# Patient Record
Sex: FEMALE | Race: BLACK OR AFRICAN AMERICAN | NOT HISPANIC OR LATINO
[De-identification: names, ages, dates, MRNs, and addresses within clinical notes are randomized per-mention and may not be internally consistent; named-entity substitution may affect disease eponyms.]

---

## 2020-01-27 PROBLEM — Z00.00 ENCOUNTER FOR PREVENTIVE HEALTH EXAMINATION: Status: ACTIVE | Noted: 2020-01-27

## 2020-01-28 PROBLEM — R87.619 ATYPICAL GLANDULAR CELLS OF UNDETERMINED SIGNIFICANCE (AGUS) ON CERVICAL PAP SMEAR: Status: ACTIVE | Noted: 2020-01-28

## 2020-01-28 NOTE — PHYSICAL EXAM
[Normal] : Bimanual Exam: Normal [Fully active, able to carry on all pre-disease performance without restriction] : Status 0 - Fully active, able to carry on all pre-disease performance without restriction

## 2020-01-29 ENCOUNTER — APPOINTMENT (OUTPATIENT)
Dept: GYNECOLOGIC ONCOLOGY | Facility: CLINIC | Age: 55
End: 2020-01-29
Payer: COMMERCIAL

## 2020-01-29 VITALS
BODY MASS INDEX: 40.98 KG/M2 | SYSTOLIC BLOOD PRESSURE: 110 MMHG | WEIGHT: 246 LBS | HEIGHT: 65 IN | OXYGEN SATURATION: 99 % | HEART RATE: 82 BPM | DIASTOLIC BLOOD PRESSURE: 90 MMHG

## 2020-01-29 DIAGNOSIS — Z80.0 FAMILY HISTORY OF MALIGNANT NEOPLASM OF DIGESTIVE ORGANS: ICD-10-CM

## 2020-01-29 DIAGNOSIS — R93.89 ABNORMAL FINDINGS ON DIAGNOSTIC IMAGING OF OTHER SPECIFIED BODY STRUCTURES: ICD-10-CM

## 2020-01-29 DIAGNOSIS — R87.619 UNSPECIFIED ABNORMAL CYTOLOGICAL FINDINGS IN SPECIMENS FROM CERVIX UTERI: ICD-10-CM

## 2020-01-29 DIAGNOSIS — Z78.9 OTHER SPECIFIED HEALTH STATUS: ICD-10-CM

## 2020-01-29 PROCEDURE — 99202 OFFICE O/P NEW SF 15 MIN: CPT | Mod: 25

## 2020-01-29 PROCEDURE — 58100 BIOPSY OF UTERUS LINING: CPT

## 2020-01-29 NOTE — CHIEF COMPLAINT
[FreeTextEntry1] : 55 y/o referred from Dr. Matt Marshall for abnormal pap smear revealing BETI, HPV + and thick endometrium for further evaluation. She reports monthly spotting, unclear if rasheed-menopausal. Last episode was in October. \par \par PAP was performed September 2019 which demosntrated BETI, hrHPV + (16/18 negative). Oct 2019: patient had an ECC (not with Dr. Marshall) which was normal. \par \par OBHx: P0\par GYNHx: Fibroids\par PMHx: Asthma\par Sx: endometrial polypectomy 2008\par MedHx: No meds\par Allergies: NKDA\par SocialHx: , single, no toxic habits\par FamHx: rectal cancer- mother\par \par Health Maintenance \par Last Pap- as above\par Last Mammogram- Nov 2019\par Colonoscopy- 2016

## 2020-01-29 NOTE — ASSESSMENT
[FreeTextEntry1] : Atypical glandular cells of undetermined significance (BETI) on cervical Pap smear and thickened endometrium.\par \par As per ASCCP recommedations colposcopy and Endometrial sampling is recommended. Importance of both procedures discussed to r/o precancerous/cancerous lesions. \par \par Endometrial biopsy risks including bleeding, infections and possible uterine perforation discussed. Pt verbalized understanding and gave verbal consent\par \par Colposcopy performed previously with ECC. Visual inspection today normal. \par \par [] EMB performed successfully, hemostasis achieved \par [] Will call w/ results. If results are negative recommend co-testing in 1 year.\par

## 2020-01-29 NOTE — PROCEDURE
[Endometrial Biopsy] : an endometrial biopsy [Other: ___] : [unfilled] [Patient] : the patient [Betadine] : betadine [Yes] : the specimen was sent to pathology [No Complications] : none [Tolerated Well] : the patient tolerated the procedure well [FreeTextEntry1] : Pipelle introduced to 8 cm. Two passes, adequate tissue.

## 2020-01-29 NOTE — HISTORY OF PRESENT ILLNESS
[FreeTextEntry1] : PROBLEM LIST \par 1. BETI, HPV positive\par 2. Thick endometrium \par \par [] Cotesting: Atyical glandular cells, NOS, HPV HR Detected. 16/18 NOT Detected\par \par [] Pelvic US 11/20/2019 \par     a) uterus, anteverted 5.8 x 3.2 x 4.5cm\par     b) Multiple fibroids, largest 2.3 x 2.1 x 2.4cm\par     c) Endometrial stripe measures 1cm in thickness\par \par [] MRI Pelvis 1/14/2020\par     a) Endometrial stripe 1.4cm w/ mild cystic changes. No discrete endometrial polyp or mass identified\par     b) multiple small fibrouds\par     c) No lymphadenopathy in pelvis

## 2020-05-20 ENCOUNTER — OUTPATIENT (OUTPATIENT)
Dept: OUTPATIENT SERVICES | Facility: HOSPITAL | Age: 55
LOS: 1 days | Discharge: HOME | End: 2020-05-20

## 2020-05-20 DIAGNOSIS — C54.1 MALIGNANT NEOPLASM OF ENDOMETRIUM: ICD-10-CM

## 2020-05-21 ENCOUNTER — APPOINTMENT (OUTPATIENT)
Dept: HEMATOLOGY ONCOLOGY | Facility: CLINIC | Age: 55
End: 2020-05-21
Payer: COMMERCIAL

## 2020-05-21 ENCOUNTER — APPOINTMENT (OUTPATIENT)
Dept: HEMATOLOGY ONCOLOGY | Facility: CLINIC | Age: 55
End: 2020-05-21

## 2020-05-21 PROCEDURE — 99205 OFFICE O/P NEW HI 60 MIN: CPT | Mod: 95

## 2020-05-22 NOTE — CONSULT LETTER
[Dear  ___] : Dear  [unfilled], [Consult Letter:] : I had the pleasure of evaluating your patient, [unfilled]. [Consult Closing:] : Thank you very much for allowing me to participate in the care of this patient.  If you have any questions, please do not hesitate to contact me. [Sincerely,] : Sincerely, [DrRobin  ___] : Dr. MEZA

## 2020-05-26 ENCOUNTER — APPOINTMENT (OUTPATIENT)
Dept: HEMATOLOGY ONCOLOGY | Facility: CLINIC | Age: 55
End: 2020-05-26

## 2020-05-27 ENCOUNTER — RESULT REVIEW (OUTPATIENT)
Age: 55
End: 2020-05-27

## 2020-05-27 ENCOUNTER — OUTPATIENT (OUTPATIENT)
Dept: OUTPATIENT SERVICES | Facility: HOSPITAL | Age: 55
LOS: 1 days | End: 2020-05-27
Payer: COMMERCIAL

## 2020-05-27 ENCOUNTER — APPOINTMENT (OUTPATIENT)
Dept: HEMATOLOGY ONCOLOGY | Facility: CLINIC | Age: 55
End: 2020-05-27

## 2020-05-27 DIAGNOSIS — Z11.59 ENCOUNTER FOR SCREENING FOR OTHER VIRAL DISEASES: ICD-10-CM

## 2020-05-27 LAB
BASOPHILS # BLD AUTO: 0.01 K/UL — SIGNIFICANT CHANGE UP (ref 0–0.2)
BASOPHILS NFR BLD AUTO: 0.2 % — SIGNIFICANT CHANGE UP (ref 0–2)
EOSINOPHIL # BLD AUTO: 0.11 K/UL — SIGNIFICANT CHANGE UP (ref 0–0.5)
EOSINOPHIL NFR BLD AUTO: 2.1 % — SIGNIFICANT CHANGE UP (ref 0–6)
HCT VFR BLD CALC: 36.8 % — SIGNIFICANT CHANGE UP (ref 34.5–45)
HGB BLD-MCNC: 12.2 G/DL — SIGNIFICANT CHANGE UP (ref 11.5–15.5)
IMM GRANULOCYTES NFR BLD AUTO: 0.2 % — SIGNIFICANT CHANGE UP (ref 0–1.5)
LYMPHOCYTES # BLD AUTO: 2.46 K/UL — SIGNIFICANT CHANGE UP (ref 1–3.3)
LYMPHOCYTES # BLD AUTO: 46.6 % — HIGH (ref 13–44)
MCHC RBC-ENTMCNC: 27.4 PG — SIGNIFICANT CHANGE UP (ref 27–34)
MCHC RBC-ENTMCNC: 33.2 GM/DL — SIGNIFICANT CHANGE UP (ref 32–36)
MCV RBC AUTO: 82.7 FL — SIGNIFICANT CHANGE UP (ref 80–100)
MONOCYTES # BLD AUTO: 0.58 K/UL — SIGNIFICANT CHANGE UP (ref 0–0.9)
MONOCYTES NFR BLD AUTO: 11 % — SIGNIFICANT CHANGE UP (ref 2–14)
NEUTROPHILS # BLD AUTO: 2.11 K/UL — SIGNIFICANT CHANGE UP (ref 1.8–7.4)
NEUTROPHILS NFR BLD AUTO: 39.9 % — LOW (ref 43–77)
NRBC # BLD: 0 /100 WBCS — SIGNIFICANT CHANGE UP (ref 0–0)
PLATELET # BLD AUTO: 212 K/UL — SIGNIFICANT CHANGE UP (ref 150–400)
RBC # BLD: 4.45 M/UL — SIGNIFICANT CHANGE UP (ref 3.8–5.2)
RBC # FLD: 14 % — SIGNIFICANT CHANGE UP (ref 10.3–14.5)
WBC # BLD: 5.28 K/UL — SIGNIFICANT CHANGE UP (ref 3.8–10.5)
WBC # FLD AUTO: 5.28 K/UL — SIGNIFICANT CHANGE UP (ref 3.8–10.5)

## 2020-05-27 PROCEDURE — U0003: CPT

## 2020-05-29 ENCOUNTER — OUTPATIENT (OUTPATIENT)
Dept: OUTPATIENT SERVICES | Facility: HOSPITAL | Age: 55
LOS: 1 days | End: 2020-05-29
Payer: COMMERCIAL

## 2020-05-29 ENCOUNTER — RESULT REVIEW (OUTPATIENT)
Age: 55
End: 2020-05-29

## 2020-05-29 DIAGNOSIS — C55 MALIGNANT NEOPLASM OF UTERUS, PART UNSPECIFIED: ICD-10-CM

## 2020-05-29 PROCEDURE — 36561 INSERT TUNNELED CV CATH: CPT

## 2020-05-29 PROCEDURE — C1769: CPT

## 2020-05-29 PROCEDURE — 77001 FLUOROGUIDE FOR VEIN DEVICE: CPT | Mod: 26

## 2020-05-29 PROCEDURE — 76937 US GUIDE VASCULAR ACCESS: CPT

## 2020-05-29 PROCEDURE — C1788: CPT

## 2020-05-29 PROCEDURE — 76937 US GUIDE VASCULAR ACCESS: CPT | Mod: 26

## 2020-05-29 PROCEDURE — C1894: CPT

## 2020-05-29 PROCEDURE — 77001 FLUOROGUIDE FOR VEIN DEVICE: CPT

## 2020-05-29 NOTE — PROGRESS NOTE ADULT - SUBJECTIVE AND OBJECTIVE BOX
Interventional Radiology Pre-Procedure Note    This is a 54y Female with Uterine Ca presenting for mediport placement for chemotherapy    Procedure: Mediport placement for chemotherapy    Diagnosis/Indication: Patient is a 54y old  Female who presents with a chief complaint of Uterine Ca    PAST MEDICAL & SURGICAL HISTORY:       Allergies: No Known Allergies      LABS:  CBC Full  -  ( 27 May 2020 16:23 )  WBC Count : 5.28 K/uL  RBC Count : 4.45 M/uL  Hemoglobin : 12.2 g/dL  Hematocrit : 36.8 %  Platelet Count - Automated : 212 K/uL  Mean Cell Volume : 82.7 fl  Mean Cell Hemoglobin : 27.4 pg  Mean Cell Hemoglobin Concentration : 33.2 gm/dL  Auto Neutrophil # : 2.11 K/uL  Auto Lymphocyte # : 2.46 K/uL  Auto Monocyte # : 0.58 K/uL  Auto Eosinophil # : 0.11 K/uL  Auto Basophil # : 0.01 K/uL  Auto Neutrophil % : 39.9 %  Auto Lymphocyte % : 46.6 %  Auto Monocyte % : 11.0 %  Auto Eosinophil % : 2.1 %  Auto Basophil % : 0.2 %              Procedure/ risks/ benefits/ alternatives were explained, informed consent obtained from patient, verbalizes understanding.

## 2020-06-03 ENCOUNTER — RESULT REVIEW (OUTPATIENT)
Age: 55
End: 2020-06-03

## 2020-06-03 ENCOUNTER — APPOINTMENT (OUTPATIENT)
Dept: INFUSION THERAPY | Facility: HOSPITAL | Age: 55
End: 2020-06-03

## 2020-06-03 DIAGNOSIS — C55 MALIGNANT NEOPLASM OF UTERUS, PART UNSPECIFIED: ICD-10-CM

## 2020-06-03 DIAGNOSIS — Z45.2 ENCOUNTER FOR ADJUSTMENT AND MANAGEMENT OF VASCULAR ACCESS DEVICE: ICD-10-CM

## 2020-06-03 LAB
BASOPHILS # BLD AUTO: 0 K/UL — SIGNIFICANT CHANGE UP (ref 0–0.2)
BASOPHILS NFR BLD AUTO: 0 % — SIGNIFICANT CHANGE UP (ref 0–2)
EOSINOPHIL # BLD AUTO: 0 K/UL — SIGNIFICANT CHANGE UP (ref 0–0.5)
EOSINOPHIL NFR BLD AUTO: 0 % — SIGNIFICANT CHANGE UP (ref 0–6)
HCT VFR BLD CALC: 38.2 % — SIGNIFICANT CHANGE UP (ref 34.5–45)
HGB BLD-MCNC: 12.6 G/DL — SIGNIFICANT CHANGE UP (ref 11.5–15.5)
IMM GRANULOCYTES NFR BLD AUTO: 0.8 % — SIGNIFICANT CHANGE UP (ref 0–1.5)
LYMPHOCYTES # BLD AUTO: 1.3 K/UL — SIGNIFICANT CHANGE UP (ref 1–3.3)
LYMPHOCYTES # BLD AUTO: 20.1 % — SIGNIFICANT CHANGE UP (ref 13–44)
MCHC RBC-ENTMCNC: 27.6 PG — SIGNIFICANT CHANGE UP (ref 27–34)
MCHC RBC-ENTMCNC: 33 GM/DL — SIGNIFICANT CHANGE UP (ref 32–36)
MCV RBC AUTO: 83.8 FL — SIGNIFICANT CHANGE UP (ref 80–100)
MONOCYTES # BLD AUTO: 0.08 K/UL — SIGNIFICANT CHANGE UP (ref 0–0.9)
MONOCYTES NFR BLD AUTO: 1.2 % — LOW (ref 2–14)
NEUTROPHILS # BLD AUTO: 5.03 K/UL — SIGNIFICANT CHANGE UP (ref 1.8–7.4)
NEUTROPHILS NFR BLD AUTO: 77.9 % — HIGH (ref 43–77)
NRBC # BLD: 0 /100 WBCS — SIGNIFICANT CHANGE UP (ref 0–0)
PLATELET # BLD AUTO: 214 K/UL — SIGNIFICANT CHANGE UP (ref 150–400)
RBC # BLD: 4.56 M/UL — SIGNIFICANT CHANGE UP (ref 3.8–5.2)
RBC # FLD: 13.8 % — SIGNIFICANT CHANGE UP (ref 10.3–14.5)
WBC # BLD: 6.46 K/UL — SIGNIFICANT CHANGE UP (ref 3.8–10.5)
WBC # FLD AUTO: 6.46 K/UL — SIGNIFICANT CHANGE UP (ref 3.8–10.5)

## 2020-06-04 DIAGNOSIS — R11.2 NAUSEA WITH VOMITING, UNSPECIFIED: ICD-10-CM

## 2020-06-04 DIAGNOSIS — Z51.11 ENCOUNTER FOR ANTINEOPLASTIC CHEMOTHERAPY: ICD-10-CM

## 2020-06-05 LAB
ALBUMIN SERPL ELPH-MCNC: 4.3 G/DL
ALP BLD-CCNC: 91 U/L
ALT SERPL-CCNC: 25 U/L
ANION GAP SERPL CALC-SCNC: 13 MMOL/L
APTT BLD: 30.2 SEC
AST SERPL-CCNC: 21 U/L
BILIRUB SERPL-MCNC: 0.3 MG/DL
BUN SERPL-MCNC: 13 MG/DL
CALCIUM SERPL-MCNC: 9.4 MG/DL
CANCER AG125 SERPL-ACNC: 28 U/ML
CEA SERPL-MCNC: 1.9 NG/ML
CHLORIDE SERPL-SCNC: 104 MMOL/L
CO2 SERPL-SCNC: 23 MMOL/L
CREAT SERPL-MCNC: 0.99 MG/DL
GLUCOSE SERPL-MCNC: 87 MG/DL
HAV IGM SER QL: NONREACTIVE
HBV CORE IGM SER QL: NONREACTIVE
HBV SURFACE AG SER QL: NONREACTIVE
HCV AB SER QL: NONREACTIVE
HCV S/CO RATIO: 0.12 S/CO
INR PPP: 1 RATIO
MAGNESIUM SERPL-MCNC: 2 MG/DL
POTASSIUM SERPL-SCNC: 4.2 MMOL/L
PROT SERPL-MCNC: 7.2 G/DL
PT BLD: 11.4 SEC
SODIUM SERPL-SCNC: 140 MMOL/L

## 2020-06-10 ENCOUNTER — APPOINTMENT (OUTPATIENT)
Dept: INFUSION THERAPY | Facility: HOSPITAL | Age: 55
End: 2020-06-10

## 2020-06-10 ENCOUNTER — LABORATORY RESULT (OUTPATIENT)
Age: 55
End: 2020-06-10

## 2020-06-10 ENCOUNTER — RESULT REVIEW (OUTPATIENT)
Age: 55
End: 2020-06-10

## 2020-06-10 ENCOUNTER — APPOINTMENT (OUTPATIENT)
Dept: HEMATOLOGY ONCOLOGY | Facility: CLINIC | Age: 55
End: 2020-06-10
Payer: COMMERCIAL

## 2020-06-10 VITALS
DIASTOLIC BLOOD PRESSURE: 88 MMHG | HEART RATE: 103 BPM | RESPIRATION RATE: 17 BRPM | OXYGEN SATURATION: 98 % | TEMPERATURE: 99.3 F | BODY MASS INDEX: 41.2 KG/M2 | SYSTOLIC BLOOD PRESSURE: 136 MMHG | WEIGHT: 247.58 LBS

## 2020-06-10 LAB
BASOPHILS # BLD AUTO: 0 K/UL — SIGNIFICANT CHANGE UP (ref 0–0.2)
BASOPHILS NFR BLD AUTO: 0 % — SIGNIFICANT CHANGE UP (ref 0–2)
EOSINOPHIL # BLD AUTO: 0.04 K/UL — SIGNIFICANT CHANGE UP (ref 0–0.5)
EOSINOPHIL NFR BLD AUTO: 2 % — SIGNIFICANT CHANGE UP (ref 0–6)
HCT VFR BLD CALC: 39.3 % — SIGNIFICANT CHANGE UP (ref 34.5–45)
HGB BLD-MCNC: 12.9 G/DL — SIGNIFICANT CHANGE UP (ref 11.5–15.5)
LYMPHOCYTES # BLD AUTO: 1.3 K/UL — SIGNIFICANT CHANGE UP (ref 1–3.3)
LYMPHOCYTES # BLD AUTO: 62 % — HIGH (ref 13–44)
MCHC RBC-ENTMCNC: 27.6 PG — SIGNIFICANT CHANGE UP (ref 27–34)
MCHC RBC-ENTMCNC: 32.8 GM/DL — SIGNIFICANT CHANGE UP (ref 32–36)
MCV RBC AUTO: 84 FL — SIGNIFICANT CHANGE UP (ref 80–100)
MONOCYTES # BLD AUTO: 0.13 K/UL — SIGNIFICANT CHANGE UP (ref 0–0.9)
MONOCYTES NFR BLD AUTO: 6 % — SIGNIFICANT CHANGE UP (ref 2–14)
NEUTROPHILS # BLD AUTO: 0.63 K/UL — LOW (ref 1.8–7.4)
NEUTROPHILS NFR BLD AUTO: 30 % — LOW (ref 43–77)
NRBC # BLD: 0 /100 — SIGNIFICANT CHANGE UP (ref 0–0)
NRBC # BLD: SIGNIFICANT CHANGE UP /100 WBCS (ref 0–0)
PLAT MORPH BLD: NORMAL — SIGNIFICANT CHANGE UP
PLATELET # BLD AUTO: 127 K/UL — LOW (ref 150–400)
RBC # BLD: 4.68 M/UL — SIGNIFICANT CHANGE UP (ref 3.8–5.2)
RBC # FLD: 13.2 % — SIGNIFICANT CHANGE UP (ref 10.3–14.5)
RBC BLD AUTO: SIGNIFICANT CHANGE UP
WBC # BLD: 2.1 K/UL — LOW (ref 3.8–10.5)
WBC # FLD AUTO: 2.1 K/UL — LOW (ref 3.8–10.5)

## 2020-06-10 PROCEDURE — 99214 OFFICE O/P EST MOD 30 MIN: CPT

## 2020-06-13 ENCOUNTER — RESULT REVIEW (OUTPATIENT)
Age: 55
End: 2020-06-13

## 2020-06-13 ENCOUNTER — LABORATORY RESULT (OUTPATIENT)
Age: 55
End: 2020-06-13

## 2020-06-13 ENCOUNTER — APPOINTMENT (OUTPATIENT)
Dept: INFUSION THERAPY | Facility: HOSPITAL | Age: 55
End: 2020-06-13

## 2020-06-13 LAB
BASOPHILS # BLD AUTO: 0 K/UL — SIGNIFICANT CHANGE UP (ref 0–0.2)
BASOPHILS NFR BLD AUTO: 0 % — SIGNIFICANT CHANGE UP (ref 0–2)
EOSINOPHIL # BLD AUTO: 0.04 K/UL — SIGNIFICANT CHANGE UP (ref 0–0.5)
EOSINOPHIL NFR BLD AUTO: 2 % — SIGNIFICANT CHANGE UP (ref 0–6)
HCT VFR BLD CALC: 32.6 % — LOW (ref 34.5–45)
HGB BLD-MCNC: 11 G/DL — LOW (ref 11.5–15.5)
LYMPHOCYTES # BLD AUTO: 1.28 K/UL — SIGNIFICANT CHANGE UP (ref 1–3.3)
LYMPHOCYTES # BLD AUTO: 69 % — HIGH (ref 13–44)
MCHC RBC-ENTMCNC: 28.1 PG — SIGNIFICANT CHANGE UP (ref 27–34)
MCHC RBC-ENTMCNC: 33.7 GM/DL — SIGNIFICANT CHANGE UP (ref 32–36)
MCV RBC AUTO: 83.2 FL — SIGNIFICANT CHANGE UP (ref 80–100)
MONOCYTES # BLD AUTO: 0.33 K/UL — SIGNIFICANT CHANGE UP (ref 0–0.9)
MONOCYTES NFR BLD AUTO: 18 % — HIGH (ref 2–14)
NEUTROPHILS # BLD AUTO: 0.2 K/UL — LOW (ref 1.8–7.4)
NEUTROPHILS NFR BLD AUTO: 11 % — LOW (ref 43–77)
NRBC # BLD: 0 /100 — SIGNIFICANT CHANGE UP (ref 0–0)
NRBC # BLD: SIGNIFICANT CHANGE UP /100 WBCS (ref 0–0)
PLAT MORPH BLD: NORMAL — SIGNIFICANT CHANGE UP
PLATELET # BLD AUTO: 101 K/UL — LOW (ref 150–400)
RBC # BLD: 3.92 M/UL — SIGNIFICANT CHANGE UP (ref 3.8–5.2)
RBC # FLD: 12.8 % — SIGNIFICANT CHANGE UP (ref 10.3–14.5)
RBC BLD AUTO: SIGNIFICANT CHANGE UP
WBC # BLD: 1.86 K/UL — LOW (ref 3.8–10.5)
WBC # FLD AUTO: 1.86 K/UL — LOW (ref 3.8–10.5)

## 2020-06-15 DIAGNOSIS — E86.0 DEHYDRATION: ICD-10-CM

## 2020-06-15 DIAGNOSIS — D70.9 NEUTROPENIA, UNSPECIFIED: ICD-10-CM

## 2020-06-15 NOTE — HISTORY OF PRESENT ILLNESS
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 Patient was seen initially by Dr. Arciniega at [place].\par She then saw Dr Robin Zamorano, at [facility] and had a repeat biopsy that showed clear cell carcinoma.\par On 4/20/2020 she underwent surgical staging and is post TLH/BSO/SLND/Omentectomy.\par Final pathology showed stage IA,high grade endometrial carcinoma involving predominantly clear cell and focally serous features, no invasion, no LVSI.\par \par Ct C/A/P with contrast done on 3/12/2020 showed no metastatic sites. [FreeTextEntry1] : Carboplatin and Taxol\par C1 - 6/3/2020\par  [de-identified] : Patient here for follow up after first cycle of chemotherapy.  She complains of painful neuropathy with numbness to the soles of both feet with slight unsteadiness while walking and numbness of fingertips.  She also had lower extremities arthralgias for a few days starting day 3 which resolved.  She has no difficulty using her hands.  She has decreased appetite and reports losing over 10lbs in one week according to her scale at home.  She tries to eat but says nothing tastes good and cannot take more than 1 or 2 bites.  She has been taking PO fluids and some juices that her brother has made for her.  She had constipation for 1-2 days, used prune juice for relief.  She denies fever, chills, chest pain, SOB, abdominal pain, nausea, vomiting, diarrhea, bleeding.

## 2020-06-15 NOTE — ASSESSMENT
[Curative] : Goals of care discussed with patient: Curative [Palliative Care Plan] : not applicable at this time [FreeTextEntry1] : 54 year old woman with uterine cancer on chemotherapy with Carboplatin and Taxol.\par She completed her first cycle and tolerated it fairly.\par Neuropathy - patient to call next week with update of symptoms, declines gabapentin at this time.  Will reduce Taxol for next cycle.\par Decreased appetite/weight loss- will get update of symptoms next week.  if not recovered may need to reduce Carboplatin dose as well and may refer to Dr. Jose Enrique Ervin.\par Labs today.\par Constipation -bowel regimen discussed.\par RTC 3 weeks.

## 2020-06-15 NOTE — REVIEW OF SYSTEMS
[Negative] : Heme/Lymph [Fatigue] : fatigue [Recent Change In Weight] : ~T recent weight change [Constipation] : constipation [de-identified] : neuropathy

## 2020-06-16 ENCOUNTER — RESULT REVIEW (OUTPATIENT)
Age: 55
End: 2020-06-16

## 2020-06-16 ENCOUNTER — APPOINTMENT (OUTPATIENT)
Dept: INFUSION THERAPY | Facility: HOSPITAL | Age: 55
End: 2020-06-16

## 2020-06-16 ENCOUNTER — APPOINTMENT (OUTPATIENT)
Dept: HEMATOLOGY ONCOLOGY | Facility: CLINIC | Age: 55
End: 2020-06-16

## 2020-06-16 LAB
BASOPHILS # BLD AUTO: 0.03 K/UL — SIGNIFICANT CHANGE UP (ref 0–0.2)
BASOPHILS NFR BLD AUTO: 1 % — SIGNIFICANT CHANGE UP (ref 0–2)
EOSINOPHIL # BLD AUTO: 0.17 K/UL — SIGNIFICANT CHANGE UP (ref 0–0.5)
EOSINOPHIL NFR BLD AUTO: 6 % — SIGNIFICANT CHANGE UP (ref 0–6)
HCT VFR BLD CALC: 32.4 % — LOW (ref 34.5–45)
HGB BLD-MCNC: 11.3 G/DL — LOW (ref 11.5–15.5)
LYMPHOCYTES # BLD AUTO: 1.84 K/UL — SIGNIFICANT CHANGE UP (ref 1–3.3)
LYMPHOCYTES # BLD AUTO: 66 % — HIGH (ref 13–44)
MCHC RBC-ENTMCNC: 28.1 PG — SIGNIFICANT CHANGE UP (ref 27–34)
MCHC RBC-ENTMCNC: 34.9 GM/DL — SIGNIFICANT CHANGE UP (ref 32–36)
MCV RBC AUTO: 80.6 FL — SIGNIFICANT CHANGE UP (ref 80–100)
MONOCYTES # BLD AUTO: 0.64 K/UL — SIGNIFICANT CHANGE UP (ref 0–0.9)
MONOCYTES NFR BLD AUTO: 23 % — HIGH (ref 2–14)
NEUTROPHILS # BLD AUTO: 0.11 K/UL — LOW (ref 1.8–7.4)
NEUTROPHILS NFR BLD AUTO: 4 % — LOW (ref 43–77)
NRBC # BLD: 0 /100 — SIGNIFICANT CHANGE UP (ref 0–0)
NRBC # BLD: SIGNIFICANT CHANGE UP /100 WBCS (ref 0–0)
PLAT MORPH BLD: NORMAL — SIGNIFICANT CHANGE UP
PLATELET # BLD AUTO: 99 K/UL — LOW (ref 150–400)
RBC # BLD: 4.02 M/UL — SIGNIFICANT CHANGE UP (ref 3.8–5.2)
RBC # FLD: 12.6 % — SIGNIFICANT CHANGE UP (ref 10.3–14.5)
RBC BLD AUTO: SIGNIFICANT CHANGE UP
WBC # BLD: 2.79 K/UL — LOW (ref 3.8–10.5)
WBC # FLD AUTO: 2.79 K/UL — LOW (ref 3.8–10.5)

## 2020-06-17 ENCOUNTER — APPOINTMENT (OUTPATIENT)
Dept: INFUSION THERAPY | Facility: HOSPITAL | Age: 55
End: 2020-06-17

## 2020-06-17 DIAGNOSIS — Z51.89 ENCOUNTER FOR OTHER SPECIFIED AFTERCARE: ICD-10-CM

## 2020-06-18 ENCOUNTER — APPOINTMENT (OUTPATIENT)
Dept: INFUSION THERAPY | Facility: HOSPITAL | Age: 55
End: 2020-06-18

## 2020-06-22 ENCOUNTER — OUTPATIENT (OUTPATIENT)
Dept: OUTPATIENT SERVICES | Facility: HOSPITAL | Age: 55
LOS: 1 days | Discharge: HOME | End: 2020-06-22

## 2020-06-22 DIAGNOSIS — C54.1 MALIGNANT NEOPLASM OF ENDOMETRIUM: ICD-10-CM

## 2020-06-24 ENCOUNTER — RESULT REVIEW (OUTPATIENT)
Age: 55
End: 2020-06-24

## 2020-06-24 ENCOUNTER — APPOINTMENT (OUTPATIENT)
Dept: INFUSION THERAPY | Facility: HOSPITAL | Age: 55
End: 2020-06-24

## 2020-06-24 LAB
BASOPHILS # BLD AUTO: 0.02 K/UL — SIGNIFICANT CHANGE UP (ref 0–0.2)
BASOPHILS NFR BLD AUTO: 0.3 % — SIGNIFICANT CHANGE UP (ref 0–2)
EOSINOPHIL # BLD AUTO: 0.02 K/UL — SIGNIFICANT CHANGE UP (ref 0–0.5)
EOSINOPHIL NFR BLD AUTO: 0.3 % — SIGNIFICANT CHANGE UP (ref 0–6)
HCT VFR BLD CALC: 32 % — LOW (ref 34.5–45)
HGB BLD-MCNC: 10.9 G/DL — LOW (ref 11.5–15.5)
IMM GRANULOCYTES NFR BLD AUTO: 0.7 % — SIGNIFICANT CHANGE UP (ref 0–1.5)
LYMPHOCYTES # BLD AUTO: 1.76 K/UL — SIGNIFICANT CHANGE UP (ref 1–3.3)
LYMPHOCYTES # BLD AUTO: 29.7 % — SIGNIFICANT CHANGE UP (ref 13–44)
MCHC RBC-ENTMCNC: 28.2 PG — SIGNIFICANT CHANGE UP (ref 27–34)
MCHC RBC-ENTMCNC: 34.1 GM/DL — SIGNIFICANT CHANGE UP (ref 32–36)
MCV RBC AUTO: 82.9 FL — SIGNIFICANT CHANGE UP (ref 80–100)
MONOCYTES # BLD AUTO: 0.82 K/UL — SIGNIFICANT CHANGE UP (ref 0–0.9)
MONOCYTES NFR BLD AUTO: 13.8 % — SIGNIFICANT CHANGE UP (ref 2–14)
NEUTROPHILS # BLD AUTO: 3.27 K/UL — SIGNIFICANT CHANGE UP (ref 1.8–7.4)
NEUTROPHILS NFR BLD AUTO: 55.2 % — SIGNIFICANT CHANGE UP (ref 43–77)
NRBC # BLD: 0 /100 WBCS — SIGNIFICANT CHANGE UP (ref 0–0)
PLATELET # BLD AUTO: 133 K/UL — LOW (ref 150–400)
RBC # BLD: 3.86 M/UL — SIGNIFICANT CHANGE UP (ref 3.8–5.2)
RBC # FLD: 13.1 % — SIGNIFICANT CHANGE UP (ref 10.3–14.5)
WBC # BLD: 5.93 K/UL — SIGNIFICANT CHANGE UP (ref 3.8–10.5)
WBC # FLD AUTO: 5.93 K/UL — SIGNIFICANT CHANGE UP (ref 3.8–10.5)

## 2020-06-25 DIAGNOSIS — R11.2 NAUSEA WITH VOMITING, UNSPECIFIED: ICD-10-CM

## 2020-06-25 DIAGNOSIS — Z51.11 ENCOUNTER FOR ANTINEOPLASTIC CHEMOTHERAPY: ICD-10-CM

## 2020-07-01 ENCOUNTER — RESULT REVIEW (OUTPATIENT)
Age: 55
End: 2020-07-01

## 2020-07-01 ENCOUNTER — LABORATORY RESULT (OUTPATIENT)
Age: 55
End: 2020-07-01

## 2020-07-01 ENCOUNTER — APPOINTMENT (OUTPATIENT)
Dept: HEMATOLOGY ONCOLOGY | Facility: CLINIC | Age: 55
End: 2020-07-01
Payer: COMMERCIAL

## 2020-07-01 ENCOUNTER — APPOINTMENT (OUTPATIENT)
Dept: INFUSION THERAPY | Facility: HOSPITAL | Age: 55
End: 2020-07-01

## 2020-07-01 VITALS
RESPIRATION RATE: 18 BRPM | HEART RATE: 100 BPM | BODY MASS INDEX: 40.17 KG/M2 | DIASTOLIC BLOOD PRESSURE: 80 MMHG | WEIGHT: 241.41 LBS | TEMPERATURE: 98 F | OXYGEN SATURATION: 99 % | SYSTOLIC BLOOD PRESSURE: 130 MMHG

## 2020-07-01 LAB
BASOPHILS # BLD AUTO: 0.03 K/UL — SIGNIFICANT CHANGE UP (ref 0–0.2)
BASOPHILS NFR BLD AUTO: 1 % — SIGNIFICANT CHANGE UP (ref 0–2)
EOSINOPHIL # BLD AUTO: 0.16 K/UL — SIGNIFICANT CHANGE UP (ref 0–0.5)
EOSINOPHIL NFR BLD AUTO: 6 % — SIGNIFICANT CHANGE UP (ref 0–6)
HCT VFR BLD CALC: 33.4 % — LOW (ref 34.5–45)
HGB BLD-MCNC: 11 G/DL — LOW (ref 11.5–15.5)
LYMPHOCYTES # BLD AUTO: 1.51 K/UL — SIGNIFICANT CHANGE UP (ref 1–3.3)
LYMPHOCYTES # BLD AUTO: 55 % — HIGH (ref 13–44)
MCHC RBC-ENTMCNC: 28 PG — SIGNIFICANT CHANGE UP (ref 27–34)
MCHC RBC-ENTMCNC: 32.9 GM/DL — SIGNIFICANT CHANGE UP (ref 32–36)
MCV RBC AUTO: 85 FL — SIGNIFICANT CHANGE UP (ref 80–100)
MONOCYTES # BLD AUTO: 0.08 K/UL — SIGNIFICANT CHANGE UP (ref 0–0.9)
MONOCYTES NFR BLD AUTO: 3 % — SIGNIFICANT CHANGE UP (ref 2–14)
NEUTROPHILS # BLD AUTO: 0.96 K/UL — LOW (ref 1.8–7.4)
NEUTROPHILS NFR BLD AUTO: 35 % — LOW (ref 43–77)
NRBC # BLD: 0 /100 — SIGNIFICANT CHANGE UP (ref 0–0)
NRBC # BLD: SIGNIFICANT CHANGE UP /100 WBCS (ref 0–0)
PLAT MORPH BLD: NORMAL — SIGNIFICANT CHANGE UP
PLATELET # BLD AUTO: 156 K/UL — SIGNIFICANT CHANGE UP (ref 150–400)
RBC # BLD: 3.93 M/UL — SIGNIFICANT CHANGE UP (ref 3.8–5.2)
RBC # FLD: 12.9 % — SIGNIFICANT CHANGE UP (ref 10.3–14.5)
RBC BLD AUTO: SIGNIFICANT CHANGE UP
WBC # BLD: 2.74 K/UL — LOW (ref 3.8–10.5)
WBC # FLD AUTO: 2.74 K/UL — LOW (ref 3.8–10.5)

## 2020-07-01 PROCEDURE — 99214 OFFICE O/P EST MOD 30 MIN: CPT

## 2020-07-01 NOTE — HISTORY OF PRESENT ILLNESS
[Disease: _____________________] : Disease: [unfilled] [AJCC Stage: ____] : AJCC Stage: [unfilled] [Home] : at home, [unfilled] , at the time of the visit. 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 Patient was seen initially by Dr. Arciniega at [facility].\par She then saw Dr Robin Zamorano, at [facility] and had a repeat biopsy that showed clear cell carcinoma.\par On 4/20/2020 she underwent surgical staging and is post TLH/BSO/SLND/Omentectomy.\par Final pathology showed stage IA,high grade endometrial carcinoma involving predominantly clear cell and focally serous features, no invasion, no LVSI.\par \par Ct C/A/P with contrast done on 3/12/2020 showed no metastatic sites. [de-identified] : Patient has recovered well from surgery and is working full time.\par She has some constipation, she is on laxatives.\par She has a fissure, seeing GI.\par She works, single , no children.\par +0\par No Smoke or drink\par Menarche: 12\par Menopause: 46\par No OC or HRT.

## 2020-07-06 NOTE — HISTORY OF PRESENT ILLNESS
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 Patient was seen initially by Dr. Arciniega at [facility].\par She then saw Dr Robin Zamorano, at [facility] and had a repeat biopsy that showed clear cell carcinoma.\par On 4/20/2020 she underwent surgical staging and is post TLH/BSO/SLND/Omentectomy.\par Final pathology showed stage IA,high grade endometrial carcinoma involving predominantly clear cell and focally serous features, no invasion, no LVSI.\par \par Ct C/A/P with contrast done on 3/12/2020 showed no metastatic sites. [FreeTextEntry1] : Carboplatin and Taxol\par C1 - 6/3/2020\par C2- 6/24/20\par  [de-identified] : Patient struggling with moderate side effects from the chemotherapy.\par She c.o arthralgias and generalized weakness.\par She has some loss of appetite, constipation, relieved with laxatives.

## 2020-07-06 NOTE — REVIEW OF SYSTEMS
[Fatigue] : fatigue [Recent Change In Weight] : ~T recent weight change [Constipation] : constipation [Negative] : Allergic/Immunologic [de-identified] : neuropathy

## 2020-07-13 ENCOUNTER — APPOINTMENT (OUTPATIENT)
Dept: INFUSION THERAPY | Facility: HOSPITAL | Age: 55
End: 2020-07-13

## 2020-07-13 ENCOUNTER — LABORATORY RESULT (OUTPATIENT)
Age: 55
End: 2020-07-13

## 2020-07-15 ENCOUNTER — RESULT REVIEW (OUTPATIENT)
Age: 55
End: 2020-07-15

## 2020-07-15 ENCOUNTER — APPOINTMENT (OUTPATIENT)
Dept: INFUSION THERAPY | Facility: HOSPITAL | Age: 55
End: 2020-07-15

## 2020-07-15 LAB
BASOPHILS # BLD AUTO: 0.01 K/UL — SIGNIFICANT CHANGE UP (ref 0–0.2)
BASOPHILS NFR BLD AUTO: 0.2 % — SIGNIFICANT CHANGE UP (ref 0–2)
EOSINOPHIL # BLD AUTO: 0.01 K/UL — SIGNIFICANT CHANGE UP (ref 0–0.5)
EOSINOPHIL NFR BLD AUTO: 0.2 % — SIGNIFICANT CHANGE UP (ref 0–6)
HCT VFR BLD CALC: 28.2 % — LOW (ref 34.5–45)
HGB BLD-MCNC: 9.5 G/DL — LOW (ref 11.5–15.5)
IMM GRANULOCYTES NFR BLD AUTO: 0.4 % — SIGNIFICANT CHANGE UP (ref 0–1.5)
LYMPHOCYTES # BLD AUTO: 1.89 K/UL — SIGNIFICANT CHANGE UP (ref 1–3.3)
LYMPHOCYTES # BLD AUTO: 39.5 % — SIGNIFICANT CHANGE UP (ref 13–44)
MCHC RBC-ENTMCNC: 28.2 PG — SIGNIFICANT CHANGE UP (ref 27–34)
MCHC RBC-ENTMCNC: 33.7 GM/DL — SIGNIFICANT CHANGE UP (ref 32–36)
MCV RBC AUTO: 83.7 FL — SIGNIFICANT CHANGE UP (ref 80–100)
MONOCYTES # BLD AUTO: 0.41 K/UL — SIGNIFICANT CHANGE UP (ref 0–0.9)
MONOCYTES NFR BLD AUTO: 8.6 % — SIGNIFICANT CHANGE UP (ref 2–14)
NEUTROPHILS # BLD AUTO: 2.44 K/UL — SIGNIFICANT CHANGE UP (ref 1.8–7.4)
NEUTROPHILS NFR BLD AUTO: 51.1 % — SIGNIFICANT CHANGE UP (ref 43–77)
NRBC # BLD: 0 /100 WBCS — SIGNIFICANT CHANGE UP (ref 0–0)
PLATELET # BLD AUTO: 74 K/UL — LOW (ref 150–400)
RBC # BLD: 3.37 M/UL — LOW (ref 3.8–5.2)
RBC # FLD: 14 % — SIGNIFICANT CHANGE UP (ref 10.3–14.5)
WBC # BLD: 4.78 K/UL — SIGNIFICANT CHANGE UP (ref 3.8–10.5)
WBC # FLD AUTO: 4.78 K/UL — SIGNIFICANT CHANGE UP (ref 3.8–10.5)

## 2020-07-22 ENCOUNTER — RESULT REVIEW (OUTPATIENT)
Age: 55
End: 2020-07-22

## 2020-07-22 ENCOUNTER — LABORATORY RESULT (OUTPATIENT)
Age: 55
End: 2020-07-22

## 2020-07-22 ENCOUNTER — APPOINTMENT (OUTPATIENT)
Dept: INFUSION THERAPY | Facility: HOSPITAL | Age: 55
End: 2020-07-22

## 2020-07-22 ENCOUNTER — APPOINTMENT (OUTPATIENT)
Dept: HEMATOLOGY ONCOLOGY | Facility: CLINIC | Age: 55
End: 2020-07-22
Payer: COMMERCIAL

## 2020-07-22 LAB
BASOPHILS # BLD AUTO: 0 K/UL — SIGNIFICANT CHANGE UP (ref 0–0.2)
BASOPHILS NFR BLD AUTO: 0 % — SIGNIFICANT CHANGE UP (ref 0–2)
EOSINOPHIL # BLD AUTO: 0.04 K/UL — SIGNIFICANT CHANGE UP (ref 0–0.5)
EOSINOPHIL NFR BLD AUTO: 1 % — SIGNIFICANT CHANGE UP (ref 0–6)
HCT VFR BLD CALC: 29.6 % — LOW (ref 34.5–45)
HGB BLD-MCNC: 9.7 G/DL — LOW (ref 11.5–15.5)
IMM GRANULOCYTES NFR BLD AUTO: 0.2 % — SIGNIFICANT CHANGE UP (ref 0–1.5)
LYMPHOCYTES # BLD AUTO: 1.57 K/UL — SIGNIFICANT CHANGE UP (ref 1–3.3)
LYMPHOCYTES # BLD AUTO: 38.3 % — SIGNIFICANT CHANGE UP (ref 13–44)
MCHC RBC-ENTMCNC: 27.7 PG — SIGNIFICANT CHANGE UP (ref 27–34)
MCHC RBC-ENTMCNC: 32.8 GM/DL — SIGNIFICANT CHANGE UP (ref 32–36)
MCV RBC AUTO: 84.6 FL — SIGNIFICANT CHANGE UP (ref 80–100)
MONOCYTES # BLD AUTO: 0.35 K/UL — SIGNIFICANT CHANGE UP (ref 0–0.9)
MONOCYTES NFR BLD AUTO: 8.5 % — SIGNIFICANT CHANGE UP (ref 2–14)
NEUTROPHILS # BLD AUTO: 2.13 K/UL — SIGNIFICANT CHANGE UP (ref 1.8–7.4)
NEUTROPHILS NFR BLD AUTO: 52 % — SIGNIFICANT CHANGE UP (ref 43–77)
NRBC # BLD: 0 /100 WBCS — SIGNIFICANT CHANGE UP (ref 0–0)
PLATELET # BLD AUTO: 151 K/UL — SIGNIFICANT CHANGE UP (ref 150–400)
RBC # BLD: 3.5 M/UL — LOW (ref 3.8–5.2)
RBC # FLD: 14.6 % — HIGH (ref 10.3–14.5)
WBC # BLD: 4.1 K/UL — SIGNIFICANT CHANGE UP (ref 3.8–10.5)
WBC # FLD AUTO: 4.1 K/UL — SIGNIFICANT CHANGE UP (ref 3.8–10.5)

## 2020-07-24 ENCOUNTER — OUTPATIENT (OUTPATIENT)
Dept: OUTPATIENT SERVICES | Facility: HOSPITAL | Age: 55
LOS: 1 days | Discharge: HOME | End: 2020-07-24

## 2020-07-24 DIAGNOSIS — C54.1 MALIGNANT NEOPLASM OF ENDOMETRIUM: ICD-10-CM

## 2020-07-29 ENCOUNTER — APPOINTMENT (OUTPATIENT)
Dept: INFUSION THERAPY | Facility: HOSPITAL | Age: 55
End: 2020-07-29

## 2020-07-29 ENCOUNTER — LABORATORY RESULT (OUTPATIENT)
Age: 55
End: 2020-07-29

## 2020-07-29 ENCOUNTER — APPOINTMENT (OUTPATIENT)
Dept: HEMATOLOGY ONCOLOGY | Facility: CLINIC | Age: 55
End: 2020-07-29
Payer: COMMERCIAL

## 2020-07-29 ENCOUNTER — RESULT REVIEW (OUTPATIENT)
Age: 55
End: 2020-07-29

## 2020-07-29 VITALS
SYSTOLIC BLOOD PRESSURE: 142 MMHG | OXYGEN SATURATION: 97 % | TEMPERATURE: 97.8 F | RESPIRATION RATE: 16 BRPM | HEART RATE: 76 BPM | WEIGHT: 239.29 LBS | DIASTOLIC BLOOD PRESSURE: 91 MMHG | BODY MASS INDEX: 39.82 KG/M2

## 2020-07-29 PROCEDURE — 99215 OFFICE O/P EST HI 40 MIN: CPT

## 2020-07-30 LAB — SARS-COV-2 RNA SPEC QL NAA+PROBE: SIGNIFICANT CHANGE UP

## 2020-07-31 NOTE — REVIEW OF SYSTEMS
[Fatigue] : fatigue [Recent Change In Weight] : ~T recent weight change [Constipation] : constipation [Negative] : Allergic/Immunologic [de-identified] : neuropathy

## 2020-07-31 NOTE — HISTORY OF PRESENT ILLNESS
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 Patient was seen initially by Dr. Arciniega at [place].\par She then saw Dr Robin Zamorano, at [facility] and had a repeat biopsy that showed clear cell carcinoma.\par On 4/20/2020 she underwent surgical staging and is post TLH/BSO/SLND/Omentectomy.\par Final pathology showed stage IA,high grade endometrial carcinoma involving predominantly clear cell and focally serous features, no invasion, no LVSI.\par \par Ct C/A/P with contrast done on 3/12/2020 showed no metastatic sites. [FreeTextEntry1] : Carboplatin and Taxol\par C1 - 6/3/2020\par C2- 6/24/20\par C3- 7/22/20 [de-identified] : Patient tolerating treatment well.\par She completed three cycles.\par Appetite is good , no bowel or bladder issues.

## 2020-08-12 ENCOUNTER — RESULT REVIEW (OUTPATIENT)
Age: 55
End: 2020-08-12

## 2020-08-12 ENCOUNTER — APPOINTMENT (OUTPATIENT)
Dept: INFUSION THERAPY | Facility: HOSPITAL | Age: 55
End: 2020-08-12

## 2020-08-12 LAB
BASOPHILS # BLD AUTO: 0.03 K/UL — SIGNIFICANT CHANGE UP (ref 0–0.2)
BASOPHILS NFR BLD AUTO: 0.5 % — SIGNIFICANT CHANGE UP (ref 0–2)
EOSINOPHIL # BLD AUTO: 0.02 K/UL — SIGNIFICANT CHANGE UP (ref 0–0.5)
EOSINOPHIL NFR BLD AUTO: 0.3 % — SIGNIFICANT CHANGE UP (ref 0–6)
HCT VFR BLD CALC: 26.5 % — LOW (ref 34.5–45)
HGB BLD-MCNC: 8.9 G/DL — LOW (ref 11.5–15.5)
IMM GRANULOCYTES NFR BLD AUTO: 0.8 % — SIGNIFICANT CHANGE UP (ref 0–1.5)
LYMPHOCYTES # BLD AUTO: 2.1 K/UL — SIGNIFICANT CHANGE UP (ref 1–3.3)
LYMPHOCYTES # BLD AUTO: 35.7 % — SIGNIFICANT CHANGE UP (ref 13–44)
MCHC RBC-ENTMCNC: 28.6 PG — SIGNIFICANT CHANGE UP (ref 27–34)
MCHC RBC-ENTMCNC: 33.6 GM/DL — SIGNIFICANT CHANGE UP (ref 32–36)
MCV RBC AUTO: 85.2 FL — SIGNIFICANT CHANGE UP (ref 80–100)
MONOCYTES # BLD AUTO: 0.8 K/UL — SIGNIFICANT CHANGE UP (ref 0–0.9)
MONOCYTES NFR BLD AUTO: 13.6 % — SIGNIFICANT CHANGE UP (ref 2–14)
NEUTROPHILS # BLD AUTO: 2.89 K/UL — SIGNIFICANT CHANGE UP (ref 1.8–7.4)
NEUTROPHILS NFR BLD AUTO: 49.1 % — SIGNIFICANT CHANGE UP (ref 43–77)
NRBC # BLD: 0 /100 WBCS — SIGNIFICANT CHANGE UP (ref 0–0)
PLATELET # BLD AUTO: 107 K/UL — LOW (ref 150–400)
RBC # BLD: 3.11 M/UL — LOW (ref 3.8–5.2)
RBC # FLD: 16.1 % — HIGH (ref 10.3–14.5)
WBC # BLD: 5.89 K/UL — SIGNIFICANT CHANGE UP (ref 3.8–10.5)
WBC # FLD AUTO: 5.89 K/UL — SIGNIFICANT CHANGE UP (ref 3.8–10.5)

## 2020-08-13 DIAGNOSIS — Z51.11 ENCOUNTER FOR ANTINEOPLASTIC CHEMOTHERAPY: ICD-10-CM

## 2020-08-13 DIAGNOSIS — R11.2 NAUSEA WITH VOMITING, UNSPECIFIED: ICD-10-CM

## 2020-08-19 ENCOUNTER — APPOINTMENT (OUTPATIENT)
Dept: HEMATOLOGY ONCOLOGY | Facility: CLINIC | Age: 55
End: 2020-08-19
Payer: COMMERCIAL

## 2020-08-19 ENCOUNTER — APPOINTMENT (OUTPATIENT)
Dept: INFUSION THERAPY | Facility: HOSPITAL | Age: 55
End: 2020-08-19

## 2020-08-19 ENCOUNTER — LABORATORY RESULT (OUTPATIENT)
Age: 55
End: 2020-08-19

## 2020-08-19 ENCOUNTER — RESULT REVIEW (OUTPATIENT)
Age: 55
End: 2020-08-19

## 2020-08-19 VITALS
RESPIRATION RATE: 16 BRPM | HEART RATE: 88 BPM | SYSTOLIC BLOOD PRESSURE: 131 MMHG | OXYGEN SATURATION: 98 % | HEIGHT: 64.57 IN | BODY MASS INDEX: 39.97 KG/M2 | WEIGHT: 236.97 LBS | DIASTOLIC BLOOD PRESSURE: 84 MMHG | TEMPERATURE: 98.6 F

## 2020-08-19 LAB
BASOPHILS # BLD AUTO: 0 K/UL — SIGNIFICANT CHANGE UP (ref 0–0.2)
BASOPHILS NFR BLD AUTO: 0 % — SIGNIFICANT CHANGE UP (ref 0–2)
EOSINOPHIL # BLD AUTO: 0.02 K/UL — SIGNIFICANT CHANGE UP (ref 0–0.5)
EOSINOPHIL NFR BLD AUTO: 1 % — SIGNIFICANT CHANGE UP (ref 0–6)
HCT VFR BLD CALC: 26.3 % — LOW (ref 34.5–45)
HGB BLD-MCNC: 8.9 G/DL — LOW (ref 11.5–15.5)
LYMPHOCYTES # BLD AUTO: 1.19 K/UL — SIGNIFICANT CHANGE UP (ref 1–3.3)
LYMPHOCYTES # BLD AUTO: 50 % — HIGH (ref 13–44)
MCHC RBC-ENTMCNC: 28.8 PG — SIGNIFICANT CHANGE UP (ref 27–34)
MCHC RBC-ENTMCNC: 33.8 GM/DL — SIGNIFICANT CHANGE UP (ref 32–36)
MCV RBC AUTO: 85.1 FL — SIGNIFICANT CHANGE UP (ref 80–100)
MONOCYTES # BLD AUTO: 0.07 K/UL — SIGNIFICANT CHANGE UP (ref 0–0.9)
MONOCYTES NFR BLD AUTO: 3 % — SIGNIFICANT CHANGE UP (ref 2–14)
NEUTROPHILS # BLD AUTO: 1.07 K/UL — LOW (ref 1.8–7.4)
NEUTROPHILS NFR BLD AUTO: 45 % — SIGNIFICANT CHANGE UP (ref 43–77)
NRBC # BLD: 0 /100 — SIGNIFICANT CHANGE UP (ref 0–0)
NRBC # BLD: SIGNIFICANT CHANGE UP /100 WBCS (ref 0–0)
PLAT MORPH BLD: NORMAL — SIGNIFICANT CHANGE UP
PLATELET # BLD AUTO: 88 K/UL — LOW (ref 150–400)
RBC # BLD: 3.09 M/UL — LOW (ref 3.8–5.2)
RBC # FLD: 15 % — HIGH (ref 10.3–14.5)
RBC BLD AUTO: SIGNIFICANT CHANGE UP
VARIANT LYMPHS # BLD: 1 % — SIGNIFICANT CHANGE UP (ref 0–6)
WBC # BLD: 2.37 K/UL — LOW (ref 3.8–10.5)
WBC # FLD AUTO: 2.37 K/UL — LOW (ref 3.8–10.5)

## 2020-08-19 PROCEDURE — 99214 OFFICE O/P EST MOD 30 MIN: CPT

## 2020-08-19 RX ORDER — GLYCERIN 2 G/1
2 SUPPOSITORY RECTAL
Qty: 1 | Refills: 0 | Status: DISCONTINUED | COMMUNITY
Start: 2020-06-22 | End: 2020-08-19

## 2020-08-19 RX ORDER — CIPROFLOXACIN HYDROCHLORIDE 500 MG/1
500 TABLET, FILM COATED ORAL TWICE DAILY
Qty: 10 | Refills: 0 | Status: DISCONTINUED | COMMUNITY
Start: 2020-06-15 | End: 2020-08-19

## 2020-08-19 RX ORDER — DEXAMETHASONE 4 MG/1
4 TABLET ORAL
Qty: 10 | Refills: 0 | Status: DISCONTINUED | COMMUNITY
Start: 2020-05-21 | End: 2020-08-19

## 2020-08-20 ENCOUNTER — OUTPATIENT (OUTPATIENT)
Dept: OUTPATIENT SERVICES | Facility: HOSPITAL | Age: 55
LOS: 1 days | Discharge: HOME | End: 2020-08-20

## 2020-08-20 DIAGNOSIS — C54.1 MALIGNANT NEOPLASM OF ENDOMETRIUM: ICD-10-CM

## 2020-08-24 ENCOUNTER — APPOINTMENT (OUTPATIENT)
Dept: INFUSION THERAPY | Facility: HOSPITAL | Age: 55
End: 2020-08-24

## 2020-08-24 ENCOUNTER — RESULT REVIEW (OUTPATIENT)
Age: 55
End: 2020-08-24

## 2020-08-24 ENCOUNTER — APPOINTMENT (OUTPATIENT)
Dept: HEMATOLOGY ONCOLOGY | Facility: CLINIC | Age: 55
End: 2020-08-24

## 2020-08-24 LAB
ANISOCYTOSIS BLD QL: SLIGHT — SIGNIFICANT CHANGE UP
BASOPHILS # BLD AUTO: 0 K/UL — SIGNIFICANT CHANGE UP (ref 0–0.2)
BASOPHILS NFR BLD AUTO: 0 % — SIGNIFICANT CHANGE UP (ref 0–2)
EOSINOPHIL # BLD AUTO: 0 K/UL — SIGNIFICANT CHANGE UP (ref 0–0.5)
EOSINOPHIL NFR BLD AUTO: 0 % — SIGNIFICANT CHANGE UP (ref 0–6)
HCT VFR BLD CALC: 23.5 % — LOW (ref 34.5–45)
HGB BLD-MCNC: 8.1 G/DL — LOW (ref 11.5–15.5)
LYMPHOCYTES # BLD AUTO: 1.84 K/UL — SIGNIFICANT CHANGE UP (ref 1–3.3)
LYMPHOCYTES # BLD AUTO: 72 % — HIGH (ref 13–44)
MACROCYTES BLD QL: SLIGHT — SIGNIFICANT CHANGE UP
MCHC RBC-ENTMCNC: 29.2 PG — SIGNIFICANT CHANGE UP (ref 27–34)
MCHC RBC-ENTMCNC: 34.5 GM/DL — SIGNIFICANT CHANGE UP (ref 32–36)
MCV RBC AUTO: 84.8 FL — SIGNIFICANT CHANGE UP (ref 80–100)
METAMYELOCYTES # FLD: 1 % — HIGH (ref 0–0)
MICROCYTES BLD QL: SLIGHT — SIGNIFICANT CHANGE UP
MONOCYTES # BLD AUTO: 0.31 K/UL — SIGNIFICANT CHANGE UP (ref 0–0.9)
MONOCYTES NFR BLD AUTO: 12 % — SIGNIFICANT CHANGE UP (ref 2–14)
NEUTROPHILS # BLD AUTO: 0.38 K/UL — LOW (ref 1.8–7.4)
NEUTROPHILS NFR BLD AUTO: 15 % — LOW (ref 43–77)
NRBC # BLD: 0 /100 — SIGNIFICANT CHANGE UP (ref 0–0)
NRBC # BLD: SIGNIFICANT CHANGE UP /100 WBCS (ref 0–0)
OVALOCYTES BLD QL SMEAR: SLIGHT — SIGNIFICANT CHANGE UP
PLAT MORPH BLD: NORMAL — SIGNIFICANT CHANGE UP
PLATELET # BLD AUTO: 119 K/UL — LOW (ref 150–400)
POIKILOCYTOSIS BLD QL AUTO: SLIGHT — SIGNIFICANT CHANGE UP
RBC # BLD: 2.77 M/UL — LOW (ref 3.8–5.2)
RBC # FLD: 15.7 % — HIGH (ref 10.3–14.5)
RBC BLD AUTO: ABNORMAL
WBC # BLD: 2.56 K/UL — LOW (ref 3.8–10.5)
WBC # FLD AUTO: 2.56 K/UL — LOW (ref 3.8–10.5)

## 2020-08-24 NOTE — ASSESSMENT
[Curative] : Goals of care discussed with patient: Curative [Palliative Care Plan] : not applicable at this time [FreeTextEntry1] : 55 year old woman with UPSC on chemotherapy with Carboplatin and Taxol.\par She has completed 4 cycles.\par Increased weakness/fatigue - likely from cumulative effect of chemo and anemia.\par Anemia - hgb 8.9, will recheck Monday or sooner if any worsening symptoms.\par RTC 3 weeks.

## 2020-08-24 NOTE — HISTORY OF PRESENT ILLNESS
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 Patient was seen initially by Dr. Arciniega at [facility].\par She then saw Dr Robin Zamorano, at [facility] and had a repeat biopsy that showed clear cell carcinoma.\par On 4/20/2020 she underwent surgical staging and is post TLH/BSO/SLND/Omentectomy.\par Final pathology showed stage IA,high grade endometrial carcinoma involving predominantly clear cell and focally serous features, no invasion, no LVSI.\par \par Ct C/A/P with contrast done on 3/12/2020 showed no metastatic sites. [FreeTextEntry1] : Carboplatin and Taxol\par C1 - 6/3/2020\par C2- 6/24/20\par C3- 7/22/20\par C4 - 8/12/20 [de-identified] : Patient is here for follow up, complains of increased weakness and fatigue with this cycle.  She also has decreased appetite but says she feels hungry today. Peripheral neuropathy is stable.  She denies fever, chills, chest pain, SOB, dypsnea on exertion, palpitations, cough, abdominal pain, nausea, vomiting, diarrhea.  Constipation has been well controlled with bowel regimen.

## 2020-08-26 ENCOUNTER — APPOINTMENT (OUTPATIENT)
Dept: INFUSION THERAPY | Facility: HOSPITAL | Age: 55
End: 2020-08-26

## 2020-08-30 DIAGNOSIS — Z51.89 ENCOUNTER FOR OTHER SPECIFIED AFTERCARE: ICD-10-CM

## 2020-09-02 ENCOUNTER — RESULT REVIEW (OUTPATIENT)
Age: 55
End: 2020-09-02

## 2020-09-02 ENCOUNTER — APPOINTMENT (OUTPATIENT)
Dept: INFUSION THERAPY | Facility: HOSPITAL | Age: 55
End: 2020-09-02

## 2020-09-02 LAB
BASOPHILS # BLD AUTO: 0.01 K/UL — SIGNIFICANT CHANGE UP (ref 0–0.2)
BASOPHILS NFR BLD AUTO: 0.2 % — SIGNIFICANT CHANGE UP (ref 0–2)
EOSINOPHIL # BLD AUTO: 0.01 K/UL — SIGNIFICANT CHANGE UP (ref 0–0.5)
EOSINOPHIL NFR BLD AUTO: 0.2 % — SIGNIFICANT CHANGE UP (ref 0–6)
HCT VFR BLD CALC: 24.5 % — LOW (ref 34.5–45)
HGB BLD-MCNC: 8.5 G/DL — LOW (ref 11.5–15.5)
IMM GRANULOCYTES NFR BLD AUTO: 1.1 % — SIGNIFICANT CHANGE UP (ref 0–1.5)
LYMPHOCYTES # BLD AUTO: 1.63 K/UL — SIGNIFICANT CHANGE UP (ref 1–3.3)
LYMPHOCYTES # BLD AUTO: 37 % — SIGNIFICANT CHANGE UP (ref 13–44)
MCHC RBC-ENTMCNC: 29.2 PG — SIGNIFICANT CHANGE UP (ref 27–34)
MCHC RBC-ENTMCNC: 34.7 GM/DL — SIGNIFICANT CHANGE UP (ref 32–36)
MCV RBC AUTO: 84.2 FL — SIGNIFICANT CHANGE UP (ref 80–100)
MONOCYTES # BLD AUTO: 0.42 K/UL — SIGNIFICANT CHANGE UP (ref 0–0.9)
MONOCYTES NFR BLD AUTO: 9.5 % — SIGNIFICANT CHANGE UP (ref 2–14)
NEUTROPHILS # BLD AUTO: 2.29 K/UL — SIGNIFICANT CHANGE UP (ref 1.8–7.4)
NEUTROPHILS NFR BLD AUTO: 52 % — SIGNIFICANT CHANGE UP (ref 43–77)
NRBC # BLD: 0 /100 WBCS — SIGNIFICANT CHANGE UP (ref 0–0)
PLATELET # BLD AUTO: 123 K/UL — LOW (ref 150–400)
RBC # BLD: 2.91 M/UL — LOW (ref 3.8–5.2)
RBC # FLD: 17.2 % — HIGH (ref 10.3–14.5)
WBC # BLD: 4.41 K/UL — SIGNIFICANT CHANGE UP (ref 3.8–10.5)
WBC # FLD AUTO: 4.41 K/UL — SIGNIFICANT CHANGE UP (ref 3.8–10.5)

## 2020-09-09 ENCOUNTER — RESULT REVIEW (OUTPATIENT)
Age: 55
End: 2020-09-09

## 2020-09-09 ENCOUNTER — APPOINTMENT (OUTPATIENT)
Dept: HEMATOLOGY ONCOLOGY | Facility: CLINIC | Age: 55
End: 2020-09-09
Payer: COMMERCIAL

## 2020-09-09 ENCOUNTER — LABORATORY RESULT (OUTPATIENT)
Age: 55
End: 2020-09-09

## 2020-09-09 ENCOUNTER — APPOINTMENT (OUTPATIENT)
Dept: INFUSION THERAPY | Facility: HOSPITAL | Age: 55
End: 2020-09-09

## 2020-09-09 VITALS
WEIGHT: 235.89 LBS | SYSTOLIC BLOOD PRESSURE: 157 MMHG | HEART RATE: 98 BPM | RESPIRATION RATE: 14 BRPM | TEMPERATURE: 98 F | DIASTOLIC BLOOD PRESSURE: 94 MMHG | BODY MASS INDEX: 39.78 KG/M2 | OXYGEN SATURATION: 95 %

## 2020-09-09 LAB
BASOPHILS # BLD AUTO: 0.02 K/UL — SIGNIFICANT CHANGE UP (ref 0–0.2)
BASOPHILS NFR BLD AUTO: 1 % — SIGNIFICANT CHANGE UP (ref 0–2)
EOSINOPHIL # BLD AUTO: 0.08 K/UL — SIGNIFICANT CHANGE UP (ref 0–0.5)
EOSINOPHIL NFR BLD AUTO: 4 % — SIGNIFICANT CHANGE UP (ref 0–6)
HCT VFR BLD CALC: 22.6 % — LOW (ref 34.5–45)
HGB BLD-MCNC: 7.5 G/DL — LOW (ref 11.5–15.5)
LYMPHOCYTES # BLD AUTO: 1.3 K/UL — SIGNIFICANT CHANGE UP (ref 1–3.3)
LYMPHOCYTES # BLD AUTO: 64 % — HIGH (ref 13–44)
MCHC RBC-ENTMCNC: 29.2 PG — SIGNIFICANT CHANGE UP (ref 27–34)
MCHC RBC-ENTMCNC: 33.2 G/DL — SIGNIFICANT CHANGE UP (ref 32–36)
MCV RBC AUTO: 87.9 FL — SIGNIFICANT CHANGE UP (ref 80–100)
MONOCYTES # BLD AUTO: 0 K/UL — SIGNIFICANT CHANGE UP (ref 0–0.9)
MONOCYTES NFR BLD AUTO: 0 % — LOW (ref 2–14)
NEUTROPHILS # BLD AUTO: 0.63 K/UL — LOW (ref 1.8–7.4)
NEUTROPHILS NFR BLD AUTO: 31 % — LOW (ref 43–77)
NRBC # BLD: 0 /100 — SIGNIFICANT CHANGE UP (ref 0–0)
NRBC # BLD: SIGNIFICANT CHANGE UP /100 WBCS (ref 0–0)
PLAT MORPH BLD: NORMAL — SIGNIFICANT CHANGE UP
PLATELET # BLD AUTO: 82 K/UL — LOW (ref 150–400)
RBC # BLD: 2.57 M/UL — LOW (ref 3.8–5.2)
RBC # FLD: 16.6 % — HIGH (ref 10.3–14.5)
RBC BLD AUTO: SIGNIFICANT CHANGE UP
WBC # BLD: 2.03 K/UL — LOW (ref 3.8–10.5)
WBC # FLD AUTO: 2.03 K/UL — LOW (ref 3.8–10.5)

## 2020-09-09 PROCEDURE — 99214 OFFICE O/P EST MOD 30 MIN: CPT

## 2020-09-11 NOTE — ASSESSMENT
[Curative] : Goals of care discussed with patient: Curative [Palliative Care Plan] : not applicable at this time [FreeTextEntry1] : 55 year old woman with uterine cancer on chemotherapy with Carboplatin and Taxol.\par She has completed 5 cycles.\par Worsening neuropathy - patient declines gabapentin at this time.  Discussed that due to early stage and her severity of neuropathy would recommend holding last cycle of chemo.  Patient would like to consider having the last cycle with carboplatin single agent.  Will discuss futher after next week's cbc as she has pancytopenia.\par Will continue exercise/stretch and massage for neuropathy as these modalities have been helping.\par RTC 3 weeks or sooner if 6th cycle is held.

## 2020-09-11 NOTE — REVIEW OF SYSTEMS
[Fatigue] : fatigue [Negative] : Allergic/Immunologic [de-identified] : neuropathy of feet and hands

## 2020-09-11 NOTE — HISTORY OF PRESENT ILLNESS
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 Patient was seen initially by Dr. Arciniega at [facility].\par She then saw Dr Robin Zamorano, at [facility] and had a repeat biopsy that showed clear cell carcinoma.\par On 4/20/2020 she underwent surgical staging and is post TLH/BSO/SLND/Omentectomy.\par Final pathology showed stage IA,high grade endometrial carcinoma involving predominantly clear cell and focally serous features, no invasion, no LVSI.\par \par Ct C/A/P with contrast done on 3/12/2020 showed no metastatic sites. [FreeTextEntry1] : Carboplatin and Taxol\par C1 - 6/3/2020\par C2- 6/24/20\par C3- 7/22/20\par C4 - 8/12/20\par C5 - 9/2/20 [de-identified] : Patient is here for follow up after 5th cycle of chemo.  She reports increased numbness to her feet and fingers.  She has had no falls but sometimes feels unsteady and has to hold on to wall to walk.  She has some difficulty using her fingers for buttons, zippers, etc.  She has more fatigue and weakness.  She saw Dr. Hastings last month who treated her with topical medication for a "yeast infection" in her groin area which has resolved.  Her appetite has improved, maintaining her weight.  She denies fever, chills, chest pain, SOB, palpitations, abdominal pain, nausea, vomiting, diarrhea,  Constipation is well controlled with bowel regimen.

## 2020-09-16 ENCOUNTER — RESULT REVIEW (OUTPATIENT)
Age: 55
End: 2020-09-16

## 2020-09-16 ENCOUNTER — APPOINTMENT (OUTPATIENT)
Dept: INFUSION THERAPY | Facility: HOSPITAL | Age: 55
End: 2020-09-16

## 2020-09-16 ENCOUNTER — APPOINTMENT (OUTPATIENT)
Dept: HEMATOLOGY ONCOLOGY | Facility: CLINIC | Age: 55
End: 2020-09-16

## 2020-09-16 LAB
BASOPHILS # BLD AUTO: 0 K/UL — SIGNIFICANT CHANGE UP (ref 0–0.2)
BASOPHILS NFR BLD AUTO: 0 % — SIGNIFICANT CHANGE UP (ref 0–2)
EOSINOPHIL # BLD AUTO: 0 K/UL — SIGNIFICANT CHANGE UP (ref 0–0.5)
EOSINOPHIL NFR BLD AUTO: 0 % — SIGNIFICANT CHANGE UP (ref 0–6)
HCT VFR BLD CALC: 22.3 % — LOW (ref 34.5–45)
HGB BLD-MCNC: 7.4 G/DL — LOW (ref 11.5–15.5)
LYMPHOCYTES # BLD AUTO: 1.8 K/UL — SIGNIFICANT CHANGE UP (ref 1–3.3)
LYMPHOCYTES # BLD AUTO: 71 % — HIGH (ref 13–44)
MCHC RBC-ENTMCNC: 29.6 PG — SIGNIFICANT CHANGE UP (ref 27–34)
MCHC RBC-ENTMCNC: 33.2 G/DL — SIGNIFICANT CHANGE UP (ref 32–36)
MCV RBC AUTO: 89.2 FL — SIGNIFICANT CHANGE UP (ref 80–100)
MONOCYTES # BLD AUTO: 0.33 K/UL — SIGNIFICANT CHANGE UP (ref 0–0.9)
MONOCYTES NFR BLD AUTO: 13 % — SIGNIFICANT CHANGE UP (ref 2–14)
NEUTROPHILS # BLD AUTO: 0.4 K/UL — LOW (ref 1.8–7.4)
NEUTROPHILS NFR BLD AUTO: 16 % — LOW (ref 43–77)
NRBC # BLD: 0 /100 — SIGNIFICANT CHANGE UP (ref 0–0)
NRBC # BLD: SIGNIFICANT CHANGE UP /100 WBCS (ref 0–0)
PLAT MORPH BLD: NORMAL — SIGNIFICANT CHANGE UP
PLATELET # BLD AUTO: 150 K/UL — SIGNIFICANT CHANGE UP (ref 150–400)
RBC # BLD: 2.5 M/UL — LOW (ref 3.8–5.2)
RBC # FLD: 17.8 % — HIGH (ref 10.3–14.5)
RBC BLD AUTO: SIGNIFICANT CHANGE UP
WBC # BLD: 2.53 K/UL — LOW (ref 3.8–10.5)
WBC # FLD AUTO: 2.53 K/UL — LOW (ref 3.8–10.5)

## 2020-09-17 ENCOUNTER — OUTPATIENT (OUTPATIENT)
Dept: OUTPATIENT SERVICES | Facility: HOSPITAL | Age: 55
LOS: 1 days | Discharge: HOME | End: 2020-09-17

## 2020-09-21 ENCOUNTER — RESULT REVIEW (OUTPATIENT)
Age: 55
End: 2020-09-21

## 2020-09-21 ENCOUNTER — APPOINTMENT (OUTPATIENT)
Dept: HEMATOLOGY ONCOLOGY | Facility: CLINIC | Age: 55
End: 2020-09-21

## 2020-09-21 LAB
BASOPHILS # BLD AUTO: 0.02 K/UL — SIGNIFICANT CHANGE UP (ref 0–0.2)
BASOPHILS NFR BLD AUTO: 0.4 % — SIGNIFICANT CHANGE UP (ref 0–2)
EOSINOPHIL # BLD AUTO: 0.03 K/UL — SIGNIFICANT CHANGE UP (ref 0–0.5)
EOSINOPHIL NFR BLD AUTO: 0.6 % — SIGNIFICANT CHANGE UP (ref 0–6)
HCT VFR BLD CALC: 24.9 % — LOW (ref 34.5–45)
HGB BLD-MCNC: 8.2 G/DL — LOW (ref 11.5–15.5)
IMM GRANULOCYTES NFR BLD AUTO: 0.6 % — SIGNIFICANT CHANGE UP (ref 0–1.5)
LYMPHOCYTES # BLD AUTO: 1.87 K/UL — SIGNIFICANT CHANGE UP (ref 1–3.3)
LYMPHOCYTES # BLD AUTO: 37.3 % — SIGNIFICANT CHANGE UP (ref 13–44)
MCHC RBC-ENTMCNC: 29.7 PG — SIGNIFICANT CHANGE UP (ref 27–34)
MCHC RBC-ENTMCNC: 32.9 G/DL — SIGNIFICANT CHANGE UP (ref 32–36)
MCV RBC AUTO: 90.2 FL — SIGNIFICANT CHANGE UP (ref 80–100)
MONOCYTES # BLD AUTO: 0.81 K/UL — SIGNIFICANT CHANGE UP (ref 0–0.9)
MONOCYTES NFR BLD AUTO: 16.1 % — HIGH (ref 2–14)
NEUTROPHILS # BLD AUTO: 2.26 K/UL — SIGNIFICANT CHANGE UP (ref 1.8–7.4)
NEUTROPHILS NFR BLD AUTO: 45 % — SIGNIFICANT CHANGE UP (ref 43–77)
NRBC # BLD: 0 /100 WBCS — SIGNIFICANT CHANGE UP (ref 0–0)
PLATELET # BLD AUTO: 145 K/UL — LOW (ref 150–400)
RBC # BLD: 2.76 M/UL — LOW (ref 3.8–5.2)
RBC # FLD: 18.8 % — HIGH (ref 10.3–14.5)
WBC # BLD: 5.02 K/UL — SIGNIFICANT CHANGE UP (ref 3.8–10.5)
WBC # FLD AUTO: 5.02 K/UL — SIGNIFICANT CHANGE UP (ref 3.8–10.5)

## 2020-09-23 ENCOUNTER — APPOINTMENT (OUTPATIENT)
Dept: INFUSION THERAPY | Facility: HOSPITAL | Age: 55
End: 2020-09-23

## 2020-09-29 ENCOUNTER — OUTPATIENT (OUTPATIENT)
Dept: OUTPATIENT SERVICES | Facility: HOSPITAL | Age: 55
LOS: 1 days | End: 2020-09-29
Payer: COMMERCIAL

## 2020-09-29 ENCOUNTER — OUTPATIENT (OUTPATIENT)
Dept: OUTPATIENT SERVICES | Facility: HOSPITAL | Age: 55
LOS: 1 days | Discharge: HOME | End: 2020-09-29
Payer: COMMERCIAL

## 2020-09-29 ENCOUNTER — APPOINTMENT (OUTPATIENT)
Dept: CT IMAGING | Facility: IMAGING CENTER | Age: 55
End: 2020-09-29
Payer: COMMERCIAL

## 2020-09-29 DIAGNOSIS — C55 MALIGNANT NEOPLASM OF UTERUS, PART UNSPECIFIED: ICD-10-CM

## 2020-09-29 PROCEDURE — 74177 CT ABD & PELVIS W/CONTRAST: CPT

## 2020-09-29 PROCEDURE — 74177 CT ABD & PELVIS W/CONTRAST: CPT | Mod: 26

## 2020-09-29 PROCEDURE — 71260 CT THORAX DX C+: CPT | Mod: 26

## 2020-09-29 PROCEDURE — 71260 CT THORAX DX C+: CPT

## 2020-09-30 ENCOUNTER — LABORATORY RESULT (OUTPATIENT)
Age: 55
End: 2020-09-30

## 2020-09-30 ENCOUNTER — RESULT REVIEW (OUTPATIENT)
Age: 55
End: 2020-09-30

## 2020-09-30 ENCOUNTER — APPOINTMENT (OUTPATIENT)
Dept: INFUSION THERAPY | Facility: HOSPITAL | Age: 55
End: 2020-09-30

## 2020-09-30 ENCOUNTER — APPOINTMENT (OUTPATIENT)
Dept: HEMATOLOGY ONCOLOGY | Facility: CLINIC | Age: 55
End: 2020-09-30
Payer: COMMERCIAL

## 2020-09-30 VITALS
SYSTOLIC BLOOD PRESSURE: 161 MMHG | OXYGEN SATURATION: 98 % | TEMPERATURE: 99.6 F | HEART RATE: 94 BPM | RESPIRATION RATE: 18 BRPM | DIASTOLIC BLOOD PRESSURE: 105 MMHG | WEIGHT: 237.64 LBS | BODY MASS INDEX: 40.08 KG/M2

## 2020-09-30 LAB
BASOPHILS # BLD AUTO: 0.01 K/UL — SIGNIFICANT CHANGE UP (ref 0–0.2)
BASOPHILS NFR BLD AUTO: 0.1 % — SIGNIFICANT CHANGE UP (ref 0–2)
EOSINOPHIL # BLD AUTO: 0.01 K/UL — SIGNIFICANT CHANGE UP (ref 0–0.5)
EOSINOPHIL NFR BLD AUTO: 0.1 % — SIGNIFICANT CHANGE UP (ref 0–6)
HCT VFR BLD CALC: 25.2 % — LOW (ref 34.5–45)
HGB BLD-MCNC: 8.4 G/DL — LOW (ref 11.5–15.5)
IMM GRANULOCYTES NFR BLD AUTO: 0.4 % — SIGNIFICANT CHANGE UP (ref 0–1.5)
LYMPHOCYTES # BLD AUTO: 1.4 K/UL — SIGNIFICANT CHANGE UP (ref 1–3.3)
LYMPHOCYTES # BLD AUTO: 20.3 % — SIGNIFICANT CHANGE UP (ref 13–44)
MCHC RBC-ENTMCNC: 30.4 PG — SIGNIFICANT CHANGE UP (ref 27–34)
MCHC RBC-ENTMCNC: 33.3 G/DL — SIGNIFICANT CHANGE UP (ref 32–36)
MCV RBC AUTO: 91.3 FL — SIGNIFICANT CHANGE UP (ref 80–100)
MONOCYTES # BLD AUTO: 0.65 K/UL — SIGNIFICANT CHANGE UP (ref 0–0.9)
MONOCYTES NFR BLD AUTO: 9.4 % — SIGNIFICANT CHANGE UP (ref 2–14)
NEUTROPHILS # BLD AUTO: 4.79 K/UL — SIGNIFICANT CHANGE UP (ref 1.8–7.4)
NEUTROPHILS NFR BLD AUTO: 69.7 % — SIGNIFICANT CHANGE UP (ref 43–77)
NRBC # BLD: 0 /100 WBCS — SIGNIFICANT CHANGE UP (ref 0–0)
PLATELET # BLD AUTO: 159 K/UL — SIGNIFICANT CHANGE UP (ref 150–400)
RBC # BLD: 2.76 M/UL — LOW (ref 3.8–5.2)
RBC # FLD: 18.1 % — HIGH (ref 10.3–14.5)
WBC # BLD: 6.89 K/UL — SIGNIFICANT CHANGE UP (ref 3.8–10.5)
WBC # FLD AUTO: 6.89 K/UL — SIGNIFICANT CHANGE UP (ref 3.8–10.5)

## 2020-09-30 PROCEDURE — 99214 OFFICE O/P EST MOD 30 MIN: CPT

## 2020-10-01 ENCOUNTER — APPOINTMENT (OUTPATIENT)
Dept: RADIATION ONCOLOGY | Facility: CLINIC | Age: 55
End: 2020-10-01
Payer: COMMERCIAL

## 2020-10-01 VITALS
HEART RATE: 87 BPM | RESPIRATION RATE: 16 BRPM | TEMPERATURE: 98.78 F | OXYGEN SATURATION: 100 % | WEIGHT: 237 LBS | BODY MASS INDEX: 39.97 KG/M2 | DIASTOLIC BLOOD PRESSURE: 82 MMHG | SYSTOLIC BLOOD PRESSURE: 130 MMHG

## 2020-10-01 PROCEDURE — 99203 OFFICE O/P NEW LOW 30 MIN: CPT | Mod: GC,25

## 2020-10-01 NOTE — REVIEW OF SYSTEMS
[Fatigue] : fatigue [Negative] : Allergic/Immunologic [de-identified] : neuropathy of feet and hands

## 2020-10-01 NOTE — HISTORY OF PRESENT ILLNESS
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 Patient was seen initially by Dr. Arciniega at [facility].\par She then saw Dr Robin Zamorano, at [facility] and had a repeat biopsy that showed clear cell carcinoma.\par On 4/20/2020 she underwent surgical staging and is post TLH/BSO/SLND/Omentectomy.\par Final pathology showed stage IA,high grade endometrial carcinoma involving predominantly clear cell and focally serous features, no invasion, no LVSI.\par \par Ct C/A/P with contrast done on 3/12/2020 showed no metastatic sites. [FreeTextEntry1] : Carboplatin and Taxol\par C1 - 6/3/2020\par C2- 6/24/20\par C3- 7/22/20\par C4 - 8/12/20\par C5 - 9/2/20 [de-identified] : Patient is here for follow up today, had CT scans done yesterday.  She continues to have fatigue and weakness.  Peripheral neuropathy is stable.  Appetite is low today but she thinks it is from anxiety regarding waiting for her scan results.

## 2020-10-01 NOTE — REASON FOR VISIT
[Consideration of Curative Therapy] : consideration of curative therapy for [Endometrial Cancer] : endometrial cancer

## 2020-10-02 NOTE — DISCUSSION/SUMMARY
[Cancer Type / Location / Histology Subtype: ________] : Cancer Type / Location / Histology Subtype: [unfilled] [Diagnosis Date (year): ____] : Diagnosis Date (year): [unfilled] [I] : I [Surgery] : Surgery: Yes [Surgery Date(s) (year): ____] : Surgery Date(s) (year): [unfilled] [Surgical Procedure / Location / Findings: _________] : Surgical Procedure / Location / Findings: [unfilled] [Primary care physician] : primary care physician [Systemic Therapy (chemotherapy, hormonal therapy, other)] : Systemic Therapy (chemotherapy, hormonal therapy, other): Yes [Colonoscopy] : colonoscopy [Mammogram] : Mammogram [Skin Checks] : skin checks [Bone Density Test] : bone density test [Cholesterol Test] : cholesterol test [Annual Flu Shot] : annual flu shot [Anxiety and Depression] : anxiety and depression [Cognitive Function] : cognitive function [Sexual Function] : sexual function [Sleep Disorders] : sleep disorders [Memory or Concentration Loss] : Memory or concentration loss [Fatigue] : Fatigue [Physical Functioning] : Physical functioning [Sexual Functioning] : Sexual functioning [Fertility] : Fertility [Alcohol use] : Alcohol use [Weigh Management (loss / gain)] : Weight management (loss / gain) [Diet] : Diet [Sun screen use] : Sun screen use [Physical activity] : Physical activity [Bridge to Survivorship GYN Cancer] : Bridge to Survivorship GYN Cancer [Psycho-social Emotional Support ([place])] : Psycho-social Emotional Support  (please call SW at [phone]) [Nutritional and Wellness Counseling ([place])] : Nutritional and Wellness Counseling (please call Nutrition office at [phone]) [Cancer Care] : Cancer Care [American Cancer Society] : the American Cancer Society [Radiation] : Radiation: Yes [Follow up with Oncologist in _____] : Follow up with Oncologist in [unfilled] [Follow up with Surgeon in _____] : Follow up with Surgeon in [unfilled] [Bloodwork: ______] : Bloodwork: [unfilled] [FreeTextEntry1] : carboplatin / taxol every 3  weeks x 5 cycles  completed 9/2/20\par cycle 6  was held for chemotherapy induced peripheral neuropathy and low white blood cell and platelet counts [FreeTextEntry2] : Seeing Dr. Kuhn for vaginal brachytherapy [FreeTextEntry7] : chemotherapy induced peripheral neuropathy of feet\par chemo induced anemia [FreeTextEntry8] : Jeny Lee [FreeTextEntry9] : 9/30/2020

## 2020-10-05 PROCEDURE — 77263 THER RADIOLOGY TX PLNG CPLX: CPT

## 2020-10-06 PROCEDURE — 77290 THER RAD SIMULAJ FIELD CPLX: CPT | Mod: 26

## 2020-10-09 ENCOUNTER — RESULT REVIEW (OUTPATIENT)
Age: 55
End: 2020-10-09

## 2020-10-09 PROCEDURE — 88321 CONSLTJ&REPRT SLD PREP ELSWR: CPT

## 2020-10-12 NOTE — DISEASE MANAGEMENT
[Pathological] : TNM Stage: p [IA] : IA [FreeTextEntry4] : endometrial cancer, clear cell [TTNM] : 1as [NTNM] : 0 [MTNM] : 0

## 2020-10-12 NOTE — REVIEW OF SYSTEMS
[Fatigue] : fatigue [Negative] : Allergic/Immunologic [Vaginal Stricture: Grade 2 - Vaginal narrowing and/or shortening not interfering with physical examination] : Vaginal Stricture: Grade 2 - Vaginal narrowing and/or shortening not interfering with physical examination

## 2020-10-12 NOTE — PHYSICAL EXAM
[General Appearance - Well Developed] : well developed [Sclera] : the sclera and conjunctiva were normal [Outer Ear] : the ears and nose were normal in appearance [] : no respiratory distress [Heart Rate And Rhythm] : heart rate and rhythm were normal [Abdomen Soft] : soft [Normal] : oriented to person, place and time, the affect was normal, the mood was normal and not anxious [Normal External Genitalia] : normal external genitalia  [de-identified] : well healed caginal apex narrowed vaginal canl

## 2020-10-12 NOTE — HISTORY OF PRESENT ILLNESS
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 \par \par She initially presented for Pap smear with michael\par \par Biopsy revealed serous cancer\par \par Repeat Bx at [place] with Dr. Zamorano 3/12/20 showed clear cell carcinoma\par CT C/A/P with contrast done on 3/12/2020 showed no metastatic sites. \par \par On 4/20/20 she had TLH/BSO/SLND/Omentectomy.\par Final pathology showed stage IA, G3 endometrial carcinoma involving predominantly clear cell and focally serous features, no myometrial invasion, no LVSI. 1.8 centimeters in greatest dimension. 0/2 right and 0/2 left SLN, omentum negative\par \par She had Five cycles of adjuvant chemo therapy with carboplatin and Taxol from 6/3 until 9/2/20 with neuropathy in her fingers and some unsteadiness standing as sequelae. Cycle 6 held for pancytopenia for now. \par \par Today she is feeling well. She continues to experience neuropathy. Reports fatigue. No urinary or bowel complaints. No vaginal bleeding or discharge. Follow up CT scan 9/29/20: CINDY. She is here to discuss adjuvant radiation\par \par \par

## 2020-10-12 NOTE — OB/GYN HISTORY
[Definite:  ___ (Date)] : the last menstrual period was [unfilled] [History of Birth Control Pills] : Patient has a history of taking birth control pills [___] : Living: [unfilled] [History of Hormone Replacement Therapy] : no history of hormone replacement therapy [Currently In Menopause] : not currently in menopause [Experiencing Menopausal Sxs] : not experiencing menopausal symptoms

## 2020-10-15 PROCEDURE — 77317 BRACHYTX ISODOSE INTERMED: CPT | Mod: 26

## 2020-10-16 PROCEDURE — 77770 HDR RDNCL NTRSTL/ICAV BRCHTX: CPT | Mod: 26

## 2020-10-16 PROCEDURE — 57156 INS VAG BRACHYTX DEVICE: CPT

## 2020-10-16 PROCEDURE — 77332 RADIATION TREATMENT AID(S): CPT | Mod: 26

## 2020-10-20 PROCEDURE — 77332 RADIATION TREATMENT AID(S): CPT | Mod: 26

## 2020-10-20 PROCEDURE — 77770 HDR RDNCL NTRSTL/ICAV BRCHTX: CPT | Mod: 26

## 2020-10-20 PROCEDURE — 57156 INS VAG BRACHYTX DEVICE: CPT

## 2020-10-23 PROCEDURE — 77332 RADIATION TREATMENT AID(S): CPT | Mod: 26

## 2020-10-23 PROCEDURE — 57156 INS VAG BRACHYTX DEVICE: CPT

## 2020-10-23 PROCEDURE — 77770 HDR RDNCL NTRSTL/ICAV BRCHTX: CPT | Mod: 26

## 2020-10-30 ENCOUNTER — NON-APPOINTMENT (OUTPATIENT)
Age: 55
End: 2020-10-30

## 2020-11-08 LAB — SURGICAL PATHOLOGY STUDY: SIGNIFICANT CHANGE UP

## 2020-12-01 ENCOUNTER — APPOINTMENT (OUTPATIENT)
Dept: RADIATION ONCOLOGY | Facility: CLINIC | Age: 55
End: 2020-12-01
Payer: COMMERCIAL

## 2020-12-01 VITALS
SYSTOLIC BLOOD PRESSURE: 122 MMHG | OXYGEN SATURATION: 100 % | DIASTOLIC BLOOD PRESSURE: 83 MMHG | HEIGHT: 64.57 IN | BODY MASS INDEX: 40.05 KG/M2 | HEART RATE: 78 BPM | WEIGHT: 237.49 LBS | RESPIRATION RATE: 16 BRPM | TEMPERATURE: 96.7 F

## 2020-12-01 PROCEDURE — 99024 POSTOP FOLLOW-UP VISIT: CPT

## 2020-12-01 RX ORDER — LIDOCAINE AND PRILOCAINE 25; 25 MG/G; MG/G
2.5-2.5 CREAM TOPICAL
Qty: 1 | Refills: 3 | Status: DISCONTINUED | COMMUNITY
Start: 2020-05-22 | End: 2020-12-01

## 2020-12-01 RX ORDER — PROCHLORPERAZINE MALEATE 10 MG/1
10 TABLET ORAL EVERY 6 HOURS
Qty: 30 | Refills: 2 | Status: DISCONTINUED | COMMUNITY
Start: 2020-05-21 | End: 2020-12-01

## 2020-12-01 RX ORDER — GABAPENTIN 100 MG/1
100 CAPSULE ORAL
Qty: 30 | Refills: 3 | Status: DISCONTINUED | COMMUNITY
Start: 2020-09-16 | End: 2020-12-01

## 2020-12-01 RX ORDER — APREPITANT 80 MG/1
80 CAPSULE ORAL
Qty: 2 | Refills: 5 | Status: DISCONTINUED | COMMUNITY
Start: 2020-05-21 | End: 2020-12-01

## 2020-12-01 RX ORDER — ONDANSETRON 8 MG/1
8 TABLET ORAL
Qty: 10 | Refills: 2 | Status: DISCONTINUED | COMMUNITY
Start: 2020-05-21 | End: 2020-12-01

## 2020-12-01 RX ORDER — CLOTRIMAZOLE AND BETAMETHASONE DIPROPIONATE 10; .5 MG/G; MG/G
1-0.05 CREAM TOPICAL
Qty: 15 | Refills: 0 | Status: DISCONTINUED | COMMUNITY
Start: 2020-08-20

## 2020-12-01 NOTE — REVIEW OF SYSTEMS
[Constipation: Grade 0] : Constipation: Grade 0 [Diarrhea: Grade 0] : Diarrhea: Grade 0 [Fatigue: Grade 1 - Fatigue relieved by rest] : Fatigue: Grade 1 - Fatigue relieved by rest [Hematuria: Grade 0] : Hematuria: Grade 0 [Urinary Incontinence: Grade 0] : Urinary Incontinence: Grade 0  [Urinary Retention: Grade 0] : Urinary Retention: Grade 0 [Urinary Tract Pain: Grade 0] : Urinary Tract Pain: Grade 0 [Urinary Urgency: Grade 0] : Urinary Urgency: Grade 0 [Urinary Frequency: Grade 1 - Present] : Urinary Frequency: Grade 1 - Present

## 2020-12-07 NOTE — HISTORY OF PRESENT ILLNESS
[FreeTextEntry1] : Palmira Liao is a 55 year old female diagnosed with Stage 1A G3 [id number] clear cell serous endometrial carcinoma s/p TLHBSO/SLND/omentectomy with negative SLN s/p 5 cycles CHT with carbo/taxol,  C6 held for pancytopenia. She received vaginal brachytherapy to a total of 2100 cGy in 3 fractions. Treatment was delivered from 10/16/20- 10/23/20. She tolerated treatment well and did not develop any grade 3 or higher acute toxicities as a result of the treatment. Returns today for a PTE.\par \par Today she is doing well. Denies any pain. Continues to have some mild fatigue. Reports urinary frequency. No dysuria or hematuria. Occasional constipation dependent on foods. She continues to report b/l LE neuropathy. Neuropathy to fingers have improved. Has follow up with Dr. Celaya and gyn onc later this month. She is not sexually active. Using vaginal dilator 2 x week. No vaginal bleeding or discharge. \par

## 2020-12-07 NOTE — PHYSICAL EXAM
[General Appearance - In No Acute Distress] : in no acute distress [] : no respiratory distress [Abdomen Soft] : soft [Normal External Genitalia] : normal external genitalia  [Normal Vaginal Cuff] : vaginal cuff without lesion or nodularity [Supraclavicular Lymph Nodes Enlarged Bilaterally] : supraclavicular [Inguinal Lymph Nodes Enlarged Bilaterally] : inguinal

## 2020-12-10 ENCOUNTER — OUTPATIENT (OUTPATIENT)
Dept: OUTPATIENT SERVICES | Facility: HOSPITAL | Age: 55
LOS: 1 days | Discharge: HOME | End: 2020-12-10

## 2020-12-10 DIAGNOSIS — C54.1 MALIGNANT NEOPLASM OF ENDOMETRIUM: ICD-10-CM

## 2020-12-15 ENCOUNTER — LABORATORY RESULT (OUTPATIENT)
Age: 55
End: 2020-12-15

## 2020-12-15 ENCOUNTER — APPOINTMENT (OUTPATIENT)
Dept: HEMATOLOGY ONCOLOGY | Facility: CLINIC | Age: 55
End: 2020-12-15
Payer: COMMERCIAL

## 2020-12-15 ENCOUNTER — RESULT REVIEW (OUTPATIENT)
Age: 55
End: 2020-12-15

## 2020-12-15 ENCOUNTER — APPOINTMENT (OUTPATIENT)
Dept: INFUSION THERAPY | Facility: HOSPITAL | Age: 55
End: 2020-12-15

## 2020-12-15 VITALS
WEIGHT: 242.49 LBS | HEART RATE: 80 BPM | RESPIRATION RATE: 16 BRPM | SYSTOLIC BLOOD PRESSURE: 163 MMHG | HEIGHT: 64.57 IN | TEMPERATURE: 97.7 F | DIASTOLIC BLOOD PRESSURE: 95 MMHG | BODY MASS INDEX: 40.89 KG/M2 | OXYGEN SATURATION: 100 %

## 2020-12-15 LAB
BASOPHILS # BLD AUTO: 0.01 K/UL — SIGNIFICANT CHANGE UP (ref 0–0.2)
BASOPHILS NFR BLD AUTO: 0.2 % — SIGNIFICANT CHANGE UP (ref 0–2)
EOSINOPHIL # BLD AUTO: 0.07 K/UL — SIGNIFICANT CHANGE UP (ref 0–0.5)
EOSINOPHIL NFR BLD AUTO: 1.5 % — SIGNIFICANT CHANGE UP (ref 0–6)
HCT VFR BLD CALC: 30.1 % — LOW (ref 34.5–45)
HGB BLD-MCNC: 10 G/DL — LOW (ref 11.5–15.5)
IMM GRANULOCYTES NFR BLD AUTO: 0.4 % — SIGNIFICANT CHANGE UP (ref 0–1.5)
LYMPHOCYTES # BLD AUTO: 1.63 K/UL — SIGNIFICANT CHANGE UP (ref 1–3.3)
LYMPHOCYTES # BLD AUTO: 34.8 % — SIGNIFICANT CHANGE UP (ref 13–44)
MCHC RBC-ENTMCNC: 28.8 PG — SIGNIFICANT CHANGE UP (ref 27–34)
MCHC RBC-ENTMCNC: 33.2 G/DL — SIGNIFICANT CHANGE UP (ref 32–36)
MCV RBC AUTO: 86.7 FL — SIGNIFICANT CHANGE UP (ref 80–100)
MONOCYTES # BLD AUTO: 0.43 K/UL — SIGNIFICANT CHANGE UP (ref 0–0.9)
MONOCYTES NFR BLD AUTO: 9.2 % — SIGNIFICANT CHANGE UP (ref 2–14)
NEUTROPHILS # BLD AUTO: 2.52 K/UL — SIGNIFICANT CHANGE UP (ref 1.8–7.4)
NEUTROPHILS NFR BLD AUTO: 53.9 % — SIGNIFICANT CHANGE UP (ref 43–77)
NRBC # BLD: 0 /100 WBCS — SIGNIFICANT CHANGE UP (ref 0–0)
PLATELET # BLD AUTO: 169 K/UL — SIGNIFICANT CHANGE UP (ref 150–400)
RBC # BLD: 3.47 M/UL — LOW (ref 3.8–5.2)
RBC # FLD: 14.2 % — SIGNIFICANT CHANGE UP (ref 10.3–14.5)
WBC # BLD: 4.68 K/UL — SIGNIFICANT CHANGE UP (ref 3.8–10.5)
WBC # FLD AUTO: 4.68 K/UL — SIGNIFICANT CHANGE UP (ref 3.8–10.5)

## 2020-12-15 PROCEDURE — 99213 OFFICE O/P EST LOW 20 MIN: CPT

## 2020-12-15 PROCEDURE — 99072 ADDL SUPL MATRL&STAF TM PHE: CPT

## 2020-12-16 DIAGNOSIS — R11.2 NAUSEA WITH VOMITING, UNSPECIFIED: ICD-10-CM

## 2020-12-16 DIAGNOSIS — Z51.11 ENCOUNTER FOR ANTINEOPLASTIC CHEMOTHERAPY: ICD-10-CM

## 2020-12-22 NOTE — REVIEW OF SYSTEMS
[Fatigue] : fatigue [Negative] : Allergic/Immunologic [de-identified] : neuropathy of feet and hands

## 2020-12-22 NOTE — HISTORY OF PRESENT ILLNESS
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 Patient was seen initially by Dr. Arciniega at [facility].\par She then saw Dr Robin Zamorano, at [facility] and had a repeat biopsy that showed clear cell carcinoma.\par On 4/20/2020 she underwent surgical staging and is post TLH/BSO/SLND/Omentectomy.\par Final pathology showed stage IA,high grade endometrial carcinoma involving predominantly clear cell and focally serous features, no invasion, no LVSI.\par \par Ct C/A/P with contrast done on 3/12/2020 showed no metastatic sites. [de-identified] : Patient is here for follow up, continues to recover from treatment.  She continues to have peripheral neuropathy.  She saw her gyn/onc (Dr. Hastings at [facility]) last week.  She is due for mammogram, has prescription and will have done here instead of in [place].\par

## 2021-04-09 ENCOUNTER — OUTPATIENT (OUTPATIENT)
Dept: OUTPATIENT SERVICES | Facility: HOSPITAL | Age: 56
LOS: 1 days | Discharge: HOME | End: 2021-04-09

## 2021-04-09 DIAGNOSIS — C54.1 MALIGNANT NEOPLASM OF ENDOMETRIUM: ICD-10-CM

## 2021-04-13 ENCOUNTER — APPOINTMENT (OUTPATIENT)
Dept: RADIATION ONCOLOGY | Facility: CLINIC | Age: 56
End: 2021-04-13
Payer: COMMERCIAL

## 2021-04-13 ENCOUNTER — APPOINTMENT (OUTPATIENT)
Dept: HEMATOLOGY ONCOLOGY | Facility: CLINIC | Age: 56
End: 2021-04-13
Payer: COMMERCIAL

## 2021-04-13 VITALS
HEART RATE: 67 BPM | DIASTOLIC BLOOD PRESSURE: 82 MMHG | TEMPERATURE: 96.8 F | RESPIRATION RATE: 16 BRPM | SYSTOLIC BLOOD PRESSURE: 136 MMHG | OXYGEN SATURATION: 99 % | WEIGHT: 244.05 LBS | BODY MASS INDEX: 41.16 KG/M2

## 2021-04-13 VITALS
RESPIRATION RATE: 16 BRPM | SYSTOLIC BLOOD PRESSURE: 133 MMHG | TEMPERATURE: 97.5 F | DIASTOLIC BLOOD PRESSURE: 82 MMHG | BODY MASS INDEX: 41.15 KG/M2 | HEART RATE: 66 BPM | WEIGHT: 244 LBS | OXYGEN SATURATION: 100 %

## 2021-04-13 PROCEDURE — 99072 ADDL SUPL MATRL&STAF TM PHE: CPT

## 2021-04-13 PROCEDURE — 99212 OFFICE O/P EST SF 10 MIN: CPT | Mod: GC

## 2021-04-13 PROCEDURE — 99213 OFFICE O/P EST LOW 20 MIN: CPT

## 2021-04-14 NOTE — HISTORY OF PRESENT ILLNESS
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 Patient was seen initially by Dr. Arciniega at [facility].\par She then saw Dr Robin Zmaorano, at [facility] and had a repeat biopsy that showed clear cell carcinoma.\par On 4/20/2020 she underwent surgical staging and is post TLH/BSO/SLND/Omentectomy.\par Final pathology showed stage IA,high grade endometrial carcinoma involving predominantly clear cell and focally serous features, no invasion, no LVSI.\par \par CT C/A/P with contrast done on 3/12/2020 showed no metastatic sites.\par \par She completed 5 cycles of chemotherapy with Carboplatin and Taxol (6th cycle held due to neuropathy) - 6/3/20 - 9/2/20\par Vaginal brachytherapy 10/16/20 - 10/23/20\par  [de-identified] : Patient is here for routine follow up, overall doing well.  She reports that she had not been using her vaginal dilators for nearly 3 months and when she recently tried to use it she had an episode of bleeding and pain, otherwise no bleeding.  Saw Dr. Kuhn today and will start using dilators again as instructed.  Appetite and energy levels are good.  Peripheral neuropathy is still present and bothersome but has gradually continues to improve.  Walking has helped.  She is up todate with mammogram (January 2021) and colonoscopy  (2018).  She denies fever, chills, chest pain, SOB, abdominal pain, bloating, nausea, vomiting, change in bowel habits.

## 2021-04-14 NOTE — ASSESSMENT
[Curative] : Goals of care discussed with patient: Curative [Palliative Care Plan] : not applicable at this time [FreeTextEntry1] : 55 year old woman with uterine cancer s/p chemotherapy (completed 9/2020) and vaginal brachytherapy (completed 10/2020) here for routine follow up.\par Reviewed signs and symptoms of recurrence, patient feeling well at this time.\par Peripheral neuropathy - improving.  Encouraged continued exercise as tolerated.\par Reviewed health maintenance, encouraged patient to take Ca and Vitamin D and maintain a healthy weight.\par Mediport maintenance discussed, will have pof/labs this Saturday.\par RTC 3 months.\par

## 2021-04-15 NOTE — HISTORY OF PRESENT ILLNESS
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 She received vaginal brachytherapy to a total of 2100 cGy in 3 fractions. Treatment was delivered from 10/16/20- 10/23/20. She tolerated treatment well and did not develop any grade 3 or higher acute toxicities as a result of the treatment. \par \par Last followed up with us and Dr. Celaya in 12/2020\par \par Labs: ca125 and cea WNL\par \par Today she is feeling well. She denies any urinary or bowel complaints. She was not using the vaginal dilator for a while , however, used the dilator yesterday and had episode of vaginal bleeding which subsided. Continues to follow up with GYN onc\par \par \par

## 2021-04-15 NOTE — REVIEW OF SYSTEMS
[Constipation: Grade 0] : Constipation: Grade 0 [Diarrhea: Grade 0] : Diarrhea: Grade 0 [Fatigue: Grade 0] : Fatigue: Grade 0 [Hematuria: Grade 0] : Hematuria: Grade 0 [Urinary Incontinence: Grade 0] : Urinary Incontinence: Grade 0  [Urinary Retention: Grade 0] : Urinary Retention: Grade 0 [Urinary Tract Pain: Grade 0] : Urinary Tract Pain: Grade 0 [Urinary Urgency: Grade 0] : Urinary Urgency: Grade 0 [Urinary Frequency: Grade 0] : Urinary Frequency: Grade 0 [Genital Edema: Grade 0] : Genital Edema: Grade 0 [Vaginal Stricture: Grade 2 - Vaginal narrowing and/or shortening not interfering with physical examination] : Vaginal Stricture: Grade 2 - Vaginal narrowing and/or shortening not interfering with physical examination

## 2021-04-15 NOTE — PHYSICAL EXAM
[] : no respiratory distress [Exaggerated Use Of Accessory Muscles For Inspiration] : no accessory muscle use [Abdomen Soft] : soft [Nondistended] : nondistended [Abdomen Tenderness] : non-tender [Normal External Genitalia] : normal external genitalia  [Normal] : oriented to person, place and time, the affect was normal, the mood was normal and not anxious [Inguinal Lymph Nodes Enlarged Bilaterally] : inguinal [Supraclavicular Lymph Nodes Enlarged Bilaterally] : supraclavicular [de-identified] : vaginal cuff with shortening but not stenotic

## 2021-04-15 NOTE — DISEASE MANAGEMENT
[Pathological] : TNM Stage: p [IA] : IA [FreeTextEntry4] : clear cell serous [TTNM] : 1a [MTNM] : 0 [NTNM] : 0

## 2021-04-17 ENCOUNTER — LABORATORY RESULT (OUTPATIENT)
Age: 56
End: 2021-04-17

## 2021-04-17 ENCOUNTER — RESULT REVIEW (OUTPATIENT)
Age: 56
End: 2021-04-17

## 2021-04-17 ENCOUNTER — APPOINTMENT (OUTPATIENT)
Dept: INFUSION THERAPY | Facility: HOSPITAL | Age: 56
End: 2021-04-17

## 2021-04-17 LAB
BASOPHILS # BLD AUTO: 0.01 K/UL — SIGNIFICANT CHANGE UP (ref 0–0.2)
BASOPHILS NFR BLD AUTO: 0.3 % — SIGNIFICANT CHANGE UP (ref 0–2)
EOSINOPHIL # BLD AUTO: 0.05 K/UL — SIGNIFICANT CHANGE UP (ref 0–0.5)
EOSINOPHIL NFR BLD AUTO: 1.4 % — SIGNIFICANT CHANGE UP (ref 0–6)
HCT VFR BLD CALC: 30.2 % — LOW (ref 34.5–45)
HGB BLD-MCNC: 9.9 G/DL — LOW (ref 11.5–15.5)
IMM GRANULOCYTES NFR BLD AUTO: 0.3 % — SIGNIFICANT CHANGE UP (ref 0–1.5)
LYMPHOCYTES # BLD AUTO: 0.99 K/UL — LOW (ref 1–3.3)
LYMPHOCYTES # BLD AUTO: 28.4 % — SIGNIFICANT CHANGE UP (ref 13–44)
MCHC RBC-ENTMCNC: 27.7 PG — SIGNIFICANT CHANGE UP (ref 27–34)
MCHC RBC-ENTMCNC: 32.8 G/DL — SIGNIFICANT CHANGE UP (ref 32–36)
MCV RBC AUTO: 84.4 FL — SIGNIFICANT CHANGE UP (ref 80–100)
MONOCYTES # BLD AUTO: 0.44 K/UL — SIGNIFICANT CHANGE UP (ref 0–0.9)
MONOCYTES NFR BLD AUTO: 12.6 % — SIGNIFICANT CHANGE UP (ref 2–14)
NEUTROPHILS # BLD AUTO: 1.98 K/UL — SIGNIFICANT CHANGE UP (ref 1.8–7.4)
NEUTROPHILS NFR BLD AUTO: 57 % — SIGNIFICANT CHANGE UP (ref 43–77)
NRBC # BLD: 0 /100 WBCS — SIGNIFICANT CHANGE UP (ref 0–0)
PLATELET # BLD AUTO: 147 K/UL — LOW (ref 150–400)
RBC # BLD: 3.58 M/UL — LOW (ref 3.8–5.2)
RBC # FLD: 14.9 % — HIGH (ref 10.3–14.5)
WBC # BLD: 3.48 K/UL — LOW (ref 3.8–10.5)
WBC # FLD AUTO: 3.48 K/UL — LOW (ref 3.8–10.5)

## 2021-04-29 ENCOUNTER — APPOINTMENT (OUTPATIENT)
Dept: HEMATOLOGY ONCOLOGY | Facility: CLINIC | Age: 56
End: 2021-04-29

## 2021-04-29 ENCOUNTER — RESULT REVIEW (OUTPATIENT)
Age: 56
End: 2021-04-29

## 2021-04-29 LAB
BASOPHILS # BLD AUTO: 0.02 K/UL — SIGNIFICANT CHANGE UP (ref 0–0.2)
BASOPHILS NFR BLD AUTO: 0.4 % — SIGNIFICANT CHANGE UP (ref 0–2)
EOSINOPHIL # BLD AUTO: 0.09 K/UL — SIGNIFICANT CHANGE UP (ref 0–0.5)
EOSINOPHIL NFR BLD AUTO: 1.9 % — SIGNIFICANT CHANGE UP (ref 0–6)
HCT VFR BLD CALC: 31.4 % — LOW (ref 34.5–45)
HGB BLD-MCNC: 10.6 G/DL — LOW (ref 11.5–15.5)
IMM GRANULOCYTES NFR BLD AUTO: 0.8 % — SIGNIFICANT CHANGE UP (ref 0–1.5)
LYMPHOCYTES # BLD AUTO: 1.97 K/UL — SIGNIFICANT CHANGE UP (ref 1–3.3)
LYMPHOCYTES # BLD AUTO: 40.7 % — SIGNIFICANT CHANGE UP (ref 13–44)
MCHC RBC-ENTMCNC: 28.3 PG — SIGNIFICANT CHANGE UP (ref 27–34)
MCHC RBC-ENTMCNC: 33.8 G/DL — SIGNIFICANT CHANGE UP (ref 32–36)
MCV RBC AUTO: 83.7 FL — SIGNIFICANT CHANGE UP (ref 80–100)
MONOCYTES # BLD AUTO: 0.49 K/UL — SIGNIFICANT CHANGE UP (ref 0–0.9)
MONOCYTES NFR BLD AUTO: 10.1 % — SIGNIFICANT CHANGE UP (ref 2–14)
NEUTROPHILS # BLD AUTO: 2.23 K/UL — SIGNIFICANT CHANGE UP (ref 1.8–7.4)
NEUTROPHILS NFR BLD AUTO: 46.1 % — SIGNIFICANT CHANGE UP (ref 43–77)
NRBC # BLD: 0 /100 WBCS — SIGNIFICANT CHANGE UP (ref 0–0)
PLATELET # BLD AUTO: 171 K/UL — SIGNIFICANT CHANGE UP (ref 150–400)
RBC # BLD: 3.75 M/UL — LOW (ref 3.8–5.2)
RBC # FLD: 15.2 % — HIGH (ref 10.3–14.5)
WBC # BLD: 4.84 K/UL — SIGNIFICANT CHANGE UP (ref 3.8–10.5)
WBC # FLD AUTO: 4.84 K/UL — SIGNIFICANT CHANGE UP (ref 3.8–10.5)

## 2021-06-09 ENCOUNTER — OUTPATIENT (OUTPATIENT)
Dept: OUTPATIENT SERVICES | Facility: HOSPITAL | Age: 56
LOS: 1 days | Discharge: HOME | End: 2021-06-09

## 2021-06-09 DIAGNOSIS — C54.1 MALIGNANT NEOPLASM OF ENDOMETRIUM: ICD-10-CM

## 2021-06-10 ENCOUNTER — RESULT REVIEW (OUTPATIENT)
Age: 56
End: 2021-06-10

## 2021-06-10 ENCOUNTER — APPOINTMENT (OUTPATIENT)
Dept: INFUSION THERAPY | Facility: HOSPITAL | Age: 56
End: 2021-06-10

## 2021-06-10 LAB
BASOPHILS # BLD AUTO: 0.01 K/UL — SIGNIFICANT CHANGE UP (ref 0–0.2)
BASOPHILS NFR BLD AUTO: 0.2 % — SIGNIFICANT CHANGE UP (ref 0–2)
EOSINOPHIL # BLD AUTO: 0.13 K/UL — SIGNIFICANT CHANGE UP (ref 0–0.5)
EOSINOPHIL NFR BLD AUTO: 2.7 % — SIGNIFICANT CHANGE UP (ref 0–6)
HCT VFR BLD CALC: 31.7 % — LOW (ref 34.5–45)
HGB BLD-MCNC: 10.4 G/DL — LOW (ref 11.5–15.5)
IMM GRANULOCYTES NFR BLD AUTO: 0.2 % — SIGNIFICANT CHANGE UP (ref 0–1.5)
LYMPHOCYTES # BLD AUTO: 1.92 K/UL — SIGNIFICANT CHANGE UP (ref 1–3.3)
LYMPHOCYTES # BLD AUTO: 40.5 % — SIGNIFICANT CHANGE UP (ref 13–44)
MCHC RBC-ENTMCNC: 28 PG — SIGNIFICANT CHANGE UP (ref 27–34)
MCHC RBC-ENTMCNC: 32.8 G/DL — SIGNIFICANT CHANGE UP (ref 32–36)
MCV RBC AUTO: 85.4 FL — SIGNIFICANT CHANGE UP (ref 80–100)
MONOCYTES # BLD AUTO: 0.48 K/UL — SIGNIFICANT CHANGE UP (ref 0–0.9)
MONOCYTES NFR BLD AUTO: 10.1 % — SIGNIFICANT CHANGE UP (ref 2–14)
NEUTROPHILS # BLD AUTO: 2.19 K/UL — SIGNIFICANT CHANGE UP (ref 1.8–7.4)
NEUTROPHILS NFR BLD AUTO: 46.3 % — SIGNIFICANT CHANGE UP (ref 43–77)
NRBC # BLD: 0 /100 WBCS — SIGNIFICANT CHANGE UP (ref 0–0)
PLATELET # BLD AUTO: 169 K/UL — SIGNIFICANT CHANGE UP (ref 150–400)
RBC # BLD: 3.71 M/UL — LOW (ref 3.8–5.2)
RBC # FLD: 14.8 % — HIGH (ref 10.3–14.5)
WBC # BLD: 4.74 K/UL — SIGNIFICANT CHANGE UP (ref 3.8–10.5)
WBC # FLD AUTO: 4.74 K/UL — SIGNIFICANT CHANGE UP (ref 3.8–10.5)

## 2021-07-15 ENCOUNTER — APPOINTMENT (OUTPATIENT)
Dept: HEMATOLOGY ONCOLOGY | Facility: CLINIC | Age: 56
End: 2021-07-15

## 2021-07-22 ENCOUNTER — APPOINTMENT (OUTPATIENT)
Dept: HEMATOLOGY ONCOLOGY | Facility: CLINIC | Age: 56
End: 2021-07-22

## 2021-07-29 ENCOUNTER — OUTPATIENT (OUTPATIENT)
Dept: OUTPATIENT SERVICES | Facility: HOSPITAL | Age: 56
LOS: 1 days | Discharge: HOME | End: 2021-07-29

## 2021-07-29 DIAGNOSIS — C54.1 MALIGNANT NEOPLASM OF ENDOMETRIUM: ICD-10-CM

## 2021-07-30 ENCOUNTER — LABORATORY RESULT (OUTPATIENT)
Age: 56
End: 2021-07-30

## 2021-07-30 ENCOUNTER — APPOINTMENT (OUTPATIENT)
Dept: HEMATOLOGY ONCOLOGY | Facility: CLINIC | Age: 56
End: 2021-07-30
Payer: COMMERCIAL

## 2021-07-30 ENCOUNTER — RESULT REVIEW (OUTPATIENT)
Age: 56
End: 2021-07-30

## 2021-07-30 ENCOUNTER — APPOINTMENT (OUTPATIENT)
Dept: INFUSION THERAPY | Facility: HOSPITAL | Age: 56
End: 2021-07-30

## 2021-07-30 VITALS
RESPIRATION RATE: 16 BRPM | HEART RATE: 71 BPM | SYSTOLIC BLOOD PRESSURE: 136 MMHG | DIASTOLIC BLOOD PRESSURE: 86 MMHG | OXYGEN SATURATION: 99 % | TEMPERATURE: 97.3 F

## 2021-07-30 LAB
BASOPHILS # BLD AUTO: 0.01 K/UL — SIGNIFICANT CHANGE UP (ref 0–0.2)
BASOPHILS NFR BLD AUTO: 0.2 % — SIGNIFICANT CHANGE UP (ref 0–2)
EOSINOPHIL # BLD AUTO: 0.14 K/UL — SIGNIFICANT CHANGE UP (ref 0–0.5)
EOSINOPHIL NFR BLD AUTO: 2.7 % — SIGNIFICANT CHANGE UP (ref 0–6)
HCT VFR BLD CALC: 33 % — LOW (ref 34.5–45)
HGB BLD-MCNC: 10.8 G/DL — LOW (ref 11.5–15.5)
IMM GRANULOCYTES NFR BLD AUTO: 0.2 % — SIGNIFICANT CHANGE UP (ref 0–1.5)
LYMPHOCYTES # BLD AUTO: 2.2 K/UL — SIGNIFICANT CHANGE UP (ref 1–3.3)
LYMPHOCYTES # BLD AUTO: 42.6 % — SIGNIFICANT CHANGE UP (ref 13–44)
MCHC RBC-ENTMCNC: 27.8 PG — SIGNIFICANT CHANGE UP (ref 27–34)
MCHC RBC-ENTMCNC: 32.7 G/DL — SIGNIFICANT CHANGE UP (ref 32–36)
MCV RBC AUTO: 85.1 FL — SIGNIFICANT CHANGE UP (ref 80–100)
MONOCYTES # BLD AUTO: 0.61 K/UL — SIGNIFICANT CHANGE UP (ref 0–0.9)
MONOCYTES NFR BLD AUTO: 11.8 % — SIGNIFICANT CHANGE UP (ref 2–14)
NEUTROPHILS # BLD AUTO: 2.19 K/UL — SIGNIFICANT CHANGE UP (ref 1.8–7.4)
NEUTROPHILS NFR BLD AUTO: 42.5 % — LOW (ref 43–77)
NRBC # BLD: 0 /100 WBCS — SIGNIFICANT CHANGE UP (ref 0–0)
PLATELET # BLD AUTO: 188 K/UL — SIGNIFICANT CHANGE UP (ref 150–400)
RBC # BLD: 3.88 M/UL — SIGNIFICANT CHANGE UP (ref 3.8–5.2)
RBC # FLD: 14.4 % — SIGNIFICANT CHANGE UP (ref 10.3–14.5)
WBC # BLD: 5.16 K/UL — SIGNIFICANT CHANGE UP (ref 3.8–10.5)
WBC # FLD AUTO: 5.16 K/UL — SIGNIFICANT CHANGE UP (ref 3.8–10.5)

## 2021-07-30 PROCEDURE — 99214 OFFICE O/P EST MOD 30 MIN: CPT

## 2021-08-02 NOTE — HISTORY OF PRESENT ILLNESS
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 Patient was seen initially by Dr. Arciniega at [facility].\par She then saw Dr Robni Zamorano, at [facility] and had a repeat biopsy that showed clear cell carcinoma.\par On 4/20/2020 she underwent surgical staging and is post TLH/BSO/SLND/Omentectomy.\par Final pathology showed stage IA,high grade endometrial carcinoma involving predominantly clear cell and focally serous features, no invasion, no LVSI.\par \par Ct C/A/P with contrast done on 3/12/2020 showed no metastatic sites.\par She completed five cycles of CT 6/3/20- 9/2/20) Last cycle held due to toxicity and myelosuppression.She received vaginal brachytherapy to a total of 2100 cGy in 3 fractions. Treatment was delivered from 10/16/20- 10/23/20. \par Scan showed CINDY. [de-identified] : Patient has no new complaints.\par She is up to date with mammogram and colonoscopy.\par

## 2021-08-05 ENCOUNTER — RESULT REVIEW (OUTPATIENT)
Age: 56
End: 2021-08-05

## 2021-08-10 ENCOUNTER — APPOINTMENT (OUTPATIENT)
Dept: CT IMAGING | Facility: IMAGING CENTER | Age: 56
End: 2021-08-10
Payer: COMMERCIAL

## 2021-08-10 ENCOUNTER — OUTPATIENT (OUTPATIENT)
Dept: OUTPATIENT SERVICES | Facility: HOSPITAL | Age: 56
LOS: 1 days | End: 2021-08-10
Payer: COMMERCIAL

## 2021-08-10 DIAGNOSIS — C55 MALIGNANT NEOPLASM OF UTERUS, PART UNSPECIFIED: ICD-10-CM

## 2021-08-10 PROCEDURE — 74177 CT ABD & PELVIS W/CONTRAST: CPT

## 2021-08-10 PROCEDURE — 74177 CT ABD & PELVIS W/CONTRAST: CPT | Mod: 26

## 2021-08-19 ENCOUNTER — OUTPATIENT (OUTPATIENT)
Dept: OUTPATIENT SERVICES | Facility: HOSPITAL | Age: 56
LOS: 1 days | End: 2021-08-19
Payer: COMMERCIAL

## 2021-08-19 ENCOUNTER — APPOINTMENT (OUTPATIENT)
Dept: CT IMAGING | Facility: IMAGING CENTER | Age: 56
End: 2021-08-19
Payer: COMMERCIAL

## 2021-08-19 DIAGNOSIS — J90 PLEURAL EFFUSION, NOT ELSEWHERE CLASSIFIED: ICD-10-CM

## 2021-08-19 DIAGNOSIS — C55 MALIGNANT NEOPLASM OF UTERUS, PART UNSPECIFIED: ICD-10-CM

## 2021-08-19 DIAGNOSIS — Z00.8 ENCOUNTER FOR OTHER GENERAL EXAMINATION: ICD-10-CM

## 2021-08-19 PROCEDURE — 71250 CT THORAX DX C-: CPT

## 2021-08-19 PROCEDURE — 71250 CT THORAX DX C-: CPT | Mod: 26

## 2021-08-30 ENCOUNTER — APPOINTMENT (OUTPATIENT)
Dept: RADIOLOGY | Facility: IMAGING CENTER | Age: 56
End: 2021-08-30
Payer: COMMERCIAL

## 2021-08-30 ENCOUNTER — OUTPATIENT (OUTPATIENT)
Dept: OUTPATIENT SERVICES | Facility: HOSPITAL | Age: 56
LOS: 1 days | End: 2021-08-30
Payer: COMMERCIAL

## 2021-08-30 ENCOUNTER — INPATIENT (INPATIENT)
Facility: HOSPITAL | Age: 56
LOS: 2 days | Discharge: HOME | End: 2021-09-02
Attending: HOSPITALIST | Admitting: HOSPITALIST
Payer: COMMERCIAL

## 2021-08-30 VITALS
OXYGEN SATURATION: 99 % | TEMPERATURE: 98 F | RESPIRATION RATE: 18 BRPM | SYSTOLIC BLOOD PRESSURE: 145 MMHG | DIASTOLIC BLOOD PRESSURE: 76 MMHG | HEIGHT: 65 IN | HEART RATE: 92 BPM

## 2021-08-30 DIAGNOSIS — J90 PLEURAL EFFUSION, NOT ELSEWHERE CLASSIFIED: ICD-10-CM

## 2021-08-30 DIAGNOSIS — Z90.710 ACQUIRED ABSENCE OF BOTH CERVIX AND UTERUS: Chronic | ICD-10-CM

## 2021-08-30 DIAGNOSIS — N12 TUBULO-INTERSTITIAL NEPHRITIS, NOT SPECIFIED AS ACUTE OR CHRONIC: ICD-10-CM

## 2021-08-30 LAB
ALBUMIN SERPL ELPH-MCNC: 4 G/DL — SIGNIFICANT CHANGE UP (ref 3.3–5)
ALP SERPL-CCNC: 177 U/L — HIGH (ref 40–120)
ALT FLD-CCNC: 46 U/L — HIGH (ref 4–33)
ANION GAP SERPL CALC-SCNC: 15 MMOL/L — HIGH (ref 7–14)
AST SERPL-CCNC: 26 U/L — SIGNIFICANT CHANGE UP (ref 4–32)
BASOPHILS # BLD AUTO: 0.01 K/UL — SIGNIFICANT CHANGE UP (ref 0–0.2)
BASOPHILS NFR BLD AUTO: 0.2 % — SIGNIFICANT CHANGE UP (ref 0–2)
BILIRUB SERPL-MCNC: 0.4 MG/DL — SIGNIFICANT CHANGE UP (ref 0.2–1.2)
BLOOD GAS VENOUS COMPREHENSIVE RESULT: SIGNIFICANT CHANGE UP
BUN SERPL-MCNC: 12 MG/DL — SIGNIFICANT CHANGE UP (ref 7–23)
CALCIUM SERPL-MCNC: 9.5 MG/DL — SIGNIFICANT CHANGE UP (ref 8.4–10.5)
CHLORIDE SERPL-SCNC: 104 MMOL/L — SIGNIFICANT CHANGE UP (ref 98–107)
CO2 SERPL-SCNC: 22 MMOL/L — SIGNIFICANT CHANGE UP (ref 22–31)
CREAT SERPL-MCNC: 1.1 MG/DL — SIGNIFICANT CHANGE UP (ref 0.5–1.3)
D DIMER BLD IA.RAPID-MCNC: 1355 NG/ML DDU — HIGH
EOSINOPHIL # BLD AUTO: 0.06 K/UL — SIGNIFICANT CHANGE UP (ref 0–0.5)
EOSINOPHIL NFR BLD AUTO: 1 % — SIGNIFICANT CHANGE UP (ref 0–6)
GLUCOSE SERPL-MCNC: 111 MG/DL — HIGH (ref 70–99)
HCT VFR BLD CALC: 33.6 % — LOW (ref 34.5–45)
HGB BLD-MCNC: 11 G/DL — LOW (ref 11.5–15.5)
IANC: 3.94 K/UL — SIGNIFICANT CHANGE UP (ref 1.5–8.5)
IMM GRANULOCYTES NFR BLD AUTO: 0.2 % — SIGNIFICANT CHANGE UP (ref 0–1.5)
LYMPHOCYTES # BLD AUTO: 1.58 K/UL — SIGNIFICANT CHANGE UP (ref 1–3.3)
LYMPHOCYTES # BLD AUTO: 25.4 % — SIGNIFICANT CHANGE UP (ref 13–44)
MCHC RBC-ENTMCNC: 26.5 PG — LOW (ref 27–34)
MCHC RBC-ENTMCNC: 32.7 GM/DL — SIGNIFICANT CHANGE UP (ref 32–36)
MCV RBC AUTO: 81 FL — SIGNIFICANT CHANGE UP (ref 80–100)
MONOCYTES # BLD AUTO: 0.63 K/UL — SIGNIFICANT CHANGE UP (ref 0–0.9)
MONOCYTES NFR BLD AUTO: 10.1 % — SIGNIFICANT CHANGE UP (ref 2–14)
NEUTROPHILS # BLD AUTO: 3.94 K/UL — SIGNIFICANT CHANGE UP (ref 1.8–7.4)
NEUTROPHILS NFR BLD AUTO: 63.1 % — SIGNIFICANT CHANGE UP (ref 43–77)
NRBC # BLD: 0 /100 WBCS — SIGNIFICANT CHANGE UP
NRBC # FLD: 0 K/UL — SIGNIFICANT CHANGE UP
PLATELET # BLD AUTO: 227 K/UL — SIGNIFICANT CHANGE UP (ref 150–400)
POTASSIUM SERPL-MCNC: 3.4 MMOL/L — LOW (ref 3.5–5.3)
POTASSIUM SERPL-SCNC: 3.4 MMOL/L — LOW (ref 3.5–5.3)
PROT SERPL-MCNC: 7.4 G/DL — SIGNIFICANT CHANGE UP (ref 6–8.3)
RBC # BLD: 4.15 M/UL — SIGNIFICANT CHANGE UP (ref 3.8–5.2)
RBC # FLD: 13.9 % — SIGNIFICANT CHANGE UP (ref 10.3–14.5)
SODIUM SERPL-SCNC: 141 MMOL/L — SIGNIFICANT CHANGE UP (ref 135–145)
WBC # BLD: 6.23 K/UL — SIGNIFICANT CHANGE UP (ref 3.8–10.5)
WBC # FLD AUTO: 6.23 K/UL — SIGNIFICANT CHANGE UP (ref 3.8–10.5)

## 2021-08-30 PROCEDURE — 71275 CT ANGIOGRAPHY CHEST: CPT | Mod: 26

## 2021-08-30 PROCEDURE — 71046 X-RAY EXAM CHEST 2 VIEWS: CPT | Mod: 26

## 2021-08-30 PROCEDURE — 99254 IP/OBS CNSLTJ NEW/EST MOD 60: CPT

## 2021-08-30 PROCEDURE — 99285 EMERGENCY DEPT VISIT HI MDM: CPT

## 2021-08-30 PROCEDURE — 71046 X-RAY EXAM CHEST 2 VIEWS: CPT

## 2021-08-30 RX ORDER — MORPHINE SULFATE 50 MG/1
4 CAPSULE, EXTENDED RELEASE ORAL ONCE
Refills: 0 | Status: DISCONTINUED | OUTPATIENT
Start: 2021-08-30 | End: 2021-08-30

## 2021-08-30 RX ORDER — KETOROLAC TROMETHAMINE 30 MG/ML
15 SYRINGE (ML) INJECTION ONCE
Refills: 0 | Status: DISCONTINUED | OUTPATIENT
Start: 2021-08-30 | End: 2021-08-30

## 2021-08-30 RX ADMIN — Medication 15 MILLIGRAM(S): at 18:59

## 2021-08-30 NOTE — ED ADULT NURSE NOTE - NSICDXPASTMEDICALHX_GEN_ALL_CORE_FT
PAST MEDICAL HISTORY:  Asthma No attacks since 2017    Uterine cancer S/p chemotherapy, radiation and hysterectomy

## 2021-08-30 NOTE — ED ADULT TRIAGE NOTE - CHIEF COMPLAINT QUOTE
pt coming from [facility], pt c/o right flank pain x a few days.  pt had CXR which shows plural effusion.  pt reports being SOB on exertion.  PMH: uterine ca

## 2021-08-30 NOTE — ED PROVIDER NOTE - CLINICAL SUMMARY MEDICAL DECISION MAKING FREE TEXT BOX
DO Terrie PGY-3: 55 y/o F presenting with ongoing chest pain for last few weeks, given pleuritic nature r/o PE. Labs, dimer, EKG. CXR today shows unchanged pleural effusion and atelectasis. Pain may be MSK in nature. Low concern for cardiac etiology, will check EKG. Dispo pending results

## 2021-08-30 NOTE — ED PROVIDER NOTE - DISPOSITION TYPE
ADMIT Oxybutynin Counseling:  I discussed with the patient the risks of oxybutynin including but not limited to skin rash, drowsiness, dry mouth, difficulty urinating, and blurred vision.

## 2021-08-30 NOTE — ED PROVIDER NOTE - NS ED ROS FT
CONSTITUTIONAL: No fevers  EYES: No vision changes  ENT: No runny nose, sore throat  CV: No palpitations   PULM: + Inability to take deep breaths due to pain. No cough  GI: +Bloating   : No changes in urine  SKIN: No swelling  NEURO: +Mild headache
No

## 2021-08-30 NOTE — ED ADULT NURSE NOTE - OBJECTIVE STATEMENT
Pt received to room 23 has a hx of uterine ca with a hysterectomy complaining of R flank pain that started few weeks ago. Pt states she hit the side of her bed on that side. Pt states she woke up saturday with worsening pain. Pt states pain is worse when she takes a deep breath. Pt states she was sent by MD because a pleural effusion was found. Pt denies chest pain. n/v/d, fever or chills. Pts chest wall port accessed by facilitator RN.

## 2021-08-30 NOTE — ED ADULT NURSE NOTE - NSIMPLEMENTINTERV_GEN_ALL_ED
Implemented All Universal Safety Interventions:  [place] to call system. Call bell, personal items and telephone within reach. Instruct patient to call for assistance. Room bathroom lighting operational. Non-slip footwear when patient is off stretcher. Physically safe environment: no spills, clutter or unnecessary equipment. Stretcher in lowest position, wheels locked, appropriate side rails in place.

## 2021-08-30 NOTE — ED ADULT NURSE NOTE - CHIEF COMPLAINT QUOTE
pt coming from [facility], pt c/o right flank pain x a few days.  pt had CXR which shows plural effusion.  pt reports being SOB on exertion.  PMH: uterine ca

## 2021-08-30 NOTE — ED PROVIDER NOTE - NSICDXPASTSURGICALHX_GEN_ALL_CORE_FT
PT CALLING ABOUT [facility] RF DENIAL. EXPLAINED TO PT THAT DR. PAYTON WROTE IN HER DICTATION THAT SHE WOULD NOT BE RF'ING PT'S PAIN MED ANYMORE. PT'S PAIN IS NOT R/T CANCER. PT STATES THAT SHE HAS DISC AND ROTATOR CUFF ISSUES AND HER PAIN IS FROM THAT. TOLD HER THAT SHE WOULD HAVE TO TRY TYL EX STR AND IBU AND SEE HER PCP IF PAIN CONTINUES. PT V/U.    PAST SURGICAL HISTORY:  S/P hysterectomy

## 2021-08-30 NOTE — ED PROVIDER NOTE - PHYSICAL EXAMINATION
GENERAL: Overweight woman  EYES: Sclera anicteric  HENT: NC/AT, moist mucous membranes  NECK: Supple  LUNG: Nonlabored respirations, no wheezes, rales  CV: Regular rate and rhythm  ABDOMEN: Soft, distended, nontender  MSK: No swelling  NEURO: AAOx4 (to person, place, time, event)  PSYCH: Anxious

## 2021-08-30 NOTE — ED PROVIDER NOTE - OBJECTIVE STATEMENT
Ms. Liao is a 57 YO F w/ PMH uterine cancer s/p hysterectomy, chemo and radiation and asthma, who was sent in from her oncologist's office for R flank pain. The pain started a few weeks ago when she hit the side of her bed. On Saturday (8/28), when she woke up, she noted that the pain was more severe and limited her ability to take deep breaths. She attributed this to having fasted the night before which caused her to be bloated/gassy. She notes that her abdomen has been distended more often since her hysterectomy. She made an appointment to see her oncologist today as the pain contined. A CXR was done, which found a pleural effusion, and it was recommended she come to the ED. Apart from this she has a headache and feels bloated. She denies fever, cough, change in urinary or bowel habits or swelling.

## 2021-08-31 DIAGNOSIS — J45.909 UNSPECIFIED ASTHMA, UNCOMPLICATED: ICD-10-CM

## 2021-08-31 DIAGNOSIS — J90 PLEURAL EFFUSION, NOT ELSEWHERE CLASSIFIED: ICD-10-CM

## 2021-08-31 DIAGNOSIS — Z00.00 ENCOUNTER FOR GENERAL ADULT MEDICAL EXAMINATION WITHOUT ABNORMAL FINDINGS: ICD-10-CM

## 2021-08-31 DIAGNOSIS — Z85.42 PERSONAL HISTORY OF MALIGNANT NEOPLASM OF OTHER PARTS OF UTERUS: ICD-10-CM

## 2021-08-31 LAB
ANION GAP SERPL CALC-SCNC: 14 MMOL/L — SIGNIFICANT CHANGE UP (ref 7–14)
APTT BLD: 30.2 SEC — SIGNIFICANT CHANGE UP (ref 27–36.3)
B PERT DNA SPEC QL NAA+PROBE: SIGNIFICANT CHANGE UP
B PERT+PARAPERT DNA PNL SPEC NAA+PROBE: SIGNIFICANT CHANGE UP
BLD GP AB SCN SERPL QL: NEGATIVE — SIGNIFICANT CHANGE UP
BORDETELLA PARAPERTUSSIS (RAPRVP): SIGNIFICANT CHANGE UP
BUN SERPL-MCNC: 17 MG/DL — SIGNIFICANT CHANGE UP (ref 7–23)
C PNEUM DNA SPEC QL NAA+PROBE: SIGNIFICANT CHANGE UP
CALCIUM SERPL-MCNC: 9.3 MG/DL — SIGNIFICANT CHANGE UP (ref 8.4–10.5)
CHLORIDE SERPL-SCNC: 106 MMOL/L — SIGNIFICANT CHANGE UP (ref 98–107)
CO2 SERPL-SCNC: 23 MMOL/L — SIGNIFICANT CHANGE UP (ref 22–31)
CREAT SERPL-MCNC: 1.17 MG/DL — SIGNIFICANT CHANGE UP (ref 0.5–1.3)
FLUAV SUBTYP SPEC NAA+PROBE: SIGNIFICANT CHANGE UP
FLUBV RNA SPEC QL NAA+PROBE: SIGNIFICANT CHANGE UP
GLUCOSE SERPL-MCNC: 100 MG/DL — HIGH (ref 70–99)
HADV DNA SPEC QL NAA+PROBE: SIGNIFICANT CHANGE UP
HCOV 229E RNA SPEC QL NAA+PROBE: SIGNIFICANT CHANGE UP
HCOV HKU1 RNA SPEC QL NAA+PROBE: SIGNIFICANT CHANGE UP
HCOV NL63 RNA SPEC QL NAA+PROBE: SIGNIFICANT CHANGE UP
HCOV OC43 RNA SPEC QL NAA+PROBE: SIGNIFICANT CHANGE UP
HCT VFR BLD CALC: 32.1 % — LOW (ref 34.5–45)
HGB BLD-MCNC: 10.6 G/DL — LOW (ref 11.5–15.5)
HMPV RNA SPEC QL NAA+PROBE: SIGNIFICANT CHANGE UP
HPIV1 RNA SPEC QL NAA+PROBE: SIGNIFICANT CHANGE UP
HPIV2 RNA SPEC QL NAA+PROBE: SIGNIFICANT CHANGE UP
HPIV3 RNA SPEC QL NAA+PROBE: SIGNIFICANT CHANGE UP
HPIV4 RNA SPEC QL NAA+PROBE: SIGNIFICANT CHANGE UP
INR BLD: 1.16 RATIO — SIGNIFICANT CHANGE UP (ref 0.88–1.16)
M PNEUMO DNA SPEC QL NAA+PROBE: SIGNIFICANT CHANGE UP
MAGNESIUM SERPL-MCNC: 2.2 MG/DL — SIGNIFICANT CHANGE UP (ref 1.6–2.6)
MCHC RBC-ENTMCNC: 27.3 PG — SIGNIFICANT CHANGE UP (ref 27–34)
MCHC RBC-ENTMCNC: 33 GM/DL — SIGNIFICANT CHANGE UP (ref 32–36)
MCV RBC AUTO: 82.7 FL — SIGNIFICANT CHANGE UP (ref 80–100)
NRBC # BLD: 0 /100 WBCS — SIGNIFICANT CHANGE UP
NRBC # FLD: 0 K/UL — SIGNIFICANT CHANGE UP
PHOSPHATE SERPL-MCNC: 3.3 MG/DL — SIGNIFICANT CHANGE UP (ref 2.5–4.5)
PLATELET # BLD AUTO: 212 K/UL — SIGNIFICANT CHANGE UP (ref 150–400)
POTASSIUM SERPL-MCNC: 3.9 MMOL/L — SIGNIFICANT CHANGE UP (ref 3.5–5.3)
POTASSIUM SERPL-SCNC: 3.9 MMOL/L — SIGNIFICANT CHANGE UP (ref 3.5–5.3)
PROTHROM AB SERPL-ACNC: 13.3 SEC — SIGNIFICANT CHANGE UP (ref 10.6–13.6)
RAPID RVP RESULT: SIGNIFICANT CHANGE UP
RBC # BLD: 3.88 M/UL — SIGNIFICANT CHANGE UP (ref 3.8–5.2)
RBC # FLD: 14.1 % — SIGNIFICANT CHANGE UP (ref 10.3–14.5)
RH IG SCN BLD-IMP: POSITIVE — SIGNIFICANT CHANGE UP
RSV RNA SPEC QL NAA+PROBE: SIGNIFICANT CHANGE UP
RV+EV RNA SPEC QL NAA+PROBE: SIGNIFICANT CHANGE UP
SARS-COV-2 RNA SPEC QL NAA+PROBE: SIGNIFICANT CHANGE UP
SODIUM SERPL-SCNC: 143 MMOL/L — SIGNIFICANT CHANGE UP (ref 135–145)
WBC # BLD: 5.96 K/UL — SIGNIFICANT CHANGE UP (ref 3.8–10.5)
WBC # FLD AUTO: 5.96 K/UL — SIGNIFICANT CHANGE UP (ref 3.8–10.5)

## 2021-08-31 PROCEDURE — 99223 1ST HOSP IP/OBS HIGH 75: CPT | Mod: GC

## 2021-08-31 PROCEDURE — 99255 IP/OBS CONSLTJ NEW/EST HI 80: CPT

## 2021-08-31 PROCEDURE — 12345: CPT | Mod: NC

## 2021-08-31 PROCEDURE — 76604 US EXAM CHEST: CPT | Mod: 26

## 2021-08-31 RX ORDER — CHLORHEXIDINE GLUCONATE 213 G/1000ML
1 SOLUTION TOPICAL DAILY
Refills: 0 | Status: DISCONTINUED | OUTPATIENT
Start: 2021-08-31 | End: 2021-09-02

## 2021-08-31 RX ORDER — ACETAMINOPHEN 500 MG
650 TABLET ORAL EVERY 6 HOURS
Refills: 0 | Status: DISCONTINUED | OUTPATIENT
Start: 2021-08-31 | End: 2021-08-31

## 2021-08-31 RX ORDER — ACETAMINOPHEN 500 MG
650 TABLET ORAL EVERY 8 HOURS
Refills: 0 | Status: DISCONTINUED | OUTPATIENT
Start: 2021-08-31 | End: 2021-09-02

## 2021-08-31 RX ORDER — IBUPROFEN 200 MG
200 TABLET ORAL EVERY 4 HOURS
Refills: 0 | Status: DISCONTINUED | OUTPATIENT
Start: 2021-08-31 | End: 2021-08-31

## 2021-08-31 RX ORDER — TRAMADOL HYDROCHLORIDE 50 MG/1
25 TABLET ORAL EVERY 6 HOURS
Refills: 0 | Status: DISCONTINUED | OUTPATIENT
Start: 2021-08-31 | End: 2021-09-02

## 2021-08-31 RX ORDER — KETOROLAC TROMETHAMINE 30 MG/ML
15 SYRINGE (ML) INJECTION ONCE
Refills: 0 | Status: DISCONTINUED | OUTPATIENT
Start: 2021-08-31 | End: 2021-08-31

## 2021-08-31 RX ADMIN — Medication 200 MILLIGRAM(S): at 12:16

## 2021-08-31 RX ADMIN — Medication 200 MILLIGRAM(S): at 05:40

## 2021-08-31 RX ADMIN — Medication 650 MILLIGRAM(S): at 15:06

## 2021-08-31 RX ADMIN — Medication 200 MILLIGRAM(S): at 13:00

## 2021-08-31 RX ADMIN — Medication 650 MILLIGRAM(S): at 22:38

## 2021-08-31 RX ADMIN — Medication 15 MILLIGRAM(S): at 00:36

## 2021-08-31 RX ADMIN — Medication 200 MILLIGRAM(S): at 04:46

## 2021-08-31 RX ADMIN — CHLORHEXIDINE GLUCONATE 1 APPLICATION(S): 213 SOLUTION TOPICAL at 12:17

## 2021-08-31 RX ADMIN — Medication 650 MILLIGRAM(S): at 21:57

## 2021-08-31 NOTE — H&P ADULT - PROBLEM SELECTOR PLAN 2
Follows Dr. Celaya tx'd w/ Carboplatin and Taxol 6/3/20- 9/2/20( five cycles); no mets on 3/2020 CT C/A/P.  - will consult onc in AM Follows Dr. Celaya, tx'd w/ Carboplatin and Taxol 6/3/20- 9/2/20( five cycles); no mets on 3/2020 CT C/A/P.  - f/u onc consult

## 2021-08-31 NOTE — CONSULT NOTE ADULT - ASSESSMENT
Pt is a 55 yo F hx of uterine ca (dx 2020; s/p chemo last cycle 9/2020, s/p VINCENT/BSO) and asthma p/w several days of increasingly severe R sided chest pain, small right pleural effusion.

## 2021-08-31 NOTE — CONSULT NOTE ADULT - PROBLEM SELECTOR RECOMMENDATION 9
Case and images reviewed and discussed with DR. Peters  Right pleural effusion small in size.   Will attempt ultrasound guided Rt. Pigtail placement at bedside today.   Will send for cultures, cytopathology  If no clear safe window for drainage though, would recommend IR consult for   guided drainage.   Will follow.

## 2021-08-31 NOTE — CONSULT NOTE ADULT - ASSESSMENT
56y      -  -  -  -    -Patient to followup with  ([facility]) upon discharge  -C/w Supportive care, pain control, Nutrition, PT, DVT ppx  -Oncology will continue to follow with you      Case d/w Dr. Eusebio ROQUE  Oncology Physician Assistant  [facility]  Pager [phone]    If after 5pm or weekends please page On-call Oncology Fellow   56y Stage: IA high grade clear cell/serous carcinoma of the uterus in CR s/p carbo/taxol in remission presenting with SOB and R pleuritic chest pain.         -  -  -  -    -Patient to followup with  ([facility]) upon discharge  -C/w Supportive care, pain control, Nutrition, PT, DVT ppx  -Oncology will continue to follow with you      Case d/w Dr. Eusebio ROQUE  Oncology Physician Assistant  [facility]  Pager [phone]    If after 5pm or weekends please page On-call Oncology Fellow   56y with h/o of Stage IA high grade clear cell/serous carcinoma of the uterus now in CR s/p VINCENT/BSO and adjuvant chemo with carbo/taxol presenting with SOB and R pleuritic chest pain.     Ct angio chest: Limited. No pulmonary embolus to level of the lobar pulmonary arteries.  Interval increase in small right pleural effusion since 8/19/2021 with associated adjacent compressive atelectasis.  Small perihepatic ascites.    -Appreciate CT surgery consult recs, Bedside US of the chest reveals very small, complex pleural effusion on the right with no approach for bedside thoracentesis.  -Would obtain IR consult for possible image guided thoracentesis  -If thoracentesis is feasible, Will send for cultures, cytopathology  -Would order an ECHO, patient endorsing history of MVP  -Please also order tumor marker   -Rest of care as per primary team  -Patient to followup with Dr. Mirtha Celaya ([facility]) upon discharge  -C/w Supportive care, pain control, Nutrition, PT, DVT ppx  -Oncology will continue to follow with you      Case d/w Dr. Eusebio ROQUE  Oncology Physician Assistant  [facility]  Pager [phone]    If after 5pm or weekends please page On-call Oncology Fellow

## 2021-08-31 NOTE — H&P ADULT - NSHPLABSRESULTS_GEN_ALL_CORE
LABS                          11.0   6.23  )-----------( 227      ( 30 Aug 2021 18:54 )             33.6     08-30    141  |  104  |  12  ----------------------------<  111<H>  3.4<L>   |  22  |  1.10    Ca    9.5      30 Aug 2021 18:54    TPro  7.4  /  Alb  4.0  /  TBili  0.4  /  DBili  x   /  AST  26  /  ALT  46<H>  /  AlkPhos  177<H>  08-30      Troponin T, High Sensitivity Result: <6:     D-Dimer Assay, Quantitative: 1355:    Blood Gas Profile - Venous (08.30.21 @ 18:54)    pH, Venous: 7.40    pCO2, Venous: 41 mmHg    pO2, Venous: 45 mmHg    HCO3, Venous: 25 mmol/L    Base Excess, Venous: 0.5 mmol/L    Oxygen Saturation, Venous: 76.7 %    Total CO2, Venous: 26.7 mmol/L    EKG personally reviewed; NSR, no obvious ST elevations    IMAGING    < from: Xray Chest 2 Views PA/Lat (08.30.21 @ 12:47) >    IMPRESSION: Right chest wall Mediport.    Small right pleural effusion associated with right mid to lower lung linear atelectasis as correlated with the prior recently performed abdominal CT of August 10, 2021. There is adjacent elevation of the right hemidiaphragm as on the prior abdominal CT.    Minimal left lung base linear or subsegmental atelectasis.    No pneumothorax.      < from: CT Angio Chest PE Protocol w/ IV Cont (08.30.21 @ 21:00) >    IMPRESSION:    Limited. No pulmonary embolus to level of the lobar pulmonary arteries.    Interval increase in small right pleural effusion since 8/19/2021 with associated adjacent compressive atelectasis.    Small perihepatic ascites.

## 2021-08-31 NOTE — H&P ADULT - PROBLEM SELECTOR PLAN 3
hx of asthma per pt, last reported attack 2017. Sating well on RA.  - no asthma related complaints at this time, will observe on RA

## 2021-08-31 NOTE — CONSULT NOTE ADULT - SUBJECTIVE AND OBJECTIVE BOX
Patient is a 56y old  Female who presents with a chief complaint of R chest pain (31 Aug 2021 07:21)      HPI:  Pt is a 55 yo F hx of uterine ca (dx 2020; s/p chemo last cycle 9/2020, s/p VINCENT/BSO) and asthma p/w several days of increasingly severe R sided chest pain w/ known R pleural effusions. Out pt records show CT performed earlier this month which demonstrated new R pleural effusions, Pt states she continued to have pain and saw outpt oncologist Dr. Mirtha Celaya 8/30, performed CXR which redemonstrated effusion at which pt was advised to go to the hospital. Pt states that the chest pain limits deep inspiration and she is unable to walk long distances without SOB since ca dx and tx, but otherwise no other associated symptoms. Denies fevers/chills, n/v/d, LE swelling, orthopnea.    In ED, 1x hypertensive to 179 systolic, otherwise VSS and AF. Received toradol for pain. (31 Aug 2021 02:01)       Oncologic History:      ROS: as above     PAST MEDICAL & SURGICAL HISTORY:  Asthma  No attacks since 2017    Uterine cancer  S/p chemotherapy, radiation and hysterectomy    S/P hysterectomy  2020 VINCENT/BSO        SOCIAL HISTORY:    FAMILY HISTORY:  No pertinent family history in first degree relatives        MEDICATIONS  (STANDING):    MEDICATIONS  (PRN):  acetaminophen   Tablet .. 650 milliGRAM(s) Oral every 6 hours PRN Moderate Pain (4 - 6)  ibuprofen  Tablet. 200 milliGRAM(s) Oral every 4 hours PRN Mild Pain (1 - 3)      Allergies    No Known Allergies    Intolerances        Vital Signs Last 24 Hrs  T(C): 36.7 (31 Aug 2021 04:51), Max: 37.3 (30 Aug 2021 17:44)  T(F): 98.1 (31 Aug 2021 04:51), Max: 99.1 (30 Aug 2021 17:44)  HR: 94 (31 Aug 2021 04:51) (83 - 94)  BP: 131/78 (31 Aug 2021 04:51) (130/77 - 179/93)  BP(mean): --  RR: 18 (31 Aug 2021 04:51) (18 - 20)  SpO2: 98% (31 Aug 2021 04:51) (98% - 100%)    PHYSICAL EXAM  General: adult in NAD  HEENT: clear oropharynx, anicteric sclera, pink conjunctiva  Neck: supple  CV: normal S1/S2 with no murmur rubs or gallops  Lungs: positive air movement b/l ant lungs, clear to auscultation, no wheezes, no rales  Abdomen: soft non-tender non-distended, no hepatosplenomegaly  Ext: no clubbing cyanosis or edema  Skin: no rashes and no petechiae  Neuro: alert and oriented X 3, none focal    LABS:                          10.6   5.96  )-----------( 212      ( 31 Aug 2021 08:00 )             32.1         Mean Cell Volume : 82.7 fL  Mean Cell Hemoglobin : 27.3 pg  Mean Cell Hemoglobin Concentration : 33.0 gm/dL  Auto Neutrophil # : x  Auto Lymphocyte # : x  Auto Monocyte # : x  Auto Eosinophil # : x  Auto Basophil # : x  Auto Neutrophil % : x  Auto Lymphocyte % : x  Auto Monocyte % : x  Auto Eosinophil % : x  Auto Basophil % : x      Serial CBC's  08-31 @ 08:00  Hct-32.1 / Hgb-10.6 / Plat-212 / RBC-3.88 / WBC-5.96  Serial CBC's  08-30 @ 18:54  Hct-33.6 / Hgb-11.0 / Plat-227 / RBC-4.15 / WBC-6.23      08-31    143  |  106  |  17  ----------------------------<  100<H>  3.9   |  23  |  1.17    Ca    9.3      31 Aug 2021 08:00  Phos  3.3     08-31  Mg     2.20     08-31    TPro  7.4  /  Alb  4.0  /  TBili  0.4  /  DBili  x   /  AST  26  /  ALT  46<H>  /  AlkPhos  177<H>  08-30      PT/INR - ( 31 Aug 2021 08:00 )   PT: 13.3 sec;   INR: 1.16 ratio         PTT - ( 31 Aug 2021 08:00 )  PTT:30.2 sec                RADIOLOGY & ADDITIONAL STUDIES:     Patient is a 56y old  Female who presents with a chief complaint of R chest pain (31 Aug 2021 07:21)      HPI:  Pt is a 55 yo F hx of uterine ca (dx 2020; s/p chemo last cycle 9/2020, s/p VINCENT/BSO) and asthma p/w several days of increasingly severe R sided chest pain w/ known R pleural effusions. Out pt records show CT performed earlier this month which demonstrated new R pleural effusions, Pt states she continued to have pain and saw outpt oncologist Dr. Mirtha Celaya 8/30, performed CXR which redemonstrated effusion at which pt was advised to go to the hospital. Pt states that the chest pain limits deep inspiration and she is unable to walk long distances without SOB since ca dx and tx, but otherwise no other associated symptoms. Denies fevers/chills, n/v/d, LE swelling, orthopnea.    In ED, 1x hypertensive to 179 systolic, otherwise VSS and AF. Received toradol for pain. 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  Patient is s/p pap c/w BETI, followed by endometrial biopsy with findings of serous cancer. Patient was seen initially by Dr. Arciniega at [place].  She then saw Dr. Zamorano, at [facility] and had a repeat biopsy that showed clear cell carcinoma.  On 4/20/2020 she underwent surgical staging and is post TLH/BSO/SLND/Omentectomy.  Final pathology showed stage IA,high grade endometrial carcinoma involving predominantly clear cell and focally serous features, no invasion, no LVSI.    Ct C/A/P with contrast done on 3/12/2020 showed no metastatic sites.  She completed five cycles of CT 6/3/20- 9/2/20) Last cycle held due to toxicity and myelosuppression.She received vaginal brachytherapy to a total of 2100 cGy in 3 fractions. Treatment was delivered from 10/16/20- 10/23/20.   Scan showed CINDY.     Disease: high grade clear cell/serous carcnoma of the uterus.   AJCC Stage: IA     Carboplatin and Taxol 6/3/20- 9/2/20( five cycles)         ROS: as above     PAST MEDICAL & SURGICAL HISTORY:  Asthma  No attacks since 2017    Uterine cancer  S/p chemotherapy, radiation and hysterectomy    S/P hysterectomy  2020 VINCENT/BSO        SOCIAL HISTORY:    FAMILY HISTORY:  No pertinent family history in first degree relatives        MEDICATIONS  (STANDING):    MEDICATIONS  (PRN):  acetaminophen   Tablet .. 650 milliGRAM(s) Oral every 6 hours PRN Moderate Pain (4 - 6)  ibuprofen  Tablet. 200 milliGRAM(s) Oral every 4 hours PRN Mild Pain (1 - 3)      Allergies    No Known Allergies    Intolerances        Vital Signs Last 24 Hrs  T(C): 36.7 (31 Aug 2021 04:51), Max: 37.3 (30 Aug 2021 17:44)  T(F): 98.1 (31 Aug 2021 04:51), Max: 99.1 (30 Aug 2021 17:44)  HR: 94 (31 Aug 2021 04:51) (83 - 94)  BP: 131/78 (31 Aug 2021 04:51) (130/77 - 179/93)  BP(mean): --  RR: 18 (31 Aug 2021 04:51) (18 - 20)  SpO2: 98% (31 Aug 2021 04:51) (98% - 100%)    PHYSICAL EXAM  General: adult in NAD  HEENT: clear oropharynx, anicteric sclera, pink conjunctiva  Neck: supple  CV: normal S1/S2 with no murmur rubs or gallops  Lungs: positive air movement b/l ant lungs, clear to auscultation, no wheezes, no rales  Abdomen: soft non-tender non-distended, no hepatosplenomegaly  Ext: no clubbing cyanosis or edema  Skin: no rashes and no petechiae  Neuro: alert and oriented X 3, none focal    LABS:                          10.6   5.96  )-----------( 212      ( 31 Aug 2021 08:00 )             32.1         Mean Cell Volume : 82.7 fL  Mean Cell Hemoglobin : 27.3 pg  Mean Cell Hemoglobin Concentration : 33.0 gm/dL  Auto Neutrophil # : x  Auto Lymphocyte # : x  Auto Monocyte # : x  Auto Eosinophil # : x  Auto Basophil # : x  Auto Neutrophil % : x  Auto Lymphocyte % : x  Auto Monocyte % : x  Auto Eosinophil % : x  Auto Basophil % : x      Serial CBC's  08-31 @ 08:00  Hct-32.1 / Hgb-10.6 / Plat-212 / RBC-3.88 / WBC-5.96  Serial CBC's  08-30 @ 18:54  Hct-33.6 / Hgb-11.0 / Plat-227 / RBC-4.15 / WBC-6.23      08-31    143  |  106  |  17  ----------------------------<  100<H>  3.9   |  23  |  1.17    Ca    9.3      31 Aug 2021 08:00  Phos  3.3     08-31  Mg     2.20     08-31    TPro  7.4  /  Alb  4.0  /  TBili  0.4  /  DBili  x   /  AST  26  /  ALT  46<H>  /  AlkPhos  177<H>  08-30      PT/INR - ( 31 Aug 2021 08:00 )   PT: 13.3 sec;   INR: 1.16 ratio         PTT - ( 31 Aug 2021 08:00 )  PTT:30.2 sec                RADIOLOGY & ADDITIONAL STUDIES:     Patient is a 56y old  Female who presents with a chief complaint of R chest pain (31 Aug 2021 07:21)      HPI:  Pt is a 57 yo F hx of uterine ca (dx 2020; s/p chemo last cycle 9/2020, s/p VINCENT/BSO) and asthma p/w several days of increasingly severe R sided chest pain w/ known R pleural effusions. Out pt records show CT performed earlier this month which demonstrated new R pleural effusions, Pt states she continued to have pain and saw outpt oncologist Dr. Mirtha Celaya 8/30, performed CXR which redemonstrated effusion at which pt was advised to go to the hospital. Pt states that the chest pain limits deep inspiration and she is unable to walk long distances without SOB since ca dx and tx, but otherwise no other associated symptoms. Denies fevers/chills, n/v/d, LE swelling, orthopnea.    In ED, 1x hypertensive to 179 systolic, otherwise VSS and AF. Received toradol for pain. 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 Diagnosed s/p pap c/w BETI, followed by endometrial biopsy with findings of serous cancer. Patient was seen initially by Dr. Arciniega at [place].  She then saw Dr. Zamorano, at [facility] and had a repeat biopsy that showed clear cell carcinoma.  On 4/20/2020 she underwent surgical staging and is post TLH/BSO/SLND/Omentectomy.  Final pathology showed stage IA,high grade endometrial carcinoma involving predominantly clear cell and focally serous features, no invasion, no LVSI.    Ct C/A/P with contrast done on 3/12/2020 showed no metastatic sites.  She completed adjuvant chemo with  five cycles of CT 6/3/20- 9/2/20) Last cycle held due to toxicity and myelosuppression. She received vaginal brachytherapy to a total of 2100 cGy in 3 fractions. Treatment was delivered from 10/16/20- 10/23/20. Scan afterwards showed CINDY. Patient has since been on surveillance     Disease: high grade clear cell/serous carcnoma of the uterus.   AJCC Stage: IA     Carboplatin and Taxol 6/3/20- 9/2/20( five cycles)         ROS: as above     PAST MEDICAL & SURGICAL HISTORY:  Asthma  No attacks since 2017    Uterine cancer  S/p chemotherapy, radiation and hysterectomy    S/P hysterectomy  2020 VINCENT/BSO        SOCIAL HISTORY:    FAMILY HISTORY:  No pertinent family history in first degree relatives        MEDICATIONS  (STANDING):    MEDICATIONS  (PRN):  acetaminophen   Tablet .. 650 milliGRAM(s) Oral every 6 hours PRN Moderate Pain (4 - 6)  ibuprofen  Tablet. 200 milliGRAM(s) Oral every 4 hours PRN Mild Pain (1 - 3)      Allergies    No Known Allergies    Intolerances        Vital Signs Last 24 Hrs  T(C): 36.7 (31 Aug 2021 04:51), Max: 37.3 (30 Aug 2021 17:44)  T(F): 98.1 (31 Aug 2021 04:51), Max: 99.1 (30 Aug 2021 17:44)  HR: 94 (31 Aug 2021 04:51) (83 - 94)  BP: 131/78 (31 Aug 2021 04:51) (130/77 - 179/93)  BP(mean): --  RR: 18 (31 Aug 2021 04:51) (18 - 20)  SpO2: 98% (31 Aug 2021 04:51) (98% - 100%)    Physical Exam:  General: WN/WD NAD. Appears to have pain/uncomfortable during interview  Neurology: A&Ox3, nonfocal, BUCHANAN x 4  Eyes: PERRLA/ EOMI, Gross vision intact  ENT/Neck: Neck supple, trachea midline, No JVD, Gross hearing intact  Respiratory: slight decreased BS to Rt. LL. CTA of left.   CV: RRR, S1S2, no murmurs, rubs or gallops  Abdominal: Soft, NT, ND +BS,   Extremities: No edema, + peripheral pulses  Skin: No Rashes, Hematoma, Ecchymosis  Neuro: alert and oriented X 3, none focal    LABS:                          10.6   5.96  )-----------( 212      ( 31 Aug 2021 08:00 )             32.1         Mean Cell Volume : 82.7 fL  Mean Cell Hemoglobin : 27.3 pg  Mean Cell Hemoglobin Concentration : 33.0 gm/dL  Auto Neutrophil # : x  Auto Lymphocyte # : x  Auto Monocyte # : x  Auto Eosinophil # : x  Auto Basophil # : x  Auto Neutrophil % : x  Auto Lymphocyte % : x  Auto Monocyte % : x  Auto Eosinophil % : x  Auto Basophil % : x      Serial CBC's  08-31 @ 08:00  Hct-32.1 / Hgb-10.6 / Plat-212 / RBC-3.88 / WBC-5.96  Serial CBC's  08-30 @ 18:54  Hct-33.6 / Hgb-11.0 / Plat-227 / RBC-4.15 / WBC-6.23      08-31    143  |  106  |  17  ----------------------------<  100<H>  3.9   |  23  |  1.17    Ca    9.3      31 Aug 2021 08:00  Phos  3.3     08-31  Mg     2.20     08-31    TPro  7.4  /  Alb  4.0  /  TBili  0.4  /  DBili  x   /  AST  26  /  ALT  46<H>  /  AlkPhos  177<H>  08-30      PT/INR - ( 31 Aug 2021 08:00 )   PT: 13.3 sec;   INR: 1.16 ratio         PTT - ( 31 Aug 2021 08:00 )  PTT:30.2 sec                RADIOLOGY & ADDITIONAL STUDIES:

## 2021-08-31 NOTE — H&P ADULT - ATTENDING COMMENTS
Pt with uterine ca p/w R sided chest pain, and found to have R pleural effusion.  Pt reports pain with deep inspiration and with laughing/coughing  On exam: Pt in NAD.  Heart RRR, Lungs diminished BS at R base.  R chest wall port in place without erythema  Labs reviewed  CT angio chest images personally reviewed showing R sided pleural effusion with increase in size  Will need thoracentesis from IR, CT surgery or pulmonology.

## 2021-08-31 NOTE — H&P ADULT - PROBLEM SELECTOR PLAN 1
Interval increase in effusion since last CT performed 8/19/2021. Pain likely 2/2 effusions - unclear etiology for effusions, but may be 2/2 malignant effusion  - IR consult for IR guided drainage of effusion Interval increase in effusion since last CT performed 8/19/2021. Pain likely 2/2 effusions - unclear etiology for effusions, but may be 2/2 malignant effusion  - IR consult vs CT consult for drainage and sampling of effusion  - f/u effusion fluid studies  - will hold AC in case pt may be taken for drain  - ordered T&S, coags Interval increase in effusion since last CT performed 8/19/2021. Pain likely 2/2 effusions - unclear etiology for effusions, but may be 2/2 malignant effusion  - IR consult vs CT consult for drainage and sampling of effusion in AM  - f/u effusion fluid studies  - will hold AC in case pt may be taken for drain  - ordered T&S, coags  - tylenol and motrin ordered prn for pain

## 2021-08-31 NOTE — H&P ADULT - NSHPREVIEWOFSYSTEMS_GEN_ALL_CORE
GENERAL: no fever, chills  HEENT: no throat pain, cough, congestion, dysphagia  CARDIAC: no chest pain, palpitations, no LE edema  PULM: +SOB with exertion, cough, wheezing   GI: no abdominal pain, nausea, vomiting, diarrhea, constipation  : no dysuria, frequency  NEURO: no headache, lightheadedness  MSK: no joint or muscle pain  SKIN: no rashes, no ulcers  HEME: no active bleeding, no supraclavicular LAD

## 2021-08-31 NOTE — PROGRESS NOTE ADULT - ATTENDING COMMENTS
Patient seen and examined care d/w HS3    55 yo F hx of uterine ca (dx 2020; s/p chemo last cycle 9/2020, s/p VINCENT/BSO) and asthma (not on meds) p/w several days of increasingly severe R sided chest pain i/s/o known R sided pleural effusions, redemonstrated on CTA during this admission with interval increase in volume.     # Pleural effusion: likely cause of R sided chest pain, CTPA neg for PNA or PE or alternative source of pain  - plan was for diagnotic eval via CT surgery or IR both of which are now stating pleural effusion cannot be tapped  - c/w symptomatic pain control, standing tylenol, avoid NSAID until decision re: procedure, trial of tramadol prn  - assess ambulatory sat   - appreciate oncology f/u   - incentive spirometry      Dispo pending   d/w CTS PA

## 2021-08-31 NOTE — CONSULT NOTE ADULT - ATTENDING COMMENTS
Patient seen and examined agree with above note as modified, where appropriate, by me. Right effusion not amenable to IR drainage. May need VATS. Repeat CXR in am.
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 CTA negative. Infection unlikely. No recent echo and history of MVP. Degree of effusion seems small to account for her symptoms although pleural disease and lymphangitis carcinomatosis can be difficult to observed by CT; IR guided thora pending.   Shar Liu MD PhD  Oncology Attending

## 2021-08-31 NOTE — PROGRESS NOTE ADULT - PROBLEM SELECTOR PLAN 1
Interval increase in effusion since last CT performed 8/19/2021. Pain likely 2/2 effusions - unclear etiology for effusions, but may be 2/2 malignant effusion  - IR consult vs CT consult for drainage and sampling of effusion in AM  - f/u effusion fluid studies  - will hold AC in case pt may be taken for drain  - ordered T&S, coags  - tylenol and motrin ordered prn for pain Interval increase in effusion since last CT performed 8/19/2021. Pain likely 2/2 effusions - unclear etiology for effusions, but may be 2/2 malignant effusion  - CT consulted for drainage - will attempt ultrasound guided right pigtail placement at bedside today and send fluid for studies, will f/u.  - if no window for drain, IR to be consulted for guided drainage.  - ordered T&S, coags  - tylenol and motrin ordered prn for pain  - heme/onc following, ordered Echo given hx MVP, also will order CA-125 marker w/AM labs tomorrow.

## 2021-08-31 NOTE — H&P ADULT - PROBLEM SELECTOR PLAN 4
ppx: lovenox 40 qd  diet: regular  dispo: pending medical improvement ppx: will start lovenox 40 qd pending drain placement  diet: NPO in case of drain  dispo: pending medical improvement

## 2021-08-31 NOTE — PROGRESS NOTE ADULT - PROBLEM SELECTOR PLAN 2
Follows Dr. Celaya, tx'd w/ Carboplatin and Taxol 6/3/20- 9/2/20( five cycles); no mets on 3/2020 CT C/A/P.  - f/u onc consult Follows Dr. Fuller, tx'd w/ Carboplatin and Taxol 6/3/20- 9/2/20( five cycles); no mets on 3/2020 CT C/A/P.  - onc consulted, following recs for echo and Ca-125.  - will f/u with Dr. Mirtha fuller at [facility] upon discharge

## 2021-08-31 NOTE — PROGRESS NOTE ADULT - SUBJECTIVE AND OBJECTIVE BOX
MD REAGAN Sheldon/VINH [id number]      PROGRESS NOTE:     Patient is a 56y old  Female who presents with a chief complaint of R chest pain (31 Aug 2021 02:01)      SUBJECTIVE / OVERNIGHT EVENTS:    No acute events overnight. Patient examined at bedside with no acute complaints.     REVIEW OF SYSTEMS:    CONSTITUTIONAL: No weakness, fevers or chills  EYES/ENT: No visual changes;  No vertigo or throat pain   NECK: No pain or stiffness  RESPIRATORY: No cough, wheezing, hemoptysis; No shortness of breath  CARDIOVASCULAR: No chest pain or palpitations  GASTROINTESTINAL: No abdominal or epigastric pain. No nausea, vomiting, or hematemesis; No diarrhea or constipation. No melena or hematochezia.  GENITOURINARY: No dysuria, frequency or hematuria  NEUROLOGICAL: No numbness or weakness  SKIN: No itching, rashes      MEDICATIONS  (STANDING):    MEDICATIONS  (PRN):  acetaminophen   Tablet .. 650 milliGRAM(s) Oral every 6 hours PRN Moderate Pain (4 - 6)  ibuprofen  Tablet. 200 milliGRAM(s) Oral every 4 hours PRN Mild Pain (1 - 3)      CAPILLARY BLOOD GLUCOSE        I&O's Summary      VITALS:   T(C): 36.7 (08-31-21 @ 04:51), Max: 37.3 (08-30-21 @ 17:44)  HR: 94 (08-31-21 @ 04:51) (83 - 94)  BP: 131/78 (08-31-21 @ 04:51) (130/77 - 179/93)  RR: 18 (08-31-21 @ 04:51) (18 - 20)  SpO2: 98% (08-31-21 @ 04:51) (98% - 100%)    GENERAL: NAD, lying in bed comfortably  HEAD:  Atraumatic, normocephalic  EYES: EOMI, PERRLA, conjunctiva and sclera clear  ENT: Moist mucous membranes  NECK: Supple, no JVD  HEART: Regular rate and rhythm, no murmurs, rubs, or gallops  LUNGS: Unlabored respirations.  Clear to auscultation bilaterally, no crackles, wheezing, or rhonchi  ABDOMEN: Soft, nontender, nondistended, +BS  EXTREMITIES: 2+ peripheral pulses bilaterally. No clubbing, cyanosis, or edema  NERVOUS SYSTEM:  A&Ox3, no focal deficits   SKIN: No rashes or lesions    LABS:                        11.0   6.23  )-----------( 227      ( 30 Aug 2021 18:54 )             33.6     08-30    141  |  104  |  12  ----------------------------<  111<H>  3.4<L>   |  22  |  1.10    Ca    9.5      30 Aug 2021 18:54    TPro  7.4  /  Alb  4.0  /  TBili  0.4  /  DBili  x   /  AST  26  /  ALT  46<H>  /  AlkPhos  177<H>  08-30                RADIOLOGY & ADDITIONAL TESTS:  Results Reviewed:   Imaging Personally Reviewed:  Electrocardiogram Personally Reviewed:    COORDINATION OF CARE:  Care Discussed with Consultants/Other Providers [Y/N]:  Prior or Outpatient Records Reviewed [Y/N]:   MD REAGAN Sheldon/VINH [id number]      PROGRESS NOTE:     Patient is a 56y old  Female who presents with a chief complaint of R chest pain (31 Aug 2021 02:01)      SUBJECTIVE / OVERNIGHT EVENTS:    No acute events overnight. Patient examined at bedside, complaining of R sided chest pain that limits deep inspiration. She also reports that she can't walk long distances without feeling short of breath.     REVIEW OF SYSTEMS:    CONSTITUTIONAL: No weakness, fevers or chills  EYES/ENT: No visual changes;  No vertigo or throat pain   NECK: No pain or stiffness  RESPIRATORY: No cough, wheezing, hemoptysis; Mild difficulty breathing  CARDIOVASCULAR: No chest pain or palpitations  GASTROINTESTINAL: No abdominal or epigastric pain. No nausea, vomiting, or hematemesis; No diarrhea or constipation. No melena or hematochezia.  GENITOURINARY: No dysuria, frequency or hematuria  NEUROLOGICAL: No numbness or weakness  SKIN: No itching, rashes      MEDICATIONS  (STANDING):    MEDICATIONS  (PRN):  acetaminophen   Tablet .. 650 milliGRAM(s) Oral every 6 hours PRN Moderate Pain (4 - 6)  ibuprofen  Tablet. 200 milliGRAM(s) Oral every 4 hours PRN Mild Pain (1 - 3)      CAPILLARY BLOOD GLUCOSE        I&O's Summary      VITALS:   T(C): 36.7 (08-31-21 @ 04:51), Max: 37.3 (08-30-21 @ 17:44)  HR: 94 (08-31-21 @ 04:51) (83 - 94)  BP: 131/78 (08-31-21 @ 04:51) (130/77 - 179/93)  RR: 18 (08-31-21 @ 04:51) (18 - 20)  SpO2: 98% (08-31-21 @ 04:51) (98% - 100%)    GENERAL: NAD, lying in bed comfortably  HEAD:  Atraumatic, normocephalic  EYES: EOMI, PERRLA, conjunctiva and sclera clear  ENT: Moist mucous membranes  NECK: Supple, no JVD  HEART: Regular rate and rhythm, no murmurs, rubs, or gallops  LUNGS: Unlabored respirations noted.  L lung to auscultation bilaterally, no crackles, wheezing, or rhonchi, R side mildly diminshed breath sounds, no crackles, wheezing, or rhonchi.   ABDOMEN: Soft, nontender, nondistended, +BS  EXTREMITIES: 2+ peripheral pulses bilaterally. No clubbing, cyanosis, or edema  NERVOUS SYSTEM:  A&Ox3, no focal deficits   SKIN: No rashes or lesions    LABS:                        11.0   6.23  )-----------( 227      ( 30 Aug 2021 18:54 )             33.6     08-30    141  |  104  |  12  ----------------------------<  111<H>  3.4<L>   |  22  |  1.10    Ca    9.5      30 Aug 2021 18:54    TPro  7.4  /  Alb  4.0  /  TBili  0.4  /  DBili  x   /  AST  26  /  ALT  46<H>  /  AlkPhos  177<H>  08-30                RADIOLOGY & ADDITIONAL TESTS:  Results Reviewed:   Imaging Personally Reviewed:  Electrocardiogram Personally Reviewed:    COORDINATION OF CARE:  Care Discussed with Consultants/Other Providers [Y/N]:  Prior or Outpatient Records Reviewed [Y/N]:   MD REAGAN Sheldon/VINH [id number]      PROGRESS NOTE:     Patient is a 56y old  Female who presents with a chief complaint of R chest pain (31 Aug 2021 02:01)      SUBJECTIVE / OVERNIGHT EVENTS:    No acute events overnight. Patient examined at bedside, complaining of R sided chest pain that limits deep inspiration. She also reports that she can't walk long distances without feeling short of breath.     REVIEW OF SYSTEMS:    CONSTITUTIONAL: No weakness, fevers or chills  EYES/ENT: No visual changes;  No vertigo or throat pain   NECK: No pain or stiffness  RESPIRATORY: No cough, wheezing, hemoptysis; Mild difficulty breathing  CARDIOVASCULAR: No chest pain or palpitations  GASTROINTESTINAL: No abdominal or epigastric pain. No nausea, vomiting, or hematemesis; No diarrhea or constipation. No melena or hematochezia.  GENITOURINARY: No dysuria, frequency or hematuria  NEUROLOGICAL: No numbness or weakness  SKIN: No itching, rashes      MEDICATIONS  (STANDING):    MEDICATIONS  (PRN):  acetaminophen   Tablet .. 650 milliGRAM(s) Oral every 6 hours PRN Moderate Pain (4 - 6)  ibuprofen  Tablet. 200 milliGRAM(s) Oral every 4 hours PRN Mild Pain (1 - 3)      CAPILLARY BLOOD GLUCOSE        I&O's Summary      VITALS:   T(C): 36.7 (08-31-21 @ 04:51), Max: 37.3 (08-30-21 @ 17:44)  HR: 94 (08-31-21 @ 04:51) (83 - 94)  BP: 131/78 (08-31-21 @ 04:51) (130/77 - 179/93)  RR: 18 (08-31-21 @ 04:51) (18 - 20)  SpO2: 98% (08-31-21 @ 04:51) (98% - 100%)    GENERAL: NAD, lying in bed comfortably  HEAD:  Atraumatic, normocephalic  EYES: EOMI, PERRLA, conjunctiva and sclera clear  ENT: Moist mucous membranes  NECK: Supple, no JVD  HEART: Regular rate and rhythm, no murmurs, rubs, or gallops  LUNGS: Unlabored respirations noted.  L lung to auscultation bilaterally, no crackles, wheezing, or rhonchi, R side mildly diminshed breath sounds, no crackles, wheezing, or rhonchi.   ABDOMEN: Soft, nontender, nondistended, +BS  EXTREMITIES: 2+ peripheral pulses bilaterally. No clubbing, cyanosis, or edema  NERVOUS SYSTEM:  A&Ox3, no focal deficits   SKIN: No rashes or lesions    LABS:                        11.0   6.23  )-----------( 227      ( 30 Aug 2021 18:54 )             33.6     08-30    141  |  104  |  12  ----------------------------<  111<H>  3.4<L>   |  22  |  1.10    Ca    9.5      30 Aug 2021 18:54    TPro  7.4  /  Alb  4.0  /  TBili  0.4  /  DBili  x   /  AST  26  /  ALT  46<H>  /  AlkPhos  177<H>  08-30      RADIOLOGY & ADDITIONAL TESTS:  Results Reviewed:   Imaging Personally Reviewed:  Electrocardiogram Personally Reviewed:    COORDINATION OF CARE:  Care Discussed with Consultants/Other Providers [Y/N]:  Prior or Outpatient Records Reviewed [Y/N]:

## 2021-08-31 NOTE — PROGRESS NOTE ADULT - PROBLEM SELECTOR PLAN 4
ppx: will start lovenox 40 qd pending drain placement  diet: NPO in case of drain  dispo: pending medical improvement ppx: will start lovenox 40 qd pending pigtail placement  diet: NPO in case of drain  dispo: pending medical improvement

## 2021-08-31 NOTE — H&P ADULT - HISTORY OF PRESENT ILLNESS
Pt is a 55 yo F hx of uterine ca (dx 2020; s/p chemo last cycle 9/2020, s/p VINCENT/BSO) and asthma p/w several days of increasingly severe R sided chest pain w/ known R pleural effusions. Out pt records show CT performed earlier this month which demonstrated new R pleural effusions, Pt states she continued to have pain and saw outpt oncologist Dr. Celaya 8/30, performed CXR which redemonstrated effusion at which pt was rec Pt is a 57 yo F hx of uterine ca (dx 2020; s/p chemo last cycle 9/2020, s/p VINCENT/BSO) and asthma p/w several days of increasingly severe R sided chest pain w/ known R pleural effusions. Out pt records show CT performed earlier this month which demonstrated new R pleural effusions, Pt states she continued to have pain and saw outpt oncologist Dr. Mirtha Celaya 8/30, performed CXR which redemonstrated effusion at which pt was advised to go to the hospital. Pt states that the chest pain limits deep inspiration, but  Pt is a 55 yo F hx of uterine ca (dx 2020; s/p chemo last cycle 9/2020, s/p VINCENT/BSO) and asthma p/w several days of increasingly severe R sided chest pain w/ known R pleural effusions. Out pt records show CT performed earlier this month which demonstrated new R pleural effusions, Pt states she continued to have pain and saw outpt oncologist Dr. Mirtha Celaya 8/30, performed CXR which redemonstrated effusion at which pt was advised to go to the hospital. Pt states that the chest pain limits deep inspiration and she is unable to walk long distances without SOB since ca dx and tx, but otherwise no other associated symptoms. Denies fevers/chills, n/v/d, LE swelling, orthopnea.    In ED, 1x hypertensive to 179 systolic, otherwise VSS and AF.  Pt is a 55 yo F hx of uterine ca (dx 2020; s/p chemo last cycle 9/2020, s/p VINCENT/BSO) and asthma p/w several days of increasingly severe R sided chest pain w/ known R pleural effusions. Out pt records show CT performed earlier this month which demonstrated new R pleural effusions, Pt states she continued to have pain and saw outpt oncologist Dr. Mirtha Celaya 8/30, performed CXR which redemonstrated effusion at which pt was advised to go to the hospital. Pt states that the chest pain limits deep inspiration and she is unable to walk long distances without SOB since ca dx and tx, but otherwise no other associated symptoms. Denies fevers/chills, n/v/d, LE swelling, orthopnea.    In ED, 1x hypertensive to 179 systolic, otherwise VSS and AF. Received toradol for pain.

## 2021-08-31 NOTE — H&P ADULT - ASSESSMENT
Pt is a 55 yo F hx of uterine ca (dx 2020; s/p chemo last cycle 9/2020, s/p VINCENT/BSO) and asthma (not on meds) p/w several days of increasingly severe R sided chest pain i/s/o known R sided pleural effusions, redemonstrated on CTA during this admission with interval increase in volume.

## 2021-08-31 NOTE — H&P ADULT - NSHPPHYSICALEXAM_GEN_ALL_CORE
Vital Signs Last 24 Hrs  T(C): 37.1 (31 Aug 2021 00:47), Max: 37.3 (30 Aug 2021 17:44)  T(F): 98.8 (31 Aug 2021 00:47), Max: 99.1 (30 Aug 2021 17:44)  HR: 88 (31 Aug 2021 00:47) (83 - 92)  BP: 130/77 (31 Aug 2021 00:47) (130/77 - 179/93)  BP(mean): --  RR: 18 (31 Aug 2021 00:47) (18 - 20)  SpO2: 98% (31 Aug 2021 00:47) (98% - 100%)      CONSTITUTIONAL: alert and active in no apparent distress; appears well-developed and well-nourished;   HEENT: head atraumatic; normal cephalic shape; no conjunctivitis or scleral icterus; EOMI; neck supple w/ FROM  CARDIAC: regular rate & rhythm; normal S1, S2; no murmurs, rubs or gallops.  RESPIRATORY: breath sounds clear to auscultation bilaterally, mildly diminished on R; no distress present, no crackles, wheezes, rales, rhonchi, retractions, or tachypnea; normal rate and effort.  GASTROINTESTINAL: abdomen soft, non-tender, & non-distended; no organomegaly or masses; no HSM appreciated; normoactive bowel sounds.  SKIN: cap refill brisk; skin warm, dry and intact; no evidence of rash.  BACK: no vertebral or paraspinal tenderness along entire spine; no CVAT.  MSK: no joint effusion or tenderness; FROM of all joints; no deformities or erythema noted; 2+ peripheral pulses.  NEURO: alert; interactive; no focal deficits.

## 2021-09-01 ENCOUNTER — RESULT REVIEW (OUTPATIENT)
Age: 56
End: 2021-09-01

## 2021-09-01 PROBLEM — C55 MALIGNANT NEOPLASM OF UTERUS, PART UNSPECIFIED: Chronic | Status: ACTIVE | Noted: 2021-08-30

## 2021-09-01 LAB
ALBUMIN FLD-MCNC: 3 G/DL — SIGNIFICANT CHANGE UP
ALBUMIN SERPL ELPH-MCNC: 3.7 G/DL — SIGNIFICANT CHANGE UP (ref 3.3–5)
ALP SERPL-CCNC: 209 U/L — HIGH (ref 40–120)
ALT FLD-CCNC: 96 U/L — HIGH (ref 4–33)
ANION GAP SERPL CALC-SCNC: 14 MMOL/L — SIGNIFICANT CHANGE UP (ref 7–14)
AST SERPL-CCNC: 92 U/L — HIGH (ref 4–32)
B PERT IGG+IGM PNL SER: ABNORMAL
BILIRUB SERPL-MCNC: 0.4 MG/DL — SIGNIFICANT CHANGE UP (ref 0.2–1.2)
BUN SERPL-MCNC: 19 MG/DL — SIGNIFICANT CHANGE UP (ref 7–23)
CALCIUM SERPL-MCNC: 9.4 MG/DL — SIGNIFICANT CHANGE UP (ref 8.4–10.5)
CANCER AG125 SERPL-ACNC: 202 U/ML — HIGH
CHLORIDE SERPL-SCNC: 104 MMOL/L — SIGNIFICANT CHANGE UP (ref 98–107)
CO2 SERPL-SCNC: 21 MMOL/L — LOW (ref 22–31)
COLOR FLD: YELLOW
COVID-19 SPIKE DOMAIN AB INTERP: POSITIVE
COVID-19 SPIKE DOMAIN ANTIBODY RESULT: >250 U/ML — HIGH
CREAT SERPL-MCNC: 1.15 MG/DL — SIGNIFICANT CHANGE UP (ref 0.5–1.3)
EOSINOPHIL # FLD: 1 % — SIGNIFICANT CHANGE UP
FLUID INTAKE SUBSTANCE CLASS: SIGNIFICANT CHANGE UP
FLUID SEGMENTED GRANULOCYTES: 50 % — SIGNIFICANT CHANGE UP
FOLATE+VIT B12 SERBLD-IMP: 0 % — SIGNIFICANT CHANGE UP
GLUCOSE FLD-MCNC: 71 MG/DL — SIGNIFICANT CHANGE UP
GLUCOSE SERPL-MCNC: 103 MG/DL — HIGH (ref 70–99)
GRAM STN FLD: SIGNIFICANT CHANGE UP
HCT VFR BLD CALC: 34.1 % — LOW (ref 34.5–45)
HCV AB S/CO SERPL IA: 0.09 S/CO — SIGNIFICANT CHANGE UP (ref 0–0.99)
HCV AB SERPL-IMP: SIGNIFICANT CHANGE UP
HGB BLD-MCNC: 11.1 G/DL — LOW (ref 11.5–15.5)
LDH SERPL L TO P-CCNC: 456 U/L — SIGNIFICANT CHANGE UP
LYMPHOCYTES # FLD: 6 % — SIGNIFICANT CHANGE UP
MAGNESIUM SERPL-MCNC: 2.1 MG/DL — SIGNIFICANT CHANGE UP (ref 1.6–2.6)
MCHC RBC-ENTMCNC: 27.2 PG — SIGNIFICANT CHANGE UP (ref 27–34)
MCHC RBC-ENTMCNC: 32.6 GM/DL — SIGNIFICANT CHANGE UP (ref 32–36)
MCV RBC AUTO: 83.6 FL — SIGNIFICANT CHANGE UP (ref 80–100)
MESOTHL CELL # FLD: 14 % — SIGNIFICANT CHANGE UP
MONOS+MACROS # FLD: 19 % — SIGNIFICANT CHANGE UP
NRBC # BLD: 0 /100 WBCS — SIGNIFICANT CHANGE UP
NRBC # FLD: 0 K/UL — SIGNIFICANT CHANGE UP
OTHER CELLS FLD MANUAL: 10 % — SIGNIFICANT CHANGE UP
PHOSPHATE SERPL-MCNC: 3.1 MG/DL — SIGNIFICANT CHANGE UP (ref 2.5–4.5)
PLATELET # BLD AUTO: 196 K/UL — SIGNIFICANT CHANGE UP (ref 150–400)
POTASSIUM SERPL-MCNC: 3.9 MMOL/L — SIGNIFICANT CHANGE UP (ref 3.5–5.3)
POTASSIUM SERPL-SCNC: 3.9 MMOL/L — SIGNIFICANT CHANGE UP (ref 3.5–5.3)
PROT FLD-MCNC: 5 G/DL — SIGNIFICANT CHANGE UP
PROT SERPL-MCNC: 7.4 G/DL — SIGNIFICANT CHANGE UP (ref 6–8.3)
RBC # BLD: 4.08 M/UL — SIGNIFICANT CHANGE UP (ref 3.8–5.2)
RBC # FLD: 14.2 % — SIGNIFICANT CHANGE UP (ref 10.3–14.5)
RCV VOL RI: 3000 CELLS/UL — HIGH (ref 0–5)
SARS-COV-2 IGG+IGM SERPL QL IA: >250 U/ML — HIGH
SARS-COV-2 IGG+IGM SERPL QL IA: POSITIVE
SODIUM SERPL-SCNC: 139 MMOL/L — SIGNIFICANT CHANGE UP (ref 135–145)
SPECIMEN SOURCE: SIGNIFICANT CHANGE UP
TOTAL NUCLEATED CELL COUNT, BODY FLUID: HIGH CELLS/UL (ref 0–5)
TUBE TYPE: SIGNIFICANT CHANGE UP
WBC # BLD: 5.8 K/UL — SIGNIFICANT CHANGE UP (ref 3.8–10.5)
WBC # FLD AUTO: 5.8 K/UL — SIGNIFICANT CHANGE UP (ref 3.8–10.5)

## 2021-09-01 PROCEDURE — 71045 X-RAY EXAM CHEST 1 VIEW: CPT | Mod: 26,76

## 2021-09-01 PROCEDURE — 88342 IMHCHEM/IMCYTCHM 1ST ANTB: CPT | Mod: 26,59

## 2021-09-01 PROCEDURE — 88108 CYTOPATH CONCENTRATE TECH: CPT | Mod: 26

## 2021-09-01 PROCEDURE — 88305 TISSUE EXAM BY PATHOLOGIST: CPT | Mod: 26

## 2021-09-01 PROCEDURE — 88112 CYTOPATH CELL ENHANCE TECH: CPT | Mod: 26

## 2021-09-01 PROCEDURE — 99233 SBSQ HOSP IP/OBS HIGH 50: CPT | Mod: GC

## 2021-09-01 PROCEDURE — 88360 TUMOR IMMUNOHISTOCHEM/MANUAL: CPT | Mod: 26

## 2021-09-01 PROCEDURE — 32555 ASPIRATE PLEURA W/ IMAGING: CPT | Mod: RT

## 2021-09-01 PROCEDURE — 88341 IMHCHEM/IMCYTCHM EA ADD ANTB: CPT | Mod: 26,59

## 2021-09-01 PROCEDURE — 99232 SBSQ HOSP IP/OBS MODERATE 35: CPT

## 2021-09-01 RX ORDER — ACETAMINOPHEN 500 MG
650 TABLET ORAL ONCE
Refills: 0 | Status: COMPLETED | OUTPATIENT
Start: 2021-09-01 | End: 2021-09-01

## 2021-09-01 RX ORDER — POLYETHYLENE GLYCOL 3350 17 G/17G
17 POWDER, FOR SOLUTION ORAL DAILY
Refills: 0 | Status: DISCONTINUED | OUTPATIENT
Start: 2021-09-01 | End: 2021-09-02

## 2021-09-01 RX ADMIN — CHLORHEXIDINE GLUCONATE 1 APPLICATION(S): 213 SOLUTION TOPICAL at 12:56

## 2021-09-01 RX ADMIN — Medication 650 MILLIGRAM(S): at 20:47

## 2021-09-01 RX ADMIN — Medication 650 MILLIGRAM(S): at 06:00

## 2021-09-01 RX ADMIN — TRAMADOL HYDROCHLORIDE 25 MILLIGRAM(S): 50 TABLET ORAL at 18:30

## 2021-09-01 RX ADMIN — TRAMADOL HYDROCHLORIDE 25 MILLIGRAM(S): 50 TABLET ORAL at 17:40

## 2021-09-01 RX ADMIN — Medication 650 MILLIGRAM(S): at 21:47

## 2021-09-01 RX ADMIN — Medication 0.5 MILLIGRAM(S): at 16:16

## 2021-09-01 RX ADMIN — Medication 650 MILLIGRAM(S): at 14:53

## 2021-09-01 RX ADMIN — Medication 650 MILLIGRAM(S): at 05:56

## 2021-09-01 RX ADMIN — Medication 0.5 MILLIGRAM(S): at 20:47

## 2021-09-01 NOTE — PROGRESS NOTE ADULT - ATTENDING COMMENTS
Patient seen and examined care d/w HS3    57 yo F hx of uterine ca (dx 2020; s/p chemo last cycle 9/2020, s/p VINCENT/BSO) and asthma (not on meds) p/w several days of increasingly severe R sided chest pain i/s/o known R sided pleural effusions, redemonstrated on CTA during this admission with interval increase in volume.     # Pleural effusion: likely cause of R sided chest pain, CTPA neg for PNA or PE or alternative source of pain  - CTS no safe pocket for bedside tap  - IR did diagnostic thora on 9/1, f/u testing from pleural fluid  - elevated Ca-125 may suggest recurrent disease, defer to onc re: futher w/u or management, will f/u fluid cytopath  - c/w symptomatic pain control, standing Tylenol, avoid NSAID until decision re: procedure, trial of tramadol prn  - assess ambulatory sat   - appreciate oncology f/u   - incentive spirometry      Dispo pending   care d/w CTS PA Patient seen and examined care d/w HS3    57 yo F hx of uterine ca (dx 2020; s/p chemo last cycle 9/2020, s/p VINCENT/BSO) and asthma (not on meds) p/w several days of increasingly severe R sided chest pain i/s/o known R sided pleural effusions, redemonstrated on CTA during this admission with interval increase in volume.     # Pleural effusion: likely cause of R sided chest pain, CTPA neg for PNA or PE or alternative source of pain  - CTS no safe pocket for bedside tap  - IR did diagnostic thora on 9/1, f/u testing from pleural fluid  - elevated Ca-125 (202) may suggest recurrent disease (prior 7/2021 was 29), defer to onc re: futher w/u or management, will f/u fluid cytopath  - c/w symptomatic pain control, standing Tylenol, avoid NSAID until decision re: procedure, trial of tramadol prn  - assess ambulatory sat   - appreciate oncology f/u   - incentive spirometry      Dispo pending, possibly tomorrow w/ OP f/u for cytopath and further management   care d/w CTS PA

## 2021-09-01 NOTE — PROGRESS NOTE ADULT - PROBLEM SELECTOR PLAN 2
Follows Dr. Fuller, tx'd w/ Carboplatin and Taxol 6/3/20- 9/2/20( five cycles); no mets on 3/2020 CT C/A/P.  - onc consulted, following recs for echo and Ca-125.  - will f/u with Dr. Mirtha fuller at [facility] upon discharge

## 2021-09-01 NOTE — PROGRESS NOTE ADULT - SUBJECTIVE AND OBJECTIVE BOX
MD REAGAN Sheldon/VINH [id number]      PROGRESS NOTE:     Patient is a 56y old  Female who presents with a chief complaint of R chest pain (31 Aug 2021 12:11)      SUBJECTIVE / OVERNIGHT EVENTS:    No acute events overnight. Patient examined at bedside with no acute complaints.     REVIEW OF SYSTEMS:    CONSTITUTIONAL: No weakness, fevers or chills  EYES/ENT: No visual changes;  No vertigo or throat pain   NECK: No pain or stiffness  RESPIRATORY: No cough, wheezing, hemoptysis; No shortness of breath  CARDIOVASCULAR: No chest pain or palpitations  GASTROINTESTINAL: No abdominal or epigastric pain. No nausea, vomiting, or hematemesis; No diarrhea or constipation. No melena or hematochezia.  GENITOURINARY: No dysuria, frequency or hematuria  NEUROLOGICAL: No numbness or weakness  SKIN: No itching, rashes      MEDICATIONS  (STANDING):  acetaminophen   Tablet .. 650 milliGRAM(s) Oral every 8 hours  chlorhexidine 2% Cloths 1 Application(s) Topical daily    MEDICATIONS  (PRN):  traMADol 25 milliGRAM(s) Oral every 6 hours PRN moderate to severe pain      CAPILLARY BLOOD GLUCOSE        I&O's Summary      VITALS:   T(C): 36.7 (09-01-21 @ 05:55), Max: 37.6 (08-31-21 @ 13:08)  HR: 82 (09-01-21 @ 05:55) (82 - 88)  BP: 142/82 (09-01-21 @ 05:55) (136/81 - 145/89)  RR: 17 (09-01-21 @ 05:55) (17 - 18)  SpO2: 100% (09-01-21 @ 05:55) (100% - 100%)    GENERAL: NAD, lying in bed comfortably  HEAD:  Atraumatic, normocephalic  EYES: EOMI, PERRLA, conjunctiva and sclera clear  ENT: Moist mucous membranes  NECK: Supple, no JVD  HEART: Regular rate and rhythm, no murmurs, rubs, or gallops  LUNGS: Unlabored respirations.  Clear to auscultation bilaterally, no crackles, wheezing, or rhonchi  ABDOMEN: Soft, nontender, nondistended, +BS  EXTREMITIES: 2+ peripheral pulses bilaterally. No clubbing, cyanosis, or edema  NERVOUS SYSTEM:  A&Ox3, no focal deficits   SKIN: No rashes or lesions    LABS:                        10.6   5.96  )-----------( 212      ( 31 Aug 2021 08:00 )             32.1     08-31    143  |  106  |  17  ----------------------------<  100<H>  3.9   |  23  |  1.17    Ca    9.3      31 Aug 2021 08:00  Phos  3.3     08-31  Mg     2.20     08-31    TPro  7.4  /  Alb  4.0  /  TBili  0.4  /  DBili  x   /  AST  26  /  ALT  46<H>  /  AlkPhos  177<H>  08-30    PT/INR - ( 31 Aug 2021 08:00 )   PT: 13.3 sec;   INR: 1.16 ratio         PTT - ( 31 Aug 2021 08:00 )  PTT:30.2 sec            RADIOLOGY & ADDITIONAL TESTS:  Results Reviewed:   Imaging Personally Reviewed:  Electrocardiogram Personally Reviewed:    COORDINATION OF CARE:  Care Discussed with Consultants/Other Providers [Y/N]:  Prior or Outpatient Records Reviewed [Y/N]:   MD REAGAN Sheldon/VINH [id number]      PROGRESS NOTE:     Patient is a 56y old  Female who presents with a chief complaint of R chest pain (31 Aug 2021 12:11)      SUBJECTIVE / OVERNIGHT EVENTS:    No acute events overnight. Patient examined at bedside with no acute complaints except for R sided chest wall pain on end expiration.    REVIEW OF SYSTEMS:    CONSTITUTIONAL: No weakness, fevers or chills  EYES/ENT: No visual changes;  No vertigo or throat pain   NECK: No pain or stiffness  RESPIRATORY: No cough, wheezing, hemoptysis; No shortness of breath. R sided pain on end expiration.  CARDIOVASCULAR: No chest pain or palpitations  GASTROINTESTINAL: No abdominal or epigastric pain. No nausea, vomiting, or hematemesis; No diarrhea or constipation. No melena or hematochezia.  GENITOURINARY: No dysuria, frequency or hematuria  NEUROLOGICAL: No numbness or weakness  SKIN: No itching, rashes      MEDICATIONS  (STANDING):  acetaminophen   Tablet .. 650 milliGRAM(s) Oral every 8 hours  chlorhexidine 2% Cloths 1 Application(s) Topical daily    MEDICATIONS  (PRN):  traMADol 25 milliGRAM(s) Oral every 6 hours PRN moderate to severe pain      CAPILLARY BLOOD GLUCOSE        I&O's Summary      VITALS:   T(C): 36.7 (09-01-21 @ 05:55), Max: 37.6 (08-31-21 @ 13:08)  HR: 82 (09-01-21 @ 05:55) (82 - 88)  BP: 142/82 (09-01-21 @ 05:55) (136/81 - 145/89)  RR: 17 (09-01-21 @ 05:55) (17 - 18)  SpO2: 100% (09-01-21 @ 05:55) (100% - 100%)    GENERAL: NAD, lying in bed comfortably  HEAD:  Atraumatic, normocephalic  EYES: EOMI, PERRLA, conjunctiva and sclera clear  ENT: Moist mucous membranes  NECK: Supple, no JVD  HEART: Regular rate and rhythm, no murmurs, rubs, or gallops  LUNGS: Unlabored respirations noted.  L lung to auscultation bilaterally, no crackles, wheezing, or rhonchi, R side mildly diminshed breath sounds, no crackles, wheezing, or rhonchi.   ABDOMEN: Soft, nontender, nondistended, +BS  EXTREMITIES: 2+ peripheral pulses bilaterally. No clubbing, cyanosis, or edema  NERVOUS SYSTEM:  A&Ox3, no focal deficits   SKIN: No rashes or lesions      LABS:                        10.6   5.96  )-----------( 212      ( 31 Aug 2021 08:00 )             32.1     08-31    143  |  106  |  17  ----------------------------<  100<H>  3.9   |  23  |  1.17    Ca    9.3      31 Aug 2021 08:00  Phos  3.3     08-31  Mg     2.20     08-31    TPro  7.4  /  Alb  4.0  /  TBili  0.4  /  DBili  x   /  AST  26  /  ALT  46<H>  /  AlkPhos  177<H>  08-30    PT/INR - ( 31 Aug 2021 08:00 )   PT: 13.3 sec;   INR: 1.16 ratio         PTT - ( 31 Aug 2021 08:00 )  PTT:30.2 sec    RADIOLOGY & ADDITIONAL TESTS:  Results Reviewed:   Imaging Personally Reviewed:  Electrocardiogram Personally Reviewed:    COORDINATION OF CARE:  Care Discussed with Consultants/Other Providers [Y/N]:  Prior or Outpatient Records Reviewed [Y/N]:

## 2021-09-01 NOTE — PROGRESS NOTE ADULT - ATTENDING COMMENTS
57 y/o lady with hx of early stage endometrial cancer in apparent remission following resection and adjuvant chemotherapy, presenting with dysnpea and unilateral pleural effusion of unclear etiology but cancer relapse is a concern. Pleural cytology and  will facilitate evaluation.  Shar Liu MD PhD  Oncology Attending

## 2021-09-01 NOTE — PROGRESS NOTE ADULT - SUBJECTIVE AND OBJECTIVE BOX
INTERVAL HPI/OVERNIGHT EVENTS:  Patient seen at bedside.  Patient still with R chest discomfort and SOB  No better or worse  Anticipating IR procedure today      VITAL SIGNS:  T(F): 98.9 (09-01-21 @ 13:14)  HR: 89 (09-01-21 @ 13:14)  BP: 149/85 (09-01-21 @ 13:14)  RR: 18 (09-01-21 @ 13:14)  SpO2: 100% (09-01-21 @ 13:14)  Wt(kg): --    PHYSICAL EXAM:    General: WN/WD NAD.   Neurology: A&Ox3, nonfocal, [place] x 4  Eyes: PERRLA/ EOMI, Gross vision intact  ENT/Neck: Neck supple, trachea midline, No JVD, Gross hearing intact  Respiratory: slight decreased BS to Rt. LL. CTA of left.   CV: RRR, S1S2, no murmurs, rubs or gallops  Abdominal: Soft, NT, ND +BS,   Extremities: No edema, + peripheral pulses  Skin: No Rashes, Hematoma, Ecchymosis  Neuro: alert and oriented X 3, none focal    MEDICATIONS  (STANDING):  acetaminophen   Tablet .. 650 milliGRAM(s) Oral every 8 hours  chlorhexidine 2% Cloths 1 Application(s) Topical daily    MEDICATIONS  (PRN):  traMADol 25 milliGRAM(s) Oral every 6 hours PRN moderate to severe pain      Allergies    No Known Allergies    Intolerances        LABS:                        11.1   5.80  )-----------( 196      ( 01 Sep 2021 07:56 )             34.1     09-01    139  |  104  |  19  ----------------------------<  103<H>  3.9   |  21<L>  |  1.15    Ca    9.4      01 Sep 2021 07:56  Phos  3.1     09-01  Mg     2.10     09-01    TPro  7.4  /  Alb  3.7  /  TBili  0.4  /  DBili  x   /  AST  92<H>  /  ALT  96<H>  /  AlkPhos  209<H>  09-01    PT/INR - ( 31 Aug 2021 08:00 )   PT: 13.3 sec;   INR: 1.16 ratio         PTT - ( 31 Aug 2021 08:00 )  PTT:30.2 sec      RADIOLOGY & ADDITIONAL TESTS:  Studies reviewed.

## 2021-09-01 NOTE — CONSULT NOTE ADULT - ASSESSMENT
Assessment: 56y Female with small R pleural effusion and R chest pain and SOB with IR consulted for thoracentesis.    Plan: IR to perform R thoracentesis today, 9/1, schedule permitting.  -Please place order for IR Procedure, approving attending Dr. Trimble  -discussed with primary team    Lucas Ndiaye MD  PGY-IV, Interventional Radiology  [facility]-[phone], [phone]  [phone]), [phone]

## 2021-09-01 NOTE — PROGRESS NOTE ADULT - PROBLEM SELECTOR PLAN 4
ppx: will start lovenox 40 qd pending pigtail placement  diet: NPO in case of drain  dispo: pending medical improvement

## 2021-09-01 NOTE — CONSULT NOTE ADULT - SUBJECTIVE AND OBJECTIVE BOX
Vascular & Interventional Radiology Consult Note    Evaluate for Procedure: R thoracentesis    HPI: 56y Female with R chest pain and SOB found to have a small right pleural effusion with adjacent compressive atelectasis. IR consulted for R thoracentesis.    Allergies:   Medications (Abx/Cardiac/Anticoagulation/Blood Products)      Data:    T(C): 36.7  HR: 82  BP: 142/82  RR: 17  SpO2: 100%    -WBC 5.80 / HgB 11.1 / Hct 34.1 / Plt 196  -Na 139 / Cl 104 / BUN 19 / Glucose 103  -K 3.9 / CO2 21 / Cr 1.15  -ALT 96 / Alk Phos 209 / T.Bili 0.4  -INR 1.16 / PTT 30.2      Imaging: CT chest reviewed demonstrating a small R pleural effusion with resultant compression atelectasis.

## 2021-09-01 NOTE — PROGRESS NOTE ADULT - PROBLEM SELECTOR PLAN 1
Interval increase in effusion since last CT performed 8/19/2021. Pain likely 2/2 effusions - unclear etiology for effusions, but may be 2/2 malignant effusion  - CT consulted for drainage - will attempt ultrasound guided right pigtail placement at bedside today and send fluid for studies, will f/u.  - if no window for drain, IR to be consulted for guided drainage.  - ordered T&S, coags  - tylenol and motrin ordered prn for pain  - heme/onc following, ordered Echo given hx MVP, also will order CA-125 marker w/AM labs tomorrow. Interval increase in effusion since last CT performed 8/19/2021. Pain likely 2/2 effusions - unclear etiology for effusions, but may be 2/2 malignant effusion  - CT surg consulted for drainage - no window available for bedside drainage, but preliminarily scheduled VATS for friday  - IR to attempt to drain fluid today, if successful will contact CT surg to cancel Friday procedure  - tylenol and motrin ordered prn for pain  - heme/onc following,  - echo pending

## 2021-09-02 ENCOUNTER — TRANSCRIPTION ENCOUNTER (OUTPATIENT)
Age: 56
End: 2021-09-02

## 2021-09-02 VITALS
DIASTOLIC BLOOD PRESSURE: 82 MMHG | SYSTOLIC BLOOD PRESSURE: 137 MMHG | OXYGEN SATURATION: 98 % | HEART RATE: 95 BPM | TEMPERATURE: 99 F | RESPIRATION RATE: 18 BRPM

## 2021-09-02 LAB
ALBUMIN SERPL ELPH-MCNC: 3.3 G/DL — SIGNIFICANT CHANGE UP (ref 3.3–5)
ALP SERPL-CCNC: 231 U/L — HIGH (ref 40–120)
ALT FLD-CCNC: 137 U/L — HIGH (ref 4–33)
ANION GAP SERPL CALC-SCNC: 16 MMOL/L — HIGH (ref 7–14)
AST SERPL-CCNC: 106 U/L — HIGH (ref 4–32)
BILIRUB SERPL-MCNC: 0.4 MG/DL — SIGNIFICANT CHANGE UP (ref 0.2–1.2)
BUN SERPL-MCNC: 20 MG/DL — SIGNIFICANT CHANGE UP (ref 7–23)
CALCIUM SERPL-MCNC: 9.4 MG/DL — SIGNIFICANT CHANGE UP (ref 8.4–10.5)
CHLORIDE SERPL-SCNC: 102 MMOL/L — SIGNIFICANT CHANGE UP (ref 98–107)
CO2 SERPL-SCNC: 20 MMOL/L — LOW (ref 22–31)
COMMENT - FLUIDS: SIGNIFICANT CHANGE UP
COMMENT - FLUIDS: SIGNIFICANT CHANGE UP
CREAT SERPL-MCNC: 1.11 MG/DL — SIGNIFICANT CHANGE UP (ref 0.5–1.3)
GLUCOSE SERPL-MCNC: 90 MG/DL — SIGNIFICANT CHANGE UP (ref 70–99)
HCT VFR BLD CALC: 32.2 % — LOW (ref 34.5–45)
HGB BLD-MCNC: 10.7 G/DL — LOW (ref 11.5–15.5)
LDH SERPL L TO P-CCNC: 203 U/L — SIGNIFICANT CHANGE UP (ref 135–225)
MAGNESIUM SERPL-MCNC: 2.1 MG/DL — SIGNIFICANT CHANGE UP (ref 1.6–2.6)
MCHC RBC-ENTMCNC: 27.6 PG — SIGNIFICANT CHANGE UP (ref 27–34)
MCHC RBC-ENTMCNC: 33.2 GM/DL — SIGNIFICANT CHANGE UP (ref 32–36)
MCV RBC AUTO: 83.2 FL — SIGNIFICANT CHANGE UP (ref 80–100)
NRBC # BLD: 0 /100 WBCS — SIGNIFICANT CHANGE UP
NRBC # FLD: 0 K/UL — SIGNIFICANT CHANGE UP
PH FLD: 7.2 — SIGNIFICANT CHANGE UP
PHOSPHATE SERPL-MCNC: 3.6 MG/DL — SIGNIFICANT CHANGE UP (ref 2.5–4.5)
PLATELET # BLD AUTO: 203 K/UL — SIGNIFICANT CHANGE UP (ref 150–400)
POTASSIUM SERPL-MCNC: 4.1 MMOL/L — SIGNIFICANT CHANGE UP (ref 3.5–5.3)
POTASSIUM SERPL-SCNC: 4.1 MMOL/L — SIGNIFICANT CHANGE UP (ref 3.5–5.3)
PROT SERPL-MCNC: 7 G/DL — SIGNIFICANT CHANGE UP (ref 6–8.3)
RBC # BLD: 3.87 M/UL — SIGNIFICANT CHANGE UP (ref 3.8–5.2)
RBC # FLD: 14.2 % — SIGNIFICANT CHANGE UP (ref 10.3–14.5)
SODIUM SERPL-SCNC: 138 MMOL/L — SIGNIFICANT CHANGE UP (ref 135–145)
WBC # BLD: 5.13 K/UL — SIGNIFICANT CHANGE UP (ref 3.8–10.5)
WBC # FLD AUTO: 5.13 K/UL — SIGNIFICANT CHANGE UP (ref 3.8–10.5)

## 2021-09-02 PROCEDURE — 99239 HOSP IP/OBS DSCHRG MGMT >30: CPT | Mod: GC

## 2021-09-02 PROCEDURE — 99233 SBSQ HOSP IP/OBS HIGH 50: CPT

## 2021-09-02 RX ORDER — ENOXAPARIN SODIUM 100 MG/ML
40 INJECTION SUBCUTANEOUS DAILY
Refills: 0 | Status: DISCONTINUED | OUTPATIENT
Start: 2021-09-02 | End: 2021-09-02

## 2021-09-02 RX ORDER — TRAMADOL HYDROCHLORIDE 50 MG/1
1 TABLET ORAL
Qty: 12 | Refills: 0
Start: 2021-09-02 | End: 2021-09-04

## 2021-09-02 RX ORDER — OXYCODONE HYDROCHLORIDE 5 MG/1
1 TABLET ORAL
Qty: 12 | Refills: 0
Start: 2021-09-02 | End: 2021-09-05

## 2021-09-02 RX ADMIN — Medication 650 MILLIGRAM(S): at 05:46

## 2021-09-02 RX ADMIN — TRAMADOL HYDROCHLORIDE 25 MILLIGRAM(S): 50 TABLET ORAL at 15:11

## 2021-09-02 RX ADMIN — Medication 300 UNIT(S): at 19:04

## 2021-09-02 RX ADMIN — CHLORHEXIDINE GLUCONATE 1 APPLICATION(S): 213 SOLUTION TOPICAL at 15:45

## 2021-09-02 RX ADMIN — POLYETHYLENE GLYCOL 3350 17 GRAM(S): 17 POWDER, FOR SOLUTION ORAL at 05:56

## 2021-09-02 RX ADMIN — TRAMADOL HYDROCHLORIDE 25 MILLIGRAM(S): 50 TABLET ORAL at 16:07

## 2021-09-02 RX ADMIN — Medication 650 MILLIGRAM(S): at 15:44

## 2021-09-02 RX ADMIN — ENOXAPARIN SODIUM 40 MILLIGRAM(S): 100 INJECTION SUBCUTANEOUS at 15:44

## 2021-09-02 NOTE — DISCHARGE NOTE PROVIDER - NSDCFUADDAPPT_GEN_ALL_CORE_FT
See Dr. Peters Thoracic surgeon in office in about a week Call to make an apt. [phone]. Have a new chest xray done prior to that appointment. Dr. Peters will discuss fluid results with you and discuss any further needed interventions if the fluid reaccumulates.

## 2021-09-02 NOTE — DISCHARGE NOTE NURSING/CASE MANAGEMENT/SOCIAL WORK - NSDCFUADDAPPT_GEN_ALL_CORE_FT
See Dr. Peters Thoracic surgeon in office in about a week Call to make an apt. [phone]. Have a new chest xray done prior to that appointment. Dr. Peters will discuss fluid results with you and discuss any further needed interventions if the fluid reaccumulates.

## 2021-09-02 NOTE — PROGRESS NOTE ADULT - PROBLEM SELECTOR PLAN 2
Follows Dr. Fuller, tx'd w/ Carboplatin and Taxol 6/3/20- 9/2/20( five cycles); no mets on 3/2020 CT C/A/P.  - onc consulted, following recs for echo and Ca-125.  - will f/u with Dr. Mirtha fuller at [facility] upon discharge

## 2021-09-02 NOTE — CHART NOTE - NSCHARTNOTEFT_GEN_A_CORE
Bedside US of the chest reveals very small, complex pleural effusion on the right with no approach for bedside thoracentesis.
PRE-INTERVENTIONAL RADIOLOGY PROCEDURE NOTE      Patient Age: 56    Patient Gender: F    Procedure: Thoracentesis    Diagnosis/Indication: New right pleural effusion    Interventional Radiology Attending Physician: Dr. Trimble    Ordering Attending Physician: Dr. Maru Ramsey    Pertinent Medical History: Previous uterine cancer    Pertinent labs:                      11.1   5.80  )-----------( 196      ( 01 Sep 2021 07:56 )             34.1       09-01    139  |  104  |  19  ----------------------------<  103<H>  3.9   |  21<L>  |  1.15    Ca    9.4      01 Sep 2021 07:56  Phos  3.1     09-01  Mg     2.10     09-01    TPro  7.4  /  Alb  3.7  /  TBili  0.4  /  DBili  x   /  AST  92<H>  /  ALT  96<H>  /  AlkPhos  209<H>  09-01      PT/INR - ( 31 Aug 2021 08:00 )   PT: 13.3 sec;   INR: 1.16 ratio         PTT - ( 31 Aug 2021 08:00 )  PTT:30.2 sec        Patient and Family Aware ? Yes
Pt at IR when attempted to see    Tentatively booked for Friday 9/3/21 for decortication but may not need OR after IR drainage. Will d/w Dr. Fran Abel PAC [id number]
Pt s/p IR drainage yesterday.   Ready for discharge per primary team  Dr. Peters will see pt next week in office to follow   up final fluid results and discuss any surgical options  if fluid reaccumulates. Pt should have repeat CXR prior  to that appointment.    Above d/w pt and primary team.

## 2021-09-02 NOTE — PROGRESS NOTE ADULT - PROBLEM SELECTOR PLAN 1
Interval increase in effusion since last CT performed 8/19/2021. Pain likely 2/2 effusions - unclear etiology for effusions, but may be 2/2 malignant effusion  - S/P thoracentesis as completed by IR yesterday. Will f/u results  - tylenol ordered for pain along with tramadol  - Will f/u with CT surgery regarding possibility of decortication; if no plans at this time. Will likely discharge the patient today.

## 2021-09-02 NOTE — DISCHARGE NOTE PROVIDER - NSDCMRMEDTOKEN_GEN_ALL_CORE_FT
oxyCODONE 5 mg oral tablet: 1 tab(s) orally every 8 hours MDD:3 tabs   traMADol 50 mg oral tablet: 1 tab(s) orally every 6 hours, As Needed MDD:4 tabs

## 2021-09-02 NOTE — DISCHARGE NOTE PROVIDER - NSDCCPCAREPLAN_GEN_ALL_CORE_FT
PRINCIPAL DISCHARGE DIAGNOSIS  Diagnosis: Pleural effusion  Assessment and Plan of Treatment: You were seen in the hospital for a right pleural effusion that was increasing in size. We were able to drain this fluid in the interventional radiology suite and sent this fluid for analysis. The results of this fluid analysis will likely return in a week. You should follow up with your oncologist Dr. Yomi bah [facility] in a week to see the results of the biopsy and to go over the next steps. Please return to the hospital immediately if you develop any new bleeding, shortness of breath, fevers or any other major changes.       PRINCIPAL DISCHARGE DIAGNOSIS  Diagnosis: Pleural effusion  Assessment and Plan of Treatment: You were seen in the hospital for a right pleural effusion that was increasing in size. We were able to drain this fluid in the interventional radiology suite and sent this fluid for analysis. The results of this fluid analysis will likely return in a week and is concerning for possible malignancy. You should follow up with your oncologist Dr. Celaya in [facility] in a week to see the results of the biopsy and to go over the next steps. Please return to the hospital immediately if you develop any new bleeding, shortness of breath, fevers or any other major changes.  You also have a follow up with Dr. Peters who is a cardiothoracic surgeon. You should call and make a follow up with him for possible

## 2021-09-02 NOTE — PROGRESS NOTE ADULT - ATTENDING COMMENTS
Patient seen and examined care d/w HS3    55 yo F hx of uterine ca (dx 2020; s/p chemo last cycle 9/2020, s/p VINCENT/BSO) and asthma (not on meds) p/w several days of increasingly severe R sided chest pain i/s/o known R sided pleural effusions, redemonstrated on CTA during this admission with interval increase in volume.     # Pleural effusion: likely cause of R sided chest pain, CTPA neg for PNA or PE or alternative source of pain  - CTS no safe pocket for bedside tap, f/u CTS re: any further plans  - IR did diagnostic thora on 9/1, f/u testing from pleural fluid, exudate  - elevated Ca-125 (202) may suggest recurrent disease (prior 7/2021 was 29), defer to onc re: futher w/u or management, will f/u fluid cytopath  - c/w symptomatic pain control, standing Tylenol, avoid NSAID until decision re: procedure, trial of tramadol prn  - incentive spirometry      Dispo pending, possibly today, pending need for further w/u per CTS  dispo planning, dispo time 32 min Patient seen and examined care d/w HS3    57 yo F hx of uterine ca (dx 2020; s/p chemo last cycle 9/2020, s/p VINCENT/BSO) and asthma (not on meds) p/w several days of increasingly severe R sided chest pain i/s/o known R sided pleural effusions, redemonstrated on CTA during this admission with interval increase in volume.     # Pleural effusion: likely cause of R sided chest pain, CTPA neg for PNA or PE or alternative source of pain  - CTS no safe pocket for bedside tap, f/u CTS re: any further plans  - IR did diagnostic thora on 9/1, f/u testing from pleural fluid, exudate, can f/u f/u at [facility]  - elevated Ca-125 (202) may suggest recurrent disease (prior 7/2021 was 29)  - c/w symptomatic pain control, standing Tylenol, can start NSAIDs or oxycodone prn pain  - incentive spirometry  - LFT elevations noted, CT on 8/10 reviewed, no symptoms, may have mets but this can be further evaluated as OP given asymptomatic without pain/nausea/vomiting and normal bili  - TTE was to r/o effusion related to HF, given plueral studies unlikely HF related, no need for TTE       Dispo home today  dispo planning, dispo time 32 min

## 2021-09-02 NOTE — PROGRESS NOTE ADULT - SUBJECTIVE AND OBJECTIVE BOX
INTERVAL HPI/OVERNIGHT EVENTS:  Patient seen at bedside.  Patient s/p thoracentesis yesterday  Tolerated procedure well  now with improved symptoms  feels breathing is better    VITAL SIGNS:  T(F): 99.4 (09-02-21 @ 05:49)  HR: 91 (09-02-21 @ 05:49)  BP: 133/81 (09-02-21 @ 05:49)  RR: 18 (09-02-21 @ 05:49)  SpO2: 98% (09-02-21 @ 05:49)  Wt(kg): --    PHYSICAL EXAM:    GENERAL: NAD, well-developed  HEAD:  Atraumatic, Normocephalic  EYES: EOMI, PERRLA, conjunctiva and sclera clear  NECK: Supple, No JVD  CHEST/LUNG: Clear to auscultation bilaterally; No wheeze  HEART: Regular rate and rhythm; No murmurs, rubs, or gallops  ABDOMEN: Soft, Nontender, Nondistended; Bowel sounds present  EXTREMITIES:  2+ Peripheral Pulses, No clubbing, cyanosis, or edema  PSYCH: AAOx3  SKIN: No rashes or lesions    MEDICATIONS  (STANDING):  acetaminophen   Tablet .. 650 milliGRAM(s) Oral every 8 hours  chlorhexidine 2% Cloths 1 Application(s) Topical daily  enoxaparin Injectable 40 milliGRAM(s) SubCutaneous daily  polyethylene glycol 3350 17 Gram(s) Oral daily    MEDICATIONS  (PRN):  traMADol 25 milliGRAM(s) Oral every 6 hours PRN moderate to severe pain      Allergies    No Known Allergies    Intolerances        LABS:                        10.7   5.13  )-----------( 203      ( 02 Sep 2021 08:04 )             32.2     09-02    138  |  102  |  20  ----------------------------<  90  4.1   |  20<L>  |  1.11    Ca    9.4      02 Sep 2021 08:04  Phos  3.6     09-02  Mg     2.10     09-02    TPro  7.0  /  Alb  3.3  /  TBili  0.4  /  DBili  x   /  AST  106<H>  /  ALT  137<H>  /  AlkPhos  231<H>  09-02          RADIOLOGY & ADDITIONAL TESTS:  Studies reviewed.

## 2021-09-02 NOTE — DISCHARGE NOTE PROVIDER - NSDCFUSCHEDAPPT_GEN_ALL_CORE_FT
ALEXANDER HUDDLESTON ; 09/03/2021 ; [facility] [address]  ALEXANDER HUDDLESTON ; 09/07/2021 ; [facility] ALEXANDER HUDDLESTON ; 09/07/2021 ; [facility]

## 2021-09-02 NOTE — DISCHARGE NOTE PROVIDER - HOSPITAL COURSE
Pt is a 57 yo F hx of uterine ca (dx 2020; s/p chemo last cycle 9/2020, s/p VINCENT/BSO) and asthma p/w several days of increasingly severe R sided chest pain w/ known R pleural effusions. Out pt records show CT performed earlier this month which demonstrated new R pleural effusions, Pt states she continued to have pain and saw outpt oncologist Dr. Mirtha Celaya 8/30, performed CXR which redemonstrated effusion at which pt was advised to go to the hospital. Pt states that the chest pain limits deep inspiration and she is unable to walk long distances without SOB since ca dx and tx, but otherwise no other associated symptoms. Denies fevers/chills, n/v/d, LE swelling, orthopnea.    Pt was found to have continued right pleural effusions and it was drained via IR and a diagnostic thoracentesis. The fluid analysis showed that it was exudative in nature and is likely a recurrence of her uterine cancer. Patient was then discharged for outpatient follow up awaiting the final results of the cytology of this pleural fluid.

## 2021-09-02 NOTE — PROGRESS NOTE ADULT - ASSESSMENT
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     Ct angio chest: Limited. No pulmonary embolus to level of the lobar pulmonary arteries.  Interval increase in small right pleural effusion since 8/19/2021 with associated adjacent compressive atelectasis.  Small perihepatic ascites.    -Appreciate CT surgery consult recs, Bedside US of the chest reveals very small, complex pleural effusion on the right with no approach for bedside thoracentesis.  -Appreciate IR consult for possible image guided thoracentesis, tentative thora today  -If thoracentesis is feasible, Will send for cultures, cytopathology  -Echo ordered, pending  - Tumor marker , elevated, will discuss with patient  -Rest of care as per primary team  -Patient to followup with Dr. Mirtha Celaya ([facility]) upon discharge  -C/w Supportive care, pain control, Nutrition, PT, DVT ppx  -Oncology will continue to follow with you      Case d/w Dr. Eusebio ROQUE  Oncology Physician Assistant  [facility] JUSTICE/[facility]  Pager [phone]    If after 5pm or weekends please page On-call Oncology Fellow  
Pt here by himself, pt reports having surgery today at [facility], pt reports he shattered his lt arm and had plates put in today around 1 PM, pt reports no current pain, but states that bleeding is coming through the dressing, pt is concerned about this, CMS intact, VSS, pt out into waiting room to wait for ED room  
Pt is a 55 yo F hx of uterine ca (dx 2020; s/p chemo last cycle 9/2020, s/p VINCENT/BSO) and asthma (not on meds) p/w several days of increasingly severe R sided chest pain i/s/o known R sided pleural effusions, redemonstrated on CTA during this admission with interval increase in volume. 
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     Ct angio chest: Limited. No pulmonary embolus to level of the lobar pulmonary arteries.  Interval increase in small right pleural effusion since 8/19/2021 with associated adjacent compressive atelectasis.  Small perihepatic ascites.    -Appreciate CT surgery consult recs, Bedside US of the chest reveals very small, complex pleural effusion on the right with no approach for bedside thoracentesis.  -Appreciate IR consult for possible image guided thoracentesis, s/p procedure. 350 cc removed, will follow up cytopath.  -Echo ordered, pending  - Tumor marker , elevated, discussed results with patient at bedside today. Patient became upset, pt is having a challenging time as we work her up  for possible recurred disease.   -Patient with elevated LFTs, would order CT A/P for further evaluation.  Most recent CT showing questionable liver hypodensities.   -Rest of care as per primary team  -Patient to followup with Dr. Mirtha Celaya ([facility]) upon discharge  -C/w Supportive care, pain control, Nutrition, PT, DVT ppx  -Oncology will continue to follow with you      Case d/w Dr. Shirley ROQUE  Oncology Physician Assistant  [facility]  Pager [phone]    If after 5pm or weekends please page On-call Oncology Fellow  
Pt is a 57 yo F hx of uterine ca (dx 2020; s/p chemo last cycle 9/2020, s/p VINCENT/BSO) and asthma (not on meds) p/w several days of increasingly severe R sided chest pain i/s/o known R sided pleural effusions, redemonstrated on CTA during this admission with interval increase in volume. 
Pt is a 57 yo F hx of uterine ca (dx 2020; s/p chemo last cycle 9/2020, s/p VINCENT/BSO) and asthma (not on meds) p/w several days of increasingly severe R sided chest pain i/s/o known R sided pleural effusions, redemonstrated on CTA during this admission with interval increase in volume.

## 2021-09-02 NOTE — DISCHARGE NOTE PROVIDER - CARE PROVIDER_API CALL
Mirtha Celaya)  Hematology; Internal Medicine; Medical Oncology  [address] [place] [address]  Phone: [phone]  Fax: [phone]  Established Patient  Follow Up Time: 1 week    Stevan Peters)  Surgery; Thoracic Surgery  [phone] [place] [address] [place] [address]  Phone: [phone]  Fax: [phone]  Established Patient  Follow Up Time: 1 week

## 2021-09-02 NOTE — DISCHARGE NOTE NURSING/CASE MANAGEMENT/SOCIAL WORK - PATIENT PORTAL LINK FT
You can access the FollowMyHealth Patient Portal offered by [facility] by registering at the following website: [email or web address]. By joining [employer]’s FollowMyHealth portal, you will also be able to view your health information using other applications (apps) compatible with our system.

## 2021-09-02 NOTE — PROGRESS NOTE ADULT - ATTENDING COMMENTS
Patient seen at bedside. Case discussed with JUDE Judd. Plan as above.   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   Cytology +   rising  Subcentimeter lesions on liver on CT from early Aug  LFT rising  Discussed cancer recurrence with patient. She is very upset about this. Discussed that she will need cancer treatment and outpatient oncology follow up.   Monitor LFTs, consider viral hep panels.   Please consider CT a/p with contrast to eval liver lesions.  Outpt oncology follow up

## 2021-09-02 NOTE — DISCHARGE NOTE PROVIDER - PROVIDER TOKENS
PROVIDER:[TOKEN:[[id number]],FOLLOWUP:[1 week],ESTABLISHEDPATIENT:[T]],PROVIDER:[TOKEN:[[id number]],FOLLOWUP:[1 week],ESTABLISHEDPATIENT:[T]]

## 2021-09-02 NOTE — PROGRESS NOTE ADULT - SUBJECTIVE AND OBJECTIVE BOX
Patient is a 56y old  Female who presents with a chief complaint of R chest pain (01 Sep 2021 16:29)      SUBJECTIVE / OVERNIGHT EVENTS: No acute overnight events. Patient states that she feels her breathing is better. I showed her how to use the spirometer at bedside. Patient endorses some right sided chest pain from the procedure, but no other major changes.      MEDICATIONS  (STANDING):  acetaminophen   Tablet .. 650 milliGRAM(s) Oral every 8 hours  chlorhexidine 2% Cloths 1 Application(s) Topical daily  polyethylene glycol 3350 17 Gram(s) Oral daily    MEDICATIONS  (PRN):  traMADol 25 milliGRAM(s) Oral every 6 hours PRN moderate to severe pain        CAPILLARY BLOOD GLUCOSE        I&O's Summary      PHYSICAL EXAM  GENERAL: NAD, well-developed  HEAD:  Atraumatic, Normocephalic  EYES: EOMI, PERRLA, conjunctiva and sclera clear  NECK: Supple, No JVD  CHEST/LUNG: Clear to auscultation bilaterally; No wheeze  HEART: Regular rate and rhythm; No murmurs, rubs, or gallops  ABDOMEN: Soft, Nontender, Nondistended; Bowel sounds present  EXTREMITIES:  2+ Peripheral Pulses, No clubbing, cyanosis, or edema  PSYCH: AAOx3  SKIN: No rashes or lesions    LABS:                        10.7   5.13  )-----------( 203      ( 02 Sep 2021 08:04 )             32.2     09-01    139  |  104  |  19  ----------------------------<  103<H>  3.9   |  21<L>  |  1.15    Ca    9.4      01 Sep 2021 07:56  Phos  3.1     09-01  Mg     2.10     09-01    TPro  7.4  /  Alb  3.7  /  TBili  0.4  /  DBili  x   /  AST  92<H>  /  ALT  96<H>  /  AlkPhos  209<H>  09-01              RADIOLOGY & ADDITIONAL TESTS:    Imaging Personally Reviewed:  Consultant(s) Notes Reviewed:    Care Discussed with Consultants/Other Providers:

## 2021-09-03 ENCOUNTER — OUTPATIENT (OUTPATIENT)
Dept: OUTPATIENT SERVICES | Facility: HOSPITAL | Age: 56
LOS: 1 days | Discharge: HOME | End: 2021-09-03

## 2021-09-03 ENCOUNTER — APPOINTMENT (OUTPATIENT)
Dept: THORACIC SURGERY | Facility: HOSPITAL | Age: 56
End: 2021-09-03

## 2021-09-03 DIAGNOSIS — Z90.710 ACQUIRED ABSENCE OF BOTH CERVIX AND UTERUS: Chronic | ICD-10-CM

## 2021-09-03 DIAGNOSIS — C54.1 MALIGNANT NEOPLASM OF ENDOMETRIUM: ICD-10-CM

## 2021-09-06 LAB
CULTURE RESULTS: SIGNIFICANT CHANGE UP
SPECIMEN SOURCE: SIGNIFICANT CHANGE UP

## 2021-09-07 ENCOUNTER — APPOINTMENT (OUTPATIENT)
Dept: HEMATOLOGY ONCOLOGY | Facility: CLINIC | Age: 56
End: 2021-09-07
Payer: COMMERCIAL

## 2021-09-07 VITALS
RESPIRATION RATE: 16 BRPM | OXYGEN SATURATION: 95 % | TEMPERATURE: 98.1 F | DIASTOLIC BLOOD PRESSURE: 87 MMHG | HEART RATE: 94 BPM | SYSTOLIC BLOOD PRESSURE: 135 MMHG

## 2021-09-07 PROCEDURE — 99214 OFFICE O/P EST MOD 30 MIN: CPT

## 2021-09-07 NOTE — HISTORY OF PRESENT ILLNESS
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 Patient was seen initially by Dr. Arciniega at [place].\par She then saw Dr Robin Zamorano, at [facility] and had a repeat biopsy that showed clear cell carcinoma.\par On 4/20/2020 she underwent surgical staging and is post TLH/BSO/SLND/Omentectomy.\par Final pathology showed stage IA,high grade endometrial carcinoma involving predominantly clear cell and focally serous features, no invasion, no LVSI.\par \par Ct C/A/P with contrast done on 3/12/2020 showed no metastatic sites.\par She completed five cycles of CT 6/3/20- 9/2/20) Last cycle held due to toxicity and myelosuppression.She received vaginal brachytherapy to a total of 2100 cGy in 3 fractions. Treatment was delivered from 10/16/20- 10/23/20. \par Scan showed CINDY. [de-identified] : Carboplatin and Taxol 6/3/20- 9/2/20( five cycles) [de-identified] : Patient is here for follow up after recent discharge from hospital for new pleural effusion.  She continues to have some SOB but overall feels better.  She has follow up with Dr. Peters 9/13.

## 2021-09-10 ENCOUNTER — APPOINTMENT (OUTPATIENT)
Dept: HEMATOLOGY ONCOLOGY | Facility: CLINIC | Age: 56
End: 2021-09-10
Payer: COMMERCIAL

## 2021-09-10 VITALS
HEART RATE: 120 BPM | OXYGEN SATURATION: 99 % | SYSTOLIC BLOOD PRESSURE: 148 MMHG | TEMPERATURE: 97.4 F | RESPIRATION RATE: 18 BRPM | DIASTOLIC BLOOD PRESSURE: 98 MMHG

## 2021-09-10 PROCEDURE — 99214 OFFICE O/P EST MOD 30 MIN: CPT

## 2021-09-13 ENCOUNTER — APPOINTMENT (OUTPATIENT)
Dept: THORACIC SURGERY | Facility: CLINIC | Age: 56
End: 2021-09-13
Payer: COMMERCIAL

## 2021-09-13 ENCOUNTER — APPOINTMENT (OUTPATIENT)
Dept: RADIOLOGY | Facility: HOSPITAL | Age: 56
End: 2021-09-13

## 2021-09-13 ENCOUNTER — NON-APPOINTMENT (OUTPATIENT)
Age: 56
End: 2021-09-13

## 2021-09-13 ENCOUNTER — RESULT REVIEW (OUTPATIENT)
Age: 56
End: 2021-09-13

## 2021-09-13 ENCOUNTER — LABORATORY RESULT (OUTPATIENT)
Age: 56
End: 2021-09-13

## 2021-09-13 ENCOUNTER — OUTPATIENT (OUTPATIENT)
Dept: OUTPATIENT SERVICES | Facility: HOSPITAL | Age: 56
LOS: 1 days | End: 2021-09-13
Payer: COMMERCIAL

## 2021-09-13 VITALS
HEIGHT: 65 IN | SYSTOLIC BLOOD PRESSURE: 147 MMHG | BODY MASS INDEX: 40.15 KG/M2 | RESPIRATION RATE: 18 BRPM | OXYGEN SATURATION: 97 % | HEART RATE: 92 BPM | DIASTOLIC BLOOD PRESSURE: 89 MMHG | WEIGHT: 241 LBS | TEMPERATURE: 98.6 F

## 2021-09-13 DIAGNOSIS — J90 PLEURAL EFFUSION, NOT ELSEWHERE CLASSIFIED: ICD-10-CM

## 2021-09-13 DIAGNOSIS — Z90.710 ACQUIRED ABSENCE OF BOTH CERVIX AND UTERUS: Chronic | ICD-10-CM

## 2021-09-13 PROCEDURE — 71046 X-RAY EXAM CHEST 2 VIEWS: CPT | Mod: 26

## 2021-09-13 PROCEDURE — 99213 OFFICE O/P EST LOW 20 MIN: CPT

## 2021-09-13 NOTE — HISTORY OF PRESENT ILLNESS
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 Patient was seen initially by Dr. Arciniega at [facility].\par She then saw Dr Robin Zamorano, at [facility] and had a repeat biopsy that showed clear cell carcinoma.\par On 4/20/2020 she underwent surgical staging and is post TLH/BSO/SLND/Omentectomy.\par Final pathology showed stage IA,high grade endometrial carcinoma involving predominantly clear cell and focally serous features, no invasion, no LVSI.\par \par Ct C/A/P with contrast done on 3/12/2020 showed no metastatic sites.\par She completed five cycles of CT 6/3/20- 9/2/20) Last cycle held due to toxicity and myelosuppression.She received vaginal brachytherapy to a total of 2100 cGy in 3 fractions. Treatment was delivered from 10/16/20- 10/23/20. \par Scan showed CINDY.\par \par 8/10/21: Surveillance scan showed New partially visualized small right pleural effusion. Correlation with dedicated chest CT is recommended. Few subcentimeter hepatic hypo densities, too small to characterize, but similar in appearance to prior. Small pelvic ascites, similar in appearance to prior.\par - 29( previously 15)\par \par 8/30/21: CT chest: Limited. No pulmonary embolus to level of the lobar pulmonary arteries. Interval increase in small right pleural effusion since 8/19/2021 with associated adjacent compressive atelectasis.Small perihepatic ascites.\par \par Patient was admitted to the hospital with SOB and pleural effusion. She had  thoracentesis and cytology was consistent with relapsed uterine cancer.\par \par Final Diagnosis\par PLEURAL FLUID, RIGHT\par POSITIVE FOR MALIGNANT CELLS.\par Metastatic adenocarcinoma\par \par Cytology slides and cell block reveal a hypercellular specimen\par composed of crowded 3-dimensional groups and single lying\par malignant cells with enlarged nuclei containing prominent\par nucleoli and vacuolated cytoplasm, among benign mesothelial\par cells.\par \par Immunohistochemical stains are performed on the cell block. The\par tumor cells are positive for PAX-8 (focal and weak), p16, napsin\par (focal) and p53 (overexpression), while they are negative for ER,\par WT-1 and TTF-1. In the absence of another primary, it is most\par consistent with involvement by patient's known endometrial\par adenocarcinoma. [de-identified] : Carboplatin and Taxol 6/3/20- 9/2/20( five cycles) [de-identified] : Patient is here to discuss pathology results and treatment options.\par SOB is stable, denies any pain or cough.

## 2021-09-14 ENCOUNTER — OUTPATIENT (OUTPATIENT)
Dept: OUTPATIENT SERVICES | Facility: HOSPITAL | Age: 56
LOS: 1 days | End: 2021-09-14
Payer: COMMERCIAL

## 2021-09-14 VITALS
TEMPERATURE: 98 F | HEIGHT: 65 IN | HEART RATE: 84 BPM | RESPIRATION RATE: 16 BRPM | WEIGHT: 231.93 LBS | OXYGEN SATURATION: 99 % | DIASTOLIC BLOOD PRESSURE: 80 MMHG | SYSTOLIC BLOOD PRESSURE: 130 MMHG

## 2021-09-14 DIAGNOSIS — J90 PLEURAL EFFUSION, NOT ELSEWHERE CLASSIFIED: ICD-10-CM

## 2021-09-14 DIAGNOSIS — Z90.710 ACQUIRED ABSENCE OF BOTH CERVIX AND UTERUS: Chronic | ICD-10-CM

## 2021-09-14 LAB
BLD GP AB SCN SERPL QL: NEGATIVE — SIGNIFICANT CHANGE UP
RH IG SCN BLD-IMP: POSITIVE — SIGNIFICANT CHANGE UP

## 2021-09-14 PROCEDURE — 93010 ELECTROCARDIOGRAM REPORT: CPT

## 2021-09-14 RX ORDER — SODIUM CHLORIDE 9 MG/ML
1000 INJECTION, SOLUTION INTRAVENOUS
Refills: 0 | Status: DISCONTINUED | OUTPATIENT
Start: 2021-09-15 | End: 2021-09-16

## 2021-09-14 NOTE — H&P PST ADULT - NEGATIVE ENMT SYMPTOMS
no hearing difficulty/no ear pain/no tinnitus/no vertigo/no sinus symptoms/no nasal congestion/no nasal discharge/no throat pain

## 2021-09-14 NOTE — H&P PST ADULT - PROBLEM SELECTOR PLAN 1
Patient tentatively scheduled for right video assisted thoracoscopy, Pleurx catheter placement on 9/15/21.  Pre-op instructions provided. Pt given verbal and written instructions with teach back on chlorhexidine shampoo and pepcid. Pt verbalized understanding with return demonstration.   Covid testing done by patient  prior to surgery as per patient. Patient tentatively scheduled for right video assisted thoracoscopy, Pleurx catheter placement on 9/15/21.  Pre-op instructions provided. Pt given verbal and written instructions with teach back on chlorhexidine shampoo and pepcid. Pt verbalized understanding with return demonstration.   Covid testing done by patient  prior to surgery as per patient.   Case discussed with Dr Hernández no further evaluation needed at this time.

## 2021-09-14 NOTE — H&P PST ADULT - RS GEN PE MLT RESP DETAILS PC
airway patent/breath sounds equal/good air movement/clear to auscultation bilaterally/no rales/no rhonchi/no wheezes/diminished breath sounds, R

## 2021-09-14 NOTE — H&P PST ADULT - NSANTHOSAYNRD_GEN_A_CORE
No. RICCO screening performed.  STOP BANG Legend: 0-2 = LOW Risk; 3-4 = INTERMEDIATE Risk; 5-8 = HIGH Risk

## 2021-09-14 NOTE — H&P PST ADULT - HISTORY OF PRESENT ILLNESS
56 year old female with PMH of Asthma, uterine cancer  s/p chemo 9/2020, presents to Presurgical testing with diagnosis of pleural effusion not elsewhere classified scheduled for right video assisted thoracoscopy, Pleurx catheter placement.     Patient was recently admitted to [facility] with right chest pain found to have right sided pleural effusion. Drain by IR. Patient reports that the right pleural effusion has been increasing recently

## 2021-09-14 NOTE — H&P PST ADULT - ATTENDING COMMENTS
The risks, benefits and alternatives of the procedure were explained to the patient including but not limited to bleeding, infection, prolonged air leak, recurrence of the effusion, oxygen dependance and shortness of breath.  All of her questions were answered. She demonstrated understanding and freely consented to the procedure.

## 2021-09-15 ENCOUNTER — TRANSCRIPTION ENCOUNTER (OUTPATIENT)
Age: 56
End: 2021-09-15

## 2021-09-15 ENCOUNTER — INPATIENT (INPATIENT)
Facility: HOSPITAL | Age: 56
LOS: 0 days | Discharge: HOME HEALTH | End: 2021-09-16
Attending: THORACIC SURGERY (CARDIOTHORACIC VASCULAR SURGERY) | Admitting: THORACIC SURGERY (CARDIOTHORACIC VASCULAR SURGERY)
Payer: COMMERCIAL

## 2021-09-15 ENCOUNTER — APPOINTMENT (OUTPATIENT)
Dept: THORACIC SURGERY | Facility: HOSPITAL | Age: 56
End: 2021-09-15

## 2021-09-15 ENCOUNTER — RESULT REVIEW (OUTPATIENT)
Age: 56
End: 2021-09-15

## 2021-09-15 VITALS
DIASTOLIC BLOOD PRESSURE: 90 MMHG | OXYGEN SATURATION: 96 % | SYSTOLIC BLOOD PRESSURE: 142 MMHG | TEMPERATURE: 99 F | HEART RATE: 99 BPM | WEIGHT: 231.04 LBS | HEIGHT: 65 IN | RESPIRATION RATE: 18 BRPM

## 2021-09-15 VITALS
WEIGHT: 241 LBS | TEMPERATURE: 98.6 F | DIASTOLIC BLOOD PRESSURE: 89 MMHG | HEIGHT: 65 IN | RESPIRATION RATE: 18 BRPM | SYSTOLIC BLOOD PRESSURE: 147 MMHG | OXYGEN SATURATION: 97 % | BODY MASS INDEX: 40.15 KG/M2 | HEART RATE: 92 BPM

## 2021-09-15 DIAGNOSIS — J90 PLEURAL EFFUSION, NOT ELSEWHERE CLASSIFIED: ICD-10-CM

## 2021-09-15 DIAGNOSIS — Z90.710 ACQUIRED ABSENCE OF BOTH CERVIX AND UTERUS: Chronic | ICD-10-CM

## 2021-09-15 PROCEDURE — 88305 TISSUE EXAM BY PATHOLOGIST: CPT | Mod: 26

## 2021-09-15 PROCEDURE — 31622 DX BRONCHOSCOPE/WASH: CPT

## 2021-09-15 PROCEDURE — 88112 CYTOPATH CELL ENHANCE TECH: CPT | Mod: 26

## 2021-09-15 PROCEDURE — 32550 INSERT PLEURAL CATH: CPT

## 2021-09-15 PROCEDURE — 88305 TISSUE EXAM BY PATHOLOGIST: CPT | Mod: 26,59

## 2021-09-15 PROCEDURE — 32651 THORACOSCOPY REMOVE CORTEX: CPT

## 2021-09-15 PROCEDURE — 71045 X-RAY EXAM CHEST 1 VIEW: CPT | Mod: 26

## 2021-09-15 RX ORDER — OXYCODONE HYDROCHLORIDE 5 MG/1
10 TABLET ORAL EVERY 6 HOURS
Refills: 0 | Status: DISCONTINUED | OUTPATIENT
Start: 2021-09-15 | End: 2021-09-15

## 2021-09-15 RX ORDER — OXYCODONE HYDROCHLORIDE 5 MG/1
5 TABLET ORAL EVERY 6 HOURS
Refills: 0 | Status: DISCONTINUED | OUTPATIENT
Start: 2021-09-15 | End: 2021-09-15

## 2021-09-15 RX ORDER — HYDROMORPHONE HYDROCHLORIDE 2 MG/ML
0.5 INJECTION INTRAMUSCULAR; INTRAVENOUS; SUBCUTANEOUS
Refills: 0 | Status: DISCONTINUED | OUTPATIENT
Start: 2021-09-15 | End: 2021-09-15

## 2021-09-15 RX ORDER — NALOXONE HYDROCHLORIDE 4 MG/.1ML
0.1 SPRAY NASAL
Refills: 0 | Status: DISCONTINUED | OUTPATIENT
Start: 2021-09-15 | End: 2021-09-16

## 2021-09-15 RX ORDER — FAMOTIDINE 10 MG/ML
20 INJECTION INTRAVENOUS EVERY 12 HOURS
Refills: 0 | Status: DISCONTINUED | OUTPATIENT
Start: 2021-09-15 | End: 2021-09-16

## 2021-09-15 RX ORDER — TRAMADOL HYDROCHLORIDE 50 MG/1
50 TABLET ORAL EVERY 6 HOURS
Refills: 0 | Status: DISCONTINUED | OUTPATIENT
Start: 2021-09-15 | End: 2021-09-16

## 2021-09-15 RX ORDER — ONDANSETRON 8 MG/1
4 TABLET, FILM COATED ORAL EVERY 6 HOURS
Refills: 0 | Status: DISCONTINUED | OUTPATIENT
Start: 2021-09-15 | End: 2021-09-16

## 2021-09-15 RX ORDER — TRAMADOL HYDROCHLORIDE 50 MG/1
25 TABLET ORAL ONCE
Refills: 0 | Status: DISCONTINUED | OUTPATIENT
Start: 2021-09-15 | End: 2021-09-15

## 2021-09-15 RX ORDER — ACETAMINOPHEN 500 MG
1000 TABLET ORAL ONCE
Refills: 0 | Status: COMPLETED | OUTPATIENT
Start: 2021-09-15 | End: 2021-09-15

## 2021-09-15 RX ORDER — ACETAMINOPHEN 500 MG
650 TABLET ORAL EVERY 6 HOURS
Refills: 0 | Status: DISCONTINUED | OUTPATIENT
Start: 2021-09-15 | End: 2021-09-16

## 2021-09-15 RX ORDER — TRAMADOL HYDROCHLORIDE 50 MG/1
25 TABLET ORAL EVERY 6 HOURS
Refills: 0 | Status: DISCONTINUED | OUTPATIENT
Start: 2021-09-15 | End: 2021-09-16

## 2021-09-15 RX ORDER — HYDROMORPHONE HYDROCHLORIDE 2 MG/ML
30 INJECTION INTRAMUSCULAR; INTRAVENOUS; SUBCUTANEOUS
Refills: 0 | Status: DISCONTINUED | OUTPATIENT
Start: 2021-09-15 | End: 2021-09-15

## 2021-09-15 RX ORDER — HEPARIN SODIUM 5000 [USP'U]/ML
7500 INJECTION INTRAVENOUS; SUBCUTANEOUS EVERY 8 HOURS
Refills: 0 | Status: DISCONTINUED | OUTPATIENT
Start: 2021-09-15 | End: 2021-09-16

## 2021-09-15 RX ADMIN — TRAMADOL HYDROCHLORIDE 25 MILLIGRAM(S): 50 TABLET ORAL at 18:48

## 2021-09-15 RX ADMIN — SODIUM CHLORIDE 30 MILLILITER(S): 9 INJECTION, SOLUTION INTRAVENOUS at 15:00

## 2021-09-15 RX ADMIN — Medication 400 MILLIGRAM(S): at 20:50

## 2021-09-15 RX ADMIN — TRAMADOL HYDROCHLORIDE 25 MILLIGRAM(S): 50 TABLET ORAL at 18:20

## 2021-09-15 RX ADMIN — HEPARIN SODIUM 7500 UNIT(S): 5000 INJECTION INTRAVENOUS; SUBCUTANEOUS at 21:33

## 2021-09-15 RX ADMIN — Medication 1000 MILLIGRAM(S): at 21:20

## 2021-09-15 RX ADMIN — FAMOTIDINE 20 MILLIGRAM(S): 10 INJECTION INTRAVENOUS at 21:32

## 2021-09-15 RX ADMIN — HYDROMORPHONE HYDROCHLORIDE 30 MILLILITER(S): 2 INJECTION INTRAMUSCULAR; INTRAVENOUS; SUBCUTANEOUS at 16:30

## 2021-09-15 NOTE — PATIENT PROFILE ADULT - NSPROGENBLOODRESTRICT_GEN_A_NUR
Progress Note -  Comp 1950, 71 y o  female MRN: [id number]    Unit/Bed#: -01 Encounter: [id number]    Primary Care Provider: No primary care provider on file     Date and time admitted to hospital: 1/18/2020 12:00 PM        Community acquired pneumonia  Assessment & Plan  · Confirmed by CT a imaging showing right upper lobe consolidation concerning for pneumonia  · On day 2 of IV antibiotics, continue with current management  · Follow-up cultures, continued symptom control    Elevated LFTs  Assessment & Plan  · Status post right upper quadrant ultra sound showing potential portal venous hypertension  · Will consider GI consult in a m , otherwise continue to monitor with daily liver function tests    Diabetes mellitus [facility])  Assessment & Plan  No results found for: HGBA1C    Recent Labs     01/18/20  1756 01/18/20  2109 01/19/20  0604 01/19/20  1143   POCGLU 198* 166* 142* 138       Blood Sugar Average: Last 72 hrs:  (P) 161  Glucose levels with good control, continue with current management    Influenza A  Assessment & Plan  · Status post treatment with Tamiflu as an outpatient  · No indication further treatment at this time  · Has superimposed bacterial pneumonia and right upper lobe  · Continue with antibiotics for community-acquired pneumonia      Hypertension  Assessment & Plan  · Blood pressure relatively well controlled, continue with home dose lisinopril 10 mg daily    CVA (cerebral vascular accident) ([facility] [address] )  Assessment & Plan  · At previous ischemic CVA back in 12/2017, status post rehab  · Still has residual left-sided hemiparesis  · Continue with high-intensity statin, blood pressure control  · Will continue with PT and OT along with fall precaution    * Asthma exacerbation, non-allergic, severe persistent  Assessment & Plan  · Has severe persistent asthma, requiring 7 intubations previously  · Currently with diffuse expiratory wheeze in all lung fields bilaterally  · Currently on Xopenex and Atrovent along with IV Solu-Medrol q 6 hours with minimal improvement  · Will consult pulmonology, continue with current management and reassess in the morning  · Continue with symptom management for cough  · Asthma exacerbation likely secondary to underlying influenza and potential pneumonia         VTE Pharmacologic Prophylaxis:   Pharmacologic: Enoxaparin (Lovenox)  Mechanical VTE Prophylaxis in Place: Yes    Patient Centered Rounds: I have performed bedside rounds with nursing staff today  Discussions with Specialists or Other Care Team Provider:  None    Education and Discussions with Family / Patient:  Severe asthma exacerbation and pneumonia    Time Spent for Care: 45 minutes  More than 50% of total time spent on counseling and coordination of care as described above  Current Length of Stay: 1 day(s)    Current Patient Status: Inpatient   Certification Statement: The patient will continue to require additional inpatient hospital stay due to See above    Discharge Plan:  48 hours    Code Status: Level 1 - Full Code      Subjective:   Patient feels about the same as yesterday    Objective:     Vitals:   Temp (24hrs), Av 4 °F (36 9 °C), Min:98 3 °F (36 8 °C), Max:98 6 °F (37 °C)    Temp:  [98 3 °F (36 8 °C)-98 6 °F (37 °C)] 98 6 °F (37 °C)  HR:  [] 79  Resp:  [16-24] 18  BP: (121-154)/(62-90) 154/71  SpO2:  [93 %-98 %] 97 %  Body mass index is 29 57 kg/m²  Input and Output Summary (last 24 hours):        Intake/Output Summary (Last 24 hours) at 2020 1435  Last data filed at 2020 1318  Gross per 24 hour   Intake 2840 ml   Output 800 ml   Net 2040 ml       Physical Exam:     In mild distress  Alert oriented x3  Regular rate rhythm  Diffuse expiratory wheeze in all lung fields bilaterally, tachypnea  Bowel sounds positive, nontender nondistended  No lower extremity edema  Residual weakness with left hemiparesis      Additional Data:     Labs:    Results from last 7 days Lab Units 01/19/20  0527  01/18/20  1241   WBC Thousand/uL 3 24*  --  6 20   HEMOGLOBIN g/dL 10 6*  --  12 1   HEMATOCRIT % 33 6*  --  37 8   PLATELETS Thousands/uL 212   < > 258   NEUTROS PCT %  --   --  71   LYMPHS PCT %  --   --  13*   MONOS PCT %  --   --  15*   EOS PCT %  --   --  0    < > = values in this interval not displayed  Results from last 7 days   Lab Units 01/19/20  0527   SODIUM mmol/L 144   POTASSIUM mmol/L 3 6   CHLORIDE mmol/L 111*   CO2 mmol/L 24   BUN mg/dL 9   CREATININE mg/dL 0 63   ANION GAP mmol/L 9   CALCIUM mg/dL 8 4   ALBUMIN g/dL 2 4*   TOTAL BILIRUBIN mg/dL 0 30   ALK PHOS U/L 302*   ALT U/L 87*   AST U/L 72*   GLUCOSE RANDOM mg/dL 142*         Results from last 7 days   Lab Units 01/19/20  1143 01/19/20  0604 01/18/20  2109 01/18/20  1756   POC GLUCOSE mg/dl 138 142* 166* 198*         Results from last 7 days   Lab Units 01/18/20  1901 01/18/20  1241   LACTIC ACID mmol/L  --  1 4   PROCALCITONIN ng/ml 0 06  --            * I Have Reviewed All Lab Data Listed Above  * Additional Pertinent Lab Tests Reviewed: Marvin 66 Admission Reviewed    Imaging:    Imaging Reports Reviewed Today Include:  CTA chest  Imaging Personally Reviewed by Myself Includes:  See above    Recent Cultures (last 7 days):     Results from last 7 days   Lab Units 01/18/20  1257 01/18/20  1241   BLOOD CULTURE  Received in Microbiology Lab  Culture in Progress  Received in Microbiology Lab  Culture in Progress         Last 24 Hours Medication List:     Current Facility-Administered Medications:  acetaminophen 650 mg Oral Q6H PRN Shajtkat Ruziev    acetaminophen 650 mg Oral Q6H PRN Mandokat Ruziev    aspirin 81 mg Oral Daily Yves Ruzioctavio    atorvastatin 40 mg Oral Daily Yves Ruzioctavio    azithromycin 500 mg Oral Q24H Yves Ruzioctavio    benzonatate 100 mg Oral TID PRN Mandokat Ruzioctavio    cefTRIAXone 1,000 mg Intravenous Q24H Yves Lamas Last Rate: 1,000 mg (01/18/20 1852) dextromethorphan-guaiFENesin 10 mL Oral Q4H PRN [facility]    docusate sodium 200 mg Oral BID [facility]    doxycycline hyclate 100 mg Oral Q12H [facility]    enoxaparin 40 mg Subcutaneous Daily [facility]    fluticasone 1 spray Each Nare Daily Con MD Shai    folic acid 1 mg Oral Daily [facility]    guaiFENesin 200 mg Oral Q12H [facility]    insulin lispro 1-5 Units Subcutaneous 4 times day [facility]    ipratropium 0 5 mg Nebulization Q6H [facility]    lactulose 10 g Oral Daily [facility]    levalbuterol 1 25 mg Nebulization 4x Daily Con MD Shai    levETIRAcetam 1,500 mg Oral BID [facility]    lisinopril 10 mg Oral Daily [facility]    methylPREDNISolone sodium succinate 60 mg Intravenous Q6H [facility]    montelukast 10 mg Oral Daily [facility]    ondansetron 4 mg Intravenous Q6H PRN [facility]    pantoprazole 40 mg Oral Early Morning [facility]    polyethylene glycol 17 g Oral BID [facility]    potassium chloride 10 mEq Oral BID [facility]    sodium chloride 50 mL/hr Intravenous Continuous [facility] Last Rate: 50 mL/hr (01/18/20 1723)   zolpidem 5 mg Oral HS [facility]         Today, Patient Was Seen By: DEMAR Holt      ** Please Note: Dictation voice to text software may have been used in the creation of this document   ** Jainism beliefs

## 2021-09-15 NOTE — CHART NOTE - NSCHARTNOTEFT_GEN_A_CORE
Patient s/p left vats, pleural biopsy and pleurx cath placement.  Patient complaining of incisional pain,  refusing to use IV PCA.  Po pain medication ordered and IV PCA discontinued.  Incision with dressing clean and dry.  Pleurx cath to water seal, no air leak noted.  Patient tolerating po, voiding.    Vital Signs Last 24 Hrs  T(C): 36.8 (15 Sep 2021 17:58), Max: 37.1 (15 Sep 2021 09:41)  T(F): 98.2 (15 Sep 2021 17:58), Max: 98.8 (15 Sep 2021 09:41)  HR: 101 (15 Sep 2021 17:58) (99 - 113)  BP: 116/84 (15 Sep 2021 17:58) (116/84 - 142/90)  BP(mean): 84 (15 Sep 2021 16:45) (80 - 90)  RR: 31 (15 Sep 2021 17:58) (13 - 34)  SpO2: 97% (15 Sep 2021 17:58) (92% - 99%)    I&O's Detail    15 Sep 2021 07:01  -  15 Sep 2021 20:18  --------------------------------------------------------  IN:    Lactated Ringers: 120 mL    Oral Fluid: 30 mL  Total IN: 150 mL    OUT:    Chest Tube (mL): 175 mL    Voided (mL): 500 mL  Total OUT: 675 mL    Total NET: -525 mL      MEDICATIONS  (STANDING):  famotidine    Tablet 20 milliGRAM(s) Oral every 12 hours  heparin   Injectable 7500 Unit(s) SubCutaneous every 8 hours  lactated ringers. 1000 milliLiter(s) (30 mL/Hr) IV Continuous <Continuous>    A/P: S/P Left Vats, pleural biopsy and pleurx cath placement   Continue pleurx to water seal   Follow up CXR and labs in am   Continue po pain meds   Chest PT, ambulation and incentive spirometer Patient s/p Right vats, pleural biopsy and pleurx cath placement.  Patient complaining of incisional pain,  refusing to use IV PCA.  Po pain medication ordered and IV PCA discontinued.  Incision with dressing clean and dry.  Pleurx cath to water seal, no air leak noted.  Patient tolerating po, voiding.    Vital Signs Last 24 Hrs  T(C): 36.8 (15 Sep 2021 17:58), Max: 37.1 (15 Sep 2021 09:41)  T(F): 98.2 (15 Sep 2021 17:58), Max: 98.8 (15 Sep 2021 09:41)  HR: 101 (15 Sep 2021 17:58) (99 - 113)  BP: 116/84 (15 Sep 2021 17:58) (116/84 - 142/90)  BP(mean): 84 (15 Sep 2021 16:45) (80 - 90)  RR: 31 (15 Sep 2021 17:58) (13 - 34)  SpO2: 97% (15 Sep 2021 17:58) (92% - 99%)    I&O's Detail    15 Sep 2021 07:01  -  15 Sep 2021 20:18  --------------------------------------------------------  IN:    Lactated Ringers: 120 mL    Oral Fluid: 30 mL  Total IN: 150 mL    OUT:    Chest Tube (mL): 175 mL    Voided (mL): 500 mL  Total OUT: 675 mL    Total NET: -525 mL      MEDICATIONS  (STANDING):  famotidine    Tablet 20 milliGRAM(s) Oral every 12 hours  heparin   Injectable 7500 Unit(s) SubCutaneous every 8 hours  lactated ringers. 1000 milliLiter(s) (30 mL/Hr) IV Continuous <Continuous>    A/P: S/P Right Vats, pleural biopsy and pleurx cath placement   Continue pleurx to water seal   Follow up CXR and labs in am   Continue po pain meds   Chest PT, ambulation and incentive spirometer

## 2021-09-16 ENCOUNTER — TRANSCRIPTION ENCOUNTER (OUTPATIENT)
Age: 56
End: 2021-09-16

## 2021-09-16 VITALS
DIASTOLIC BLOOD PRESSURE: 81 MMHG | RESPIRATION RATE: 18 BRPM | TEMPERATURE: 98 F | OXYGEN SATURATION: 100 % | HEART RATE: 83 BPM | SYSTOLIC BLOOD PRESSURE: 133 MMHG

## 2021-09-16 LAB
ANION GAP SERPL CALC-SCNC: 14 MMOL/L — SIGNIFICANT CHANGE UP (ref 7–14)
BUN SERPL-MCNC: 13 MG/DL — SIGNIFICANT CHANGE UP (ref 7–23)
CALCIUM SERPL-MCNC: 9.7 MG/DL — SIGNIFICANT CHANGE UP (ref 8.4–10.5)
CHLORIDE SERPL-SCNC: 101 MMOL/L — SIGNIFICANT CHANGE UP (ref 98–107)
CO2 SERPL-SCNC: 23 MMOL/L — SIGNIFICANT CHANGE UP (ref 22–31)
COVID-19 SPIKE DOMAIN AB INTERP: POSITIVE
COVID-19 SPIKE DOMAIN ANTIBODY RESULT: >250 U/ML — HIGH
CREAT SERPL-MCNC: 1.18 MG/DL — SIGNIFICANT CHANGE UP (ref 0.5–1.3)
GLUCOSE SERPL-MCNC: 119 MG/DL — HIGH (ref 70–99)
HCT VFR BLD CALC: 35 % — SIGNIFICANT CHANGE UP (ref 34.5–45)
HGB BLD-MCNC: 11.3 G/DL — LOW (ref 11.5–15.5)
MAGNESIUM SERPL-MCNC: 2.2 MG/DL — SIGNIFICANT CHANGE UP (ref 1.6–2.6)
MCHC RBC-ENTMCNC: 26.5 PG — LOW (ref 27–34)
MCHC RBC-ENTMCNC: 32.3 GM/DL — SIGNIFICANT CHANGE UP (ref 32–36)
MCV RBC AUTO: 82.2 FL — SIGNIFICANT CHANGE UP (ref 80–100)
NRBC # BLD: 0 /100 WBCS — SIGNIFICANT CHANGE UP
NRBC # FLD: 0 K/UL — SIGNIFICANT CHANGE UP
PHOSPHATE SERPL-MCNC: 3.9 MG/DL — SIGNIFICANT CHANGE UP (ref 2.5–4.5)
PLATELET # BLD AUTO: 312 K/UL — SIGNIFICANT CHANGE UP (ref 150–400)
POTASSIUM SERPL-MCNC: 4.8 MMOL/L — SIGNIFICANT CHANGE UP (ref 3.5–5.3)
POTASSIUM SERPL-SCNC: 4.8 MMOL/L — SIGNIFICANT CHANGE UP (ref 3.5–5.3)
RBC # BLD: 4.26 M/UL — SIGNIFICANT CHANGE UP (ref 3.8–5.2)
RBC # FLD: 13.8 % — SIGNIFICANT CHANGE UP (ref 10.3–14.5)
SARS-COV-2 IGG+IGM SERPL QL IA: >250 U/ML — HIGH
SARS-COV-2 IGG+IGM SERPL QL IA: POSITIVE
SODIUM SERPL-SCNC: 138 MMOL/L — SIGNIFICANT CHANGE UP (ref 135–145)
WBC # BLD: 8.72 K/UL — SIGNIFICANT CHANGE UP (ref 3.8–10.5)
WBC # FLD AUTO: 8.72 K/UL — SIGNIFICANT CHANGE UP (ref 3.8–10.5)

## 2021-09-16 PROCEDURE — 71045 X-RAY EXAM CHEST 1 VIEW: CPT | Mod: 26

## 2021-09-16 RX ORDER — ACETAMINOPHEN 500 MG
2 TABLET ORAL
Qty: 0 | Refills: 0 | DISCHARGE
Start: 2021-09-16

## 2021-09-16 RX ORDER — TRAMADOL HYDROCHLORIDE 50 MG/1
1 TABLET ORAL
Qty: 42 | Refills: 0
Start: 2021-09-16 | End: 2021-09-22

## 2021-09-16 RX ORDER — TRAMADOL HYDROCHLORIDE 50 MG/1
25 TABLET ORAL EVERY 4 HOURS
Refills: 0 | Status: DISCONTINUED | OUTPATIENT
Start: 2021-09-16 | End: 2021-09-16

## 2021-09-16 RX ORDER — TRAMADOL HYDROCHLORIDE 50 MG/1
50 TABLET ORAL EVERY 4 HOURS
Refills: 0 | Status: DISCONTINUED | OUTPATIENT
Start: 2021-09-16 | End: 2021-09-16

## 2021-09-16 RX ORDER — IBUPROFEN 200 MG
1 TABLET ORAL
Qty: 0 | Refills: 0 | DISCHARGE
Start: 2021-09-16

## 2021-09-16 RX ORDER — LIDOCAINE 4 G/100G
1 CREAM TOPICAL DAILY
Refills: 0 | Status: DISCONTINUED | OUTPATIENT
Start: 2021-09-16 | End: 2021-09-16

## 2021-09-16 RX ORDER — IBUPROFEN 200 MG
400 TABLET ORAL EVERY 6 HOURS
Refills: 0 | Status: DISCONTINUED | OUTPATIENT
Start: 2021-09-16 | End: 2021-09-16

## 2021-09-16 RX ORDER — POLYETHYLENE GLYCOL 3350 17 G/17G
17 POWDER, FOR SOLUTION ORAL
Qty: 0 | Refills: 0 | DISCHARGE
Start: 2021-09-16

## 2021-09-16 RX ORDER — POLYETHYLENE GLYCOL 3350 17 G/17G
17 POWDER, FOR SOLUTION ORAL DAILY
Refills: 0 | Status: DISCONTINUED | OUTPATIENT
Start: 2021-09-16 | End: 2021-09-16

## 2021-09-16 RX ORDER — SENNA PLUS 8.6 MG/1
2 TABLET ORAL AT BEDTIME
Refills: 0 | Status: DISCONTINUED | OUTPATIENT
Start: 2021-09-16 | End: 2021-09-16

## 2021-09-16 RX ORDER — SENNA PLUS 8.6 MG/1
2 TABLET ORAL
Qty: 0 | Refills: 0 | DISCHARGE
Start: 2021-09-16

## 2021-09-16 RX ORDER — TRAMADOL HYDROCHLORIDE 50 MG/1
1 TABLET ORAL
Qty: 0 | Refills: 0 | DISCHARGE

## 2021-09-16 RX ADMIN — TRAMADOL HYDROCHLORIDE 50 MILLIGRAM(S): 50 TABLET ORAL at 06:07

## 2021-09-16 RX ADMIN — TRAMADOL HYDROCHLORIDE 50 MILLIGRAM(S): 50 TABLET ORAL at 00:17

## 2021-09-16 RX ADMIN — TRAMADOL HYDROCHLORIDE 50 MILLIGRAM(S): 50 TABLET ORAL at 00:47

## 2021-09-16 RX ADMIN — FAMOTIDINE 20 MILLIGRAM(S): 10 INJECTION INTRAVENOUS at 06:03

## 2021-09-16 RX ADMIN — Medication 400 MILLIGRAM(S): at 08:45

## 2021-09-16 RX ADMIN — TRAMADOL HYDROCHLORIDE 50 MILLIGRAM(S): 50 TABLET ORAL at 06:39

## 2021-09-16 RX ADMIN — LIDOCAINE 1 PATCH: 4 CREAM TOPICAL at 07:00

## 2021-09-16 RX ADMIN — Medication 400 MILLIGRAM(S): at 09:15

## 2021-09-16 RX ADMIN — TRAMADOL HYDROCHLORIDE 50 MILLIGRAM(S): 50 TABLET ORAL at 12:28

## 2021-09-16 RX ADMIN — HEPARIN SODIUM 7500 UNIT(S): 5000 INJECTION INTRAVENOUS; SUBCUTANEOUS at 06:03

## 2021-09-16 RX ADMIN — LIDOCAINE 1 PATCH: 4 CREAM TOPICAL at 06:10

## 2021-09-16 NOTE — DISCHARGE NOTE PROVIDER - NSDCMRMEDTOKEN_GEN_ALL_CORE_FT
traMADol 50 mg oral tablet: 1 tab(s) orally 2 times a day, As Needed   acetaminophen 325 mg oral tablet: 2 tab(s) orally every 6 hours, As needed, Mild Pain (1 - 3)  ibuprofen 400 mg oral tablet: 1 tab(s) orally every 6 hours, As Needed  polyethylene glycol 3350 oral powder for reconstitution: 17 gram(s) orally once a day  senna oral tablet: 2 tab(s) orally once a day (at bedtime)  traMADol 50 mg oral tablet: 1 tab(s) orally every 4 hours, As Needed moderate to severe pain MDD:6

## 2021-09-16 NOTE — DISCHARGE NOTE PROVIDER - NSRESEARCHGRANT_MLMHIDDEN_GEN_A_CORE
CVTS Daily Note  10/31/2017  Attending: Rubio Piña,*    S:   No overnight events. Patient continues to progress. Flank pain resolved, UA WNL. Ambulating independently. Appetite improving. +BM. UOP adequate. Pain well-controlled. Maintaining oxygen saturations on room air. No new chest pain, shortness of breath, nausea, calf pain.      O:   Vitals:    10/30/17 2336 10/31/17 0403 10/31/17 0600 10/31/17 0757   BP: 123/77 133/74  122/72   BP Location: Left arm Right arm  Right arm   Resp: 16 16 16   Temp: 98.5  F (36.9  C) 98.2  F (36.8  C)  98.1  F (36.7  C)   TempSrc: Oral Oral  Oral   SpO2: 94% 96%  93%   Weight:   92.6 kg (204 lb 1.6 oz)    Height:         Vitals:    10/29/17 0030 10/30/17 0105 10/31/17 0600   Weight: 94.8 kg (208 lb 14.4 oz) 93.2 kg (205 lb 8 oz) 92.6 kg (204 lb 1.6 oz)       Intake/Output Summary (Last 24 hours) at 10/31/17 1106  Last data filed at 10/31/17 0547   Gross per 24 hour   Intake              720 ml   Output             2290 ml   Net            -1570 ml       Gen: AAO x 3, pleasant, NAD  CV: RRR, S1S2 normal, no murmurs, rubs, or gallops.   Pulm: CTA, no rhonchi or wheezes. Limited effort.  Abd: Soft, non-distended, non-tender.   Ext: Peripheral edema, resolving  Incision:Clean, dry, intact, no erythema  Chest Tube sites: Right pleural tube to waterseal. Dressings clean and dry, serosanguinous, no air leak, output appropriate.     Labs:  [facility]    Recent Labs  Lab 10/31/17  0723 10/30/17  0727 10/29/17  0811 10/29/17  0655 10/28/17  0341 10/27/17  2008 10/27/17  1402    139 138 138 147*  --  147*   POTASSIUM 3.7 3.9 3.8 4.0 4.4 4.6 3.8   CHLORIDE 101 106 106 106 117*  --  114*   CO2 27 24 24 23 24  --  24   BUN 18 20 25 26 24  --  18   CR 0.46* 0.42* 0.47* 0.53 0.58  --  0.63   * 106* 124* 125* 132*  --  203*   MAG  --   --  2.1  --  2.4* 2.4* 2.6*   PHOS  --  2.5 2.4*  --  3.5  --  4.7*     CBC    Recent Labs  Lab 10/31/17  0723 10/30/17  0727 10/29/17  0811  10/28/17  0341   WBC 7.9 10.1 12.8* 12.2*   HGB 11.1* 10.8* 11.1* 12.0   * 79* 69* 75*     INR/PTT    Recent Labs  Lab 10/29/17  0811 10/27/17  1402 10/27/17  1235 10/27/17  0544   INR 1.21* 1.23* 1.29* 0.91   PTT  --  35 32  --      ABG    Recent Labs  Lab 10/28/17  0341 10/27/17  2008 10/27/17  1648 10/27/17  1402   PH 7.40 7.40 7.42 7.27*   PCO2 40 37 29* 51*   PO2 66* 66* 78* 81   HCO3 25 23 19* 23     LFTNo lab results found in last 7 days.    GLUCOSE:     Recent Labs  Lab 10/31/17  0755 10/31/17  0723 10/30/17  2249 10/30/17  1750 10/30/17  1200 10/30/17  0731 10/30/17  0727 10/29/17  2059  10/29/17  0811 10/29/17  0655  10/28/17  0341  10/27/17  1402   GLC  --  110*  --   --   --   --  106*  --   --  124* 125*  --  132*  --  203*   *  --  87 124* 113* 120*  --  122*  < >  --   --   < >  --   < >  --    < > = values in this interval not displayed.      Imaging:    Recent imaging reviewed.       A/P:   Georgie Patino is a 60 year old female who is status post repair of a 6.2 cm ascending aortic aneurysm on 10/27/17 by Dr Piña.     Neuro:   - PRN Tylenol, oxy, dilaudid for pain    CV:   - Aspirin  - PTA statin to begin today  - Normotensive currently, holding home HTN regimen    Pulm:   - Encourage pulmonary toilet  - Removed right pleural tube this morning without immediate complication. CXR to follow.     ID:   -  WBC WNL, no concern for infection.     GI / FEN:  - Regular diet  - Bowel regimen     Renal / :   - Replace lytes  - UA/UC yesterday WNL, flank pain resolved.     Heme:   - Hgb WNL, no bleeding concerns.    Endo:   - SSI    PPX:   - Protonix  - Ambulation    Dispo:   - 6C since 10/28. Likely discharge in next 1-2 days.       Patient discussed with staff.    ____  Jody Medrano PA-C  Transplant Surgery  Pager: [phone]     yes

## 2021-09-16 NOTE — DISCHARGE NOTE NURSING/CASE MANAGEMENT/SOCIAL WORK - PATIENT PORTAL LINK FT
You can access the FollowMyHealth Patient Portal offered by [facility] by registering at the following website: [email or web address]. By joining [facility]’s FollowMyHealth portal, you will also be able to view your health information using other applications (apps) compatible with our system.

## 2021-09-16 NOTE — HISTORY OF PRESENT ILLNESS
[FreeTextEntry1] : Ms. ALEXANDER HUDDLESTON, 56 year old female, never smoker, w/ hx of Uterine cancer dx 2020 s/p chemo s/p VINCENT/BSO, asthma, with recent hospitalization 8/30-9/2/21 for right chest pain found to have persistent pleural effusion now s/p IR drainage during that admission with path positive for metastatic adenocarcinoma.  \par \par CT Chest on 8/19/21:\par - Small right pleural effusion and atelectasis at the right base is not significantly changed from 8/10/2021, new from 9/29/2020. \par \par CXR 9/13/21:\par - Interval increase in partly loculated right pleural effusion -with associated right basilar atelectasis.\par \par Patient is here today for CT Sx consultation, referred by Dr. Mirtha Celaya (HEME/ONC). She endorses persistent SOB affecting her activities of daily living. Her brother accompanies her today and endorses she struggles to maintain a conversation over the phone secondary to her SOB. Patient denies cough, fever, chills.\par

## 2021-09-16 NOTE — DISCHARGE NOTE PROVIDER - NSDCFUADDAPPT_GEN_ALL_CORE_FT
Follow up with Dr. Peters in 7-10 days  Chest x-ray 1-2 days prior to appointment with Dr. Peters  Follow up with primary care provider in one week  Follow up with Dr. Peters in 7-10 days. Call to make an apt. [phone]  Chest x-ray 1-2 days prior to appointment with Dr. Peters  Follow up with primary care provider in 2-3 weeks  Follow up with your Oncologist in 1-2 weeks.

## 2021-09-16 NOTE — DISCHARGE NOTE PROVIDER - NSDCCPCAREPLAN_GEN_ALL_CORE_FT
PRINCIPAL DISCHARGE DIAGNOSIS  Diagnosis: Pleural effusion, left  Assessment and Plan of Treatment:        PRINCIPAL DISCHARGE DIAGNOSIS  Diagnosis: Pleural effusion, right  Assessment and Plan of Treatment:

## 2021-09-16 NOTE — DISCHARGE NOTE PROVIDER - HOSPITAL COURSE
56 year old female with PMH of Asthma, uterine cancer  s/p chemo 9/2020, presents with diagnosis of pleural effusion.  Patient was recently admitted to [facility] with right chest pain found to have right sided pleural effusion. Drain by IR. Patient reports that the right pleural effusion has been increasing recently.  Patient s/p left vats, pleural biopsy and pleurx cath placement on 9/15/21.  Post op course without complication, patient stable for discharge home with visiting nurse service.     56 year old female with PMH of Asthma, uterine cancer  s/p chemo 9/2020, presents with diagnosis of pleural effusion.  Patient was recently admitted to [facility] with right chest pain found to have right sided pleural effusion. Drain by IR. Patient reports that the right pleural effusion has been increasing recently.  Patient s/p right vats, pleural biopsy and pleurx cath placement on 9/15/21.  Post op course without complication, patient stable for discharge home with visiting nurse service.     56 year old female with PMH of Asthma, uterine cancer  s/p chemo 9/2020, presents with diagnosis of pleural effusion.  Patient was recently admitted to [facility] with right chest pain found to have right sided pleural effusion. Drain by IR. Patient reports that the right pleural effusion has been increasing recently.  Patient s/p right vats, pleural biopsy and pleurx cath placement on 9/15/21.  Post op course without complication,  Today, CXR stable. No air leak. Rt. Pleurx capped. VATS site c/d/i. pt amb ad edison in room. No CP or SOB. Preferring tramadol for pain. All VNS arranged for pleurx drainages.  Cleared for dc to home by Dr Martinez, covering for Dr. Peters  Vital Signs Last 24 Hrs  T(C): 36.9 (16 Sep 2021 08:25), Max: 37.2 (15 Sep 2021 21:30)  T(F): 98.5 (16 Sep 2021 08:25), Max: 98.9 (15 Sep 2021 21:30)  HR: 83 (16 Sep 2021 08:25) (77 - 113)  BP: 133/81 (16 Sep 2021 08:25) (111/78 - 137/71)  BP(mean): 84 (15 Sep 2021 16:45) (80 - 90)  RR: 18 (16 Sep 2021 08:25) (13 - 34)  SpO2: 100% (16 Sep 2021 08:25) (92% - 100%)

## 2021-09-16 NOTE — DISCHARGE NOTE PROVIDER - NSDCCPTREATMENT_GEN_ALL_CORE_FT
PRINCIPAL PROCEDURE  Procedure: Video-assisted thoracoscopic surgery (VATS) on left side  Findings and Treatment: Pleural biopsy, Pleurex cath placement       PRINCIPAL PROCEDURE  Procedure: VATS, with PleurX catheter system insertion, pleural cavity  Findings and Treatment:

## 2021-09-16 NOTE — CONSULT LETTER
[Dear  ___] : Dear  [unfilled], [Consult Letter:] : I had the pleasure of evaluating your patient, [unfilled]. [( Thank you for referring [unfilled] for consultation for _____ )] : Thank you for referring [unfilled] for consultation for [unfilled] [Please see my note below.] : Please see my note below. [Consult Closing:] : Thank you very much for allowing me to participate in the care of this patient.  If you have any questions, please do not hesitate to contact me. [Sincerely,] : Sincerely, [FreeTextEntry2] : Dr. Mirtha Celaya ([facility]/ref) [FreeTextEntry3] : Stevan Peters MD \par Attending Surgeon \par Division of Thoracic Surgery \par , Cardiovascular and Thoracic Surgery \par [facility] [employer] [facility]\par

## 2021-09-16 NOTE — DISCHARGE NOTE NURSING/CASE MANAGEMENT/SOCIAL WORK - NSDCFUADDAPPT_GEN_ALL_CORE_FT
Follow up with Dr. Peters in 7-10 days  Chest x-ray 1-2 days prior to appointment with Dr. Peters  Follow up with primary care provider in one week

## 2021-09-16 NOTE — ASSESSMENT
[FreeTextEntry1] : Ms. ALEXANDER HUDDLESTON, 56 year old female, never smoker, w/ hx of Uterine cancer dx 2020 s/p chemo s/p VINCENT/BSO, asthma, with recent hospitalization 8/30-9/2/21 for right chest pain found to have persistent pleural effusion now s/p IR drainage during that admission with path positive for metastatic adenocarcinoma.  \par \par CT Chest on 8/19/21:\par - Small right pleural effusion and atelectasis at the right base is not significantly changed from 8/10/2021, new from 9/29/2020. \par \par CXR 9/13/21:\par - Interval increase in partly loculated right pleural effusion -with associated right basilar atelectasis.\par \par I have independently reviewed the patient's medical records and diagnostic images at time of this office consultation and have made the following recommendation:\par 1. CXR reviewed with patient and brother; interval increase in pleural effusion. I recommended PleurX catheter placement. Risks, benefits, and alternatives explained to patient, all questions answered, patient agreed to proceed with surgery-booked for Wed 9/15/21.\par 2. She understand that if her SOB worsens she should go to ED immediately. \par \par I personally performed the services described in the documentation, reviewed the documentation recorded by the scribe in my presence and it accurately and completely records my words and actions.\par \par I, SHAHIDA Andres, am scribing for and in the presence of NAVNEET Mari, the following sections HISTORY OF PRESENT ILLNESS, PAST MEDICAL/FAMILY/SOCIAL HISTORY; REVIEW OF SYSTEMS; VITAL SIGNS; PHYSICAL EXAM; DISPOSITION.\par  \par \par  \par

## 2021-09-16 NOTE — PHYSICAL EXAM
[General Appearance - Alert] : alert [PERRL With Normal Accommodation] : pupils were equal in size, round, and reactive to light [Outer Ear] : the ears and nose were normal in appearance [Neck Appearance] : the appearance of the neck was normal [Decreased Breath Sounds] : decreased breath sounds [Heart Sounds] : normal S1 and S2 [Examination Of The Chest] : the chest was normal in appearance [2+] : left 2+ [Breast Appearance] : normal in appearance [Abdomen Soft] : soft [Cervical Lymph Nodes Enlarged Posterior Bilaterally] : posterior cervical [Cervical Lymph Nodes Enlarged Anterior Bilaterally] : anterior cervical [Supraclavicular Lymph Nodes Enlarged Bilaterally] : supraclavicular [No CVA Tenderness] : no ~M costovertebral angle tenderness [Abnormal Walk] : normal gait [Skin Turgor] : normal skin turgor [No Focal Deficits] : no focal deficits [Oriented To Time, Place, And Person] : oriented to person, place, and time [FreeTextEntry1] : labored

## 2021-09-16 NOTE — DISCHARGE NOTE PROVIDER - CARE PROVIDER_API CALL
Stevan Peters)  Surgery; Thoracic Surgery  [phone] [place] [address], [place] [address]  Phone: [phone]  Fax: [phone]  Follow Up Time:

## 2021-09-17 NOTE — BRIEF OPERATIVE NOTE - NSICDXBRIEFPROCEDURE_GEN_ALL_CORE_FT
PROCEDURES:  Drainage, pleural effusion 17-Sep-2021 08:31:44  Timothy Lopez  VATS, with pleural biopsy 17-Sep-2021 08:32:01  Timothy Lopez

## 2021-09-21 ENCOUNTER — RESULT REVIEW (OUTPATIENT)
Age: 56
End: 2021-09-21

## 2021-09-21 ENCOUNTER — NON-APPOINTMENT (OUTPATIENT)
Age: 56
End: 2021-09-21

## 2021-09-21 ENCOUNTER — APPOINTMENT (OUTPATIENT)
Dept: HEMATOLOGY ONCOLOGY | Facility: CLINIC | Age: 56
End: 2021-09-21
Payer: COMMERCIAL

## 2021-09-21 VITALS
DIASTOLIC BLOOD PRESSURE: 91 MMHG | SYSTOLIC BLOOD PRESSURE: 143 MMHG | OXYGEN SATURATION: 98 % | TEMPERATURE: 97.3 F | HEART RATE: 72 BPM | RESPIRATION RATE: 18 BRPM

## 2021-09-21 LAB
NON-GYNECOLOGICAL CYTOLOGY STUDY: SIGNIFICANT CHANGE UP
SURGICAL PATHOLOGY STUDY: SIGNIFICANT CHANGE UP

## 2021-09-21 PROCEDURE — 99214 OFFICE O/P EST MOD 30 MIN: CPT

## 2021-09-22 NOTE — REASON FOR VISIT
[Follow-Up Visit] : a follow-up [Family Member] : family member [FreeTextEntry2] : relapsed Uterine cancer

## 2021-09-22 NOTE — HISTORY OF PRESENT ILLNESS
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 Patient was seen initially by Dr. Arciniega at [facility].\par She then saw Dr Robin Zamorano, at [facility] and had a repeat biopsy that showed clear cell carcinoma.\par On 4/20/2020 she underwent surgical staging and is post TLH/BSO/SLND/Omentectomy.\par Final pathology showed stage IA,high grade endometrial carcinoma involving predominantly clear cell and focally serous features, no invasion, no LVSI.\par \par Ct C/A/P with contrast done on 3/12/2020 showed no metastatic sites.\par She completed five cycles of CT 6/3/20- 9/2/20) Last cycle held due to toxicity and myelosuppression.She received vaginal brachytherapy to a total of 2100 cGy in 3 fractions. Treatment was delivered from 10/16/20- 10/23/20. \par Scan showed CINDY.\par \par 8/10/21: Surveillance scan showed New partially visualized small right pleural effusion. Correlation with dedicated chest CT is recommended. Few subcentimeter hepatic hypo densities, too small to characterize, but similar in appearance to prior. Small pelvic ascites, similar in appearance to prior.\par - 29( previously 15)\par \par 8/30/21: CT chest: Limited. No pulmonary embolus to level of the lobar pulmonary arteries. Interval increase in small right pleural effusion since 8/19/2021 with associated adjacent compressive atelectasis.Small perihepatic ascites.\par \par Patient was admitted to the hospital with SOB and pleural effusion. She had  thoracentesis and cytology was consistent with relapsed uterine cancer.\par \par Final Diagnosis\par PLEURAL FLUID, RIGHT\par POSITIVE FOR MALIGNANT CELLS.\par Metastatic adenocarcinoma\par \par Cytology slides and cell block reveal a hypercellular specimen\par composed of crowded 3-dimensional groups and single lying\par malignant cells with enlarged nuclei containing prominent\par nucleoli and vacuolated cytoplasm, among benign mesothelial\par cells.\par \par Immunohistochemical stains are performed on the cell block. The\par tumor cells are positive for PAX-8 (focal and weak), p16, napsin\par (focal) and p53 (overexpression), while they are negative for ER,\par WT-1 and TTF-1. In the absence of another primary, it is most\par consistent with involvement by patient's known endometrial\par adenocarcinoma. [de-identified] : Carboplatin and Taxol 6/3/20- 9/2/20( five cycles) [de-identified] : Patient here to sign consent for \par She is s/p Right pleurex catheter placement

## 2021-09-24 ENCOUNTER — OUTPATIENT (OUTPATIENT)
Dept: OUTPATIENT SERVICES | Facility: HOSPITAL | Age: 56
LOS: 1 days | End: 2021-09-24
Payer: COMMERCIAL

## 2021-09-24 ENCOUNTER — APPOINTMENT (OUTPATIENT)
Dept: CT IMAGING | Facility: IMAGING CENTER | Age: 56
End: 2021-09-24
Payer: COMMERCIAL

## 2021-09-24 DIAGNOSIS — C55 MALIGNANT NEOPLASM OF UTERUS, PART UNSPECIFIED: ICD-10-CM

## 2021-09-24 DIAGNOSIS — Z00.8 ENCOUNTER FOR OTHER GENERAL EXAMINATION: ICD-10-CM

## 2021-09-24 DIAGNOSIS — Z90.710 ACQUIRED ABSENCE OF BOTH CERVIX AND UTERUS: Chronic | ICD-10-CM

## 2021-09-24 PROCEDURE — 74177 CT ABD & PELVIS W/CONTRAST: CPT

## 2021-09-24 PROCEDURE — 71260 CT THORAX DX C+: CPT | Mod: 26

## 2021-09-24 PROCEDURE — 74177 CT ABD & PELVIS W/CONTRAST: CPT | Mod: 26

## 2021-09-24 PROCEDURE — 71260 CT THORAX DX C+: CPT

## 2021-09-27 ENCOUNTER — RESULT REVIEW (OUTPATIENT)
Age: 56
End: 2021-09-27

## 2021-09-27 ENCOUNTER — OUTPATIENT (OUTPATIENT)
Dept: OUTPATIENT SERVICES | Facility: HOSPITAL | Age: 56
LOS: 1 days | End: 2021-09-27
Payer: COMMERCIAL

## 2021-09-27 ENCOUNTER — APPOINTMENT (OUTPATIENT)
Dept: RADIOLOGY | Facility: HOSPITAL | Age: 56
End: 2021-09-27

## 2021-09-27 ENCOUNTER — APPOINTMENT (OUTPATIENT)
Dept: THORACIC SURGERY | Facility: CLINIC | Age: 56
End: 2021-09-27
Payer: COMMERCIAL

## 2021-09-27 VITALS
OXYGEN SATURATION: 98 % | HEART RATE: 101 BPM | TEMPERATURE: 98.8 F | HEIGHT: 65 IN | RESPIRATION RATE: 18 BRPM | DIASTOLIC BLOOD PRESSURE: 103 MMHG | SYSTOLIC BLOOD PRESSURE: 144 MMHG

## 2021-09-27 DIAGNOSIS — Z90.710 ACQUIRED ABSENCE OF BOTH CERVIX AND UTERUS: Chronic | ICD-10-CM

## 2021-09-27 DIAGNOSIS — C55 MALIGNANT NEOPLASM OF UTERUS, PART UNSPECIFIED: ICD-10-CM

## 2021-09-27 DIAGNOSIS — J90 PLEURAL EFFUSION, NOT ELSEWHERE CLASSIFIED: ICD-10-CM

## 2021-09-27 PROCEDURE — 99024 POSTOP FOLLOW-UP VISIT: CPT

## 2021-09-27 PROCEDURE — 71046 X-RAY EXAM CHEST 2 VIEWS: CPT | Mod: 26

## 2021-10-01 NOTE — CONSULT LETTER
[Dear  ___] : Dear  [unfilled], [Consult Letter:] : I had the pleasure of evaluating your patient, [unfilled]. [( Thank you for referring [unfilled] for consultation for _____ )] : Thank you for referring [unfilled] for consultation for [unfilled] [Please see my note below.] : Please see my note below. [Consult Closing:] : Thank you very much for allowing me to participate in the care of this patient.  If you have any questions, please do not hesitate to contact me. [Sincerely,] : Sincerely, [FreeTextEntry2] : Dr. Mirtha Celaya ([facility]/ref) \par  [FreeTextEntry3] : Stevan Peters MD \par Attending Surgeon \par Division of Thoracic Surgery \par , Cardiovascular and Thoracic Surgery \par [facility] [employer] [facility] [place]\par \par

## 2021-10-01 NOTE — ASSESSMENT
[FreeTextEntry1] : Ms. ALEXANDER HUDDLESTON, 56 year old female, never smoker, w/ hx of Uterine cancer dx 2020 s/p chemo s/p VINCENT/BSO, asthma, with recent hospitalization 8/30-9/2/21 for right chest pain found to have persistent pleural effusion now s/p IR drainage during that admission with path positive for metastatic adenocarcinoma. \par \par Patient is s/p Right VATS, pleural bx and Pleurx catheter placement on 09/15/2021. 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 \par \par I have reviewed the patient's medical records and diagnostic images at time of this office consultation and have made the following recommendation:\par 1. Continue to drain PleurX, if less than 250mL, then decrease frequency to twice a week.\par 2. F/U with Hem/Onc\par 3. RTC in 4wks with CXR.\par \par \par I personally performed the services described in the documentation, reviewed the documentation recorded by the scribe in my presence and it accurately and completely records my words and actions.\par \par I, Jose R Jaffe NP, am scribing for and the presence of NAVNEET Mari, the following sections HISTORY OF PRESENT ILLNESS, PAST MEDICAL/FAMILY/SOCIAL HISTORY; REVIEW OF SYSTEMS; VITAL SIGNS; PHYSICAL EXAM; DISPOSITION.\par \par \par \par

## 2021-10-01 NOTE — HISTORY OF PRESENT ILLNESS
[FreeTextEntry1] : Ms. ALEXANDER HUDDLESTON, 56 year old female, never smoker, w/ hx of Uterine cancer dx 2020 s/p chemo s/p VINCENT/BSO, asthma, with recent hospitalization 8/30-9/2/21 for right chest pain found to have persistent pleural effusion now s/p IR drainage during that admission with path positive for metastatic adenocarcinoma. \par \par Patient is s/p Right VATS, pleural bx and Pleurx catheter placement on 09/15/2021. 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 \par \par Patient was discharged on 09/16/2021 and instructed to drained 3x/week, as follows: 150mL on 9/20, 100mL on 9/22, 250mL on 9/25.\par \par CXR today: Rt sided pleural effusion.\par \par Patient is here today for a follow up. Admits to mild SOB, mild cough, and pain and discomfort to PleurX site, takes Tramadol prior to PleurX drainage, other times she takes Tylenol with good relief. patient will start chemo and clinical trial with Dr. Mirtha Celaya.\par \par \par

## 2021-10-02 LAB
CULTURE RESULTS: SIGNIFICANT CHANGE UP
SPECIMEN SOURCE: SIGNIFICANT CHANGE UP

## 2021-10-08 ENCOUNTER — NON-APPOINTMENT (OUTPATIENT)
Age: 56
End: 2021-10-08

## 2021-10-08 DIAGNOSIS — R06.02 SHORTNESS OF BREATH: ICD-10-CM

## 2021-10-15 ENCOUNTER — OUTPATIENT (OUTPATIENT)
Dept: OUTPATIENT SERVICES | Facility: HOSPITAL | Age: 56
LOS: 1 days | Discharge: HOME | End: 2021-10-15
Payer: COMMERCIAL

## 2021-10-15 DIAGNOSIS — C54.1 MALIGNANT NEOPLASM OF ENDOMETRIUM: ICD-10-CM

## 2021-10-15 DIAGNOSIS — Z90.710 ACQUIRED ABSENCE OF BOTH CERVIX AND UTERUS: Chronic | ICD-10-CM

## 2021-10-18 ENCOUNTER — APPOINTMENT (OUTPATIENT)
Dept: HEMATOLOGY ONCOLOGY | Facility: CLINIC | Age: 56
End: 2021-10-18
Payer: COMMERCIAL

## 2021-10-18 ENCOUNTER — RESULT REVIEW (OUTPATIENT)
Age: 56
End: 2021-10-18

## 2021-10-18 VITALS
DIASTOLIC BLOOD PRESSURE: 90 MMHG | OXYGEN SATURATION: 97 % | BODY MASS INDEX: 38.38 KG/M2 | WEIGHT: 230.38 LBS | HEIGHT: 65 IN | HEART RATE: 108 BPM | TEMPERATURE: 97.2 F | RESPIRATION RATE: 18 BRPM | SYSTOLIC BLOOD PRESSURE: 129 MMHG

## 2021-10-18 LAB
ALBUMIN SERPL ELPH-MCNC: 3.9 G/DL
ALP BLD-CCNC: 198 U/L
ALT SERPL-CCNC: 18 U/L
ANION GAP SERPL CALC-SCNC: 15 MMOL/L
AST SERPL-CCNC: 13 U/L
BASOPHILS # BLD AUTO: 0.02 K/UL — SIGNIFICANT CHANGE UP (ref 0–0.2)
BASOPHILS NFR BLD AUTO: 0.3 % — SIGNIFICANT CHANGE UP (ref 0–2)
BILIRUB SERPL-MCNC: 0.2 MG/DL
BUN SERPL-MCNC: 11 MG/DL
CALCIUM SERPL-MCNC: 9.7 MG/DL
CANCER AG125 SERPL-ACNC: 629 U/ML
CHLORIDE SERPL-SCNC: 102 MMOL/L
CO2 SERPL-SCNC: 23 MMOL/L
CREAT SERPL-MCNC: 1.16 MG/DL
EOSINOPHIL # BLD AUTO: 0.21 K/UL — SIGNIFICANT CHANGE UP (ref 0–0.5)
EOSINOPHIL NFR BLD AUTO: 2.9 % — SIGNIFICANT CHANGE UP (ref 0–6)
GLUCOSE SERPL-MCNC: 97 MG/DL
HBV CORE IGG+IGM SER QL: NONREACTIVE
HBV SURFACE AG SER QL: NONREACTIVE
HCT VFR BLD CALC: 33.8 % — LOW (ref 34.5–45)
HCV AB SER QL: NONREACTIVE
HCV S/CO RATIO: 0.12 S/CO
HGB BLD-MCNC: 10.7 G/DL — LOW (ref 11.5–15.5)
IMM GRANULOCYTES NFR BLD AUTO: 0.4 % — SIGNIFICANT CHANGE UP (ref 0–1.5)
LYMPHOCYTES # BLD AUTO: 1.29 K/UL — SIGNIFICANT CHANGE UP (ref 1–3.3)
LYMPHOCYTES # BLD AUTO: 17.8 % — SIGNIFICANT CHANGE UP (ref 13–44)
MCHC RBC-ENTMCNC: 24.9 PG — LOW (ref 27–34)
MCHC RBC-ENTMCNC: 31.7 G/DL — LOW (ref 32–36)
MCV RBC AUTO: 78.6 FL — LOW (ref 80–100)
MONOCYTES # BLD AUTO: 0.85 K/UL — SIGNIFICANT CHANGE UP (ref 0–0.9)
MONOCYTES NFR BLD AUTO: 11.7 % — SIGNIFICANT CHANGE UP (ref 2–14)
NEUTROPHILS # BLD AUTO: 4.86 K/UL — SIGNIFICANT CHANGE UP (ref 1.8–7.4)
NEUTROPHILS NFR BLD AUTO: 66.9 % — SIGNIFICANT CHANGE UP (ref 43–77)
NRBC # BLD: 0 /100 WBCS — SIGNIFICANT CHANGE UP (ref 0–0)
PLATELET # BLD AUTO: 367 K/UL — SIGNIFICANT CHANGE UP (ref 150–400)
POTASSIUM SERPL-SCNC: 4.6 MMOL/L
PROT SERPL-MCNC: 7.7 G/DL
RBC # BLD: 4.3 M/UL — SIGNIFICANT CHANGE UP (ref 3.8–5.2)
RBC # FLD: 14.6 % — HIGH (ref 10.3–14.5)
SODIUM SERPL-SCNC: 140 MMOL/L
T4 FREE SERPL-MCNC: 1.3 NG/DL
TSH SERPL-ACNC: 3.21 UIU/ML
WBC # BLD: 7.26 K/UL — SIGNIFICANT CHANGE UP (ref 3.8–10.5)
WBC # FLD AUTO: 7.26 K/UL — SIGNIFICANT CHANGE UP (ref 3.8–10.5)

## 2021-10-18 PROCEDURE — 99214 OFFICE O/P EST MOD 30 MIN: CPT

## 2021-10-18 PROCEDURE — 93010 ELECTROCARDIOGRAM REPORT: CPT | Mod: 76

## 2021-10-18 NOTE — HISTORY OF PRESENT ILLNESS
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 Patient was seen initially by Dr. Arciniega at [facility].\par She then saw Dr Robin Zamorano, at [facility] and had a repeat biopsy that showed clear cell carcinoma.\par On 4/20/2020 she underwent surgical staging and is post TLH/BSO/SLND/Omentectomy.\par Final pathology showed stage IA,high grade endometrial carcinoma involving predominantly clear cell and focally serous features, no invasion, no LVSI.\par \par Ct C/A/P with contrast done on 3/12/2020 showed no metastatic sites.\par She completed five cycles of CT 6/3/20- 9/2/20) Last cycle held due to toxicity and myelosuppression.She received vaginal brachytherapy to a total of 2100 cGy in 3 fractions. Treatment was delivered from 10/16/20- 10/23/20. \par Scan showed CINDY.\par \par 8/10/21: Surveillance scan showed New partially visualized small right pleural effusion. Correlation with dedicated chest CT is recommended. Few subcentimeter hepatic hypo densities, too small to characterize, but similar in appearance to prior. Small pelvic ascites, similar in appearance to prior.\par - 29( previously 15)\par \par 8/30/21: CT chest: Limited. No pulmonary embolus to level of the lobar pulmonary arteries. Interval increase in small right pleural effusion since 8/19/2021 with associated adjacent compressive atelectasis.Small perihepatic ascites.\par \par Patient was admitted to the hospital with SOB and pleural effusion. She had  thoracentesis and cytology was consistent with relapsed uterine cancer.\par \par Final Diagnosis\par PLEURAL FLUID, RIGHT\par POSITIVE FOR MALIGNANT CELLS.\par Metastatic adenocarcinoma\par \par Cytology slides and cell block reveal a hypercellular specimen\par composed of crowded 3-dimensional groups and single lying\par malignant cells with enlarged nuclei containing prominent\par nucleoli and vacuolated cytoplasm, among benign mesothelial\par cells.\par \par Immunohistochemical stains are performed on the cell block. The\par tumor cells are positive for PAX-8 (focal and weak), p16, napsin\par (focal) and p53 (overexpression), while they are negative for ER,\par WT-1 and TTF-1. In the absence of another primary, it is most\par consistent with involvement by patient's known endometrial\par adenocarcinoma. [de-identified] : Carboplatin and Taxol 6/3/20- 9/2/20( five cycles) [de-identified] : Patient has some increase in cough.\par She takes Tramadol for pain , which she needs prior to the pleural drainage.\par She denies any bowel or bladder issue. \par She has decreased appetite and has lost few pounds since the last visit.\par \par Patient does not have a prior or concurrent malignancy whose natural history or treatment has the potential to interfere with the safety or efficacy assessment of the investigational regimen.\par Patient has not had any prior treatment with anti-PD-1, antil-PD-L1 or anti-CTLA-4 therapeutic antibodies or other similar agents.\par Patient has not had a history of severe hypersensitivity reaction to monoclonal antibody or pembrolizumab ([id number]) and/or its excipients.\par Patient is not currently participating or receiving cancer-directed study therapy.\par Patient does not have a diagnosis of immunodeficiency or is receiving systematic steroid therapy or any other form of immunosuppressive therapy.\par Patient had not been treated for brain metastases or has any active autoimmune disease or history of autoimmune disease that might recur.\par Patient does not have a history of (non-infectious) pneumonia that required steroids, or current pneumonitis.\par Patient does not have uncontrolled intercurrent illness including but not limited to: ongoing or active infection (except for uncomplicated urinary tract infection), interstitial lung disease or active, non-infectious pneumonitis, symptomatic congestive heart failure, unstable angina pectoris, cardiac arrhythmia, or psychiatric illness/social situations that would limit compliance with study requirements.\par Patient does not have any known clinically significant liver disease, including active viral, alcoholic, or other hepatitis and cirrhosis.\par

## 2021-10-19 ENCOUNTER — APPOINTMENT (OUTPATIENT)
Dept: INFUSION THERAPY | Facility: HOSPITAL | Age: 56
End: 2021-10-19

## 2021-10-22 ENCOUNTER — NON-APPOINTMENT (OUTPATIENT)
Age: 56
End: 2021-10-22

## 2021-10-22 ENCOUNTER — RESULT REVIEW (OUTPATIENT)
Age: 56
End: 2021-10-22

## 2021-10-22 ENCOUNTER — APPOINTMENT (OUTPATIENT)
Dept: INFUSION THERAPY | Facility: HOSPITAL | Age: 56
End: 2021-10-22

## 2021-10-22 LAB
BASOPHILS # BLD AUTO: 0.02 K/UL — SIGNIFICANT CHANGE UP (ref 0–0.2)
BASOPHILS NFR BLD AUTO: 0.3 % — SIGNIFICANT CHANGE UP (ref 0–2)
EOSINOPHIL # BLD AUTO: 0.15 K/UL — SIGNIFICANT CHANGE UP (ref 0–0.5)
EOSINOPHIL NFR BLD AUTO: 2.1 % — SIGNIFICANT CHANGE UP (ref 0–6)
HCT VFR BLD CALC: 32.3 % — LOW (ref 34.5–45)
HGB BLD-MCNC: 10.5 G/DL — LOW (ref 11.5–15.5)
IMM GRANULOCYTES NFR BLD AUTO: 0.4 % — SIGNIFICANT CHANGE UP (ref 0–1.5)
LYMPHOCYTES # BLD AUTO: 0.88 K/UL — LOW (ref 1–3.3)
LYMPHOCYTES # BLD AUTO: 12.3 % — LOW (ref 13–44)
MCHC RBC-ENTMCNC: 25.1 PG — LOW (ref 27–34)
MCHC RBC-ENTMCNC: 32.5 G/DL — SIGNIFICANT CHANGE UP (ref 32–36)
MCV RBC AUTO: 77.3 FL — LOW (ref 80–100)
MONOCYTES # BLD AUTO: 0.17 K/UL — SIGNIFICANT CHANGE UP (ref 0–0.9)
MONOCYTES NFR BLD AUTO: 2.4 % — SIGNIFICANT CHANGE UP (ref 2–14)
NEUTROPHILS # BLD AUTO: 5.91 K/UL — SIGNIFICANT CHANGE UP (ref 1.8–7.4)
NEUTROPHILS NFR BLD AUTO: 82.5 % — HIGH (ref 43–77)
NRBC # BLD: 0 /100 WBCS — SIGNIFICANT CHANGE UP (ref 0–0)
PLATELET # BLD AUTO: 333 K/UL — SIGNIFICANT CHANGE UP (ref 150–400)
RBC # BLD: 4.18 M/UL — SIGNIFICANT CHANGE UP (ref 3.8–5.2)
RBC # FLD: 14.8 % — HIGH (ref 10.3–14.5)
WBC # BLD: 7.16 K/UL — SIGNIFICANT CHANGE UP (ref 3.8–10.5)
WBC # FLD AUTO: 7.16 K/UL — SIGNIFICANT CHANGE UP (ref 3.8–10.5)

## 2021-10-25 ENCOUNTER — NON-APPOINTMENT (OUTPATIENT)
Age: 56
End: 2021-10-25

## 2021-10-25 ENCOUNTER — APPOINTMENT (OUTPATIENT)
Dept: THORACIC SURGERY | Facility: CLINIC | Age: 56
End: 2021-10-25

## 2021-10-25 DIAGNOSIS — Z51.11 ENCOUNTER FOR ANTINEOPLASTIC CHEMOTHERAPY: ICD-10-CM

## 2021-10-25 DIAGNOSIS — R11.2 NAUSEA WITH VOMITING, UNSPECIFIED: ICD-10-CM

## 2021-10-27 ENCOUNTER — NON-APPOINTMENT (OUTPATIENT)
Age: 56
End: 2021-10-27

## 2021-11-10 ENCOUNTER — NON-APPOINTMENT (OUTPATIENT)
Age: 56
End: 2021-11-10

## 2021-11-10 ENCOUNTER — RESULT REVIEW (OUTPATIENT)
Age: 56
End: 2021-11-10

## 2021-11-10 ENCOUNTER — APPOINTMENT (OUTPATIENT)
Dept: HEMATOLOGY ONCOLOGY | Facility: CLINIC | Age: 56
End: 2021-11-10
Payer: COMMERCIAL

## 2021-11-10 ENCOUNTER — APPOINTMENT (OUTPATIENT)
Dept: INFUSION THERAPY | Facility: HOSPITAL | Age: 56
End: 2021-11-10

## 2021-11-10 ENCOUNTER — LABORATORY RESULT (OUTPATIENT)
Age: 56
End: 2021-11-10

## 2021-11-10 VITALS
BODY MASS INDEX: 38.2 KG/M2 | HEART RATE: 93 BPM | WEIGHT: 229.28 LBS | TEMPERATURE: 97.3 F | HEIGHT: 65 IN | DIASTOLIC BLOOD PRESSURE: 86 MMHG | OXYGEN SATURATION: 96 % | SYSTOLIC BLOOD PRESSURE: 128 MMHG | RESPIRATION RATE: 18 BRPM

## 2021-11-10 LAB
BASOPHILS # BLD AUTO: 0.03 K/UL — SIGNIFICANT CHANGE UP (ref 0–0.2)
BASOPHILS NFR BLD AUTO: 0.6 % — SIGNIFICANT CHANGE UP (ref 0–2)
EOSINOPHIL # BLD AUTO: 0.05 K/UL — SIGNIFICANT CHANGE UP (ref 0–0.5)
EOSINOPHIL NFR BLD AUTO: 0.9 % — SIGNIFICANT CHANGE UP (ref 0–6)
HCT VFR BLD CALC: 29 % — LOW (ref 34.5–45)
HGB BLD-MCNC: 9.1 G/DL — LOW (ref 11.5–15.5)
IMM GRANULOCYTES NFR BLD AUTO: 0.7 % — SIGNIFICANT CHANGE UP (ref 0–1.5)
LYMPHOCYTES # BLD AUTO: 1.63 K/UL — SIGNIFICANT CHANGE UP (ref 1–3.3)
LYMPHOCYTES # BLD AUTO: 30.5 % — SIGNIFICANT CHANGE UP (ref 13–44)
MCHC RBC-ENTMCNC: 24.9 PG — LOW (ref 27–34)
MCHC RBC-ENTMCNC: 31.4 G/DL — LOW (ref 32–36)
MCV RBC AUTO: 79.2 FL — LOW (ref 80–100)
MONOCYTES # BLD AUTO: 0.67 K/UL — SIGNIFICANT CHANGE UP (ref 0–0.9)
MONOCYTES NFR BLD AUTO: 12.5 % — SIGNIFICANT CHANGE UP (ref 2–14)
NEUTROPHILS # BLD AUTO: 2.93 K/UL — SIGNIFICANT CHANGE UP (ref 1.8–7.4)
NEUTROPHILS NFR BLD AUTO: 54.8 % — SIGNIFICANT CHANGE UP (ref 43–77)
NRBC # BLD: 0 /100 WBCS — SIGNIFICANT CHANGE UP (ref 0–0)
PLATELET # BLD AUTO: 255 K/UL — SIGNIFICANT CHANGE UP (ref 150–400)
RBC # BLD: 3.66 M/UL — LOW (ref 3.8–5.2)
RBC # FLD: 16.4 % — HIGH (ref 10.3–14.5)
WBC # BLD: 5.35 K/UL — SIGNIFICANT CHANGE UP (ref 3.8–10.5)
WBC # FLD AUTO: 5.35 K/UL — SIGNIFICANT CHANGE UP (ref 3.8–10.5)

## 2021-11-10 PROCEDURE — 99214 OFFICE O/P EST MOD 30 MIN: CPT

## 2021-11-11 ENCOUNTER — APPOINTMENT (OUTPATIENT)
Dept: HEMATOLOGY ONCOLOGY | Facility: CLINIC | Age: 56
End: 2021-11-11

## 2021-11-11 ENCOUNTER — APPOINTMENT (OUTPATIENT)
Dept: INFUSION THERAPY | Facility: HOSPITAL | Age: 56
End: 2021-11-11

## 2021-11-11 NOTE — HISTORY OF PRESENT ILLNESS
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 Patient was seen initially by Dr. Arciniega at [facility].\par She then saw Dr Robin Zamorano, at [facility] and had a repeat biopsy that showed clear cell carcinoma.\par On 4/20/2020 she underwent surgical staging and is post TLH/BSO/SLND/Omentectomy.\par Final pathology showed stage IA,high grade endometrial carcinoma involving predominantly clear cell and focally serous features, no invasion, no LVSI.\par \par Ct C/A/P with contrast done on 3/12/2020 showed no metastatic sites.\par She completed five cycles of CT 6/3/20- 9/2/20) Last cycle held due to toxicity and myelosuppression.She received vaginal brachytherapy to a total of 2100 cGy in 3 fractions. Treatment was delivered from 10/16/20- 10/23/20. \par Scan showed CINDY.\par \par 8/10/21: Surveillance scan showed New partially visualized small right pleural effusion. Correlation with dedicated chest CT is recommended. Few subcentimeter hepatic hypo densities, too small to characterize, but similar in appearance to prior. Small pelvic ascites, similar in appearance to prior.\par - 29( previously 15)\par \par 8/30/21: CT chest: Limited. No pulmonary embolus to level of the lobar pulmonary arteries. Interval increase in small right pleural effusion since 8/19/2021 with associated adjacent compressive atelectasis.Small perihepatic ascites.\par \par Patient was admitted to the hospital with SOB and pleural effusion. She had  thoracentesis and cytology was consistent with relapsed uterine cancer.\par \par Final Diagnosis\par PLEURAL FLUID, RIGHT\par POSITIVE FOR MALIGNANT CELLS.\par Metastatic adenocarcinoma\par \par Cytology slides and cell block reveal a hypercellular specimen\par composed of crowded 3-dimensional groups and single lying\par malignant cells with enlarged nuclei containing prominent\par nucleoli and vacuolated cytoplasm, among benign mesothelial\par cells.\par \par Immunohistochemical stains are performed on the cell block. The\par tumor cells are positive for PAX-8 (focal and weak), p16, napsin\par (focal) and p53 (overexpression), while they are negative for ER,\par WT-1 and TTF-1. In the absence of another primary, it is most\par consistent with involvement by patient's known endometrial\par adenocarcinoma. [de-identified] : Carboplatin and Taxol 6/3/20- 9/2/20( five cycles) [FreeTextEntry1] : NRG -  [de-identified] : Patient is here for follow up prior to cycle#2 tomorrow.  Her main complaint is cough which has been persistent and has not gotten much relief from any cough medicine.  The right pleurex catheter is draining, now being drained twice per week., yesterday was 75 cc.  She had b/l lower extremity pain from knees to feet starting the Sunday after chemo and lasted for about 3 days.  She took Tylenol without much relief. She does not like taking strong pain medications because she does not like how the make her feel ("drowsy").  Appetite was decreased but is better, has maintained her weight since last visit.  She had constipation, needed laxatives for relief.  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

## 2021-11-11 NOTE — ASSESSMENT
[Palliative Care Plan] : not applicable at this time [FreeTextEntry1] : 56 year old woman with relapsed uterine cancer on clinical trial  with Carboplatin, Taxol, and Pembro vs. placebo.\par Continue current regimen, Ok to proceed with cycle #2.\par Arthalgias - patient reassurred that pain MAY be less but to keep Tylenol on hand if pain returns this cycle.  She is to call office if pain is not well controlled. continue to monitor for any increased neuropathy.\par Constipation - reviewed bowel regimen.\par Cough - continue cough medicines as needed, f/up with Dr. Peters as scheduled later this month.  If cough/SOB increases to call office sooner.\par RTC 3 weeks.\par \par

## 2021-11-12 ENCOUNTER — NON-APPOINTMENT (OUTPATIENT)
Age: 56
End: 2021-11-12

## 2021-11-18 ENCOUNTER — NON-APPOINTMENT (OUTPATIENT)
Age: 56
End: 2021-11-18

## 2021-11-22 ENCOUNTER — APPOINTMENT (OUTPATIENT)
Dept: THORACIC SURGERY | Facility: CLINIC | Age: 56
End: 2021-11-22
Payer: COMMERCIAL

## 2021-11-22 ENCOUNTER — RESULT REVIEW (OUTPATIENT)
Age: 56
End: 2021-11-22

## 2021-11-22 ENCOUNTER — APPOINTMENT (OUTPATIENT)
Dept: RADIOLOGY | Facility: HOSPITAL | Age: 56
End: 2021-11-22

## 2021-11-22 ENCOUNTER — OUTPATIENT (OUTPATIENT)
Dept: OUTPATIENT SERVICES | Facility: HOSPITAL | Age: 56
LOS: 1 days | End: 2021-11-22
Payer: COMMERCIAL

## 2021-11-22 VITALS
RESPIRATION RATE: 18 BRPM | TEMPERATURE: 98.8 F | DIASTOLIC BLOOD PRESSURE: 86 MMHG | HEIGHT: 65 IN | HEART RATE: 107 BPM | OXYGEN SATURATION: 96 % | SYSTOLIC BLOOD PRESSURE: 128 MMHG

## 2021-11-22 DIAGNOSIS — J91.0 MALIGNANT PLEURAL EFFUSION: ICD-10-CM

## 2021-11-22 DIAGNOSIS — Z90.710 ACQUIRED ABSENCE OF BOTH CERVIX AND UTERUS: Chronic | ICD-10-CM

## 2021-11-22 PROCEDURE — 71046 X-RAY EXAM CHEST 2 VIEWS: CPT | Mod: 26

## 2021-11-22 PROCEDURE — 99024 POSTOP FOLLOW-UP VISIT: CPT

## 2021-11-22 NOTE — PHYSICAL EXAM
[Respiration, Rhythm And Depth] : normal respiratory rhythm and effort [Exaggerated Use Of Accessory Muscles For Inspiration] : no accessory muscle use [Auscultation Breath Sounds / Voice Sounds] : lungs were clear to auscultation bilaterally [Heart Rate And Rhythm] : heart rate was normal and rhythm regular [Examination Of The Chest] : the chest was normal in appearance [Chest Visual Inspection Thoracic Asymmetry] : no chest asymmetry [Diminished Respiratory Excursion] : normal chest expansion [2+] : left 2+ [Breast Appearance] : normal in appearance [Breast Palpation Mass] : no palpable masses [Bowel Sounds] : normal bowel sounds [Abdomen Soft] : soft [Abdomen Tenderness] : non-tender [No CVA Tenderness] : no ~M costovertebral angle tenderness [No Spinal Tenderness] : no spinal tenderness [Abnormal Walk] : normal gait [Nail Clubbing] : no clubbing  or cyanosis of the fingernails [Musculoskeletal - Swelling] : no joint swelling seen [Skin Color & Pigmentation] : normal skin color and pigmentation [Skin Turgor] : normal skin turgor [] : no rash [Oriented To Time, Place, And Person] : oriented to person, place, and time

## 2021-11-24 NOTE — CONSULT LETTER
[FreeTextEntry2] : Dr. Mirtha Celaya ([facility]/ref) \par  [FreeTextEntry3] : Stevan Peters MD \par Attending Surgeon \par Division of Thoracic Surgery \par , Cardiovascular and Thoracic Surgery \par [facility] [employer] [facility] at [facility]\par \par

## 2021-11-24 NOTE — HISTORY OF PRESENT ILLNESS
[FreeTextEntry1] : Ms. ALEXANDER HUDDLESTON, 56 year old female, never smoker, w/ hx of Uterine cancer dx 2020 s/p chemo s/p VINCENT/BSO, asthma, with recent hospitalization 8/30-9/2/21 for right chest pain found to have persistent pleural effusion now s/p IR drainage during that admission with path positive for metastatic adenocarcinoma. \par \par Patient is s/p Right VATS, pleural bx and Pleurx catheter placement on 09/15/2021. 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 \par \par Followed by Mirtha Celaya; Will be starting 3rd cycle out of 6 on December 3rd. \par \par CXR today - Stable, post op changes. \par \par Patient presents to office for follow up. Endorses that last 2 outputs were 15ml and 35ml. VNS will now start draining weekly. No worsening shortness of breath, fever, chills, night sweats, lightheadedness or dizziness. \par \par

## 2021-11-24 NOTE — ASSESSMENT
[FreeTextEntry1] : Ms. ALEXANDER HUDDLESTON, 56 year old female, never smoker, w/ hx of Uterine cancer dx 2020 s/p chemo s/p VINCENT/BSO, asthma, with recent hospitalization 8/30-9/2/21 for right chest pain found to have persistent pleural effusion now s/p IR drainage during that admission with path positive for metastatic adenocarcinoma. \par \par Patient is s/p Right VATS, pleural bx and Pleurx catheter placement on 09/15/2021. 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 \par \par Followed by Mirtha Celaya; Will be starting 3rd cycle out of 6 on December 3rd. \par \par I have reviewed the patient's medical records and diagnostic images at time of this office consultation and have made the following recommendation:\par 1. Return to clinic in 8 weeks with repeat CXR. Continue draining weekly. If output is maintained less than 50ml, then will consider PleurX removal. \par 2. Follow up with HemeOnc as per their recommendation. \par \par Recommendations reviewed with patient during this office visit, and all questions answered; Patient instructed on the importance of follow up and verbalizes understanding.\par \par I personally performed the services described in the documentation, reviewed the documentation recorded by the scribe in my presence and it accurately and completely records my words and actions.\par \par I, WALTER GilmoreC, am scribing for and the presence of NAVNEET Mari, the following sections HISTORY OF PRESENT ILLNESS, PAST MEDICAL/FAMILY/SOCIAL HISTORY; REVIEW OF SYSTEMS; VITAL SIGNS; PHYSICAL EXAM; DISPOSITION.\par \par \par \par

## 2021-11-25 ENCOUNTER — INPATIENT (INPATIENT)
Facility: HOSPITAL | Age: 56
LOS: 1 days | Discharge: HOME | End: 2021-11-27
Attending: INTERNAL MEDICINE | Admitting: INTERNAL MEDICINE
Payer: COMMERCIAL

## 2021-11-25 ENCOUNTER — NON-APPOINTMENT (OUTPATIENT)
Age: 56
End: 2021-11-25

## 2021-11-25 VITALS
DIASTOLIC BLOOD PRESSURE: 84 MMHG | HEART RATE: 108 BPM | TEMPERATURE: 99 F | RESPIRATION RATE: 20 BRPM | HEIGHT: 65 IN | SYSTOLIC BLOOD PRESSURE: 141 MMHG | OXYGEN SATURATION: 100 %

## 2021-11-25 DIAGNOSIS — D75.89 OTHER SPECIFIED DISEASES OF BLOOD AND BLOOD-FORMING ORGANS: ICD-10-CM

## 2021-11-25 DIAGNOSIS — J98.11 ATELECTASIS: ICD-10-CM

## 2021-11-25 DIAGNOSIS — Z90.710 ACQUIRED ABSENCE OF BOTH CERVIX AND UTERUS: Chronic | ICD-10-CM

## 2021-11-25 DIAGNOSIS — C55 MALIGNANT NEOPLASM OF UTERUS, PART UNSPECIFIED: ICD-10-CM

## 2021-11-25 DIAGNOSIS — R07.81 PLEURODYNIA: ICD-10-CM

## 2021-11-25 DIAGNOSIS — R06.02 SHORTNESS OF BREATH: ICD-10-CM

## 2021-11-25 DIAGNOSIS — Z29.9 ENCOUNTER FOR PROPHYLACTIC MEASURES, UNSPECIFIED: ICD-10-CM

## 2021-11-25 DIAGNOSIS — J45.909 UNSPECIFIED ASTHMA, UNCOMPLICATED: ICD-10-CM

## 2021-11-25 DIAGNOSIS — Z87.09 PERSONAL HISTORY OF OTHER DISEASES OF THE RESPIRATORY SYSTEM: ICD-10-CM

## 2021-11-25 DIAGNOSIS — J90 PLEURAL EFFUSION, NOT ELSEWHERE CLASSIFIED: ICD-10-CM

## 2021-11-25 LAB
ALBUMIN SERPL ELPH-MCNC: 4.2 G/DL — SIGNIFICANT CHANGE UP (ref 3.3–5)
ALP SERPL-CCNC: 215 U/L — HIGH (ref 40–120)
ALT FLD-CCNC: 48 U/L — HIGH (ref 4–33)
ANION GAP SERPL CALC-SCNC: 12 MMOL/L — SIGNIFICANT CHANGE UP (ref 7–14)
APPEARANCE UR: ABNORMAL
APTT BLD: 29.1 SEC — SIGNIFICANT CHANGE UP (ref 27–36.3)
AST SERPL-CCNC: 31 U/L — SIGNIFICANT CHANGE UP (ref 4–32)
B PERT DNA SPEC QL NAA+PROBE: SIGNIFICANT CHANGE UP
B PERT+PARAPERT DNA PNL SPEC NAA+PROBE: SIGNIFICANT CHANGE UP
BACTERIA # UR AUTO: ABNORMAL
BASOPHILS # BLD AUTO: 0 K/UL — SIGNIFICANT CHANGE UP (ref 0–0.2)
BASOPHILS NFR BLD AUTO: 0 % — SIGNIFICANT CHANGE UP (ref 0–2)
BILIRUB SERPL-MCNC: 0.2 MG/DL — SIGNIFICANT CHANGE UP (ref 0.2–1.2)
BILIRUB UR-MCNC: NEGATIVE — SIGNIFICANT CHANGE UP
BLOOD GAS ARTERIAL COMPREHENSIVE RESULT: SIGNIFICANT CHANGE UP
BORDETELLA PARAPERTUSSIS (RAPRVP): SIGNIFICANT CHANGE UP
BUN SERPL-MCNC: 16 MG/DL — SIGNIFICANT CHANGE UP (ref 7–23)
C PNEUM DNA SPEC QL NAA+PROBE: SIGNIFICANT CHANGE UP
CALCIUM SERPL-MCNC: 9.6 MG/DL — SIGNIFICANT CHANGE UP (ref 8.4–10.5)
CHLORIDE SERPL-SCNC: 104 MMOL/L — SIGNIFICANT CHANGE UP (ref 98–107)
CO2 SERPL-SCNC: 26 MMOL/L — SIGNIFICANT CHANGE UP (ref 22–31)
COLOR SPEC: YELLOW — SIGNIFICANT CHANGE UP
COMMENT - URINE: SIGNIFICANT CHANGE UP
CREAT SERPL-MCNC: 1.13 MG/DL — SIGNIFICANT CHANGE UP (ref 0.5–1.3)
DIFF PNL FLD: NEGATIVE — SIGNIFICANT CHANGE UP
EOSINOPHIL # BLD AUTO: 0.11 K/UL — SIGNIFICANT CHANGE UP (ref 0–0.5)
EOSINOPHIL NFR BLD AUTO: 3.6 % — SIGNIFICANT CHANGE UP (ref 0–6)
EPI CELLS # UR: 5 /HPF — SIGNIFICANT CHANGE UP (ref 0–5)
FLUAV SUBTYP SPEC NAA+PROBE: SIGNIFICANT CHANGE UP
FLUBV RNA SPEC QL NAA+PROBE: SIGNIFICANT CHANGE UP
GLUCOSE SERPL-MCNC: 109 MG/DL — HIGH (ref 70–99)
GLUCOSE UR QL: NEGATIVE — SIGNIFICANT CHANGE UP
HADV DNA SPEC QL NAA+PROBE: SIGNIFICANT CHANGE UP
HCOV 229E RNA SPEC QL NAA+PROBE: SIGNIFICANT CHANGE UP
HCOV HKU1 RNA SPEC QL NAA+PROBE: SIGNIFICANT CHANGE UP
HCOV NL63 RNA SPEC QL NAA+PROBE: SIGNIFICANT CHANGE UP
HCOV OC43 RNA SPEC QL NAA+PROBE: SIGNIFICANT CHANGE UP
HCT VFR BLD CALC: 27 % — LOW (ref 34.5–45)
HGB BLD-MCNC: 8.7 G/DL — LOW (ref 11.5–15.5)
HMPV RNA SPEC QL NAA+PROBE: SIGNIFICANT CHANGE UP
HPIV1 RNA SPEC QL NAA+PROBE: SIGNIFICANT CHANGE UP
HPIV2 RNA SPEC QL NAA+PROBE: SIGNIFICANT CHANGE UP
HPIV3 RNA SPEC QL NAA+PROBE: SIGNIFICANT CHANGE UP
HPIV4 RNA SPEC QL NAA+PROBE: SIGNIFICANT CHANGE UP
HYALINE CASTS # UR AUTO: SIGNIFICANT CHANGE UP (ref 0–7)
IANC: 0.8 K/UL — LOW (ref 1.5–8.5)
IMM GRANULOCYTES NFR BLD AUTO: 0.3 % — SIGNIFICANT CHANGE UP (ref 0–1.5)
INR BLD: 1.17 RATIO — HIGH (ref 0.88–1.16)
KETONES UR-MCNC: NEGATIVE — SIGNIFICANT CHANGE UP
LEUKOCYTE ESTERASE UR-ACNC: NEGATIVE — SIGNIFICANT CHANGE UP
LYMPHOCYTES # BLD AUTO: 1.4 K/UL — SIGNIFICANT CHANGE UP (ref 1–3.3)
LYMPHOCYTES # BLD AUTO: 46.2 % — HIGH (ref 13–44)
M PNEUMO DNA SPEC QL NAA+PROBE: SIGNIFICANT CHANGE UP
MCHC RBC-ENTMCNC: 25 PG — LOW (ref 27–34)
MCHC RBC-ENTMCNC: 32.2 GM/DL — SIGNIFICANT CHANGE UP (ref 32–36)
MCV RBC AUTO: 77.6 FL — LOW (ref 80–100)
MONOCYTES # BLD AUTO: 0.71 K/UL — SIGNIFICANT CHANGE UP (ref 0–0.9)
MONOCYTES NFR BLD AUTO: 23.4 % — HIGH (ref 2–14)
NEUTROPHILS # BLD AUTO: 0.8 K/UL — LOW (ref 1.8–7.4)
NEUTROPHILS NFR BLD AUTO: 26.5 % — LOW (ref 43–77)
NITRITE UR-MCNC: NEGATIVE — SIGNIFICANT CHANGE UP
NRBC # BLD: 1 /100 WBCS — SIGNIFICANT CHANGE UP
NRBC # FLD: 0.04 K/UL — HIGH
NT-PROBNP SERPL-SCNC: 53 PG/ML — SIGNIFICANT CHANGE UP
PH UR: 5.5 — SIGNIFICANT CHANGE UP (ref 5–8)
PLATELET # BLD AUTO: 189 K/UL — SIGNIFICANT CHANGE UP (ref 150–400)
POTASSIUM SERPL-MCNC: 3.7 MMOL/L — SIGNIFICANT CHANGE UP (ref 3.5–5.3)
POTASSIUM SERPL-SCNC: 3.7 MMOL/L — SIGNIFICANT CHANGE UP (ref 3.5–5.3)
PROT SERPL-MCNC: 8.5 G/DL — HIGH (ref 6–8.3)
PROT UR-MCNC: ABNORMAL
PROTHROM AB SERPL-ACNC: 13.2 SEC — SIGNIFICANT CHANGE UP (ref 10.6–13.6)
RAPID RVP RESULT: SIGNIFICANT CHANGE UP
RBC # BLD: 3.48 M/UL — LOW (ref 3.8–5.2)
RBC # FLD: 19.1 % — HIGH (ref 10.3–14.5)
RBC CASTS # UR COMP ASSIST: 4 /HPF — SIGNIFICANT CHANGE UP (ref 0–4)
RSV RNA SPEC QL NAA+PROBE: SIGNIFICANT CHANGE UP
RV+EV RNA SPEC QL NAA+PROBE: SIGNIFICANT CHANGE UP
SARS-COV-2 RNA SPEC QL NAA+PROBE: SIGNIFICANT CHANGE UP
SODIUM SERPL-SCNC: 142 MMOL/L — SIGNIFICANT CHANGE UP (ref 135–145)
SP GR SPEC: 1.03 — SIGNIFICANT CHANGE UP (ref 1–1.05)
TROPONIN T, HIGH SENSITIVITY RESULT: 10 NG/L — SIGNIFICANT CHANGE UP
TROPONIN T, HIGH SENSITIVITY RESULT: 8 NG/L — SIGNIFICANT CHANGE UP
UROBILINOGEN FLD QL: SIGNIFICANT CHANGE UP
WBC # BLD: 3.03 K/UL — LOW (ref 3.8–10.5)
WBC # FLD AUTO: 3.03 K/UL — LOW (ref 3.8–10.5)
WBC UR QL: 3 /HPF — SIGNIFICANT CHANGE UP (ref 0–5)

## 2021-11-25 PROCEDURE — 71275 CT ANGIOGRAPHY CHEST: CPT | Mod: 26,MA

## 2021-11-25 PROCEDURE — 99285 EMERGENCY DEPT VISIT HI MDM: CPT | Mod: 25

## 2021-11-25 PROCEDURE — 93010 ELECTROCARDIOGRAM REPORT: CPT

## 2021-11-25 PROCEDURE — 99223 1ST HOSP IP/OBS HIGH 75: CPT

## 2021-11-25 RX ORDER — LANOLIN ALCOHOL/MO/W.PET/CERES
3 CREAM (GRAM) TOPICAL AT BEDTIME
Refills: 0 | Status: DISCONTINUED | OUTPATIENT
Start: 2021-11-25 | End: 2021-11-27

## 2021-11-25 RX ORDER — POLYETHYLENE GLYCOL 3350 17 G/17G
17 POWDER, FOR SOLUTION ORAL DAILY
Refills: 0 | Status: DISCONTINUED | OUTPATIENT
Start: 2021-11-25 | End: 2021-11-26

## 2021-11-25 RX ORDER — TRAMADOL HYDROCHLORIDE 50 MG/1
25 TABLET ORAL EVERY 8 HOURS
Refills: 0 | Status: DISCONTINUED | OUTPATIENT
Start: 2021-11-25 | End: 2021-11-27

## 2021-11-25 RX ORDER — ACETAMINOPHEN 500 MG
650 TABLET ORAL EVERY 6 HOURS
Refills: 0 | Status: DISCONTINUED | OUTPATIENT
Start: 2021-11-25 | End: 2021-11-27

## 2021-11-25 RX ORDER — ENOXAPARIN SODIUM 100 MG/ML
40 INJECTION SUBCUTANEOUS AT BEDTIME
Refills: 0 | Status: DISCONTINUED | OUTPATIENT
Start: 2021-11-25 | End: 2021-11-27

## 2021-11-25 RX ORDER — ALBUTEROL 90 UG/1
2.5 AEROSOL, METERED ORAL ONCE
Refills: 0 | Status: COMPLETED | OUTPATIENT
Start: 2021-11-25 | End: 2021-11-25

## 2021-11-25 RX ORDER — SENNA PLUS 8.6 MG/1
2 TABLET ORAL AT BEDTIME
Refills: 0 | Status: DISCONTINUED | OUTPATIENT
Start: 2021-11-25 | End: 2021-11-27

## 2021-11-25 RX ORDER — CEFEPIME 1 G/1
2000 INJECTION, POWDER, FOR SOLUTION INTRAMUSCULAR; INTRAVENOUS ONCE
Refills: 0 | Status: COMPLETED | OUTPATIENT
Start: 2021-11-25 | End: 2021-11-25

## 2021-11-25 RX ORDER — ACETAMINOPHEN 500 MG
650 TABLET ORAL ONCE
Refills: 0 | Status: COMPLETED | OUTPATIENT
Start: 2021-11-25 | End: 2021-11-25

## 2021-11-25 RX ORDER — TRAMADOL HYDROCHLORIDE 50 MG/1
50 TABLET ORAL EVERY 8 HOURS
Refills: 0 | Status: DISCONTINUED | OUTPATIENT
Start: 2021-11-25 | End: 2021-11-27

## 2021-11-25 RX ORDER — LIDOCAINE 4 G/100G
1 CREAM TOPICAL EVERY 24 HOURS
Refills: 0 | Status: DISCONTINUED | OUTPATIENT
Start: 2021-11-25 | End: 2021-11-27

## 2021-11-25 RX ORDER — ONDANSETRON 8 MG/1
4 TABLET, FILM COATED ORAL EVERY 8 HOURS
Refills: 0 | Status: DISCONTINUED | OUTPATIENT
Start: 2021-11-25 | End: 2021-11-27

## 2021-11-25 RX ORDER — LIDOCAINE 4 G/100G
1 CREAM TOPICAL ONCE
Refills: 0 | Status: COMPLETED | OUTPATIENT
Start: 2021-11-25 | End: 2021-11-25

## 2021-11-25 RX ADMIN — LIDOCAINE 1 PATCH: 4 CREAM TOPICAL at 23:15

## 2021-11-25 RX ADMIN — LIDOCAINE 1 PATCH: 4 CREAM TOPICAL at 21:16

## 2021-11-25 RX ADMIN — Medication 100 MILLIGRAM(S): at 18:45

## 2021-11-25 RX ADMIN — LIDOCAINE 1 PATCH: 4 CREAM TOPICAL at 12:05

## 2021-11-25 RX ADMIN — TRAMADOL HYDROCHLORIDE 50 MILLIGRAM(S): 50 TABLET ORAL at 23:54

## 2021-11-25 RX ADMIN — ALBUTEROL 2.5 MILLIGRAM(S): 90 AEROSOL, METERED ORAL at 18:46

## 2021-11-25 RX ADMIN — ENOXAPARIN SODIUM 40 MILLIGRAM(S): 100 INJECTION SUBCUTANEOUS at 21:10

## 2021-11-25 RX ADMIN — Medication 650 MILLIGRAM(S): at 16:14

## 2021-11-25 RX ADMIN — CEFEPIME 2000 MILLIGRAM(S): 1 INJECTION, POWDER, FOR SOLUTION INTRAMUSCULAR; INTRAVENOUS at 14:16

## 2021-11-25 RX ADMIN — LIDOCAINE 1 PATCH: 4 CREAM TOPICAL at 23:48

## 2021-11-25 RX ADMIN — TRAMADOL HYDROCHLORIDE 50 MILLIGRAM(S): 50 TABLET ORAL at 22:44

## 2021-11-25 RX ADMIN — CEFEPIME 100 MILLIGRAM(S): 1 INJECTION, POWDER, FOR SOLUTION INTRAMUSCULAR; INTRAVENOUS at 13:32

## 2021-11-25 NOTE — H&P ADULT - ASSESSMENT
57 yo F hx of uterine ca now found to have mets in lung per patient (dx 2020; s/p Carboplatin and Taxol in 2020, s/p VINCENT/BSO), asthma, hx of hysterectomy in Sept 2020, and chronic pleural effusions s/p R Pleurx now p/w dyspnea for 3 days and L lower rib pain for 1 week concerning for atelectasis vs CHF.

## 2021-11-25 NOTE — H&P ADULT - NSHPLABSRESULTS_GEN_ALL_CORE
142  |  104  |  16  ----------------------------<  109<H>  3.7   |  26  |  1.13    Ca    9.6      2021 12:13    TPro  8.5<H>  /  Alb  4.2  /  TBili  0.2  /  DBili  x   /  AST  31  /  ALT  48<H>  /  AlkPhos  215<H>                          8.7    3.03  )-----------( 189      ( 2021 12:13 )             27.0     LIVER FUNCTIONS - ( 2021 12:13 )  Alb: 4.2 g/dL / Pro: 8.5 g/dL / ALK PHOS: 215 U/L / ALT: 48 U/L / AST: 31 U/L / GGT: x             PT/INR - ( 2021 12:13 )   PT: 13.2 sec;   INR: 1.17 ratio         PTT - ( 2021 12:13 )  PTT:29.1 sec    Urinalysis Basic - ( 2021 12:14 )    Color: Yellow / Appearance: Slightly Turbid / S.032 / pH: x  Gluc: x / Ketone: Negative  / Bili: Negative / Urobili: <2 mg/dL   Blood: x / Protein: 30 mg/dL / Nitrite: Negative   Leuk Esterase: Negative / RBC: 4 /HPF / WBC 3 /HPF   Sq Epi: x / Non Sq Epi: 5 /HPF / Bacteria: Occasional    In the ED, /84, . T 99.6F. CTA without PE. Given cefepime in ED. EKG sinus tach without ischemic changes.

## 2021-11-25 NOTE — ED ADULT TRIAGE NOTE - CHIEF COMPLAINT QUOTE
pt with CA on Chemo the last 2 wks ago, right chest Med. port, sent by MD for SOB few days ago, pain to left ribs area since Thursday,

## 2021-11-25 NOTE — ED PROVIDER NOTE - OBJECTIVE STATEMENT
55 y/o F w/ hx of asthma, uterine cancer (last chemo 2 weeks ago), hx of hysterectomy in Sept 2020, and pleural effusions p/w sob for 3 days and L lower rib pain for 1 week. Was told by her oncologist to go to the ED. Pt denies f/c, n/v, cp, abd pain, urinary sx. Pt states she has a baseline cough that has not worsened over the past week. Pt has been covid vaccinated. Denies hx of PE or any recent travel 55 y/o F w/ hx of asthma, uterine cancer (last chemo 2 weeks ago), hx of hysterectomy in Sept 2020, and pleural effusions p/w sob for 3 days and L lower rib pain for 1 week. Was told by her oncologist to go to the ED. Pt denies f/c, n/v, cp, abd pain, urinary sx. Pt states she has a baseline cough that has not worsened over the past week. Pt has been covid vaccinated. Denies hx of PE or any recent travel. Gets pleural effusion drained 1x/week.

## 2021-11-25 NOTE — H&P ADULT - NSHPREVIEWOFSYSTEMS_GEN_ALL_CORE
REVIEW OF SYSTEMS:    CONSTITUTIONAL: No weakness, fevers or chills  EYES/ENT: No visual changes;  No dysphagia  NECK: No pain or stiffness  RESPIRATORY: + chronic cough, but no wheezing, hemoptysis; +shortness of breath  CARDIOVASCULAR: No chest pain or palpitations; No lower extremity edema  GASTROINTESTINAL: No abdominal or epigastric pain. No nausea, vomiting, or hematemesis; No diarrhea or constipation. No melena or hematochezia.  GENITOURINARY: No dysuria, frequency or hematuria  NEUROLOGICAL: No numbness or weakness  SKIN: No itching, burning, rashes, or lesions   PSYCH: No auditory or visual hallucinations  All other review of systems is negative unless indicated above.

## 2021-11-25 NOTE — H&P ADULT - NSHPPHYSICALEXAM_GEN_ALL_CORE
PHYSICAL EXAM:  GENERAL: NAD, well-developed, well-nourished  HEAD:  Atraumatic, Normocephalic  EYES: EOMI, PERRL, conjunctiva and sclera clear  NECK: Supple, No JVD  CHEST/LUNG: Clear to auscultation bilaterally, decreaseed breath sounds at bases; No wheezes, rales or rhonchi  HEART: Regular rate and rhythm; No murmurs, rubs, or gallops, (+)S1, S2  ABDOMEN: Soft, Nontender, Nondistended; Normal Bowel sounds   EXTREMITIES:  2+ Peripheral Pulses, No clubbing, cyanosis, no edema  PSYCH: normal mood and affect  NEUROLOGY: AAOx3, non-focal  SKIN: No rashes or lesions

## 2021-11-25 NOTE — H&P ADULT - PROBLEM SELECTOR PLAN 1
Likely related to underlying malignant process but cannot r/o CHF yet  -Will attempt to get previous pleural samples  -Echo for cardiac evaluation as Taxol have been assocaited with cardiomyopathy  -low concern for ACS as patient does not have any chest pain  -Not enough to drain per CT scan

## 2021-11-25 NOTE — ED ADULT NURSE NOTE - OBJECTIVE STATEMENT
Pt presents to ED ambulatory with steady gait c/o left lower rib/flank pain since last Thursday and SOB x3 days. Pt states she was told by her oncologist to go to ED for workup. Pt with hx of uterine ca with mets to lungs and pleural effusions. States she has a baseline cough that has not worsened in the last week and has been vaccinated for covid. Denies any other medical complaints. see assessment. evaluated by provider. Treatment in progress. EMANUEL Fitzgerald

## 2021-11-25 NOTE — H&P ADULT - HISTORY OF PRESENT ILLNESS
55 yo F hx of uterine ca now found to have mets in lung (dx 2020; s/p Carboplatin and Taxol in 2020, s/p VINCENT/BSO) and asthma 55 yo F hx of uterine ca now found to have mets in lung per patient (dx 2020; s/p Carboplatin and Taxol in 2020, s/p VINCENT/BSO), asthma, hx of hysterectomy in Sept 2020, and chronic pleural effusions s/p R Pleurx now p/w dyspnea for 3 days and L lower rib pain for 1 week. Was told by her oncologist to go to the ED. She has been laying in her bed for many weeks now due to her clinical decompensation. She has never had an echo. Pt denies f/c, n/v, cp, abd pain, urinary sx. Pt states she has a baseline cough that has not worsened over the past week. Pt has been covid vaccinated. Denies hx of PE or any recent travel. Gets pleural effusion drained 1x/week last drained was 2 days ago with some symptomatic alleviation. L rib pain related to muscle pain with mild alleviation by tramadol and lidocaine patch. Only hurts with movement.  In the ED, /84, . T 99.6F. CTA without PE. Given cefepime in ED. EKG sinus tach without ischemic changes.

## 2021-11-25 NOTE — ED ADULT NURSE REASSESSMENT NOTE - NS ED NURSE REASSESS COMMENT FT1
Pt resting in stretcher in no apparent distress. pt endorses sadness about missing thanksgiving with her family. Comfort measures provided. POC explained by MD. Pt verbalizes understanding of admission. Pt c/o she is hungry and in pain. MD aware. Pt given sandwich and ginger ale and medicated as ordered for pain. O2 100% on RA. afebrile at this time. pending bed placement. call bell within reach. Will continue to monitor. EMANUEL Fitzgerald

## 2021-11-25 NOTE — ED PROVIDER NOTE - PHYSICAL EXAMINATION
CONSTITUTIONAL: Well-developed; well-nourished; in no acute distress.   SKIN: warm, dry  HEAD: Normocephalic; atraumatic.  EYES: no conjunctival injection. PERRL.   ENT: No nasal discharge; airway clear.  NECK: Supple; non tender.  CARD: S1, S2 normal; no murmurs, gallops, or rubs. tachycardic   RESP: tachypneic. fine crackles R base   ABD: soft ntnd, no guarding, no distention, no rigidity.   EXT: No cyanosis. No gross distal edema   NEURO: Alert, oriented, grossly unremarkable  PSYCH: Cooperative, appropriate.

## 2021-11-25 NOTE — H&P ADULT - PROBLEM SELECTOR PLAN 3
Intractable pain  -Pending Xr of lumbar to ro lesions. CTA did not go far down enough  -Lidocaine patch for now  -Tramadol  -Patient does not like how opiates make her feel. NO OXY or Morphine

## 2021-11-25 NOTE — ED PROVIDER NOTE - CLINICAL SUMMARY MEDICAL DECISION MAKING FREE TEXT BOX
O'[place] DO PGY-2: pt w/ hx of uterine CA and pleural effusion p/w sob for 3 days and L lower rib pain for one week. DDx PE vs worsening pleural effusion vs ACS. Ordered labs, meds, ekg, cxr, CTA. Will reassess. Dispo pending workup.

## 2021-11-25 NOTE — ED PROVIDER NOTE - NS ED ROS FT
CONSTITUTIONAL - No fever, No diaphoresis, No weight change  SKIN - No rash  HEMATOLOGIC - No abnormal bleeding or bruising  EYES - No eye pain, No blurred vision  ENT - No change in hearing, No sore throat, No neck pain, No rhinorrhea, No ear pain  RESPIRATORY +shortness of breath, No cough  CARDIAC +L lower rib pain, No palpitations  GI - No abdominal pain, No nausea, No vomiting, No diarrhea, No constipation  - No dysuria, no frequency, no hematuria.   MUSCULOSKELETAL - No joint pain, No swelling, No back pain  NEUROLOGIC - No numbness, No focal weakness, No headache, No dizziness

## 2021-11-25 NOTE — H&P ADULT - PROBLEM SELECTOR PLAN 2
Her dyspnea could also be related to underlying atelectasis from laying in bed  -Trial nebulizer x1 and incentive spirometry for now  -Will need PT to help with mobility

## 2021-11-25 NOTE — ED PROVIDER NOTE - ATTENDING CONTRIBUTION TO CARE
Jesus Méndez DO:  patient seen and evaluated with the resident.  I was present for key portions of the History & Physical, and I agree with the Impression & Plan. 57 yo f pmh uterine ca (dx 2020; s/p chemo last cycle 9/2020, s/p VINCENT/BSO) and asthma s/p Right vats, pleural biopsy and pleurx cath placement 9/15/21, pw left sided cp and sob. reports one week of sx, woke up with pain, unk inciting event. reports worse this morning and now associated w/ sob. denies ca, n/v, cough, congestion, ha, f/c, urinary sx, hx of similar sx. cp non pleuritic, non exer, mildly positional. arrives hds. mild tachypnea and tach noted in room. borderline elevated po temp, will defer rectal temp as pt recently had chemo. lungs ctabl. high suspicion pna vs pe. ekg non ischemic, sx not c/w jamarcus/pericarditis or acs. labs, imaging, reassess.

## 2021-11-26 LAB
ALBUMIN SERPL ELPH-MCNC: 3.7 G/DL — SIGNIFICANT CHANGE UP (ref 3.3–5)
ALP SERPL-CCNC: 187 U/L — HIGH (ref 40–120)
ALT FLD-CCNC: 37 U/L — HIGH (ref 4–33)
ANION GAP SERPL CALC-SCNC: 11 MMOL/L — SIGNIFICANT CHANGE UP (ref 7–14)
APTT BLD: 31.7 SEC — SIGNIFICANT CHANGE UP (ref 27–36.3)
AST SERPL-CCNC: 24 U/L — SIGNIFICANT CHANGE UP (ref 4–32)
BILIRUB SERPL-MCNC: 0.2 MG/DL — SIGNIFICANT CHANGE UP (ref 0.2–1.2)
BUN SERPL-MCNC: 14 MG/DL — SIGNIFICANT CHANGE UP (ref 7–23)
CALCIUM SERPL-MCNC: 9.3 MG/DL — SIGNIFICANT CHANGE UP (ref 8.4–10.5)
CHLORIDE SERPL-SCNC: 106 MMOL/L — SIGNIFICANT CHANGE UP (ref 98–107)
CO2 SERPL-SCNC: 25 MMOL/L — SIGNIFICANT CHANGE UP (ref 22–31)
COVID-19 NUCLEOCAPSID GAM AB INTERP: POSITIVE
COVID-19 NUCLEOCAPSID TOTAL GAM ANTIBODY RESULT: 279 INDEX — HIGH
COVID-19 SPIKE DOMAIN AB INTERP: POSITIVE
COVID-19 SPIKE DOMAIN ANTIBODY RESULT: >250 U/ML — HIGH
CREAT SERPL-MCNC: 1.09 MG/DL — SIGNIFICANT CHANGE UP (ref 0.5–1.3)
GLUCOSE SERPL-MCNC: 93 MG/DL — SIGNIFICANT CHANGE UP (ref 70–99)
HCT VFR BLD CALC: 26.6 % — LOW (ref 34.5–45)
HGB BLD-MCNC: 8.5 G/DL — LOW (ref 11.5–15.5)
INR BLD: 1.18 RATIO — HIGH (ref 0.88–1.16)
MAGNESIUM SERPL-MCNC: 1.8 MG/DL — SIGNIFICANT CHANGE UP (ref 1.6–2.6)
MCHC RBC-ENTMCNC: 24.9 PG — LOW (ref 27–34)
MCHC RBC-ENTMCNC: 32 GM/DL — SIGNIFICANT CHANGE UP (ref 32–36)
MCV RBC AUTO: 78 FL — LOW (ref 80–100)
NRBC # BLD: 0 /100 WBCS — SIGNIFICANT CHANGE UP
NRBC # FLD: 0.02 K/UL — HIGH
PHOSPHATE SERPL-MCNC: 4.5 MG/DL — SIGNIFICANT CHANGE UP (ref 2.5–4.5)
PLATELET # BLD AUTO: 183 K/UL — SIGNIFICANT CHANGE UP (ref 150–400)
POTASSIUM SERPL-MCNC: 3.8 MMOL/L — SIGNIFICANT CHANGE UP (ref 3.5–5.3)
POTASSIUM SERPL-SCNC: 3.8 MMOL/L — SIGNIFICANT CHANGE UP (ref 3.5–5.3)
PROT SERPL-MCNC: 7.6 G/DL — SIGNIFICANT CHANGE UP (ref 6–8.3)
PROTHROM AB SERPL-ACNC: 13.4 SEC — SIGNIFICANT CHANGE UP (ref 10.6–13.6)
RBC # BLD: 3.41 M/UL — LOW (ref 3.8–5.2)
RBC # FLD: 19.6 % — HIGH (ref 10.3–14.5)
SARS-COV-2 IGG+IGM SERPL QL IA: 279 INDEX — HIGH
SARS-COV-2 IGG+IGM SERPL QL IA: >250 U/ML — HIGH
SARS-COV-2 IGG+IGM SERPL QL IA: POSITIVE
SARS-COV-2 IGG+IGM SERPL QL IA: POSITIVE
SODIUM SERPL-SCNC: 142 MMOL/L — SIGNIFICANT CHANGE UP (ref 135–145)
WBC # BLD: 2.83 K/UL — LOW (ref 3.8–10.5)
WBC # FLD AUTO: 2.83 K/UL — LOW (ref 3.8–10.5)

## 2021-11-26 PROCEDURE — 99232 SBSQ HOSP IP/OBS MODERATE 35: CPT

## 2021-11-26 PROCEDURE — 93306 TTE W/DOPPLER COMPLETE: CPT | Mod: 26

## 2021-11-26 RX ORDER — CYCLOBENZAPRINE HYDROCHLORIDE 10 MG/1
5 TABLET, FILM COATED ORAL THREE TIMES A DAY
Refills: 0 | Status: DISCONTINUED | OUTPATIENT
Start: 2021-11-26 | End: 2021-11-27

## 2021-11-26 RX ORDER — POLYETHYLENE GLYCOL 3350 17 G/17G
17 POWDER, FOR SOLUTION ORAL
Refills: 0 | Status: DISCONTINUED | OUTPATIENT
Start: 2021-11-26 | End: 2021-11-27

## 2021-11-26 RX ORDER — MAGNESIUM HYDROXIDE 400 MG/1
30 TABLET, CHEWABLE ORAL DAILY
Refills: 0 | Status: DISCONTINUED | OUTPATIENT
Start: 2021-11-26 | End: 2021-11-27

## 2021-11-26 RX ADMIN — LIDOCAINE 1 PATCH: 4 CREAM TOPICAL at 06:56

## 2021-11-26 RX ADMIN — TRAMADOL HYDROCHLORIDE 50 MILLIGRAM(S): 50 TABLET ORAL at 09:53

## 2021-11-26 RX ADMIN — Medication 100 MILLIGRAM(S): at 11:47

## 2021-11-26 RX ADMIN — TRAMADOL HYDROCHLORIDE 50 MILLIGRAM(S): 50 TABLET ORAL at 10:50

## 2021-11-26 RX ADMIN — ENOXAPARIN SODIUM 40 MILLIGRAM(S): 100 INJECTION SUBCUTANEOUS at 21:02

## 2021-11-26 RX ADMIN — LIDOCAINE 1 PATCH: 4 CREAM TOPICAL at 22:25

## 2021-11-26 RX ADMIN — POLYETHYLENE GLYCOL 3350 17 GRAM(S): 17 POWDER, FOR SOLUTION ORAL at 11:47

## 2021-11-26 RX ADMIN — MAGNESIUM HYDROXIDE 30 MILLILITER(S): 400 TABLET, CHEWABLE ORAL at 19:15

## 2021-11-26 RX ADMIN — LIDOCAINE 1 PATCH: 4 CREAM TOPICAL at 22:20

## 2021-11-26 RX ADMIN — Medication 100 MILLIGRAM(S): at 04:04

## 2021-11-26 RX ADMIN — CYCLOBENZAPRINE HYDROCHLORIDE 5 MILLIGRAM(S): 10 TABLET, FILM COATED ORAL at 11:51

## 2021-11-26 RX ADMIN — Medication 100 MILLIGRAM(S): at 21:02

## 2021-11-26 NOTE — PHYSICAL THERAPY INITIAL EVALUATION ADULT - PATIENT PROFILE REVIEW, REHAB EVAL
PT orders received: ambulate with assistance. Consult with RN Rosemarie GARCIA, patient may participate in PT evaluation./yes

## 2021-11-26 NOTE — PHYSICAL THERAPY INITIAL EVALUATION ADULT - DISCHARGE DISPOSITION, PT EVAL
Will continue to follow while at [facility]./home/no skilled PT needs

## 2021-11-26 NOTE — PHYSICAL THERAPY INITIAL EVALUATION ADULT - ADDITIONAL COMMENTS
Pt typically lives in an apartment alone, however will be staying with mother post d/c in a house with 4 exterior steps to negotiate. Prior to admission, pt ambulated independently. no assistive device.    Pt was left semi-supine with head of bed elevated to 30°, all lines intact and call bell within reach, RN aware.

## 2021-11-26 NOTE — PROGRESS NOTE ADULT - PROBLEM SELECTOR PLAN 1
Likely related to underlying malignant process but cannot r/o CHF yet  -Will attempt to get previous pleural samples  -Echo for cardiac evaluation as Taxol have been assocaited with cardiomyopathy  -low concern for ACS as patient does not have any chest pain  -Not enough to drain per CT scan    back pain sec to muscle strain , will add flexeril to help with muscle spasm

## 2021-11-26 NOTE — PHYSICAL THERAPY INITIAL EVALUATION ADULT - PERTINENT HX OF CURRENT PROBLEM, REHAB EVAL
Pt is a 56 year old female presenting with dyspnea and left lower rib pain. Concerning for atelectasis vs CHF. PMH: Uterine ca now found to have mets in lung per patient (dx 2020; s/p Carboplatin and Taxol in 2020, s/p VINCENT/BSO), asthma, hx of hysterectomy in Sept 2020, and chronic pleural effusions s/p right Pleurx.

## 2021-11-27 ENCOUNTER — TRANSCRIPTION ENCOUNTER (OUTPATIENT)
Age: 56
End: 2021-11-27

## 2021-11-27 VITALS
SYSTOLIC BLOOD PRESSURE: 130 MMHG | TEMPERATURE: 98 F | OXYGEN SATURATION: 100 % | DIASTOLIC BLOOD PRESSURE: 74 MMHG | RESPIRATION RATE: 19 BRPM | HEART RATE: 96 BPM

## 2021-11-27 LAB
ALBUMIN SERPL ELPH-MCNC: 3.6 G/DL — SIGNIFICANT CHANGE UP (ref 3.3–5)
ALP SERPL-CCNC: 193 U/L — HIGH (ref 40–120)
ALT FLD-CCNC: 37 U/L — HIGH (ref 4–33)
ANION GAP SERPL CALC-SCNC: 12 MMOL/L — SIGNIFICANT CHANGE UP (ref 7–14)
AST SERPL-CCNC: 26 U/L — SIGNIFICANT CHANGE UP (ref 4–32)
BILIRUB SERPL-MCNC: 0.2 MG/DL — SIGNIFICANT CHANGE UP (ref 0.2–1.2)
BUN SERPL-MCNC: 11 MG/DL — SIGNIFICANT CHANGE UP (ref 7–23)
CALCIUM SERPL-MCNC: 9.4 MG/DL — SIGNIFICANT CHANGE UP (ref 8.4–10.5)
CHLORIDE SERPL-SCNC: 103 MMOL/L — SIGNIFICANT CHANGE UP (ref 98–107)
CO2 SERPL-SCNC: 25 MMOL/L — SIGNIFICANT CHANGE UP (ref 22–31)
CREAT SERPL-MCNC: 1.07 MG/DL — SIGNIFICANT CHANGE UP (ref 0.5–1.3)
CULTURE RESULTS: SIGNIFICANT CHANGE UP
GLUCOSE SERPL-MCNC: 108 MG/DL — HIGH (ref 70–99)
HCT VFR BLD CALC: 27.4 % — LOW (ref 34.5–45)
HGB BLD-MCNC: 8.3 G/DL — LOW (ref 11.5–15.5)
MAGNESIUM SERPL-MCNC: 1.7 MG/DL — SIGNIFICANT CHANGE UP (ref 1.6–2.6)
MCHC RBC-ENTMCNC: 24.6 PG — LOW (ref 27–34)
MCHC RBC-ENTMCNC: 30.3 GM/DL — LOW (ref 32–36)
MCV RBC AUTO: 81.1 FL — SIGNIFICANT CHANGE UP (ref 80–100)
NRBC # BLD: 0 /100 WBCS — SIGNIFICANT CHANGE UP
NRBC # FLD: 0.03 K/UL — HIGH
PHOSPHATE SERPL-MCNC: 3.5 MG/DL — SIGNIFICANT CHANGE UP (ref 2.5–4.5)
PLATELET # BLD AUTO: 185 K/UL — SIGNIFICANT CHANGE UP (ref 150–400)
POTASSIUM SERPL-MCNC: 3.6 MMOL/L — SIGNIFICANT CHANGE UP (ref 3.5–5.3)
POTASSIUM SERPL-SCNC: 3.6 MMOL/L — SIGNIFICANT CHANGE UP (ref 3.5–5.3)
PROT SERPL-MCNC: 7.2 G/DL — SIGNIFICANT CHANGE UP (ref 6–8.3)
RBC # BLD: 3.38 M/UL — LOW (ref 3.8–5.2)
RBC # FLD: 19.2 % — HIGH (ref 10.3–14.5)
SODIUM SERPL-SCNC: 140 MMOL/L — SIGNIFICANT CHANGE UP (ref 135–145)
SPECIMEN SOURCE: SIGNIFICANT CHANGE UP
WBC # BLD: 3.78 K/UL — LOW (ref 3.8–10.5)
WBC # FLD AUTO: 3.78 K/UL — LOW (ref 3.8–10.5)

## 2021-11-27 PROCEDURE — 99239 HOSP IP/OBS DSCHRG MGMT >30: CPT

## 2021-11-27 RX ORDER — CYCLOBENZAPRINE HYDROCHLORIDE 10 MG/1
1 TABLET, FILM COATED ORAL
Qty: 45 | Refills: 0
Start: 2021-11-27 | End: 2021-12-11

## 2021-11-27 RX ORDER — TRAMADOL HYDROCHLORIDE 50 MG/1
1 TABLET ORAL
Qty: 30 | Refills: 0
Start: 2021-11-27 | End: 2021-12-06

## 2021-11-27 RX ADMIN — TRAMADOL HYDROCHLORIDE 50 MILLIGRAM(S): 50 TABLET ORAL at 10:30

## 2021-11-27 RX ADMIN — CYCLOBENZAPRINE HYDROCHLORIDE 5 MILLIGRAM(S): 10 TABLET, FILM COATED ORAL at 09:34

## 2021-11-27 RX ADMIN — TRAMADOL HYDROCHLORIDE 50 MILLIGRAM(S): 50 TABLET ORAL at 09:34

## 2021-11-27 RX ADMIN — POLYETHYLENE GLYCOL 3350 17 GRAM(S): 17 POWDER, FOR SOLUTION ORAL at 11:36

## 2021-11-27 RX ADMIN — Medication 100 MILLIGRAM(S): at 15:07

## 2021-11-27 RX ADMIN — TRAMADOL HYDROCHLORIDE 50 MILLIGRAM(S): 50 TABLET ORAL at 18:25

## 2021-11-27 RX ADMIN — LIDOCAINE 1 PATCH: 4 CREAM TOPICAL at 10:00

## 2021-11-27 RX ADMIN — Medication 100 MILLIGRAM(S): at 05:19

## 2021-11-27 RX ADMIN — CYCLOBENZAPRINE HYDROCHLORIDE 5 MILLIGRAM(S): 10 TABLET, FILM COATED ORAL at 18:25

## 2021-11-27 NOTE — DISCHARGE NOTE PROVIDER - NSDCMRMEDTOKEN_GEN_ALL_CORE_FT
acetaminophen 325 mg oral tablet: 2 tab(s) orally every 6 hours, As needed, Mild Pain (1 - 3)  ibuprofen 400 mg oral tablet: 1 tab(s) orally every 6 hours, As Needed  polyethylene glycol 3350 oral powder for reconstitution: 17 gram(s) orally once a day  senna oral tablet: 2 tab(s) orally once a day (at bedtime)  traMADol 50 mg oral tablet: 1 tab(s) orally every 4 hours, As Needed moderate to severe pain MDD:6   cyclobenzaprine 5 mg oral tablet: 1 tab(s) orally 3 times a day, As needed, Muscle Spasm  polyethylene glycol 3350 oral powder for reconstitution: 17 gram(s) orally once a day  senna oral tablet: 2 tab(s) orally once a day (at bedtime)  traMADol 50 mg oral tablet: 1 tab(s) orally every 8 hours, As Needed  moderate to severe pain MDD:3

## 2021-11-27 NOTE — PROGRESS NOTE ADULT - ASSESSMENT
57 yo F hx of uterine ca now found to have mets in lung per patient (dx 2020; s/p Carboplatin and Taxol in 2020, s/p VINCENT/BSO), asthma, hx of hysterectomy in Sept 2020, and chronic pleural effusions s/p R Pleurx now p/w dyspnea for 3 days and L lower rib pain for 1 week. 
57 yo F hx of uterine ca now found to have mets in lung per patient (dx 2020; s/p Carboplatin and Taxol in 2020, s/p VINCENT/BSO), asthma, hx of hysterectomy in Sept 2020, and chronic pleural effusions s/p R Pleurx now p/w dyspnea for 3 days and L lower rib pain for 1 week concerning for atelectasis vs CHF.

## 2021-11-27 NOTE — PROGRESS NOTE ADULT - PROBLEM SELECTOR PLAN 7
FENP: No IVF, Lytes PRN, Regular diet, Lovenox ppx
FENP: No IVF, Lytes PRN, Regular diet, Lovenox ppx    discharge home with outpt f/u 40 min

## 2021-11-27 NOTE — PROGRESS NOTE ADULT - PROBLEM SELECTOR PLAN 6
Likely 2/2 immunosuppression from recent chemo  -No signs of infections  -Hold further abx
Likely 2/2 immunosuppression from recent chemo  -No signs of infections  -Hold further abx

## 2021-11-27 NOTE — DISCHARGE NOTE PROVIDER - NSDCFUSCHEDAPPT_GEN_ALL_CORE_FT
ALEXANDER HUDDLESTON D ; 12/01/2021 ; [facility]  VAL HUDDLESTONAINE D ; 12/01/2021 ; [facility]  VAL HUDDLESTONAINE D ; 12/01/2021 ; [facility]  FLAQUITOVAL CARREONAINE D ; 12/02/2021 ; [facility]  FLAQUITOVAL CARREONAINE D ; 12/02/2021 ; [facility]  VAL HUDDLESTONAINE D ; 12/22/2021 ; [facility]  FLAQUITOVAL CARREONAINE D ; 12/22/2021 ; [facility]  VAL HUDDLESTONAINE D ; 12/22/2021 ; [facility]  VAL HUDDLESTONAINE D ; 12/23/2021 ; [facility]  VAL HUDDLESTONAINE D ; 12/23/2021 ; [facility]  VAL HUDDLESTONAINE D ; 01/10/2022 ; [facility] [phone]

## 2021-11-27 NOTE — PROGRESS NOTE ADULT - PROBLEM SELECTOR PLAN 2
Her dyspnea could also be related to underlying atelectasis from laying in bed  - Trial nebulizer x1 and incentive spirometry for now  - PT home no needs  - would discharge with home with incentive spirometry

## 2021-11-27 NOTE — DISCHARGE NOTE NURSING/CASE MANAGEMENT/SOCIAL WORK - NSDCPNINST_GEN_ALL_CORE
Call MD with any signs of infection ie fever, unrelieved increased pain, or persistent nausea or vomiting. Continue to drink plenty of fluids. Avoid strenuous activity and constipation which may be a side effect from taking certain medications and narcotics.  Safety and fall prevention measures reinforced. Follow-up with MD in office as instructed.

## 2021-11-27 NOTE — PROGRESS NOTE ADULT - PROBLEM SELECTOR PLAN 3
Intractable pain  - CTA did not go far down enough  - Lidocaine patch for now  - Tramadol  - Back pain in LT UE upper back in rib area suspect musculoskeletal reproducible does not need xray L spine
Intractable pain  -Pending Xr of lumbar to ro lesions. CTA did not go far down enough  -Lidocaine patch for now  -Tramadol  -Patient does not like how opiates make her feel. NO OXY or Morphine, add flexeril

## 2021-11-27 NOTE — PROGRESS NOTE ADULT - PROBLEM SELECTOR PLAN 5
- Will need outpatient follow up. No active follow up for now
Will need outpatient follow up. No active follow up for now

## 2021-11-27 NOTE — DISCHARGE NOTE PROVIDER - HOSPITAL COURSE
Patient is a 57 yo F hx of uterine ca now found to have mets in lung per patient (dx 2020; s/p Carboplatin and Taxol in 2020, s/p VINCENT/BSO), asthma,   hx of hysterectomy in Sept 2020, and chronic pleural effusions s/p R Pleurx now p/w dyspnea for 3 days and L lower rib pain for 1 week concerning for atelectasis vs CHF.        Pleural effusion.   Likely related to underlying malignant process   Echo for cardiac evaluation as Taxol have been associated with cardiomyopathy  Not enough to drain per CT scan.    Atelectasis of both lungs.  Patient's dyspnea could also be related to underlying atelectasis from laying in bed  Trial nebulizer x1 and incentive spirometry for now      Rib pain on left side.   Intractable pain  Lidocaine patch for now  Tramadol  Patient does not like how opiates make her feel. NO OXY or Morphine.      Uterine cancer.   Need outpatient follow up.       Bicytopenia.   Likely 2/2 immunosuppression from recent chemo  -No signs of infections    Hospital course discussed with medical attending. Patient is medically stable for discharge.   Patient is a 57 yo F hx of uterine ca now found to have mets in lung per patient (dx 2020; s/p Carboplatin and Taxol in 2020, s/p VINCENT/BSO), asthma,   hx of hysterectomy in Sept 2020, and chronic pleural effusions s/p R Pleurx now p/w dyspnea for 3 days and L lower rib pain for 1 week concerning for atelectasis vs CHF.        Pleural effusion.   Likely related to underlying malignant process   Echo for cardiac evaluation as Taxol have been associated with cardiomyopathy  Not enough to drain per CT scan  CT chest w/o PE or rib lesions.    Atelectasis of both lungs.  Patient's dyspnea could also be related to underlying atelectasis from laying in bed  Trial nebulizer x1 and incentive spirometry for now  PT home no needs  would discharge with home with incentive spirometry.    Rib pain on left side.   Intractable pain  Lidocaine patch for now  Tramadol  Back pain in LT UE upper back in rib area suspect musculoskeletal reproducible does not need xray L spine.  Patient does not like how opiates make her feel. NO OXY or Morphine.      Uterine cancer.   Need outpatient follow up.       Bicytopenia.   Likely 2/2 immunosuppression from recent chemo  -No signs of infections    Hospital course discussed with medical attending. Patient is medically stable for discharge.                         Patient is a 55 yo F hx of uterine ca now found to have mets in lung per patient (dx 2020; s/p Carboplatin and Taxol in 2020, s/p VINCENT/BSO), asthma,   hx of hysterectomy in Sept 2020, and chronic pleural effusions s/p R Pleurx now p/w dyspnea for 3 days and L lower rib pain for 1 week concerning for atelectasis vs CHF.        Pleural effusion.   Likely related to underlying malignant process   TTE normal EF   Not enough to drain per CT scan  CT chest w/o PE or rib lesions.    Atelectasis of both lungs.  Patient's dyspnea could also be related to underlying atelectasis from laying in bed  Trial nebulizer x1 and incentive spirometry for now  PT home no needs  would discharge with home with incentive spirometry.    Rib pain on left side.   Intractable pain  Lidocaine patch for now  Tramadol and muscle relaxant improving   Back pain in LT UE upper back in rib area suspect musculoskeletal reproducible does not need xray L spine.  .  Uterine cancer.   Need outpatient follow up.       Bicytopenia.   Likely 2/2 immunosuppression from recent chemo  -No signs of infections    Hospital course discussed with medical attending. Patient is medically stable for discharge.

## 2021-11-27 NOTE — PROGRESS NOTE ADULT - SUBJECTIVE AND OBJECTIVE BOX
[facility] Division of Hospital Medicine  Lore Adams MD  Pager (M-F, 8A-5P): [phone]      Patient is a 56y old  Female who presents with a chief complaint of Dyspnea (2021 18:17)      SUBJECTIVE / OVERNIGHT EVENTS: Patient is clinically stable, pending TTE  Patient is c/o back pain/strain     MEDICATIONS  (STANDING):  benzonatate 100 milliGRAM(s) Oral every 8 hours  enoxaparin Injectable 40 milliGRAM(s) SubCutaneous at bedtime  lidocaine   4% Patch 1 Patch Transdermal every 24 hours  polyethylene glycol 3350 17 Gram(s) Oral daily  senna 2 Tablet(s) Oral at bedtime    MEDICATIONS  (PRN):  acetaminophen     Tablet .. 650 milliGRAM(s) Oral every 6 hours PRN Temp greater or equal to 38C (100.4F), Mild Pain (1 - 3)  aluminum hydroxide/magnesium hydroxide/simethicone Suspension 30 milliLiter(s) Oral every 4 hours PRN Dyspepsia  cyclobenzaprine 5 milliGRAM(s) Oral three times a day PRN Muscle Spasm  melatonin 3 milliGRAM(s) Oral at bedtime PRN Insomnia  ondansetron Injectable 4 milliGRAM(s) IV Push every 8 hours PRN Nausea and/or Vomiting  traMADol 25 milliGRAM(s) Oral every 8 hours PRN Moderate Pain (4 - 6)  traMADol 50 milliGRAM(s) Oral every 8 hours PRN Severe Pain (7 - 10)      CAPILLARY BLOOD GLUCOSE        I&O's Summary      PHYSICAL EXAM:  Vital Signs Last 24 Hrs  T(C): 36.7 (2021 14:04), Max: 37 (2021 19:47)  T(F): 98.1 (2021 14:04), Max: 98.6 (2021 19:47)  HR: 91 (2021 14:04) (87 - 96)  BP: 139/74 (2021 14:04) (109/61 - 147/94)  BP(mean): --  RR: 17 (2021 14:04) (17 - 24)  SpO2: 97% (2021 14:04) (97% - 100%)  CONSTITUTIONAL: NAD, well-developed, well-groomed  EYES: EOMI; conjunctiva and sclera clear  NECK: Supple, no palpable masses; no thyromegaly  RESPIRATORY: Normal respiratory effort; diminished breath sounds  to auscultation bilaterally  CARDIOVASCULAR: Regular rate and rhythm, normal S1 and S2,  No lower extremity edema; Peripheral pulses are 2+ bilaterally  ABDOMEN: Nontender to palpation, normoactive bowel sounds, no rebound/guarding;   MUSKULOSKELETAL:  no clubbing or cyanosis of digits; no joint swelling or tenderness to palpation  PSYCH: A+O to person, place, and time; affect appropriate  NEUROLOGY: CN 2-12 are intact and symmetric; no gross sensory deficits;   SKIN: No rashes; no palpable lesions    LABS:                        8.5    2.83  )-----------( 183      ( 2021 07:15 )             26.6         142  |  106  |  14  ----------------------------<  93  3.8   |  25  |  1.09    Ca    9.3      2021 07:15  Phos  4.5       Mg     1.80         TPro  7.6  /  Alb  3.7  /  TBili  0.2  /  DBili  x   /  AST  24  /  ALT  37<H>  /  AlkPhos  187<H>      PT/INR - ( 2021 07:15 )   PT: 13.4 sec;   INR: 1.18 ratio         PTT - ( 2021 07:15 )  PTT:31.7 sec      Urinalysis Basic - ( 2021 12:14 )    Color: Yellow / Appearance: Slightly Turbid / S.032 / pH: x  Gluc: x / Ketone: Negative  / Bili: Negative / Urobili: <2 mg/dL   Blood: x / Protein: 30 mg/dL / Nitrite: Negative   Leuk Esterase: Negative / RBC: 4 /HPF / WBC 3 /HPF   Sq Epi: x / Non Sq Epi: 5 /HPF / Bacteria: Occasional          RADIOLOGY & ADDITIONAL TESTS:  Results Reviewed.   Imaging Personally Reviewed.    COORDINATION OF CARE:  Care Discussed with  ACP 
Patient is a 56y old  Female who presents with a chief complaint of Dyspnea (26 Nov 2021 14:42)      SUBJECTIVE / OVERNIGHT EVENTS: Notes pain in LT side on ribs states pain improved with muscle relaxant and tramadol   ADDITIONAL REVIEW OF SYSTEMS: denies CP/N/V     MEDICATIONS  (STANDING):  benzonatate 100 milliGRAM(s) Oral every 8 hours  enoxaparin Injectable 40 milliGRAM(s) SubCutaneous at bedtime  lidocaine   4% Patch 1 Patch Transdermal every 24 hours  polyethylene glycol 3350 17 Gram(s) Oral two times a day  senna 2 Tablet(s) Oral at bedtime    MEDICATIONS  (PRN):  acetaminophen     Tablet .. 650 milliGRAM(s) Oral every 6 hours PRN Temp greater or equal to 38C (100.4F), Mild Pain (1 - 3)  aluminum hydroxide/magnesium hydroxide/simethicone Suspension 30 milliLiter(s) Oral every 4 hours PRN Dyspepsia  cyclobenzaprine 5 milliGRAM(s) Oral three times a day PRN Muscle Spasm  magnesium hydroxide Suspension 30 milliLiter(s) Oral daily PRN Constipation  melatonin 3 milliGRAM(s) Oral at bedtime PRN Insomnia  ondansetron Injectable 4 milliGRAM(s) IV Push every 8 hours PRN Nausea and/or Vomiting  traMADol 25 milliGRAM(s) Oral every 8 hours PRN Moderate Pain (4 - 6)  traMADol 50 milliGRAM(s) Oral every 8 hours PRN Severe Pain (7 - 10)      CAPILLARY BLOOD GLUCOSE        I&O's Summary    26 Nov 2021 07:01  -  27 Nov 2021 07:00  --------------------------------------------------------  IN: 500 mL / OUT: 650 mL / NET: -150 mL        PHYSICAL EXAM:  Vital Signs Last 24 Hrs  T(C): 36.8 (27 Nov 2021 10:09), Max: 37.1 (26 Nov 2021 17:22)  T(F): 98.3 (27 Nov 2021 10:09), Max: 98.8 (26 Nov 2021 17:22)  HR: 98 (27 Nov 2021 10:09) (90 - 98)  BP: 137/76 (27 Nov 2021 10:09) (113/76 - 139/74)  BP(mean): --  RR: 20 (27 Nov 2021 10:09) (17 - 20)  SpO2: 100% (27 Nov 2021 10:09) (97% - 100%)  CONSTITUTIONAL: NAD over nadir female eating lunch  EYES: PERRLA; conjunctiva and sclera clear  ENMT: Moist oral mucosa, no pharyngeal injection or exudates; normal dentition  NECK: Supple, no palpable masses; no thyromegaly  RESPIRATORY: +RT base decreased BS; +RT chest wall cathter no wheezing   CARDIOVASCULAR: Regular rate and rhythm, normal S1 and S2,   ABDOMEN: Nontender to palpation, normoactive bowel sounds, no rebound/guarding; No hepatosplenomegaly  MUSCULOSKELETAL:  +LT upper posterior back tenderness reproducible on exam   PSYCH: A+O to person, place, and time; affect appropriate  NEUROLOGY: CN 2-12 are intact and symmetric; no gross sensory deficits       LABS:                        8.3    3.78  )-----------( 185      ( 27 Nov 2021 07:47 )             27.4     11-27    140  |  103  |  11  ----------------------------<  108<H>  3.6   |  25  |  1.07    Ca    9.4      27 Nov 2021 07:45  Phos  3.5     11-27  Mg     1.70     11-27    TPro  7.2  /  Alb  3.6  /  TBili  0.2  /  DBili  x   /  AST  26  /  ALT  37<H>  /  AlkPhos  193<H>  11-27    PT/INR - ( 26 Nov 2021 07:15 )   PT: 13.4 sec;   INR: 1.18 ratio         PTT - ( 26 Nov 2021 07:15 )  PTT:31.7 sec          Culture - Urine (collected 25 Nov 2021 17:24)  Source: Clean Catch Clean Catch (Midstream)  Final Report (27 Nov 2021 07:15):    <10,000 CFU/mL Normal Urogenital Danielle    Culture - Blood (collected 25 Nov 2021 17:02)  Source: .Blood Blood-Peripheral  Preliminary Report (26 Nov 2021 18:01):    No growth to date.    Culture - Blood (collected 25 Nov 2021 17:01)  Source: .Blood Blood-Venous  Preliminary Report (26 Nov 2021 18:01):    No growth to date.        RADIOLOGY & ADDITIONAL TESTS:  Results Reviewed: < from: Transthoracic Echocardiogram (11.26.21 @ 17:21) >  Ejection Fraction (Visual Estimate): 55-60 %  ------------------------------------------------------------------------  OBSERVATIONS:  Mitral Valve: Normal mitral valve.  Aortic Root: Normal aortic root.  Aortic Valve: Normal trileaflet aortic valve.  Left Atrium: Normal left atrium.  LA volume index = 16  cc/m2.  Left Ventricle: Endocardium not well visualized; grossly  normal left ventricular systolic function. No significant  regional wall motion abnormalities. Normal left ventricular  internal dimensions and wall thicknesses. Increased E/e'  is consistent with elevated left ventricular filling  pressure.Variability of R-R interval precludes accurate  determination.  Right Heart: Normal right atrium. The right ventricle is  not well visualized; grossly normal right ventricular  systolic function. Normal tricuspid valve. Minimal  tricuspid regurgitation. Pulmonic valve not well  visualized.  Pericardium/PleuraNormal pericardium with no pericardial  effusion.  Hemodynamic: Estimated right ventricular systolic pressure  equals 55 mm Hg, assuming right atrial pressure equals 10  mm Hg, consistent with moderate pulmonary hypertension.  ------------------------------------------------------------------------  CONCLUSIONS:  1. Endocardium not well visualized; grossly normal left  ventricular systolic function. No significant regional wall  motion abnormalities.  2. The right ventricle is not well visualized; grossly  normal right ventricular systolic function.  3. Estimated right ventricular systolic pressure equals 55  mm Hg, assuming right atrial pressure equals 10 mm Hg,  consistent with moderate pulmonary hypertension.    < end of copied text >    Imaging Personally Reviewed:  Electrocardiogram Personally Reviewed:    COORDINATION OF CARE:  Care Discussed with Consultants/Other Providers [Y/N]:  Prior or Outpatient Records Reviewed [Y/N]:

## 2021-11-27 NOTE — PROGRESS NOTE ADULT - PROBLEM SELECTOR PLAN 1
Likely related to underlying malignant process   - Will attempt to get previous pleural samples has RT sided chest wall cath  - TTE with normal EF   - low concern for ACS as patient does not have any chest pain  - Not enough to drain per CT scan  - CT chest w/o PE or rib lesions Likely related to underlying malignant process   - RT sided chest wall cath no fever likely malignant in nature   - TTE with normal EF   - low concern for ACS as patient does not have any chest pain  - Not enough to drain per CT scan  - CT chest w/o PE or rib lesions

## 2021-11-27 NOTE — DISCHARGE NOTE NURSING/CASE MANAGEMENT/SOCIAL WORK - PATIENT PORTAL LINK FT
You can access the FollowMyHealth Patient Portal offered by [facility] by registering at the following website: [email or web address]. By joining [email or web address]’s FollowMyHealth portal, you will also be able to view your health information using other applications (apps) compatible with our system.

## 2021-11-29 ENCOUNTER — OUTPATIENT (OUTPATIENT)
Dept: OUTPATIENT SERVICES | Facility: HOSPITAL | Age: 56
LOS: 1 days | Discharge: HOME | End: 2021-11-29

## 2021-11-29 DIAGNOSIS — C54.1 MALIGNANT NEOPLASM OF ENDOMETRIUM: ICD-10-CM

## 2021-11-29 DIAGNOSIS — Z90.710 ACQUIRED ABSENCE OF BOTH CERVIX AND UTERUS: Chronic | ICD-10-CM

## 2021-11-30 ENCOUNTER — RESULT REVIEW (OUTPATIENT)
Age: 56
End: 2021-11-30

## 2021-11-30 ENCOUNTER — APPOINTMENT (OUTPATIENT)
Dept: HEMATOLOGY ONCOLOGY | Facility: CLINIC | Age: 56
End: 2021-11-30

## 2021-11-30 LAB
BASOPHILS # BLD AUTO: 0.03 K/UL — SIGNIFICANT CHANGE UP (ref 0–0.2)
BASOPHILS NFR BLD AUTO: 0.5 % — SIGNIFICANT CHANGE UP (ref 0–2)
CULTURE RESULTS: SIGNIFICANT CHANGE UP
CULTURE RESULTS: SIGNIFICANT CHANGE UP
EOSINOPHIL # BLD AUTO: 0.09 K/UL — SIGNIFICANT CHANGE UP (ref 0–0.5)
EOSINOPHIL NFR BLD AUTO: 1.6 % — SIGNIFICANT CHANGE UP (ref 0–6)
HCT VFR BLD CALC: 27 % — LOW (ref 34.5–45)
HGB BLD-MCNC: 8.9 G/DL — LOW (ref 11.5–15.5)
IMM GRANULOCYTES NFR BLD AUTO: 2.2 % — HIGH (ref 0–1.5)
LYMPHOCYTES # BLD AUTO: 1.45 K/UL — SIGNIFICANT CHANGE UP (ref 1–3.3)
LYMPHOCYTES # BLD AUTO: 26.1 % — SIGNIFICANT CHANGE UP (ref 13–44)
MCHC RBC-ENTMCNC: 25.4 PG — LOW (ref 27–34)
MCHC RBC-ENTMCNC: 33 G/DL — SIGNIFICANT CHANGE UP (ref 32–36)
MCV RBC AUTO: 77.1 FL — LOW (ref 80–100)
MONOCYTES # BLD AUTO: 0.6 K/UL — SIGNIFICANT CHANGE UP (ref 0–0.9)
MONOCYTES NFR BLD AUTO: 10.8 % — SIGNIFICANT CHANGE UP (ref 2–14)
NEUTROPHILS # BLD AUTO: 3.26 K/UL — SIGNIFICANT CHANGE UP (ref 1.8–7.4)
NEUTROPHILS NFR BLD AUTO: 58.8 % — SIGNIFICANT CHANGE UP (ref 43–77)
NRBC # BLD: 1 /100 WBCS — HIGH (ref 0–0)
PLATELET # BLD AUTO: 182 K/UL — SIGNIFICANT CHANGE UP (ref 150–400)
RBC # BLD: 3.5 M/UL — LOW (ref 3.8–5.2)
RBC # FLD: 20 % — HIGH (ref 10.3–14.5)
SPECIMEN SOURCE: SIGNIFICANT CHANGE UP
SPECIMEN SOURCE: SIGNIFICANT CHANGE UP
WBC # BLD: 5.55 K/UL — SIGNIFICANT CHANGE UP (ref 3.8–10.5)
WBC # FLD AUTO: 5.55 K/UL — SIGNIFICANT CHANGE UP (ref 3.8–10.5)

## 2021-12-01 ENCOUNTER — LABORATORY RESULT (OUTPATIENT)
Age: 56
End: 2021-12-01

## 2021-12-01 ENCOUNTER — APPOINTMENT (OUTPATIENT)
Dept: HEMATOLOGY ONCOLOGY | Facility: CLINIC | Age: 56
End: 2021-12-01
Payer: COMMERCIAL

## 2021-12-01 ENCOUNTER — RESULT REVIEW (OUTPATIENT)
Age: 56
End: 2021-12-01

## 2021-12-01 ENCOUNTER — APPOINTMENT (OUTPATIENT)
Dept: INFUSION THERAPY | Facility: HOSPITAL | Age: 56
End: 2021-12-01

## 2021-12-01 ENCOUNTER — NON-APPOINTMENT (OUTPATIENT)
Age: 56
End: 2021-12-01

## 2021-12-01 VITALS
RESPIRATION RATE: 18 BRPM | BODY MASS INDEX: 37.76 KG/M2 | WEIGHT: 226.61 LBS | SYSTOLIC BLOOD PRESSURE: 141 MMHG | HEART RATE: 102 BPM | HEIGHT: 65 IN | TEMPERATURE: 97.2 F | OXYGEN SATURATION: 99 % | DIASTOLIC BLOOD PRESSURE: 104 MMHG

## 2021-12-01 VITALS — SYSTOLIC BLOOD PRESSURE: 134 MMHG | DIASTOLIC BLOOD PRESSURE: 91 MMHG

## 2021-12-01 LAB
BASOPHILS # BLD AUTO: 0.03 K/UL — SIGNIFICANT CHANGE UP (ref 0–0.2)
BASOPHILS NFR BLD AUTO: 0.4 % — SIGNIFICANT CHANGE UP (ref 0–2)
EOSINOPHIL # BLD AUTO: 0.04 K/UL — SIGNIFICANT CHANGE UP (ref 0–0.5)
EOSINOPHIL NFR BLD AUTO: 0.6 % — SIGNIFICANT CHANGE UP (ref 0–6)
HCT VFR BLD CALC: 27 % — LOW (ref 34.5–45)
HGB BLD-MCNC: 8.6 G/DL — LOW (ref 11.5–15.5)
IMM GRANULOCYTES NFR BLD AUTO: 0.4 % — SIGNIFICANT CHANGE UP (ref 0–1.5)
LYMPHOCYTES # BLD AUTO: 1.67 K/UL — SIGNIFICANT CHANGE UP (ref 1–3.3)
LYMPHOCYTES # BLD AUTO: 23.4 % — SIGNIFICANT CHANGE UP (ref 13–44)
MCHC RBC-ENTMCNC: 25 PG — LOW (ref 27–34)
MCHC RBC-ENTMCNC: 31.9 G/DL — LOW (ref 32–36)
MCV RBC AUTO: 78.5 FL — LOW (ref 80–100)
MONOCYTES # BLD AUTO: 0.6 K/UL — SIGNIFICANT CHANGE UP (ref 0–0.9)
MONOCYTES NFR BLD AUTO: 8.4 % — SIGNIFICANT CHANGE UP (ref 2–14)
NEUTROPHILS # BLD AUTO: 4.77 K/UL — SIGNIFICANT CHANGE UP (ref 1.8–7.4)
NEUTROPHILS NFR BLD AUTO: 66.8 % — SIGNIFICANT CHANGE UP (ref 43–77)
NRBC # BLD: 0 /100 WBCS — SIGNIFICANT CHANGE UP (ref 0–0)
PLATELET # BLD AUTO: 193 K/UL — SIGNIFICANT CHANGE UP (ref 150–400)
RBC # BLD: 3.44 M/UL — LOW (ref 3.8–5.2)
RBC # FLD: 20.1 % — HIGH (ref 10.3–14.5)
WBC # BLD: 7.14 K/UL — SIGNIFICANT CHANGE UP (ref 3.8–10.5)
WBC # FLD AUTO: 7.14 K/UL — SIGNIFICANT CHANGE UP (ref 3.8–10.5)

## 2021-12-01 PROCEDURE — 99214 OFFICE O/P EST MOD 30 MIN: CPT

## 2021-12-02 ENCOUNTER — NON-APPOINTMENT (OUTPATIENT)
Age: 56
End: 2021-12-02

## 2021-12-02 ENCOUNTER — APPOINTMENT (OUTPATIENT)
Dept: HEMATOLOGY ONCOLOGY | Facility: CLINIC | Age: 56
End: 2021-12-02

## 2021-12-02 ENCOUNTER — APPOINTMENT (OUTPATIENT)
Dept: INFUSION THERAPY | Facility: HOSPITAL | Age: 56
End: 2021-12-02

## 2021-12-02 DIAGNOSIS — R11.2 NAUSEA WITH VOMITING, UNSPECIFIED: ICD-10-CM

## 2021-12-02 DIAGNOSIS — Z51.11 ENCOUNTER FOR ANTINEOPLASTIC CHEMOTHERAPY: ICD-10-CM

## 2021-12-02 NOTE — HISTORY OF PRESENT ILLNESS
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 Patient was seen initially by Dr. Arciniega at [place].\par She then saw Dr Robin Zamorano, at [facility] and had a repeat biopsy that showed clear cell carcinoma.\par On 4/20/2020 she underwent surgical staging and is post TLH/BSO/SLND/Omentectomy.\par Final pathology showed stage IA,high grade endometrial carcinoma involving predominantly clear cell and focally serous features, no invasion, no LVSI.\par \par Ct C/A/P with contrast done on 3/12/2020 showed no metastatic sites.\par She completed five cycles of CT 6/3/20- 9/2/20) Last cycle held due to toxicity and myelosuppression.She received vaginal brachytherapy to a total of 2100 cGy in 3 fractions. Treatment was delivered from 10/16/20- 10/23/20. \par Scan showed CINDY.\par \par 8/10/21: Surveillance scan showed New partially visualized small right pleural effusion. Correlation with dedicated chest CT is recommended. Few subcentimeter hepatic hypo densities, too small to characterize, but similar in appearance to prior. Small pelvic ascites, similar in appearance to prior.\par - 29( previously 15)\par \par 8/30/21: CT chest: Limited. No pulmonary embolus to level of the lobar pulmonary arteries. Interval increase in small right pleural effusion since 8/19/2021 with associated adjacent compressive atelectasis.Small perihepatic ascites.\par \par Patient was admitted to the hospital with SOB and pleural effusion. She had  thoracentesis and cytology was consistent with relapsed uterine cancer.\par \par Final Diagnosis\par PLEURAL FLUID, RIGHT\par POSITIVE FOR MALIGNANT CELLS.\par Metastatic adenocarcinoma\par \par Cytology slides and cell block reveal a hypercellular specimen\par composed of crowded 3-dimensional groups and single lying\par malignant cells with enlarged nuclei containing prominent\par nucleoli and vacuolated cytoplasm, among benign mesothelial\par cells.\par \par Immunohistochemical stains are performed on the cell block. The\par tumor cells are positive for PAX-8 (focal and weak), p16, napsin\par (focal) and p53 (overexpression), while they are negative for ER,\par WT-1 and TTF-1. In the absence of another primary, it is most\par consistent with involvement by patient's known endometrial\par adenocarcinoma. [de-identified] : Carboplatin and Taxol 6/3/20- 9/2/20( five cycles) [de-identified] : Patient feels better. She was recently discharged from hospital. She was admitted for SOB. Work up negative for PE. Found to be anemic, not transfused.

## 2021-12-08 NOTE — DISCHARGE NOTE PROVIDER - EXTENDED VTE YES NO FOR MLM ENOXAPARIN
12/8/2021 11:48 AM    Ms. Tristan Fortune [id number]        Dear Dr. Tanja Sampson    Please fax us the most recent cytology report so that we may update the patient's records for continuity of care.      Our fax number: [phone]    Patient:   Jamaal Nolasco  1975                      Sincerely,      LIZETTE Van ,

## 2021-12-15 ENCOUNTER — RESULT REVIEW (OUTPATIENT)
Age: 56
End: 2021-12-15

## 2021-12-20 ENCOUNTER — APPOINTMENT (OUTPATIENT)
Dept: CT IMAGING | Facility: IMAGING CENTER | Age: 56
End: 2021-12-20
Payer: COMMERCIAL

## 2021-12-20 ENCOUNTER — OUTPATIENT (OUTPATIENT)
Dept: OUTPATIENT SERVICES | Facility: HOSPITAL | Age: 56
LOS: 1 days | End: 2021-12-20
Payer: COMMERCIAL

## 2021-12-20 DIAGNOSIS — Z00.8 ENCOUNTER FOR OTHER GENERAL EXAMINATION: ICD-10-CM

## 2021-12-20 DIAGNOSIS — C55 MALIGNANT NEOPLASM OF UTERUS, PART UNSPECIFIED: ICD-10-CM

## 2021-12-20 DIAGNOSIS — Z90.710 ACQUIRED ABSENCE OF BOTH CERVIX AND UTERUS: Chronic | ICD-10-CM

## 2021-12-20 PROCEDURE — 74177 CT ABD & PELVIS W/CONTRAST: CPT | Mod: 26

## 2021-12-20 PROCEDURE — 71260 CT THORAX DX C+: CPT | Mod: 26

## 2021-12-22 ENCOUNTER — APPOINTMENT (OUTPATIENT)
Dept: INFUSION THERAPY | Facility: HOSPITAL | Age: 56
End: 2021-12-22

## 2021-12-22 ENCOUNTER — RESULT REVIEW (OUTPATIENT)
Age: 56
End: 2021-12-22

## 2021-12-22 ENCOUNTER — APPOINTMENT (OUTPATIENT)
Dept: HEMATOLOGY ONCOLOGY | Facility: CLINIC | Age: 56
End: 2021-12-22
Payer: COMMERCIAL

## 2021-12-22 ENCOUNTER — NON-APPOINTMENT (OUTPATIENT)
Age: 56
End: 2021-12-22

## 2021-12-22 ENCOUNTER — APPOINTMENT (OUTPATIENT)
Dept: HEMATOLOGY ONCOLOGY | Facility: CLINIC | Age: 56
End: 2021-12-22

## 2021-12-22 VITALS
HEIGHT: 65 IN | WEIGHT: 225.97 LBS | OXYGEN SATURATION: 99 % | SYSTOLIC BLOOD PRESSURE: 126 MMHG | DIASTOLIC BLOOD PRESSURE: 90 MMHG | RESPIRATION RATE: 18 BRPM | TEMPERATURE: 97.3 F | BODY MASS INDEX: 37.65 KG/M2 | HEART RATE: 105 BPM

## 2021-12-22 LAB
ALBUMIN SERPL ELPH-MCNC: 4.1 G/DL — SIGNIFICANT CHANGE UP (ref 3.3–5)
ALP SERPL-CCNC: 193 U/L — HIGH (ref 40–120)
ALT FLD-CCNC: 25 U/L — SIGNIFICANT CHANGE UP (ref 10–45)
ANION GAP SERPL CALC-SCNC: 10 MMOL/L — SIGNIFICANT CHANGE UP (ref 5–17)
AST SERPL-CCNC: 18 U/L — SIGNIFICANT CHANGE UP (ref 10–40)
BILIRUB SERPL-MCNC: <0.2 MG/DL — SIGNIFICANT CHANGE UP (ref 0.2–1.2)
BUN SERPL-MCNC: 12 MG/DL — SIGNIFICANT CHANGE UP (ref 7–23)
CALCIUM SERPL-MCNC: 9.2 MG/DL — SIGNIFICANT CHANGE UP (ref 8.4–10.5)
CHLORIDE SERPL-SCNC: 104 MMOL/L — SIGNIFICANT CHANGE UP (ref 96–108)
CO2 SERPL-SCNC: 25 MMOL/L — SIGNIFICANT CHANGE UP (ref 22–31)
CREAT SERPL-MCNC: 1.33 MG/DL — HIGH (ref 0.5–1.3)
GLUCOSE SERPL-MCNC: 109 MG/DL — HIGH (ref 70–99)
HCT VFR BLD CALC: 24.8 % — LOW (ref 34.5–45)
HGB BLD-MCNC: 8 G/DL — LOW (ref 11.5–15.5)
MCHC RBC-ENTMCNC: 25.8 PG — LOW (ref 27–34)
MCHC RBC-ENTMCNC: 32.3 G/DL — SIGNIFICANT CHANGE UP (ref 32–36)
MCV RBC AUTO: 80 FL — SIGNIFICANT CHANGE UP (ref 80–100)
PLATELET # BLD AUTO: 205 K/UL — SIGNIFICANT CHANGE UP (ref 150–400)
POTASSIUM SERPL-MCNC: 4.2 MMOL/L — SIGNIFICANT CHANGE UP (ref 3.5–5.3)
POTASSIUM SERPL-SCNC: 4.2 MMOL/L — SIGNIFICANT CHANGE UP (ref 3.5–5.3)
PROT SERPL-MCNC: 7.3 G/DL — SIGNIFICANT CHANGE UP (ref 6–8.3)
RBC # BLD: 3.1 M/UL — LOW (ref 3.8–5.2)
RBC # FLD: 23.1 % — HIGH (ref 10.3–14.5)
SODIUM SERPL-SCNC: 139 MMOL/L — SIGNIFICANT CHANGE UP (ref 135–145)
WBC # BLD: 4.77 K/UL — SIGNIFICANT CHANGE UP (ref 3.8–10.5)
WBC # FLD AUTO: 4.77 K/UL — SIGNIFICANT CHANGE UP (ref 3.8–10.5)

## 2021-12-22 PROCEDURE — 99214 OFFICE O/P EST MOD 30 MIN: CPT

## 2021-12-23 ENCOUNTER — NON-APPOINTMENT (OUTPATIENT)
Age: 56
End: 2021-12-23

## 2021-12-23 ENCOUNTER — APPOINTMENT (OUTPATIENT)
Dept: HEMATOLOGY ONCOLOGY | Facility: CLINIC | Age: 56
End: 2021-12-23

## 2021-12-23 ENCOUNTER — APPOINTMENT (OUTPATIENT)
Dept: INFUSION THERAPY | Facility: HOSPITAL | Age: 56
End: 2021-12-23

## 2021-12-23 NOTE — HISTORY OF PRESENT ILLNESS
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 Patient was seen initially by Dr. Arciniega at [facility].\par She then saw Dr Robin Zamorano, at [facility] and had a repeat biopsy that showed clear cell carcinoma.\par On 4/20/2020 she underwent surgical staging and is post TLH/BSO/SLND/Omentectomy.\par Final pathology showed stage IA,high grade endometrial carcinoma involving predominantly clear cell and focally serous features, no invasion, no LVSI.\par \par Ct C/A/P with contrast done on 3/12/2020 showed no metastatic sites.\par She completed five cycles of CT 6/3/20- 9/2/20) Last cycle held due to toxicity and myelosuppression.She received vaginal brachytherapy to a total of 2100 cGy in 3 fractions. Treatment was delivered from 10/16/20- 10/23/20. \par Scan showed CINDY.\par \par 8/10/21: Surveillance scan showed New partially visualized small right pleural effusion. Correlation with dedicated chest CT is recommended. Few subcentimeter hepatic hypo densities, too small to characterize, but similar in appearance to prior. Small pelvic ascites, similar in appearance to prior.\par - 29( previously 15)\par \par 8/30/21: CT chest: Limited. No pulmonary embolus to level of the lobar pulmonary arteries. Interval increase in small right pleural effusion since 8/19/2021 with associated adjacent compressive atelectasis.Small perihepatic ascites.\par \par Patient was admitted to the hospital with SOB and pleural effusion. She had  thoracentesis and cytology was consistent with relapsed uterine cancer.\par \par Final Diagnosis\par PLEURAL FLUID, RIGHT\par POSITIVE FOR MALIGNANT CELLS.\par Metastatic adenocarcinoma\par \par Cytology slides and cell block reveal a hypercellular specimen\par composed of crowded 3-dimensional groups and single lying\par malignant cells with enlarged nuclei containing prominent\par nucleoli and vacuolated cytoplasm, among benign mesothelial\par cells.\par \par Immunohistochemical stains are performed on the cell block. The\par tumor cells are positive for PAX-8 (focal and weak), p16, napsin\par (focal) and p53 (overexpression), while they are negative for ER,\par WT-1 and TTF-1. In the absence of another primary, it is most\par consistent with involvement by patient's known endometrial\par adenocarcinoma. [de-identified] : Carboplatin and Taxol 6/3/20- 9/2/20( five cycles) [FreeTextEntry1] : Carboplatin/ Taxol/ Pembro/Placebo [de-identified] : Patient tolerating treatment fairly. She has some fatigue, cough is stable.\par Pleural fluid drainage <40 ml/week

## 2021-12-23 NOTE — PHYSICAL EXAM
[Fully active, able to carry on all pre-disease performance without restriction] : Status 0 - Fully active, able to carry on all pre-disease performance without restriction [Normal] : affect appropriate Patient seen and evaluated, ED attending note and orders reviewed, will continue with patient follow up and care.  Attempting approval for MRI in the ED as there is a concern for spinal cord compression secondary to increasing weakness.  WIll continue to monitor patient -Simin Claudio PA-C Patient did not respond well to morphine for pain, it caused nausea and a HA.  IV tylenol ordered, CT results pending, MRI pending, patient comfortably waiting at this time, will continue to monitor -Simin Claudio PA-C Reviewed CT results with patient and daughter, she has worsening pelvic fractures from lytic lesions, likely MM although her recent biopsy is inconclusive.  Patient also with +UTI and has not had any urinary incontinence here in ED, low suspicion for cauda equina, MRI results pending.  Patient to be admitted for worsening pathologic fractures, patient agreeable to admission. Page placed to spine for low back pain and Dr. Gonzales.  Case d/w hospitalist Dr. Obrien who also requests orthopedic consult, page placed to orthopedic resident -Simin Claudio PA-C Case d/w Dr. Mcdowell from ortho spine who will consult tomorrow, case d/w orthopedic PERFECTO Mccoy who will consult on patient and requested additional imaging which was ordered, case d/w Dr. Sy who is covering for Dr. Gonzales who is aware she is to be admitted and will consult tomorrow -Simin Claudio PA-C

## 2021-12-26 DIAGNOSIS — Z51.89 ENCOUNTER FOR OTHER SPECIFIED AFTERCARE: ICD-10-CM

## 2021-12-27 ENCOUNTER — NON-APPOINTMENT (OUTPATIENT)
Age: 56
End: 2021-12-27

## 2022-01-01 ENCOUNTER — RESULT REVIEW (OUTPATIENT)
Age: 57
End: 2022-01-01

## 2022-01-01 ENCOUNTER — TRANSCRIPTION ENCOUNTER (OUTPATIENT)
Age: 57
End: 2022-01-01

## 2022-01-01 ENCOUNTER — APPOINTMENT (OUTPATIENT)
Dept: RADIOLOGY | Facility: IMAGING CENTER | Age: 57
End: 2022-01-01

## 2022-01-01 ENCOUNTER — APPOINTMENT (OUTPATIENT)
Dept: HEMATOLOGY ONCOLOGY | Facility: CLINIC | Age: 57
End: 2022-01-01

## 2022-01-01 ENCOUNTER — APPOINTMENT (OUTPATIENT)
Dept: THORACIC SURGERY | Facility: HOSPITAL | Age: 57
End: 2022-01-01

## 2022-01-01 ENCOUNTER — APPOINTMENT (OUTPATIENT)
Dept: GYNECOLOGIC ONCOLOGY | Facility: CLINIC | Age: 57
End: 2022-01-01
Payer: COMMERCIAL

## 2022-01-01 ENCOUNTER — APPOINTMENT (OUTPATIENT)
Dept: INFUSION THERAPY | Facility: HOSPITAL | Age: 57
End: 2022-01-01

## 2022-01-01 ENCOUNTER — APPOINTMENT (OUTPATIENT)
Dept: RADIOLOGY | Facility: HOSPITAL | Age: 57
End: 2022-01-01

## 2022-01-01 ENCOUNTER — OUTPATIENT (OUTPATIENT)
Dept: OUTPATIENT SERVICES | Facility: HOSPITAL | Age: 57
LOS: 1 days | End: 2022-01-01
Payer: COMMERCIAL

## 2022-01-01 ENCOUNTER — NON-APPOINTMENT (OUTPATIENT)
Age: 57
End: 2022-01-01

## 2022-01-01 ENCOUNTER — OUTPATIENT (OUTPATIENT)
Dept: OUTPATIENT SERVICES | Facility: HOSPITAL | Age: 57
LOS: 1 days | End: 2022-01-01

## 2022-01-01 ENCOUNTER — APPOINTMENT (OUTPATIENT)
Dept: CT IMAGING | Facility: IMAGING CENTER | Age: 57
End: 2022-01-01
Payer: COMMERCIAL

## 2022-01-01 ENCOUNTER — APPOINTMENT (OUTPATIENT)
Dept: ULTRASOUND IMAGING | Facility: IMAGING CENTER | Age: 57
End: 2022-01-01
Payer: COMMERCIAL

## 2022-01-01 ENCOUNTER — APPOINTMENT (OUTPATIENT)
Dept: GERIATRICS | Facility: CLINIC | Age: 57
End: 2022-01-01

## 2022-01-01 ENCOUNTER — APPOINTMENT (OUTPATIENT)
Dept: ULTRASOUND IMAGING | Facility: IMAGING CENTER | Age: 57
End: 2022-01-01

## 2022-01-01 ENCOUNTER — APPOINTMENT (OUTPATIENT)
Dept: CT IMAGING | Facility: IMAGING CENTER | Age: 57
End: 2022-01-01

## 2022-01-01 ENCOUNTER — INPATIENT (INPATIENT)
Facility: HOSPITAL | Age: 57
LOS: 5 days | Discharge: HOME | End: 2022-07-17
Attending: STUDENT IN AN ORGANIZED HEALTH CARE EDUCATION/TRAINING PROGRAM | Admitting: STUDENT IN AN ORGANIZED HEALTH CARE EDUCATION/TRAINING PROGRAM

## 2022-01-01 ENCOUNTER — APPOINTMENT (OUTPATIENT)
Dept: HEMATOLOGY ONCOLOGY | Facility: CLINIC | Age: 57
End: 2022-01-01
Payer: COMMERCIAL

## 2022-01-01 ENCOUNTER — INPATIENT (INPATIENT)
Facility: HOSPITAL | Age: 57
LOS: 7 days | Discharge: HOME | End: 2022-06-11
Attending: OBSTETRICS & GYNECOLOGY | Admitting: OBSTETRICS & GYNECOLOGY
Payer: COMMERCIAL

## 2022-01-01 ENCOUNTER — INPATIENT (INPATIENT)
Facility: HOSPITAL | Age: 57
LOS: 10 days | Discharge: HOME HEALTH | End: 2022-10-11
Attending: STUDENT IN AN ORGANIZED HEALTH CARE EDUCATION/TRAINING PROGRAM | Admitting: STUDENT IN AN ORGANIZED HEALTH CARE EDUCATION/TRAINING PROGRAM

## 2022-01-01 ENCOUNTER — APPOINTMENT (OUTPATIENT)
Dept: RADIOLOGY | Facility: IMAGING CENTER | Age: 57
End: 2022-01-01
Payer: COMMERCIAL

## 2022-01-01 ENCOUNTER — APPOINTMENT (OUTPATIENT)
Dept: GERIATRICS | Facility: CLINIC | Age: 57
End: 2022-01-01
Payer: COMMERCIAL

## 2022-01-01 ENCOUNTER — OUTPATIENT (OUTPATIENT)
Dept: OUTPATIENT SERVICES | Facility: HOSPITAL | Age: 57
LOS: 1 days | Discharge: HOME | End: 2022-01-01

## 2022-01-01 ENCOUNTER — APPOINTMENT (OUTPATIENT)
Dept: THORACIC SURGERY | Facility: CLINIC | Age: 57
End: 2022-01-01

## 2022-01-01 ENCOUNTER — APPOINTMENT (OUTPATIENT)
Dept: CARDIOLOGY | Facility: CLINIC | Age: 57
End: 2022-01-01

## 2022-01-01 ENCOUNTER — APPOINTMENT (OUTPATIENT)
Dept: NEUROLOGY | Facility: CLINIC | Age: 57
End: 2022-01-01

## 2022-01-01 ENCOUNTER — APPOINTMENT (OUTPATIENT)
Dept: THORACIC SURGERY | Facility: CLINIC | Age: 57
End: 2022-01-01
Payer: COMMERCIAL

## 2022-01-01 ENCOUNTER — OUTPATIENT (OUTPATIENT)
Dept: OUTPATIENT SERVICES | Facility: HOSPITAL | Age: 57
LOS: 1 days | Discharge: HOME | End: 2022-01-01
Payer: COMMERCIAL

## 2022-01-01 ENCOUNTER — INPATIENT (INPATIENT)
Facility: HOSPITAL | Age: 57
LOS: 14 days | Discharge: NURSING, REHAB OR LONG-TERM CARE | End: 2022-12-29
Attending: STUDENT IN AN ORGANIZED HEALTH CARE EDUCATION/TRAINING PROGRAM | Admitting: STUDENT IN AN ORGANIZED HEALTH CARE EDUCATION/TRAINING PROGRAM
Payer: COMMERCIAL

## 2022-01-01 ENCOUNTER — APPOINTMENT (OUTPATIENT)
Dept: NEUROLOGY | Facility: CLINIC | Age: 57
End: 2022-01-01
Payer: COMMERCIAL

## 2022-01-01 VITALS
TEMPERATURE: 98 F | DIASTOLIC BLOOD PRESSURE: 89 MMHG | HEART RATE: 98 BPM | RESPIRATION RATE: 16 BRPM | OXYGEN SATURATION: 98 % | SYSTOLIC BLOOD PRESSURE: 117 MMHG

## 2022-01-01 VITALS
HEIGHT: 64.96 IN | DIASTOLIC BLOOD PRESSURE: 82 MMHG | RESPIRATION RATE: 18 BRPM | SYSTOLIC BLOOD PRESSURE: 119 MMHG | TEMPERATURE: 99 F | HEART RATE: 87 BPM | OXYGEN SATURATION: 100 %

## 2022-01-01 VITALS
HEIGHT: 65 IN | TEMPERATURE: 97 F | WEIGHT: 212.08 LBS | RESPIRATION RATE: 16 BRPM | DIASTOLIC BLOOD PRESSURE: 90 MMHG | SYSTOLIC BLOOD PRESSURE: 134 MMHG | HEART RATE: 94 BPM | OXYGEN SATURATION: 98 %

## 2022-01-01 VITALS
OXYGEN SATURATION: 96 % | HEART RATE: 111 BPM | SYSTOLIC BLOOD PRESSURE: 138 MMHG | WEIGHT: 180.1 LBS | HEIGHT: 65 IN | BODY MASS INDEX: 30.01 KG/M2 | OXYGEN SATURATION: 95 % | HEART RATE: 72 BPM | TEMPERATURE: 98 F | RESPIRATION RATE: 16 BRPM | SYSTOLIC BLOOD PRESSURE: 113 MMHG | DIASTOLIC BLOOD PRESSURE: 90 MMHG | RESPIRATION RATE: 18 BRPM | DIASTOLIC BLOOD PRESSURE: 82 MMHG | TEMPERATURE: 98.5 F

## 2022-01-01 VITALS
HEART RATE: 102 BPM | TEMPERATURE: 97.9 F | HEIGHT: 65 IN | BODY MASS INDEX: 38.49 KG/M2 | SYSTOLIC BLOOD PRESSURE: 148 MMHG | DIASTOLIC BLOOD PRESSURE: 93 MMHG | OXYGEN SATURATION: 98 % | WEIGHT: 231 LBS

## 2022-01-01 VITALS
SYSTOLIC BLOOD PRESSURE: 154 MMHG | HEIGHT: 65 IN | HEART RATE: 78 BPM | RESPIRATION RATE: 18 BRPM | OXYGEN SATURATION: 99 % | TEMPERATURE: 97 F | DIASTOLIC BLOOD PRESSURE: 114 MMHG

## 2022-01-01 VITALS
OXYGEN SATURATION: 93 % | SYSTOLIC BLOOD PRESSURE: 133 MMHG | RESPIRATION RATE: 105 BRPM | DIASTOLIC BLOOD PRESSURE: 90 MMHG

## 2022-01-01 VITALS
BODY MASS INDEX: 29.53 KG/M2 | DIASTOLIC BLOOD PRESSURE: 85 MMHG | TEMPERATURE: 96.8 F | HEART RATE: 111 BPM | OXYGEN SATURATION: 97 % | HEIGHT: 64.96 IN | SYSTOLIC BLOOD PRESSURE: 127 MMHG | RESPIRATION RATE: 17 BRPM | WEIGHT: 177.25 LBS

## 2022-01-01 VITALS
WEIGHT: 234.35 LBS | RESPIRATION RATE: 16 BRPM | HEART RATE: 104 BPM | SYSTOLIC BLOOD PRESSURE: 135 MMHG | HEIGHT: 65 IN | OXYGEN SATURATION: 98 % | TEMPERATURE: 96.8 F | DIASTOLIC BLOOD PRESSURE: 84 MMHG | BODY MASS INDEX: 39.04 KG/M2

## 2022-01-01 VITALS
OXYGEN SATURATION: 99 % | HEIGHT: 65 IN | RESPIRATION RATE: 24 BRPM | HEART RATE: 111 BPM | DIASTOLIC BLOOD PRESSURE: 89 MMHG | TEMPERATURE: 99 F | SYSTOLIC BLOOD PRESSURE: 102 MMHG

## 2022-01-01 VITALS
HEIGHT: 65 IN | DIASTOLIC BLOOD PRESSURE: 84 MMHG | TEMPERATURE: 97.5 F | BODY MASS INDEX: 37.83 KG/M2 | HEART RATE: 116 BPM | RESPIRATION RATE: 16 BRPM | WEIGHT: 227.05 LBS | SYSTOLIC BLOOD PRESSURE: 117 MMHG | OXYGEN SATURATION: 99 %

## 2022-01-01 VITALS
BODY MASS INDEX: 36.88 KG/M2 | TEMPERATURE: 97.2 F | WEIGHT: 221.34 LBS | HEIGHT: 65 IN | SYSTOLIC BLOOD PRESSURE: 157 MMHG | HEART RATE: 93 BPM | OXYGEN SATURATION: 96 % | DIASTOLIC BLOOD PRESSURE: 99 MMHG | RESPIRATION RATE: 16 BRPM

## 2022-01-01 VITALS
OXYGEN SATURATION: 93 % | HEART RATE: 110 BPM | DIASTOLIC BLOOD PRESSURE: 94 MMHG | RESPIRATION RATE: 16 BRPM | HEIGHT: 65 IN | BODY MASS INDEX: 33.13 KG/M2 | SYSTOLIC BLOOD PRESSURE: 134 MMHG | TEMPERATURE: 97 F | WEIGHT: 198.83 LBS

## 2022-01-01 VITALS — HEIGHT: 65 IN | WEIGHT: 202 LBS | BODY MASS INDEX: 33.66 KG/M2

## 2022-01-01 VITALS
OXYGEN SATURATION: 100 % | SYSTOLIC BLOOD PRESSURE: 144 MMHG | RESPIRATION RATE: 15 BRPM | HEIGHT: 65 IN | TEMPERATURE: 98 F | DIASTOLIC BLOOD PRESSURE: 69 MMHG | HEART RATE: 87 BPM | WEIGHT: 231.93 LBS

## 2022-01-01 VITALS
OXYGEN SATURATION: 97 % | BODY MASS INDEX: 38.99 KG/M2 | HEART RATE: 66 BPM | SYSTOLIC BLOOD PRESSURE: 142 MMHG | WEIGHT: 234 LBS | HEIGHT: 65 IN | DIASTOLIC BLOOD PRESSURE: 115 MMHG

## 2022-01-01 VITALS
OXYGEN SATURATION: 99 % | DIASTOLIC BLOOD PRESSURE: 86 MMHG | SYSTOLIC BLOOD PRESSURE: 135 MMHG | HEART RATE: 75 BPM | BODY MASS INDEX: 38.56 KG/M2 | RESPIRATION RATE: 16 BRPM | WEIGHT: 231.46 LBS | HEIGHT: 65 IN | TEMPERATURE: 97.4 F

## 2022-01-01 VITALS
RESPIRATION RATE: 18 BRPM | OXYGEN SATURATION: 97 % | TEMPERATURE: 99 F | DIASTOLIC BLOOD PRESSURE: 68 MMHG | SYSTOLIC BLOOD PRESSURE: 124 MMHG | HEART RATE: 98 BPM

## 2022-01-01 VITALS
HEART RATE: 101 BPM | WEIGHT: 213.83 LBS | SYSTOLIC BLOOD PRESSURE: 128 MMHG | OXYGEN SATURATION: 96 % | DIASTOLIC BLOOD PRESSURE: 85 MMHG | BODY MASS INDEX: 35.59 KG/M2 | RESPIRATION RATE: 16 BRPM | TEMPERATURE: 97 F

## 2022-01-01 VITALS — HEART RATE: 77 BPM | RESPIRATION RATE: 14 BRPM | OXYGEN SATURATION: 100 %

## 2022-01-01 VITALS
BODY MASS INDEX: 38.99 KG/M2 | HEIGHT: 65 IN | DIASTOLIC BLOOD PRESSURE: 63 MMHG | OXYGEN SATURATION: 100 % | SYSTOLIC BLOOD PRESSURE: 138 MMHG | WEIGHT: 234 LBS | HEART RATE: 83 BPM

## 2022-01-01 VITALS
OXYGEN SATURATION: 97 % | RESPIRATION RATE: 16 BRPM | HEIGHT: 64.96 IN | SYSTOLIC BLOOD PRESSURE: 129 MMHG | TEMPERATURE: 97.1 F | DIASTOLIC BLOOD PRESSURE: 92 MMHG | WEIGHT: 174.8 LBS | HEART RATE: 104 BPM | BODY MASS INDEX: 29.12 KG/M2

## 2022-01-01 VITALS
WEIGHT: 225 LBS | HEIGHT: 65 IN | BODY MASS INDEX: 37.49 KG/M2 | SYSTOLIC BLOOD PRESSURE: 133 MMHG | DIASTOLIC BLOOD PRESSURE: 87 MMHG | HEART RATE: 102 BPM

## 2022-01-01 VITALS
HEART RATE: 103 BPM | HEIGHT: 65 IN | SYSTOLIC BLOOD PRESSURE: 138 MMHG | OXYGEN SATURATION: 91 % | WEIGHT: 182 LBS | BODY MASS INDEX: 30.32 KG/M2 | DIASTOLIC BLOOD PRESSURE: 89 MMHG

## 2022-01-01 VITALS
TEMPERATURE: 96.3 F | DIASTOLIC BLOOD PRESSURE: 78 MMHG | HEIGHT: 64.96 IN | RESPIRATION RATE: 16 BRPM | BODY MASS INDEX: 29.01 KG/M2 | HEART RATE: 114 BPM | OXYGEN SATURATION: 95 % | WEIGHT: 174.14 LBS | SYSTOLIC BLOOD PRESSURE: 119 MMHG

## 2022-01-01 VITALS
DIASTOLIC BLOOD PRESSURE: 87 MMHG | HEIGHT: 65 IN | BODY MASS INDEX: 31.66 KG/M2 | HEART RATE: 107 BPM | OXYGEN SATURATION: 99 % | SYSTOLIC BLOOD PRESSURE: 121 MMHG | TEMPERATURE: 97.3 F | WEIGHT: 190.04 LBS | RESPIRATION RATE: 16 BRPM

## 2022-01-01 VITALS
SYSTOLIC BLOOD PRESSURE: 155 MMHG | DIASTOLIC BLOOD PRESSURE: 87 MMHG | RESPIRATION RATE: 19 BRPM | HEIGHT: 65 IN | TEMPERATURE: 100 F | HEART RATE: 118 BPM

## 2022-01-01 VITALS
TEMPERATURE: 98 F | HEART RATE: 115 BPM | OXYGEN SATURATION: 98 % | DIASTOLIC BLOOD PRESSURE: 86 MMHG | SYSTOLIC BLOOD PRESSURE: 125 MMHG | RESPIRATION RATE: 18 BRPM

## 2022-01-01 VITALS
SYSTOLIC BLOOD PRESSURE: 127 MMHG | TEMPERATURE: 98.3 F | DIASTOLIC BLOOD PRESSURE: 86 MMHG | RESPIRATION RATE: 18 BRPM | HEIGHT: 64 IN | BODY MASS INDEX: 34.62 KG/M2 | HEART RATE: 111 BPM | OXYGEN SATURATION: 95 % | WEIGHT: 202.8 LBS

## 2022-01-01 VITALS
HEIGHT: 65 IN | BODY MASS INDEX: 38.15 KG/M2 | HEART RATE: 85 BPM | WEIGHT: 229 LBS | OXYGEN SATURATION: 100 % | SYSTOLIC BLOOD PRESSURE: 137 MMHG | TEMPERATURE: 98.5 F | DIASTOLIC BLOOD PRESSURE: 85 MMHG

## 2022-01-01 DIAGNOSIS — Z00.8 ENCOUNTER FOR OTHER GENERAL EXAMINATION: ICD-10-CM

## 2022-01-01 DIAGNOSIS — J91.0 MALIGNANT PLEURAL EFFUSION: ICD-10-CM

## 2022-01-01 DIAGNOSIS — C54.1 MALIGNANT NEOPLASM OF ENDOMETRIUM: ICD-10-CM

## 2022-01-01 DIAGNOSIS — Z98.890 OTHER SPECIFIED POSTPROCEDURAL STATES: Chronic | ICD-10-CM

## 2022-01-01 DIAGNOSIS — Z01.812 ENCOUNTER FOR PREPROCEDURAL LABORATORY EXAMINATION: ICD-10-CM

## 2022-01-01 DIAGNOSIS — Z90.710 ACQUIRED ABSENCE OF BOTH CERVIX AND UTERUS: Chronic | ICD-10-CM

## 2022-01-01 DIAGNOSIS — R00.0 TACHYCARDIA, UNSPECIFIED: ICD-10-CM

## 2022-01-01 DIAGNOSIS — J90 PLEURAL EFFUSION, NOT ELSEWHERE CLASSIFIED: ICD-10-CM

## 2022-01-01 DIAGNOSIS — R06.02 SHORTNESS OF BREATH: ICD-10-CM

## 2022-01-01 DIAGNOSIS — C55 MALIGNANT NEOPLASM OF UTERUS, PART UNSPECIFIED: ICD-10-CM

## 2022-01-01 DIAGNOSIS — C78.00 SECONDARY MALIGNANT NEOPLASM OF UNSPECIFIED LUNG: ICD-10-CM

## 2022-01-01 DIAGNOSIS — R06.00 DYSPNEA, UNSPECIFIED: ICD-10-CM

## 2022-01-01 DIAGNOSIS — Z51.5 ENCOUNTER FOR PALLIATIVE CARE: ICD-10-CM

## 2022-01-01 DIAGNOSIS — R11.2 NAUSEA WITH VOMITING, UNSPECIFIED: ICD-10-CM

## 2022-01-01 DIAGNOSIS — Z29.9 ENCOUNTER FOR PROPHYLACTIC MEASURES, UNSPECIFIED: ICD-10-CM

## 2022-01-01 DIAGNOSIS — Z01.818 ENCOUNTER FOR OTHER PREPROCEDURAL EXAMINATION: ICD-10-CM

## 2022-01-01 DIAGNOSIS — R18.8 OTHER ASCITES: ICD-10-CM

## 2022-01-01 DIAGNOSIS — R07.81 PLEURODYNIA: ICD-10-CM

## 2022-01-01 DIAGNOSIS — Z51.11 ENCOUNTER FOR ANTINEOPLASTIC CHEMOTHERAPY: ICD-10-CM

## 2022-01-01 DIAGNOSIS — J96.01 ACUTE RESPIRATORY FAILURE WITH HYPOXIA: ICD-10-CM

## 2022-01-01 DIAGNOSIS — R63.0 ANOREXIA: ICD-10-CM

## 2022-01-01 DIAGNOSIS — F43.21 ADJUSTMENT DISORDER WITH DEPRESSED MOOD: ICD-10-CM

## 2022-01-01 DIAGNOSIS — R65.10 SYSTEMIC INFLAMMATORY RESPONSE SYNDROME (SIRS) OF NON-INFECTIOUS ORIGIN WITHOUT ACUTE ORGAN DYSFUNCTION: ICD-10-CM

## 2022-01-01 DIAGNOSIS — K59.00 CONSTIPATION, UNSPECIFIED: ICD-10-CM

## 2022-01-01 DIAGNOSIS — D64.9 ANEMIA, UNSPECIFIED: ICD-10-CM

## 2022-01-01 DIAGNOSIS — R91.8 OTHER NONSPECIFIC ABNORMAL FINDING OF LUNG FIELD: ICD-10-CM

## 2022-01-01 DIAGNOSIS — U07.1 COVID-19: ICD-10-CM

## 2022-01-01 DIAGNOSIS — R41.82 ALTERED MENTAL STATUS, UNSPECIFIED: ICD-10-CM

## 2022-01-01 DIAGNOSIS — C78.6 SECONDARY MALIGNANT NEOPLASM OF RETROPERITONEUM AND PERITONEUM: ICD-10-CM

## 2022-01-01 DIAGNOSIS — R05.9 COUGH, UNSPECIFIED: ICD-10-CM

## 2022-01-01 LAB
-  AMPICILLIN/SULBACTAM: SIGNIFICANT CHANGE UP
-  CEFAZOLIN: SIGNIFICANT CHANGE UP
-  CLINDAMYCIN: SIGNIFICANT CHANGE UP
-  ERYTHROMYCIN: SIGNIFICANT CHANGE UP
-  GENTAMICIN: SIGNIFICANT CHANGE UP
-  OXACILLIN: SIGNIFICANT CHANGE UP
-  PENICILLIN: SIGNIFICANT CHANGE UP
-  RIFAMPIN: SIGNIFICANT CHANGE UP
-  TETRACYCLINE: SIGNIFICANT CHANGE UP
-  TRIMETHOPRIM/SULFAMETHOXAZOLE: SIGNIFICANT CHANGE UP
-  VANCOMYCIN: SIGNIFICANT CHANGE UP
ALBUMIN FLD-MCNC: 2 G/DL — SIGNIFICANT CHANGE UP
ALBUMIN SERPL ELPH-MCNC: 2.8 G/DL — LOW (ref 3.3–5)
ALBUMIN SERPL ELPH-MCNC: 3 G/DL — LOW (ref 3.3–5)
ALBUMIN SERPL ELPH-MCNC: 3 G/DL — LOW (ref 3.3–5)
ALBUMIN SERPL ELPH-MCNC: 3.1 G/DL — LOW (ref 3.3–5)
ALBUMIN SERPL ELPH-MCNC: 3.2 G/DL — LOW (ref 3.3–5)
ALBUMIN SERPL ELPH-MCNC: 3.3 G/DL — SIGNIFICANT CHANGE UP (ref 3.3–5)
ALBUMIN SERPL ELPH-MCNC: 3.4 G/DL — SIGNIFICANT CHANGE UP (ref 3.3–5)
ALBUMIN SERPL ELPH-MCNC: 3.5 G/DL — SIGNIFICANT CHANGE UP (ref 3.3–5)
ALBUMIN SERPL ELPH-MCNC: 3.6 G/DL
ALBUMIN SERPL ELPH-MCNC: 3.7 G/DL
ALBUMIN SERPL ELPH-MCNC: 3.7 G/DL
ALBUMIN SERPL ELPH-MCNC: 3.7 G/DL — SIGNIFICANT CHANGE UP (ref 3.3–5)
ALBUMIN SERPL ELPH-MCNC: 3.8 G/DL
ALBUMIN SERPL ELPH-MCNC: 3.8 G/DL — SIGNIFICANT CHANGE UP (ref 3.3–5)
ALBUMIN SERPL ELPH-MCNC: 3.9 G/DL
ALBUMIN SERPL ELPH-MCNC: 3.9 G/DL — SIGNIFICANT CHANGE UP (ref 3.3–5)
ALBUMIN SERPL ELPH-MCNC: 4 G/DL
ALBUMIN SERPL ELPH-MCNC: 4 G/DL — SIGNIFICANT CHANGE UP (ref 3.3–5)
ALBUMIN SERPL ELPH-MCNC: 4 G/DL — SIGNIFICANT CHANGE UP (ref 3.3–5)
ALBUMIN SERPL ELPH-MCNC: 4.1 G/DL
ALBUMIN SERPL ELPH-MCNC: 4.5 G/DL
ALP BLD-CCNC: 108 U/L
ALP BLD-CCNC: 109 U/L
ALP BLD-CCNC: 124 U/L
ALP BLD-CCNC: 130 U/L
ALP BLD-CCNC: 168 U/L
ALP BLD-CCNC: 185 U/L
ALP BLD-CCNC: 191 U/L
ALP BLD-CCNC: 387 U/L
ALP SERPL-CCNC: 108 U/L — SIGNIFICANT CHANGE UP (ref 40–120)
ALP SERPL-CCNC: 109 U/L — SIGNIFICANT CHANGE UP (ref 40–120)
ALP SERPL-CCNC: 110 U/L — SIGNIFICANT CHANGE UP (ref 40–120)
ALP SERPL-CCNC: 114 U/L — SIGNIFICANT CHANGE UP (ref 40–120)
ALP SERPL-CCNC: 122 U/L — HIGH (ref 40–120)
ALP SERPL-CCNC: 141 U/L — HIGH (ref 40–120)
ALP SERPL-CCNC: 141 U/L — HIGH (ref 40–120)
ALP SERPL-CCNC: 152 U/L — HIGH (ref 40–120)
ALP SERPL-CCNC: 158 U/L — HIGH (ref 40–120)
ALP SERPL-CCNC: 160 U/L — HIGH (ref 40–120)
ALP SERPL-CCNC: 164 U/L — HIGH (ref 40–120)
ALP SERPL-CCNC: 173 U/L — HIGH (ref 40–120)
ALP SERPL-CCNC: 178 U/L — HIGH (ref 40–120)
ALP SERPL-CCNC: 183 U/L — HIGH (ref 40–120)
ALP SERPL-CCNC: 192 U/L — HIGH (ref 40–120)
ALP SERPL-CCNC: 196 U/L — HIGH (ref 40–120)
ALP SERPL-CCNC: 288 U/L — HIGH (ref 40–120)
ALP SERPL-CCNC: 79 U/L — SIGNIFICANT CHANGE UP (ref 40–120)
ALP SERPL-CCNC: 80 U/L — SIGNIFICANT CHANGE UP (ref 40–120)
ALP SERPL-CCNC: 83 U/L — SIGNIFICANT CHANGE UP (ref 40–120)
ALP SERPL-CCNC: 84 U/L — SIGNIFICANT CHANGE UP (ref 40–120)
ALP SERPL-CCNC: 92 U/L — SIGNIFICANT CHANGE UP (ref 40–120)
ALP SERPL-CCNC: 92 U/L — SIGNIFICANT CHANGE UP (ref 40–120)
ALP SERPL-CCNC: 93 U/L — SIGNIFICANT CHANGE UP (ref 40–120)
ALP SERPL-CCNC: 94 U/L — SIGNIFICANT CHANGE UP (ref 40–120)
ALP SERPL-CCNC: 94 U/L — SIGNIFICANT CHANGE UP (ref 40–120)
ALT FLD-CCNC: 10 U/L — SIGNIFICANT CHANGE UP (ref 4–33)
ALT FLD-CCNC: 13 U/L — SIGNIFICANT CHANGE UP (ref 4–33)
ALT FLD-CCNC: 15 U/L — SIGNIFICANT CHANGE UP (ref 10–45)
ALT FLD-CCNC: 15 U/L — SIGNIFICANT CHANGE UP (ref 4–33)
ALT FLD-CCNC: 16 U/L — SIGNIFICANT CHANGE UP (ref 4–33)
ALT FLD-CCNC: 19 U/L — SIGNIFICANT CHANGE UP (ref 10–45)
ALT FLD-CCNC: 22 U/L — SIGNIFICANT CHANGE UP (ref 10–45)
ALT FLD-CCNC: 24 U/L — SIGNIFICANT CHANGE UP (ref 4–33)
ALT FLD-CCNC: 30 U/L — SIGNIFICANT CHANGE UP (ref 4–33)
ALT FLD-CCNC: 30 U/L — SIGNIFICANT CHANGE UP (ref 4–33)
ALT FLD-CCNC: 31 U/L — SIGNIFICANT CHANGE UP (ref 10–45)
ALT FLD-CCNC: 33 U/L — SIGNIFICANT CHANGE UP (ref 4–33)
ALT FLD-CCNC: 37 U/L — HIGH (ref 4–33)
ALT FLD-CCNC: 5 U/L — SIGNIFICANT CHANGE UP (ref 4–33)
ALT FLD-CCNC: 6 U/L — SIGNIFICANT CHANGE UP (ref 4–33)
ALT FLD-CCNC: 7 U/L — SIGNIFICANT CHANGE UP (ref 4–33)
ALT FLD-CCNC: 8 U/L — LOW (ref 10–45)
ALT FLD-CCNC: 8 U/L — SIGNIFICANT CHANGE UP (ref 4–33)
ALT FLD-CCNC: 8 U/L — SIGNIFICANT CHANGE UP (ref 4–33)
ALT FLD-CCNC: <5 U/L — LOW (ref 4–33)
ALT FLD-CCNC: <5 U/L — LOW (ref 4–33)
ALT FLD-CCNC: <5 U/L — SIGNIFICANT CHANGE UP (ref 4–33)
ALT SERPL-CCNC: 12 U/L
ALT SERPL-CCNC: 22 U/L
ALT SERPL-CCNC: 31 U/L
ALT SERPL-CCNC: 5 U/L
ALT SERPL-CCNC: 71 U/L
ALT SERPL-CCNC: 8 U/L
ALT SERPL-CCNC: 80 U/L
ALT SERPL-CCNC: 9 U/L
ANION GAP SERPL CALC-SCNC: 10 MMOL/L — SIGNIFICANT CHANGE UP (ref 7–14)
ANION GAP SERPL CALC-SCNC: 11 MMOL/L
ANION GAP SERPL CALC-SCNC: 11 MMOL/L — SIGNIFICANT CHANGE UP (ref 7–14)
ANION GAP SERPL CALC-SCNC: 12 MMOL/L
ANION GAP SERPL CALC-SCNC: 12 MMOL/L
ANION GAP SERPL CALC-SCNC: 12 MMOL/L — SIGNIFICANT CHANGE UP (ref 5–17)
ANION GAP SERPL CALC-SCNC: 12 MMOL/L — SIGNIFICANT CHANGE UP (ref 7–14)
ANION GAP SERPL CALC-SCNC: 13 MMOL/L — SIGNIFICANT CHANGE UP (ref 5–17)
ANION GAP SERPL CALC-SCNC: 13 MMOL/L — SIGNIFICANT CHANGE UP (ref 7–14)
ANION GAP SERPL CALC-SCNC: 14 MMOL/L
ANION GAP SERPL CALC-SCNC: 14 MMOL/L
ANION GAP SERPL CALC-SCNC: 14 MMOL/L — SIGNIFICANT CHANGE UP (ref 5–17)
ANION GAP SERPL CALC-SCNC: 14 MMOL/L — SIGNIFICANT CHANGE UP (ref 5–17)
ANION GAP SERPL CALC-SCNC: 14 MMOL/L — SIGNIFICANT CHANGE UP (ref 7–14)
ANION GAP SERPL CALC-SCNC: 14 MMOL/L — SIGNIFICANT CHANGE UP (ref 7–14)
ANION GAP SERPL CALC-SCNC: 15 MMOL/L
ANION GAP SERPL CALC-SCNC: 15 MMOL/L
ANION GAP SERPL CALC-SCNC: 15 MMOL/L — HIGH (ref 7–14)
ANION GAP SERPL CALC-SCNC: 16 MMOL/L
ANION GAP SERPL CALC-SCNC: 16 MMOL/L — SIGNIFICANT CHANGE UP (ref 5–17)
ANION GAP SERPL CALC-SCNC: 18 MMOL/L
ANION GAP SERPL CALC-SCNC: 6 MMOL/L — LOW (ref 7–14)
ANION GAP SERPL CALC-SCNC: 6 MMOL/L — LOW (ref 7–14)
ANION GAP SERPL CALC-SCNC: 7 MMOL/L — SIGNIFICANT CHANGE UP (ref 7–14)
ANION GAP SERPL CALC-SCNC: 8 MMOL/L — SIGNIFICANT CHANGE UP (ref 7–14)
ANION GAP SERPL CALC-SCNC: 9 MMOL/L — SIGNIFICANT CHANGE UP (ref 7–14)
ANISOCYTOSIS BLD QL: SLIGHT — SIGNIFICANT CHANGE UP
APPEARANCE UR: ABNORMAL
APPEARANCE UR: ABNORMAL
APPEARANCE UR: CLEAR — SIGNIFICANT CHANGE UP
APTT BLD: 29.4 SEC — SIGNIFICANT CHANGE UP (ref 27–36.3)
APTT BLD: 29.6 SEC — SIGNIFICANT CHANGE UP (ref 27–36.3)
APTT BLD: 29.7 SEC
APTT BLD: 29.8 SEC — SIGNIFICANT CHANGE UP (ref 27–36.3)
APTT BLD: 29.8 SEC — SIGNIFICANT CHANGE UP (ref 27–36.3)
APTT BLD: 30.2 SEC — SIGNIFICANT CHANGE UP (ref 27–36.3)
APTT BLD: 31.3 SEC
APTT BLD: 31.6 SEC
APTT BLD: 31.7 SEC — SIGNIFICANT CHANGE UP (ref 27–36.3)
APTT BLD: 31.7 SEC — SIGNIFICANT CHANGE UP (ref 27–36.3)
APTT BLD: 32.5 SEC — SIGNIFICANT CHANGE UP (ref 27–36.3)
APTT BLD: 33.6 SEC
APTT BLD: 33.7 SEC — SIGNIFICANT CHANGE UP (ref 27–36.3)
APTT BLD: 35.9 SEC — SIGNIFICANT CHANGE UP (ref 27–36.3)
APTT BLD: 62.4 SEC — HIGH (ref 27–36.3)
AST SERPL-CCNC: 11 U/L — SIGNIFICANT CHANGE UP (ref 10–40)
AST SERPL-CCNC: 12 U/L
AST SERPL-CCNC: 12 U/L
AST SERPL-CCNC: 12 U/L — SIGNIFICANT CHANGE UP (ref 10–40)
AST SERPL-CCNC: 12 U/L — SIGNIFICANT CHANGE UP (ref 4–32)
AST SERPL-CCNC: 13 U/L — SIGNIFICANT CHANGE UP (ref 4–32)
AST SERPL-CCNC: 14 U/L
AST SERPL-CCNC: 14 U/L
AST SERPL-CCNC: 14 U/L — SIGNIFICANT CHANGE UP (ref 4–32)
AST SERPL-CCNC: 14 U/L — SIGNIFICANT CHANGE UP (ref 4–32)
AST SERPL-CCNC: 16 U/L — SIGNIFICANT CHANGE UP (ref 4–32)
AST SERPL-CCNC: 16 U/L — SIGNIFICANT CHANGE UP (ref 4–32)
AST SERPL-CCNC: 18 U/L
AST SERPL-CCNC: 22 U/L — SIGNIFICANT CHANGE UP (ref 10–40)
AST SERPL-CCNC: 22 U/L — SIGNIFICANT CHANGE UP (ref 4–32)
AST SERPL-CCNC: 24 U/L — SIGNIFICANT CHANGE UP (ref 10–40)
AST SERPL-CCNC: 24 U/L — SIGNIFICANT CHANGE UP (ref 4–32)
AST SERPL-CCNC: 25 U/L
AST SERPL-CCNC: 25 U/L — SIGNIFICANT CHANGE UP (ref 10–40)
AST SERPL-CCNC: 27 U/L — SIGNIFICANT CHANGE UP (ref 4–32)
AST SERPL-CCNC: 27 U/L — SIGNIFICANT CHANGE UP (ref 4–32)
AST SERPL-CCNC: 30 U/L — SIGNIFICANT CHANGE UP (ref 4–32)
AST SERPL-CCNC: 34 U/L
AST SERPL-CCNC: 36 U/L — HIGH (ref 4–32)
AST SERPL-CCNC: 39 U/L
AST SERPL-CCNC: 49 U/L — HIGH (ref 4–32)
AST SERPL-CCNC: 7 U/L — SIGNIFICANT CHANGE UP (ref 4–32)
AST SERPL-CCNC: 9 U/L — SIGNIFICANT CHANGE UP (ref 4–32)
B PERT DNA SPEC QL NAA+PROBE: SIGNIFICANT CHANGE UP
B PERT IGG+IGM PNL SER: ABNORMAL
B PERT+PARAPERT DNA PNL SPEC NAA+PROBE: SIGNIFICANT CHANGE UP
BACTERIA # UR AUTO: ABNORMAL
BACTERIA # UR AUTO: ABNORMAL
BACTERIA # UR AUTO: NEGATIVE — SIGNIFICANT CHANGE UP
BASE EXCESS BLDV CALC-SCNC: 2 MMOL/L — SIGNIFICANT CHANGE UP (ref -2–3)
BASE EXCESS BLDV CALC-SCNC: 2.4 MMOL/L — SIGNIFICANT CHANGE UP (ref -2–3)
BASE EXCESS BLDV CALC-SCNC: 23.6 MMOL/L — HIGH (ref -2–3)
BASE EXCESS BLDV CALC-SCNC: 27.3 MMOL/L — HIGH (ref -2–3)
BASE EXCESS BLDV CALC-SCNC: 29.7 MMOL/L — HIGH (ref -2–3)
BASE EXCESS BLDV CALC-SCNC: 3 MMOL/L — SIGNIFICANT CHANGE UP (ref -2–3)
BASE EXCESS BLDV CALC-SCNC: 3.2 MMOL/L — HIGH (ref -2–3)
BASE EXCESS BLDV CALC-SCNC: 4.5 MMOL/L — HIGH (ref -2–3)
BASOPHILS # BLD AUTO: 0 K/UL — SIGNIFICANT CHANGE UP (ref 0–0.2)
BASOPHILS # BLD AUTO: 0.01 K/UL — SIGNIFICANT CHANGE UP (ref 0–0.2)
BASOPHILS # BLD AUTO: 0.02 K/UL
BASOPHILS # BLD AUTO: 0.02 K/UL — SIGNIFICANT CHANGE UP (ref 0–0.2)
BASOPHILS # BLD AUTO: 0.03 K/UL — SIGNIFICANT CHANGE UP (ref 0–0.2)
BASOPHILS # BLD AUTO: 0.04 K/UL — SIGNIFICANT CHANGE UP (ref 0–0.2)
BASOPHILS # BLD AUTO: 0.06 K/UL — SIGNIFICANT CHANGE UP (ref 0–0.2)
BASOPHILS NFR BLD AUTO: 0 % — SIGNIFICANT CHANGE UP (ref 0–2)
BASOPHILS NFR BLD AUTO: 0.1 % — SIGNIFICANT CHANGE UP (ref 0–2)
BASOPHILS NFR BLD AUTO: 0.2 % — SIGNIFICANT CHANGE UP (ref 0–2)
BASOPHILS NFR BLD AUTO: 0.3 %
BASOPHILS NFR BLD AUTO: 0.3 % — SIGNIFICANT CHANGE UP (ref 0–2)
BASOPHILS NFR BLD AUTO: 0.4 % — SIGNIFICANT CHANGE UP (ref 0–2)
BASOPHILS NFR BLD AUTO: 0.5 % — SIGNIFICANT CHANGE UP (ref 0–2)
BASOPHILS NFR BLD AUTO: 0.6 % — SIGNIFICANT CHANGE UP (ref 0–2)
BASOPHILS NFR BLD AUTO: 0.7 % — SIGNIFICANT CHANGE UP (ref 0–2)
BASOPHILS NFR BLD AUTO: 0.7 % — SIGNIFICANT CHANGE UP (ref 0–2)
BASOPHILS NFR BLD AUTO: 2.6 % — HIGH (ref 0–2)
BILIRUB DIRECT SERPL-MCNC: <0.2 MG/DL — SIGNIFICANT CHANGE UP (ref 0–0.3)
BILIRUB INDIRECT FLD-MCNC: >0 MG/DL — SIGNIFICANT CHANGE UP (ref 0–1)
BILIRUB SERPL-MCNC: 0.2 MG/DL
BILIRUB SERPL-MCNC: 0.2 MG/DL — SIGNIFICANT CHANGE UP (ref 0.2–1.2)
BILIRUB SERPL-MCNC: 0.3 MG/DL
BILIRUB SERPL-MCNC: 0.3 MG/DL — SIGNIFICANT CHANGE UP (ref 0.2–1.2)
BILIRUB SERPL-MCNC: 0.4 MG/DL — SIGNIFICANT CHANGE UP (ref 0.2–1.2)
BILIRUB SERPL-MCNC: 0.5 MG/DL — SIGNIFICANT CHANGE UP (ref 0.2–1.2)
BILIRUB SERPL-MCNC: <0.2 MG/DL — SIGNIFICANT CHANGE UP (ref 0.2–1.2)
BILIRUB UR-MCNC: ABNORMAL
BILIRUB UR-MCNC: NEGATIVE — SIGNIFICANT CHANGE UP
BLD GP AB SCN SERPL QL: NEGATIVE — SIGNIFICANT CHANGE UP
BLOOD GAS ARTERIAL - LYTES,HGB,ICA,LACT RESULT: SIGNIFICANT CHANGE UP
BLOOD GAS VENOUS COMPREHENSIVE RESULT: SIGNIFICANT CHANGE UP
BORDETELLA PARAPERTUSSIS (RAPRVP): SIGNIFICANT CHANGE UP
BUN SERPL-MCNC: 10 MG/DL
BUN SERPL-MCNC: 10 MG/DL — SIGNIFICANT CHANGE UP (ref 7–23)
BUN SERPL-MCNC: 10 MG/DL — SIGNIFICANT CHANGE UP (ref 7–23)
BUN SERPL-MCNC: 11 MG/DL
BUN SERPL-MCNC: 11 MG/DL — SIGNIFICANT CHANGE UP (ref 7–23)
BUN SERPL-MCNC: 12 MG/DL
BUN SERPL-MCNC: 12 MG/DL — SIGNIFICANT CHANGE UP (ref 7–23)
BUN SERPL-MCNC: 13 MG/DL
BUN SERPL-MCNC: 13 MG/DL
BUN SERPL-MCNC: 14 MG/DL — SIGNIFICANT CHANGE UP (ref 7–23)
BUN SERPL-MCNC: 15 MG/DL — SIGNIFICANT CHANGE UP (ref 7–23)
BUN SERPL-MCNC: 15 MG/DL — SIGNIFICANT CHANGE UP (ref 7–23)
BUN SERPL-MCNC: 16 MG/DL — SIGNIFICANT CHANGE UP (ref 7–23)
BUN SERPL-MCNC: 16 MG/DL — SIGNIFICANT CHANGE UP (ref 7–23)
BUN SERPL-MCNC: 17 MG/DL — SIGNIFICANT CHANGE UP (ref 7–23)
BUN SERPL-MCNC: 18 MG/DL — SIGNIFICANT CHANGE UP (ref 7–23)
BUN SERPL-MCNC: 18 MG/DL — SIGNIFICANT CHANGE UP (ref 7–23)
BUN SERPL-MCNC: 19 MG/DL — SIGNIFICANT CHANGE UP (ref 7–23)
BUN SERPL-MCNC: 20 MG/DL — SIGNIFICANT CHANGE UP (ref 7–23)
BUN SERPL-MCNC: 21 MG/DL — SIGNIFICANT CHANGE UP (ref 7–23)
BUN SERPL-MCNC: 22 MG/DL
BUN SERPL-MCNC: 23 MG/DL — SIGNIFICANT CHANGE UP (ref 7–23)
BUN SERPL-MCNC: 23 MG/DL — SIGNIFICANT CHANGE UP (ref 7–23)
BUN SERPL-MCNC: 6 MG/DL — LOW (ref 7–23)
BUN SERPL-MCNC: 7 MG/DL
BUN SERPL-MCNC: 7 MG/DL — SIGNIFICANT CHANGE UP (ref 7–23)
BUN SERPL-MCNC: 8 MG/DL
BUN SERPL-MCNC: 8 MG/DL
BUN SERPL-MCNC: 8 MG/DL — SIGNIFICANT CHANGE UP (ref 7–23)
BUN SERPL-MCNC: 9 MG/DL — SIGNIFICANT CHANGE UP (ref 7–23)
C PNEUM DNA SPEC QL NAA+PROBE: SIGNIFICANT CHANGE UP
CALCIUM SERPL-MCNC: 10 MG/DL
CALCIUM SERPL-MCNC: 10 MG/DL
CALCIUM SERPL-MCNC: 10 MG/DL — SIGNIFICANT CHANGE UP (ref 8.4–10.5)
CALCIUM SERPL-MCNC: 10.1 MG/DL
CALCIUM SERPL-MCNC: 10.1 MG/DL — SIGNIFICANT CHANGE UP (ref 8.4–10.5)
CALCIUM SERPL-MCNC: 10.1 MG/DL — SIGNIFICANT CHANGE UP (ref 8.4–10.5)
CALCIUM SERPL-MCNC: 10.2 MG/DL — SIGNIFICANT CHANGE UP (ref 8.4–10.5)
CALCIUM SERPL-MCNC: 10.4 MG/DL — SIGNIFICANT CHANGE UP (ref 8.4–10.5)
CALCIUM SERPL-MCNC: 11.1 MG/DL — HIGH (ref 8.4–10.5)
CALCIUM SERPL-MCNC: 11.1 MG/DL — HIGH (ref 8.4–10.5)
CALCIUM SERPL-MCNC: 11.3 MG/DL — HIGH (ref 8.4–10.5)
CALCIUM SERPL-MCNC: 11.5 MG/DL — HIGH (ref 8.4–10.5)
CALCIUM SERPL-MCNC: 11.7 MG/DL — HIGH (ref 8.4–10.5)
CALCIUM SERPL-MCNC: 8.9 MG/DL — SIGNIFICANT CHANGE UP (ref 8.4–10.5)
CALCIUM SERPL-MCNC: 9 MG/DL
CALCIUM SERPL-MCNC: 9.1 MG/DL — SIGNIFICANT CHANGE UP (ref 8.4–10.5)
CALCIUM SERPL-MCNC: 9.1 MG/DL — SIGNIFICANT CHANGE UP (ref 8.4–10.5)
CALCIUM SERPL-MCNC: 9.2 MG/DL — SIGNIFICANT CHANGE UP (ref 8.4–10.5)
CALCIUM SERPL-MCNC: 9.3 MG/DL — SIGNIFICANT CHANGE UP (ref 8.4–10.5)
CALCIUM SERPL-MCNC: 9.4 MG/DL
CALCIUM SERPL-MCNC: 9.4 MG/DL — SIGNIFICANT CHANGE UP (ref 8.4–10.5)
CALCIUM SERPL-MCNC: 9.5 MG/DL
CALCIUM SERPL-MCNC: 9.6 MG/DL
CALCIUM SERPL-MCNC: 9.6 MG/DL — SIGNIFICANT CHANGE UP (ref 8.4–10.5)
CALCIUM SERPL-MCNC: 9.6 MG/DL — SIGNIFICANT CHANGE UP (ref 8.4–10.5)
CALCIUM SERPL-MCNC: 9.7 MG/DL
CALCIUM SERPL-MCNC: 9.7 MG/DL — SIGNIFICANT CHANGE UP (ref 8.4–10.5)
CALCIUM SERPL-MCNC: 9.8 MG/DL
CALCIUM SERPL-MCNC: 9.8 MG/DL — SIGNIFICANT CHANGE UP (ref 8.4–10.5)
CANCER AG125 SERPL-ACNC: 1000 U/ML
CANCER AG125 SERPL-ACNC: 1090 U/ML
CANCER AG125 SERPL-ACNC: 122 U/ML — HIGH
CANCER AG125 SERPL-ACNC: 13 U/ML
CANCER AG125 SERPL-ACNC: 30 U/ML
CANCER AG125 SERPL-ACNC: 350 U/ML
CANCER AG125 SERPL-ACNC: 380 U/ML
CANCER AG125 SERPL-ACNC: 381 U/ML — HIGH
CANCER AG125 SERPL-ACNC: 575 U/ML
CANCER AG125 SERPL-ACNC: 639 U/ML — HIGH
CANCER AG125 SERPL-ACNC: 654 U/ML — HIGH
CANCER AG125 SERPL-ACNC: 936 U/ML — HIGH
CANCER AG125 SERPL-ACNC: 997 U/ML
CEA SERPL-MCNC: 1.4 NG/ML
CEA SERPL-MCNC: 1.4 NG/ML — SIGNIFICANT CHANGE UP (ref 0–3.8)
CEA SERPL-MCNC: 1.5 NG/ML
CEA SERPL-MCNC: 1.9 NG/ML
CEA SERPL-MCNC: 1.9 NG/ML
CEA SERPL-MCNC: 2 NG/ML
CEA SERPL-MCNC: 2 NG/ML
CEA SERPL-MCNC: 2 NG/ML — SIGNIFICANT CHANGE UP (ref 0–3.8)
CEA SERPL-MCNC: 2.7 NG/ML — SIGNIFICANT CHANGE UP (ref 0–3.8)
CHLORIDE BLDV-SCNC: 101 MMOL/L — SIGNIFICANT CHANGE UP (ref 96–108)
CHLORIDE BLDV-SCNC: 104 MMOL/L — SIGNIFICANT CHANGE UP (ref 96–108)
CHLORIDE BLDV-SCNC: 104 MMOL/L — SIGNIFICANT CHANGE UP (ref 96–108)
CHLORIDE BLDV-SCNC: 107 MMOL/L — SIGNIFICANT CHANGE UP (ref 96–108)
CHLORIDE BLDV-SCNC: 108 MMOL/L — SIGNIFICANT CHANGE UP (ref 96–108)
CHLORIDE BLDV-SCNC: 96 MMOL/L — SIGNIFICANT CHANGE UP (ref 96–108)
CHLORIDE BLDV-SCNC: 98 MMOL/L — SIGNIFICANT CHANGE UP (ref 96–108)
CHLORIDE SERPL-SCNC: 100 MMOL/L
CHLORIDE SERPL-SCNC: 100 MMOL/L — SIGNIFICANT CHANGE UP (ref 98–107)
CHLORIDE SERPL-SCNC: 101 MMOL/L
CHLORIDE SERPL-SCNC: 101 MMOL/L — SIGNIFICANT CHANGE UP (ref 96–108)
CHLORIDE SERPL-SCNC: 101 MMOL/L — SIGNIFICANT CHANGE UP (ref 98–107)
CHLORIDE SERPL-SCNC: 102 MMOL/L
CHLORIDE SERPL-SCNC: 102 MMOL/L — SIGNIFICANT CHANGE UP (ref 96–108)
CHLORIDE SERPL-SCNC: 102 MMOL/L — SIGNIFICANT CHANGE UP (ref 98–107)
CHLORIDE SERPL-SCNC: 103 MMOL/L — SIGNIFICANT CHANGE UP (ref 96–108)
CHLORIDE SERPL-SCNC: 103 MMOL/L — SIGNIFICANT CHANGE UP (ref 98–107)
CHLORIDE SERPL-SCNC: 104 MMOL/L
CHLORIDE SERPL-SCNC: 104 MMOL/L
CHLORIDE SERPL-SCNC: 104 MMOL/L — SIGNIFICANT CHANGE UP (ref 96–108)
CHLORIDE SERPL-SCNC: 104 MMOL/L — SIGNIFICANT CHANGE UP (ref 96–108)
CHLORIDE SERPL-SCNC: 104 MMOL/L — SIGNIFICANT CHANGE UP (ref 98–107)
CHLORIDE SERPL-SCNC: 104 MMOL/L — SIGNIFICANT CHANGE UP (ref 98–107)
CHLORIDE SERPL-SCNC: 106 MMOL/L
CHLORIDE SERPL-SCNC: 107 MMOL/L
CHLORIDE SERPL-SCNC: 90 MMOL/L — LOW (ref 98–107)
CHLORIDE SERPL-SCNC: 91 MMOL/L — LOW (ref 98–107)
CHLORIDE SERPL-SCNC: 93 MMOL/L — LOW (ref 98–107)
CHLORIDE SERPL-SCNC: 94 MMOL/L — LOW (ref 98–107)
CHLORIDE SERPL-SCNC: 94 MMOL/L — LOW (ref 98–107)
CHLORIDE SERPL-SCNC: 96 MMOL/L — LOW (ref 98–107)
CHLORIDE SERPL-SCNC: 99 MMOL/L — SIGNIFICANT CHANGE UP (ref 98–107)
CO2 BLDV-SCNC: 29.5 MMOL/L — HIGH (ref 22–26)
CO2 BLDV-SCNC: 29.8 MMOL/L — HIGH (ref 22–26)
CO2 BLDV-SCNC: 29.9 MMOL/L — HIGH (ref 22–26)
CO2 BLDV-SCNC: 30.4 MMOL/L — HIGH (ref 22–26)
CO2 BLDV-SCNC: 30.9 MMOL/L — HIGH (ref 22–26)
CO2 BLDV-SCNC: 55.6 MMOL/L — HIGH (ref 22–26)
CO2 BLDV-SCNC: 58.4 MMOL/L — HIGH (ref 22–26)
CO2 BLDV-SCNC: 62.9 MMOL/L — HIGH (ref 22–26)
CO2 SERPL-SCNC: 22 MMOL/L
CO2 SERPL-SCNC: 23 MMOL/L
CO2 SERPL-SCNC: 23 MMOL/L — SIGNIFICANT CHANGE UP (ref 22–31)
CO2 SERPL-SCNC: 24 MMOL/L
CO2 SERPL-SCNC: 24 MMOL/L — SIGNIFICANT CHANGE UP (ref 22–31)
CO2 SERPL-SCNC: 24 MMOL/L — SIGNIFICANT CHANGE UP (ref 22–31)
CO2 SERPL-SCNC: 25 MMOL/L
CO2 SERPL-SCNC: 25 MMOL/L — SIGNIFICANT CHANGE UP (ref 22–31)
CO2 SERPL-SCNC: 26 MMOL/L
CO2 SERPL-SCNC: 26 MMOL/L — SIGNIFICANT CHANGE UP (ref 22–31)
CO2 SERPL-SCNC: 27 MMOL/L
CO2 SERPL-SCNC: 27 MMOL/L — SIGNIFICANT CHANGE UP (ref 22–31)
CO2 SERPL-SCNC: 28 MMOL/L — SIGNIFICANT CHANGE UP (ref 22–31)
CO2 SERPL-SCNC: 28 MMOL/L — SIGNIFICANT CHANGE UP (ref 22–31)
CO2 SERPL-SCNC: 29 MMOL/L
CO2 SERPL-SCNC: 30 MMOL/L — SIGNIFICANT CHANGE UP (ref 22–31)
CO2 SERPL-SCNC: 30 MMOL/L — SIGNIFICANT CHANGE UP (ref 22–31)
CO2 SERPL-SCNC: 31 MMOL/L — SIGNIFICANT CHANGE UP (ref 22–31)
CO2 SERPL-SCNC: 33 MMOL/L — HIGH (ref 22–31)
CO2 SERPL-SCNC: 35 MMOL/L — HIGH (ref 22–31)
CO2 SERPL-SCNC: 36 MMOL/L — HIGH (ref 22–31)
CO2 SERPL-SCNC: 39 MMOL/L — HIGH (ref 22–31)
CO2 SERPL-SCNC: 41 MMOL/L — HIGH (ref 22–31)
CO2 SERPL-SCNC: 43 MMOL/L — HIGH (ref 22–31)
CO2 SERPL-SCNC: 43 MMOL/L — HIGH (ref 22–31)
CO2 SERPL-SCNC: 46 MMOL/L — CRITICAL HIGH (ref 22–31)
CO2 SERPL-SCNC: 47 MMOL/L — CRITICAL HIGH (ref 22–31)
CO2 SERPL-SCNC: 48 MMOL/L — CRITICAL HIGH (ref 22–31)
COLOR FLD: YELLOW
COLOR SPEC: SIGNIFICANT CHANGE UP
COLOR SPEC: YELLOW — SIGNIFICANT CHANGE UP
COMMENT - URINE: SIGNIFICANT CHANGE UP
COMMENT - URINE: SIGNIFICANT CHANGE UP
CREAT SERPL-MCNC: 0.64 MG/DL — SIGNIFICANT CHANGE UP (ref 0.5–1.3)
CREAT SERPL-MCNC: 0.67 MG/DL — SIGNIFICANT CHANGE UP (ref 0.5–1.3)
CREAT SERPL-MCNC: 0.68 MG/DL — SIGNIFICANT CHANGE UP (ref 0.5–1.3)
CREAT SERPL-MCNC: 0.69 MG/DL — SIGNIFICANT CHANGE UP (ref 0.5–1.3)
CREAT SERPL-MCNC: 0.73 MG/DL — SIGNIFICANT CHANGE UP (ref 0.5–1.3)
CREAT SERPL-MCNC: 0.73 MG/DL — SIGNIFICANT CHANGE UP (ref 0.5–1.3)
CREAT SERPL-MCNC: 0.75 MG/DL — SIGNIFICANT CHANGE UP (ref 0.5–1.3)
CREAT SERPL-MCNC: 0.76 MG/DL — SIGNIFICANT CHANGE UP (ref 0.5–1.3)
CREAT SERPL-MCNC: 0.77 MG/DL — SIGNIFICANT CHANGE UP (ref 0.5–1.3)
CREAT SERPL-MCNC: 0.78 MG/DL — SIGNIFICANT CHANGE UP (ref 0.5–1.3)
CREAT SERPL-MCNC: 0.78 MG/DL — SIGNIFICANT CHANGE UP (ref 0.5–1.3)
CREAT SERPL-MCNC: 0.79 MG/DL
CREAT SERPL-MCNC: 0.79 MG/DL — SIGNIFICANT CHANGE UP (ref 0.5–1.3)
CREAT SERPL-MCNC: 0.79 MG/DL — SIGNIFICANT CHANGE UP (ref 0.5–1.3)
CREAT SERPL-MCNC: 0.81 MG/DL — SIGNIFICANT CHANGE UP (ref 0.5–1.3)
CREAT SERPL-MCNC: 0.84 MG/DL — SIGNIFICANT CHANGE UP (ref 0.5–1.3)
CREAT SERPL-MCNC: 0.85 MG/DL — SIGNIFICANT CHANGE UP (ref 0.5–1.3)
CREAT SERPL-MCNC: 0.85 MG/DL — SIGNIFICANT CHANGE UP (ref 0.5–1.3)
CREAT SERPL-MCNC: 0.86 MG/DL
CREAT SERPL-MCNC: 0.87 MG/DL
CREAT SERPL-MCNC: 0.87 MG/DL — SIGNIFICANT CHANGE UP (ref 0.5–1.3)
CREAT SERPL-MCNC: 0.88 MG/DL — SIGNIFICANT CHANGE UP (ref 0.5–1.3)
CREAT SERPL-MCNC: 0.94 MG/DL — SIGNIFICANT CHANGE UP (ref 0.5–1.3)
CREAT SERPL-MCNC: 0.95 MG/DL — SIGNIFICANT CHANGE UP (ref 0.5–1.3)
CREAT SERPL-MCNC: 0.96 MG/DL
CREAT SERPL-MCNC: 0.96 MG/DL — SIGNIFICANT CHANGE UP (ref 0.5–1.3)
CREAT SERPL-MCNC: 0.96 MG/DL — SIGNIFICANT CHANGE UP (ref 0.5–1.3)
CREAT SERPL-MCNC: 0.97 MG/DL
CREAT SERPL-MCNC: 0.97 MG/DL — SIGNIFICANT CHANGE UP (ref 0.5–1.3)
CREAT SERPL-MCNC: 0.97 MG/DL — SIGNIFICANT CHANGE UP (ref 0.5–1.3)
CREAT SERPL-MCNC: 0.99 MG/DL — SIGNIFICANT CHANGE UP (ref 0.5–1.3)
CREAT SERPL-MCNC: 1 MG/DL — SIGNIFICANT CHANGE UP (ref 0.5–1.3)
CREAT SERPL-MCNC: 1.01 MG/DL
CREAT SERPL-MCNC: 1.01 MG/DL — SIGNIFICANT CHANGE UP (ref 0.5–1.3)
CREAT SERPL-MCNC: 1.01 MG/DL — SIGNIFICANT CHANGE UP (ref 0.5–1.3)
CREAT SERPL-MCNC: 1.03 MG/DL — SIGNIFICANT CHANGE UP (ref 0.5–1.3)
CREAT SERPL-MCNC: 1.06 MG/DL — SIGNIFICANT CHANGE UP (ref 0.5–1.3)
CREAT SERPL-MCNC: 1.07 MG/DL — SIGNIFICANT CHANGE UP (ref 0.5–1.3)
CREAT SERPL-MCNC: 1.09 MG/DL
CREAT SERPL-MCNC: 1.09 MG/DL — SIGNIFICANT CHANGE UP (ref 0.5–1.3)
CREAT SERPL-MCNC: 1.14 MG/DL
CREAT SERPL-MCNC: 1.16 MG/DL — SIGNIFICANT CHANGE UP (ref 0.5–1.3)
CREAT SERPL-MCNC: 1.18 MG/DL
CRP SERPL-MCNC: 13.4 MG/L — HIGH
CRP SERPL-MCNC: 67.3 MG/L — HIGH
CULTURE RESULTS: NO GROWTH — SIGNIFICANT CHANGE UP
CULTURE RESULTS: SIGNIFICANT CHANGE UP
D DIMER BLD IA.RAPID-MCNC: 1046 NG/ML DDU — HIGH
D DIMER BLD IA.RAPID-MCNC: 1047 NG/ML DDU — HIGH
DACRYOCYTES BLD QL SMEAR: SLIGHT — SIGNIFICANT CHANGE UP
DIFF PNL FLD: NEGATIVE — SIGNIFICANT CHANGE UP
EGFR: 101 ML/MIN/1.73M2 — SIGNIFICANT CHANGE UP
EGFR: 102 ML/MIN/1.73M2 — SIGNIFICANT CHANGE UP
EGFR: 102 ML/MIN/1.73M2 — SIGNIFICANT CHANGE UP
EGFR: 103 ML/MIN/1.73M2 — SIGNIFICANT CHANGE UP
EGFR: 54 ML/MIN/1.73M2
EGFR: 55 ML/MIN/1.73M2 — LOW
EGFR: 56 ML/MIN/1.73M2
EGFR: 60 ML/MIN/1.73M2
EGFR: 60 ML/MIN/1.73M2 — SIGNIFICANT CHANGE UP
EGFR: 61 ML/MIN/1.73M2 — SIGNIFICANT CHANGE UP
EGFR: 62 ML/MIN/1.73M2 — SIGNIFICANT CHANGE UP
EGFR: 64 ML/MIN/1.73M2 — SIGNIFICANT CHANGE UP
EGFR: 65 ML/MIN/1.73M2
EGFR: 65 ML/MIN/1.73M2 — SIGNIFICANT CHANGE UP
EGFR: 65 ML/MIN/1.73M2 — SIGNIFICANT CHANGE UP
EGFR: 66 ML/MIN/1.73M2 — SIGNIFICANT CHANGE UP
EGFR: 67 ML/MIN/1.73M2 — SIGNIFICANT CHANGE UP
EGFR: 68 ML/MIN/1.73M2
EGFR: 69 ML/MIN/1.73M2
EGFR: 69 ML/MIN/1.73M2 — SIGNIFICANT CHANGE UP
EGFR: 70 ML/MIN/1.73M2 — SIGNIFICANT CHANGE UP
EGFR: 71 ML/MIN/1.73M2 — SIGNIFICANT CHANGE UP
EGFR: 77 ML/MIN/1.73M2 — SIGNIFICANT CHANGE UP
EGFR: 78 ML/MIN/1.73M2
EGFR: 78 ML/MIN/1.73M2 — SIGNIFICANT CHANGE UP
EGFR: 79 ML/MIN/1.73M2
EGFR: 80 ML/MIN/1.73M2 — SIGNIFICANT CHANGE UP
EGFR: 80 ML/MIN/1.73M2 — SIGNIFICANT CHANGE UP
EGFR: 81 ML/MIN/1.73M2 — SIGNIFICANT CHANGE UP
EGFR: 85 ML/MIN/1.73M2 — SIGNIFICANT CHANGE UP
EGFR: 87 ML/MIN/1.73M2
EGFR: 87 ML/MIN/1.73M2 — SIGNIFICANT CHANGE UP
EGFR: 87 ML/MIN/1.73M2 — SIGNIFICANT CHANGE UP
EGFR: 89 ML/MIN/1.73M2 — SIGNIFICANT CHANGE UP
EGFR: 89 ML/MIN/1.73M2 — SIGNIFICANT CHANGE UP
EGFR: 90 ML/MIN/1.73M2 — SIGNIFICANT CHANGE UP
EGFR: 91 ML/MIN/1.73M2 — SIGNIFICANT CHANGE UP
EGFR: 93 ML/MIN/1.73M2 — SIGNIFICANT CHANGE UP
EGFR: 96 ML/MIN/1.73M2 — SIGNIFICANT CHANGE UP
EGFR: 96 ML/MIN/1.73M2 — SIGNIFICANT CHANGE UP
ELLIPTOCYTES BLD QL SMEAR: SLIGHT — SIGNIFICANT CHANGE UP
EOSINOPHIL # BLD AUTO: 0 K/UL — SIGNIFICANT CHANGE UP (ref 0–0.5)
EOSINOPHIL # BLD AUTO: 0.01 K/UL — SIGNIFICANT CHANGE UP (ref 0–0.5)
EOSINOPHIL # BLD AUTO: 0.02 K/UL — SIGNIFICANT CHANGE UP (ref 0–0.5)
EOSINOPHIL # BLD AUTO: 0.03 K/UL — SIGNIFICANT CHANGE UP (ref 0–0.5)
EOSINOPHIL # BLD AUTO: 0.04 K/UL — SIGNIFICANT CHANGE UP (ref 0–0.5)
EOSINOPHIL # BLD AUTO: 0.05 K/UL — SIGNIFICANT CHANGE UP (ref 0–0.5)
EOSINOPHIL # BLD AUTO: 0.06 K/UL — SIGNIFICANT CHANGE UP (ref 0–0.5)
EOSINOPHIL # BLD AUTO: 0.07 K/UL — SIGNIFICANT CHANGE UP (ref 0–0.5)
EOSINOPHIL # BLD AUTO: 0.08 K/UL — SIGNIFICANT CHANGE UP (ref 0–0.5)
EOSINOPHIL # BLD AUTO: 0.09 K/UL — SIGNIFICANT CHANGE UP (ref 0–0.5)
EOSINOPHIL # BLD AUTO: 0.1 K/UL — SIGNIFICANT CHANGE UP (ref 0–0.5)
EOSINOPHIL # BLD AUTO: 0.11 K/UL — SIGNIFICANT CHANGE UP (ref 0–0.5)
EOSINOPHIL # BLD AUTO: 0.12 K/UL
EOSINOPHIL # BLD AUTO: 0.12 K/UL — SIGNIFICANT CHANGE UP (ref 0–0.5)
EOSINOPHIL # BLD AUTO: 0.13 K/UL — SIGNIFICANT CHANGE UP (ref 0–0.5)
EOSINOPHIL # BLD AUTO: 0.15 K/UL — SIGNIFICANT CHANGE UP (ref 0–0.5)
EOSINOPHIL # BLD AUTO: 0.17 K/UL — SIGNIFICANT CHANGE UP (ref 0–0.5)
EOSINOPHIL # BLD AUTO: 0.23 K/UL — SIGNIFICANT CHANGE UP (ref 0–0.5)
EOSINOPHIL NFR BLD AUTO: 0 % — SIGNIFICANT CHANGE UP (ref 0–6)
EOSINOPHIL NFR BLD AUTO: 0.2 % — SIGNIFICANT CHANGE UP (ref 0–6)
EOSINOPHIL NFR BLD AUTO: 0.3 % — SIGNIFICANT CHANGE UP (ref 0–6)
EOSINOPHIL NFR BLD AUTO: 0.3 % — SIGNIFICANT CHANGE UP (ref 0–6)
EOSINOPHIL NFR BLD AUTO: 0.5 % — SIGNIFICANT CHANGE UP (ref 0–6)
EOSINOPHIL NFR BLD AUTO: 0.5 % — SIGNIFICANT CHANGE UP (ref 0–6)
EOSINOPHIL NFR BLD AUTO: 0.6 % — SIGNIFICANT CHANGE UP (ref 0–6)
EOSINOPHIL NFR BLD AUTO: 0.7 % — SIGNIFICANT CHANGE UP (ref 0–6)
EOSINOPHIL NFR BLD AUTO: 0.7 % — SIGNIFICANT CHANGE UP (ref 0–6)
EOSINOPHIL NFR BLD AUTO: 0.9 % — SIGNIFICANT CHANGE UP (ref 0–6)
EOSINOPHIL NFR BLD AUTO: 1 % — SIGNIFICANT CHANGE UP (ref 0–6)
EOSINOPHIL NFR BLD AUTO: 1.1 % — SIGNIFICANT CHANGE UP (ref 0–6)
EOSINOPHIL NFR BLD AUTO: 1.3 % — SIGNIFICANT CHANGE UP (ref 0–6)
EOSINOPHIL NFR BLD AUTO: 1.6 %
EOSINOPHIL NFR BLD AUTO: 1.7 % — SIGNIFICANT CHANGE UP (ref 0–6)
EOSINOPHIL NFR BLD AUTO: 1.8 % — SIGNIFICANT CHANGE UP (ref 0–6)
EOSINOPHIL NFR BLD AUTO: 1.9 % — SIGNIFICANT CHANGE UP (ref 0–6)
EOSINOPHIL NFR BLD AUTO: 1.9 % — SIGNIFICANT CHANGE UP (ref 0–6)
EOSINOPHIL NFR BLD AUTO: 2 % — SIGNIFICANT CHANGE UP (ref 0–6)
EOSINOPHIL NFR BLD AUTO: 2 % — SIGNIFICANT CHANGE UP (ref 0–6)
EOSINOPHIL NFR BLD AUTO: 2.1 % — SIGNIFICANT CHANGE UP (ref 0–6)
EOSINOPHIL NFR BLD AUTO: 2.2 % — SIGNIFICANT CHANGE UP (ref 0–6)
EOSINOPHIL NFR BLD AUTO: 2.2 % — SIGNIFICANT CHANGE UP (ref 0–6)
EOSINOPHIL NFR BLD AUTO: 2.4 % — SIGNIFICANT CHANGE UP (ref 0–6)
EOSINOPHIL NFR BLD AUTO: 2.5 % — SIGNIFICANT CHANGE UP (ref 0–6)
EOSINOPHIL NFR BLD AUTO: 2.6 % — SIGNIFICANT CHANGE UP (ref 0–6)
EOSINOPHIL NFR BLD AUTO: 3.6 % — SIGNIFICANT CHANGE UP (ref 0–6)
EPI CELLS # UR: 17 /HPF — HIGH (ref 0–5)
EPI CELLS # UR: 3 /HPF — SIGNIFICANT CHANGE UP (ref 0–5)
EPI CELLS # UR: 5 /HPF — SIGNIFICANT CHANGE UP (ref 0–5)
EPI CELLS # UR: 6 /HPF — HIGH (ref 0–5)
EPI CELLS # UR: 8 /HPF — HIGH (ref 0–5)
EPI CELLS # UR: 9 /HPF — HIGH (ref 0–5)
ERYTHROCYTE [SEDIMENTATION RATE] IN BLOOD: 109 MM/HR — HIGH (ref 4–25)
FERRITIN SERPL-MCNC: 374 NG/ML — HIGH (ref 15–150)
FERRITIN SERPL-MCNC: 441 NG/ML — HIGH (ref 15–150)
FIBRINOGEN PPP-MCNC: 922 MG/DL — HIGH (ref 330–520)
FLUAV AG NPH QL: SIGNIFICANT CHANGE UP
FLUAV AG NPH QL: SIGNIFICANT CHANGE UP
FLUAV SUBTYP SPEC NAA+PROBE: SIGNIFICANT CHANGE UP
FLUBV AG NPH QL: SIGNIFICANT CHANGE UP
FLUBV AG NPH QL: SIGNIFICANT CHANGE UP
FLUBV RNA SPEC QL NAA+PROBE: SIGNIFICANT CHANGE UP
FLUID INTAKE SUBSTANCE CLASS: SIGNIFICANT CHANGE UP
FUNGITELL: <31 PG/ML — SIGNIFICANT CHANGE UP
GALACTOMANNAN AG SERPL-ACNC: 0.06 INDEX — SIGNIFICANT CHANGE UP (ref 0–0.49)
GAS PNL BLDV: 135 MMOL/L — LOW (ref 136–145)
GAS PNL BLDV: 136 MMOL/L — SIGNIFICANT CHANGE UP (ref 136–145)
GAS PNL BLDV: 138 MMOL/L — SIGNIFICANT CHANGE UP (ref 136–145)
GAS PNL BLDV: 139 MMOL/L — SIGNIFICANT CHANGE UP (ref 136–145)
GAS PNL BLDV: 143 MMOL/L — SIGNIFICANT CHANGE UP (ref 136–145)
GAS PNL BLDV: 145 MMOL/L — SIGNIFICANT CHANGE UP (ref 136–145)
GAS PNL BLDV: 145 MMOL/L — SIGNIFICANT CHANGE UP (ref 136–145)
GAS PNL BLDV: SIGNIFICANT CHANGE UP
GAS PNL BLDV: SIGNIFICANT CHANGE UP
GIANT PLATELETS BLD QL SMEAR: PRESENT — SIGNIFICANT CHANGE UP
GIANT PLATELETS BLD QL SMEAR: PRESENT — SIGNIFICANT CHANGE UP
GLUCOSE BLDC GLUCOMTR-MCNC: 168 MG/DL — HIGH (ref 70–99)
GLUCOSE BLDC GLUCOMTR-MCNC: 184 MG/DL — HIGH (ref 70–99)
GLUCOSE BLDV-MCNC: 101 MG/DL — HIGH (ref 70–99)
GLUCOSE BLDV-MCNC: 108 MG/DL — HIGH (ref 70–99)
GLUCOSE BLDV-MCNC: 112 MG/DL — HIGH (ref 70–99)
GLUCOSE BLDV-MCNC: 122 MG/DL — HIGH (ref 70–99)
GLUCOSE BLDV-MCNC: 153 MG/DL — HIGH (ref 70–99)
GLUCOSE BLDV-MCNC: 171 MG/DL — HIGH (ref 70–99)
GLUCOSE BLDV-MCNC: 96 MG/DL — SIGNIFICANT CHANGE UP (ref 70–99)
GLUCOSE FLD-MCNC: 27 MG/DL — SIGNIFICANT CHANGE UP
GLUCOSE SERPL-MCNC: 100 MG/DL — HIGH (ref 70–99)
GLUCOSE SERPL-MCNC: 100 MG/DL — HIGH (ref 70–99)
GLUCOSE SERPL-MCNC: 101 MG/DL — HIGH (ref 70–99)
GLUCOSE SERPL-MCNC: 101 MG/DL — HIGH (ref 70–99)
GLUCOSE SERPL-MCNC: 102 MG/DL — HIGH (ref 70–99)
GLUCOSE SERPL-MCNC: 103 MG/DL — HIGH (ref 70–99)
GLUCOSE SERPL-MCNC: 103 MG/DL — HIGH (ref 70–99)
GLUCOSE SERPL-MCNC: 104 MG/DL — HIGH (ref 70–99)
GLUCOSE SERPL-MCNC: 104 MG/DL — HIGH (ref 70–99)
GLUCOSE SERPL-MCNC: 105 MG/DL — HIGH (ref 70–99)
GLUCOSE SERPL-MCNC: 107 MG/DL — HIGH (ref 70–99)
GLUCOSE SERPL-MCNC: 108 MG/DL
GLUCOSE SERPL-MCNC: 108 MG/DL
GLUCOSE SERPL-MCNC: 109 MG/DL — HIGH (ref 70–99)
GLUCOSE SERPL-MCNC: 111 MG/DL — HIGH (ref 70–99)
GLUCOSE SERPL-MCNC: 114 MG/DL
GLUCOSE SERPL-MCNC: 114 MG/DL — HIGH (ref 70–99)
GLUCOSE SERPL-MCNC: 116 MG/DL — HIGH (ref 70–99)
GLUCOSE SERPL-MCNC: 117 MG/DL — HIGH (ref 70–99)
GLUCOSE SERPL-MCNC: 117 MG/DL — HIGH (ref 70–99)
GLUCOSE SERPL-MCNC: 120 MG/DL — HIGH (ref 70–99)
GLUCOSE SERPL-MCNC: 120 MG/DL — HIGH (ref 70–99)
GLUCOSE SERPL-MCNC: 121 MG/DL
GLUCOSE SERPL-MCNC: 121 MG/DL — HIGH (ref 70–99)
GLUCOSE SERPL-MCNC: 123 MG/DL — HIGH (ref 70–99)
GLUCOSE SERPL-MCNC: 126 MG/DL — HIGH (ref 70–99)
GLUCOSE SERPL-MCNC: 127 MG/DL
GLUCOSE SERPL-MCNC: 128 MG/DL
GLUCOSE SERPL-MCNC: 135 MG/DL — HIGH (ref 70–99)
GLUCOSE SERPL-MCNC: 137 MG/DL
GLUCOSE SERPL-MCNC: 137 MG/DL — HIGH (ref 70–99)
GLUCOSE SERPL-MCNC: 144 MG/DL
GLUCOSE SERPL-MCNC: 148 MG/DL — HIGH (ref 70–99)
GLUCOSE SERPL-MCNC: 148 MG/DL — HIGH (ref 70–99)
GLUCOSE SERPL-MCNC: 150 MG/DL — HIGH (ref 70–99)
GLUCOSE SERPL-MCNC: 171 MG/DL — HIGH (ref 70–99)
GLUCOSE SERPL-MCNC: 185 MG/DL — HIGH (ref 70–99)
GLUCOSE SERPL-MCNC: 194 MG/DL — HIGH (ref 70–99)
GLUCOSE SERPL-MCNC: 88 MG/DL — SIGNIFICANT CHANGE UP (ref 70–99)
GLUCOSE SERPL-MCNC: 92 MG/DL
GLUCOSE SERPL-MCNC: 93 MG/DL — SIGNIFICANT CHANGE UP (ref 70–99)
GLUCOSE SERPL-MCNC: 93 MG/DL — SIGNIFICANT CHANGE UP (ref 70–99)
GLUCOSE SERPL-MCNC: 95 MG/DL — SIGNIFICANT CHANGE UP (ref 70–99)
GLUCOSE SERPL-MCNC: 96 MG/DL — SIGNIFICANT CHANGE UP (ref 70–99)
GLUCOSE SERPL-MCNC: 96 MG/DL — SIGNIFICANT CHANGE UP (ref 70–99)
GLUCOSE SERPL-MCNC: 97 MG/DL — SIGNIFICANT CHANGE UP (ref 70–99)
GLUCOSE SERPL-MCNC: 99 MG/DL — SIGNIFICANT CHANGE UP (ref 70–99)
GLUCOSE UR QL: NEGATIVE — SIGNIFICANT CHANGE UP
GLUCOSE UR QL: SIGNIFICANT CHANGE UP
GRAM STN FLD: SIGNIFICANT CHANGE UP
GRAN CASTS # UR COMP ASSIST: 2 /LPF — HIGH
HADV DNA SPEC QL NAA+PROBE: SIGNIFICANT CHANGE UP
HCO3 BLDV-SCNC: 28 MMOL/L — SIGNIFICANT CHANGE UP (ref 22–29)
HCO3 BLDV-SCNC: 29 MMOL/L — SIGNIFICANT CHANGE UP (ref 22–29)
HCO3 BLDV-SCNC: 29 MMOL/L — SIGNIFICANT CHANGE UP (ref 22–29)
HCO3 BLDV-SCNC: 53 MMOL/L — CRITICAL HIGH (ref 22–29)
HCO3 BLDV-SCNC: 56 MMOL/L — CRITICAL HIGH (ref 22–29)
HCO3 BLDV-SCNC: 60 MMOL/L — CRITICAL HIGH (ref 22–29)
HCOV 229E RNA SPEC QL NAA+PROBE: SIGNIFICANT CHANGE UP
HCOV HKU1 RNA SPEC QL NAA+PROBE: SIGNIFICANT CHANGE UP
HCOV NL63 RNA SPEC QL NAA+PROBE: SIGNIFICANT CHANGE UP
HCOV OC43 RNA SPEC QL NAA+PROBE: SIGNIFICANT CHANGE UP
HCT VFR BLD CALC: 23.5 % — LOW (ref 34.5–45)
HCT VFR BLD CALC: 26 % — LOW (ref 34.5–45)
HCT VFR BLD CALC: 27.3 % — LOW (ref 34.5–45)
HCT VFR BLD CALC: 27.5 % — LOW (ref 34.5–45)
HCT VFR BLD CALC: 27.5 % — LOW (ref 34.5–45)
HCT VFR BLD CALC: 28.1 % — LOW (ref 34.5–45)
HCT VFR BLD CALC: 28.2 % — LOW (ref 34.5–45)
HCT VFR BLD CALC: 28.4 % — LOW (ref 34.5–45)
HCT VFR BLD CALC: 28.5 % — LOW (ref 34.5–45)
HCT VFR BLD CALC: 28.7 % — LOW (ref 34.5–45)
HCT VFR BLD CALC: 28.8 % — LOW (ref 34.5–45)
HCT VFR BLD CALC: 28.8 % — LOW (ref 34.5–45)
HCT VFR BLD CALC: 29.2 % — LOW (ref 34.5–45)
HCT VFR BLD CALC: 29.5 % — LOW (ref 34.5–45)
HCT VFR BLD CALC: 29.6 % — LOW (ref 34.5–45)
HCT VFR BLD CALC: 29.7 % — LOW (ref 34.5–45)
HCT VFR BLD CALC: 30 % — LOW (ref 34.5–45)
HCT VFR BLD CALC: 30.1 % — LOW (ref 34.5–45)
HCT VFR BLD CALC: 30.2 % — LOW (ref 34.5–45)
HCT VFR BLD CALC: 30.4 % — LOW (ref 34.5–45)
HCT VFR BLD CALC: 30.7 % — LOW (ref 34.5–45)
HCT VFR BLD CALC: 30.9 % — LOW (ref 34.5–45)
HCT VFR BLD CALC: 31 % — LOW (ref 34.5–45)
HCT VFR BLD CALC: 31.1 % — LOW (ref 34.5–45)
HCT VFR BLD CALC: 31.2 % — LOW (ref 34.5–45)
HCT VFR BLD CALC: 31.7 % — LOW (ref 34.5–45)
HCT VFR BLD CALC: 31.8 % — LOW (ref 34.5–45)
HCT VFR BLD CALC: 31.8 % — LOW (ref 34.5–45)
HCT VFR BLD CALC: 31.9 % — LOW (ref 34.5–45)
HCT VFR BLD CALC: 31.9 % — LOW (ref 34.5–45)
HCT VFR BLD CALC: 32.1 % — LOW (ref 34.5–45)
HCT VFR BLD CALC: 32.3 % — LOW (ref 34.5–45)
HCT VFR BLD CALC: 32.4 % — LOW (ref 34.5–45)
HCT VFR BLD CALC: 32.5 % — LOW (ref 34.5–45)
HCT VFR BLD CALC: 32.6 % — LOW (ref 34.5–45)
HCT VFR BLD CALC: 32.7 % — LOW (ref 34.5–45)
HCT VFR BLD CALC: 33.1 % — LOW (ref 34.5–45)
HCT VFR BLD CALC: 33.3 % — LOW (ref 34.5–45)
HCT VFR BLD CALC: 33.4 % — LOW (ref 34.5–45)
HCT VFR BLD CALC: 33.6 %
HCT VFR BLD CALC: 33.6 % — LOW (ref 34.5–45)
HCT VFR BLD CALC: 33.8 % — LOW (ref 34.5–45)
HCT VFR BLD CALC: 34.4 % — LOW (ref 34.5–45)
HCT VFR BLD CALC: 35.6 % — SIGNIFICANT CHANGE UP (ref 34.5–45)
HCT VFR BLD CALC: 35.6 % — SIGNIFICANT CHANGE UP (ref 34.5–45)
HCT VFR BLD CALC: 38.3 % — SIGNIFICANT CHANGE UP (ref 34.5–45)
HCT VFR BLD CALC: 41.2 % — SIGNIFICANT CHANGE UP (ref 34.5–45)
HCT VFR BLDA CALC: 26 % — LOW (ref 34.5–46.5)
HCT VFR BLDA CALC: 27 % — LOW (ref 34.5–46.5)
HCT VFR BLDA CALC: 27 % — LOW (ref 34.5–46.5)
HCT VFR BLDA CALC: 28 % — LOW (ref 34.5–46.5)
HCT VFR BLDA CALC: 29 % — LOW (ref 34.5–46.5)
HCT VFR BLDA CALC: 29 % — LOW (ref 34.5–46.5)
HCT VFR BLDA CALC: 38 % — SIGNIFICANT CHANGE UP (ref 34.5–46.5)
HGB BLD CALC-MCNC: 12.8 G/DL — SIGNIFICANT CHANGE UP (ref 11.5–15.5)
HGB BLD CALC-MCNC: 8.6 G/DL — LOW (ref 11.5–15.5)
HGB BLD CALC-MCNC: 8.9 G/DL — LOW (ref 11.5–15.5)
HGB BLD CALC-MCNC: 9 G/DL — LOW (ref 11.5–15.5)
HGB BLD CALC-MCNC: 9.4 G/DL — LOW (ref 11.5–15.5)
HGB BLD CALC-MCNC: 9.5 G/DL — LOW (ref 11.5–15.5)
HGB BLD CALC-MCNC: 9.8 G/DL — LOW (ref 11.5–15.5)
HGB BLD-MCNC: 10 G/DL — LOW (ref 11.5–15.5)
HGB BLD-MCNC: 10 G/DL — LOW (ref 11.5–15.5)
HGB BLD-MCNC: 10.1 G/DL — LOW (ref 11.5–15.5)
HGB BLD-MCNC: 10.2 G/DL — LOW (ref 11.5–15.5)
HGB BLD-MCNC: 10.3 G/DL
HGB BLD-MCNC: 10.4 G/DL — LOW (ref 11.5–15.5)
HGB BLD-MCNC: 10.6 G/DL — LOW (ref 11.5–15.5)
HGB BLD-MCNC: 10.6 G/DL — LOW (ref 11.5–15.5)
HGB BLD-MCNC: 10.7 G/DL — LOW (ref 11.5–15.5)
HGB BLD-MCNC: 10.7 G/DL — LOW (ref 11.5–15.5)
HGB BLD-MCNC: 11.1 G/DL — LOW (ref 11.5–15.5)
HGB BLD-MCNC: 11.4 G/DL — LOW (ref 11.5–15.5)
HGB BLD-MCNC: 11.7 G/DL — SIGNIFICANT CHANGE UP (ref 11.5–15.5)
HGB BLD-MCNC: 13.1 G/DL — SIGNIFICANT CHANGE UP (ref 11.5–15.5)
HGB BLD-MCNC: 7.4 G/DL — LOW (ref 11.5–15.5)
HGB BLD-MCNC: 8.1 G/DL — LOW (ref 11.5–15.5)
HGB BLD-MCNC: 8.3 G/DL — LOW (ref 11.5–15.5)
HGB BLD-MCNC: 8.3 G/DL — LOW (ref 11.5–15.5)
HGB BLD-MCNC: 8.6 G/DL — LOW (ref 11.5–15.5)
HGB BLD-MCNC: 8.7 G/DL — LOW (ref 11.5–15.5)
HGB BLD-MCNC: 8.7 G/DL — LOW (ref 11.5–15.5)
HGB BLD-MCNC: 8.8 G/DL — LOW (ref 11.5–15.5)
HGB BLD-MCNC: 8.9 G/DL — LOW (ref 11.5–15.5)
HGB BLD-MCNC: 8.9 G/DL — LOW (ref 11.5–15.5)
HGB BLD-MCNC: 9 G/DL — LOW (ref 11.5–15.5)
HGB BLD-MCNC: 9.1 G/DL — LOW (ref 11.5–15.5)
HGB BLD-MCNC: 9.2 G/DL — LOW (ref 11.5–15.5)
HGB BLD-MCNC: 9.3 G/DL — LOW (ref 11.5–15.5)
HGB BLD-MCNC: 9.4 G/DL — LOW (ref 11.5–15.5)
HGB BLD-MCNC: 9.5 G/DL — LOW (ref 11.5–15.5)
HGB BLD-MCNC: 9.6 G/DL — LOW (ref 11.5–15.5)
HGB BLD-MCNC: 9.6 G/DL — LOW (ref 11.5–15.5)
HGB BLD-MCNC: 9.7 G/DL — LOW (ref 11.5–15.5)
HGB BLD-MCNC: 9.8 G/DL — LOW (ref 11.5–15.5)
HGB BLD-MCNC: 9.8 G/DL — LOW (ref 11.5–15.5)
HMPV RNA SPEC QL NAA+PROBE: SIGNIFICANT CHANGE UP
HPIV1 RNA SPEC QL NAA+PROBE: SIGNIFICANT CHANGE UP
HPIV2 RNA SPEC QL NAA+PROBE: SIGNIFICANT CHANGE UP
HPIV3 RNA SPEC QL NAA+PROBE: SIGNIFICANT CHANGE UP
HPIV4 RNA SPEC QL NAA+PROBE: SIGNIFICANT CHANGE UP
HYALINE CASTS # UR AUTO: 0 /LPF — SIGNIFICANT CHANGE UP (ref 0–7)
HYALINE CASTS # UR AUTO: 0 /LPF — SIGNIFICANT CHANGE UP (ref 0–7)
HYALINE CASTS # UR AUTO: 1 /LPF — SIGNIFICANT CHANGE UP (ref 0–7)
HYALINE CASTS # UR AUTO: 2 /LPF — SIGNIFICANT CHANGE UP (ref 0–7)
HYPOCHROMIA BLD QL: SLIGHT — SIGNIFICANT CHANGE UP
IANC: 0.87 K/UL — LOW (ref 1.8–7.4)
IANC: 1.49 K/UL — LOW (ref 1.8–7.4)
IANC: 1.64 K/UL — LOW (ref 1.8–7.4)
IANC: 10.9 K/UL — HIGH (ref 1.8–7.4)
IANC: 12 K/UL — HIGH (ref 1.8–7.4)
IANC: 2.65 K/UL — SIGNIFICANT CHANGE UP (ref 1.8–7.4)
IANC: 3.01 K/UL — SIGNIFICANT CHANGE UP (ref 1.8–7.4)
IANC: 3.01 K/UL — SIGNIFICANT CHANGE UP (ref 1.8–7.4)
IANC: 3.06 K/UL — SIGNIFICANT CHANGE UP (ref 1.8–7.4)
IANC: 3.16 K/UL — SIGNIFICANT CHANGE UP (ref 1.8–7.4)
IANC: 3.39 K/UL — SIGNIFICANT CHANGE UP (ref 1.8–7.4)
IANC: 3.4 K/UL — SIGNIFICANT CHANGE UP (ref 1.8–7.4)
IANC: 3.6 K/UL — SIGNIFICANT CHANGE UP (ref 1.8–7.4)
IANC: 3.68 K/UL — SIGNIFICANT CHANGE UP (ref 1.8–7.4)
IANC: 3.99 K/UL — SIGNIFICANT CHANGE UP (ref 1.8–7.4)
IANC: 4.11 K/UL — SIGNIFICANT CHANGE UP (ref 1.8–7.4)
IANC: 4.82 K/UL — SIGNIFICANT CHANGE UP (ref 1.8–7.4)
IANC: 5.86 K/UL — SIGNIFICANT CHANGE UP (ref 1.8–7.4)
IANC: 5.98 K/UL — SIGNIFICANT CHANGE UP (ref 1.8–7.4)
IANC: 7.19 K/UL — SIGNIFICANT CHANGE UP (ref 1.8–7.4)
IANC: 7.43 K/UL — HIGH (ref 1.8–7.4)
IANC: 7.45 K/UL — HIGH (ref 1.8–7.4)
IANC: 7.83 K/UL — HIGH (ref 1.8–7.4)
IANC: 8.53 K/UL — HIGH (ref 1.8–7.4)
IANC: 9.66 K/UL — HIGH (ref 1.8–7.4)
IMM GRANULOCYTES NFR BLD AUTO: 0.1 % — SIGNIFICANT CHANGE UP (ref 0–1.5)
IMM GRANULOCYTES NFR BLD AUTO: 0.2 % — SIGNIFICANT CHANGE UP (ref 0–1.5)
IMM GRANULOCYTES NFR BLD AUTO: 0.3 %
IMM GRANULOCYTES NFR BLD AUTO: 0.3 % — SIGNIFICANT CHANGE UP (ref 0–0.9)
IMM GRANULOCYTES NFR BLD AUTO: 0.3 % — SIGNIFICANT CHANGE UP (ref 0–1.5)
IMM GRANULOCYTES NFR BLD AUTO: 0.4 % — SIGNIFICANT CHANGE UP (ref 0–0.9)
IMM GRANULOCYTES NFR BLD AUTO: 0.4 % — SIGNIFICANT CHANGE UP (ref 0–1.5)
IMM GRANULOCYTES NFR BLD AUTO: 0.5 % — SIGNIFICANT CHANGE UP (ref 0–0.9)
IMM GRANULOCYTES NFR BLD AUTO: 0.5 % — SIGNIFICANT CHANGE UP (ref 0–1.5)
IMM GRANULOCYTES NFR BLD AUTO: 0.6 % — SIGNIFICANT CHANGE UP (ref 0–0.9)
IMM GRANULOCYTES NFR BLD AUTO: 0.6 % — SIGNIFICANT CHANGE UP (ref 0–0.9)
IMM GRANULOCYTES NFR BLD AUTO: 0.7 % — SIGNIFICANT CHANGE UP (ref 0–0.9)
IMM GRANULOCYTES NFR BLD AUTO: 0.7 % — SIGNIFICANT CHANGE UP (ref 0–1.5)
IMM GRANULOCYTES NFR BLD AUTO: 0.7 % — SIGNIFICANT CHANGE UP (ref 0–1.5)
IMM GRANULOCYTES NFR BLD AUTO: 0.8 % — SIGNIFICANT CHANGE UP (ref 0–0.9)
IMM GRANULOCYTES NFR BLD AUTO: 0.8 % — SIGNIFICANT CHANGE UP (ref 0–0.9)
IMM GRANULOCYTES NFR BLD AUTO: 0.9 % — SIGNIFICANT CHANGE UP (ref 0–1.5)
IMM GRANULOCYTES NFR BLD AUTO: 1.1 % — SIGNIFICANT CHANGE UP (ref 0–1.5)
IMM GRANULOCYTES NFR BLD AUTO: 1.3 % — HIGH (ref 0–0.9)
IMM GRANULOCYTES NFR BLD AUTO: 1.3 % — HIGH (ref 0–0.9)
IMM GRANULOCYTES NFR BLD AUTO: 1.3 % — SIGNIFICANT CHANGE UP (ref 0–1.5)
IMM GRANULOCYTES NFR BLD AUTO: 1.4 % — HIGH (ref 0–0.9)
IMM GRANULOCYTES NFR BLD AUTO: 1.5 % — HIGH (ref 0–0.9)
IMM GRANULOCYTES NFR BLD AUTO: 1.5 % — SIGNIFICANT CHANGE UP (ref 0–1.5)
IMM GRANULOCYTES NFR BLD AUTO: 1.8 % — HIGH (ref 0–1.5)
INR BLD: 1.05 RATIO — SIGNIFICANT CHANGE UP (ref 0.88–1.16)
INR BLD: 1.16 RATIO — SIGNIFICANT CHANGE UP (ref 0.88–1.16)
INR BLD: 1.23 RATIO — HIGH (ref 0.88–1.16)
INR BLD: 1.25 RATIO — HIGH (ref 0.88–1.16)
INR BLD: 1.28 RATIO — HIGH (ref 0.88–1.16)
INR BLD: 1.3 RATIO — HIGH (ref 0.88–1.16)
INR BLD: 1.32 RATIO — HIGH (ref 0.88–1.16)
INR BLD: 1.4 RATIO — HIGH (ref 0.88–1.16)
INR PPP: 1.07 RATIO
INR PPP: 1.11 RATIO
INR PPP: 1.18 RATIO
INR PPP: 1.19 RATIO
KETONES UR-MCNC: ABNORMAL
KETONES UR-MCNC: NEGATIVE — SIGNIFICANT CHANGE UP
KETONES UR-MCNC: NEGATIVE — SIGNIFICANT CHANGE UP
KETONES UR-MCNC: SIGNIFICANT CHANGE UP
LACTATE BLDV-MCNC: 0.8 MMOL/L — SIGNIFICANT CHANGE UP (ref 0.5–2)
LACTATE BLDV-MCNC: 1 MMOL/L — SIGNIFICANT CHANGE UP (ref 0.5–2)
LACTATE BLDV-MCNC: 1.1 MMOL/L — SIGNIFICANT CHANGE UP (ref 0.5–2)
LACTATE BLDV-MCNC: 1.4 MMOL/L — SIGNIFICANT CHANGE UP (ref 0.5–2)
LACTATE BLDV-MCNC: 1.6 MMOL/L — SIGNIFICANT CHANGE UP (ref 0.5–2)
LACTATE BLDV-MCNC: 1.6 MMOL/L — SIGNIFICANT CHANGE UP (ref 0.5–2)
LACTATE BLDV-MCNC: 2.5 MMOL/L — HIGH (ref 0.5–2)
LACTATE SERPL-SCNC: 1.1 MMOL/L — SIGNIFICANT CHANGE UP (ref 0.5–2)
LACTATE SERPL-SCNC: 3.1 MMOL/L — HIGH (ref 0.5–2)
LDH SERPL L TO P-CCNC: 141 U/L — SIGNIFICANT CHANGE UP (ref 135–225)
LDH SERPL L TO P-CCNC: 146 U/L — SIGNIFICANT CHANGE UP (ref 135–225)
LDH SERPL L TO P-CCNC: 192 U/L — SIGNIFICANT CHANGE UP (ref 135–225)
LDH SERPL L TO P-CCNC: 198 U/L — SIGNIFICANT CHANGE UP (ref 135–225)
LDH SERPL L TO P-CCNC: 290 U/L — SIGNIFICANT CHANGE UP
LDLC SERPL DIRECT ASSAY-MCNC: 67 MG/DL — LOW (ref 73–160)
LEGIONELLA AG UR QL: NEGATIVE — SIGNIFICANT CHANGE UP
LEUKOCYTE ESTERASE UR-ACNC: ABNORMAL
LEUKOCYTE ESTERASE UR-ACNC: ABNORMAL
LEUKOCYTE ESTERASE UR-ACNC: NEGATIVE — SIGNIFICANT CHANGE UP
LYMPHOCYTES # BLD AUTO: 0.6 K/UL — LOW (ref 1–3.3)
LYMPHOCYTES # BLD AUTO: 0.65 K/UL — LOW (ref 1–3.3)
LYMPHOCYTES # BLD AUTO: 0.67 K/UL — LOW (ref 1–3.3)
LYMPHOCYTES # BLD AUTO: 0.7 K/UL — LOW (ref 1–3.3)
LYMPHOCYTES # BLD AUTO: 0.72 K/UL — LOW (ref 1–3.3)
LYMPHOCYTES # BLD AUTO: 0.73 K/UL — LOW (ref 1–3.3)
LYMPHOCYTES # BLD AUTO: 0.79 K/UL — LOW (ref 1–3.3)
LYMPHOCYTES # BLD AUTO: 0.8 K/UL — LOW (ref 1–3.3)
LYMPHOCYTES # BLD AUTO: 0.81 K/UL — LOW (ref 1–3.3)
LYMPHOCYTES # BLD AUTO: 0.83 K/UL — LOW (ref 1–3.3)
LYMPHOCYTES # BLD AUTO: 0.86 K/UL — LOW (ref 1–3.3)
LYMPHOCYTES # BLD AUTO: 0.92 K/UL — LOW (ref 1–3.3)
LYMPHOCYTES # BLD AUTO: 0.99 K/UL — LOW (ref 1–3.3)
LYMPHOCYTES # BLD AUTO: 1.01 K/UL — SIGNIFICANT CHANGE UP (ref 1–3.3)
LYMPHOCYTES # BLD AUTO: 1.03 K/UL — SIGNIFICANT CHANGE UP (ref 1–3.3)
LYMPHOCYTES # BLD AUTO: 1.08 K/UL — SIGNIFICANT CHANGE UP (ref 1–3.3)
LYMPHOCYTES # BLD AUTO: 1.1 K/UL — SIGNIFICANT CHANGE UP (ref 1–3.3)
LYMPHOCYTES # BLD AUTO: 1.1 K/UL — SIGNIFICANT CHANGE UP (ref 1–3.3)
LYMPHOCYTES # BLD AUTO: 1.15 K/UL — SIGNIFICANT CHANGE UP (ref 1–3.3)
LYMPHOCYTES # BLD AUTO: 1.23 K/UL — SIGNIFICANT CHANGE UP (ref 1–3.3)
LYMPHOCYTES # BLD AUTO: 1.25 K/UL — SIGNIFICANT CHANGE UP (ref 1–3.3)
LYMPHOCYTES # BLD AUTO: 1.27 K/UL — SIGNIFICANT CHANGE UP (ref 1–3.3)
LYMPHOCYTES # BLD AUTO: 1.27 K/UL — SIGNIFICANT CHANGE UP (ref 1–3.3)
LYMPHOCYTES # BLD AUTO: 1.28 K/UL — SIGNIFICANT CHANGE UP (ref 1–3.3)
LYMPHOCYTES # BLD AUTO: 1.29 K/UL — SIGNIFICANT CHANGE UP (ref 1–3.3)
LYMPHOCYTES # BLD AUTO: 1.3 K/UL — SIGNIFICANT CHANGE UP (ref 1–3.3)
LYMPHOCYTES # BLD AUTO: 1.31 K/UL — SIGNIFICANT CHANGE UP (ref 1–3.3)
LYMPHOCYTES # BLD AUTO: 1.34 K/UL — SIGNIFICANT CHANGE UP (ref 1–3.3)
LYMPHOCYTES # BLD AUTO: 1.37 K/UL — SIGNIFICANT CHANGE UP (ref 1–3.3)
LYMPHOCYTES # BLD AUTO: 1.41 K/UL — SIGNIFICANT CHANGE UP (ref 1–3.3)
LYMPHOCYTES # BLD AUTO: 1.45 K/UL — SIGNIFICANT CHANGE UP (ref 1–3.3)
LYMPHOCYTES # BLD AUTO: 1.45 K/UL — SIGNIFICANT CHANGE UP (ref 1–3.3)
LYMPHOCYTES # BLD AUTO: 1.46 K/UL — SIGNIFICANT CHANGE UP (ref 1–3.3)
LYMPHOCYTES # BLD AUTO: 1.47 K/UL — SIGNIFICANT CHANGE UP (ref 1–3.3)
LYMPHOCYTES # BLD AUTO: 1.49 K/UL — SIGNIFICANT CHANGE UP (ref 1–3.3)
LYMPHOCYTES # BLD AUTO: 1.56 K/UL — SIGNIFICANT CHANGE UP (ref 1–3.3)
LYMPHOCYTES # BLD AUTO: 1.57 K/UL — SIGNIFICANT CHANGE UP (ref 1–3.3)
LYMPHOCYTES # BLD AUTO: 1.59 K/UL
LYMPHOCYTES # BLD AUTO: 1.62 K/UL — SIGNIFICANT CHANGE UP (ref 1–3.3)
LYMPHOCYTES # BLD AUTO: 1.64 K/UL — SIGNIFICANT CHANGE UP (ref 1–3.3)
LYMPHOCYTES # BLD AUTO: 1.66 K/UL — SIGNIFICANT CHANGE UP (ref 1–3.3)
LYMPHOCYTES # BLD AUTO: 1.77 K/UL — SIGNIFICANT CHANGE UP (ref 1–3.3)
LYMPHOCYTES # BLD AUTO: 1.79 K/UL — SIGNIFICANT CHANGE UP (ref 1–3.3)
LYMPHOCYTES # BLD AUTO: 1.8 K/UL — SIGNIFICANT CHANGE UP (ref 1–3.3)
LYMPHOCYTES # BLD AUTO: 1.82 K/UL — SIGNIFICANT CHANGE UP (ref 1–3.3)
LYMPHOCYTES # BLD AUTO: 1.95 K/UL — SIGNIFICANT CHANGE UP (ref 1–3.3)
LYMPHOCYTES # BLD AUTO: 10.7 % — LOW (ref 13–44)
LYMPHOCYTES # BLD AUTO: 12 % — LOW (ref 13–44)
LYMPHOCYTES # BLD AUTO: 12.8 % — LOW (ref 13–44)
LYMPHOCYTES # BLD AUTO: 15 % — SIGNIFICANT CHANGE UP (ref 13–44)
LYMPHOCYTES # BLD AUTO: 15.2 % — SIGNIFICANT CHANGE UP (ref 13–44)
LYMPHOCYTES # BLD AUTO: 16.8 % — SIGNIFICANT CHANGE UP (ref 13–44)
LYMPHOCYTES # BLD AUTO: 17.8 % — SIGNIFICANT CHANGE UP (ref 13–44)
LYMPHOCYTES # BLD AUTO: 18 % — SIGNIFICANT CHANGE UP (ref 13–44)
LYMPHOCYTES # BLD AUTO: 18.2 % — SIGNIFICANT CHANGE UP (ref 13–44)
LYMPHOCYTES # BLD AUTO: 18.2 % — SIGNIFICANT CHANGE UP (ref 13–44)
LYMPHOCYTES # BLD AUTO: 18.5 % — SIGNIFICANT CHANGE UP (ref 13–44)
LYMPHOCYTES # BLD AUTO: 18.5 % — SIGNIFICANT CHANGE UP (ref 13–44)
LYMPHOCYTES # BLD AUTO: 19.4 % — SIGNIFICANT CHANGE UP (ref 13–44)
LYMPHOCYTES # BLD AUTO: 19.6 % — SIGNIFICANT CHANGE UP (ref 13–44)
LYMPHOCYTES # BLD AUTO: 19.6 % — SIGNIFICANT CHANGE UP (ref 13–44)
LYMPHOCYTES # BLD AUTO: 19.7 % — SIGNIFICANT CHANGE UP (ref 13–44)
LYMPHOCYTES # BLD AUTO: 19.9 % — SIGNIFICANT CHANGE UP (ref 13–44)
LYMPHOCYTES # BLD AUTO: 20.2 % — SIGNIFICANT CHANGE UP (ref 13–44)
LYMPHOCYTES # BLD AUTO: 20.9 % — SIGNIFICANT CHANGE UP (ref 13–44)
LYMPHOCYTES # BLD AUTO: 21.4 % — SIGNIFICANT CHANGE UP (ref 13–44)
LYMPHOCYTES # BLD AUTO: 22.3 % — SIGNIFICANT CHANGE UP (ref 13–44)
LYMPHOCYTES # BLD AUTO: 24 % — SIGNIFICANT CHANGE UP (ref 13–44)
LYMPHOCYTES # BLD AUTO: 24.6 % — SIGNIFICANT CHANGE UP (ref 13–44)
LYMPHOCYTES # BLD AUTO: 26.2 % — SIGNIFICANT CHANGE UP (ref 13–44)
LYMPHOCYTES # BLD AUTO: 26.3 % — SIGNIFICANT CHANGE UP (ref 13–44)
LYMPHOCYTES # BLD AUTO: 27 % — SIGNIFICANT CHANGE UP (ref 13–44)
LYMPHOCYTES # BLD AUTO: 27.6 % — SIGNIFICANT CHANGE UP (ref 13–44)
LYMPHOCYTES # BLD AUTO: 28.3 % — SIGNIFICANT CHANGE UP (ref 13–44)
LYMPHOCYTES # BLD AUTO: 29.2 % — SIGNIFICANT CHANGE UP (ref 13–44)
LYMPHOCYTES # BLD AUTO: 29.5 % — SIGNIFICANT CHANGE UP (ref 13–44)
LYMPHOCYTES # BLD AUTO: 31.8 % — SIGNIFICANT CHANGE UP (ref 13–44)
LYMPHOCYTES # BLD AUTO: 32.4 % — SIGNIFICANT CHANGE UP (ref 13–44)
LYMPHOCYTES # BLD AUTO: 32.6 % — SIGNIFICANT CHANGE UP (ref 13–44)
LYMPHOCYTES # BLD AUTO: 32.8 % — SIGNIFICANT CHANGE UP (ref 13–44)
LYMPHOCYTES # BLD AUTO: 33 % — SIGNIFICANT CHANGE UP (ref 13–44)
LYMPHOCYTES # BLD AUTO: 33.3 % — SIGNIFICANT CHANGE UP (ref 13–44)
LYMPHOCYTES # BLD AUTO: 36.2 % — SIGNIFICANT CHANGE UP (ref 13–44)
LYMPHOCYTES # BLD AUTO: 4.6 % — LOW (ref 13–44)
LYMPHOCYTES # BLD AUTO: 40.9 % — SIGNIFICANT CHANGE UP (ref 13–44)
LYMPHOCYTES # BLD AUTO: 6.7 % — LOW (ref 13–44)
LYMPHOCYTES # BLD AUTO: 7 % — LOW (ref 13–44)
LYMPHOCYTES # BLD AUTO: 7 % — LOW (ref 13–44)
LYMPHOCYTES # BLD AUTO: 7.3 % — LOW (ref 13–44)
LYMPHOCYTES # BLD AUTO: 8.9 % — LOW (ref 13–44)
LYMPHOCYTES # BLD AUTO: 9.9 % — LOW (ref 13–44)
LYMPHOCYTES # FLD: 88 % — SIGNIFICANT CHANGE UP
LYMPHOCYTES NFR BLD AUTO: 21.7 %
M PNEUMO DNA SPEC QL NAA+PROBE: SIGNIFICANT CHANGE UP
MAGNESIUM SERPL-MCNC: 1.1 MG/DL — LOW (ref 1.6–2.6)
MAGNESIUM SERPL-MCNC: 1.2 MG/DL — LOW (ref 1.6–2.6)
MAGNESIUM SERPL-MCNC: 1.4 MG/DL — LOW (ref 1.6–2.6)
MAGNESIUM SERPL-MCNC: 1.5 MG/DL — LOW (ref 1.6–2.6)
MAGNESIUM SERPL-MCNC: 1.6 MG/DL
MAGNESIUM SERPL-MCNC: 1.6 MG/DL — SIGNIFICANT CHANGE UP (ref 1.6–2.6)
MAGNESIUM SERPL-MCNC: 1.7 MG/DL
MAGNESIUM SERPL-MCNC: 1.7 MG/DL
MAGNESIUM SERPL-MCNC: 1.7 MG/DL — SIGNIFICANT CHANGE UP (ref 1.6–2.6)
MAGNESIUM SERPL-MCNC: 1.8 MG/DL — SIGNIFICANT CHANGE UP (ref 1.6–2.6)
MAGNESIUM SERPL-MCNC: 1.9 MG/DL
MAGNESIUM SERPL-MCNC: 1.9 MG/DL — SIGNIFICANT CHANGE UP (ref 1.6–2.6)
MAGNESIUM SERPL-MCNC: 1.9 MG/DL — SIGNIFICANT CHANGE UP (ref 1.6–2.6)
MAGNESIUM SERPL-MCNC: 2 MG/DL — SIGNIFICANT CHANGE UP (ref 1.6–2.6)
MAN DIFF?: NORMAL
MANUAL SMEAR VERIFICATION: SIGNIFICANT CHANGE UP
MCHC RBC-ENTMCNC: 23.5 PG — LOW (ref 27–34)
MCHC RBC-ENTMCNC: 23.7 PG — LOW (ref 27–34)
MCHC RBC-ENTMCNC: 23.7 PG — LOW (ref 27–34)
MCHC RBC-ENTMCNC: 23.8 PG — LOW (ref 27–34)
MCHC RBC-ENTMCNC: 23.9 PG — LOW (ref 27–34)
MCHC RBC-ENTMCNC: 24 PG — LOW (ref 27–34)
MCHC RBC-ENTMCNC: 24.1 PG — LOW (ref 27–34)
MCHC RBC-ENTMCNC: 24.2 PG — LOW (ref 27–34)
MCHC RBC-ENTMCNC: 24.3 PG
MCHC RBC-ENTMCNC: 24.3 PG — LOW (ref 27–34)
MCHC RBC-ENTMCNC: 24.4 PG — LOW (ref 27–34)
MCHC RBC-ENTMCNC: 24.4 PG — LOW (ref 27–34)
MCHC RBC-ENTMCNC: 24.5 PG — LOW (ref 27–34)
MCHC RBC-ENTMCNC: 24.6 PG — LOW (ref 27–34)
MCHC RBC-ENTMCNC: 24.7 PG — LOW (ref 27–34)
MCHC RBC-ENTMCNC: 24.7 PG — LOW (ref 27–34)
MCHC RBC-ENTMCNC: 24.8 PG — LOW (ref 27–34)
MCHC RBC-ENTMCNC: 24.8 PG — LOW (ref 27–34)
MCHC RBC-ENTMCNC: 25 PG — LOW (ref 27–34)
MCHC RBC-ENTMCNC: 25.1 PG — LOW (ref 27–34)
MCHC RBC-ENTMCNC: 25.2 PG — LOW (ref 27–34)
MCHC RBC-ENTMCNC: 26.2 PG — LOW (ref 27–34)
MCHC RBC-ENTMCNC: 26.3 PG — LOW (ref 27–34)
MCHC RBC-ENTMCNC: 26.4 PG — LOW (ref 27–34)
MCHC RBC-ENTMCNC: 26.5 PG — LOW (ref 27–34)
MCHC RBC-ENTMCNC: 26.6 PG — LOW (ref 27–34)
MCHC RBC-ENTMCNC: 26.7 PG — LOW (ref 27–34)
MCHC RBC-ENTMCNC: 26.7 PG — LOW (ref 27–34)
MCHC RBC-ENTMCNC: 26.9 PG — LOW (ref 27–34)
MCHC RBC-ENTMCNC: 26.9 PG — LOW (ref 27–34)
MCHC RBC-ENTMCNC: 27 PG — SIGNIFICANT CHANGE UP (ref 27–34)
MCHC RBC-ENTMCNC: 27.1 PG — SIGNIFICANT CHANGE UP (ref 27–34)
MCHC RBC-ENTMCNC: 27.1 PG — SIGNIFICANT CHANGE UP (ref 27–34)
MCHC RBC-ENTMCNC: 27.4 GM/DL — LOW (ref 32–36)
MCHC RBC-ENTMCNC: 28.1 GM/DL — LOW (ref 32–36)
MCHC RBC-ENTMCNC: 28.3 GM/DL — LOW (ref 32–36)
MCHC RBC-ENTMCNC: 28.3 PG — SIGNIFICANT CHANGE UP (ref 27–34)
MCHC RBC-ENTMCNC: 28.6 PG — SIGNIFICANT CHANGE UP (ref 27–34)
MCHC RBC-ENTMCNC: 28.8 GM/DL — LOW (ref 32–36)
MCHC RBC-ENTMCNC: 28.9 GM/DL — LOW (ref 32–36)
MCHC RBC-ENTMCNC: 29 GM/DL — LOW (ref 32–36)
MCHC RBC-ENTMCNC: 29 PG — SIGNIFICANT CHANGE UP (ref 27–34)
MCHC RBC-ENTMCNC: 29.1 PG — SIGNIFICANT CHANGE UP (ref 27–34)
MCHC RBC-ENTMCNC: 29.3 GM/DL — LOW (ref 32–36)
MCHC RBC-ENTMCNC: 29.4 GM/DL — LOW (ref 32–36)
MCHC RBC-ENTMCNC: 29.4 PG — SIGNIFICANT CHANGE UP (ref 27–34)
MCHC RBC-ENTMCNC: 29.5 GM/DL — LOW (ref 32–36)
MCHC RBC-ENTMCNC: 29.6 GM/DL — LOW (ref 32–36)
MCHC RBC-ENTMCNC: 29.6 GM/DL — LOW (ref 32–36)
MCHC RBC-ENTMCNC: 29.8 GM/DL — LOW (ref 32–36)
MCHC RBC-ENTMCNC: 29.9 GM/DL — LOW (ref 32–36)
MCHC RBC-ENTMCNC: 30 GM/DL — LOW (ref 32–36)
MCHC RBC-ENTMCNC: 30 GM/DL — LOW (ref 32–36)
MCHC RBC-ENTMCNC: 30 PG — SIGNIFICANT CHANGE UP (ref 27–34)
MCHC RBC-ENTMCNC: 30.2 GM/DL — LOW (ref 32–36)
MCHC RBC-ENTMCNC: 30.3 G/DL — LOW (ref 32–36)
MCHC RBC-ENTMCNC: 30.3 GM/DL — LOW (ref 32–36)
MCHC RBC-ENTMCNC: 30.3 GM/DL — LOW (ref 32–36)
MCHC RBC-ENTMCNC: 30.4 GM/DL — LOW (ref 32–36)
MCHC RBC-ENTMCNC: 30.5 G/DL — LOW (ref 32–36)
MCHC RBC-ENTMCNC: 30.5 G/DL — LOW (ref 32–36)
MCHC RBC-ENTMCNC: 30.5 GM/DL — LOW (ref 32–36)
MCHC RBC-ENTMCNC: 30.6 GM/DL — LOW (ref 32–36)
MCHC RBC-ENTMCNC: 30.7 GM/DL
MCHC RBC-ENTMCNC: 30.8 G/DL — LOW (ref 32–36)
MCHC RBC-ENTMCNC: 30.8 G/DL — LOW (ref 32–36)
MCHC RBC-ENTMCNC: 31 G/DL — LOW (ref 32–36)
MCHC RBC-ENTMCNC: 31.1 GM/DL — LOW (ref 32–36)
MCHC RBC-ENTMCNC: 31.2 G/DL — LOW (ref 32–36)
MCHC RBC-ENTMCNC: 31.2 G/DL — LOW (ref 32–36)
MCHC RBC-ENTMCNC: 31.2 GM/DL — LOW (ref 32–36)
MCHC RBC-ENTMCNC: 31.3 G/DL — LOW (ref 32–36)
MCHC RBC-ENTMCNC: 31.3 G/DL — LOW (ref 32–36)
MCHC RBC-ENTMCNC: 31.3 GM/DL — LOW (ref 32–36)
MCHC RBC-ENTMCNC: 31.5 G/DL — LOW (ref 32–36)
MCHC RBC-ENTMCNC: 31.5 GM/DL — LOW (ref 32–36)
MCHC RBC-ENTMCNC: 31.5 GM/DL — LOW (ref 32–36)
MCHC RBC-ENTMCNC: 31.7 G/DL — LOW (ref 32–36)
MCHC RBC-ENTMCNC: 31.7 GM/DL — LOW (ref 32–36)
MCHC RBC-ENTMCNC: 31.7 GM/DL — LOW (ref 32–36)
MCHC RBC-ENTMCNC: 31.8 GM/DL — LOW (ref 32–36)
MCHC RBC-ENTMCNC: 31.9 GM/DL — LOW (ref 32–36)
MCHC RBC-ENTMCNC: 32 G/DL — SIGNIFICANT CHANGE UP (ref 32–36)
MCHC RBC-ENTMCNC: 32 GM/DL — SIGNIFICANT CHANGE UP (ref 32–36)
MCHC RBC-ENTMCNC: 32 GM/DL — SIGNIFICANT CHANGE UP (ref 32–36)
MCHC RBC-ENTMCNC: 32.1 G/DL — SIGNIFICANT CHANGE UP (ref 32–36)
MCHC RBC-ENTMCNC: 32.3 G/DL — SIGNIFICANT CHANGE UP (ref 32–36)
MCHC RBC-ENTMCNC: 33 G/DL — SIGNIFICANT CHANGE UP (ref 32–36)
MCHC RBC-ENTMCNC: 33.1 G/DL — SIGNIFICANT CHANGE UP (ref 32–36)
MCHC RBC-ENTMCNC: 33.4 G/DL — SIGNIFICANT CHANGE UP (ref 32–36)
MCHC RBC-ENTMCNC: 33.7 G/DL — SIGNIFICANT CHANGE UP (ref 32–36)
MCHC RBC-ENTMCNC: 34.5 G/DL — SIGNIFICANT CHANGE UP (ref 32–36)
MCV RBC AUTO: 77.4 FL — LOW (ref 80–100)
MCV RBC AUTO: 77.5 FL — LOW (ref 80–100)
MCV RBC AUTO: 77.9 FL — LOW (ref 80–100)
MCV RBC AUTO: 78.5 FL — LOW (ref 80–100)
MCV RBC AUTO: 78.6 FL — LOW (ref 80–100)
MCV RBC AUTO: 78.7 FL — LOW (ref 80–100)
MCV RBC AUTO: 78.9 FL — LOW (ref 80–100)
MCV RBC AUTO: 78.9 FL — LOW (ref 80–100)
MCV RBC AUTO: 79 FL — LOW (ref 80–100)
MCV RBC AUTO: 79.2 FL
MCV RBC AUTO: 79.3 FL — LOW (ref 80–100)
MCV RBC AUTO: 79.4 FL — LOW (ref 80–100)
MCV RBC AUTO: 79.6 FL — LOW (ref 80–100)
MCV RBC AUTO: 79.8 FL — LOW (ref 80–100)
MCV RBC AUTO: 79.9 FL — LOW (ref 80–100)
MCV RBC AUTO: 80 FL — SIGNIFICANT CHANGE UP (ref 80–100)
MCV RBC AUTO: 80.2 FL — SIGNIFICANT CHANGE UP (ref 80–100)
MCV RBC AUTO: 80.2 FL — SIGNIFICANT CHANGE UP (ref 80–100)
MCV RBC AUTO: 80.3 FL — SIGNIFICANT CHANGE UP (ref 80–100)
MCV RBC AUTO: 81.2 FL — SIGNIFICANT CHANGE UP (ref 80–100)
MCV RBC AUTO: 81.2 FL — SIGNIFICANT CHANGE UP (ref 80–100)
MCV RBC AUTO: 82.3 FL — SIGNIFICANT CHANGE UP (ref 80–100)
MCV RBC AUTO: 82.6 FL — SIGNIFICANT CHANGE UP (ref 80–100)
MCV RBC AUTO: 82.9 FL — SIGNIFICANT CHANGE UP (ref 80–100)
MCV RBC AUTO: 82.9 FL — SIGNIFICANT CHANGE UP (ref 80–100)
MCV RBC AUTO: 83.2 FL — SIGNIFICANT CHANGE UP (ref 80–100)
MCV RBC AUTO: 83.3 FL — SIGNIFICANT CHANGE UP (ref 80–100)
MCV RBC AUTO: 83.3 FL — SIGNIFICANT CHANGE UP (ref 80–100)
MCV RBC AUTO: 83.4 FL — SIGNIFICANT CHANGE UP (ref 80–100)
MCV RBC AUTO: 83.4 FL — SIGNIFICANT CHANGE UP (ref 80–100)
MCV RBC AUTO: 83.5 FL — SIGNIFICANT CHANGE UP (ref 80–100)
MCV RBC AUTO: 83.7 FL — SIGNIFICANT CHANGE UP (ref 80–100)
MCV RBC AUTO: 83.7 FL — SIGNIFICANT CHANGE UP (ref 80–100)
MCV RBC AUTO: 84 FL — SIGNIFICANT CHANGE UP (ref 80–100)
MCV RBC AUTO: 84.1 FL — SIGNIFICANT CHANGE UP (ref 80–100)
MCV RBC AUTO: 84.2 FL — SIGNIFICANT CHANGE UP (ref 80–100)
MCV RBC AUTO: 84.3 FL — SIGNIFICANT CHANGE UP (ref 80–100)
MCV RBC AUTO: 84.3 FL — SIGNIFICANT CHANGE UP (ref 80–100)
MCV RBC AUTO: 84.6 FL — SIGNIFICANT CHANGE UP (ref 80–100)
MCV RBC AUTO: 84.6 FL — SIGNIFICANT CHANGE UP (ref 80–100)
MCV RBC AUTO: 85.9 FL — SIGNIFICANT CHANGE UP (ref 80–100)
MCV RBC AUTO: 86.1 FL — SIGNIFICANT CHANGE UP (ref 80–100)
MCV RBC AUTO: 86.5 FL — SIGNIFICANT CHANGE UP (ref 80–100)
MCV RBC AUTO: 86.6 FL — SIGNIFICANT CHANGE UP (ref 80–100)
MCV RBC AUTO: 86.7 FL — SIGNIFICANT CHANGE UP (ref 80–100)
MCV RBC AUTO: 87.5 FL — SIGNIFICANT CHANGE UP (ref 80–100)
MCV RBC AUTO: 88 FL — SIGNIFICANT CHANGE UP (ref 80–100)
MCV RBC AUTO: 88.1 FL — SIGNIFICANT CHANGE UP (ref 80–100)
MCV RBC AUTO: 89 FL — SIGNIFICANT CHANGE UP (ref 80–100)
METHOD TYPE: SIGNIFICANT CHANGE UP
MICROCYTES BLD QL: SLIGHT — SIGNIFICANT CHANGE UP
MONOCYTES # BLD AUTO: 0.2 K/UL — SIGNIFICANT CHANGE UP (ref 0–0.9)
MONOCYTES # BLD AUTO: 0.23 K/UL — SIGNIFICANT CHANGE UP (ref 0–0.9)
MONOCYTES # BLD AUTO: 0.28 K/UL — SIGNIFICANT CHANGE UP (ref 0–0.9)
MONOCYTES # BLD AUTO: 0.29 K/UL — SIGNIFICANT CHANGE UP (ref 0–0.9)
MONOCYTES # BLD AUTO: 0.33 K/UL — SIGNIFICANT CHANGE UP (ref 0–0.9)
MONOCYTES # BLD AUTO: 0.33 K/UL — SIGNIFICANT CHANGE UP (ref 0–0.9)
MONOCYTES # BLD AUTO: 0.37 K/UL — SIGNIFICANT CHANGE UP (ref 0–0.9)
MONOCYTES # BLD AUTO: 0.39 K/UL — SIGNIFICANT CHANGE UP (ref 0–0.9)
MONOCYTES # BLD AUTO: 0.45 K/UL — SIGNIFICANT CHANGE UP (ref 0–0.9)
MONOCYTES # BLD AUTO: 0.46 K/UL — SIGNIFICANT CHANGE UP (ref 0–0.9)
MONOCYTES # BLD AUTO: 0.55 K/UL — SIGNIFICANT CHANGE UP (ref 0–0.9)
MONOCYTES # BLD AUTO: 0.56 K/UL — SIGNIFICANT CHANGE UP (ref 0–0.9)
MONOCYTES # BLD AUTO: 0.57 K/UL — SIGNIFICANT CHANGE UP (ref 0–0.9)
MONOCYTES # BLD AUTO: 0.59 K/UL — SIGNIFICANT CHANGE UP (ref 0–0.9)
MONOCYTES # BLD AUTO: 0.61 K/UL — SIGNIFICANT CHANGE UP (ref 0–0.9)
MONOCYTES # BLD AUTO: 0.63 K/UL — SIGNIFICANT CHANGE UP (ref 0–0.9)
MONOCYTES # BLD AUTO: 0.63 K/UL — SIGNIFICANT CHANGE UP (ref 0–0.9)
MONOCYTES # BLD AUTO: 0.64 K/UL — SIGNIFICANT CHANGE UP (ref 0–0.9)
MONOCYTES # BLD AUTO: 0.64 K/UL — SIGNIFICANT CHANGE UP (ref 0–0.9)
MONOCYTES # BLD AUTO: 0.66 K/UL — SIGNIFICANT CHANGE UP (ref 0–0.9)
MONOCYTES # BLD AUTO: 0.69 K/UL — SIGNIFICANT CHANGE UP (ref 0–0.9)
MONOCYTES # BLD AUTO: 0.71 K/UL — SIGNIFICANT CHANGE UP (ref 0–0.9)
MONOCYTES # BLD AUTO: 0.73 K/UL — SIGNIFICANT CHANGE UP (ref 0–0.9)
MONOCYTES # BLD AUTO: 0.74 K/UL — SIGNIFICANT CHANGE UP (ref 0–0.9)
MONOCYTES # BLD AUTO: 0.76 K/UL — SIGNIFICANT CHANGE UP (ref 0–0.9)
MONOCYTES # BLD AUTO: 0.76 K/UL — SIGNIFICANT CHANGE UP (ref 0–0.9)
MONOCYTES # BLD AUTO: 0.81 K/UL — SIGNIFICANT CHANGE UP (ref 0–0.9)
MONOCYTES # BLD AUTO: 0.83 K/UL — SIGNIFICANT CHANGE UP (ref 0–0.9)
MONOCYTES # BLD AUTO: 0.85 K/UL — SIGNIFICANT CHANGE UP (ref 0–0.9)
MONOCYTES # BLD AUTO: 0.94 K/UL — HIGH (ref 0–0.9)
MONOCYTES # BLD AUTO: 0.96 K/UL
MONOCYTES # BLD AUTO: 0.98 K/UL — HIGH (ref 0–0.9)
MONOCYTES # BLD AUTO: 1.09 K/UL — HIGH (ref 0–0.9)
MONOCYTES # BLD AUTO: 1.13 K/UL — HIGH (ref 0–0.9)
MONOCYTES # BLD AUTO: 1.24 K/UL — HIGH (ref 0–0.9)
MONOCYTES # BLD AUTO: 1.33 K/UL — HIGH (ref 0–0.9)
MONOCYTES # BLD AUTO: 1.48 K/UL — HIGH (ref 0–0.9)
MONOCYTES # BLD AUTO: 1.5 K/UL — HIGH (ref 0–0.9)
MONOCYTES # BLD AUTO: 1.58 K/UL — HIGH (ref 0–0.9)
MONOCYTES # BLD AUTO: 1.84 K/UL — HIGH (ref 0–0.9)
MONOCYTES # BLD AUTO: 1.88 K/UL — HIGH (ref 0–0.9)
MONOCYTES # BLD AUTO: 1.88 K/UL — HIGH (ref 0–0.9)
MONOCYTES # BLD AUTO: 2.03 K/UL — HIGH (ref 0–0.9)
MONOCYTES # BLD AUTO: 2.16 K/UL — HIGH (ref 0–0.9)
MONOCYTES NFR BLD AUTO: 10 % — SIGNIFICANT CHANGE UP (ref 2–14)
MONOCYTES NFR BLD AUTO: 10.3 % — SIGNIFICANT CHANGE UP (ref 2–14)
MONOCYTES NFR BLD AUTO: 10.3 % — SIGNIFICANT CHANGE UP (ref 2–14)
MONOCYTES NFR BLD AUTO: 10.5 % — SIGNIFICANT CHANGE UP (ref 2–14)
MONOCYTES NFR BLD AUTO: 10.9 % — SIGNIFICANT CHANGE UP (ref 2–14)
MONOCYTES NFR BLD AUTO: 11 % — SIGNIFICANT CHANGE UP (ref 2–14)
MONOCYTES NFR BLD AUTO: 11 % — SIGNIFICANT CHANGE UP (ref 2–14)
MONOCYTES NFR BLD AUTO: 11.2 % — SIGNIFICANT CHANGE UP (ref 2–14)
MONOCYTES NFR BLD AUTO: 11.3 % — SIGNIFICANT CHANGE UP (ref 2–14)
MONOCYTES NFR BLD AUTO: 11.3 % — SIGNIFICANT CHANGE UP (ref 2–14)
MONOCYTES NFR BLD AUTO: 12.1 % — SIGNIFICANT CHANGE UP (ref 2–14)
MONOCYTES NFR BLD AUTO: 12.3 % — SIGNIFICANT CHANGE UP (ref 2–14)
MONOCYTES NFR BLD AUTO: 12.6 % — SIGNIFICANT CHANGE UP (ref 2–14)
MONOCYTES NFR BLD AUTO: 12.8 % — SIGNIFICANT CHANGE UP (ref 2–14)
MONOCYTES NFR BLD AUTO: 13.1 %
MONOCYTES NFR BLD AUTO: 13.1 % — SIGNIFICANT CHANGE UP (ref 2–14)
MONOCYTES NFR BLD AUTO: 13.5 % — SIGNIFICANT CHANGE UP (ref 2–14)
MONOCYTES NFR BLD AUTO: 13.6 % — SIGNIFICANT CHANGE UP (ref 2–14)
MONOCYTES NFR BLD AUTO: 13.8 % — SIGNIFICANT CHANGE UP (ref 2–14)
MONOCYTES NFR BLD AUTO: 13.8 % — SIGNIFICANT CHANGE UP (ref 2–14)
MONOCYTES NFR BLD AUTO: 14.1 % — HIGH (ref 2–14)
MONOCYTES NFR BLD AUTO: 14.1 % — HIGH (ref 2–14)
MONOCYTES NFR BLD AUTO: 14.7 % — HIGH (ref 2–14)
MONOCYTES NFR BLD AUTO: 16.5 % — HIGH (ref 2–14)
MONOCYTES NFR BLD AUTO: 17 % — HIGH (ref 2–14)
MONOCYTES NFR BLD AUTO: 17.2 % — HIGH (ref 2–14)
MONOCYTES NFR BLD AUTO: 17.4 % — HIGH (ref 2–14)
MONOCYTES NFR BLD AUTO: 17.4 % — HIGH (ref 2–14)
MONOCYTES NFR BLD AUTO: 19.6 % — HIGH (ref 2–14)
MONOCYTES NFR BLD AUTO: 20.8 % — HIGH (ref 2–14)
MONOCYTES NFR BLD AUTO: 31.5 % — HIGH (ref 2–14)
MONOCYTES NFR BLD AUTO: 4 % — SIGNIFICANT CHANGE UP (ref 2–14)
MONOCYTES NFR BLD AUTO: 4.1 % — SIGNIFICANT CHANGE UP (ref 2–14)
MONOCYTES NFR BLD AUTO: 5.2 % — SIGNIFICANT CHANGE UP (ref 2–14)
MONOCYTES NFR BLD AUTO: 8.1 % — SIGNIFICANT CHANGE UP (ref 2–14)
MONOCYTES NFR BLD AUTO: 8.4 % — SIGNIFICANT CHANGE UP (ref 2–14)
MONOCYTES NFR BLD AUTO: 8.7 % — SIGNIFICANT CHANGE UP (ref 2–14)
MONOCYTES NFR BLD AUTO: 8.9 % — SIGNIFICANT CHANGE UP (ref 2–14)
MONOCYTES NFR BLD AUTO: 9.3 % — SIGNIFICANT CHANGE UP (ref 2–14)
MONOCYTES NFR BLD AUTO: 9.4 % — SIGNIFICANT CHANGE UP (ref 2–14)
MONOCYTES NFR BLD AUTO: 9.5 % — SIGNIFICANT CHANGE UP (ref 2–14)
MONOCYTES NFR BLD AUTO: 9.7 % — SIGNIFICANT CHANGE UP (ref 2–14)
MONOS+MACROS # FLD: 8 % — SIGNIFICANT CHANGE UP
NEUTROPHILS # BLD AUTO: 1.02 K/UL — LOW (ref 1.8–7.4)
NEUTROPHILS # BLD AUTO: 1.18 K/UL — LOW (ref 1.8–7.4)
NEUTROPHILS # BLD AUTO: 1.59 K/UL — LOW (ref 1.8–7.4)
NEUTROPHILS # BLD AUTO: 1.64 K/UL — LOW (ref 1.8–7.4)
NEUTROPHILS # BLD AUTO: 1.87 K/UL — SIGNIFICANT CHANGE UP (ref 1.8–7.4)
NEUTROPHILS # BLD AUTO: 1.89 K/UL — SIGNIFICANT CHANGE UP (ref 1.8–7.4)
NEUTROPHILS # BLD AUTO: 10.9 K/UL — HIGH (ref 1.8–7.4)
NEUTROPHILS # BLD AUTO: 12 K/UL — HIGH (ref 1.8–7.4)
NEUTROPHILS # BLD AUTO: 2.19 K/UL — SIGNIFICANT CHANGE UP (ref 1.8–7.4)
NEUTROPHILS # BLD AUTO: 2.26 K/UL — SIGNIFICANT CHANGE UP (ref 1.8–7.4)
NEUTROPHILS # BLD AUTO: 2.27 K/UL — SIGNIFICANT CHANGE UP (ref 1.8–7.4)
NEUTROPHILS # BLD AUTO: 2.65 K/UL — SIGNIFICANT CHANGE UP (ref 1.8–7.4)
NEUTROPHILS # BLD AUTO: 2.83 K/UL — SIGNIFICANT CHANGE UP (ref 1.8–7.4)
NEUTROPHILS # BLD AUTO: 3.01 K/UL — SIGNIFICANT CHANGE UP (ref 1.8–7.4)
NEUTROPHILS # BLD AUTO: 3.01 K/UL — SIGNIFICANT CHANGE UP (ref 1.8–7.4)
NEUTROPHILS # BLD AUTO: 3.06 K/UL — SIGNIFICANT CHANGE UP (ref 1.8–7.4)
NEUTROPHILS # BLD AUTO: 3.16 K/UL — SIGNIFICANT CHANGE UP (ref 1.8–7.4)
NEUTROPHILS # BLD AUTO: 3.3 K/UL — SIGNIFICANT CHANGE UP (ref 1.8–7.4)
NEUTROPHILS # BLD AUTO: 3.39 K/UL — SIGNIFICANT CHANGE UP (ref 1.8–7.4)
NEUTROPHILS # BLD AUTO: 3.4 K/UL — SIGNIFICANT CHANGE UP (ref 1.8–7.4)
NEUTROPHILS # BLD AUTO: 3.68 K/UL — SIGNIFICANT CHANGE UP (ref 1.8–7.4)
NEUTROPHILS # BLD AUTO: 3.82 K/UL — SIGNIFICANT CHANGE UP (ref 1.8–7.4)
NEUTROPHILS # BLD AUTO: 3.82 K/UL — SIGNIFICANT CHANGE UP (ref 1.8–7.4)
NEUTROPHILS # BLD AUTO: 3.99 K/UL — SIGNIFICANT CHANGE UP (ref 1.8–7.4)
NEUTROPHILS # BLD AUTO: 4.11 K/UL — SIGNIFICANT CHANGE UP (ref 1.8–7.4)
NEUTROPHILS # BLD AUTO: 4.22 K/UL — SIGNIFICANT CHANGE UP (ref 1.8–7.4)
NEUTROPHILS # BLD AUTO: 4.44 K/UL — SIGNIFICANT CHANGE UP (ref 1.8–7.4)
NEUTROPHILS # BLD AUTO: 4.58 K/UL — SIGNIFICANT CHANGE UP (ref 1.8–7.4)
NEUTROPHILS # BLD AUTO: 4.62 K/UL
NEUTROPHILS # BLD AUTO: 4.65 K/UL — SIGNIFICANT CHANGE UP (ref 1.8–7.4)
NEUTROPHILS # BLD AUTO: 4.82 K/UL — SIGNIFICANT CHANGE UP (ref 1.8–7.4)
NEUTROPHILS # BLD AUTO: 5.09 K/UL — SIGNIFICANT CHANGE UP (ref 1.8–7.4)
NEUTROPHILS # BLD AUTO: 5.18 K/UL — SIGNIFICANT CHANGE UP (ref 1.8–7.4)
NEUTROPHILS # BLD AUTO: 5.19 K/UL — SIGNIFICANT CHANGE UP (ref 1.8–7.4)
NEUTROPHILS # BLD AUTO: 5.71 K/UL — SIGNIFICANT CHANGE UP (ref 1.8–7.4)
NEUTROPHILS # BLD AUTO: 5.71 K/UL — SIGNIFICANT CHANGE UP (ref 1.8–7.4)
NEUTROPHILS # BLD AUTO: 5.86 K/UL — SIGNIFICANT CHANGE UP (ref 1.8–7.4)
NEUTROPHILS # BLD AUTO: 5.98 K/UL — SIGNIFICANT CHANGE UP (ref 1.8–7.4)
NEUTROPHILS # BLD AUTO: 6.03 K/UL — SIGNIFICANT CHANGE UP (ref 1.8–7.4)
NEUTROPHILS # BLD AUTO: 7.19 K/UL — SIGNIFICANT CHANGE UP (ref 1.8–7.4)
NEUTROPHILS # BLD AUTO: 7.25 K/UL — SIGNIFICANT CHANGE UP (ref 1.8–7.4)
NEUTROPHILS # BLD AUTO: 7.43 K/UL — HIGH (ref 1.8–7.4)
NEUTROPHILS # BLD AUTO: 7.45 K/UL — HIGH (ref 1.8–7.4)
NEUTROPHILS # BLD AUTO: 7.83 K/UL — HIGH (ref 1.8–7.4)
NEUTROPHILS # BLD AUTO: 8.53 K/UL — HIGH (ref 1.8–7.4)
NEUTROPHILS # BLD AUTO: 9.66 K/UL — HIGH (ref 1.8–7.4)
NEUTROPHILS NFR BLD AUTO: 43.8 % — SIGNIFICANT CHANGE UP (ref 43–77)
NEUTROPHILS NFR BLD AUTO: 46.1 % — SIGNIFICANT CHANGE UP (ref 43–77)
NEUTROPHILS NFR BLD AUTO: 47.3 % — SIGNIFICANT CHANGE UP (ref 43–77)
NEUTROPHILS NFR BLD AUTO: 48.8 % — SIGNIFICANT CHANGE UP (ref 43–77)
NEUTROPHILS NFR BLD AUTO: 49.1 % — SIGNIFICANT CHANGE UP (ref 43–77)
NEUTROPHILS NFR BLD AUTO: 52.1 % — SIGNIFICANT CHANGE UP (ref 43–77)
NEUTROPHILS NFR BLD AUTO: 54.8 % — SIGNIFICANT CHANGE UP (ref 43–77)
NEUTROPHILS NFR BLD AUTO: 55 % — SIGNIFICANT CHANGE UP (ref 43–77)
NEUTROPHILS NFR BLD AUTO: 55.7 % — SIGNIFICANT CHANGE UP (ref 43–77)
NEUTROPHILS NFR BLD AUTO: 55.9 % — SIGNIFICANT CHANGE UP (ref 43–77)
NEUTROPHILS NFR BLD AUTO: 56.4 % — SIGNIFICANT CHANGE UP (ref 43–77)
NEUTROPHILS NFR BLD AUTO: 56.4 % — SIGNIFICANT CHANGE UP (ref 43–77)
NEUTROPHILS NFR BLD AUTO: 57.1 % — SIGNIFICANT CHANGE UP (ref 43–77)
NEUTROPHILS NFR BLD AUTO: 57.1 % — SIGNIFICANT CHANGE UP (ref 43–77)
NEUTROPHILS NFR BLD AUTO: 57.7 % — SIGNIFICANT CHANGE UP (ref 43–77)
NEUTROPHILS NFR BLD AUTO: 59 % — SIGNIFICANT CHANGE UP (ref 43–77)
NEUTROPHILS NFR BLD AUTO: 60.3 % — SIGNIFICANT CHANGE UP (ref 43–77)
NEUTROPHILS NFR BLD AUTO: 61.8 % — SIGNIFICANT CHANGE UP (ref 43–77)
NEUTROPHILS NFR BLD AUTO: 62.7 % — SIGNIFICANT CHANGE UP (ref 43–77)
NEUTROPHILS NFR BLD AUTO: 63 %
NEUTROPHILS NFR BLD AUTO: 63.8 % — SIGNIFICANT CHANGE UP (ref 43–77)
NEUTROPHILS NFR BLD AUTO: 64.7 % — SIGNIFICANT CHANGE UP (ref 43–77)
NEUTROPHILS NFR BLD AUTO: 65.3 % — SIGNIFICANT CHANGE UP (ref 43–77)
NEUTROPHILS NFR BLD AUTO: 66.2 % — SIGNIFICANT CHANGE UP (ref 43–77)
NEUTROPHILS NFR BLD AUTO: 66.4 % — SIGNIFICANT CHANGE UP (ref 43–77)
NEUTROPHILS NFR BLD AUTO: 67 % — SIGNIFICANT CHANGE UP (ref 43–77)
NEUTROPHILS NFR BLD AUTO: 67.3 % — SIGNIFICANT CHANGE UP (ref 43–77)
NEUTROPHILS NFR BLD AUTO: 67.4 % — SIGNIFICANT CHANGE UP (ref 43–77)
NEUTROPHILS NFR BLD AUTO: 68.5 % — SIGNIFICANT CHANGE UP (ref 43–77)
NEUTROPHILS NFR BLD AUTO: 69 % — SIGNIFICANT CHANGE UP (ref 43–77)
NEUTROPHILS NFR BLD AUTO: 69.1 % — SIGNIFICANT CHANGE UP (ref 43–77)
NEUTROPHILS NFR BLD AUTO: 69.3 % — SIGNIFICANT CHANGE UP (ref 43–77)
NEUTROPHILS NFR BLD AUTO: 69.4 % — SIGNIFICANT CHANGE UP (ref 43–77)
NEUTROPHILS NFR BLD AUTO: 71 % — SIGNIFICANT CHANGE UP (ref 43–77)
NEUTROPHILS NFR BLD AUTO: 71.6 % — SIGNIFICANT CHANGE UP (ref 43–77)
NEUTROPHILS NFR BLD AUTO: 71.8 % — SIGNIFICANT CHANGE UP (ref 43–77)
NEUTROPHILS NFR BLD AUTO: 72 % — SIGNIFICANT CHANGE UP (ref 43–77)
NEUTROPHILS NFR BLD AUTO: 72.2 % — SIGNIFICANT CHANGE UP (ref 43–77)
NEUTROPHILS NFR BLD AUTO: 72.5 % — SIGNIFICANT CHANGE UP (ref 43–77)
NEUTROPHILS NFR BLD AUTO: 74.8 % — SIGNIFICANT CHANGE UP (ref 43–77)
NEUTROPHILS NFR BLD AUTO: 77.2 % — HIGH (ref 43–77)
NEUTROPHILS NFR BLD AUTO: 80.1 % — HIGH (ref 43–77)
NEUTROPHILS NFR BLD AUTO: 80.1 % — HIGH (ref 43–77)
NEUTROPHILS NFR BLD AUTO: 81.6 % — HIGH (ref 43–77)
NEUTROPHILS NFR BLD AUTO: 84.4 % — HIGH (ref 43–77)
NEUTROPHILS NFR BLD AUTO: 84.5 % — HIGH (ref 43–77)
NEUTROPHILS-BODY FLUID: 4 % — SIGNIFICANT CHANGE UP
NIGHT BLUE STAIN TISS: SIGNIFICANT CHANGE UP
NITRITE UR-MCNC: NEGATIVE — SIGNIFICANT CHANGE UP
NON-GYNECOLOGICAL CYTOLOGY STUDY: SIGNIFICANT CHANGE UP
NRBC # BLD: 0 /100 WBCS — SIGNIFICANT CHANGE UP
NRBC # BLD: 0 /100 WBCS — SIGNIFICANT CHANGE UP (ref 0–0)
NRBC # BLD: 1 /100 WBCS — HIGH (ref 0–0)
NRBC # BLD: 1 /100 — HIGH (ref 0–0)
NRBC # BLD: 2 /100 WBCS — HIGH (ref 0–0)
NRBC # BLD: 2 /100 WBCS — HIGH (ref 0–0)
NRBC # BLD: 2 /100 — HIGH (ref 0–0)
NRBC # BLD: SIGNIFICANT CHANGE UP /100 WBCS (ref 0–0)
NRBC # FLD: 0 K/UL — SIGNIFICANT CHANGE UP
NRBC # FLD: 0 K/UL — SIGNIFICANT CHANGE UP (ref 0–0)
NRBC # FLD: 0.02 K/UL — HIGH
NRBC # FLD: 0.02 K/UL — HIGH
NRBC # FLD: 0.02 K/UL — HIGH (ref 0–0)
NRBC # FLD: 0.03 K/UL — HIGH
NRBC # FLD: 0.03 K/UL — HIGH (ref 0–0)
NRBC # FLD: 0.05 K/UL — HIGH
NRBC # FLD: 0.05 K/UL — HIGH (ref 0–0)
NRBC # FLD: 0.05 K/UL — HIGH (ref 0–0)
NRBC # FLD: 0.08 K/UL — HIGH (ref 0–0)
NRBC # FLD: 0.1 K/UL — HIGH (ref 0–0)
NRBC # FLD: 0.1 K/UL — HIGH (ref 0–0)
NRBC # FLD: 0.13 K/UL — HIGH (ref 0–0)
NRBC # FLD: 0.13 K/UL — HIGH (ref 0–0)
NRBC # FLD: 0.17 K/UL — HIGH (ref 0–0)
NT-PROBNP SERPL-SCNC: 5128 PG/ML — HIGH
NT-PROBNP SERPL-SCNC: 58 PG/ML — SIGNIFICANT CHANGE UP
ORGANISM # SPEC MICROSCOPIC CNT: SIGNIFICANT CHANGE UP
ORGANISM # SPEC MICROSCOPIC CNT: SIGNIFICANT CHANGE UP
OVALOCYTES BLD QL SMEAR: SLIGHT — SIGNIFICANT CHANGE UP
PCO2 BLDV: 108 MMHG — HIGH (ref 39–42)
PCO2 BLDV: 38 MMHG — LOW (ref 39–42)
PCO2 BLDV: 46 MMHG — HIGH (ref 39–42)
PCO2 BLDV: 51 MMHG — HIGH (ref 39–42)
PCO2 BLDV: 51 MMHG — HIGH (ref 39–42)
PCO2 BLDV: 53 MMHG — HIGH (ref 39–42)
PCO2 BLDV: 80 MMHG — HIGH (ref 39–42)
PCO2 BLDV: 86 MMHG — HIGH (ref 39–42)
PH BLDV: 7.35 — SIGNIFICANT CHANGE UP (ref 7.32–7.43)
PH BLDV: 7.36 — SIGNIFICANT CHANGE UP (ref 7.32–7.43)
PH BLDV: 7.4 — SIGNIFICANT CHANGE UP (ref 7.32–7.43)
PH BLDV: 7.42 — SIGNIFICANT CHANGE UP (ref 7.32–7.43)
PH BLDV: 7.43 — SIGNIFICANT CHANGE UP (ref 7.32–7.43)
PH BLDV: 7.48 — HIGH (ref 7.32–7.43)
PH FLD: 7.5 — SIGNIFICANT CHANGE UP
PH UR: 5.5 — SIGNIFICANT CHANGE UP (ref 5–8)
PH UR: 6 — SIGNIFICANT CHANGE UP (ref 5–8)
PHOSPHATE SERPL-MCNC: 1.3 MG/DL — LOW (ref 2.5–4.5)
PHOSPHATE SERPL-MCNC: 1.8 MG/DL — LOW (ref 2.5–4.5)
PHOSPHATE SERPL-MCNC: 2.4 MG/DL — LOW (ref 2.5–4.5)
PHOSPHATE SERPL-MCNC: 2.4 MG/DL — LOW (ref 2.5–4.5)
PHOSPHATE SERPL-MCNC: 2.6 MG/DL — SIGNIFICANT CHANGE UP (ref 2.5–4.5)
PHOSPHATE SERPL-MCNC: 2.8 MG/DL — SIGNIFICANT CHANGE UP (ref 2.5–4.5)
PHOSPHATE SERPL-MCNC: 2.8 MG/DL — SIGNIFICANT CHANGE UP (ref 2.5–4.5)
PHOSPHATE SERPL-MCNC: 2.9 MG/DL — SIGNIFICANT CHANGE UP (ref 2.5–4.5)
PHOSPHATE SERPL-MCNC: 3 MG/DL — SIGNIFICANT CHANGE UP (ref 2.5–4.5)
PHOSPHATE SERPL-MCNC: 3.1 MG/DL — SIGNIFICANT CHANGE UP (ref 2.5–4.5)
PHOSPHATE SERPL-MCNC: 3.2 MG/DL — SIGNIFICANT CHANGE UP (ref 2.5–4.5)
PHOSPHATE SERPL-MCNC: 3.2 MG/DL — SIGNIFICANT CHANGE UP (ref 2.5–4.5)
PHOSPHATE SERPL-MCNC: 3.3 MG/DL — SIGNIFICANT CHANGE UP (ref 2.5–4.5)
PHOSPHATE SERPL-MCNC: 3.4 MG/DL — SIGNIFICANT CHANGE UP (ref 2.5–4.5)
PHOSPHATE SERPL-MCNC: 3.5 MG/DL — SIGNIFICANT CHANGE UP (ref 2.5–4.5)
PHOSPHATE SERPL-MCNC: 3.5 MG/DL — SIGNIFICANT CHANGE UP (ref 2.5–4.5)
PHOSPHATE SERPL-MCNC: 3.6 MG/DL — SIGNIFICANT CHANGE UP (ref 2.5–4.5)
PHOSPHATE SERPL-MCNC: 4.1 MG/DL — SIGNIFICANT CHANGE UP (ref 2.5–4.5)
PHOSPHATE SERPL-MCNC: 4.1 MG/DL — SIGNIFICANT CHANGE UP (ref 2.5–4.5)
PLAT MORPH BLD: NORMAL — SIGNIFICANT CHANGE UP
PLATELET # BLD AUTO: 142 K/UL — LOW (ref 150–400)
PLATELET # BLD AUTO: 171 K/UL — SIGNIFICANT CHANGE UP (ref 150–400)
PLATELET # BLD AUTO: 192 K/UL — SIGNIFICANT CHANGE UP (ref 150–400)
PLATELET # BLD AUTO: 200 K/UL — SIGNIFICANT CHANGE UP (ref 150–400)
PLATELET # BLD AUTO: 202 K/UL — SIGNIFICANT CHANGE UP (ref 150–400)
PLATELET # BLD AUTO: 203 K/UL — SIGNIFICANT CHANGE UP (ref 150–400)
PLATELET # BLD AUTO: 210 K/UL — SIGNIFICANT CHANGE UP (ref 150–400)
PLATELET # BLD AUTO: 212 K/UL — SIGNIFICANT CHANGE UP (ref 150–400)
PLATELET # BLD AUTO: 222 K/UL — SIGNIFICANT CHANGE UP (ref 150–400)
PLATELET # BLD AUTO: 224 K/UL — SIGNIFICANT CHANGE UP (ref 150–400)
PLATELET # BLD AUTO: 226 K/UL — SIGNIFICANT CHANGE UP (ref 150–400)
PLATELET # BLD AUTO: 226 K/UL — SIGNIFICANT CHANGE UP (ref 150–400)
PLATELET # BLD AUTO: 229 K/UL — SIGNIFICANT CHANGE UP (ref 150–400)
PLATELET # BLD AUTO: 236 K/UL — SIGNIFICANT CHANGE UP (ref 150–400)
PLATELET # BLD AUTO: 238 K/UL — SIGNIFICANT CHANGE UP (ref 150–400)
PLATELET # BLD AUTO: 246 K/UL — SIGNIFICANT CHANGE UP (ref 150–400)
PLATELET # BLD AUTO: 246 K/UL — SIGNIFICANT CHANGE UP (ref 150–400)
PLATELET # BLD AUTO: 248 K/UL — SIGNIFICANT CHANGE UP (ref 150–400)
PLATELET # BLD AUTO: 249 K/UL — SIGNIFICANT CHANGE UP (ref 150–400)
PLATELET # BLD AUTO: 252 K/UL — SIGNIFICANT CHANGE UP (ref 150–400)
PLATELET # BLD AUTO: 253 K/UL — SIGNIFICANT CHANGE UP (ref 150–400)
PLATELET # BLD AUTO: 265 K/UL — SIGNIFICANT CHANGE UP (ref 150–400)
PLATELET # BLD AUTO: 270 K/UL — SIGNIFICANT CHANGE UP (ref 150–400)
PLATELET # BLD AUTO: 272 K/UL — SIGNIFICANT CHANGE UP (ref 150–400)
PLATELET # BLD AUTO: 274 K/UL — SIGNIFICANT CHANGE UP (ref 150–400)
PLATELET # BLD AUTO: 280 K/UL — SIGNIFICANT CHANGE UP (ref 150–400)
PLATELET # BLD AUTO: 281 K/UL — SIGNIFICANT CHANGE UP (ref 150–400)
PLATELET # BLD AUTO: 281 K/UL — SIGNIFICANT CHANGE UP (ref 150–400)
PLATELET # BLD AUTO: 284 K/UL — SIGNIFICANT CHANGE UP (ref 150–400)
PLATELET # BLD AUTO: 294 K/UL — SIGNIFICANT CHANGE UP (ref 150–400)
PLATELET # BLD AUTO: 299 K/UL — SIGNIFICANT CHANGE UP (ref 150–400)
PLATELET # BLD AUTO: 308 K/UL — SIGNIFICANT CHANGE UP (ref 150–400)
PLATELET # BLD AUTO: 309 K/UL — SIGNIFICANT CHANGE UP (ref 150–400)
PLATELET # BLD AUTO: 313 K/UL — SIGNIFICANT CHANGE UP (ref 150–400)
PLATELET # BLD AUTO: 314 K/UL — SIGNIFICANT CHANGE UP (ref 150–400)
PLATELET # BLD AUTO: 315 K/UL — SIGNIFICANT CHANGE UP (ref 150–400)
PLATELET # BLD AUTO: 317 K/UL — SIGNIFICANT CHANGE UP (ref 150–400)
PLATELET # BLD AUTO: 318 K/UL — SIGNIFICANT CHANGE UP (ref 150–400)
PLATELET # BLD AUTO: 320 K/UL — SIGNIFICANT CHANGE UP (ref 150–400)
PLATELET # BLD AUTO: 321 K/UL — SIGNIFICANT CHANGE UP (ref 150–400)
PLATELET # BLD AUTO: 327 K/UL — SIGNIFICANT CHANGE UP (ref 150–400)
PLATELET # BLD AUTO: 328 K/UL — SIGNIFICANT CHANGE UP (ref 150–400)
PLATELET # BLD AUTO: 329 K/UL
PLATELET # BLD AUTO: 330 K/UL — SIGNIFICANT CHANGE UP (ref 150–400)
PLATELET # BLD AUTO: 331 K/UL — SIGNIFICANT CHANGE UP (ref 150–400)
PLATELET # BLD AUTO: 337 K/UL — SIGNIFICANT CHANGE UP (ref 150–400)
PLATELET # BLD AUTO: 345 K/UL — SIGNIFICANT CHANGE UP (ref 150–400)
PLATELET # BLD AUTO: 350 K/UL — SIGNIFICANT CHANGE UP (ref 150–400)
PLATELET # BLD AUTO: 370 K/UL — SIGNIFICANT CHANGE UP (ref 150–400)
PLATELET # BLD AUTO: 384 K/UL — SIGNIFICANT CHANGE UP (ref 150–400)
PLATELET # BLD AUTO: 387 K/UL — SIGNIFICANT CHANGE UP (ref 150–400)
PLATELET # BLD AUTO: 388 K/UL — SIGNIFICANT CHANGE UP (ref 150–400)
PLATELET # BLD AUTO: 397 K/UL — SIGNIFICANT CHANGE UP (ref 150–400)
PLATELET # BLD AUTO: 49 K/UL — LOW (ref 150–400)
PLATELET # BLD AUTO: 53 K/UL — LOW (ref 150–400)
PLATELET # BLD AUTO: 89 K/UL — LOW (ref 150–400)
PLATELET # PLAS AUTO: NORMAL
PLATELET COUNT - ESTIMATE: NORMAL — SIGNIFICANT CHANGE UP
PO2 BLDV: 175 MMHG — SIGNIFICANT CHANGE UP
PO2 BLDV: 21 MMHG — SIGNIFICANT CHANGE UP
PO2 BLDV: 32 MMHG — SIGNIFICANT CHANGE UP
PO2 BLDV: 46 MMHG — SIGNIFICANT CHANGE UP
PO2 BLDV: 49 MMHG — SIGNIFICANT CHANGE UP
PO2 BLDV: 50 MMHG — SIGNIFICANT CHANGE UP
PO2 BLDV: 53 MMHG — SIGNIFICANT CHANGE UP
PO2 BLDV: 56 MMHG — SIGNIFICANT CHANGE UP
POIKILOCYTOSIS BLD QL AUTO: SLIGHT — SIGNIFICANT CHANGE UP
POLYCHROMASIA BLD QL SMEAR: SLIGHT — SIGNIFICANT CHANGE UP
POTASSIUM BLDV-SCNC: 3.4 MMOL/L — LOW (ref 3.5–5.1)
POTASSIUM BLDV-SCNC: 3.5 MMOL/L — SIGNIFICANT CHANGE UP (ref 3.5–5.1)
POTASSIUM BLDV-SCNC: 3.6 MMOL/L — SIGNIFICANT CHANGE UP (ref 3.5–5.1)
POTASSIUM BLDV-SCNC: 4.2 MMOL/L — SIGNIFICANT CHANGE UP (ref 3.5–5.1)
POTASSIUM BLDV-SCNC: 4.2 MMOL/L — SIGNIFICANT CHANGE UP (ref 3.5–5.1)
POTASSIUM BLDV-SCNC: 4.3 MMOL/L — SIGNIFICANT CHANGE UP (ref 3.5–5.1)
POTASSIUM BLDV-SCNC: 4.5 MMOL/L — SIGNIFICANT CHANGE UP (ref 3.5–5.1)
POTASSIUM SERPL-MCNC: 2.9 MMOL/L — CRITICAL LOW (ref 3.5–5.3)
POTASSIUM SERPL-MCNC: 2.9 MMOL/L — CRITICAL LOW (ref 3.5–5.3)
POTASSIUM SERPL-MCNC: 3.3 MMOL/L — LOW (ref 3.5–5.3)
POTASSIUM SERPL-MCNC: 3.4 MMOL/L — LOW (ref 3.5–5.3)
POTASSIUM SERPL-MCNC: 3.5 MMOL/L — SIGNIFICANT CHANGE UP (ref 3.5–5.3)
POTASSIUM SERPL-MCNC: 3.6 MMOL/L — SIGNIFICANT CHANGE UP (ref 3.5–5.3)
POTASSIUM SERPL-MCNC: 3.7 MMOL/L — SIGNIFICANT CHANGE UP (ref 3.5–5.3)
POTASSIUM SERPL-MCNC: 3.8 MMOL/L — SIGNIFICANT CHANGE UP (ref 3.5–5.3)
POTASSIUM SERPL-MCNC: 3.9 MMOL/L — SIGNIFICANT CHANGE UP (ref 3.5–5.3)
POTASSIUM SERPL-MCNC: 4 MMOL/L — SIGNIFICANT CHANGE UP (ref 3.5–5.3)
POTASSIUM SERPL-MCNC: 4.1 MMOL/L — SIGNIFICANT CHANGE UP (ref 3.5–5.3)
POTASSIUM SERPL-MCNC: 4.1 MMOL/L — SIGNIFICANT CHANGE UP (ref 3.5–5.3)
POTASSIUM SERPL-MCNC: 4.2 MMOL/L — SIGNIFICANT CHANGE UP (ref 3.5–5.3)
POTASSIUM SERPL-MCNC: 4.3 MMOL/L — SIGNIFICANT CHANGE UP (ref 3.5–5.3)
POTASSIUM SERPL-MCNC: 4.3 MMOL/L — SIGNIFICANT CHANGE UP (ref 3.5–5.3)
POTASSIUM SERPL-MCNC: 5.6 MMOL/L — HIGH (ref 3.5–5.3)
POTASSIUM SERPL-SCNC: 2.9 MMOL/L — CRITICAL LOW (ref 3.5–5.3)
POTASSIUM SERPL-SCNC: 2.9 MMOL/L — CRITICAL LOW (ref 3.5–5.3)
POTASSIUM SERPL-SCNC: 3.3 MMOL/L — LOW (ref 3.5–5.3)
POTASSIUM SERPL-SCNC: 3.4 MMOL/L — LOW (ref 3.5–5.3)
POTASSIUM SERPL-SCNC: 3.5 MMOL/L — SIGNIFICANT CHANGE UP (ref 3.5–5.3)
POTASSIUM SERPL-SCNC: 3.6 MMOL/L
POTASSIUM SERPL-SCNC: 3.6 MMOL/L
POTASSIUM SERPL-SCNC: 3.6 MMOL/L — SIGNIFICANT CHANGE UP (ref 3.5–5.3)
POTASSIUM SERPL-SCNC: 3.7 MMOL/L
POTASSIUM SERPL-SCNC: 3.7 MMOL/L
POTASSIUM SERPL-SCNC: 3.7 MMOL/L — SIGNIFICANT CHANGE UP (ref 3.5–5.3)
POTASSIUM SERPL-SCNC: 3.8 MMOL/L — SIGNIFICANT CHANGE UP (ref 3.5–5.3)
POTASSIUM SERPL-SCNC: 3.9 MMOL/L
POTASSIUM SERPL-SCNC: 3.9 MMOL/L — SIGNIFICANT CHANGE UP (ref 3.5–5.3)
POTASSIUM SERPL-SCNC: 4 MMOL/L
POTASSIUM SERPL-SCNC: 4 MMOL/L — SIGNIFICANT CHANGE UP (ref 3.5–5.3)
POTASSIUM SERPL-SCNC: 4.1 MMOL/L
POTASSIUM SERPL-SCNC: 4.1 MMOL/L — SIGNIFICANT CHANGE UP (ref 3.5–5.3)
POTASSIUM SERPL-SCNC: 4.1 MMOL/L — SIGNIFICANT CHANGE UP (ref 3.5–5.3)
POTASSIUM SERPL-SCNC: 4.2 MMOL/L
POTASSIUM SERPL-SCNC: 4.2 MMOL/L
POTASSIUM SERPL-SCNC: 4.2 MMOL/L — SIGNIFICANT CHANGE UP (ref 3.5–5.3)
POTASSIUM SERPL-SCNC: 4.3 MMOL/L — SIGNIFICANT CHANGE UP (ref 3.5–5.3)
POTASSIUM SERPL-SCNC: 4.3 MMOL/L — SIGNIFICANT CHANGE UP (ref 3.5–5.3)
POTASSIUM SERPL-SCNC: 5.6 MMOL/L — HIGH (ref 3.5–5.3)
PROCALCITONIN SERPL-MCNC: 0.08 NG/ML — SIGNIFICANT CHANGE UP (ref 0.02–0.1)
PROCALCITONIN SERPL-MCNC: 0.47 NG/ML — HIGH (ref 0.02–0.1)
PROT FLD-MCNC: 4.7 G/DL — SIGNIFICANT CHANGE UP
PROT SERPL-MCNC: 6.3 G/DL — SIGNIFICANT CHANGE UP (ref 6–8.3)
PROT SERPL-MCNC: 6.5 G/DL — SIGNIFICANT CHANGE UP (ref 6–8.3)
PROT SERPL-MCNC: 6.6 G/DL — SIGNIFICANT CHANGE UP (ref 6–8.3)
PROT SERPL-MCNC: 6.6 G/DL — SIGNIFICANT CHANGE UP (ref 6–8.3)
PROT SERPL-MCNC: 6.8 G/DL — SIGNIFICANT CHANGE UP (ref 6–8.3)
PROT SERPL-MCNC: 6.9 G/DL
PROT SERPL-MCNC: 6.9 G/DL — SIGNIFICANT CHANGE UP (ref 6–8.3)
PROT SERPL-MCNC: 7 G/DL — SIGNIFICANT CHANGE UP (ref 6–8.3)
PROT SERPL-MCNC: 7 G/DL — SIGNIFICANT CHANGE UP (ref 6–8.3)
PROT SERPL-MCNC: 7.1 G/DL — SIGNIFICANT CHANGE UP (ref 6–8.3)
PROT SERPL-MCNC: 7.2 G/DL — SIGNIFICANT CHANGE UP (ref 6–8.3)
PROT SERPL-MCNC: 7.3 G/DL
PROT SERPL-MCNC: 7.4 G/DL
PROT SERPL-MCNC: 7.4 G/DL
PROT SERPL-MCNC: 7.4 G/DL — SIGNIFICANT CHANGE UP (ref 6–8.3)
PROT SERPL-MCNC: 7.5 G/DL
PROT SERPL-MCNC: 7.5 G/DL — SIGNIFICANT CHANGE UP (ref 6–8.3)
PROT SERPL-MCNC: 7.5 G/DL — SIGNIFICANT CHANGE UP (ref 6–8.3)
PROT SERPL-MCNC: 7.6 G/DL — SIGNIFICANT CHANGE UP (ref 6–8.3)
PROT SERPL-MCNC: 7.6 G/DL — SIGNIFICANT CHANGE UP (ref 6–8.3)
PROT SERPL-MCNC: 7.7 G/DL
PROT SERPL-MCNC: 7.8 G/DL — SIGNIFICANT CHANGE UP (ref 6–8.3)
PROT SERPL-MCNC: 7.9 G/DL
PROT SERPL-MCNC: 7.9 G/DL
PROT SERPL-MCNC: 7.9 G/DL — SIGNIFICANT CHANGE UP (ref 6–8.3)
PROT SERPL-MCNC: 8.1 G/DL — SIGNIFICANT CHANGE UP (ref 6–8.3)
PROT SERPL-MCNC: 8.2 G/DL — SIGNIFICANT CHANGE UP (ref 6–8.3)
PROT SERPL-MCNC: 8.6 G/DL — HIGH (ref 6–8.3)
PROT UR-MCNC: ABNORMAL
PROT UR-MCNC: SIGNIFICANT CHANGE UP
PROTHROM AB SERPL-ACNC: 12.2 SEC — SIGNIFICANT CHANGE UP (ref 10.5–13.4)
PROTHROM AB SERPL-ACNC: 13.5 SEC — HIGH (ref 10.5–13.4)
PROTHROM AB SERPL-ACNC: 14.3 SEC — HIGH (ref 10.5–13.4)
PROTHROM AB SERPL-ACNC: 14.5 SEC — HIGH (ref 10.5–13.4)
PROTHROM AB SERPL-ACNC: 14.9 SEC — HIGH (ref 10.5–13.4)
PROTHROM AB SERPL-ACNC: 15.1 SEC — HIGH (ref 10.5–13.4)
PROTHROM AB SERPL-ACNC: 15.3 SEC — HIGH (ref 10.5–13.4)
PROTHROM AB SERPL-ACNC: 16.3 SEC — HIGH (ref 10.5–13.4)
PT BLD: 12.6 SEC
PT BLD: 13 SEC
PT BLD: 13.8 SEC
PT BLD: 13.9 SEC
RAPID RVP RESULT: DETECTED
RAPID RVP RESULT: SIGNIFICANT CHANGE UP
RAPID RVP RESULT: SIGNIFICANT CHANGE UP
RBC # BLD: 2.97 M/UL — LOW (ref 3.8–5.2)
RBC # BLD: 2.98 M/UL — LOW (ref 3.8–5.2)
RBC # BLD: 3.15 M/UL — LOW (ref 3.8–5.2)
RBC # BLD: 3.26 M/UL — LOW (ref 3.8–5.2)
RBC # BLD: 3.27 M/UL — LOW (ref 3.8–5.2)
RBC # BLD: 3.32 M/UL — LOW (ref 3.8–5.2)
RBC # BLD: 3.4 M/UL — LOW (ref 3.8–5.2)
RBC # BLD: 3.42 M/UL — LOW (ref 3.8–5.2)
RBC # BLD: 3.43 M/UL — LOW (ref 3.8–5.2)
RBC # BLD: 3.47 M/UL — LOW (ref 3.8–5.2)
RBC # BLD: 3.49 M/UL — LOW (ref 3.8–5.2)
RBC # BLD: 3.5 M/UL — LOW (ref 3.8–5.2)
RBC # BLD: 3.5 M/UL — LOW (ref 3.8–5.2)
RBC # BLD: 3.51 M/UL — LOW (ref 3.8–5.2)
RBC # BLD: 3.53 M/UL — LOW (ref 3.8–5.2)
RBC # BLD: 3.57 M/UL — LOW (ref 3.8–5.2)
RBC # BLD: 3.58 M/UL — LOW (ref 3.8–5.2)
RBC # BLD: 3.58 M/UL — LOW (ref 3.8–5.2)
RBC # BLD: 3.6 M/UL — LOW (ref 3.8–5.2)
RBC # BLD: 3.6 M/UL — LOW (ref 3.8–5.2)
RBC # BLD: 3.61 M/UL — LOW (ref 3.8–5.2)
RBC # BLD: 3.62 M/UL — LOW (ref 3.8–5.2)
RBC # BLD: 3.64 M/UL — LOW (ref 3.8–5.2)
RBC # BLD: 3.65 M/UL — LOW (ref 3.8–5.2)
RBC # BLD: 3.69 M/UL — LOW (ref 3.8–5.2)
RBC # BLD: 3.71 M/UL — LOW (ref 3.8–5.2)
RBC # BLD: 3.76 M/UL — LOW (ref 3.8–5.2)
RBC # BLD: 3.76 M/UL — LOW (ref 3.8–5.2)
RBC # BLD: 3.77 M/UL — LOW (ref 3.8–5.2)
RBC # BLD: 3.8 M/UL — SIGNIFICANT CHANGE UP (ref 3.8–5.2)
RBC # BLD: 3.82 M/UL — SIGNIFICANT CHANGE UP (ref 3.8–5.2)
RBC # BLD: 3.83 M/UL — SIGNIFICANT CHANGE UP (ref 3.8–5.2)
RBC # BLD: 3.84 M/UL — SIGNIFICANT CHANGE UP (ref 3.8–5.2)
RBC # BLD: 3.85 M/UL — SIGNIFICANT CHANGE UP (ref 3.8–5.2)
RBC # BLD: 3.86 M/UL — SIGNIFICANT CHANGE UP (ref 3.8–5.2)
RBC # BLD: 3.87 M/UL — SIGNIFICANT CHANGE UP (ref 3.8–5.2)
RBC # BLD: 3.88 M/UL — SIGNIFICANT CHANGE UP (ref 3.8–5.2)
RBC # BLD: 3.88 M/UL — SIGNIFICANT CHANGE UP (ref 3.8–5.2)
RBC # BLD: 3.93 M/UL — SIGNIFICANT CHANGE UP (ref 3.8–5.2)
RBC # BLD: 3.98 M/UL — SIGNIFICANT CHANGE UP (ref 3.8–5.2)
RBC # BLD: 3.99 M/UL — SIGNIFICANT CHANGE UP (ref 3.8–5.2)
RBC # BLD: 4 M/UL — SIGNIFICANT CHANGE UP (ref 3.8–5.2)
RBC # BLD: 4.01 M/UL — SIGNIFICANT CHANGE UP (ref 3.8–5.2)
RBC # BLD: 4.02 M/UL — SIGNIFICANT CHANGE UP (ref 3.8–5.2)
RBC # BLD: 4.02 M/UL — SIGNIFICANT CHANGE UP (ref 3.8–5.2)
RBC # BLD: 4.04 M/UL — SIGNIFICANT CHANGE UP (ref 3.8–5.2)
RBC # BLD: 4.11 M/UL — SIGNIFICANT CHANGE UP (ref 3.8–5.2)
RBC # BLD: 4.12 M/UL — SIGNIFICANT CHANGE UP (ref 3.8–5.2)
RBC # BLD: 4.14 M/UL — SIGNIFICANT CHANGE UP (ref 3.8–5.2)
RBC # BLD: 4.17 M/UL — SIGNIFICANT CHANGE UP (ref 3.8–5.2)
RBC # BLD: 4.24 M/UL
RBC # BLD: 4.27 M/UL — SIGNIFICANT CHANGE UP (ref 3.8–5.2)
RBC # BLD: 4.31 M/UL — SIGNIFICANT CHANGE UP (ref 3.8–5.2)
RBC # BLD: 4.31 M/UL — SIGNIFICANT CHANGE UP (ref 3.8–5.2)
RBC # BLD: 4.81 M/UL — SIGNIFICANT CHANGE UP (ref 3.8–5.2)
RBC # BLD: 4.87 M/UL — SIGNIFICANT CHANGE UP (ref 3.8–5.2)
RBC # FLD: 14.9 % — HIGH (ref 10.3–14.5)
RBC # FLD: 15.1 % — HIGH (ref 10.3–14.5)
RBC # FLD: 15.4 % — HIGH (ref 10.3–14.5)
RBC # FLD: 16.6 % — HIGH (ref 10.3–14.5)
RBC # FLD: 16.6 % — HIGH (ref 10.3–14.5)
RBC # FLD: 16.8 % — HIGH (ref 10.3–14.5)
RBC # FLD: 16.9 % — HIGH (ref 10.3–14.5)
RBC # FLD: 16.9 % — HIGH (ref 10.3–14.5)
RBC # FLD: 17 % — HIGH (ref 10.3–14.5)
RBC # FLD: 17.2 % — HIGH (ref 10.3–14.5)
RBC # FLD: 17.2 % — HIGH (ref 10.3–14.5)
RBC # FLD: 17.3 % — HIGH (ref 10.3–14.5)
RBC # FLD: 17.3 % — HIGH (ref 10.3–14.5)
RBC # FLD: 17.5 % — HIGH (ref 10.3–14.5)
RBC # FLD: 17.5 % — HIGH (ref 10.3–14.5)
RBC # FLD: 17.6 % — HIGH (ref 10.3–14.5)
RBC # FLD: 17.8 % — HIGH (ref 10.3–14.5)
RBC # FLD: 17.9 % — HIGH (ref 10.3–14.5)
RBC # FLD: 18.1 % — HIGH (ref 10.3–14.5)
RBC # FLD: 18.3 % — HIGH (ref 10.3–14.5)
RBC # FLD: 18.4 % — HIGH (ref 10.3–14.5)
RBC # FLD: 18.6 % — HIGH (ref 10.3–14.5)
RBC # FLD: 18.6 % — HIGH (ref 10.3–14.5)
RBC # FLD: 18.8 % — HIGH (ref 10.3–14.5)
RBC # FLD: 19 %
RBC # FLD: 19.2 % — HIGH (ref 10.3–14.5)
RBC # FLD: 19.6 % — HIGH (ref 10.3–14.5)
RBC # FLD: 19.7 % — HIGH (ref 10.3–14.5)
RBC # FLD: 19.9 % — HIGH (ref 10.3–14.5)
RBC # FLD: 19.9 % — HIGH (ref 10.3–14.5)
RBC # FLD: 20 % — HIGH (ref 10.3–14.5)
RBC # FLD: 20.1 % — HIGH (ref 10.3–14.5)
RBC # FLD: 20.1 % — HIGH (ref 10.3–14.5)
RBC # FLD: 20.2 % — HIGH (ref 10.3–14.5)
RBC # FLD: 20.5 % — HIGH (ref 10.3–14.5)
RBC # FLD: 22.2 % — HIGH (ref 10.3–14.5)
RBC # FLD: 23.1 % — HIGH (ref 10.3–14.5)
RBC # FLD: 23.3 % — HIGH (ref 10.3–14.5)
RBC # FLD: 23.6 % — HIGH (ref 10.3–14.5)
RBC # FLD: 23.8 % — HIGH (ref 10.3–14.5)
RBC # FLD: 23.8 % — HIGH (ref 10.3–14.5)
RBC # FLD: 24 % — HIGH (ref 10.3–14.5)
RBC # FLD: 24.1 % — HIGH (ref 10.3–14.5)
RBC # FLD: 24.2 % — HIGH (ref 10.3–14.5)
RBC # FLD: 24.6 % — HIGH (ref 10.3–14.5)
RBC # FLD: 24.9 % — HIGH (ref 10.3–14.5)
RBC # FLD: 24.9 % — HIGH (ref 10.3–14.5)
RBC # FLD: 25 % — HIGH (ref 10.3–14.5)
RBC # FLD: 25.1 % — HIGH (ref 10.3–14.5)
RBC # FLD: 25.3 % — HIGH (ref 10.3–14.5)
RBC # FLD: 25.3 % — HIGH (ref 10.3–14.5)
RBC BLD AUTO: ABNORMAL
RBC CASTS # UR COMP ASSIST: 3 /HPF — SIGNIFICANT CHANGE UP (ref 0–4)
RBC CASTS # UR COMP ASSIST: 3 /HPF — SIGNIFICANT CHANGE UP (ref 0–4)
RBC CASTS # UR COMP ASSIST: 4 /HPF — SIGNIFICANT CHANGE UP (ref 0–4)
RBC CASTS # UR COMP ASSIST: 4 /HPF — SIGNIFICANT CHANGE UP (ref 0–4)
RBC CASTS # UR COMP ASSIST: 6 /HPF — HIGH (ref 0–4)
RBC CASTS # UR COMP ASSIST: 7 /HPF — HIGH (ref 0–4)
RCV VOL RI: 4000 CELLS/UL — HIGH (ref 0–5)
RH IG SCN BLD-IMP: POSITIVE — SIGNIFICANT CHANGE UP
RSV RNA NPH QL NAA+NON-PROBE: SIGNIFICANT CHANGE UP
RSV RNA NPH QL NAA+NON-PROBE: SIGNIFICANT CHANGE UP
RSV RNA SPEC QL NAA+PROBE: SIGNIFICANT CHANGE UP
RV+EV RNA SPEC QL NAA+PROBE: SIGNIFICANT CHANGE UP
SAO2 % BLDV: 26 % — SIGNIFICANT CHANGE UP
SAO2 % BLDV: 51.8 % — SIGNIFICANT CHANGE UP
SAO2 % BLDV: 72.5 % — SIGNIFICANT CHANGE UP
SAO2 % BLDV: 73.9 % — SIGNIFICANT CHANGE UP
SAO2 % BLDV: 76.5 % — SIGNIFICANT CHANGE UP
SAO2 % BLDV: 83.5 % — SIGNIFICANT CHANGE UP
SAO2 % BLDV: 87.3 % — SIGNIFICANT CHANGE UP
SAO2 % BLDV: 98.8 % — SIGNIFICANT CHANGE UP
SARS-COV-2 N GENE NPH QL NAA+PROBE: NOT DETECTED
SARS-COV-2 RNA SPEC QL NAA+PROBE: DETECTED
SARS-COV-2 RNA SPEC QL NAA+PROBE: SIGNIFICANT CHANGE UP
SCHISTOCYTES BLD QL AUTO: SLIGHT — SIGNIFICANT CHANGE UP
SODIUM SERPL-SCNC: 135 MMOL/L — SIGNIFICANT CHANGE UP (ref 135–145)
SODIUM SERPL-SCNC: 136 MMOL/L — SIGNIFICANT CHANGE UP (ref 135–145)
SODIUM SERPL-SCNC: 137 MMOL/L — SIGNIFICANT CHANGE UP (ref 135–145)
SODIUM SERPL-SCNC: 138 MMOL/L — SIGNIFICANT CHANGE UP (ref 135–145)
SODIUM SERPL-SCNC: 139 MMOL/L
SODIUM SERPL-SCNC: 139 MMOL/L — SIGNIFICANT CHANGE UP (ref 135–145)
SODIUM SERPL-SCNC: 140 MMOL/L
SODIUM SERPL-SCNC: 140 MMOL/L — SIGNIFICANT CHANGE UP (ref 135–145)
SODIUM SERPL-SCNC: 141 MMOL/L — SIGNIFICANT CHANGE UP (ref 135–145)
SODIUM SERPL-SCNC: 142 MMOL/L — SIGNIFICANT CHANGE UP (ref 135–145)
SODIUM SERPL-SCNC: 143 MMOL/L
SODIUM SERPL-SCNC: 143 MMOL/L — SIGNIFICANT CHANGE UP (ref 135–145)
SODIUM SERPL-SCNC: 144 MMOL/L
SODIUM SERPL-SCNC: 144 MMOL/L — SIGNIFICANT CHANGE UP (ref 135–145)
SODIUM SERPL-SCNC: 144 MMOL/L — SIGNIFICANT CHANGE UP (ref 135–145)
SODIUM SERPL-SCNC: 145 MMOL/L
SODIUM SERPL-SCNC: 145 MMOL/L — SIGNIFICANT CHANGE UP (ref 135–145)
SODIUM SERPL-SCNC: 145 MMOL/L — SIGNIFICANT CHANGE UP (ref 135–145)
SODIUM SERPL-SCNC: 146 MMOL/L — HIGH (ref 135–145)
SODIUM SERPL-SCNC: 147 MMOL/L — HIGH (ref 135–145)
SODIUM SERPL-SCNC: 148 MMOL/L — HIGH (ref 135–145)
SP GR SPEC: 1.02 — SIGNIFICANT CHANGE UP (ref 1.01–1.05)
SP GR SPEC: 1.03 — HIGH (ref 1.01–1.02)
SP GR SPEC: 1.03 — SIGNIFICANT CHANGE UP (ref 1.01–1.05)
SP GR SPEC: 1.03 — SIGNIFICANT CHANGE UP (ref 1–1.05)
SP GR SPEC: 1.04 — HIGH (ref 1.01–1.02)
SPECIMEN SOURCE FLD: SIGNIFICANT CHANGE UP
SPECIMEN SOURCE FLD: SIGNIFICANT CHANGE UP
SPECIMEN SOURCE: SIGNIFICANT CHANGE UP
T4 FREE SERPL-MCNC: 1.1 NG/DL
TARGETS BLD QL SMEAR: SLIGHT — SIGNIFICANT CHANGE UP
TOTAL NUCLEATED CELL COUNT, BODY FLUID: 266 CELLS/UL — HIGH (ref 0–5)
TROPONIN T, HIGH SENSITIVITY RESULT: 9 NG/L — SIGNIFICANT CHANGE UP
TSH SERPL-ACNC: 3.08 UIU/ML
TSH SERPL-ACNC: 3.58 UIU/ML
TSH SERPL-MCNC: 14.4 UIU/ML — HIGH (ref 0.27–4.2)
TSH SERPL-MCNC: 2.1 UIU/ML — SIGNIFICANT CHANGE UP (ref 0.27–4.2)
TUBE TYPE: SIGNIFICANT CHANGE UP
UROBILINOGEN FLD QL: ABNORMAL
UROBILINOGEN FLD QL: SIGNIFICANT CHANGE UP
VARIANT LYMPHS # BLD: 3.5 % — SIGNIFICANT CHANGE UP (ref 0–6)
WBC # BLD: 10.37 K/UL — SIGNIFICANT CHANGE UP (ref 3.8–10.5)
WBC # BLD: 10.37 K/UL — SIGNIFICANT CHANGE UP (ref 3.8–10.5)
WBC # BLD: 10.78 K/UL — HIGH (ref 3.8–10.5)
WBC # BLD: 10.82 K/UL — HIGH (ref 3.8–10.5)
WBC # BLD: 10.83 K/UL — HIGH (ref 3.8–10.5)
WBC # BLD: 11.04 K/UL — HIGH (ref 3.8–10.5)
WBC # BLD: 11.4 K/UL — HIGH (ref 3.8–10.5)
WBC # BLD: 12.06 K/UL — HIGH (ref 3.8–10.5)
WBC # BLD: 12.28 K/UL — HIGH (ref 3.8–10.5)
WBC # BLD: 13.6 K/UL — HIGH (ref 3.8–10.5)
WBC # BLD: 14.2 K/UL — HIGH (ref 3.8–10.5)
WBC # BLD: 2.22 K/UL — LOW (ref 3.8–10.5)
WBC # BLD: 2.4 K/UL — LOW (ref 3.8–10.5)
WBC # BLD: 2.45 K/UL — LOW (ref 3.8–10.5)
WBC # BLD: 2.62 K/UL — LOW (ref 3.8–10.5)
WBC # BLD: 2.63 K/UL — LOW (ref 3.8–10.5)
WBC # BLD: 2.69 K/UL — LOW (ref 3.8–10.5)
WBC # BLD: 2.89 K/UL — LOW (ref 3.8–10.5)
WBC # BLD: 2.9 K/UL — LOW (ref 3.8–10.5)
WBC # BLD: 3.02 K/UL — LOW (ref 3.8–10.5)
WBC # BLD: 3.35 K/UL — LOW (ref 3.8–10.5)
WBC # BLD: 3.46 K/UL — LOW (ref 3.8–10.5)
WBC # BLD: 4.06 K/UL — SIGNIFICANT CHANGE UP (ref 3.8–10.5)
WBC # BLD: 4.36 K/UL — SIGNIFICANT CHANGE UP (ref 3.8–10.5)
WBC # BLD: 4.48 K/UL — SIGNIFICANT CHANGE UP (ref 3.8–10.5)
WBC # BLD: 4.95 K/UL — SIGNIFICANT CHANGE UP (ref 3.8–10.5)
WBC # BLD: 5.04 K/UL — SIGNIFICANT CHANGE UP (ref 3.8–10.5)
WBC # BLD: 5.06 K/UL — SIGNIFICANT CHANGE UP (ref 3.8–10.5)
WBC # BLD: 5.12 K/UL — SIGNIFICANT CHANGE UP (ref 3.8–10.5)
WBC # BLD: 5.27 K/UL — SIGNIFICANT CHANGE UP (ref 3.8–10.5)
WBC # BLD: 5.28 K/UL — SIGNIFICANT CHANGE UP (ref 3.8–10.5)
WBC # BLD: 5.36 K/UL — SIGNIFICANT CHANGE UP (ref 3.8–10.5)
WBC # BLD: 5.39 K/UL — SIGNIFICANT CHANGE UP (ref 3.8–10.5)
WBC # BLD: 5.46 K/UL — SIGNIFICANT CHANGE UP (ref 3.8–10.5)
WBC # BLD: 5.62 K/UL — SIGNIFICANT CHANGE UP (ref 3.8–10.5)
WBC # BLD: 5.71 K/UL — SIGNIFICANT CHANGE UP (ref 3.8–10.5)
WBC # BLD: 5.78 K/UL — SIGNIFICANT CHANGE UP (ref 3.8–10.5)
WBC # BLD: 6.02 K/UL — SIGNIFICANT CHANGE UP (ref 3.8–10.5)
WBC # BLD: 6.09 K/UL — SIGNIFICANT CHANGE UP (ref 3.8–10.5)
WBC # BLD: 6.23 K/UL — SIGNIFICANT CHANGE UP (ref 3.8–10.5)
WBC # BLD: 6.34 K/UL — SIGNIFICANT CHANGE UP (ref 3.8–10.5)
WBC # BLD: 6.6 K/UL — SIGNIFICANT CHANGE UP (ref 3.8–10.5)
WBC # BLD: 6.68 K/UL — SIGNIFICANT CHANGE UP (ref 3.8–10.5)
WBC # BLD: 6.7 K/UL — SIGNIFICANT CHANGE UP (ref 3.8–10.5)
WBC # BLD: 6.74 K/UL — SIGNIFICANT CHANGE UP (ref 3.8–10.5)
WBC # BLD: 6.96 K/UL — SIGNIFICANT CHANGE UP (ref 3.8–10.5)
WBC # BLD: 7.05 K/UL — SIGNIFICANT CHANGE UP (ref 3.8–10.5)
WBC # BLD: 7.08 K/UL — SIGNIFICANT CHANGE UP (ref 3.8–10.5)
WBC # BLD: 7.25 K/UL — SIGNIFICANT CHANGE UP (ref 3.8–10.5)
WBC # BLD: 7.93 K/UL — SIGNIFICANT CHANGE UP (ref 3.8–10.5)
WBC # BLD: 8.01 K/UL — SIGNIFICANT CHANGE UP (ref 3.8–10.5)
WBC # BLD: 8.04 K/UL — SIGNIFICANT CHANGE UP (ref 3.8–10.5)
WBC # BLD: 8.37 K/UL — SIGNIFICANT CHANGE UP (ref 3.8–10.5)
WBC # BLD: 8.37 K/UL — SIGNIFICANT CHANGE UP (ref 3.8–10.5)
WBC # BLD: 8.48 K/UL — SIGNIFICANT CHANGE UP (ref 3.8–10.5)
WBC # BLD: 8.99 K/UL — SIGNIFICANT CHANGE UP (ref 3.8–10.5)
WBC # BLD: 9.71 K/UL — SIGNIFICANT CHANGE UP (ref 3.8–10.5)
WBC # FLD AUTO: 10.37 K/UL — SIGNIFICANT CHANGE UP (ref 3.8–10.5)
WBC # FLD AUTO: 10.37 K/UL — SIGNIFICANT CHANGE UP (ref 3.8–10.5)
WBC # FLD AUTO: 10.78 K/UL — HIGH (ref 3.8–10.5)
WBC # FLD AUTO: 10.82 K/UL — HIGH (ref 3.8–10.5)
WBC # FLD AUTO: 10.83 K/UL — HIGH (ref 3.8–10.5)
WBC # FLD AUTO: 11.04 K/UL — HIGH (ref 3.8–10.5)
WBC # FLD AUTO: 11.4 K/UL — HIGH (ref 3.8–10.5)
WBC # FLD AUTO: 12.06 K/UL — HIGH (ref 3.8–10.5)
WBC # FLD AUTO: 12.28 K/UL — HIGH (ref 3.8–10.5)
WBC # FLD AUTO: 13.6 K/UL — HIGH (ref 3.8–10.5)
WBC # FLD AUTO: 14.2 K/UL — HIGH (ref 3.8–10.5)
WBC # FLD AUTO: 2.22 K/UL — LOW (ref 3.8–10.5)
WBC # FLD AUTO: 2.4 K/UL — LOW (ref 3.8–10.5)
WBC # FLD AUTO: 2.45 K/UL — LOW (ref 3.8–10.5)
WBC # FLD AUTO: 2.62 K/UL — LOW (ref 3.8–10.5)
WBC # FLD AUTO: 2.63 K/UL — LOW (ref 3.8–10.5)
WBC # FLD AUTO: 2.69 K/UL — LOW (ref 3.8–10.5)
WBC # FLD AUTO: 2.89 K/UL — LOW (ref 3.8–10.5)
WBC # FLD AUTO: 2.9 K/UL — LOW (ref 3.8–10.5)
WBC # FLD AUTO: 3.02 K/UL — LOW (ref 3.8–10.5)
WBC # FLD AUTO: 3.35 K/UL — LOW (ref 3.8–10.5)
WBC # FLD AUTO: 3.46 K/UL — LOW (ref 3.8–10.5)
WBC # FLD AUTO: 4.06 K/UL — SIGNIFICANT CHANGE UP (ref 3.8–10.5)
WBC # FLD AUTO: 4.36 K/UL — SIGNIFICANT CHANGE UP (ref 3.8–10.5)
WBC # FLD AUTO: 4.48 K/UL — SIGNIFICANT CHANGE UP (ref 3.8–10.5)
WBC # FLD AUTO: 4.95 K/UL — SIGNIFICANT CHANGE UP (ref 3.8–10.5)
WBC # FLD AUTO: 5.04 K/UL — SIGNIFICANT CHANGE UP (ref 3.8–10.5)
WBC # FLD AUTO: 5.06 K/UL — SIGNIFICANT CHANGE UP (ref 3.8–10.5)
WBC # FLD AUTO: 5.12 K/UL — SIGNIFICANT CHANGE UP (ref 3.8–10.5)
WBC # FLD AUTO: 5.27 K/UL — SIGNIFICANT CHANGE UP (ref 3.8–10.5)
WBC # FLD AUTO: 5.28 K/UL — SIGNIFICANT CHANGE UP (ref 3.8–10.5)
WBC # FLD AUTO: 5.36 K/UL — SIGNIFICANT CHANGE UP (ref 3.8–10.5)
WBC # FLD AUTO: 5.39 K/UL — SIGNIFICANT CHANGE UP (ref 3.8–10.5)
WBC # FLD AUTO: 5.46 K/UL — SIGNIFICANT CHANGE UP (ref 3.8–10.5)
WBC # FLD AUTO: 5.62 K/UL — SIGNIFICANT CHANGE UP (ref 3.8–10.5)
WBC # FLD AUTO: 5.71 K/UL — SIGNIFICANT CHANGE UP (ref 3.8–10.5)
WBC # FLD AUTO: 5.78 K/UL — SIGNIFICANT CHANGE UP (ref 3.8–10.5)
WBC # FLD AUTO: 6.02 K/UL — SIGNIFICANT CHANGE UP (ref 3.8–10.5)
WBC # FLD AUTO: 6.09 K/UL — SIGNIFICANT CHANGE UP (ref 3.8–10.5)
WBC # FLD AUTO: 6.23 K/UL — SIGNIFICANT CHANGE UP (ref 3.8–10.5)
WBC # FLD AUTO: 6.34 K/UL — SIGNIFICANT CHANGE UP (ref 3.8–10.5)
WBC # FLD AUTO: 6.6 K/UL — SIGNIFICANT CHANGE UP (ref 3.8–10.5)
WBC # FLD AUTO: 6.68 K/UL — SIGNIFICANT CHANGE UP (ref 3.8–10.5)
WBC # FLD AUTO: 6.7 K/UL — SIGNIFICANT CHANGE UP (ref 3.8–10.5)
WBC # FLD AUTO: 6.74 K/UL — SIGNIFICANT CHANGE UP (ref 3.8–10.5)
WBC # FLD AUTO: 6.96 K/UL — SIGNIFICANT CHANGE UP (ref 3.8–10.5)
WBC # FLD AUTO: 7.05 K/UL — SIGNIFICANT CHANGE UP (ref 3.8–10.5)
WBC # FLD AUTO: 7.08 K/UL — SIGNIFICANT CHANGE UP (ref 3.8–10.5)
WBC # FLD AUTO: 7.25 K/UL — SIGNIFICANT CHANGE UP (ref 3.8–10.5)
WBC # FLD AUTO: 7.33 K/UL
WBC # FLD AUTO: 7.93 K/UL — SIGNIFICANT CHANGE UP (ref 3.8–10.5)
WBC # FLD AUTO: 8.01 K/UL — SIGNIFICANT CHANGE UP (ref 3.8–10.5)
WBC # FLD AUTO: 8.04 K/UL — SIGNIFICANT CHANGE UP (ref 3.8–10.5)
WBC # FLD AUTO: 8.37 K/UL — SIGNIFICANT CHANGE UP (ref 3.8–10.5)
WBC # FLD AUTO: 8.37 K/UL — SIGNIFICANT CHANGE UP (ref 3.8–10.5)
WBC # FLD AUTO: 8.48 K/UL — SIGNIFICANT CHANGE UP (ref 3.8–10.5)
WBC # FLD AUTO: 8.99 K/UL — SIGNIFICANT CHANGE UP (ref 3.8–10.5)
WBC # FLD AUTO: 9.71 K/UL — SIGNIFICANT CHANGE UP (ref 3.8–10.5)
WBC UR QL: 2 /HPF — SIGNIFICANT CHANGE UP (ref 0–5)
WBC UR QL: 4 /HPF — SIGNIFICANT CHANGE UP (ref 0–5)
WBC UR QL: 8 /HPF — HIGH (ref 0–5)

## 2022-01-01 PROCEDURE — 99233 SBSQ HOSP IP/OBS HIGH 50: CPT

## 2022-01-01 PROCEDURE — 74177 CT ABD & PELVIS W/CONTRAST: CPT

## 2022-01-01 PROCEDURE — 71045 X-RAY EXAM CHEST 1 VIEW: CPT | Mod: 26

## 2022-01-01 PROCEDURE — 99232 SBSQ HOSP IP/OBS MODERATE 35: CPT

## 2022-01-01 PROCEDURE — 99233 SBSQ HOSP IP/OBS HIGH 50: CPT | Mod: 57

## 2022-01-01 PROCEDURE — 99221 1ST HOSP IP/OBS SF/LOW 40: CPT | Mod: GC

## 2022-01-01 PROCEDURE — 99221 1ST HOSP IP/OBS SF/LOW 40: CPT

## 2022-01-01 PROCEDURE — 99233 SBSQ HOSP IP/OBS HIGH 50: CPT | Mod: GC

## 2022-01-01 PROCEDURE — 99214 OFFICE O/P EST MOD 30 MIN: CPT

## 2022-01-01 PROCEDURE — 88305 TISSUE EXAM BY PATHOLOGIST: CPT | Mod: 26

## 2022-01-01 PROCEDURE — 71275 CT ANGIOGRAPHY CHEST: CPT | Mod: 26

## 2022-01-01 PROCEDURE — 99253 IP/OBS CNSLTJ NEW/EST LOW 45: CPT

## 2022-01-01 PROCEDURE — 99231 SBSQ HOSP IP/OBS SF/LOW 25: CPT

## 2022-01-01 PROCEDURE — 99239 HOSP IP/OBS DSCHRG MGMT >30: CPT | Mod: GC

## 2022-01-01 PROCEDURE — 32555 ASPIRATE PLEURA W/ IMAGING: CPT | Mod: RT

## 2022-01-01 PROCEDURE — 32557 INSERT CATH PLEURA W/ IMAGE: CPT | Mod: LT

## 2022-01-01 PROCEDURE — 71260 CT THORAX DX C+: CPT

## 2022-01-01 PROCEDURE — 93306 TTE W/DOPPLER COMPLETE: CPT | Mod: 26

## 2022-01-01 PROCEDURE — 76604 US EXAM CHEST: CPT

## 2022-01-01 PROCEDURE — 88112 CYTOPATH CELL ENHANCE TECH: CPT | Mod: 26

## 2022-01-01 PROCEDURE — 99244 OFF/OP CNSLTJ NEW/EST MOD 40: CPT

## 2022-01-01 PROCEDURE — 71045 X-RAY EXAM CHEST 1 VIEW: CPT

## 2022-01-01 PROCEDURE — 71275 CT ANGIOGRAPHY CHEST: CPT | Mod: 26,MA

## 2022-01-01 PROCEDURE — 99255 IP/OBS CONSLTJ NEW/EST HI 80: CPT | Mod: GC

## 2022-01-01 PROCEDURE — 71260 CT THORAX DX C+: CPT | Mod: 26

## 2022-01-01 PROCEDURE — 32552 REMOVE LUNG CATHETER: CPT

## 2022-01-01 PROCEDURE — 99231 SBSQ HOSP IP/OBS SF/LOW 25: CPT | Mod: 25

## 2022-01-01 PROCEDURE — 99232 SBSQ HOSP IP/OBS MODERATE 35: CPT | Mod: GC

## 2022-01-01 PROCEDURE — 76700 US EXAM ABDOM COMPLETE: CPT | Mod: 26

## 2022-01-01 PROCEDURE — 99285 EMERGENCY DEPT VISIT HI MDM: CPT

## 2022-01-01 PROCEDURE — 71046 X-RAY EXAM CHEST 2 VIEWS: CPT | Mod: 26

## 2022-01-01 PROCEDURE — 71046 X-RAY EXAM CHEST 2 VIEWS: CPT

## 2022-01-01 PROCEDURE — 49083 ABD PARACENTESIS W/IMAGING: CPT

## 2022-01-01 PROCEDURE — 76700 US EXAM ABDOM COMPLETE: CPT

## 2022-01-01 PROCEDURE — 74177 CT ABD & PELVIS W/CONTRAST: CPT | Mod: 26

## 2022-01-01 PROCEDURE — 99254 IP/OBS CNSLTJ NEW/EST MOD 60: CPT | Mod: 57

## 2022-01-01 PROCEDURE — 36598 INJ W/FLUOR EVAL CV DEVICE: CPT | Mod: 52

## 2022-01-01 PROCEDURE — 99233 SBSQ HOSP IP/OBS HIGH 50: CPT | Mod: GC,25

## 2022-01-01 PROCEDURE — 99223 1ST HOSP IP/OBS HIGH 75: CPT

## 2022-01-01 PROCEDURE — 99213 OFFICE O/P EST LOW 20 MIN: CPT

## 2022-01-01 PROCEDURE — 88342 IMHCHEM/IMCYTCHM 1ST ANTB: CPT | Mod: 26

## 2022-01-01 PROCEDURE — 99205 OFFICE O/P NEW HI 60 MIN: CPT

## 2022-01-01 PROCEDURE — 99497 ADVNCD CARE PLAN 30 MIN: CPT

## 2022-01-01 PROCEDURE — 71275 CT ANGIOGRAPHY CHEST: CPT

## 2022-01-01 PROCEDURE — 93010 ELECTROCARDIOGRAM REPORT: CPT

## 2022-01-01 PROCEDURE — 88341 IMHCHEM/IMCYTCHM EA ADD ANTB: CPT | Mod: 26

## 2022-01-01 PROCEDURE — 99443: CPT

## 2022-01-01 PROCEDURE — 93970 EXTREMITY STUDY: CPT | Mod: 26

## 2022-01-01 PROCEDURE — 93306 TTE W/DOPPLER COMPLETE: CPT

## 2022-01-01 PROCEDURE — 99442: CPT

## 2022-01-01 PROCEDURE — 71250 CT THORAX DX C-: CPT | Mod: 26

## 2022-01-01 PROCEDURE — 71045 X-RAY EXAM CHEST 1 VIEW: CPT | Mod: 26,76

## 2022-01-01 PROCEDURE — 74230 X-RAY XM SWLNG FUNCJ C+: CPT | Mod: 26

## 2022-01-01 PROCEDURE — 99231 SBSQ HOSP IP/OBS SF/LOW 25: CPT | Mod: GC

## 2022-01-01 PROCEDURE — 99254 IP/OBS CNSLTJ NEW/EST MOD 60: CPT

## 2022-01-01 PROCEDURE — 99232 SBSQ HOSP IP/OBS MODERATE 35: CPT | Mod: 57

## 2022-01-01 PROCEDURE — 76604 US EXAM CHEST: CPT | Mod: 26

## 2022-01-01 PROCEDURE — 76937 US GUIDE VASCULAR ACCESS: CPT | Mod: 26

## 2022-01-01 PROCEDURE — 71275 CT ANGIOGRAPHY CHEST: CPT | Mod: 26,MA,59

## 2022-01-01 PROCEDURE — 70470 CT HEAD/BRAIN W/O & W/DYE: CPT | Mod: 26

## 2022-01-01 PROCEDURE — 99203 OFFICE O/P NEW LOW 30 MIN: CPT

## 2022-01-01 PROCEDURE — 99254 IP/OBS CNSLTJ NEW/EST MOD 60: CPT | Mod: GC

## 2022-01-01 PROCEDURE — 99284 EMERGENCY DEPT VISIT MOD MDM: CPT | Mod: 25

## 2022-01-01 PROCEDURE — 74018 RADEX ABDOMEN 1 VIEW: CPT | Mod: 26

## 2022-01-01 PROCEDURE — 99239 HOSP IP/OBS DSCHRG MGMT >30: CPT

## 2022-01-01 PROCEDURE — 12345: CPT | Mod: NC,GC

## 2022-01-01 PROCEDURE — 99233 SBSQ HOSP IP/OBS HIGH 50: CPT | Mod: 25

## 2022-01-01 PROCEDURE — 99223 1ST HOSP IP/OBS HIGH 75: CPT | Mod: GC

## 2022-01-01 PROCEDURE — 74177 CT ABD & PELVIS W/CONTRAST: CPT | Mod: 26,MA

## 2022-01-01 RX ORDER — IPRATROPIUM/ALBUTEROL SULFATE 18-103MCG
3 AEROSOL WITH ADAPTER (GRAM) INHALATION ONCE
Refills: 0 | Status: COMPLETED | OUTPATIENT
Start: 2022-01-01 | End: 2022-01-01

## 2022-01-01 RX ORDER — ATOVAQUONE 750 MG/5ML
5 SUSPENSION ORAL
Qty: 0 | Refills: 0 | DISCHARGE
Start: 2022-01-01

## 2022-01-01 RX ORDER — BENZOCAINE AND MENTHOL 5; 1 G/100ML; G/100ML
1 LIQUID ORAL
Refills: 0 | Status: DISCONTINUED | OUTPATIENT
Start: 2022-01-01 | End: 2022-01-01

## 2022-01-01 RX ORDER — SODIUM CHLORIDE 9 MG/ML
500 INJECTION, SOLUTION INTRAVENOUS ONCE
Refills: 0 | Status: DISCONTINUED | OUTPATIENT
Start: 2022-01-01 | End: 2022-01-01

## 2022-01-01 RX ORDER — POTASSIUM CHLORIDE 20 MEQ
40 PACKET (EA) ORAL EVERY 4 HOURS
Refills: 0 | Status: DISCONTINUED | OUTPATIENT
Start: 2022-01-01 | End: 2022-01-01

## 2022-01-01 RX ORDER — ACETAMINOPHEN 500 MG
650 TABLET ORAL EVERY 6 HOURS
Refills: 0 | Status: DISCONTINUED | OUTPATIENT
Start: 2022-01-01 | End: 2022-01-01

## 2022-01-01 RX ORDER — HEPARIN SODIUM 5000 [USP'U]/ML
5000 INJECTION INTRAVENOUS; SUBCUTANEOUS EVERY 8 HOURS
Refills: 0 | Status: DISCONTINUED | OUTPATIENT
Start: 2022-01-01 | End: 2022-01-01

## 2022-01-01 RX ORDER — SODIUM CHLORIDE 9 MG/ML
1000 INJECTION, SOLUTION INTRAVENOUS
Refills: 0 | Status: DISCONTINUED | OUTPATIENT
Start: 2022-01-01 | End: 2022-01-01

## 2022-01-01 RX ORDER — MAGNESIUM SULFATE 500 MG/ML
2 VIAL (ML) INJECTION ONCE
Refills: 0 | Status: COMPLETED | OUTPATIENT
Start: 2022-01-01 | End: 2022-01-01

## 2022-01-01 RX ORDER — PIPERACILLIN AND TAZOBACTAM 4; .5 G/20ML; G/20ML
3.38 INJECTION, POWDER, LYOPHILIZED, FOR SOLUTION INTRAVENOUS EVERY 8 HOURS
Refills: 0 | Status: DISCONTINUED | OUTPATIENT
Start: 2022-01-01 | End: 2022-01-01

## 2022-01-01 RX ORDER — POTASSIUM PHOSPHATE, MONOBASIC POTASSIUM PHOSPHATE, DIBASIC 236; 224 MG/ML; MG/ML
30 INJECTION, SOLUTION INTRAVENOUS ONCE
Refills: 0 | Status: COMPLETED | OUTPATIENT
Start: 2022-01-01 | End: 2022-01-01

## 2022-01-01 RX ORDER — PIPERACILLIN AND TAZOBACTAM 4; .5 G/20ML; G/20ML
3.38 INJECTION, POWDER, LYOPHILIZED, FOR SOLUTION INTRAVENOUS EVERY 8 HOURS
Refills: 0 | Status: COMPLETED | OUTPATIENT
Start: 2022-01-01 | End: 2022-01-01

## 2022-01-01 RX ORDER — IBUPROFEN 200 MG
600 TABLET ORAL ONCE
Refills: 0 | Status: COMPLETED | OUTPATIENT
Start: 2022-01-01 | End: 2022-01-01

## 2022-01-01 RX ORDER — ALBUTEROL 90 UG/1
1 AEROSOL, METERED ORAL
Qty: 0 | Refills: 0 | DISCHARGE
Start: 2022-01-01

## 2022-01-01 RX ORDER — LORATADINE 10 MG/1
1 TABLET ORAL
Qty: 0 | Refills: 0 | DISCHARGE
Start: 2022-01-01

## 2022-01-01 RX ORDER — TRAMADOL HYDROCHLORIDE 50 MG/1
25 TABLET ORAL ONCE
Refills: 0 | Status: DISCONTINUED | OUTPATIENT
Start: 2022-01-01 | End: 2022-01-01

## 2022-01-01 RX ORDER — BENZOCAINE AND MENTHOL 5; 1 G/100ML; G/100ML
1 LIQUID ORAL EVERY 4 HOURS
Refills: 0 | Status: DISCONTINUED | OUTPATIENT
Start: 2022-01-01 | End: 2022-01-01

## 2022-01-01 RX ORDER — LIDOCAINE 4 G/100G
1 CREAM TOPICAL DAILY
Refills: 0 | Status: DISCONTINUED | OUTPATIENT
Start: 2022-01-01 | End: 2022-01-01

## 2022-01-01 RX ORDER — KETOROLAC TROMETHAMINE 30 MG/ML
15 SYRINGE (ML) INJECTION ONCE
Refills: 0 | Status: DISCONTINUED | OUTPATIENT
Start: 2022-01-01 | End: 2022-01-01

## 2022-01-01 RX ORDER — ACETAMINOPHEN 500 MG
1000 TABLET ORAL EVERY 6 HOURS
Refills: 0 | Status: DISCONTINUED | OUTPATIENT
Start: 2022-01-01 | End: 2022-01-01

## 2022-01-01 RX ORDER — LIDOCAINE HYDROCHLORIDE AND EPINEPHRINE 10; 10 MG/ML; UG/ML
20 INJECTION, SOLUTION INFILTRATION; PERINEURAL ONCE
Refills: 0 | Status: DISCONTINUED | OUTPATIENT
Start: 2022-01-01 | End: 2022-01-01

## 2022-01-01 RX ORDER — SODIUM CHLORIDE 9 MG/ML
500 INJECTION INTRAMUSCULAR; INTRAVENOUS; SUBCUTANEOUS ONCE
Refills: 0 | Status: COMPLETED | OUTPATIENT
Start: 2022-01-01 | End: 2022-01-01

## 2022-01-01 RX ORDER — HYDROCODONE BITARTRATE AND HOMATROPINE METHYLBROMIDE 5; 1.5 MG/5ML; MG/5ML
5 SOLUTION ORAL
Qty: 0 | Refills: 0 | DISCHARGE
Start: 2022-01-01 | End: 2022-01-01

## 2022-01-01 RX ORDER — ENOXAPARIN SODIUM 100 MG/ML
40 INJECTION SUBCUTANEOUS EVERY 24 HOURS
Refills: 0 | Status: DISCONTINUED | OUTPATIENT
Start: 2022-01-01 | End: 2022-01-01

## 2022-01-01 RX ORDER — IBUPROFEN 200 MG
200 TABLET ORAL ONCE
Refills: 0 | Status: COMPLETED | OUTPATIENT
Start: 2022-01-01 | End: 2022-01-01

## 2022-01-01 RX ORDER — PIPERACILLIN AND TAZOBACTAM 4; .5 G/20ML; G/20ML
3.38 INJECTION, POWDER, LYOPHILIZED, FOR SOLUTION INTRAVENOUS ONCE
Refills: 0 | Status: COMPLETED | OUTPATIENT
Start: 2022-01-01 | End: 2022-01-01

## 2022-01-01 RX ORDER — ALPRAZOLAM 0.25 MG
0.25 TABLET ORAL ONCE
Refills: 0 | Status: DISCONTINUED | OUTPATIENT
Start: 2022-01-01 | End: 2022-01-01

## 2022-01-01 RX ORDER — ACETAMINOPHEN 500 MG
3 TABLET ORAL
Qty: 0 | Refills: 0 | DISCHARGE
Start: 2022-01-01

## 2022-01-01 RX ORDER — BISACODYL 10 MG/1
10 SUPPOSITORY RECTAL
Qty: 14 | Refills: 0 | Status: COMPLETED | COMMUNITY
Start: 2022-01-01

## 2022-01-01 RX ORDER — FUROSEMIDE 40 MG
40 TABLET ORAL DAILY
Refills: 0 | Status: DISCONTINUED | OUTPATIENT
Start: 2022-01-01 | End: 2022-01-01

## 2022-01-01 RX ORDER — POTASSIUM CHLORIDE 20 MEQ
20 PACKET (EA) ORAL ONCE
Refills: 0 | Status: DISCONTINUED | OUTPATIENT
Start: 2022-01-01 | End: 2022-01-01

## 2022-01-01 RX ORDER — PROCHLORPERAZINE MALEATE 10 MG/1
10 TABLET ORAL EVERY 6 HOURS
Qty: 20 | Refills: 6 | Status: ACTIVE | COMMUNITY
Start: 2022-01-01 | End: 1900-01-01

## 2022-01-01 RX ORDER — NYSTATIN CREAM 100000 [USP'U]/G
1 CREAM TOPICAL
Refills: 0 | Status: DISCONTINUED | OUTPATIENT
Start: 2022-01-01 | End: 2022-01-01

## 2022-01-01 RX ORDER — ACETAMINOPHEN 500 MG
975 TABLET ORAL EVERY 6 HOURS
Refills: 0 | Status: DISCONTINUED | OUTPATIENT
Start: 2022-01-01 | End: 2022-01-01

## 2022-01-01 RX ORDER — POLYETHYLENE GLYCOL 3350 17 G/17G
17 POWDER, FOR SOLUTION ORAL
Qty: 510 | Refills: 0 | Status: COMPLETED | COMMUNITY
Start: 2022-01-01

## 2022-01-01 RX ORDER — IBUPROFEN 200 MG
600 TABLET ORAL EVERY 6 HOURS
Refills: 0 | Status: DISCONTINUED | OUTPATIENT
Start: 2022-01-01 | End: 2022-01-01

## 2022-01-01 RX ORDER — LANOLIN ALCOHOL/MO/W.PET/CERES
3 CREAM (GRAM) TOPICAL AT BEDTIME
Refills: 0 | Status: DISCONTINUED | OUTPATIENT
Start: 2022-01-01 | End: 2022-01-01

## 2022-01-01 RX ORDER — ACETAMINOPHEN 500 MG
1000 TABLET ORAL ONCE
Refills: 0 | Status: COMPLETED | OUTPATIENT
Start: 2022-01-01 | End: 2022-01-01

## 2022-01-01 RX ORDER — ALPRAZOLAM 0.25 MG
0.5 TABLET ORAL ONCE
Refills: 0 | Status: DISCONTINUED | OUTPATIENT
Start: 2022-01-01 | End: 2022-01-01

## 2022-01-01 RX ORDER — POTASSIUM CHLORIDE 20 MEQ
20 PACKET (EA) ORAL
Refills: 0 | Status: COMPLETED | OUTPATIENT
Start: 2022-01-01 | End: 2022-01-01

## 2022-01-01 RX ORDER — HYDROCODONE BITARTRATE AND HOMATROPINE METHYLBROMIDE 5; 1.5 MG/5ML; MG/5ML
5-1.5 SOLUTION ORAL
Qty: 100 | Refills: 0 | Status: ACTIVE | COMMUNITY
Start: 2022-01-01

## 2022-01-01 RX ORDER — ACETAMINOPHEN 500 MG
1000 TABLET ORAL EVERY 6 HOURS
Refills: 0 | Status: COMPLETED | OUTPATIENT
Start: 2022-01-01 | End: 2022-01-01

## 2022-01-01 RX ORDER — FUROSEMIDE 40 MG
20 TABLET ORAL ONCE
Refills: 0 | Status: COMPLETED | OUTPATIENT
Start: 2022-01-01 | End: 2022-01-01

## 2022-01-01 RX ORDER — PROCHLORPERAZINE MALEATE 5 MG
1 TABLET ORAL
Qty: 0 | Refills: 0 | DISCHARGE

## 2022-01-01 RX ORDER — SIMETHICONE 80 MG/1
80 TABLET, CHEWABLE ORAL ONCE
Refills: 0 | Status: COMPLETED | OUTPATIENT
Start: 2022-01-01 | End: 2022-01-01

## 2022-01-01 RX ORDER — POTASSIUM CHLORIDE 20 MEQ
40 PACKET (EA) ORAL EVERY 4 HOURS
Refills: 0 | Status: COMPLETED | OUTPATIENT
Start: 2022-01-01 | End: 2022-01-01

## 2022-01-01 RX ORDER — POLYETHYLENE GLYCOL 3350 17 G/17G
17 POWDER, FOR SOLUTION ORAL ONCE
Refills: 0 | Status: COMPLETED | OUTPATIENT
Start: 2022-01-01 | End: 2022-01-01

## 2022-01-01 RX ORDER — MORPHINE SULFATE 50 MG/1
1 CAPSULE, EXTENDED RELEASE ORAL
Refills: 0 | Status: DISCONTINUED | OUTPATIENT
Start: 2022-01-01 | End: 2022-01-01

## 2022-01-01 RX ORDER — POTASSIUM CHLORIDE 20 MEQ
40 PACKET (EA) ORAL ONCE
Refills: 0 | Status: COMPLETED | OUTPATIENT
Start: 2022-01-01 | End: 2022-01-01

## 2022-01-01 RX ORDER — LEVALBUTEROL 1.25 MG/.5ML
0.63 SOLUTION, CONCENTRATE RESPIRATORY (INHALATION) EVERY 6 HOURS
Refills: 0 | Status: DISCONTINUED | OUTPATIENT
Start: 2022-01-01 | End: 2022-01-01

## 2022-01-01 RX ORDER — ACETAMINOPHEN 500 MG
650 TABLET ORAL ONCE
Refills: 0 | Status: COMPLETED | OUTPATIENT
Start: 2022-01-01 | End: 2022-01-01

## 2022-01-01 RX ORDER — PANTOPRAZOLE SODIUM 20 MG/1
40 TABLET, DELAYED RELEASE ORAL
Refills: 0 | Status: DISCONTINUED | OUTPATIENT
Start: 2022-01-01 | End: 2022-01-01

## 2022-01-01 RX ORDER — POTASSIUM CHLORIDE 20 MEQ
10 PACKET (EA) ORAL
Refills: 0 | Status: COMPLETED | OUTPATIENT
Start: 2022-01-01 | End: 2022-01-01

## 2022-01-01 RX ORDER — HEPARIN SODIUM 5000 [USP'U]/ML
5000 INJECTION INTRAVENOUS; SUBCUTANEOUS ONCE
Refills: 0 | Status: COMPLETED | OUTPATIENT
Start: 2022-01-01 | End: 2022-01-01

## 2022-01-01 RX ORDER — CHLORHEXIDINE GLUCONATE 213 G/1000ML
1 SOLUTION TOPICAL DAILY
Refills: 0 | Status: DISCONTINUED | OUTPATIENT
Start: 2022-01-01 | End: 2022-01-01

## 2022-01-01 RX ORDER — POTASSIUM CHLORIDE 20 MEQ
20 PACKET (EA) ORAL ONCE
Refills: 0 | Status: COMPLETED | OUTPATIENT
Start: 2022-01-01 | End: 2022-01-01

## 2022-01-01 RX ORDER — REMDESIVIR 5 MG/ML
200 INJECTION INTRAVENOUS EVERY 24 HOURS
Refills: 0 | Status: COMPLETED | OUTPATIENT
Start: 2022-01-01 | End: 2022-01-01

## 2022-01-01 RX ORDER — POTASSIUM CHLORIDE 20 MEQ
10 PACKET (EA) ORAL EVERY 4 HOURS
Refills: 0 | Status: DISCONTINUED | OUTPATIENT
Start: 2022-01-01 | End: 2022-01-01

## 2022-01-01 RX ORDER — PANTOPRAZOLE SODIUM 20 MG/1
1 TABLET, DELAYED RELEASE ORAL
Qty: 0 | Refills: 0 | DISCHARGE
Start: 2022-01-01

## 2022-01-01 RX ORDER — LIDOCAINE 4 G/100G
1 CREAM TOPICAL
Qty: 0 | Refills: 0 | DISCHARGE
Start: 2022-01-01

## 2022-01-01 RX ORDER — ATOVAQUONE 750 MG/5ML
750 SUSPENSION ORAL EVERY 12 HOURS
Refills: 0 | Status: DISCONTINUED | OUTPATIENT
Start: 2022-01-01 | End: 2022-01-01

## 2022-01-01 RX ORDER — CALCITONIN SALMON 200 [IU]/ML
330 INJECTION, SOLUTION INTRAMUSCULAR EVERY 12 HOURS
Refills: 0 | Status: COMPLETED | OUTPATIENT
Start: 2022-01-01 | End: 2022-01-01

## 2022-01-01 RX ORDER — MAGNESIUM OXIDE 400 MG ORAL TABLET 241.3 MG
400 TABLET ORAL
Refills: 0 | Status: DISCONTINUED | OUTPATIENT
Start: 2022-01-01 | End: 2022-01-01

## 2022-01-01 RX ORDER — CEFEPIME 1 G/1
2000 INJECTION, POWDER, FOR SOLUTION INTRAMUSCULAR; INTRAVENOUS EVERY 8 HOURS
Refills: 0 | Status: DISCONTINUED | OUTPATIENT
Start: 2022-01-01 | End: 2022-01-01

## 2022-01-01 RX ORDER — TRAMADOL HYDROCHLORIDE 50 MG/1
50 TABLET ORAL ONCE
Refills: 0 | Status: DISCONTINUED | OUTPATIENT
Start: 2022-01-01 | End: 2022-01-01

## 2022-01-01 RX ORDER — ALTEPLASE 100 MG
2 KIT INTRAVENOUS ONCE
Refills: 0 | Status: COMPLETED | OUTPATIENT
Start: 2022-01-01 | End: 2022-01-01

## 2022-01-01 RX ORDER — POLYETHYLENE GLYCOL 3350 17 G/17G
17 POWDER, FOR SOLUTION ORAL
Qty: 0 | Refills: 0 | DISCHARGE
Start: 2022-01-01

## 2022-01-01 RX ORDER — MAGNESIUM OXIDE 400 MG ORAL TABLET 241.3 MG
400 TABLET ORAL
Refills: 0 | Status: COMPLETED | OUTPATIENT
Start: 2022-01-01 | End: 2022-01-01

## 2022-01-01 RX ORDER — VANCOMYCIN HCL 1 G
1000 VIAL (EA) INTRAVENOUS ONCE
Refills: 0 | Status: COMPLETED | OUTPATIENT
Start: 2022-01-01 | End: 2022-01-01

## 2022-01-01 RX ORDER — POTASSIUM CHLORIDE 20 MEQ
20 PACKET (EA) ORAL EVERY 4 HOURS
Refills: 0 | Status: DISCONTINUED | OUTPATIENT
Start: 2022-01-01 | End: 2022-01-01

## 2022-01-01 RX ORDER — POTASSIUM PHOSPHATE, MONOBASIC POTASSIUM PHOSPHATE, DIBASIC 236; 224 MG/ML; MG/ML
30 INJECTION, SOLUTION INTRAVENOUS ONCE
Refills: 0 | Status: DISCONTINUED | OUTPATIENT
Start: 2022-01-01 | End: 2022-01-01

## 2022-01-01 RX ORDER — PIPERACILLIN AND TAZOBACTAM 4; .5 G/20ML; G/20ML
3.38 INJECTION, POWDER, LYOPHILIZED, FOR SOLUTION INTRAVENOUS ONCE
Refills: 0 | Status: DISCONTINUED | OUTPATIENT
Start: 2022-01-01 | End: 2022-01-01

## 2022-01-01 RX ORDER — LENVATINIB 14 MG/DAY
10 & 4 KIT ORAL
Qty: 30 | Refills: 0 | Status: DISCONTINUED | COMMUNITY
Start: 2022-01-01 | End: 2022-01-01

## 2022-01-01 RX ORDER — CEFEPIME 1 G/1
2000 INJECTION, POWDER, FOR SOLUTION INTRAMUSCULAR; INTRAVENOUS ONCE
Refills: 0 | Status: COMPLETED | OUTPATIENT
Start: 2022-01-01 | End: 2022-01-01

## 2022-01-01 RX ORDER — ACETAMINOPHEN 500 MG
1000 TABLET ORAL ONCE
Refills: 0 | Status: DISCONTINUED | OUTPATIENT
Start: 2022-01-01 | End: 2022-01-01

## 2022-01-01 RX ORDER — POLYETHYLENE GLYCOL 3350 17 G/17G
17 POWDER, FOR SOLUTION ORAL
Qty: 476 | Refills: 0
Start: 2022-01-01 | End: 2022-01-01

## 2022-01-01 RX ORDER — SODIUM CHLORIDE 0.65 %
1 AEROSOL, SPRAY (ML) NASAL ONCE
Refills: 0 | Status: COMPLETED | OUTPATIENT
Start: 2022-01-01 | End: 2022-01-01

## 2022-01-01 RX ORDER — PHENYLEPHRINE-SHARK LIVER OIL-MINERAL OIL-PETROLATUM RECTAL OINTMENT
1 OINTMENT (GRAM) RECTAL THREE TIMES A DAY
Refills: 0 | Status: DISCONTINUED | OUTPATIENT
Start: 2022-01-01 | End: 2022-01-01

## 2022-01-01 RX ORDER — HALOPERIDOL DECANOATE 100 MG/ML
1 INJECTION INTRAMUSCULAR ONCE
Refills: 0 | Status: COMPLETED | OUTPATIENT
Start: 2022-01-01 | End: 2022-01-01

## 2022-01-01 RX ORDER — SODIUM CHLORIDE 9 MG/ML
1000 INJECTION INTRAMUSCULAR; INTRAVENOUS; SUBCUTANEOUS
Refills: 0 | Status: DISCONTINUED | OUTPATIENT
Start: 2022-01-01 | End: 2022-01-01

## 2022-01-01 RX ORDER — METHYLPREDNISOLONE 4 MG/1
4 TABLET ORAL
Qty: 1 | Refills: 0 | Status: ACTIVE | COMMUNITY
Start: 2022-01-01 | End: 1900-01-01

## 2022-01-01 RX ORDER — LIDOCAINE 4 G/100G
1 CREAM TOPICAL ONCE
Refills: 0 | Status: COMPLETED | OUTPATIENT
Start: 2022-01-01 | End: 2022-01-01

## 2022-01-01 RX ORDER — MAGNESIUM SULFATE 500 MG/ML
2 VIAL (ML) INJECTION
Refills: 0 | Status: COMPLETED | OUTPATIENT
Start: 2022-01-01 | End: 2022-01-01

## 2022-01-01 RX ORDER — CEFEPIME 1 G/1
INJECTION, POWDER, FOR SOLUTION INTRAMUSCULAR; INTRAVENOUS
Refills: 0 | Status: DISCONTINUED | OUTPATIENT
Start: 2022-01-01 | End: 2022-01-01

## 2022-01-01 RX ORDER — LORATADINE 10 MG/1
10 TABLET ORAL DAILY
Refills: 0 | Status: DISCONTINUED | OUTPATIENT
Start: 2022-01-01 | End: 2022-01-01

## 2022-01-01 RX ORDER — LIDOCAINE 4 G/100G
1 CREAM TOPICAL
Qty: 5 | Refills: 0
Start: 2022-01-01 | End: 2022-01-01

## 2022-01-01 RX ORDER — MAGNESIUM SULFATE 500 MG/ML
2 VIAL (ML) INJECTION
Refills: 0 | Status: DISCONTINUED | OUTPATIENT
Start: 2022-01-01 | End: 2022-01-01

## 2022-01-01 RX ORDER — FUROSEMIDE 40 MG
40 TABLET ORAL ONCE
Refills: 0 | Status: COMPLETED | OUTPATIENT
Start: 2022-01-01 | End: 2022-01-01

## 2022-01-01 RX ORDER — MAGNESIUM OXIDE 400 MG ORAL TABLET 241.3 MG
400 TABLET ORAL ONCE
Refills: 0 | Status: COMPLETED | OUTPATIENT
Start: 2022-01-01 | End: 2022-01-01

## 2022-01-01 RX ORDER — POLYETHYLENE GLYCOL 3350 17 G/17G
17 POWDER, FOR SOLUTION ORAL DAILY
Refills: 0 | Status: DISCONTINUED | OUTPATIENT
Start: 2022-01-01 | End: 2022-01-01

## 2022-01-01 RX ORDER — CYCLOBENZAPRINE HYDROCHLORIDE 5 MG/1
5 TABLET, FILM COATED ORAL
Qty: 45 | Refills: 0 | Status: DISCONTINUED | COMMUNITY
Start: 2021-11-27 | End: 2022-01-01

## 2022-01-01 RX ORDER — CALCIUM CARBONATE 500(1250)
2 TABLET ORAL EVERY 4 HOURS
Refills: 0 | Status: DISCONTINUED | OUTPATIENT
Start: 2022-01-01 | End: 2022-01-01

## 2022-01-01 RX ORDER — BENZOCAINE AND MENTHOL 5; 1 G/100ML; G/100ML
1 LIQUID ORAL ONCE
Refills: 0 | Status: COMPLETED | OUTPATIENT
Start: 2022-01-01 | End: 2022-01-01

## 2022-01-01 RX ORDER — ENOXAPARIN SODIUM 100 MG/ML
40 INJECTION SUBCUTANEOUS
Qty: 0 | Refills: 0 | DISCHARGE
Start: 2022-01-01

## 2022-01-01 RX ORDER — SENNA PLUS 8.6 MG/1
1 TABLET ORAL
Qty: 0 | Refills: 0 | DISCHARGE
Start: 2022-01-01

## 2022-01-01 RX ORDER — MORPHINE SULFATE 50 MG/1
2 CAPSULE, EXTENDED RELEASE ORAL ONCE
Refills: 0 | Status: DISCONTINUED | OUTPATIENT
Start: 2022-01-01 | End: 2022-01-01

## 2022-01-01 RX ORDER — CEFTRIAXONE 500 MG/1
1000 INJECTION, POWDER, FOR SOLUTION INTRAMUSCULAR; INTRAVENOUS EVERY 24 HOURS
Refills: 0 | Status: DISCONTINUED | OUTPATIENT
Start: 2022-01-01 | End: 2022-01-01

## 2022-01-01 RX ORDER — LEVALBUTEROL 1.25 MG/.5ML
3 SOLUTION, CONCENTRATE RESPIRATORY (INHALATION)
Qty: 0 | Refills: 0 | DISCHARGE
Start: 2022-01-01

## 2022-01-01 RX ORDER — SENNA PLUS 8.6 MG/1
2 TABLET ORAL AT BEDTIME
Refills: 0 | Status: DISCONTINUED | OUTPATIENT
Start: 2022-01-01 | End: 2022-01-01

## 2022-01-01 RX ORDER — FUROSEMIDE 40 MG
40 TABLET ORAL EVERY 12 HOURS
Refills: 0 | Status: DISCONTINUED | OUTPATIENT
Start: 2022-01-01 | End: 2022-01-01

## 2022-01-01 RX ORDER — ENOXAPARIN SODIUM 100 MG/ML
40 INJECTION SUBCUTANEOUS EVERY 12 HOURS
Refills: 0 | Status: DISCONTINUED | OUTPATIENT
Start: 2022-01-01 | End: 2022-01-01

## 2022-01-01 RX ORDER — ALBUTEROL 90 UG/1
1 AEROSOL, METERED ORAL EVERY 6 HOURS
Refills: 0 | Status: DISCONTINUED | OUTPATIENT
Start: 2022-01-01 | End: 2022-01-01

## 2022-01-01 RX ORDER — MAGNESIUM SULFATE 500 MG/ML
1 VIAL (ML) INJECTION ONCE
Refills: 0 | Status: DISCONTINUED | OUTPATIENT
Start: 2022-01-01 | End: 2022-01-01

## 2022-01-01 RX ORDER — AZITHROMYCIN 500 MG/1
500 TABLET, FILM COATED ORAL EVERY 24 HOURS
Refills: 0 | Status: DISCONTINUED | OUTPATIENT
Start: 2022-01-01 | End: 2022-01-01

## 2022-01-01 RX ORDER — REMDESIVIR 5 MG/ML
INJECTION INTRAVENOUS
Refills: 0 | Status: COMPLETED | OUTPATIENT
Start: 2022-01-01 | End: 2022-01-01

## 2022-01-01 RX ORDER — DEXAMETHASONE 0.5 MG/5ML
6 ELIXIR ORAL ONCE
Refills: 0 | Status: COMPLETED | OUTPATIENT
Start: 2022-01-01 | End: 2022-01-01

## 2022-01-01 RX ORDER — TRAMADOL HYDROCHLORIDE 50 MG/1
1 TABLET ORAL
Qty: 20 | Refills: 0
Start: 2022-01-01 | End: 2022-01-01

## 2022-01-01 RX ORDER — ACETAMINOPHEN 500 MG
975 TABLET ORAL ONCE
Refills: 0 | Status: COMPLETED | OUTPATIENT
Start: 2022-01-01 | End: 2022-01-01

## 2022-01-01 RX ORDER — MAGNESIUM SULFATE 500 MG/ML
2 VIAL (ML) INJECTION ONCE
Refills: 0 | Status: DISCONTINUED | OUTPATIENT
Start: 2022-01-01 | End: 2022-01-01

## 2022-01-01 RX ORDER — OXYCODONE HYDROCHLORIDE 5 MG/1
5 TABLET ORAL ONCE
Refills: 0 | Status: DISCONTINUED | OUTPATIENT
Start: 2022-01-01 | End: 2022-01-01

## 2022-01-01 RX ORDER — HYDROMORPHONE HYDROCHLORIDE 2 MG/ML
0.25 INJECTION INTRAMUSCULAR; INTRAVENOUS; SUBCUTANEOUS ONCE
Refills: 0 | Status: DISCONTINUED | OUTPATIENT
Start: 2022-01-01 | End: 2022-01-01

## 2022-01-01 RX ORDER — RIVAROXABAN 15 MG-20MG
1 KIT ORAL
Qty: 30 | Refills: 0
Start: 2022-01-01 | End: 2022-01-01

## 2022-01-01 RX ORDER — CALCIUM CARBONATE 500(1250)
2 TABLET ORAL
Qty: 0 | Refills: 0 | DISCHARGE
Start: 2022-01-01

## 2022-01-01 RX ORDER — POLYETHYLENE GLYCOL 3350 17 G/17G
17 POWDER, FOR SOLUTION ORAL
Refills: 0 | Status: DISCONTINUED | OUTPATIENT
Start: 2022-01-01 | End: 2022-01-01

## 2022-01-01 RX ORDER — INFLUENZA VIRUS VACCINE 15; 15; 15; 15 UG/.5ML; UG/.5ML; UG/.5ML; UG/.5ML
0.5 SUSPENSION INTRAMUSCULAR ONCE
Refills: 0 | Status: DISCONTINUED | OUTPATIENT
Start: 2022-01-01 | End: 2022-01-01

## 2022-01-01 RX ORDER — SENNA PLUS 8.6 MG/1
2 TABLET ORAL
Qty: 28 | Refills: 0
Start: 2022-01-01 | End: 2022-01-01

## 2022-01-01 RX ORDER — TRAMADOL HYDROCHLORIDE 50 MG/1
1 TABLET ORAL
Qty: 0 | Refills: 0 | DISCHARGE

## 2022-01-01 RX ORDER — DEXAMETHASONE 0.5 MG/5ML
6 ELIXIR ORAL EVERY 24 HOURS
Refills: 0 | Status: DISCONTINUED | OUTPATIENT
Start: 2022-01-01 | End: 2022-01-01

## 2022-01-01 RX ORDER — PSYLLIUM SEED (WITH DEXTROSE)
1 POWDER (GRAM) ORAL ONCE
Refills: 0 | Status: COMPLETED | OUTPATIENT
Start: 2022-01-01 | End: 2022-01-01

## 2022-01-01 RX ORDER — BENZONATATE 100 MG/1
100 CAPSULE ORAL 3 TIMES DAILY
Qty: 90 | Refills: 0 | Status: ACTIVE | COMMUNITY
Start: 2022-01-01 | End: 1900-01-01

## 2022-01-01 RX ORDER — RIVAROXABAN 10 MG/1
10 TABLET, FILM COATED ORAL
Qty: 30 | Refills: 0 | Status: ACTIVE | COMMUNITY
Start: 2022-01-01

## 2022-01-01 RX ORDER — TRAMADOL HYDROCHLORIDE 50 MG/1
50 TABLET ORAL EVERY 6 HOURS
Refills: 0 | Status: DISCONTINUED | OUTPATIENT
Start: 2022-01-01 | End: 2022-01-01

## 2022-01-01 RX ORDER — HYDROMORPHONE HYDROCHLORIDE 2 MG/ML
0.5 INJECTION INTRAMUSCULAR; INTRAVENOUS; SUBCUTANEOUS ONCE
Refills: 0 | Status: DISCONTINUED | OUTPATIENT
Start: 2022-01-01 | End: 2022-01-01

## 2022-01-01 RX ORDER — SODIUM CHLORIDE 9 MG/ML
1000 INJECTION, SOLUTION INTRAVENOUS ONCE
Refills: 0 | Status: COMPLETED | OUTPATIENT
Start: 2022-01-01 | End: 2022-01-01

## 2022-01-01 RX ORDER — ENOXAPARIN SODIUM 100 MG/ML
40 INJECTION SUBCUTANEOUS EVERY 24 HOURS
Refills: 0 | Status: COMPLETED | OUTPATIENT
Start: 2022-01-01 | End: 2022-01-01

## 2022-01-01 RX ORDER — MAGNESIUM OXIDE 400 MG ORAL TABLET 241.3 MG
400 TABLET ORAL EVERY 4 HOURS
Refills: 0 | Status: COMPLETED | OUTPATIENT
Start: 2022-01-01 | End: 2022-01-01

## 2022-01-01 RX ORDER — REMDESIVIR 5 MG/ML
100 INJECTION INTRAVENOUS EVERY 24 HOURS
Refills: 0 | Status: COMPLETED | OUTPATIENT
Start: 2022-01-01 | End: 2022-01-01

## 2022-01-01 RX ORDER — MINERAL OIL
133 OIL (ML) MISCELLANEOUS ONCE
Refills: 0 | Status: DISCONTINUED | OUTPATIENT
Start: 2022-01-01 | End: 2022-01-01

## 2022-01-01 RX ORDER — PREDNISONE 10 MG/1
10 TABLET ORAL
Qty: 6 | Refills: 0 | Status: COMPLETED | COMMUNITY
Start: 2022-01-01

## 2022-01-01 RX ORDER — FLUTICASONE PROPIONATE 50 MCG
1 SPRAY, SUSPENSION NASAL
Refills: 0 | Status: DISCONTINUED | OUTPATIENT
Start: 2022-01-01 | End: 2022-01-01

## 2022-01-01 RX ORDER — POTASSIUM CHLORIDE 20 MEQ
20 PACKET (EA) ORAL
Refills: 0 | Status: DISCONTINUED | OUTPATIENT
Start: 2022-01-01 | End: 2022-01-01

## 2022-01-01 RX ORDER — BENZOCAINE AND MENTHOL 5; 1 G/100ML; G/100ML
1 LIQUID ORAL ONCE
Refills: 0 | Status: DISCONTINUED | OUTPATIENT
Start: 2022-01-01 | End: 2022-01-01

## 2022-01-01 RX ORDER — SENNA PLUS 8.6 MG/1
1 TABLET ORAL AT BEDTIME
Refills: 0 | Status: DISCONTINUED | OUTPATIENT
Start: 2022-01-01 | End: 2022-01-01

## 2022-01-01 RX ORDER — POTASSIUM CHLORIDE 20 MEQ
40 PACKET (EA) ORAL ONCE
Refills: 0 | Status: DISCONTINUED | OUTPATIENT
Start: 2022-01-01 | End: 2022-01-01

## 2022-01-01 RX ORDER — ACETAMINOPHEN 500 MG
2 TABLET ORAL
Qty: 0 | Refills: 0 | DISCHARGE

## 2022-01-01 RX ORDER — HEPARIN SODIUM 5000 [USP'U]/ML
INJECTION INTRAVENOUS; SUBCUTANEOUS
Qty: 25000 | Refills: 0 | Status: DISCONTINUED | OUTPATIENT
Start: 2022-01-01 | End: 2022-01-01

## 2022-01-01 RX ADMIN — POTASSIUM PHOSPHATE, MONOBASIC POTASSIUM PHOSPHATE, DIBASIC 83.33 MILLIMOLE(S): 236; 224 INJECTION, SOLUTION INTRAVENOUS at 10:22

## 2022-01-01 RX ADMIN — BENZOCAINE AND MENTHOL 1 LOZENGE: 5; 1 LIQUID ORAL at 05:59

## 2022-01-01 RX ADMIN — Medication 1000 MILLIGRAM(S): at 02:50

## 2022-01-01 RX ADMIN — Medication 2 TABLET(S): at 18:35

## 2022-01-01 RX ADMIN — HYDROMORPHONE HYDROCHLORIDE 0.5 MILLIGRAM(S): 2 INJECTION INTRAMUSCULAR; INTRAVENOUS; SUBCUTANEOUS at 10:39

## 2022-01-01 RX ADMIN — Medication 100 MILLIGRAM(S): at 15:09

## 2022-01-01 RX ADMIN — Medication 650 MILLIGRAM(S): at 14:04

## 2022-01-01 RX ADMIN — Medication 650 MILLIGRAM(S): at 19:58

## 2022-01-01 RX ADMIN — NYSTATIN CREAM 1 APPLICATION(S): 100000 CREAM TOPICAL at 05:27

## 2022-01-01 RX ADMIN — Medication 100 MILLIGRAM(S): at 12:49

## 2022-01-01 RX ADMIN — Medication 100 MILLIGRAM(S): at 13:04

## 2022-01-01 RX ADMIN — Medication 1000 MILLIGRAM(S): at 14:40

## 2022-01-01 RX ADMIN — LEVALBUTEROL 0.63 MILLIGRAM(S): 1.25 SOLUTION, CONCENTRATE RESPIRATORY (INHALATION) at 09:52

## 2022-01-01 RX ADMIN — SIMETHICONE 80 MILLIGRAM(S): 80 TABLET, CHEWABLE ORAL at 02:39

## 2022-01-01 RX ADMIN — MAGNESIUM OXIDE 400 MG ORAL TABLET 400 MILLIGRAM(S): 241.3 TABLET ORAL at 11:59

## 2022-01-01 RX ADMIN — CHLORHEXIDINE GLUCONATE 1 APPLICATION(S): 213 SOLUTION TOPICAL at 13:18

## 2022-01-01 RX ADMIN — Medication 650 MILLIGRAM(S): at 13:32

## 2022-01-01 RX ADMIN — ENOXAPARIN SODIUM 40 MILLIGRAM(S): 100 INJECTION SUBCUTANEOUS at 18:22

## 2022-01-01 RX ADMIN — ENOXAPARIN SODIUM 40 MILLIGRAM(S): 100 INJECTION SUBCUTANEOUS at 06:33

## 2022-01-01 RX ADMIN — CHLORHEXIDINE GLUCONATE 1 APPLICATION(S): 213 SOLUTION TOPICAL at 12:08

## 2022-01-01 RX ADMIN — SODIUM CHLORIDE 500 MILLILITER(S): 9 INJECTION INTRAMUSCULAR; INTRAVENOUS; SUBCUTANEOUS at 02:01

## 2022-01-01 RX ADMIN — CHLORHEXIDINE GLUCONATE 1 APPLICATION(S): 213 SOLUTION TOPICAL at 12:18

## 2022-01-01 RX ADMIN — Medication 400 MILLIGRAM(S): at 08:54

## 2022-01-01 RX ADMIN — LIDOCAINE 1 PATCH: 4 CREAM TOPICAL at 05:09

## 2022-01-01 RX ADMIN — Medication 3 MILLIGRAM(S): at 21:03

## 2022-01-01 RX ADMIN — POLYETHYLENE GLYCOL 3350 17 GRAM(S): 17 POWDER, FOR SOLUTION ORAL at 12:02

## 2022-01-01 RX ADMIN — BENZOCAINE AND MENTHOL 1 LOZENGE: 5; 1 LIQUID ORAL at 18:43

## 2022-01-01 RX ADMIN — HEPARIN SODIUM 5000 UNIT(S): 5000 INJECTION INTRAVENOUS; SUBCUTANEOUS at 06:15

## 2022-01-01 RX ADMIN — CHLORHEXIDINE GLUCONATE 1 APPLICATION(S): 213 SOLUTION TOPICAL at 12:41

## 2022-01-01 RX ADMIN — MAGNESIUM OXIDE 400 MG ORAL TABLET 400 MILLIGRAM(S): 241.3 TABLET ORAL at 17:34

## 2022-01-01 RX ADMIN — Medication 40 MILLIGRAM(S): at 17:35

## 2022-01-01 RX ADMIN — ENOXAPARIN SODIUM 40 MILLIGRAM(S): 100 INJECTION SUBCUTANEOUS at 21:21

## 2022-01-01 RX ADMIN — BENZOCAINE AND MENTHOL 1 LOZENGE: 5; 1 LIQUID ORAL at 18:02

## 2022-01-01 RX ADMIN — Medication 100 MILLIGRAM(S): at 00:28

## 2022-01-01 RX ADMIN — BENZOCAINE AND MENTHOL 1 LOZENGE: 5; 1 LIQUID ORAL at 17:35

## 2022-01-01 RX ADMIN — TRAMADOL HYDROCHLORIDE 50 MILLIGRAM(S): 50 TABLET ORAL at 06:44

## 2022-01-01 RX ADMIN — Medication 100 MILLIGRAM(S): at 12:04

## 2022-01-01 RX ADMIN — TRAMADOL HYDROCHLORIDE 25 MILLIGRAM(S): 50 TABLET ORAL at 15:10

## 2022-01-01 RX ADMIN — TRAMADOL HYDROCHLORIDE 25 MILLIGRAM(S): 50 TABLET ORAL at 14:01

## 2022-01-01 RX ADMIN — LEVALBUTEROL 0.63 MILLIGRAM(S): 1.25 SOLUTION, CONCENTRATE RESPIRATORY (INHALATION) at 16:15

## 2022-01-01 RX ADMIN — LEVALBUTEROL 0.63 MILLIGRAM(S): 1.25 SOLUTION, CONCENTRATE RESPIRATORY (INHALATION) at 21:30

## 2022-01-01 RX ADMIN — HEPARIN SODIUM 5000 UNIT(S): 5000 INJECTION INTRAVENOUS; SUBCUTANEOUS at 21:21

## 2022-01-01 RX ADMIN — BENZOCAINE AND MENTHOL 1 LOZENGE: 5; 1 LIQUID ORAL at 22:08

## 2022-01-01 RX ADMIN — HEPARIN SODIUM 5000 UNIT(S): 5000 INJECTION INTRAVENOUS; SUBCUTANEOUS at 13:26

## 2022-01-01 RX ADMIN — LIDOCAINE 1 PATCH: 4 CREAM TOPICAL at 11:48

## 2022-01-01 RX ADMIN — Medication 100 MILLIGRAM(S): at 21:11

## 2022-01-01 RX ADMIN — Medication 400 MILLIGRAM(S): at 15:31

## 2022-01-01 RX ADMIN — Medication 25 GRAM(S): at 08:37

## 2022-01-01 RX ADMIN — BENZOCAINE AND MENTHOL 1 LOZENGE: 5; 1 LIQUID ORAL at 11:31

## 2022-01-01 RX ADMIN — Medication 40 MILLIGRAM(S): at 06:46

## 2022-01-01 RX ADMIN — CHLORHEXIDINE GLUCONATE 1 APPLICATION(S): 213 SOLUTION TOPICAL at 11:21

## 2022-01-01 RX ADMIN — Medication 650 MILLIGRAM(S): at 09:50

## 2022-01-01 RX ADMIN — Medication 25 GRAM(S): at 20:45

## 2022-01-01 RX ADMIN — MORPHINE SULFATE 1 MILLIGRAM(S): 50 CAPSULE, EXTENDED RELEASE ORAL at 00:57

## 2022-01-01 RX ADMIN — Medication 1 SPRAY(S): at 03:33

## 2022-01-01 RX ADMIN — BENZOCAINE AND MENTHOL 1 LOZENGE: 5; 1 LIQUID ORAL at 12:07

## 2022-01-01 RX ADMIN — LORATADINE 10 MILLIGRAM(S): 10 TABLET ORAL at 17:00

## 2022-01-01 RX ADMIN — Medication 40 MILLIEQUIVALENT(S): at 12:13

## 2022-01-01 RX ADMIN — REMDESIVIR 500 MILLIGRAM(S): 5 INJECTION INTRAVENOUS at 18:11

## 2022-01-01 RX ADMIN — PIPERACILLIN AND TAZOBACTAM 200 GRAM(S): 4; .5 INJECTION, POWDER, LYOPHILIZED, FOR SOLUTION INTRAVENOUS at 01:33

## 2022-01-01 RX ADMIN — SENNA PLUS 1 TABLET(S): 8.6 TABLET ORAL at 22:09

## 2022-01-01 RX ADMIN — TRAMADOL HYDROCHLORIDE 50 MILLIGRAM(S): 50 TABLET ORAL at 14:56

## 2022-01-01 RX ADMIN — Medication 100 MILLIEQUIVALENT(S): at 12:14

## 2022-01-01 RX ADMIN — ENOXAPARIN SODIUM 40 MILLIGRAM(S): 100 INJECTION SUBCUTANEOUS at 18:54

## 2022-01-01 RX ADMIN — ENOXAPARIN SODIUM 40 MILLIGRAM(S): 100 INJECTION SUBCUTANEOUS at 21:31

## 2022-01-01 RX ADMIN — ENOXAPARIN SODIUM 40 MILLIGRAM(S): 100 INJECTION SUBCUTANEOUS at 05:34

## 2022-01-01 RX ADMIN — LIDOCAINE 1 PATCH: 4 CREAM TOPICAL at 12:18

## 2022-01-01 RX ADMIN — Medication 650 MILLIGRAM(S): at 09:52

## 2022-01-01 RX ADMIN — CHLORHEXIDINE GLUCONATE 1 APPLICATION(S): 213 SOLUTION TOPICAL at 18:05

## 2022-01-01 RX ADMIN — MAGNESIUM OXIDE 400 MG ORAL TABLET 400 MILLIGRAM(S): 241.3 TABLET ORAL at 12:18

## 2022-01-01 RX ADMIN — Medication 40 MILLIGRAM(S): at 06:42

## 2022-01-01 RX ADMIN — Medication 400 MILLIGRAM(S): at 19:54

## 2022-01-01 RX ADMIN — LIDOCAINE 1 PATCH: 4 CREAM TOPICAL at 23:04

## 2022-01-01 RX ADMIN — BENZOCAINE AND MENTHOL 1 LOZENGE: 5; 1 LIQUID ORAL at 22:53

## 2022-01-01 RX ADMIN — Medication 40 MILLIEQUIVALENT(S): at 18:02

## 2022-01-01 RX ADMIN — MAGNESIUM OXIDE 400 MG ORAL TABLET 400 MILLIGRAM(S): 241.3 TABLET ORAL at 18:04

## 2022-01-01 RX ADMIN — CHLORHEXIDINE GLUCONATE 1 APPLICATION(S): 213 SOLUTION TOPICAL at 18:10

## 2022-01-01 RX ADMIN — CHLORHEXIDINE GLUCONATE 1 APPLICATION(S): 213 SOLUTION TOPICAL at 11:08

## 2022-01-01 RX ADMIN — TRAMADOL HYDROCHLORIDE 50 MILLIGRAM(S): 50 TABLET ORAL at 11:30

## 2022-01-01 RX ADMIN — Medication 650 MILLIGRAM(S): at 10:59

## 2022-01-01 RX ADMIN — SENNA PLUS 1 TABLET(S): 8.6 TABLET ORAL at 23:11

## 2022-01-01 RX ADMIN — LIDOCAINE 1 PATCH: 4 CREAM TOPICAL at 17:07

## 2022-01-01 RX ADMIN — BENZOCAINE AND MENTHOL 1 LOZENGE: 5; 1 LIQUID ORAL at 13:00

## 2022-01-01 RX ADMIN — SENNA PLUS 1 TABLET(S): 8.6 TABLET ORAL at 21:27

## 2022-01-01 RX ADMIN — Medication 650 MILLIGRAM(S): at 02:38

## 2022-01-01 RX ADMIN — Medication 100 MILLIGRAM(S): at 12:44

## 2022-01-01 RX ADMIN — Medication 400 MILLIGRAM(S): at 20:57

## 2022-01-01 RX ADMIN — Medication 25 GRAM(S): at 11:20

## 2022-01-01 RX ADMIN — Medication 650 MILLIGRAM(S): at 23:50

## 2022-01-01 RX ADMIN — Medication 650 MILLIGRAM(S): at 11:34

## 2022-01-01 RX ADMIN — Medication 100 MILLIGRAM(S): at 05:29

## 2022-01-01 RX ADMIN — MAGNESIUM OXIDE 400 MG ORAL TABLET 400 MILLIGRAM(S): 241.3 TABLET ORAL at 17:40

## 2022-01-01 RX ADMIN — HEPARIN SODIUM 5000 UNIT(S): 5000 INJECTION INTRAVENOUS; SUBCUTANEOUS at 09:09

## 2022-01-01 RX ADMIN — POLYETHYLENE GLYCOL 3350 17 GRAM(S): 17 POWDER, FOR SOLUTION ORAL at 17:25

## 2022-01-01 RX ADMIN — Medication 600 MILLIGRAM(S): at 21:01

## 2022-01-01 RX ADMIN — Medication 100 MILLIGRAM(S): at 21:04

## 2022-01-01 RX ADMIN — Medication 650 MILLIGRAM(S): at 18:09

## 2022-01-01 RX ADMIN — Medication 20 MILLIEQUIVALENT(S): at 18:36

## 2022-01-01 RX ADMIN — Medication 100 MILLIGRAM(S): at 03:11

## 2022-01-01 RX ADMIN — ENOXAPARIN SODIUM 40 MILLIGRAM(S): 100 INJECTION SUBCUTANEOUS at 18:08

## 2022-01-01 RX ADMIN — PANTOPRAZOLE SODIUM 40 MILLIGRAM(S): 20 TABLET, DELAYED RELEASE ORAL at 05:42

## 2022-01-01 RX ADMIN — Medication 40 MILLIGRAM(S): at 05:05

## 2022-01-01 RX ADMIN — Medication 100 MILLIGRAM(S): at 12:07

## 2022-01-01 RX ADMIN — Medication 15 MILLIGRAM(S): at 22:37

## 2022-01-01 RX ADMIN — PANTOPRAZOLE SODIUM 40 MILLIGRAM(S): 20 TABLET, DELAYED RELEASE ORAL at 05:28

## 2022-01-01 RX ADMIN — Medication 100 MILLIGRAM(S): at 18:01

## 2022-01-01 RX ADMIN — ENOXAPARIN SODIUM 40 MILLIGRAM(S): 100 INJECTION SUBCUTANEOUS at 18:02

## 2022-01-01 RX ADMIN — Medication 650 MILLIGRAM(S): at 03:48

## 2022-01-01 RX ADMIN — Medication 100 MILLIEQUIVALENT(S): at 09:31

## 2022-01-01 RX ADMIN — SENNA PLUS 2 TABLET(S): 8.6 TABLET ORAL at 21:10

## 2022-01-01 RX ADMIN — PIPERACILLIN AND TAZOBACTAM 25 GRAM(S): 4; .5 INJECTION, POWDER, LYOPHILIZED, FOR SOLUTION INTRAVENOUS at 12:50

## 2022-01-01 RX ADMIN — ATOVAQUONE 750 MILLIGRAM(S): 750 SUSPENSION ORAL at 17:27

## 2022-01-01 RX ADMIN — Medication 3 MILLIGRAM(S): at 21:30

## 2022-01-01 RX ADMIN — Medication 100 MILLIGRAM(S): at 22:01

## 2022-01-01 RX ADMIN — Medication 650 MILLIGRAM(S): at 13:00

## 2022-01-01 RX ADMIN — MAGNESIUM OXIDE 400 MG ORAL TABLET 400 MILLIGRAM(S): 241.3 TABLET ORAL at 12:00

## 2022-01-01 RX ADMIN — Medication 650 MILLIGRAM(S): at 13:02

## 2022-01-01 RX ADMIN — CEFEPIME 100 MILLIGRAM(S): 1 INJECTION, POWDER, FOR SOLUTION INTRAMUSCULAR; INTRAVENOUS at 17:48

## 2022-01-01 RX ADMIN — Medication 100 MILLIGRAM(S): at 14:13

## 2022-01-01 RX ADMIN — Medication 100 MILLIGRAM(S): at 08:04

## 2022-01-01 RX ADMIN — PIPERACILLIN AND TAZOBACTAM 25 GRAM(S): 4; .5 INJECTION, POWDER, LYOPHILIZED, FOR SOLUTION INTRAVENOUS at 12:15

## 2022-01-01 RX ADMIN — Medication 100 MILLIGRAM(S): at 13:13

## 2022-01-01 RX ADMIN — ENOXAPARIN SODIUM 40 MILLIGRAM(S): 100 INJECTION SUBCUTANEOUS at 05:06

## 2022-01-01 RX ADMIN — LEVALBUTEROL 0.63 MILLIGRAM(S): 1.25 SOLUTION, CONCENTRATE RESPIRATORY (INHALATION) at 04:03

## 2022-01-01 RX ADMIN — Medication 1 PACKET(S): at 22:34

## 2022-01-01 RX ADMIN — CHLORHEXIDINE GLUCONATE 1 APPLICATION(S): 213 SOLUTION TOPICAL at 14:38

## 2022-01-01 RX ADMIN — Medication 100 MILLIGRAM(S): at 10:59

## 2022-01-01 RX ADMIN — Medication 6 MILLIGRAM(S): at 05:05

## 2022-01-01 RX ADMIN — BENZOCAINE AND MENTHOL 1 LOZENGE: 5; 1 LIQUID ORAL at 22:39

## 2022-01-01 RX ADMIN — SODIUM CHLORIDE 50 MILLILITER(S): 9 INJECTION, SOLUTION INTRAVENOUS at 16:36

## 2022-01-01 RX ADMIN — CHLORHEXIDINE GLUCONATE 1 APPLICATION(S): 213 SOLUTION TOPICAL at 12:31

## 2022-01-01 RX ADMIN — MAGNESIUM OXIDE 400 MG ORAL TABLET 400 MILLIGRAM(S): 241.3 TABLET ORAL at 14:38

## 2022-01-01 RX ADMIN — PANTOPRAZOLE SODIUM 40 MILLIGRAM(S): 20 TABLET, DELAYED RELEASE ORAL at 06:45

## 2022-01-01 RX ADMIN — Medication 40 MILLIGRAM(S): at 05:42

## 2022-01-01 RX ADMIN — TRAMADOL HYDROCHLORIDE 25 MILLIGRAM(S): 50 TABLET ORAL at 21:11

## 2022-01-01 RX ADMIN — Medication 100 MILLIGRAM(S): at 14:35

## 2022-01-01 RX ADMIN — NYSTATIN CREAM 1 APPLICATION(S): 100000 CREAM TOPICAL at 06:01

## 2022-01-01 RX ADMIN — ATOVAQUONE 750 MILLIGRAM(S): 750 SUSPENSION ORAL at 05:54

## 2022-01-01 RX ADMIN — Medication 100 MILLIGRAM(S): at 13:54

## 2022-01-01 RX ADMIN — ATOVAQUONE 750 MILLIGRAM(S): 750 SUSPENSION ORAL at 05:43

## 2022-01-01 RX ADMIN — Medication 100 MILLIGRAM(S): at 21:01

## 2022-01-01 RX ADMIN — LEVALBUTEROL 0.63 MILLIGRAM(S): 1.25 SOLUTION, CONCENTRATE RESPIRATORY (INHALATION) at 03:03

## 2022-01-01 RX ADMIN — TRAMADOL HYDROCHLORIDE 50 MILLIGRAM(S): 50 TABLET ORAL at 23:12

## 2022-01-01 RX ADMIN — Medication 100 MILLIGRAM(S): at 16:10

## 2022-01-01 RX ADMIN — NYSTATIN CREAM 1 APPLICATION(S): 100000 CREAM TOPICAL at 17:28

## 2022-01-01 RX ADMIN — Medication 650 MILLIGRAM(S): at 12:24

## 2022-01-01 RX ADMIN — Medication 400 MILLIGRAM(S): at 06:30

## 2022-01-01 RX ADMIN — MAGNESIUM OXIDE 400 MG ORAL TABLET 400 MILLIGRAM(S): 241.3 TABLET ORAL at 17:28

## 2022-01-01 RX ADMIN — Medication 400 MILLIGRAM(S): at 19:08

## 2022-01-01 RX ADMIN — Medication 40 MILLIEQUIVALENT(S): at 17:35

## 2022-01-01 RX ADMIN — Medication 100 MILLIGRAM(S): at 11:37

## 2022-01-01 RX ADMIN — CHLORHEXIDINE GLUCONATE 1 APPLICATION(S): 213 SOLUTION TOPICAL at 12:58

## 2022-01-01 RX ADMIN — MORPHINE SULFATE 1 MILLIGRAM(S): 50 CAPSULE, EXTENDED RELEASE ORAL at 00:42

## 2022-01-01 RX ADMIN — Medication 20 MILLIEQUIVALENT(S): at 13:03

## 2022-01-01 RX ADMIN — ENOXAPARIN SODIUM 40 MILLIGRAM(S): 100 INJECTION SUBCUTANEOUS at 21:10

## 2022-01-01 RX ADMIN — Medication 650 MILLIGRAM(S): at 13:04

## 2022-01-01 RX ADMIN — CHLORHEXIDINE GLUCONATE 1 APPLICATION(S): 213 SOLUTION TOPICAL at 11:31

## 2022-01-01 RX ADMIN — NYSTATIN CREAM 1 APPLICATION(S): 100000 CREAM TOPICAL at 17:01

## 2022-01-01 RX ADMIN — HEPARIN SODIUM 5000 UNIT(S): 5000 INJECTION INTRAVENOUS; SUBCUTANEOUS at 22:12

## 2022-01-01 RX ADMIN — Medication 25 GRAM(S): at 13:58

## 2022-01-01 RX ADMIN — Medication 100 MILLIGRAM(S): at 21:10

## 2022-01-01 RX ADMIN — TRAMADOL HYDROCHLORIDE 25 MILLIGRAM(S): 50 TABLET ORAL at 00:00

## 2022-01-01 RX ADMIN — PIPERACILLIN AND TAZOBACTAM 25 GRAM(S): 4; .5 INJECTION, POWDER, LYOPHILIZED, FOR SOLUTION INTRAVENOUS at 06:30

## 2022-01-01 RX ADMIN — Medication 40 MILLIGRAM(S): at 05:20

## 2022-01-01 RX ADMIN — TRAMADOL HYDROCHLORIDE 50 MILLIGRAM(S): 50 TABLET ORAL at 10:47

## 2022-01-01 RX ADMIN — Medication 650 MILLIGRAM(S): at 19:43

## 2022-01-01 RX ADMIN — ATOVAQUONE 750 MILLIGRAM(S): 750 SUSPENSION ORAL at 18:02

## 2022-01-01 RX ADMIN — HEPARIN SODIUM 5000 UNIT(S): 5000 INJECTION INTRAVENOUS; SUBCUTANEOUS at 22:11

## 2022-01-01 RX ADMIN — Medication 100 MILLIGRAM(S): at 06:44

## 2022-01-01 RX ADMIN — SENNA PLUS 1 TABLET(S): 8.6 TABLET ORAL at 23:21

## 2022-01-01 RX ADMIN — CHLORHEXIDINE GLUCONATE 1 APPLICATION(S): 213 SOLUTION TOPICAL at 11:14

## 2022-01-01 RX ADMIN — PANTOPRAZOLE SODIUM 40 MILLIGRAM(S): 20 TABLET, DELAYED RELEASE ORAL at 05:50

## 2022-01-01 RX ADMIN — LIDOCAINE 1 PATCH: 4 CREAM TOPICAL at 19:01

## 2022-01-01 RX ADMIN — HEPARIN SODIUM 5000 UNIT(S): 5000 INJECTION INTRAVENOUS; SUBCUTANEOUS at 14:35

## 2022-01-01 RX ADMIN — TRAMADOL HYDROCHLORIDE 50 MILLIGRAM(S): 50 TABLET ORAL at 22:10

## 2022-01-01 RX ADMIN — MAGNESIUM OXIDE 400 MG ORAL TABLET 400 MILLIGRAM(S): 241.3 TABLET ORAL at 13:25

## 2022-01-01 RX ADMIN — TRAMADOL HYDROCHLORIDE 50 MILLIGRAM(S): 50 TABLET ORAL at 13:10

## 2022-01-01 RX ADMIN — NYSTATIN CREAM 1 APPLICATION(S): 100000 CREAM TOPICAL at 06:29

## 2022-01-01 RX ADMIN — Medication 650 MILLIGRAM(S): at 18:39

## 2022-01-01 RX ADMIN — Medication 400 MILLIGRAM(S): at 11:37

## 2022-01-01 RX ADMIN — CHLORHEXIDINE GLUCONATE 1 APPLICATION(S): 213 SOLUTION TOPICAL at 11:20

## 2022-01-01 RX ADMIN — TRAMADOL HYDROCHLORIDE 25 MILLIGRAM(S): 50 TABLET ORAL at 19:30

## 2022-01-01 RX ADMIN — TRAMADOL HYDROCHLORIDE 50 MILLIGRAM(S): 50 TABLET ORAL at 17:00

## 2022-01-01 RX ADMIN — Medication 40 MILLIGRAM(S): at 05:50

## 2022-01-01 RX ADMIN — Medication 100 MILLIGRAM(S): at 13:01

## 2022-01-01 RX ADMIN — Medication 1000 MILLIGRAM(S): at 21:59

## 2022-01-01 RX ADMIN — REMDESIVIR 500 MILLIGRAM(S): 5 INJECTION INTRAVENOUS at 17:56

## 2022-01-01 RX ADMIN — BENZOCAINE AND MENTHOL 1 LOZENGE: 5; 1 LIQUID ORAL at 16:36

## 2022-01-01 RX ADMIN — PANTOPRAZOLE SODIUM 40 MILLIGRAM(S): 20 TABLET, DELAYED RELEASE ORAL at 06:03

## 2022-01-01 RX ADMIN — Medication 100 MILLIGRAM(S): at 21:55

## 2022-01-01 RX ADMIN — Medication 25 GRAM(S): at 10:23

## 2022-01-01 RX ADMIN — MORPHINE SULFATE 1 MILLIGRAM(S): 50 CAPSULE, EXTENDED RELEASE ORAL at 20:26

## 2022-01-01 RX ADMIN — Medication 100 MILLIGRAM(S): at 22:27

## 2022-01-01 RX ADMIN — Medication 100 MILLIGRAM(S): at 22:12

## 2022-01-01 RX ADMIN — Medication 100 MILLIGRAM(S): at 14:47

## 2022-01-01 RX ADMIN — SODIUM CHLORIDE 500 MILLILITER(S): 9 INJECTION INTRAMUSCULAR; INTRAVENOUS; SUBCUTANEOUS at 03:47

## 2022-01-01 RX ADMIN — ATOVAQUONE 750 MILLIGRAM(S): 750 SUSPENSION ORAL at 11:30

## 2022-01-01 RX ADMIN — Medication 650 MILLIGRAM(S): at 15:22

## 2022-01-01 RX ADMIN — LEVALBUTEROL 0.63 MILLIGRAM(S): 1.25 SOLUTION, CONCENTRATE RESPIRATORY (INHALATION) at 10:11

## 2022-01-01 RX ADMIN — TRAMADOL HYDROCHLORIDE 25 MILLIGRAM(S): 50 TABLET ORAL at 16:10

## 2022-01-01 RX ADMIN — Medication 100 MILLIGRAM(S): at 14:03

## 2022-01-01 RX ADMIN — CEFTRIAXONE 100 MILLIGRAM(S): 500 INJECTION, POWDER, FOR SOLUTION INTRAMUSCULAR; INTRAVENOUS at 12:00

## 2022-01-01 RX ADMIN — LIDOCAINE 1 PATCH: 4 CREAM TOPICAL at 12:54

## 2022-01-01 RX ADMIN — HEPARIN SODIUM 5000 UNIT(S): 5000 INJECTION INTRAVENOUS; SUBCUTANEOUS at 05:20

## 2022-01-01 RX ADMIN — MAGNESIUM OXIDE 400 MG ORAL TABLET 400 MILLIGRAM(S): 241.3 TABLET ORAL at 10:27

## 2022-01-01 RX ADMIN — ENOXAPARIN SODIUM 40 MILLIGRAM(S): 100 INJECTION SUBCUTANEOUS at 16:51

## 2022-01-01 RX ADMIN — HEPARIN SODIUM 5000 UNIT(S): 5000 INJECTION INTRAVENOUS; SUBCUTANEOUS at 12:24

## 2022-01-01 RX ADMIN — ATOVAQUONE 750 MILLIGRAM(S): 750 SUSPENSION ORAL at 17:28

## 2022-01-01 RX ADMIN — Medication 6 MILLIGRAM(S): at 03:46

## 2022-01-01 RX ADMIN — HEPARIN SODIUM 5000 UNIT(S): 5000 INJECTION INTRAVENOUS; SUBCUTANEOUS at 05:29

## 2022-01-01 RX ADMIN — MAGNESIUM OXIDE 400 MG ORAL TABLET 400 MILLIGRAM(S): 241.3 TABLET ORAL at 18:07

## 2022-01-01 RX ADMIN — ATOVAQUONE 750 MILLIGRAM(S): 750 SUSPENSION ORAL at 06:03

## 2022-01-01 RX ADMIN — ENOXAPARIN SODIUM 40 MILLIGRAM(S): 100 INJECTION SUBCUTANEOUS at 04:37

## 2022-01-01 RX ADMIN — Medication 650 MILLIGRAM(S): at 13:20

## 2022-01-01 RX ADMIN — Medication 650 MILLIGRAM(S): at 09:41

## 2022-01-01 RX ADMIN — MORPHINE SULFATE 1 MILLIGRAM(S): 50 CAPSULE, EXTENDED RELEASE ORAL at 17:15

## 2022-01-01 RX ADMIN — Medication 100 MILLIGRAM(S): at 06:23

## 2022-01-01 RX ADMIN — Medication 650 MILLIGRAM(S): at 09:58

## 2022-01-01 RX ADMIN — Medication 100 MILLIGRAM(S): at 21:20

## 2022-01-01 RX ADMIN — Medication 100 MILLIGRAM(S): at 20:25

## 2022-01-01 RX ADMIN — ENOXAPARIN SODIUM 40 MILLIGRAM(S): 100 INJECTION SUBCUTANEOUS at 18:14

## 2022-01-01 RX ADMIN — Medication 40 MILLIGRAM(S): at 17:29

## 2022-01-01 RX ADMIN — POLYETHYLENE GLYCOL 3350 17 GRAM(S): 17 POWDER, FOR SOLUTION ORAL at 18:09

## 2022-01-01 RX ADMIN — Medication 100 MILLIGRAM(S): at 18:36

## 2022-01-01 RX ADMIN — LEVALBUTEROL 0.63 MILLIGRAM(S): 1.25 SOLUTION, CONCENTRATE RESPIRATORY (INHALATION) at 09:10

## 2022-01-01 RX ADMIN — Medication 25 GRAM(S): at 07:09

## 2022-01-01 RX ADMIN — Medication 100 MILLIEQUIVALENT(S): at 10:27

## 2022-01-01 RX ADMIN — ATOVAQUONE 750 MILLIGRAM(S): 750 SUSPENSION ORAL at 06:01

## 2022-01-01 RX ADMIN — Medication 6 MILLIGRAM(S): at 05:00

## 2022-01-01 RX ADMIN — Medication 40 MILLIGRAM(S): at 06:03

## 2022-01-01 RX ADMIN — Medication 650 MILLIGRAM(S): at 10:23

## 2022-01-01 RX ADMIN — Medication 400 MILLIGRAM(S): at 07:05

## 2022-01-01 RX ADMIN — Medication 100 MILLIGRAM(S): at 05:55

## 2022-01-01 RX ADMIN — LEVALBUTEROL 0.63 MILLIGRAM(S): 1.25 SOLUTION, CONCENTRATE RESPIRATORY (INHALATION) at 04:56

## 2022-01-01 RX ADMIN — ENOXAPARIN SODIUM 40 MILLIGRAM(S): 100 INJECTION SUBCUTANEOUS at 06:42

## 2022-01-01 RX ADMIN — ENOXAPARIN SODIUM 40 MILLIGRAM(S): 100 INJECTION SUBCUTANEOUS at 21:35

## 2022-01-01 RX ADMIN — PANTOPRAZOLE SODIUM 40 MILLIGRAM(S): 20 TABLET, DELAYED RELEASE ORAL at 06:02

## 2022-01-01 RX ADMIN — TRAMADOL HYDROCHLORIDE 25 MILLIGRAM(S): 50 TABLET ORAL at 21:37

## 2022-01-01 RX ADMIN — LORATADINE 10 MILLIGRAM(S): 10 TABLET ORAL at 11:39

## 2022-01-01 RX ADMIN — Medication 650 MILLIGRAM(S): at 09:23

## 2022-01-01 RX ADMIN — LORATADINE 10 MILLIGRAM(S): 10 TABLET ORAL at 17:55

## 2022-01-01 RX ADMIN — CHLORHEXIDINE GLUCONATE 1 APPLICATION(S): 213 SOLUTION TOPICAL at 18:19

## 2022-01-01 RX ADMIN — MAGNESIUM OXIDE 400 MG ORAL TABLET 400 MILLIGRAM(S): 241.3 TABLET ORAL at 10:02

## 2022-01-01 RX ADMIN — BENZOCAINE AND MENTHOL 1 LOZENGE: 5; 1 LIQUID ORAL at 21:11

## 2022-01-01 RX ADMIN — Medication 100 MILLIGRAM(S): at 07:53

## 2022-01-01 RX ADMIN — ENOXAPARIN SODIUM 40 MILLIGRAM(S): 100 INJECTION SUBCUTANEOUS at 21:30

## 2022-01-01 RX ADMIN — TRAMADOL HYDROCHLORIDE 25 MILLIGRAM(S): 50 TABLET ORAL at 14:11

## 2022-01-01 RX ADMIN — Medication 63.75 MILLIMOLE(S): at 09:23

## 2022-01-01 RX ADMIN — Medication 975 MILLIGRAM(S): at 22:49

## 2022-01-01 RX ADMIN — CHLORHEXIDINE GLUCONATE 1 APPLICATION(S): 213 SOLUTION TOPICAL at 11:59

## 2022-01-01 RX ADMIN — Medication 3 MILLILITER(S): at 17:47

## 2022-01-01 RX ADMIN — BENZOCAINE AND MENTHOL 1 LOZENGE: 5; 1 LIQUID ORAL at 10:24

## 2022-01-01 RX ADMIN — Medication 650 MILLIGRAM(S): at 13:15

## 2022-01-01 RX ADMIN — Medication 650 MILLIGRAM(S): at 10:22

## 2022-01-01 RX ADMIN — Medication 0.25 MILLIGRAM(S): at 19:25

## 2022-01-01 RX ADMIN — TRAMADOL HYDROCHLORIDE 50 MILLIGRAM(S): 50 TABLET ORAL at 11:23

## 2022-01-01 RX ADMIN — Medication 40 MILLIGRAM(S): at 06:02

## 2022-01-01 RX ADMIN — SODIUM CHLORIDE 75 MILLILITER(S): 9 INJECTION, SOLUTION INTRAVENOUS at 20:03

## 2022-01-01 RX ADMIN — BENZOCAINE AND MENTHOL 1 LOZENGE: 5; 1 LIQUID ORAL at 22:33

## 2022-01-01 RX ADMIN — ATOVAQUONE 750 MILLIGRAM(S): 750 SUSPENSION ORAL at 17:00

## 2022-01-01 RX ADMIN — PANTOPRAZOLE SODIUM 40 MILLIGRAM(S): 20 TABLET, DELAYED RELEASE ORAL at 05:20

## 2022-01-01 RX ADMIN — LEVALBUTEROL 0.63 MILLIGRAM(S): 1.25 SOLUTION, CONCENTRATE RESPIRATORY (INHALATION) at 16:19

## 2022-01-01 RX ADMIN — MORPHINE SULFATE 1 MILLIGRAM(S): 50 CAPSULE, EXTENDED RELEASE ORAL at 04:00

## 2022-01-01 RX ADMIN — Medication 1000 MILLIGRAM(S): at 16:00

## 2022-01-01 RX ADMIN — TRAMADOL HYDROCHLORIDE 50 MILLIGRAM(S): 50 TABLET ORAL at 08:02

## 2022-01-01 RX ADMIN — ENOXAPARIN SODIUM 40 MILLIGRAM(S): 100 INJECTION SUBCUTANEOUS at 22:11

## 2022-01-01 RX ADMIN — BENZOCAINE AND MENTHOL 1 LOZENGE: 5; 1 LIQUID ORAL at 21:55

## 2022-01-01 RX ADMIN — Medication 650 MILLIGRAM(S): at 10:50

## 2022-01-01 RX ADMIN — PHENYLEPHRINE-SHARK LIVER OIL-MINERAL OIL-PETROLATUM RECTAL OINTMENT 1 SUPPOSITORY(S): at 07:29

## 2022-01-01 RX ADMIN — BENZOCAINE AND MENTHOL 1 LOZENGE: 5; 1 LIQUID ORAL at 05:46

## 2022-01-01 RX ADMIN — Medication 40 MILLIGRAM(S): at 06:45

## 2022-01-01 RX ADMIN — TRAMADOL HYDROCHLORIDE 25 MILLIGRAM(S): 50 TABLET ORAL at 16:52

## 2022-01-01 RX ADMIN — SENNA PLUS 1 TABLET(S): 8.6 TABLET ORAL at 22:29

## 2022-01-01 RX ADMIN — Medication 650 MILLIGRAM(S): at 08:00

## 2022-01-01 RX ADMIN — LIDOCAINE 1 PATCH: 4 CREAM TOPICAL at 18:25

## 2022-01-01 RX ADMIN — Medication 650 MILLIGRAM(S): at 09:00

## 2022-01-01 RX ADMIN — POLYETHYLENE GLYCOL 3350 17 GRAM(S): 17 POWDER, FOR SOLUTION ORAL at 06:13

## 2022-01-01 RX ADMIN — ALTEPLASE 2 MILLIGRAM(S): KIT at 06:32

## 2022-01-01 RX ADMIN — MAGNESIUM OXIDE 400 MG ORAL TABLET 400 MILLIGRAM(S): 241.3 TABLET ORAL at 12:59

## 2022-01-01 RX ADMIN — TRAMADOL HYDROCHLORIDE 25 MILLIGRAM(S): 50 TABLET ORAL at 15:00

## 2022-01-01 RX ADMIN — BENZOCAINE AND MENTHOL 1 LOZENGE: 5; 1 LIQUID ORAL at 17:16

## 2022-01-01 RX ADMIN — SENNA PLUS 1 TABLET(S): 8.6 TABLET ORAL at 22:35

## 2022-01-01 RX ADMIN — MAGNESIUM OXIDE 400 MG ORAL TABLET 400 MILLIGRAM(S): 241.3 TABLET ORAL at 09:31

## 2022-01-01 RX ADMIN — Medication 650 MILLIGRAM(S): at 11:00

## 2022-01-01 RX ADMIN — CHLORHEXIDINE GLUCONATE 1 APPLICATION(S): 213 SOLUTION TOPICAL at 11:52

## 2022-01-01 RX ADMIN — Medication 1000 MILLIGRAM(S): at 07:09

## 2022-01-01 RX ADMIN — LEVALBUTEROL 0.63 MILLIGRAM(S): 1.25 SOLUTION, CONCENTRATE RESPIRATORY (INHALATION) at 03:30

## 2022-01-01 RX ADMIN — ENOXAPARIN SODIUM 40 MILLIGRAM(S): 100 INJECTION SUBCUTANEOUS at 17:11

## 2022-01-01 RX ADMIN — Medication 600 MILLIGRAM(S): at 22:00

## 2022-01-01 RX ADMIN — Medication 100 MILLIGRAM(S): at 21:33

## 2022-01-01 RX ADMIN — CHLORHEXIDINE GLUCONATE 1 APPLICATION(S): 213 SOLUTION TOPICAL at 12:30

## 2022-01-01 RX ADMIN — Medication 100 MILLIGRAM(S): at 05:14

## 2022-01-01 RX ADMIN — BENZOCAINE AND MENTHOL 1 LOZENGE: 5; 1 LIQUID ORAL at 12:55

## 2022-01-01 RX ADMIN — HEPARIN SODIUM 5000 UNIT(S): 5000 INJECTION INTRAVENOUS; SUBCUTANEOUS at 14:06

## 2022-01-01 RX ADMIN — CHLORHEXIDINE GLUCONATE 1 APPLICATION(S): 213 SOLUTION TOPICAL at 12:37

## 2022-01-01 RX ADMIN — CHLORHEXIDINE GLUCONATE 1 APPLICATION(S): 213 SOLUTION TOPICAL at 12:02

## 2022-01-01 RX ADMIN — TRAMADOL HYDROCHLORIDE 50 MILLIGRAM(S): 50 TABLET ORAL at 14:02

## 2022-01-01 RX ADMIN — Medication 25 GRAM(S): at 12:54

## 2022-01-01 RX ADMIN — BENZOCAINE AND MENTHOL 1 LOZENGE: 5; 1 LIQUID ORAL at 11:20

## 2022-01-01 RX ADMIN — POLYETHYLENE GLYCOL 3350 17 GRAM(S): 17 POWDER, FOR SOLUTION ORAL at 12:06

## 2022-01-01 RX ADMIN — BENZOCAINE AND MENTHOL 1 LOZENGE: 5; 1 LIQUID ORAL at 16:58

## 2022-01-01 RX ADMIN — MAGNESIUM OXIDE 400 MG ORAL TABLET 400 MILLIGRAM(S): 241.3 TABLET ORAL at 09:10

## 2022-01-01 RX ADMIN — Medication 100 MILLIGRAM(S): at 13:26

## 2022-01-01 RX ADMIN — Medication 40 MILLIEQUIVALENT(S): at 11:53

## 2022-01-01 RX ADMIN — HEPARIN SODIUM 5000 UNIT(S): 5000 INJECTION INTRAVENOUS; SUBCUTANEOUS at 21:10

## 2022-01-01 RX ADMIN — Medication 650 MILLIGRAM(S): at 22:50

## 2022-01-01 RX ADMIN — Medication 400 MILLIGRAM(S): at 22:10

## 2022-01-01 RX ADMIN — Medication 100 MILLIGRAM(S): at 17:28

## 2022-01-01 RX ADMIN — CEFEPIME 100 MILLIGRAM(S): 1 INJECTION, POWDER, FOR SOLUTION INTRAMUSCULAR; INTRAVENOUS at 05:34

## 2022-01-01 RX ADMIN — AZITHROMYCIN 255 MILLIGRAM(S): 500 TABLET, FILM COATED ORAL at 12:44

## 2022-01-01 RX ADMIN — HEPARIN SODIUM 5000 UNIT(S): 5000 INJECTION INTRAVENOUS; SUBCUTANEOUS at 07:07

## 2022-01-01 RX ADMIN — Medication 20 MILLIEQUIVALENT(S): at 12:43

## 2022-01-01 RX ADMIN — TRAMADOL HYDROCHLORIDE 50 MILLIGRAM(S): 50 TABLET ORAL at 23:22

## 2022-01-01 RX ADMIN — HEPARIN SODIUM 5000 UNIT(S): 5000 INJECTION INTRAVENOUS; SUBCUTANEOUS at 21:56

## 2022-01-01 RX ADMIN — REMDESIVIR 500 MILLIGRAM(S): 5 INJECTION INTRAVENOUS at 17:11

## 2022-01-01 RX ADMIN — Medication 6 MILLIGRAM(S): at 03:38

## 2022-01-01 RX ADMIN — Medication 100 MILLIGRAM(S): at 22:55

## 2022-01-01 RX ADMIN — Medication 650 MILLIGRAM(S): at 11:53

## 2022-01-01 RX ADMIN — PHENYLEPHRINE-SHARK LIVER OIL-MINERAL OIL-PETROLATUM RECTAL OINTMENT 1 SUPPOSITORY(S): at 22:09

## 2022-01-01 RX ADMIN — Medication 20 MILLIEQUIVALENT(S): at 17:57

## 2022-01-01 RX ADMIN — ATOVAQUONE 750 MILLIGRAM(S): 750 SUSPENSION ORAL at 05:50

## 2022-01-01 RX ADMIN — TRAMADOL HYDROCHLORIDE 50 MILLIGRAM(S): 50 TABLET ORAL at 15:00

## 2022-01-01 RX ADMIN — Medication 975 MILLIGRAM(S): at 21:49

## 2022-01-01 RX ADMIN — TRAMADOL HYDROCHLORIDE 50 MILLIGRAM(S): 50 TABLET ORAL at 16:59

## 2022-01-01 RX ADMIN — CHLORHEXIDINE GLUCONATE 1 APPLICATION(S): 213 SOLUTION TOPICAL at 14:08

## 2022-01-01 RX ADMIN — Medication 650 MILLIGRAM(S): at 22:30

## 2022-01-01 RX ADMIN — TRAMADOL HYDROCHLORIDE 25 MILLIGRAM(S): 50 TABLET ORAL at 16:22

## 2022-01-01 RX ADMIN — PANTOPRAZOLE SODIUM 40 MILLIGRAM(S): 20 TABLET, DELAYED RELEASE ORAL at 06:01

## 2022-01-01 RX ADMIN — MAGNESIUM OXIDE 400 MG ORAL TABLET 400 MILLIGRAM(S): 241.3 TABLET ORAL at 12:02

## 2022-01-01 RX ADMIN — Medication 100 MILLIGRAM(S): at 20:42

## 2022-01-01 RX ADMIN — Medication 1000 MILLIGRAM(S): at 09:24

## 2022-01-01 RX ADMIN — LEVALBUTEROL 0.63 MILLIGRAM(S): 1.25 SOLUTION, CONCENTRATE RESPIRATORY (INHALATION) at 09:00

## 2022-01-01 RX ADMIN — LEVALBUTEROL 0.63 MILLIGRAM(S): 1.25 SOLUTION, CONCENTRATE RESPIRATORY (INHALATION) at 15:22

## 2022-01-01 RX ADMIN — Medication 100 MILLIGRAM(S): at 12:18

## 2022-01-01 RX ADMIN — HEPARIN SODIUM 5000 UNIT(S): 5000 INJECTION INTRAVENOUS; SUBCUTANEOUS at 21:06

## 2022-01-01 RX ADMIN — Medication 40 MILLIGRAM(S): at 06:26

## 2022-01-01 RX ADMIN — Medication 40 MILLIEQUIVALENT(S): at 09:23

## 2022-01-01 RX ADMIN — Medication 650 MILLIGRAM(S): at 11:10

## 2022-01-01 RX ADMIN — LEVALBUTEROL 0.63 MILLIGRAM(S): 1.25 SOLUTION, CONCENTRATE RESPIRATORY (INHALATION) at 15:35

## 2022-01-01 RX ADMIN — Medication 650 MILLIGRAM(S): at 21:49

## 2022-01-01 RX ADMIN — Medication 40 MILLIGRAM(S): at 06:29

## 2022-01-01 RX ADMIN — SODIUM CHLORIDE 75 MILLILITER(S): 9 INJECTION, SOLUTION INTRAVENOUS at 12:37

## 2022-01-01 RX ADMIN — BENZOCAINE AND MENTHOL 1 LOZENGE: 5; 1 LIQUID ORAL at 05:50

## 2022-01-01 RX ADMIN — Medication 100 MILLIGRAM(S): at 04:52

## 2022-01-01 RX ADMIN — ENOXAPARIN SODIUM 40 MILLIGRAM(S): 100 INJECTION SUBCUTANEOUS at 21:03

## 2022-01-01 RX ADMIN — Medication 1000 MILLIGRAM(S): at 06:31

## 2022-01-01 RX ADMIN — Medication 100 MILLIGRAM(S): at 07:03

## 2022-01-01 RX ADMIN — SODIUM CHLORIDE 1000 MILLILITER(S): 9 INJECTION, SOLUTION INTRAVENOUS at 13:20

## 2022-01-01 RX ADMIN — NYSTATIN CREAM 1 APPLICATION(S): 100000 CREAM TOPICAL at 05:50

## 2022-01-01 RX ADMIN — TRAMADOL HYDROCHLORIDE 25 MILLIGRAM(S): 50 TABLET ORAL at 11:57

## 2022-01-01 RX ADMIN — ATOVAQUONE 750 MILLIGRAM(S): 750 SUSPENSION ORAL at 17:54

## 2022-01-01 RX ADMIN — HYDROMORPHONE HYDROCHLORIDE 0.25 MILLIGRAM(S): 2 INJECTION INTRAMUSCULAR; INTRAVENOUS; SUBCUTANEOUS at 15:25

## 2022-01-01 RX ADMIN — ENOXAPARIN SODIUM 40 MILLIGRAM(S): 100 INJECTION SUBCUTANEOUS at 22:12

## 2022-01-01 RX ADMIN — ENOXAPARIN SODIUM 40 MILLIGRAM(S): 100 INJECTION SUBCUTANEOUS at 19:55

## 2022-01-01 RX ADMIN — PHENYLEPHRINE-SHARK LIVER OIL-MINERAL OIL-PETROLATUM RECTAL OINTMENT 1 SUPPOSITORY(S): at 14:01

## 2022-01-01 RX ADMIN — Medication 650 MILLIGRAM(S): at 10:00

## 2022-01-01 RX ADMIN — Medication 650 MILLIGRAM(S): at 20:43

## 2022-01-01 RX ADMIN — Medication 20 MILLIEQUIVALENT(S): at 15:31

## 2022-01-01 RX ADMIN — Medication 1000 MILLIGRAM(S): at 22:49

## 2022-01-01 RX ADMIN — Medication 10 MILLIGRAM(S): at 15:35

## 2022-01-01 RX ADMIN — CHLORHEXIDINE GLUCONATE 1 APPLICATION(S): 213 SOLUTION TOPICAL at 11:48

## 2022-01-01 RX ADMIN — POLYETHYLENE GLYCOL 3350 17 GRAM(S): 17 POWDER, FOR SOLUTION ORAL at 07:05

## 2022-01-01 RX ADMIN — MAGNESIUM OXIDE 400 MG ORAL TABLET 400 MILLIGRAM(S): 241.3 TABLET ORAL at 12:38

## 2022-01-01 RX ADMIN — Medication 100 MILLIGRAM(S): at 21:56

## 2022-01-01 RX ADMIN — ENOXAPARIN SODIUM 40 MILLIGRAM(S): 100 INJECTION SUBCUTANEOUS at 17:26

## 2022-01-01 RX ADMIN — Medication 100 MILLIGRAM(S): at 20:37

## 2022-01-01 RX ADMIN — HEPARIN SODIUM 1500 UNIT(S)/HR: 5000 INJECTION INTRAVENOUS; SUBCUTANEOUS at 09:13

## 2022-01-01 RX ADMIN — Medication 40 MILLIEQUIVALENT(S): at 12:51

## 2022-01-01 RX ADMIN — HEPARIN SODIUM 5000 UNIT(S): 5000 INJECTION INTRAVENOUS; SUBCUTANEOUS at 18:53

## 2022-01-01 RX ADMIN — TRAMADOL HYDROCHLORIDE 25 MILLIGRAM(S): 50 TABLET ORAL at 21:41

## 2022-01-01 RX ADMIN — Medication 40 MILLIGRAM(S): at 17:25

## 2022-01-01 RX ADMIN — PIPERACILLIN AND TAZOBACTAM 25 GRAM(S): 4; .5 INJECTION, POWDER, LYOPHILIZED, FOR SOLUTION INTRAVENOUS at 21:50

## 2022-01-01 RX ADMIN — ATOVAQUONE 750 MILLIGRAM(S): 750 SUSPENSION ORAL at 21:29

## 2022-01-01 RX ADMIN — Medication 250 MILLIGRAM(S): at 18:36

## 2022-01-01 RX ADMIN — Medication 40 MILLIEQUIVALENT(S): at 14:16

## 2022-01-01 RX ADMIN — Medication 400 MILLIGRAM(S): at 22:16

## 2022-01-01 RX ADMIN — CHLORHEXIDINE GLUCONATE 1 APPLICATION(S): 213 SOLUTION TOPICAL at 13:26

## 2022-01-01 RX ADMIN — PIPERACILLIN AND TAZOBACTAM 25 GRAM(S): 4; .5 INJECTION, POWDER, LYOPHILIZED, FOR SOLUTION INTRAVENOUS at 21:45

## 2022-01-01 RX ADMIN — ATOVAQUONE 750 MILLIGRAM(S): 750 SUSPENSION ORAL at 05:30

## 2022-01-01 RX ADMIN — Medication 650 MILLIGRAM(S): at 22:55

## 2022-01-01 RX ADMIN — TRAMADOL HYDROCHLORIDE 25 MILLIGRAM(S): 50 TABLET ORAL at 22:24

## 2022-01-01 RX ADMIN — MAGNESIUM OXIDE 400 MG ORAL TABLET 400 MILLIGRAM(S): 241.3 TABLET ORAL at 13:21

## 2022-01-01 RX ADMIN — Medication 100 MILLIGRAM(S): at 05:19

## 2022-01-01 RX ADMIN — PIPERACILLIN AND TAZOBACTAM 200 GRAM(S): 4; .5 INJECTION, POWDER, LYOPHILIZED, FOR SOLUTION INTRAVENOUS at 21:19

## 2022-01-01 RX ADMIN — Medication 100 MILLIGRAM(S): at 18:02

## 2022-01-01 RX ADMIN — Medication 100 MILLIGRAM(S): at 18:52

## 2022-01-01 RX ADMIN — HEPARIN SODIUM 5000 UNIT(S): 5000 INJECTION INTRAVENOUS; SUBCUTANEOUS at 14:03

## 2022-01-01 RX ADMIN — SENNA PLUS 1 TABLET(S): 8.6 TABLET ORAL at 17:34

## 2022-01-01 RX ADMIN — Medication 40 MILLIGRAM(S): at 18:42

## 2022-01-01 RX ADMIN — Medication 400 MILLIGRAM(S): at 13:26

## 2022-01-01 RX ADMIN — CHLORHEXIDINE GLUCONATE 1 APPLICATION(S): 213 SOLUTION TOPICAL at 11:04

## 2022-01-01 RX ADMIN — Medication 6 MILLIGRAM(S): at 05:32

## 2022-01-01 RX ADMIN — Medication 40 MILLIGRAM(S): at 17:37

## 2022-01-01 RX ADMIN — Medication 25 GRAM(S): at 12:00

## 2022-01-01 RX ADMIN — PIPERACILLIN AND TAZOBACTAM 25 GRAM(S): 4; .5 INJECTION, POWDER, LYOPHILIZED, FOR SOLUTION INTRAVENOUS at 05:05

## 2022-01-01 RX ADMIN — MAGNESIUM OXIDE 400 MG ORAL TABLET 400 MILLIGRAM(S): 241.3 TABLET ORAL at 09:29

## 2022-01-01 RX ADMIN — MORPHINE SULFATE 1 MILLIGRAM(S): 50 CAPSULE, EXTENDED RELEASE ORAL at 01:13

## 2022-01-01 RX ADMIN — ATOVAQUONE 750 MILLIGRAM(S): 750 SUSPENSION ORAL at 17:29

## 2022-01-01 RX ADMIN — HYDROMORPHONE HYDROCHLORIDE 0.25 MILLIGRAM(S): 2 INJECTION INTRAMUSCULAR; INTRAVENOUS; SUBCUTANEOUS at 15:37

## 2022-01-01 RX ADMIN — Medication 10 MILLIGRAM(S): at 20:31

## 2022-01-01 RX ADMIN — PIPERACILLIN AND TAZOBACTAM 25 GRAM(S): 4; .5 INJECTION, POWDER, LYOPHILIZED, FOR SOLUTION INTRAVENOUS at 14:16

## 2022-01-01 RX ADMIN — Medication 100 MILLIGRAM(S): at 14:56

## 2022-01-01 RX ADMIN — TRAMADOL HYDROCHLORIDE 25 MILLIGRAM(S): 50 TABLET ORAL at 12:50

## 2022-01-01 RX ADMIN — MAGNESIUM OXIDE 400 MG ORAL TABLET 400 MILLIGRAM(S): 241.3 TABLET ORAL at 18:08

## 2022-01-01 RX ADMIN — CALCITONIN SALMON 330 INTERNATIONAL UNIT(S): 200 INJECTION, SOLUTION INTRAMUSCULAR at 06:58

## 2022-01-01 RX ADMIN — LEVALBUTEROL 0.63 MILLIGRAM(S): 1.25 SOLUTION, CONCENTRATE RESPIRATORY (INHALATION) at 21:32

## 2022-01-01 RX ADMIN — LIDOCAINE 1 PATCH: 4 CREAM TOPICAL at 23:43

## 2022-01-01 RX ADMIN — LIDOCAINE 1 PATCH: 4 CREAM TOPICAL at 23:42

## 2022-01-01 RX ADMIN — Medication 100 MILLIGRAM(S): at 22:34

## 2022-01-01 RX ADMIN — LORATADINE 10 MILLIGRAM(S): 10 TABLET ORAL at 12:07

## 2022-01-01 RX ADMIN — Medication 650 MILLIGRAM(S): at 10:52

## 2022-01-01 RX ADMIN — Medication 400 MILLIGRAM(S): at 14:07

## 2022-01-01 RX ADMIN — CHLORHEXIDINE GLUCONATE 1 APPLICATION(S): 213 SOLUTION TOPICAL at 12:54

## 2022-01-01 RX ADMIN — ENOXAPARIN SODIUM 40 MILLIGRAM(S): 100 INJECTION SUBCUTANEOUS at 21:51

## 2022-01-01 RX ADMIN — CHLORHEXIDINE GLUCONATE 1 APPLICATION(S): 213 SOLUTION TOPICAL at 21:35

## 2022-01-01 RX ADMIN — LIDOCAINE 1 PATCH: 4 CREAM TOPICAL at 11:54

## 2022-01-01 RX ADMIN — Medication 400 MILLIGRAM(S): at 19:03

## 2022-01-01 RX ADMIN — Medication 1 SPRAY(S): at 17:39

## 2022-01-01 RX ADMIN — ENOXAPARIN SODIUM 40 MILLIGRAM(S): 100 INJECTION SUBCUTANEOUS at 17:56

## 2022-01-01 RX ADMIN — Medication 400 MILLIGRAM(S): at 01:33

## 2022-01-01 RX ADMIN — Medication 20 MILLIEQUIVALENT(S): at 12:54

## 2022-01-01 RX ADMIN — Medication 1000 MILLIGRAM(S): at 14:00

## 2022-01-01 RX ADMIN — Medication 1 ENEMA: at 22:00

## 2022-01-01 RX ADMIN — POLYETHYLENE GLYCOL 3350 17 GRAM(S): 17 POWDER, FOR SOLUTION ORAL at 11:30

## 2022-01-01 RX ADMIN — Medication 400 MILLIGRAM(S): at 21:27

## 2022-01-01 RX ADMIN — MAGNESIUM OXIDE 400 MG ORAL TABLET 400 MILLIGRAM(S): 241.3 TABLET ORAL at 11:31

## 2022-01-01 RX ADMIN — TRAMADOL HYDROCHLORIDE 25 MILLIGRAM(S): 50 TABLET ORAL at 18:54

## 2022-01-01 RX ADMIN — MAGNESIUM OXIDE 400 MG ORAL TABLET 400 MILLIGRAM(S): 241.3 TABLET ORAL at 19:08

## 2022-01-01 RX ADMIN — NYSTATIN CREAM 1 APPLICATION(S): 100000 CREAM TOPICAL at 18:02

## 2022-01-01 RX ADMIN — HEPARIN SODIUM 5000 UNIT(S): 5000 INJECTION INTRAVENOUS; SUBCUTANEOUS at 00:33

## 2022-01-01 RX ADMIN — Medication 40 MILLIGRAM(S): at 05:28

## 2022-01-01 RX ADMIN — TRAMADOL HYDROCHLORIDE 25 MILLIGRAM(S): 50 TABLET ORAL at 15:11

## 2022-01-01 RX ADMIN — BENZOCAINE AND MENTHOL 1 LOZENGE: 5; 1 LIQUID ORAL at 19:55

## 2022-01-01 RX ADMIN — Medication 6 MILLIGRAM(S): at 04:37

## 2022-01-01 RX ADMIN — CHLORHEXIDINE GLUCONATE 1 APPLICATION(S): 213 SOLUTION TOPICAL at 12:42

## 2022-01-01 RX ADMIN — Medication 100 MILLIGRAM(S): at 11:23

## 2022-01-01 RX ADMIN — POLYETHYLENE GLYCOL 3350 17 GRAM(S): 17 POWDER, FOR SOLUTION ORAL at 23:14

## 2022-01-01 RX ADMIN — HEPARIN SODIUM 1500 UNIT(S)/HR: 5000 INJECTION INTRAVENOUS; SUBCUTANEOUS at 07:16

## 2022-01-01 RX ADMIN — HALOPERIDOL DECANOATE 1 MILLIGRAM(S): 100 INJECTION INTRAMUSCULAR at 14:40

## 2022-01-01 RX ADMIN — AZITHROMYCIN 255 MILLIGRAM(S): 500 TABLET, FILM COATED ORAL at 11:59

## 2022-01-01 RX ADMIN — Medication 0.5 MILLIGRAM(S): at 11:46

## 2022-01-01 RX ADMIN — CHLORHEXIDINE GLUCONATE 1 APPLICATION(S): 213 SOLUTION TOPICAL at 11:30

## 2022-01-01 RX ADMIN — ENOXAPARIN SODIUM 40 MILLIGRAM(S): 100 INJECTION SUBCUTANEOUS at 17:36

## 2022-01-01 RX ADMIN — Medication 100 MILLIGRAM(S): at 14:06

## 2022-01-01 RX ADMIN — HEPARIN SODIUM 5000 UNIT(S): 5000 INJECTION INTRAVENOUS; SUBCUTANEOUS at 05:55

## 2022-01-01 RX ADMIN — Medication 40 MILLIGRAM(S): at 06:25

## 2022-01-01 RX ADMIN — Medication 100 MILLIGRAM(S): at 10:27

## 2022-01-01 RX ADMIN — CHLORHEXIDINE GLUCONATE 1 APPLICATION(S): 213 SOLUTION TOPICAL at 14:36

## 2022-01-01 RX ADMIN — Medication 40 MILLIGRAM(S): at 17:15

## 2022-01-01 RX ADMIN — Medication 40 MILLIGRAM(S): at 20:59

## 2022-01-01 RX ADMIN — LORATADINE 10 MILLIGRAM(S): 10 TABLET ORAL at 13:54

## 2022-01-01 RX ADMIN — SENNA PLUS 2 TABLET(S): 8.6 TABLET ORAL at 19:45

## 2022-01-01 RX ADMIN — MAGNESIUM OXIDE 400 MG ORAL TABLET 400 MILLIGRAM(S): 241.3 TABLET ORAL at 09:50

## 2022-01-01 RX ADMIN — PHENYLEPHRINE-SHARK LIVER OIL-MINERAL OIL-PETROLATUM RECTAL OINTMENT 1 SUPPOSITORY(S): at 14:38

## 2022-01-01 RX ADMIN — Medication 25 GRAM(S): at 10:47

## 2022-01-01 RX ADMIN — Medication 250 MILLIGRAM(S): at 02:39

## 2022-01-01 RX ADMIN — Medication 40 MILLIEQUIVALENT(S): at 13:25

## 2022-01-01 RX ADMIN — PIPERACILLIN AND TAZOBACTAM 25 GRAM(S): 4; .5 INJECTION, POWDER, LYOPHILIZED, FOR SOLUTION INTRAVENOUS at 20:27

## 2022-01-01 RX ADMIN — ENOXAPARIN SODIUM 40 MILLIGRAM(S): 100 INJECTION SUBCUTANEOUS at 21:07

## 2022-01-01 RX ADMIN — ENOXAPARIN SODIUM 40 MILLIGRAM(S): 100 INJECTION SUBCUTANEOUS at 20:27

## 2022-01-01 RX ADMIN — HYDROMORPHONE HYDROCHLORIDE 0.5 MILLIGRAM(S): 2 INJECTION INTRAMUSCULAR; INTRAVENOUS; SUBCUTANEOUS at 10:22

## 2022-01-01 RX ADMIN — ENOXAPARIN SODIUM 40 MILLIGRAM(S): 100 INJECTION SUBCUTANEOUS at 07:22

## 2022-01-01 RX ADMIN — LIDOCAINE 1 PATCH: 4 CREAM TOPICAL at 23:16

## 2022-01-01 RX ADMIN — POLYETHYLENE GLYCOL 3350 17 GRAM(S): 17 POWDER, FOR SOLUTION ORAL at 18:08

## 2022-01-01 RX ADMIN — LIDOCAINE 1 PATCH: 4 CREAM TOPICAL at 12:19

## 2022-01-01 RX ADMIN — Medication 975 MILLIGRAM(S): at 18:10

## 2022-01-01 RX ADMIN — MAGNESIUM OXIDE 400 MG ORAL TABLET 400 MILLIGRAM(S): 241.3 TABLET ORAL at 18:18

## 2022-01-01 RX ADMIN — LEVALBUTEROL 0.63 MILLIGRAM(S): 1.25 SOLUTION, CONCENTRATE RESPIRATORY (INHALATION) at 10:05

## 2022-01-01 RX ADMIN — SODIUM CHLORIDE 75 MILLILITER(S): 9 INJECTION INTRAMUSCULAR; INTRAVENOUS; SUBCUTANEOUS at 12:45

## 2022-01-01 RX ADMIN — Medication 100 MILLIGRAM(S): at 00:38

## 2022-01-01 RX ADMIN — Medication 1 ENEMA: at 00:11

## 2022-01-01 RX ADMIN — Medication 650 MILLIGRAM(S): at 16:44

## 2022-01-01 RX ADMIN — LORATADINE 10 MILLIGRAM(S): 10 TABLET ORAL at 13:12

## 2022-01-01 RX ADMIN — TRAMADOL HYDROCHLORIDE 25 MILLIGRAM(S): 50 TABLET ORAL at 16:51

## 2022-01-01 RX ADMIN — Medication 25 GRAM(S): at 11:24

## 2022-01-01 RX ADMIN — Medication 40 MILLIGRAM(S): at 05:56

## 2022-01-01 RX ADMIN — ATOVAQUONE 750 MILLIGRAM(S): 750 SUSPENSION ORAL at 06:30

## 2022-01-01 RX ADMIN — TRAMADOL HYDROCHLORIDE 25 MILLIGRAM(S): 50 TABLET ORAL at 23:03

## 2022-01-01 RX ADMIN — ENOXAPARIN SODIUM 40 MILLIGRAM(S): 100 INJECTION SUBCUTANEOUS at 18:10

## 2022-01-01 RX ADMIN — Medication 650 MILLIGRAM(S): at 16:22

## 2022-01-01 RX ADMIN — ENOXAPARIN SODIUM 40 MILLIGRAM(S): 100 INJECTION SUBCUTANEOUS at 21:22

## 2022-01-01 RX ADMIN — PANTOPRAZOLE SODIUM 40 MILLIGRAM(S): 20 TABLET, DELAYED RELEASE ORAL at 06:30

## 2022-01-01 RX ADMIN — Medication 100 MILLIGRAM(S): at 09:58

## 2022-01-01 RX ADMIN — NYSTATIN CREAM 1 APPLICATION(S): 100000 CREAM TOPICAL at 17:32

## 2022-01-01 RX ADMIN — ENOXAPARIN SODIUM 40 MILLIGRAM(S): 100 INJECTION SUBCUTANEOUS at 05:01

## 2022-01-01 RX ADMIN — MAGNESIUM OXIDE 400 MG ORAL TABLET 400 MILLIGRAM(S): 241.3 TABLET ORAL at 09:21

## 2022-01-01 RX ADMIN — MAGNESIUM OXIDE 400 MG ORAL TABLET 400 MILLIGRAM(S): 241.3 TABLET ORAL at 18:24

## 2022-01-01 RX ADMIN — ENOXAPARIN SODIUM 40 MILLIGRAM(S): 100 INJECTION SUBCUTANEOUS at 17:28

## 2022-01-01 RX ADMIN — MAGNESIUM OXIDE 400 MG ORAL TABLET 400 MILLIGRAM(S): 241.3 TABLET ORAL at 13:04

## 2022-01-01 RX ADMIN — LEVALBUTEROL 0.63 MILLIGRAM(S): 1.25 SOLUTION, CONCENTRATE RESPIRATORY (INHALATION) at 20:17

## 2022-01-01 RX ADMIN — Medication 400 MILLIGRAM(S): at 02:11

## 2022-01-01 RX ADMIN — PIPERACILLIN AND TAZOBACTAM 25 GRAM(S): 4; .5 INJECTION, POWDER, LYOPHILIZED, FOR SOLUTION INTRAVENOUS at 06:42

## 2022-01-01 RX ADMIN — REMDESIVIR 500 MILLIGRAM(S): 5 INJECTION INTRAVENOUS at 17:21

## 2022-01-01 RX ADMIN — Medication 40 MILLIGRAM(S): at 06:01

## 2022-01-01 RX ADMIN — Medication 100 MILLIGRAM(S): at 13:12

## 2022-01-01 RX ADMIN — LEVALBUTEROL 0.63 MILLIGRAM(S): 1.25 SOLUTION, CONCENTRATE RESPIRATORY (INHALATION) at 21:24

## 2022-01-01 RX ADMIN — Medication 650 MILLIGRAM(S): at 20:58

## 2022-01-01 RX ADMIN — CEFTRIAXONE 100 MILLIGRAM(S): 500 INJECTION, POWDER, FOR SOLUTION INTRAMUSCULAR; INTRAVENOUS at 11:28

## 2022-01-01 RX ADMIN — MAGNESIUM OXIDE 400 MG ORAL TABLET 400 MILLIGRAM(S): 241.3 TABLET ORAL at 18:02

## 2022-01-01 RX ADMIN — Medication 100 MILLIGRAM(S): at 22:10

## 2022-01-01 RX ADMIN — Medication 15 MILLIGRAM(S): at 23:16

## 2022-01-01 RX ADMIN — CHLORHEXIDINE GLUCONATE 1 APPLICATION(S): 213 SOLUTION TOPICAL at 17:01

## 2022-01-01 RX ADMIN — NYSTATIN CREAM 1 APPLICATION(S): 100000 CREAM TOPICAL at 06:22

## 2022-01-01 RX ADMIN — Medication 20 MILLIGRAM(S): at 21:03

## 2022-01-01 RX ADMIN — SENNA PLUS 1 TABLET(S): 8.6 TABLET ORAL at 21:15

## 2022-01-01 RX ADMIN — Medication 400 MILLIGRAM(S): at 15:21

## 2022-01-01 RX ADMIN — MAGNESIUM OXIDE 400 MG ORAL TABLET 400 MILLIGRAM(S): 241.3 TABLET ORAL at 13:17

## 2022-01-01 RX ADMIN — Medication 300 UNIT(S): at 17:01

## 2022-01-01 RX ADMIN — Medication 25 GRAM(S): at 11:00

## 2022-01-01 RX ADMIN — Medication 100 MILLIGRAM(S): at 05:43

## 2022-01-01 RX ADMIN — NYSTATIN CREAM 1 APPLICATION(S): 100000 CREAM TOPICAL at 17:55

## 2022-01-01 RX ADMIN — PANTOPRAZOLE SODIUM 40 MILLIGRAM(S): 20 TABLET, DELAYED RELEASE ORAL at 05:55

## 2022-01-01 RX ADMIN — REMDESIVIR 500 MILLIGRAM(S): 5 INJECTION INTRAVENOUS at 18:06

## 2022-01-01 RX ADMIN — Medication 40 MILLIGRAM(S): at 20:57

## 2022-01-01 RX ADMIN — CHLORHEXIDINE GLUCONATE 1 APPLICATION(S): 213 SOLUTION TOPICAL at 13:00

## 2022-01-01 RX ADMIN — Medication 100 MILLIGRAM(S): at 12:16

## 2022-01-01 RX ADMIN — CALCITONIN SALMON 330 INTERNATIONAL UNIT(S): 200 INJECTION, SOLUTION INTRAMUSCULAR at 18:02

## 2022-01-01 RX ADMIN — Medication 400 MILLIGRAM(S): at 02:46

## 2022-01-01 RX ADMIN — BENZOCAINE AND MENTHOL 1 LOZENGE: 5; 1 LIQUID ORAL at 07:03

## 2022-01-01 RX ADMIN — NYSTATIN CREAM 1 APPLICATION(S): 100000 CREAM TOPICAL at 05:42

## 2022-01-01 RX ADMIN — BENZOCAINE AND MENTHOL 1 LOZENGE: 5; 1 LIQUID ORAL at 05:28

## 2022-01-01 RX ADMIN — Medication 1000 MILLIGRAM(S): at 22:16

## 2022-01-01 RX ADMIN — HEPARIN SODIUM 1500 UNIT(S)/HR: 5000 INJECTION INTRAVENOUS; SUBCUTANEOUS at 22:40

## 2022-01-01 RX ADMIN — Medication 100 MILLIGRAM(S): at 07:06

## 2022-01-01 RX ADMIN — Medication 3 MILLIGRAM(S): at 21:21

## 2022-01-01 RX ADMIN — NYSTATIN CREAM 1 APPLICATION(S): 100000 CREAM TOPICAL at 05:53

## 2022-01-01 RX ADMIN — TRAMADOL HYDROCHLORIDE 50 MILLIGRAM(S): 50 TABLET ORAL at 15:30

## 2022-01-03 ENCOUNTER — OUTPATIENT (OUTPATIENT)
Dept: OUTPATIENT SERVICES | Facility: HOSPITAL | Age: 57
LOS: 1 days | Discharge: HOME | End: 2022-01-03

## 2022-01-03 DIAGNOSIS — C54.1 MALIGNANT NEOPLASM OF ENDOMETRIUM: ICD-10-CM

## 2022-01-03 DIAGNOSIS — Z90.710 ACQUIRED ABSENCE OF BOTH CERVIX AND UTERUS: Chronic | ICD-10-CM

## 2022-01-04 ENCOUNTER — RESULT REVIEW (OUTPATIENT)
Age: 57
End: 2022-01-04

## 2022-01-04 ENCOUNTER — APPOINTMENT (OUTPATIENT)
Dept: HEMATOLOGY ONCOLOGY | Facility: CLINIC | Age: 57
End: 2022-01-04

## 2022-01-04 ENCOUNTER — APPOINTMENT (OUTPATIENT)
Dept: INFUSION THERAPY | Facility: HOSPITAL | Age: 57
End: 2022-01-04

## 2022-01-04 ENCOUNTER — OUTPATIENT (OUTPATIENT)
Dept: OUTPATIENT SERVICES | Facility: HOSPITAL | Age: 57
LOS: 1 days | End: 2022-01-04
Payer: COMMERCIAL

## 2022-01-04 DIAGNOSIS — Z90.710 ACQUIRED ABSENCE OF BOTH CERVIX AND UTERUS: Chronic | ICD-10-CM

## 2022-01-04 DIAGNOSIS — Z51.89 ENCOUNTER FOR OTHER SPECIFIED AFTERCARE: ICD-10-CM

## 2022-01-04 LAB
BASOPHILS # BLD AUTO: 0 K/UL — SIGNIFICANT CHANGE UP (ref 0–0.2)
BASOPHILS NFR BLD AUTO: 0 % — SIGNIFICANT CHANGE UP (ref 0–2)
BLD GP AB SCN SERPL QL: NEGATIVE — SIGNIFICANT CHANGE UP
EOSINOPHIL # BLD AUTO: 0.13 K/UL — SIGNIFICANT CHANGE UP (ref 0–0.5)
EOSINOPHIL NFR BLD AUTO: 7 % — HIGH (ref 0–6)
HCT VFR BLD CALC: 23.1 % — LOW (ref 34.5–45)
HGB BLD-MCNC: 7.6 G/DL — LOW (ref 11.5–15.5)
LYMPHOCYTES # BLD AUTO: 1.13 K/UL — SIGNIFICANT CHANGE UP (ref 1–3.3)
LYMPHOCYTES # BLD AUTO: 59 % — HIGH (ref 13–44)
MCHC RBC-ENTMCNC: 26.2 PG — LOW (ref 27–34)
MCHC RBC-ENTMCNC: 32.9 G/DL — SIGNIFICANT CHANGE UP (ref 32–36)
MCV RBC AUTO: 79.7 FL — LOW (ref 80–100)
MONOCYTES # BLD AUTO: 0.55 K/UL — SIGNIFICANT CHANGE UP (ref 0–0.9)
MONOCYTES NFR BLD AUTO: 29 % — HIGH (ref 2–14)
NEUTROPHILS # BLD AUTO: 0.1 K/UL — LOW (ref 1.8–7.4)
NEUTROPHILS NFR BLD AUTO: 5 % — LOW (ref 43–77)
NRBC # BLD: 0 /100 — SIGNIFICANT CHANGE UP (ref 0–0)
NRBC # BLD: SIGNIFICANT CHANGE UP /100 WBCS (ref 0–0)
PLAT MORPH BLD: NORMAL — SIGNIFICANT CHANGE UP
PLATELET # BLD AUTO: 127 K/UL — LOW (ref 150–400)
RBC # BLD: 2.9 M/UL — LOW (ref 3.8–5.2)
RBC # FLD: 24.1 % — HIGH (ref 10.3–14.5)
RBC BLD AUTO: SIGNIFICANT CHANGE UP
RH IG SCN BLD-IMP: POSITIVE — SIGNIFICANT CHANGE UP
WBC # BLD: 1.91 K/UL — LOW (ref 3.8–10.5)
WBC # FLD AUTO: 1.91 K/UL — LOW (ref 3.8–10.5)

## 2022-01-04 PROCEDURE — 86850 RBC ANTIBODY SCREEN: CPT

## 2022-01-04 PROCEDURE — 86901 BLOOD TYPING SEROLOGIC RH(D): CPT

## 2022-01-04 PROCEDURE — 86923 COMPATIBILITY TEST ELECTRIC: CPT

## 2022-01-04 PROCEDURE — 86900 BLOOD TYPING SEROLOGIC ABO: CPT

## 2022-01-05 ENCOUNTER — APPOINTMENT (OUTPATIENT)
Dept: INFUSION THERAPY | Facility: HOSPITAL | Age: 57
End: 2022-01-05

## 2022-01-05 PROCEDURE — 74177 CT ABD & PELVIS W/CONTRAST: CPT

## 2022-01-05 PROCEDURE — 86850 RBC ANTIBODY SCREEN: CPT

## 2022-01-05 PROCEDURE — 71260 CT THORAX DX C+: CPT

## 2022-01-05 PROCEDURE — 86900 BLOOD TYPING SEROLOGIC ABO: CPT

## 2022-01-05 PROCEDURE — 86923 COMPATIBILITY TEST ELECTRIC: CPT

## 2022-01-05 PROCEDURE — 86901 BLOOD TYPING SEROLOGIC RH(D): CPT

## 2022-01-05 PROCEDURE — P9040: CPT

## 2022-01-06 DIAGNOSIS — R11.2 NAUSEA WITH VOMITING, UNSPECIFIED: ICD-10-CM

## 2022-01-07 DIAGNOSIS — C54.1 MALIGNANT NEOPLASM OF ENDOMETRIUM: ICD-10-CM

## 2022-01-10 ENCOUNTER — APPOINTMENT (OUTPATIENT)
Dept: RADIOLOGY | Facility: HOSPITAL | Age: 57
End: 2022-01-10

## 2022-01-10 ENCOUNTER — OUTPATIENT (OUTPATIENT)
Dept: OUTPATIENT SERVICES | Facility: HOSPITAL | Age: 57
LOS: 1 days | End: 2022-01-10
Payer: COMMERCIAL

## 2022-01-10 ENCOUNTER — APPOINTMENT (OUTPATIENT)
Dept: THORACIC SURGERY | Facility: CLINIC | Age: 57
End: 2022-01-10
Payer: COMMERCIAL

## 2022-01-10 ENCOUNTER — RESULT REVIEW (OUTPATIENT)
Age: 57
End: 2022-01-10

## 2022-01-10 VITALS
HEIGHT: 65 IN | HEART RATE: 98 BPM | SYSTOLIC BLOOD PRESSURE: 107 MMHG | DIASTOLIC BLOOD PRESSURE: 80 MMHG | BODY MASS INDEX: 37.49 KG/M2 | OXYGEN SATURATION: 100 % | WEIGHT: 225 LBS | RESPIRATION RATE: 18 BRPM

## 2022-01-10 DIAGNOSIS — J90 PLEURAL EFFUSION, NOT ELSEWHERE CLASSIFIED: ICD-10-CM

## 2022-01-10 DIAGNOSIS — Z90.710 ACQUIRED ABSENCE OF BOTH CERVIX AND UTERUS: Chronic | ICD-10-CM

## 2022-01-10 PROCEDURE — 71046 X-RAY EXAM CHEST 2 VIEWS: CPT | Mod: 26

## 2022-01-10 PROCEDURE — 99213 OFFICE O/P EST LOW 20 MIN: CPT

## 2022-01-10 RX ORDER — PROMETHAZINE HYDROCHLORIDE 6.25 MG/5ML
6.25 SOLUTION ORAL
Qty: 1 | Refills: 0 | Status: DISCONTINUED | COMMUNITY
Start: 2021-12-15 | End: 2022-01-10

## 2022-01-10 RX ORDER — DEXAMETHASONE 4 MG/1
4 TABLET ORAL
Qty: 5 | Refills: 1 | Status: DISCONTINUED | COMMUNITY
Start: 2021-10-19 | End: 2022-01-10

## 2022-01-10 RX ORDER — BENZONATATE 100 MG/1
100 CAPSULE ORAL 3 TIMES DAILY
Qty: 90 | Refills: 0 | Status: DISCONTINUED | COMMUNITY
Start: 2021-10-08 | End: 2022-01-10

## 2022-01-10 RX ORDER — PROCHLORPERAZINE MALEATE 10 MG/1
10 TABLET ORAL EVERY 6 HOURS
Qty: 30 | Refills: 2 | Status: DISCONTINUED | COMMUNITY
Start: 2021-10-19 | End: 2022-01-10

## 2022-01-10 RX ORDER — PROMETHAZINE HYDROCHLORIDE 6.25 MG/5ML
6.25 SOLUTION ORAL
Qty: 240 | Refills: 0 | Status: DISCONTINUED | COMMUNITY
Start: 2021-10-13 | End: 2022-01-10

## 2022-01-10 RX ORDER — TRAMADOL HYDROCHLORIDE 50 MG/1
50 TABLET, COATED ORAL
Qty: 12 | Refills: 0 | Status: DISCONTINUED | COMMUNITY
Start: 2021-09-03 | End: 2022-01-10

## 2022-01-10 RX ORDER — HYDROCODONE BITARTRATE AND HOMATROPINE METHYLBROMIDE 5; 1.5 MG/5ML; MG/5ML
5-1.5 SYRUP ORAL
Qty: 1 | Refills: 0 | Status: DISCONTINUED | COMMUNITY
Start: 2021-11-04 | End: 2022-01-10

## 2022-01-11 ENCOUNTER — APPOINTMENT (OUTPATIENT)
Dept: HEMATOLOGY ONCOLOGY | Facility: CLINIC | Age: 57
End: 2022-01-11

## 2022-01-13 ENCOUNTER — APPOINTMENT (OUTPATIENT)
Dept: HEMATOLOGY ONCOLOGY | Facility: CLINIC | Age: 57
End: 2022-01-13

## 2022-01-13 NOTE — HISTORY OF PRESENT ILLNESS
[FreeTextEntry1] : Ms. ALEXANDER HUDDLESTON, 56 year old female, never smoker, w/ hx of Uterine cancer dx 2020 s/p chemo s/p VINCENT/BSO, asthma, with recent hospitalization 8/30-9/2/21 for right chest pain found to have persistent pleural effusion now s/p IR drainage during that admission with path positive for metastatic adenocarcinoma. \par \par Patient is s/p Right VATS, pleural bx and Pleurx catheter placement on 09/15/2021. 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 \par \par Followed by Mirtha Celaya; Will be starting 5th cycle out of 6. \par \par Patient is currently drained once a week, with ~ 40 -70 ml drainage. \par \par CT C/A/P on 12/20/2021: \par - Right pleural chest tube terminates medially in the upper pleural space, unchanged. Trace bilateral pleural effusions, decreased on the left and unchanged on the right.\par - Slight interval increase in low-density nodule in the left aspect of the vaginal cuff. Overall interval decrease in previously seen mild left upper peritoneal nodularity and in small ascites. \par \par CXR today: reviewed. \par \par Patient is here today for a follow up. Patient c/o SOB on exertion, c/o dry cough, denies chest pain, fever, chills.

## 2022-01-13 NOTE — CONSULT LETTER
[Dear  ___] : Dear  [unfilled], [Consult Letter:] : I had the pleasure of evaluating your patient, [unfilled]. [( Thank you for referring [unfilled] for consultation for _____ )] : Thank you for referring [unfilled] for consultation for [unfilled] [Please see my note below.] : Please see my note below. [Consult Closing:] : Thank you very much for allowing me to participate in the care of this patient.  If you have any questions, please do not hesitate to contact me. [Sincerely,] : Sincerely, [FreeTextEntry2] : Dr. Mirtha Celaya ([facility]/ref) \par  [FreeTextEntry3] : Stevan Peters MD \par Attending Surgeon \par Division of Thoracic Surgery \par , Cardiovascular and Thoracic Surgery \par [facility]\par \par

## 2022-01-13 NOTE — ASSESSMENT
[FreeTextEntry1] : Ms. ALEXANDER HUDDLESTON, 56 year old female, never smoker, w/ hx of Uterine cancer dx 2020 s/p chemo s/p VINCENT/BSO, asthma, with recent hospitalization 8/30-9/2/21 for right chest pain found to have persistent pleural effusion now s/p IR drainage during that admission with path positive for metastatic adenocarcinoma. \par \par Patient is s/p Right VATS, pleural bx and Pleurx catheter placement on 09/15/2021. 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 \par \par Followed by Mirtha Celaya; Will be starting 5th cycle out of 6. \par \par Patient is currently drained once a week, with ~ 40 -70 ml drainage. \par \par CT C/A/P on 12/20/2021: \par - Right pleural chest tube terminates medially in the upper pleural space, unchanged. Trace bilateral pleural effusions, decreased on the left and unchanged on the right.\par - Slight interval increase in low-density nodule in the left aspect of the vaginal cuff. Overall interval decrease in previously seen mild left upper peritoneal nodularity and in small ascites. \par \par CXR today: reviewed. \par \par I have reviewed the patient's medical records and diagnostic images at time of this office consultation and have made the following recommendation:\par 1. Patient is doing well with minimal drainage, I recommended Right PleurX catheter removal, however, patient preferred to wait until her chemo completed, therefore, I recommended patient to RTC in 4 weeks with CXR and continue drainage once a week. \par \par I personally performed the services described in the documentation, reviewed the documentation recorded by the scribe in my presence and it accurately and completely records my words and actions.\par \par I, Adriana Sheth NP, am scribing for and the presence of NAVNEET Mari, the following sections HISTORY OF PRESENT ILLNESS, PAST MEDICAL/FAMILY/SOCIAL HISTORY; REVIEW OF SYSTEMS; VITAL SIGNS; PHYSICAL EXAM; DISPOSITION.

## 2022-01-14 LAB — SARS-COV-2 N GENE NPH QL NAA+PROBE: NOT DETECTED

## 2022-01-14 NOTE — DISCHARGE NOTE PROVIDER - NSDCHC_MEDRECLITE_GEN_ALL_CORE
Complaints of chest pain for 3 days. Complaints of SOB and headache. Patient did not take any pain meds. Denies fever.   Click to Modify Medication Indication on Note Save

## 2022-01-19 ENCOUNTER — RESULT REVIEW (OUTPATIENT)
Age: 57
End: 2022-01-19

## 2022-01-19 ENCOUNTER — APPOINTMENT (OUTPATIENT)
Dept: HEMATOLOGY ONCOLOGY | Facility: CLINIC | Age: 57
End: 2022-01-19
Payer: COMMERCIAL

## 2022-01-19 ENCOUNTER — NON-APPOINTMENT (OUTPATIENT)
Age: 57
End: 2022-01-19

## 2022-01-19 ENCOUNTER — APPOINTMENT (OUTPATIENT)
Dept: INFUSION THERAPY | Facility: HOSPITAL | Age: 57
End: 2022-01-19

## 2022-01-19 VITALS
WEIGHT: 228.16 LBS | RESPIRATION RATE: 18 BRPM | TEMPERATURE: 97.2 F | BODY MASS INDEX: 38.48 KG/M2 | HEIGHT: 64.57 IN | HEART RATE: 98 BPM | OXYGEN SATURATION: 99 % | DIASTOLIC BLOOD PRESSURE: 96 MMHG | SYSTOLIC BLOOD PRESSURE: 147 MMHG

## 2022-01-19 LAB
ALBUMIN SERPL ELPH-MCNC: 4.1 G/DL — SIGNIFICANT CHANGE UP (ref 3.3–5)
ALP SERPL-CCNC: 214 U/L — HIGH (ref 40–120)
ALT FLD-CCNC: 85 U/L — HIGH (ref 10–45)
ANION GAP SERPL CALC-SCNC: 10 MMOL/L — SIGNIFICANT CHANGE UP (ref 5–17)
AST SERPL-CCNC: 41 U/L — HIGH (ref 10–40)
BILIRUB SERPL-MCNC: 0.2 MG/DL — SIGNIFICANT CHANGE UP (ref 0.2–1.2)
BUN SERPL-MCNC: 15 MG/DL — SIGNIFICANT CHANGE UP (ref 7–23)
CALCIUM SERPL-MCNC: 9.5 MG/DL — SIGNIFICANT CHANGE UP (ref 8.4–10.5)
CHLORIDE SERPL-SCNC: 106 MMOL/L — SIGNIFICANT CHANGE UP (ref 96–108)
CO2 SERPL-SCNC: 24 MMOL/L — SIGNIFICANT CHANGE UP (ref 22–31)
CREAT SERPL-MCNC: 1.05 MG/DL — SIGNIFICANT CHANGE UP (ref 0.5–1.3)
GLUCOSE SERPL-MCNC: 91 MG/DL — SIGNIFICANT CHANGE UP (ref 70–99)
HCT VFR BLD CALC: 29.3 % — LOW (ref 34.5–45)
HGB BLD-MCNC: 9.5 G/DL — LOW (ref 11.5–15.5)
MCHC RBC-ENTMCNC: 27.2 PG — SIGNIFICANT CHANGE UP (ref 27–34)
MCHC RBC-ENTMCNC: 32.4 G/DL — SIGNIFICANT CHANGE UP (ref 32–36)
MCV RBC AUTO: 84 FL — SIGNIFICANT CHANGE UP (ref 80–100)
PLATELET # BLD AUTO: 149 K/UL — LOW (ref 150–400)
POTASSIUM SERPL-MCNC: 3.8 MMOL/L — SIGNIFICANT CHANGE UP (ref 3.5–5.3)
POTASSIUM SERPL-SCNC: 3.8 MMOL/L — SIGNIFICANT CHANGE UP (ref 3.5–5.3)
PROT SERPL-MCNC: 7 G/DL — SIGNIFICANT CHANGE UP (ref 6–8.3)
RBC # BLD: 3.49 M/UL — LOW (ref 3.8–5.2)
RBC # FLD: 20.1 % — HIGH (ref 10.3–14.5)
SODIUM SERPL-SCNC: 140 MMOL/L — SIGNIFICANT CHANGE UP (ref 135–145)
T4 FREE+ TSH PNL SERPL: 3.06 UIU/ML — SIGNIFICANT CHANGE UP (ref 0.27–4.2)
WBC # BLD: 4.71 K/UL — SIGNIFICANT CHANGE UP (ref 3.8–10.5)
WBC # FLD AUTO: 4.71 K/UL — SIGNIFICANT CHANGE UP (ref 3.8–10.5)

## 2022-01-19 PROCEDURE — 99213 OFFICE O/P EST LOW 20 MIN: CPT

## 2022-01-20 ENCOUNTER — APPOINTMENT (OUTPATIENT)
Dept: HEMATOLOGY ONCOLOGY | Facility: CLINIC | Age: 57
End: 2022-01-20

## 2022-01-20 ENCOUNTER — APPOINTMENT (OUTPATIENT)
Dept: INFUSION THERAPY | Facility: HOSPITAL | Age: 57
End: 2022-01-20

## 2022-01-20 NOTE — ASSESSMENT
[Palliative] : Goals of care discussed with patient: Palliative [Palliative Care Plan] : not applicable at this time [FreeTextEntry1] : 56 year old woman with relapsed uterine cancer on .\par Increased neuropathy - will decrease Taxol with treatment tomorrow.\par Will continue to monitor closely for worsening neuropathy.  Patient aware to notify us with any increase in symptoms.\par Anemia improved after transfusion, continue to monitor cbc and transfuse PRBCs PRN.\par Constipation - reviewed bowel regimen.\par Check labs today.\par RTC 3 weeks,\par

## 2022-01-20 NOTE — HISTORY OF PRESENT ILLNESS
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 Patient was seen initially by Dr. Arciniega at [facility] [facility].\par She then saw Dr Robin Zamorano, at [facility] and had a repeat biopsy that showed clear cell carcinoma.\par On 4/20/2020 she underwent surgical staging and is post TLH/BSO/SLND/Omentectomy.\par Final pathology showed stage IA,high grade endometrial carcinoma involving predominantly clear cell and focally serous features, no invasion, no LVSI.\par \par Ct C/A/P with contrast done on 3/12/2020 showed no metastatic sites.\par She completed five cycles of CT 6/3/20- 9/2/20) Last cycle held due to toxicity and myelosuppression.She received vaginal brachytherapy to a total of 2100 cGy in 3 fractions. Treatment was delivered from 10/16/20- 10/23/20. \par Scan showed CINDY.\par \par 8/10/21: Surveillance scan showed New partially visualized small right pleural effusion. Correlation with dedicated chest CT is recommended. Few subcentimeter hepatic hypo densities, too small to characterize, but similar in appearance to prior. Small pelvic ascites, similar in appearance to prior.\par - 29( previously 15)\par \par 8/30/21: CT chest: Limited. No pulmonary embolus to level of the lobar pulmonary arteries. Interval increase in small right pleural effusion since 8/19/2021 with associated adjacent compressive atelectasis.Small perihepatic ascites.\par \par Patient was admitted to the hospital with SOB and pleural effusion. She had  thoracentesis and cytology was consistent with relapsed uterine cancer.\par \par Final Diagnosis\par PLEURAL FLUID, RIGHT\par POSITIVE FOR MALIGNANT CELLS.\par Metastatic adenocarcinoma\par \par Cytology slides and cell block reveal a hypercellular specimen\par composed of crowded 3-dimensional groups and single lying\par malignant cells with enlarged nuclei containing prominent\par nucleoli and vacuolated cytoplasm, among benign mesothelial\par cells.\par \par Immunohistochemical stains are performed on the cell block. The\par tumor cells are positive for PAX-8 (focal and weak), p16, napsin\par (focal) and p53 (overexpression), while they are negative for ER,\par WT-1 and TTF-1. In the absence of another primary, it is most\par consistent with involvement by patient's known endometrial\par adenocarcinoma. [de-identified] : Carboplatin and Taxol 6/3/20- 9/2/20( five cycles) [FreeTextEntry1] : Carboplatin/ Taxol/ Pembro/Placebo [de-identified] : Patient is here for follow up, has treatment scheduled for tomorrow.  She is feeling well, had a wonderful time at her goddaughter's wedding this past weekend, something she was really looking forward to.  She has increased neuropathy with this cycle.  She has constant numbness of her finger and toes.  She initially had difficulty holding objects after treatment but that has resolved and she has full strength.  Denies difficulty walking or falls, just "feels numb".  She has been feeling much better since the blood transfusion she had after the most recent treatment.  She has increased energy levels and appetite.  Cough has decreased and she has decreased drainage from pleurex (last was 40cc after 4 days).  Constipation fairly well controlled

## 2022-01-20 NOTE — REVIEW OF SYSTEMS
[Negative] : Allergic/Immunologic [FreeTextEntry6] : improved cough and SOB [de-identified] : increased numbness fingers and toes

## 2022-01-24 LAB — T4 FREE SERPL-MCNC: 1.29 NG/DL — SIGNIFICANT CHANGE UP

## 2022-02-06 ENCOUNTER — OUTPATIENT (OUTPATIENT)
Dept: OUTPATIENT SERVICES | Facility: HOSPITAL | Age: 57
LOS: 1 days | Discharge: HOME | End: 2022-02-06

## 2022-02-06 DIAGNOSIS — Z90.710 ACQUIRED ABSENCE OF BOTH CERVIX AND UTERUS: Chronic | ICD-10-CM

## 2022-02-06 DIAGNOSIS — C54.1 MALIGNANT NEOPLASM OF ENDOMETRIUM: ICD-10-CM

## 2022-02-09 ENCOUNTER — RESULT REVIEW (OUTPATIENT)
Age: 57
End: 2022-02-09

## 2022-02-09 ENCOUNTER — APPOINTMENT (OUTPATIENT)
Dept: HEMATOLOGY ONCOLOGY | Facility: CLINIC | Age: 57
End: 2022-02-09
Payer: COMMERCIAL

## 2022-02-09 ENCOUNTER — NON-APPOINTMENT (OUTPATIENT)
Age: 57
End: 2022-02-09

## 2022-02-09 ENCOUNTER — APPOINTMENT (OUTPATIENT)
Dept: INFUSION THERAPY | Facility: HOSPITAL | Age: 57
End: 2022-02-09

## 2022-02-09 VITALS
DIASTOLIC BLOOD PRESSURE: 81 MMHG | SYSTOLIC BLOOD PRESSURE: 136 MMHG | HEART RATE: 89 BPM | BODY MASS INDEX: 38.63 KG/M2 | OXYGEN SATURATION: 99 % | TEMPERATURE: 96.8 F | WEIGHT: 229.06 LBS | RESPIRATION RATE: 18 BRPM | HEIGHT: 64.57 IN

## 2022-02-09 LAB
ALBUMIN SERPL ELPH-MCNC: 4.2 G/DL — SIGNIFICANT CHANGE UP (ref 3.3–5)
ALP SERPL-CCNC: 198 U/L — HIGH (ref 40–120)
ALT FLD-CCNC: 58 U/L — HIGH (ref 10–45)
ANION GAP SERPL CALC-SCNC: 15 MMOL/L — SIGNIFICANT CHANGE UP (ref 5–17)
AST SERPL-CCNC: 26 U/L — SIGNIFICANT CHANGE UP (ref 10–40)
BILIRUB SERPL-MCNC: 0.2 MG/DL — SIGNIFICANT CHANGE UP (ref 0.2–1.2)
BUN SERPL-MCNC: 21 MG/DL — SIGNIFICANT CHANGE UP (ref 7–23)
CALCIUM SERPL-MCNC: 9 MG/DL — SIGNIFICANT CHANGE UP (ref 8.4–10.5)
CHLORIDE SERPL-SCNC: 106 MMOL/L — SIGNIFICANT CHANGE UP (ref 96–108)
CO2 SERPL-SCNC: 21 MMOL/L — LOW (ref 22–31)
CREAT SERPL-MCNC: 0.91 MG/DL — SIGNIFICANT CHANGE UP (ref 0.5–1.3)
GLUCOSE SERPL-MCNC: 111 MG/DL — HIGH (ref 70–99)
HCT VFR BLD CALC: 31.3 % — LOW (ref 34.5–45)
HGB BLD-MCNC: 10.2 G/DL — LOW (ref 11.5–15.5)
MCHC RBC-ENTMCNC: 27.7 PG — SIGNIFICANT CHANGE UP (ref 27–34)
MCHC RBC-ENTMCNC: 32.6 G/DL — SIGNIFICANT CHANGE UP (ref 32–36)
MCV RBC AUTO: 85.1 FL — SIGNIFICANT CHANGE UP (ref 80–100)
PLATELET # BLD AUTO: 138 K/UL — LOW (ref 150–400)
POTASSIUM SERPL-MCNC: 3.7 MMOL/L — SIGNIFICANT CHANGE UP (ref 3.5–5.3)
POTASSIUM SERPL-SCNC: 3.7 MMOL/L — SIGNIFICANT CHANGE UP (ref 3.5–5.3)
PROT SERPL-MCNC: 7 G/DL — SIGNIFICANT CHANGE UP (ref 6–8.3)
RBC # BLD: 3.68 M/UL — LOW (ref 3.8–5.2)
RBC # FLD: 18.4 % — HIGH (ref 10.3–14.5)
SODIUM SERPL-SCNC: 142 MMOL/L — SIGNIFICANT CHANGE UP (ref 135–145)
WBC # BLD: 5.21 K/UL — SIGNIFICANT CHANGE UP (ref 3.8–10.5)
WBC # FLD AUTO: 5.21 K/UL — SIGNIFICANT CHANGE UP (ref 3.8–10.5)

## 2022-02-09 PROCEDURE — 99214 OFFICE O/P EST MOD 30 MIN: CPT

## 2022-02-10 ENCOUNTER — NON-APPOINTMENT (OUTPATIENT)
Age: 57
End: 2022-02-10

## 2022-02-10 ENCOUNTER — APPOINTMENT (OUTPATIENT)
Dept: HEMATOLOGY ONCOLOGY | Facility: CLINIC | Age: 57
End: 2022-02-10

## 2022-02-10 ENCOUNTER — APPOINTMENT (OUTPATIENT)
Dept: INFUSION THERAPY | Facility: HOSPITAL | Age: 57
End: 2022-02-10

## 2022-02-10 DIAGNOSIS — R11.2 NAUSEA WITH VOMITING, UNSPECIFIED: ICD-10-CM

## 2022-02-10 DIAGNOSIS — Z51.11 ENCOUNTER FOR ANTINEOPLASTIC CHEMOTHERAPY: ICD-10-CM

## 2022-02-11 ENCOUNTER — NON-APPOINTMENT (OUTPATIENT)
Age: 57
End: 2022-02-11

## 2022-02-11 NOTE — HISTORY OF PRESENT ILLNESS
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 Patient was seen initially by Dr. Arciniega at [facility].\par She then saw Dr Robin Zamorano, at [facility] and had a repeat biopsy that showed clear cell carcinoma.\par On 4/20/2020 she underwent surgical staging and is post TLH/BSO/SLND/Omentectomy.\par Final pathology showed stage IA,high grade endometrial carcinoma involving predominantly clear cell and focally serous features, no invasion, no LVSI.\par \par Ct C/A/P with contrast done on 3/12/2020 showed no metastatic sites.\par She completed five cycles of CT 6/3/20- 9/2/20) Last cycle held due to toxicity and myelosuppression.She received vaginal brachytherapy to a total of 2100 cGy in 3 fractions. Treatment was delivered from 10/16/20- 10/23/20. \par Scan showed CINDY.\par \par 8/10/21: Surveillance scan showed New partially visualized small right pleural effusion. Correlation with dedicated chest CT is recommended. Few subcentimeter hepatic hypo densities, too small to characterize, but similar in appearance to prior. Small pelvic ascites, similar in appearance to prior.\par - 29( previously 15)\par \par 8/30/21: CT chest: Limited. No pulmonary embolus to level of the lobar pulmonary arteries. Interval increase in small right pleural effusion since 8/19/2021 with associated adjacent compressive atelectasis.Small perihepatic ascites.\par \par Patient was admitted to the hospital with SOB and pleural effusion. She had  thoracentesis and cytology was consistent with relapsed uterine cancer.\par \par Final Diagnosis\par PLEURAL FLUID, RIGHT\par POSITIVE FOR MALIGNANT CELLS.\par Metastatic adenocarcinoma\par \par Cytology slides and cell block reveal a hypercellular specimen\par composed of crowded 3-dimensional groups and single lying\par malignant cells with enlarged nuclei containing prominent\par nucleoli and vacuolated cytoplasm, among benign mesothelial\par cells.\par \par Immunohistochemical stains are performed on the cell block. The\par tumor cells are positive for PAX-8 (focal and weak), p16, napsin\par (focal) and p53 (overexpression), while they are negative for ER,\par WT-1 and TTF-1. In the absence of another primary, it is most\par consistent with involvement by patient's known endometrial\par adenocarcinoma. [de-identified] : Carboplatin and Taxol 6/3/20- 9/2/20( five cycles) [Treatment Protocol] : Treatment Protocol [FreeTextEntry1] : Carboplatin/ Taxol/ Pembro/placebo( )\par C1- 10/22/21\par C2- 11/11/21\par C3- 12/2/21\par C4- 12/23/21\par C5- 1/20/22\par C6- 2/10/22 [de-identified] : Neuropathy is the same. When temperature is cold she feels it more. Appetite is normal. Bowel are normal.\par No more chest pain , effusion drainage < 20 ml / week.\par Denies any cough. \par

## 2022-02-14 ENCOUNTER — APPOINTMENT (OUTPATIENT)
Dept: THORACIC SURGERY | Facility: CLINIC | Age: 57
End: 2022-02-14
Payer: COMMERCIAL

## 2022-02-16 ENCOUNTER — LABORATORY RESULT (OUTPATIENT)
Age: 57
End: 2022-02-16

## 2022-02-16 ENCOUNTER — RESULT REVIEW (OUTPATIENT)
Age: 57
End: 2022-02-16

## 2022-02-16 ENCOUNTER — NON-APPOINTMENT (OUTPATIENT)
Age: 57
End: 2022-02-16

## 2022-02-16 ENCOUNTER — APPOINTMENT (OUTPATIENT)
Dept: INFUSION THERAPY | Facility: HOSPITAL | Age: 57
End: 2022-02-16

## 2022-02-16 ENCOUNTER — APPOINTMENT (OUTPATIENT)
Dept: HEMATOLOGY ONCOLOGY | Facility: CLINIC | Age: 57
End: 2022-02-16

## 2022-02-16 LAB
ALBUMIN SERPL ELPH-MCNC: 4.1 G/DL — SIGNIFICANT CHANGE UP (ref 3.3–5)
ALP SERPL-CCNC: 173 U/L — HIGH (ref 40–120)
ALT FLD-CCNC: 65 U/L — HIGH (ref 10–45)
ANION GAP SERPL CALC-SCNC: 13 MMOL/L — SIGNIFICANT CHANGE UP (ref 5–17)
AST SERPL-CCNC: 34 U/L — SIGNIFICANT CHANGE UP (ref 10–40)
BASOPHILS # BLD AUTO: 0.05 K/UL — SIGNIFICANT CHANGE UP (ref 0–0.2)
BASOPHILS NFR BLD AUTO: 1.5 % — SIGNIFICANT CHANGE UP (ref 0–2)
BILIRUB SERPL-MCNC: 0.3 MG/DL — SIGNIFICANT CHANGE UP (ref 0.2–1.2)
BUN SERPL-MCNC: 19 MG/DL — SIGNIFICANT CHANGE UP (ref 7–23)
BUN SERPL-MCNC: 20 MG/DL — SIGNIFICANT CHANGE UP (ref 7–23)
CA-I BLDA-SCNC: 1.22 MMOL/L — SIGNIFICANT CHANGE UP (ref 1.12–1.3)
CALCIUM SERPL-MCNC: 9.5 MG/DL — SIGNIFICANT CHANGE UP (ref 8.4–10.5)
CHLORIDE SERPL-SCNC: 102 MMOL/L — SIGNIFICANT CHANGE UP (ref 96–108)
CHLORIDE SERPL-SCNC: 104 MMOL/L — SIGNIFICANT CHANGE UP (ref 96–108)
CO2 SERPL-SCNC: 24 MMOL/L — SIGNIFICANT CHANGE UP (ref 22–31)
CO2 SERPL-SCNC: 27 MMOL/L — SIGNIFICANT CHANGE UP (ref 22–31)
CREAT SERPL-MCNC: 0.8 MG/DL — SIGNIFICANT CHANGE UP (ref 0.5–1.3)
CREAT SERPL-MCNC: 0.9 MG/DL — SIGNIFICANT CHANGE UP (ref 0.5–1.3)
EOSINOPHIL # BLD AUTO: 0.12 K/UL — SIGNIFICANT CHANGE UP (ref 0–0.5)
EOSINOPHIL NFR BLD AUTO: 3.7 % — SIGNIFICANT CHANGE UP (ref 0–6)
GLUCOSE SERPL-MCNC: 96 MG/DL — SIGNIFICANT CHANGE UP (ref 70–99)
GLUCOSE SERPL-MCNC: 97 MG/DL — SIGNIFICANT CHANGE UP (ref 70–99)
HCT VFR BLD CALC: 30 % — LOW (ref 34.5–45)
HGB BLD-MCNC: 10.5 G/DL — LOW (ref 11.5–15.5)
IMM GRANULOCYTES NFR BLD AUTO: 5.9 % — HIGH (ref 0–1.5)
LYMPHOCYTES # BLD AUTO: 1.3 K/UL — SIGNIFICANT CHANGE UP (ref 1–3.3)
LYMPHOCYTES # BLD AUTO: 40.1 % — SIGNIFICANT CHANGE UP (ref 13–44)
MAGNESIUM SERPL-MCNC: 1.6 MG/DL — SIGNIFICANT CHANGE UP (ref 1.6–2.6)
MCHC RBC-ENTMCNC: 28.9 PG — SIGNIFICANT CHANGE UP (ref 27–34)
MCHC RBC-ENTMCNC: 35 G/DL — SIGNIFICANT CHANGE UP (ref 32–36)
MCV RBC AUTO: 82.6 FL — SIGNIFICANT CHANGE UP (ref 80–100)
MONOCYTES # BLD AUTO: 0.15 K/UL — SIGNIFICANT CHANGE UP (ref 0–0.9)
MONOCYTES NFR BLD AUTO: 4.6 % — SIGNIFICANT CHANGE UP (ref 2–14)
NEUTROPHILS # BLD AUTO: 1.43 K/UL — LOW (ref 1.8–7.4)
NEUTROPHILS NFR BLD AUTO: 44.2 % — SIGNIFICANT CHANGE UP (ref 43–77)
NRBC # BLD: 2 /100 WBCS — HIGH (ref 0–0)
PLATELET # BLD AUTO: 63 K/UL — LOW (ref 150–400)
POTASSIUM SERPL-MCNC: 3.7 MMOL/L — SIGNIFICANT CHANGE UP (ref 3.5–5.3)
POTASSIUM SERPL-MCNC: 3.9 MMOL/L — SIGNIFICANT CHANGE UP (ref 3.5–5.3)
POTASSIUM SERPL-SCNC: 3.7 MMOL/L — SIGNIFICANT CHANGE UP (ref 3.5–5.3)
POTASSIUM SERPL-SCNC: 3.9 MMOL/L — SIGNIFICANT CHANGE UP (ref 3.5–5.3)
PROT SERPL-MCNC: 7.1 G/DL — SIGNIFICANT CHANGE UP (ref 6–8.3)
RBC # BLD: 3.63 M/UL — LOW (ref 3.8–5.2)
RBC # FLD: 16.8 % — HIGH (ref 10.3–14.5)
SODIUM SERPL-SCNC: 141 MMOL/L — SIGNIFICANT CHANGE UP (ref 135–145)
SODIUM SERPL-SCNC: 142 MMOL/L — SIGNIFICANT CHANGE UP (ref 135–145)
WBC # BLD: 3.17 K/UL — LOW (ref 3.8–10.5)
WBC # FLD AUTO: 3.17 K/UL — LOW (ref 3.8–10.5)

## 2022-02-17 ENCOUNTER — RESULT REVIEW (OUTPATIENT)
Age: 57
End: 2022-02-17

## 2022-02-17 ENCOUNTER — NON-APPOINTMENT (OUTPATIENT)
Age: 57
End: 2022-02-17

## 2022-02-17 ENCOUNTER — APPOINTMENT (OUTPATIENT)
Dept: HEMATOLOGY ONCOLOGY | Facility: CLINIC | Age: 57
End: 2022-02-17

## 2022-02-17 LAB
BASOPHILS # BLD AUTO: 0 K/UL — SIGNIFICANT CHANGE UP (ref 0–0.2)
BASOPHILS NFR BLD AUTO: 0 % — SIGNIFICANT CHANGE UP (ref 0–2)
EOSINOPHIL # BLD AUTO: 0.07 K/UL — SIGNIFICANT CHANGE UP (ref 0–0.5)
EOSINOPHIL NFR BLD AUTO: 2.8 % — SIGNIFICANT CHANGE UP (ref 0–6)
HCT VFR BLD CALC: 29 % — LOW (ref 34.5–45)
HGB BLD-MCNC: 9.7 G/DL — LOW (ref 11.5–15.5)
IMM GRANULOCYTES NFR BLD AUTO: 0.4 % — SIGNIFICANT CHANGE UP (ref 0–1.5)
LYMPHOCYTES # BLD AUTO: 1.15 K/UL — SIGNIFICANT CHANGE UP (ref 1–3.3)
LYMPHOCYTES # BLD AUTO: 46.7 % — HIGH (ref 13–44)
MCHC RBC-ENTMCNC: 28.3 PG — SIGNIFICANT CHANGE UP (ref 27–34)
MCHC RBC-ENTMCNC: 33.4 G/DL — SIGNIFICANT CHANGE UP (ref 32–36)
MCV RBC AUTO: 84.5 FL — SIGNIFICANT CHANGE UP (ref 80–100)
MONOCYTES # BLD AUTO: 0.07 K/UL — SIGNIFICANT CHANGE UP (ref 0–0.9)
MONOCYTES NFR BLD AUTO: 2.8 % — SIGNIFICANT CHANGE UP (ref 2–14)
NEUTROPHILS # BLD AUTO: 1.16 K/UL — LOW (ref 1.8–7.4)
NEUTROPHILS NFR BLD AUTO: 47.3 % — SIGNIFICANT CHANGE UP (ref 43–77)
NRBC # BLD: 0 /100 WBCS — SIGNIFICANT CHANGE UP (ref 0–0)
PLATELET # BLD AUTO: 55 K/UL — LOW (ref 150–400)
RBC # BLD: 3.43 M/UL — LOW (ref 3.8–5.2)
RBC # FLD: 16.7 % — HIGH (ref 10.3–14.5)
WBC # BLD: 2.46 K/UL — LOW (ref 3.8–10.5)
WBC # FLD AUTO: 2.46 K/UL — LOW (ref 3.8–10.5)

## 2022-02-18 ENCOUNTER — NON-APPOINTMENT (OUTPATIENT)
Age: 57
End: 2022-02-18

## 2022-02-18 ENCOUNTER — APPOINTMENT (OUTPATIENT)
Dept: HEMATOLOGY ONCOLOGY | Facility: CLINIC | Age: 57
End: 2022-02-18

## 2022-02-18 ENCOUNTER — RESULT REVIEW (OUTPATIENT)
Age: 57
End: 2022-02-18

## 2022-02-18 LAB
BASOPHILS # BLD AUTO: 0.02 K/UL — SIGNIFICANT CHANGE UP (ref 0–0.2)
BASOPHILS NFR BLD AUTO: 0.7 % — SIGNIFICANT CHANGE UP (ref 0–2)
EOSINOPHIL # BLD AUTO: 0.11 K/UL — SIGNIFICANT CHANGE UP (ref 0–0.5)
EOSINOPHIL NFR BLD AUTO: 3.9 % — SIGNIFICANT CHANGE UP (ref 0–6)
HCT VFR BLD CALC: 27.9 % — LOW (ref 34.5–45)
HGB BLD-MCNC: 9.6 G/DL — LOW (ref 11.5–15.5)
IMM GRANULOCYTES NFR BLD AUTO: 1.8 % — HIGH (ref 0–1.5)
LYMPHOCYTES # BLD AUTO: 1.26 K/UL — SIGNIFICANT CHANGE UP (ref 1–3.3)
LYMPHOCYTES # BLD AUTO: 45.2 % — HIGH (ref 13–44)
MCHC RBC-ENTMCNC: 28.8 PG — SIGNIFICANT CHANGE UP (ref 27–34)
MCHC RBC-ENTMCNC: 34.4 G/DL — SIGNIFICANT CHANGE UP (ref 32–36)
MCV RBC AUTO: 83.8 FL — SIGNIFICANT CHANGE UP (ref 80–100)
MONOCYTES # BLD AUTO: 0.13 K/UL — SIGNIFICANT CHANGE UP (ref 0–0.9)
MONOCYTES NFR BLD AUTO: 4.7 % — SIGNIFICANT CHANGE UP (ref 2–14)
NEUTROPHILS # BLD AUTO: 1.22 K/UL — LOW (ref 1.8–7.4)
NEUTROPHILS NFR BLD AUTO: 43.7 % — SIGNIFICANT CHANGE UP (ref 43–77)
NRBC # BLD: 0 /100 WBCS — SIGNIFICANT CHANGE UP (ref 0–0)
PLATELET # BLD AUTO: 55 K/UL — LOW (ref 150–400)
RBC # BLD: 3.33 M/UL — LOW (ref 3.8–5.2)
RBC # FLD: 16.5 % — HIGH (ref 10.3–14.5)
WBC # BLD: 2.79 K/UL — LOW (ref 3.8–10.5)
WBC # FLD AUTO: 2.79 K/UL — LOW (ref 3.8–10.5)

## 2022-02-22 ENCOUNTER — APPOINTMENT (OUTPATIENT)
Dept: INFUSION THERAPY | Facility: HOSPITAL | Age: 57
End: 2022-02-22

## 2022-02-22 ENCOUNTER — RESULT REVIEW (OUTPATIENT)
Age: 57
End: 2022-02-22

## 2022-02-22 ENCOUNTER — NON-APPOINTMENT (OUTPATIENT)
Age: 57
End: 2022-02-22

## 2022-02-22 ENCOUNTER — APPOINTMENT (OUTPATIENT)
Dept: CT IMAGING | Facility: IMAGING CENTER | Age: 57
End: 2022-02-22
Payer: COMMERCIAL

## 2022-02-22 ENCOUNTER — APPOINTMENT (OUTPATIENT)
Dept: HEMATOLOGY ONCOLOGY | Facility: CLINIC | Age: 57
End: 2022-02-22

## 2022-02-22 ENCOUNTER — OUTPATIENT (OUTPATIENT)
Dept: OUTPATIENT SERVICES | Facility: HOSPITAL | Age: 57
LOS: 1 days | End: 2022-02-22
Payer: COMMERCIAL

## 2022-02-22 DIAGNOSIS — Z90.710 ACQUIRED ABSENCE OF BOTH CERVIX AND UTERUS: Chronic | ICD-10-CM

## 2022-02-22 DIAGNOSIS — C55 MALIGNANT NEOPLASM OF UTERUS, PART UNSPECIFIED: ICD-10-CM

## 2022-02-22 LAB
BASOPHILS # BLD AUTO: 0 K/UL — SIGNIFICANT CHANGE UP (ref 0–0.2)
BASOPHILS NFR BLD AUTO: 0 % — SIGNIFICANT CHANGE UP (ref 0–2)
EOSINOPHIL # BLD AUTO: 0.02 K/UL — SIGNIFICANT CHANGE UP (ref 0–0.5)
EOSINOPHIL NFR BLD AUTO: 1 % — SIGNIFICANT CHANGE UP (ref 0–6)
HCT VFR BLD CALC: 28.4 % — LOW (ref 34.5–45)
HGB BLD-MCNC: 9.4 G/DL — LOW (ref 11.5–15.5)
LYMPHOCYTES # BLD AUTO: 1.6 K/UL — SIGNIFICANT CHANGE UP (ref 1–3.3)
LYMPHOCYTES # BLD AUTO: 68 % — HIGH (ref 13–44)
MCHC RBC-ENTMCNC: 27.7 PG — SIGNIFICANT CHANGE UP (ref 27–34)
MCHC RBC-ENTMCNC: 33.1 G/DL — SIGNIFICANT CHANGE UP (ref 32–36)
MCV RBC AUTO: 83.8 FL — SIGNIFICANT CHANGE UP (ref 80–100)
MONOCYTES # BLD AUTO: 0.31 K/UL — SIGNIFICANT CHANGE UP (ref 0–0.9)
MONOCYTES NFR BLD AUTO: 13 % — SIGNIFICANT CHANGE UP (ref 2–14)
NEUTROPHILS # BLD AUTO: 0.42 K/UL — LOW (ref 1.8–7.4)
NEUTROPHILS NFR BLD AUTO: 18 % — LOW (ref 43–77)
NRBC # BLD: 0 /100 — SIGNIFICANT CHANGE UP (ref 0–0)
NRBC # BLD: SIGNIFICANT CHANGE UP /100 WBCS (ref 0–0)
PLAT MORPH BLD: NORMAL — SIGNIFICANT CHANGE UP
PLATELET # BLD AUTO: 46 K/UL — LOW (ref 150–400)
RBC # BLD: 3.39 M/UL — LOW (ref 3.8–5.2)
RBC # FLD: 16.5 % — HIGH (ref 10.3–14.5)
RBC BLD AUTO: SIGNIFICANT CHANGE UP
WBC # BLD: 2.36 K/UL — LOW (ref 3.8–10.5)
WBC # FLD AUTO: 2.36 K/UL — LOW (ref 3.8–10.5)

## 2022-02-22 PROCEDURE — 74177 CT ABD & PELVIS W/CONTRAST: CPT | Mod: 26

## 2022-02-22 PROCEDURE — 71260 CT THORAX DX C+: CPT | Mod: 26

## 2022-02-22 PROCEDURE — 71260 CT THORAX DX C+: CPT

## 2022-02-22 PROCEDURE — 82565 ASSAY OF CREATININE: CPT

## 2022-02-22 PROCEDURE — 74177 CT ABD & PELVIS W/CONTRAST: CPT

## 2022-02-24 ENCOUNTER — RESULT REVIEW (OUTPATIENT)
Age: 57
End: 2022-02-24

## 2022-02-24 ENCOUNTER — NON-APPOINTMENT (OUTPATIENT)
Age: 57
End: 2022-02-24

## 2022-02-24 ENCOUNTER — APPOINTMENT (OUTPATIENT)
Dept: HEMATOLOGY ONCOLOGY | Facility: CLINIC | Age: 57
End: 2022-02-24

## 2022-02-24 LAB
BASOPHILS # BLD AUTO: 0 K/UL — SIGNIFICANT CHANGE UP (ref 0–0.2)
BASOPHILS NFR BLD AUTO: 0 % — SIGNIFICANT CHANGE UP (ref 0–2)
EOSINOPHIL # BLD AUTO: 0.04 K/UL — SIGNIFICANT CHANGE UP (ref 0–0.5)
EOSINOPHIL NFR BLD AUTO: 1 % — SIGNIFICANT CHANGE UP (ref 0–6)
HCT VFR BLD CALC: 27.4 % — LOW (ref 34.5–45)
HGB BLD-MCNC: 9.4 G/DL — LOW (ref 11.5–15.5)
LYMPHOCYTES # BLD AUTO: 1.83 K/UL — SIGNIFICANT CHANGE UP (ref 1–3.3)
LYMPHOCYTES # BLD AUTO: 43 % — SIGNIFICANT CHANGE UP (ref 13–44)
MCHC RBC-ENTMCNC: 28.9 PG — SIGNIFICANT CHANGE UP (ref 27–34)
MCHC RBC-ENTMCNC: 34.3 G/DL — SIGNIFICANT CHANGE UP (ref 32–36)
MCV RBC AUTO: 84.3 FL — SIGNIFICANT CHANGE UP (ref 80–100)
METAMYELOCYTES # FLD: 1 % — HIGH (ref 0–0)
MONOCYTES # BLD AUTO: 0.64 K/UL — SIGNIFICANT CHANGE UP (ref 0–0.9)
MONOCYTES NFR BLD AUTO: 15 % — HIGH (ref 2–14)
NEUTROPHILS # BLD AUTO: 1.7 K/UL — LOW (ref 1.8–7.4)
NEUTROPHILS NFR BLD AUTO: 40 % — LOW (ref 43–77)
NRBC # BLD: 1 /100 — HIGH (ref 0–0)
NRBC # BLD: SIGNIFICANT CHANGE UP /100 WBCS (ref 0–0)
PLAT MORPH BLD: NORMAL — SIGNIFICANT CHANGE UP
PLATELET # BLD AUTO: 45 K/UL — LOW (ref 150–400)
RBC # BLD: 3.25 M/UL — LOW (ref 3.8–5.2)
RBC # FLD: 17 % — HIGH (ref 10.3–14.5)
RBC BLD AUTO: SIGNIFICANT CHANGE UP
WBC # BLD: 4.26 K/UL — SIGNIFICANT CHANGE UP (ref 3.8–10.5)
WBC # FLD AUTO: 4.26 K/UL — SIGNIFICANT CHANGE UP (ref 3.8–10.5)

## 2022-02-28 PROBLEM — Z01.818 PRE-OP TESTING: Status: ACTIVE | Noted: 2022-01-01

## 2022-03-04 NOTE — HISTORY OF PRESENT ILLNESS
[FreeTextEntry1] : Ms. ALEXANDER HUDDLESTON, 56 year old female, never smoker, w/ hx of Uterine cancer dx 2020 s/p chemo s/p VINCENT/BSO, asthma, with recent hospitalization 8/30-9/2/21 for right chest pain found to have persistent pleural effusion now s/p IR drainage during that admission with path positive for metastatic adenocarcinoma. \par \par Patient is s/p Right VATS, pleural bx and Pleurx catheter placement on 09/15/2021. 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 \par \par Followed by Mirtha Celaya; completed 6 cycles of chemo on 02/10/2022. \par \par Patient is currently drained once a week, with 10 ml drainage. \par \par CXR today: clear, minimal Rt pleural effusion.\par \par Patient is here today for a follow up. Completed chemo 1 week ago, feeling weak, was told that Platelet count was low.

## 2022-03-04 NOTE — ASSESSMENT
[FreeTextEntry1] : Ms. ALEXANDER HUDDLESTON, 56 year old female, never smoker, w/ hx of Uterine cancer dx 2020 s/p chemo s/p VINCENT/BSO, asthma, with recent hospitalization 8/30-9/2/21 for right chest pain found to have persistent pleural effusion now s/p IR drainage during that admission with path positive for metastatic adenocarcinoma. \par \par Patient is s/p Right VATS, pleural bx and Pleurx catheter placement on 09/15/2021. 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 \par \par I have reviewed the patient's medical records and diagnostic images at time of this office consultation and have made the following recommendation:\par 1. CXR showed minimal Rt Pleural effusion, will schedule for removal of Rt PleurX in the Bronch Suite. \par \par \par I personally performed the services described in the documentation, reviewed the documentation recorded by the scribe in my presence and it accurately and completely records my words and actions.\par \par I, Jose R Jaffe NP, am scribing for and the presence of NAVNEET Mari, the following sections HISTORY OF PRESENT ILLNESS, PAST MEDICAL/FAMILY/SOCIAL HISTORY; REVIEW OF SYSTEMS; VITAL SIGNS; PHYSICAL EXAM; DISPOSITION.\par \par \par

## 2022-03-04 NOTE — HISTORY OF PRESENT ILLNESS
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 Patient was seen initially by Dr. Arciniega at [facility].\par She then saw Dr Robin Zamorano, at [facility] and had a repeat biopsy that showed clear cell carcinoma.\par On 4/20/2020 she underwent surgical staging and is post TLH/BSO/SLND/Omentectomy.\par Final pathology showed stage IA,high grade endometrial carcinoma involving predominantly clear cell and focally serous features, no invasion, no LVSI.\par \par Ct C/A/P with contrast done on 3/12/2020 showed no metastatic sites.\par She completed five cycles of CT 6/3/20- 9/2/20) Last cycle held due to toxicity and myelosuppression.She received vaginal brachytherapy to a total of 2100 cGy in 3 fractions. Treatment was delivered from 10/16/20- 10/23/20. \par Scan showed CINDY.\par \par 8/10/21: Surveillance scan showed New partially visualized small right pleural effusion. Correlation with dedicated chest CT is recommended. Few subcentimeter hepatic hypo densities, too small to characterize, but similar in appearance to prior. Small pelvic ascites, similar in appearance to prior.\par - 29( previously 15)\par \par 8/30/21: CT chest: Limited. No pulmonary embolus to level of the lobar pulmonary arteries. Interval increase in small right pleural effusion since 8/19/2021 with associated adjacent compressive atelectasis.Small perihepatic ascites.\par \par Patient was admitted to the hospital with SOB and pleural effusion. She had  thoracentesis and cytology was consistent with relapsed uterine cancer.\par \par Final Diagnosis\par PLEURAL FLUID, RIGHT\par POSITIVE FOR MALIGNANT CELLS.\par Metastatic adenocarcinoma\par \par Cytology slides and cell block reveal a hypercellular specimen\par composed of crowded 3-dimensional groups and single lying\par malignant cells with enlarged nuclei containing prominent\par nucleoli and vacuolated cytoplasm, among benign mesothelial\par cells.\par \par Immunohistochemical stains are performed on the cell block. The\par tumor cells are positive for PAX-8 (focal and weak), p16, napsin\par (focal) and p53 (overexpression), while they are negative for ER,\par WT-1 and TTF-1. In the absence of another primary, it is most\par consistent with involvement by patient's known endometrial\par adenocarcinoma. [de-identified] : Carboplatin and Taxol 6/3/20- 9/2/20( five cycles) [FreeTextEntry1] : Carboplatin/ Taxol/ Pembro/placebo( )\par C1- 10/22/21\par C2- 11/11/21\par C3- 12/2/21\par C4- 12/23/21\par C5- 1/20/22\par C6- 2/10/22 [de-identified] : Patient is here for follow up, feeling better.  Breathing has improved.  Having pleurex catheter removed next week.  Appetite and energy is improved.

## 2022-03-07 NOTE — ASU PATIENT PROFILE, ADULT - FALL HARM RISK - UNIVERSAL INTERVENTIONS
Bed in lowest position, wheels locked, appropriate side rails in place/Call bell, personal items and telephone in reach/Instruct patient to call for assistance before getting out of bed or chair/Non-slip footwear when patient is out of bed/[place] to call system/Physically safe environment - no spills, clutter or unnecessary equipment/Purposeful Proactive Rounding/Room/bathroom lighting operational, light cord in reach

## 2022-03-08 NOTE — H&P ADULT - NSHPADDITIONALINFOADULT_GEN_ALL_CORE
The risks benefits and alternatives of the procedure were explained to the patient including but not limited to bleeding, infection, recurrence of the effusion and shortness of breath.  All of her questions were answered. She demonstrated understanding and freely consented to the procedure.

## 2022-03-08 NOTE — ASU DISCHARGE PLAN (ADULT/PEDIATRIC) - CARE PROVIDER_API CALL
Stevan Peters)  Surgery; Thoracic Surgery  [phone] [place] [address], [place] [address]  Phone: [phone]  Fax: [phone]  Follow Up Time:

## 2022-03-08 NOTE — H&P ADULT - ASSESSMENT
Ms. ALEXANDER HUDDLESTON, 56 year old female, never smoker, w/ hx of Uterine cancer dx 2020 s/p chemo s/p VINCENT/BSO, asthma, with recent hospitalization 8/30-9/2/21 for right chest pain found to have persistent pleural effusion now s/p IR drainage during that admission with path positive for metastatic adenocarcinoma.     Patient is s/p Right VATS, pleural bx and Pleurx catheter placement on 09/15/2021. 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     I have reviewed the patient's medical records and diagnostic images at time of this office consultation and have made the following recommendation:  1. CXR showed minimal Rt Pleural effusion, will schedule for removal of Rt PleurX in the Bronch Suite.

## 2022-03-08 NOTE — H&P ADULT - HISTORY OF PRESENT ILLNESS
History of Present Illness  Ms. ALEXANDER HUDDLESTON, 56 year old female, never smoker, w/ hx of Uterine cancer dx 2020 s/p chemo s/p VINCENT/BSO, asthma, with recent hospitalization 8/30-9/2/21 for right chest pain found to have persistent pleural effusion now s/p IR drainage during that admission with path positive for metastatic adenocarcinoma.     Patient is s/p Right VATS, pleural bx and Pleurx catheter placement on 09/15/2021. 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     Followed by Mirtha Celaya; completed 6 cycles of chemo on 02/10/2022.     Patient is currently drained once a week, with 10 ml drainage.     CXR today: clear, minimal Rt pleural effusion.

## 2022-03-08 NOTE — ASU DISCHARGE PLAN (ADULT/PEDIATRIC) - ASU DC SPECIAL INSTRUCTIONSFT
Please walk 4-5 x per day, Increase as tolerated. You may climb stairs. Continue to use incentive spirometer.   You may keep all wounds uncovered. You may shower daily, but do not take any baths until you follow up with Dr. Peters. The suture will be removed in office at follow up apt.   See Dr. Peters's office in 7-10 days. Please call [phone] for an apt.   Please call the office at [phone] if you have fevers, chills, worsening shortness of breath, chest pain, warmth, redness or purulent discharge from the insertion site.

## 2022-03-08 NOTE — ASU DISCHARGE PLAN (ADULT/PEDIATRIC) - NS MD DC FALL RISK RISK
For information on Fall & Injury Prevention, visit: [email or web address] OR  [email or web address] OR  https://www.cdc.gov/steadi/patient.html

## 2022-03-08 NOTE — H&P ADULT - NSHPPHYSICALEXAM_GEN_ALL_CORE
no respiratory distress and lungs were clear to auscultation bilaterally.   Heart: heart rate was normal and rhythm regular, normal S1 and S2, no gallops, no murmurs and no pericardial rub.   Chest: the chest was normal in appearance, no chest asymmetry and normal chest expansion.

## 2022-03-21 NOTE — PHYSICAL EXAM
[] : no respiratory distress [Auscultation Breath Sounds / Voice Sounds] : lungs were clear to auscultation bilaterally [Heart Rate And Rhythm] : heart rate was normal and rhythm regular [Heart Sounds] : normal S1 and S2 [Heart Sounds Gallop] : no gallops [Heart Sounds Pericardial Friction Rub] : no pericardial rub [Murmurs] : no murmurs [Examination Of The Chest] : the chest was normal in appearance [Diminished Respiratory Excursion] : normal chest expansion [Chest Visual Inspection Thoracic Asymmetry] : no chest asymmetry

## 2022-03-24 NOTE — HISTORY OF PRESENT ILLNESS
[FreeTextEntry1] : Ms. ALEXANDER HUDDLESTON, 56 year old female, never smoker, w/ hx of Uterine cancer dx 2020 s/p chemo s/p VINCENT/BSO, asthma, with recent hospitalization 8/30-9/2/21 for right chest pain found to have persistent pleural effusion now s/p IR drainage during that admission with path positive for metastatic adenocarcinoma. \par \par Patient is s/p Right VATS, pleural bx and Pleurx catheter placement on 09/15/2021. 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 \par \par Followed by Mirtha Celaya; completed 6 cycles of chemo on 02/10/2022. \par \par Patient is currently drained once a week, with 10 ml drainage. \par \par Patient is s/p Right Pleural catheter removal on 03/08/2022. \par \par CXR today: reviewed. \par \par Patient is here today for a follow up. Patient c/o SOB on exertion, denies cough, chest pain, fever, chills.

## 2022-03-24 NOTE — ASSESSMENT
[FreeTextEntry1] : Ms. ALEXANDER HUDDLESTON, 56 year old female, never smoker, w/ hx of Uterine cancer dx 2020 s/p chemo s/p VINCENT/BSO, asthma, with recent hospitalization 8/30-9/2/21 for right chest pain found to have persistent pleural effusion now s/p IR drainage during that admission with path positive for metastatic adenocarcinoma. \par \par Patient is s/p Right VATS, pleural bx and Pleurx catheter placement on 09/15/2021. 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 \par \par Followed by Mirtha Celaya; completed 6 cycles of chemo on 02/10/2022. \par \par Patient is currently drained once a week, with 10 ml drainage. \par \par Patient is s/p Right Pleural catheter removal on 03/08/2022. \par \par I have reviewed the patient's medical records and diagnostic images at time of this office consultation and have made the following recommendation:\par 1. CXR reviewed and patient is doing well, I recommended patient to RTC as needed. \par 2. F/u with Hem/Onc. \par \par I personally performed the services described in the documentation, reviewed the documentation recorded by the scribe in my presence and it accurately and completely records my words and actions.\par \par Adriana WIRGHT NP, am scribing for and the presence of NAVNEET Mari, the following sections HISTORY OF PRESENT ILLNESS, PAST MEDICAL/FAMILY/SOCIAL HISTORY; REVIEW OF SYSTEMS; VITAL SIGNS; PHYSICAL EXAM; DISPOSITION.

## 2022-03-24 NOTE — CONSULT LETTER
[Dear  ___] : Dear  [unfilled], [Consult Letter:] : I had the pleasure of evaluating your patient, [unfilled]. [( Thank you for referring [unfilled] for consultation for _____ )] : Thank you for referring [unfilled] for consultation for [unfilled] [Please see my note below.] : Please see my note below. [Consult Closing:] : Thank you very much for allowing me to participate in the care of this patient.  If you have any questions, please do not hesitate to contact me. [Sincerely,] : Sincerely, [FreeTextEntry2] : Dr. Mirtha Celaya ([facility]/ref) \par  [FreeTextEntry3] : Stevan Peters MD \par Attending Surgeon \par Division of Thoracic Surgery \par , Cardiovascular and Thoracic Surgery \par [facility] [employer] at \[facility]\""/[facility]\par \par

## 2022-03-28 NOTE — PHYSICAL EXAM
[Auscultation Breath Sounds / Voice Sounds] : lungs were clear to auscultation bilaterally [] : no respiratory distress [Heart Rate And Rhythm] : heart rate was normal and rhythm regular [Heart Sounds] : normal S1 and S2 [Heart Sounds Gallop] : no gallops [Murmurs] : no murmurs [Heart Sounds Pericardial Friction Rub] : no pericardial rub [Examination Of The Chest] : the chest was normal in appearance [Chest Visual Inspection Thoracic Asymmetry] : no chest asymmetry [Diminished Respiratory Excursion] : normal chest expansion

## 2022-03-30 NOTE — CONSULT LETTER
[Dear  ___] : Dear  [unfilled], [Consult Letter:] : I had the pleasure of evaluating your patient, [unfilled]. [( Thank you for referring [unfilled] for consultation for _____ )] : Thank you for referring [unfilled] for consultation for [unfilled] [Please see my note below.] : Please see my note below. [Consult Closing:] : Thank you very much for allowing me to participate in the care of this patient.  If you have any questions, please do not hesitate to contact me. [Sincerely,] : Sincerely, [FreeTextEntry3] : Stevan Peters MD \par Attending Surgeon \par Division of Thoracic Surgery \par , Cardiovascular and Thoracic Surgery \par [facility] [employer] [facility] at [facility]\par \par  [FreeTextEntry2] : Dr. Mirtha Celaya ([facility]/ref) \par

## 2022-03-30 NOTE — HISTORY OF PRESENT ILLNESS
[FreeTextEntry1] : Ms. ALEXANDER HUDDLESTON, 56 year old female, never smoker, w/ hx of Uterine cancer dx 2020 s/p chemo s/p VINCENT/BSO, asthma, with recent hospitalization 8/30-9/2/21 for right chest pain found to have persistent pleural effusion now s/p IR drainage during that admission with path positive for metastatic adenocarcinoma. \par \par Patient is s/p Right VATS, pleural bx and Pleurx catheter placement on 09/15/2021. 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 \par \par Followed by Mirtha Celaya; completed 6 cycles of chemo on 02/10/2022. \par \par Patient is s/p Right Pleural catheter removal on 03/08/2022. \par \par CXR today: reviewed. \par \par Patient is here today for a follow up.

## 2022-03-30 NOTE — ASSESSMENT
[FreeTextEntry1] : Ms. ALEXANDER HUDDLESTON, 56 year old female, never smoker, w/ hx of Uterine cancer dx 2020 s/p chemo s/p VINCENT/BSO, asthma, with recent hospitalization 8/30-9/2/21 for right chest pain found to have persistent pleural effusion now s/p IR drainage during that admission with path positive for metastatic adenocarcinoma. \par \par Patient is s/p Right VATS, pleural bx and Pleurx catheter placement on 09/15/2021. 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 \par \par Followed by Mirtha Celaya; completed 6 cycles of chemo on 02/10/2022. \par \par Patient is s/p Right Pleural catheter removal on 03/08/2022. \par \par Patient called on 03/25/2022, c/o SOB and right upper back pain since 03/22/2022. Today, patient stated her back pain resolved, still SOB occasionally. \par \par I have reviewed the patient's medical records and diagnostic images at time of this office consultation and have made the following recommendation:\par 1. CXR reviewed and explained to patient, stable CXR, I recommended patient to f/u Hem/Onc and RTC as needed.\par 2. Elevated BP, patient is asymptomatic, recommended patient to f/u with PCP.  \par \par I personally performed the services described in the documentation, reviewed the documentation recorded by the scribe in my presence and it accurately and completely records my words and actions.\par \par I, Adriana Sheth NP, am scribing for and the presence of NAVNEET Mari, the following sections HISTORY OF PRESENT ILLNESS, PAST MEDICAL/FAMILY/SOCIAL HISTORY; REVIEW OF SYSTEMS; VITAL SIGNS; PHYSICAL EXAM; DISPOSITION.

## 2022-04-05 NOTE — REVIEW OF SYSTEMS
[Negative] : Allergic/Immunologic [Fatigue] : fatigue [Shortness Of Breath] : shortness of breath [SOB on Exertion] : shortness of breath during exertion

## 2022-04-05 NOTE — ASSESSMENT
[Palliative] : Goals of care discussed with patient: Palliative [Palliative Care Plan] : not applicable at this time [FreeTextEntry1] : 56 year old woman with relapsed endometrial cancer on clinical trial  here for SOB.\par Due to persistent and increasing symptoms will get chest CTA today to rule out PE and/or immune induced toxicity.\par if CTA normal may need referral to pulmonary for pulmonary eval and possible chest PT.\par Advised to go to ED with any worsening symptoms.\par CBC reviewed today - stable.  \par Will check CMP and tumor markers.\par

## 2022-04-05 NOTE — PHYSICAL EXAM
[Fully active, able to carry on all pre-disease performance without restriction] : Status 0 - Fully active, able to carry on all pre-disease performance without restriction [Normal] : affect appropriate [de-identified] : tachypneic, decreased lungs sounds right lower chest, no wheezing or crackles

## 2022-04-05 NOTE — HISTORY OF PRESENT ILLNESS
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 Patient was seen initially by Dr. Arciniega at [place].\par She then saw Dr Robin Zamorano, at [facility] and had a repeat biopsy that showed clear cell carcinoma.\par On 4/20/2020 she underwent surgical staging and is post TLH/BSO/SLND/Omentectomy.\par Final pathology showed stage IA,high grade endometrial carcinoma involving predominantly clear cell and focally serous features, no invasion, no LVSI.\par \par Ct C/A/P with contrast done on 3/12/2020 showed no metastatic sites.\par She completed five cycles of CT 6/3/20- 9/2/20) Last cycle held due to toxicity and myelosuppression.She received vaginal brachytherapy to a total of 2100 cGy in 3 fractions. Treatment was delivered from 10/16/20- 10/23/20. \par Scan showed CINDY.\par \par 8/10/21: Surveillance scan showed New partially visualized small right pleural effusion. Correlation with dedicated chest CT is recommended. Few subcentimeter hepatic hypo densities, too small to characterize, but similar in appearance to prior. Small pelvic ascites, similar in appearance to prior.\par - 29( previously 15)\par \par 8/30/21: CT chest: Limited. No pulmonary embolus to level of the lobar pulmonary arteries. Interval increase in small right pleural effusion since 8/19/2021 with associated adjacent compressive atelectasis.Small perihepatic ascites.\par \par Patient was admitted to the hospital with SOB and pleural effusion. She had  thoracentesis and cytology was consistent with relapsed uterine cancer.\par \par Final Diagnosis\par PLEURAL FLUID, RIGHT\par POSITIVE FOR MALIGNANT CELLS.\par Metastatic adenocarcinoma\par \par Cytology slides and cell block reveal a hypercellular specimen\par composed of crowded 3-dimensional groups and single lying\par malignant cells with enlarged nuclei containing prominent\par nucleoli and vacuolated cytoplasm, among benign mesothelial\par cells.\par \par Immunohistochemical stains are performed on the cell block. The\par tumor cells are positive for PAX-8 (focal and weak), p16, napsin\par (focal) and p53 (overexpression), while they are negative for ER,\par WT-1 and TTF-1. In the absence of another primary, it is most\par consistent with involvement by patient's known endometrial\par adenocarcinoma. [de-identified] : Carboplatin and Taxol 6/3/20- 9/2/20( five cycles) [FreeTextEntry1] : Carboplatin/ Taxol/ Pembro/placebo( )\par C1- 10/22/21\par C2- 11/11/21\par C3- 12/2/21\par C4- 12/23/21\par C5- 1/20/22\par C6- 2/10/22 [de-identified] : Patient is here for urgent visit for SOB that started gradually about a week after her pleurex was removed and seems to have increased over the last week.  She also had some right upper back pain but that has resolved.  She also has a dry cough.  She says the SOB is worsened with any amount of exertion.  She is very anxious about these symptoms as it reminds her of when the cancer first relapsed.  She denies fever, chills, chest pain, abdominal pain, bloating, nausea, vomiting, diarrhea, constipation, bleeding, swelling.  She saw Dr. Peters last week for the same symptoms and cxr was stable.

## 2022-04-23 NOTE — PHYSICAL EXAM
[FreeTextEntry1] : General examination–vital signs are recorded and unremarkable.  Head neck, ear nose and throat is unremarkable.  There is no carotid bruit thyromegaly or lymphadenopathy.  Chest is clear and heart sounds are normal.  Abdominal soft and there is no tenderness.  Chest sounds are normal except for some evidence of poor respiratory effort on the right but there are currently no adventitious sounds chest percussion was slightly delayed at the right lung base probably due to residual pleural effusion but there was no chest wall tenderness.  Careful evaluation of her neck failed to reveal any lymphadenopathy or swelling near the course of her phrenic nerve.\par \par Neurological examination revealed her to be significantly anxious appropriate to her disability and fear of cancer but there is no language cognitive or memory dysfunction and was able to narrate her history and good details.\par \par Cranial nerve examination was completely normal and there was no papilledema or optic atrophy or visual field defects and pupils are symmetric brisk without Sumaya syndrome and there was no facial weakness.  Hearing and bulbar functions were intact.\par \par Motor evaluation revealed normal symmetrical strength in both upper and lower extremities without any atrophy fasciculation or abnormal movements.  Tone was preserved with normal coordination and her reflexes are absent throughout without Babinski sign and sensory evaluation revealed decreased perception distally to fine touch pinprick and vibration without any posterior column dysfunction.\par \par Diaphragmatic movements were difficult to assess as she is significantly obese with a weight of 230 pounds but intercostal muscle expansion was completely normal.  Her diaphragmatic weakness is a radiological finding.

## 2022-04-23 NOTE — HISTORY OF PRESENT ILLNESS
[FreeTextEntry1] : Ms. Liao is a 56 years old somewhat anxious -American lady who accompanied her brother who was later involved in discussions and was referred by oncologist and Dr. 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  Her recent work-up has been negative for widespread metastasis but review of the records indicate that she had pleural effusion which is malignant which was noted mostly on the right side with minimal on the left and drainage has been effective.  There is no other widespread metastasis but there is mild peritoneal fluid around the perihepatic area but review of her chest CT did not reveal any lymphadenopathy in the paratracheal area other than mild cardiomegaly.\par \par Current symptoms include shortness of breath on exertion as well as at rest during the massive pleural effusion treated with thoracocentesis and has relieved some of her discomfort.  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  There is no history of any cognitive or language dysfunction.\par \par Past medical history did not reveal any diabetes and there are no other significant medical issues.\par \par Personal history reveals that she has no toxic habits is single has no children continues to work as an  lives alone in an apartment with family help.  I reviewed her medications and allergies.  Her height is 5 feet 5 inches and weight is 230 pounds.\par \par

## 2022-04-23 NOTE — DATA REVIEWED
[de-identified] : I reviewed the CT scan and CT angiogram undertaken recently including comparison which describes pleural effusion without any evidence of metastatic lymphadenopathy in the paratracheal area and slight cardiomegaly and no evidence of pulmonary.  There are extensive medical reports of Dr. Mirtha Celaya [de-identified] : There are extensive medical reports of her oncologist Dr. Celaya and extensive reports of Dr. Stevan Peters and I reviewed the medical records including extensive labs other investigations and there is no neurological pertinence.

## 2022-04-23 NOTE — REVIEW OF SYSTEMS
[Feeling Poorly] : feeling poorly [Numbness] : numbness [Tingling] : tingling [Anxiety] : anxiety [Shortness Of Breath] : shortness of breath [SOB on Exertion] : shortness of breath during exertion [As Noted in HPI] : as noted in HPI [Negative] : Endocrine

## 2022-05-03 NOTE — PHYSICAL EXAM
[Fully active, able to carry on all pre-disease performance without restriction] : Status 0 - Fully active, able to carry on all pre-disease performance without restriction [Normal] : affect appropriate [de-identified] : tachypneic, decreased lungs sounds right lower chest, no wheezing or crackles

## 2022-05-03 NOTE — REVIEW OF SYSTEMS
[Fatigue] : fatigue [Shortness Of Breath] : shortness of breath [SOB on Exertion] : shortness of breath during exertion [Negative] : Allergic/Immunologic

## 2022-05-03 NOTE — HISTORY OF PRESENT ILLNESS
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 Patient was seen initially by Dr. Arciniega at [place].\par She then saw Dr Robin Zamorano, at [facility] and had a repeat biopsy that showed clear cell carcinoma.\par On 4/20/2020 she underwent surgical staging and is post TLH/BSO/SLND/Omentectomy.\par Final pathology showed stage IA,high grade endometrial carcinoma involving predominantly clear cell and focally serous features, no invasion, no LVSI.\par \par Ct C/A/P with contrast done on 3/12/2020 showed no metastatic sites.\par She completed five cycles of CT 6/3/20- 9/2/20) Last cycle held due to toxicity and myelosuppression.She received vaginal brachytherapy to a total of 2100 cGy in 3 fractions. Treatment was delivered from 10/16/20- 10/23/20. \par Scan showed CINDY.\par \par 8/10/21: Surveillance scan showed New partially visualized small right pleural effusion. Correlation with dedicated chest CT is recommended. Few subcentimeter hepatic hypo densities, too small to characterize, but similar in appearance to prior. Small pelvic ascites, similar in appearance to prior.\par - 29( previously 15)\par \par 8/30/21: CT chest: Limited. No pulmonary embolus to level of the lobar pulmonary arteries. Interval increase in small right pleural effusion since 8/19/2021 with associated adjacent compressive atelectasis.Small perihepatic ascites.\par \par Patient was admitted to the hospital with SOB and pleural effusion. She had  thoracentesis and cytology was consistent with relapsed uterine cancer.\par \par Final Diagnosis\par PLEURAL FLUID, RIGHT\par POSITIVE FOR MALIGNANT CELLS.\par Metastatic adenocarcinoma\par \par Cytology slides and cell block reveal a hypercellular specimen\par composed of crowded 3-dimensional groups and single lying\par malignant cells with enlarged nuclei containing prominent\par nucleoli and vacuolated cytoplasm, among benign mesothelial\par cells.\par \par Immunohistochemical stains are performed on the cell block. The\par tumor cells are positive for PAX-8 (focal and weak), p16, napsin\par (focal) and p53 (overexpression), while they are negative for ER,\par WT-1 and TTF-1. In the absence of another primary, it is most\par consistent with involvement by patient's known endometrial\par adenocarcinoma. [de-identified] : Carboplatin and Taxol 6/3/20- 9/2/20( five cycles) [FreeTextEntry1] : Carboplatin/ Taxol/ Pembro/placebo( )\par C1- 10/22/21\par C2- 11/11/21\par C3- 12/2/21\par C4- 12/23/21\par C5- 1/20/22\par C6- 2/10/22 [de-identified] : Patient is here for follow up to discuss recent scans which show POD.\par She continues to have exertional SOB\par

## 2022-05-10 NOTE — HISTORY OF PRESENT ILLNESS
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  Patient had an BETI pap smear, followed by endometrial biopsy with findings of serous cancer. \par \par She saw Dr. Zamorano, at [facility] and had a repeat biopsy that showed clear cell carcinoma.\par \par On 4/20/2020 she underwent surgical staging and is post TLH/BSO/SLND/Omentectomy.\par Final pathology showed stage IA,high grade endometrial carcinoma involving predominantly clear cell and focally serous features, no invasion, no LVSI.\par \par She completed 5 cycles of Carbo/Taxol  adjuvant therapy completed 9/2020 with Dr. Celaya. \par Followed by vaginal brachytherapy with Dr. Kuhn. \par \par \par August 2021 presented with symptomatic pleural effusion. In September of 2021 she underwent a pleural biopsies with PleurX catheter placement.  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 Received 6 cycle of CB/T + Pembro v placebo from 10/2021 to 2/2022. Scans after cycle 6 showed POD in chest and pelvis and patient was taken off trial. \par \par 4/22/2022- CT CAP- Bilateral pleural effusions with new small left pleural effusion.\par   Cystic lesion adjacent to the left vaginal cuff which had increased in size as compared with the prior study.\par   Subcentimeter peritoneal nodularity in the left paracolic gutter, new.\par \par 5/3/2022- - 350 \par \par Plan is to proceed with Keytruda and Lenvima tomorrow.\par \par She was planning on seeing Dr. Zamorano for follow up, but opted to have a follow up visit here. She had a vaginal exam last year.  She is not sexually active\par \par No VB/VD.  She has persistent SOB.  She has decrease in appetite.  She has no difficulties with bowels or urination.  She denies nausea/vomiting.  She denies all other associated signs and symptoms. \par \par HM\par Mammogram- 2021 negative per patient\par Colonoscopy- 2017- negative

## 2022-05-10 NOTE — PHYSICAL EXAM
[Chaperone Present] : A chaperone was present in the examining room during all aspects of the physical examination [Absent] : Adnexa(ae): Absent [Normal] : Recto-Vaginal Exam: Normal [FreeTextEntry1] : alopecia [de-identified] : well healed LSC scars [de-identified] : short, normal mucosa [Fully active, able to carry on all pre-disease performance without restriction] : Status 0 - Fully active, able to carry on all pre-disease performance without restriction

## 2022-06-02 NOTE — PHYSICAL EXAM
[Ambulatory and capable of all self care but unable to carry out any work activities] : Status 2- Ambulatory and capable of all self care but unable to carry out any work activities. Up and about more than 50% of waking hours [Normal] : affect appropriate [de-identified] : b/l decreased BS

## 2022-06-03 NOTE — PATIENT PROFILE ADULT - DO YOU FEEL UNSAFE AT HOME, WORK, OR SCHOOL?
1. Keep up the great work!    2. Try to increase the fiber supplement to 2-3 times per day    3. Continue the imodium    4. Get some labs today  Lab tests today at the 1st floor -- you will be notified of results by letter or my chart message in 7-10 days.  You will receive a phone call if more urgent follow up is needed.      5. Recommend 2-3 times per year having your PCP check kidney function, electrolytes, liver tests    Follow up as needed.  For questions regarding your care Monday through Friday, contact the RN GI care coordinator,  Call Romelia Camacho at  [phone] option 3 and leave a voicemail. Your call will be  returned same day, or if consultation is needed with the provider, it may be following business day - or you may send a My Chart message.    For medication refills (prescribed by the GI clinic), contact your pharmacy.    For appointment rescheduling/cancellation, contact [phone]     After hours, or if you have an immediate GI concern and cannot wait for a return call, contact the GI Fellow at [phone] and select option #4.    Romelia Camacho     no

## 2022-06-03 NOTE — H&P ADULT - ASSESSMENT
57y/o  w/ a PMHx of recurrent Clear Cell/Serous Uterine Carcinoma presenting to the ED complaining of SOB and chest pain found to have bilateral pleural effusions on imaging being admitted to the GYN Oncology service for mgmt. Patient with multiple admissions to [facility] with similar symptoms, s/p R Pleurx removal by Thoracic team (2022). Patient recently started on Keytruda/Lenvima for POD (05/10/2022), now being held for worsening side effects (high BPs, SOB). On presentation to the ED, patient found to be severely tachypneic, mildly tachycardic, without significant lab abnormalities or signs of a PE. Patient currently in stable condition, symptoms improved with O2 via nasal cannula, but still tachypneic and uncomfortable with inc. work of breathing.   - Admit to GYN Oncology service  - Continuous pulse ox, monitor vital signs  - DVT ppx with Heparin  - CLD, advance as tolerated  - IV analgesics prn  - AM CBC/BMP/Mg/Phos  - Thoracic and Heme/Onc consulted, f/u recs    Seen with Dr. ZACKERY Vázquez, GYN Oncolgoy Fellow  ula PGY2 57y/o  w/ a PMHx of recurrent Clear Cell/Serous Uterine Carcinoma presenting to the ED complaining of SOB and chest pain found to have bilateral pleural effusions on imaging being admitted to the GYN Oncology service for mgmt. Patient with multiple admissions to [facility] with similar symptoms, s/p R Pleurx removal by Thoracic team (2022). Patient recently started on Keytruda/Lenvima for POD (05/10/2022), now being held for worsening side effects (high BPs, SOB). On presentation to the ED, patient found to be severely tachypneic, mildly tachycardic, without significant lab abnormalities or signs of a PE. Patient currently in stable condition, symptoms improved with O2 via nasal cannula, but still tachypneic and uncomfortable with inc. work of breathing.   - Admit to GYN Oncology service  - Continuous pulse ox, monitor vital signs  - DVT ppx with Heparin  - CLD, advance as tolerated  - IV analgesics prn  - AM CBC/BMP/Mg/Phos  - Thoracic and Heme/Onc consulted, f/u recs    Seen with Dr. ZACKERY Vázquez, GYN Oncology Fellow  D/W Dr. Wall, GYN Oncology attending  ula PGY2 57y/o  w/ a PMHx of recurrent Clear Cell/Serous Uterine Carcinoma presenting to the ED complaining of SOB and chest pain found to have bilateral pleural effusions on imaging being admitted to the GYN Oncology service for mgmt. Patient with multiple admissions to [facility] with similar symptoms, lung mets dx by biopsy, s/p R Pleurx removal by Thoracic team (2022). Patient recently started on Keytruda/Lenvima for POD (05/10/2022), now being held for worsening side effects (high BPs, SOB). On presentation to the ED, patient found to be severely tachypneic, mildly tachycardic, without significant lab abnormalities or signs of a PE. Patient currently in stable condition, symptoms improved with O2 via nasal cannula, but still tachypneic and uncomfortable with inc. work of breathing.   - Admit to GYN Oncology service  - Continuous pulse ox, monitor vital signs  - DVT ppx with Heparin  - CLD, advance as tolerated  - IV analgesics prn  - AM CBC/BMP/Mg/Phos  - Thoracic and Heme/Onc consulted, f/u recs    Seen with Dr. ZACKERY Vázquez, GYN Oncology Fellow  D/W Dr. Wall, GYN Oncology attending  ula PGY2 57y/o  w/ a PMHx of recurrent Clear Cell/Serous Uterine Carcinoma presenting to the ED complaining of SOB and chest pain found to have bilateral pleural effusions on imaging being admitted to the GYN Oncology service for mgmt. Patient with multiple admissions to [facility] with similar symptoms, lung mets dx by biopsy, s/p R Pleurx removal by Thoracic team (2022). Patient recently started on Keytruda/Lenvima for POD (05/10/2022), now being held for worsening side effects (high BPs, SOB). On presentation to the ED, patient found to be severely tachypneic, mildly tachycardic, without significant lab abnormalities or signs of a PE. Patient currently in stable condition, symptoms improved with O2 via nasal cannula, but still tachypneic and uncomfortable with inc. work of breathing.   - Admit to GYN Oncology service  - Continuous pulse ox, monitor vital signs  - Appreciate Thoracic recs: poss Pigtail Catheter placement pending review of images, f/u further recs  - DVT ppx with Heparin, to be held for possible procedure with Thoracic  - CLD, advance as tolerated; no need for NPO for proceudre  - IV analgesics prn  - AM CBC/BMP/Mg/Phos/Coags   - Heme/Onc consulted, f/u recs  Dispo: routine inpatient care, monitor O2 sat, poss procedure with Thoracic tmrw    Seen with Dr. ZACKERY Vázquez, GYN Oncology Fellow  D/W Dr. Wall, GYN Oncology attending  Mvula PGY2 55y/o  w/ a PMHx of recurrent Clear Cell/Serous Uterine Carcinoma presenting to the ED complaining of SOB and chest pain found to have bilateral pleural effusions on imaging being admitted to the GYN Oncology service for mgmt. Patient with multiple admissions to [facility] with similar symptoms, lung mets dx by biopsy, s/p R Pleurx removal by Thoracic team (2022). Patient recently started on Keytruda/Lenvima for POD (05/10/2022), now being held for worsening side effects (high BPs, SOB). On presentation to the ED, patient found to be severely tachypneic, mildly tachycardic, without significant lab abnormalities or signs of a PE. Patient currently in stable condition, symptoms improved with O2 via nasal cannula, but still tachypneic and uncomfortable with inc. work of breathing.   - Admit to GYN Oncology service  - Continuous pulse ox, monitor vital signs  - Appreciate Thoracic recs: poss Pigtail Catheter placement pending review of images, f/u further recs  - DVT ppx with Heparin, to be held for possible procedure with Thoracic  - CLD, advance as tolerated; no need for NPO for proceudre  - IV analgesics prn  - AM CBC/BMP/Mg/Phos/Coags   - Heme/Onc consulted, f/u recs  Dispo: routine inpatient care, monitor O2 sat, poss procedure with Thoracic tmrw    Seen with Dr. ZACKERY Vázquez, GYN Oncology Fellow  D/W Dr. Wall, GYN Oncology attending  [facility] PGY2    GYN ONC Fellow Addendum:  Chart reviewed. Pt seen and evaluated at bedside. 57 yo with progressive uterine serous carcinoma most recently on Pembo/Lenvima presenting with worsening dyspnea over 3 weeks found to have bilateral pleural effusions.  - Thoracic for drainage  - DVT ppx  - Diet as tolerated  - Appreciate Med Onc recs    D/w Dr. Duke Vázquez, PGY6

## 2022-06-03 NOTE — ED PROVIDER NOTE - CLINICAL SUMMARY MEDICAL DECISION MAKING FREE TEXT BOX
57 yo F with hx of asthma, Uterine Ca with mets to lung c/b right pleural effusion with VATS and pleurx catheter in March 2022 (now removed) last chemo 6/1, PSHX  VINCENT/BSO) presenting with shortness of breath x 3 days. Tachypneic with decreased bs at the bases, tachycardic, no leg edema. Likely worsening of known pleural effusion, will r/o PE. Plan for labs, CTA and admit. Discussed with thoracic, gyn onc and onc, aware of patient.

## 2022-06-03 NOTE — ED ADULT NURSE REASSESSMENT NOTE - NS ED NURSE REASSESS COMMENT FT1
pt alert,oriented x3. noted tachypnea. pt states present x past mo,worse x past 2 wks. nasal robel in use 2 l. awits transport to floor. will continue to monitor

## 2022-06-03 NOTE — CONSULT NOTE ADULT - SUBJECTIVE AND OBJECTIVE BOX
Thoracic Surgery Consultation Note  =====================================================  HPI: 55 yo F with hx of asthma, Uterine Ca with mets to lung c/b right pleural effusion with VATS and pleurx catheter in March 2022 (now removed) last chemo 6/1, PSHX  VINCENT/BSO) presenting with shortness of breath x 3 days. Patient reports progressively worsening dyspnea on exertion, orthopnea and R sided chest discomfort for weeks, more severe over the last few days. Xray performed and showed worsening L sided pleural effusion. Reports productive cough. Denies fevers/chills, palpitations, leg swelling, nausea, vomiting. Spoke with oncology team today and told to come to the ER.    Subjective: Patient seen and assessed at bedside. Pt speaking w/o need of NC. States has had progressive dry cough over past several days. Pt did not endorse pleuritic chest pain, chills/fevers.    Allergies:   PAST MEDICAL & SURGICAL HISTORY:  Asthma  No attacks since 2017  Uterine cancer  S/p chemotherapy, radiation and hysterectomy  S/P hysterectomy  2020 VINCENT/BSO    FAMILY HISTORY:  No pertinent family history in first degree relatives    SOCIAL HISTORY:   - Never smoker  - Denies consumption of alcohol  - Lives with mother    ADVANCE DIRECTIVES: Presumed Full Code     REVIEW OF SYSTEMS:   Respiratory and Thorax: Persistent cough    CURRENT MEDICATIONS:   --------------------------------------------------------------------------------------  Neurologic Medications  acetaminophen     Tablet .. 650 milliGRAM(s) Oral once    Respiratory Medications    Cardiovascular Medications    Gastrointestinal Medications  lactated ringers Bolus 1000 milliLiter(s) IV Bolus once    Genitourinary Medications    Hematologic/Oncologic Medications    Antimicrobial/Immunologic Medications    Endocrine/Metabolic Medications    Topical/Other Medications    --------------------------------------------------------------------------------------  VITAL SIGNS, INS/OUTS (last 24 hours):  --------------------------------------------------------------------------------------  T(C): 36.3 (06-03-22 @ 10:43), Max: 36.3 (06-03-22 @ 10:43)  HR: 110 (06-03-22 @ 12:11) (78 - 110)  BP: 131/88 (06-03-22 @ 12:11) (131/88 - 154/114)  BP(mean): --  ABP: --  ABP(mean): --  RR: 18 (06-03-22 @ 12:11) (18 - 18)  SpO2: 96% (06-03-22 @ 12:11) (96% - 99%)  Wt(kg): --  CVP(mm Hg): --  CI: --  CAPILLARY BLOOD GLUCOSE       N/A  --------------------------------------------------------------------------------------    EXAM  NEUROLOGY  Exam: Normal, NAD, alert, oriented x 3, no focal deficits.  HEENT  Exam: Normocephalic, atraumatic.  EOMI.  RESPIRATORY  Exam: Breathing on RA w/ periodic coughing. Prior R Pleurex site C/D/I.  CARDIOVASCULAR  Exam: S1, S2.  Regular rate and rhythm.  Peripheral edema   GI/NUTRITION  Exam: Abdomen soft, Non-tender, Non-distended.   VASCULAR  Exam: Extremities warm, pink, well-perfused.   MUSCULOSKELETAL  Exam: All extremities moving spontaneously without limitations.    LABS  --------------------------------------------------------------------------------------                                            13.1                  Neurophils% (auto):   x      (06-03 @ 11:40):    x    )-----------(x            Lymphocytes% (auto):  x                                             41.2                   Eosinphils% (auto):   x        Manual%: Neutrophils x    ; Lymphocytes x    ; Eosinophils x    ; Bands%: x    ; Blasts x                    VB - ( 03 Jun 2022 11:40 )  pH: 7.36  /  pCO2: 51    /  pO2: 21    / HCO3: 29    / Base Excess: 2.4   /  SvO2: 26.0  / Lactate: 1.6      RECENT CULTURES:  --------------------------------------------------------------------------------------

## 2022-06-03 NOTE — PATIENT PROFILE ADULT - FALL HARM RISK - HARM RISK INTERVENTIONS

## 2022-06-03 NOTE — H&P ADULT - HISTORY OF PRESENT ILLNESS
HPI: 55y/o  w/ a PMHx of recurrent Clear Cell/Serous Uterine Carcinoma presenting to the ED complaining of SOB and chest pain.     Patient reports chest pain and SOB that has worsened over the past few days. She has been unable to sleep lying down and reports a productive cough. She has not had any abdominal pain, vaginal bleeding, or abnormal vaginal discharge. She was recently started on a new chemotherapy regimen with Keytruda and Lenvima after being found to have POD on imaging and exam. Treatment was stopped secondary to side effects. She was sent in by Dr. Celaya to be evaluated after being seen in office with increased work of breathing and dec. O2 saturation. Currently, she reports sever SOB, BANUELOS, CP, and right sided chest discomfort. She denies lightheadedness, dizziness, HA, N/V. No weight loss, early satiety, abdominal girth, or changes in bowel or bladder habits.     Name of GYN Oncology Physician: Dr. Susy Price  Name of Heme/Onc Physician: Dr. LEELEE Celaya  Name of Rad Onc Physician: Dr. Kuhn    ONC History:   - 2020: Dx with Stage IA high grade Clear Cell/Serous Uterine Carcinoma, no invasion, no LVSI       -- s/p TLH/BSO/SLND/Omentectomy (at [facility] w/ Dr. Zamorano)  - 2020: Completed 5 cycles of Carbo/Taxol adjuvant therapy (with Dr. Celaya)  - 10/2020: Vaginal brachytherapy (with Dr. 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  - 10/2021-2022: started on  trial s/p 6C CB/T + Pembro       -- Scans after cycle 6 showed progression of dx in chest and pelvis and patient was taken off the trial  - 2022: Pleurx removed by Thoracic team   - 2022: CT CAP with bilateral pleural effusions, vaginal cuff lesion, peritoneal nodularity in the left paracolic gutter  - 5/3/2022: elevated CA-125 to 325  - 5/10/2022: started Keytruda and Lenvima       -- Lenvima held for high BPs and Keytruda held for worsening SOB    OBHx:    GYNHx: Denies fibroids, cysts, endometriosis, STI's, Abnormal pap smears   PMHx: Asthma (last attack in )  PSHx: TLH/BSO/SLND/Omentectomy (), Uterine polypectomy (),   Meds: Tylenol prn  Allergies: NKDA    Vital Signs Last 24 Hrs  T(C): 36.3 (2022 16:29), Max: 36.3 (2022 10:43)  T(F): 97.3 (2022 16:29), Max: 97.4 (2022 10:43)  HR: 99 (2022 16:29) (78 - 110)  BP: 141/93 (2022 16:29) (131/88 - 154/114)  BP(mean): --  RR: 25 (2022 16:29) (18 - 25)  SpO2: 99% (2022 16:29) (96% - 99%)    Physical Exam:   General: inc. work of breathing, NAD   CV: RRR  Lungs: tachypneic, dec Bs at bases  Abd: Soft, non-tender, non-distended.  Bowel sounds present.    Ext: non-tender b/l, no edema     LABS:             13.1   8.48  )-----------( 246      ( 2022 11:40 )             41.2     06-  143  |  103  |  12  ----------------------------<  105<H>  3.6   |  27  |  1.16    Ca    10.0      2022 11:40  TPro  8.6<H>  /  Alb  4.0  /  TBili  0.5  /  DBili  x   /  AST  14  /  ALT  16  /  AlkPhos  178<H>  06-03  PT/INR - ( 2022 11:40 )   PT: 12.2 sec;   INR: 1.05 ratio    PTT - ( 2022 11:40 )  PTT:32.5 sec      RADIOLOGY & ADDITIONAL STUDIES:    < from: CT Angio Chest PE Protocol w/ IV Cont (22 @ 15:40) >  ACC: [id number] EXAM:  CT ANGIO CHEST PULM ART [facility]                        PROCEDURE DATE:  2022    INTERPRETATION:  CLINICAL INFORMATION: Uterine cancer with pulmonary   metastases, complicated by pleural effusion status post VATS andinterval   Pleurx catheter presenting with shortness of breath. Evaluate for   pulmonary embolism.  COMPARISON: CT chest and abdomen 2022.  CONTRAST/COMPLICATIONS:  IV Contrast: Omnipaque 350  60 cc administered   10 cc discarded  Oral Contrast: NONE  Complications: None reported at time of study completion  PROCEDURE:  CT Angiogram of the chest was obtained with intravenous contrast. Three   dimensional maximum intensity projection (MIP) images were generated.  FINDINGS:  PULMONARY ANGIOGRAM: No pulmonary embolism in the main or lobar pulmonary   arteries. Limited evaluation of the segmental and subsegmental pulmonary   arteries.  LYMPH NODES: No lymphadenopathy.  HEART/VASCULATURE: Heart size is normal. No pericardial effusion. Normal   caliber aorta. Right chest wall port tip terminates in the SVC.  AIRWAYS/LUNGS/PLEURA: The central airways are patent. Unchanged   moderate-large right and increased small left loculated pleural effusions   with underlying enhancing nodular pleural thickening and basilar   compressive atelectasis.  UPPER ABDOMEN: Subcentimeter right hepatic lobe hypodense lesion, too   small to characterize. Small volume ascites, increased from 2022.  BONES/SOFT TISSUES: Degenerative changes of the spine. Right chest wall   port.  IMPRESSION:  No pulmonary embolism in the main or lobar pulmonary arteries. Limited   evaluation of the segmental and subsegmental pulmonary arteries.  Stable moderate to large right and increased small loculated pleural   effusions with bibasilar compressive atelectasis.  Increased small volume ascites.    --- End of Report ---  SUZETTE BENAVIDES MD; Resident Radiology  This document has been electronically signed.  ZAKI DURAN M.D., Attending Radiologist  This document has been electronically signed. [place]  3 2022  4:05PM  < end of copied text >        < from: Xray Chest 1 View- PORTABLE-Urgent (22 @ 11:56) >  ACC: [id number] EXAM:  XR CHEST PORTABLE URGENT 1V                        PROCEDURE DATE:  2022    INTERPRETATION:  EXAMINATION: XR CHEST URGENT  CLINICAL INDICATION: Shortness of Breath  TECHNIQUE: Single frontal, portable view of the chest was obtained.  COMPARISON: Chest x-ray 2022  FINDINGS:  Right-sided Mediport with tip in the superior vena cava.  The heart is poorly assessed on this projection..  No focal consolidations.  Moderate sized bilateral pleural effusionswith associated atelectasis.  No pneumothorax  IMPRESSION: Follow-up bilateral effusions without significant change.  --- End of Report ---    RAYMOND RIVERA MD; Resident Radiologist  This document has been electronically signed.  LILIANA BURGOS MD; Attending Radiologist  This document has been electronically signed. [place]  3 2022 12:36PM  < end of copied text >   HPI: 57y/o  w/ a PMHx of recurrent Clear Cell/Serous Uterine Carcinoma presenting to the ED complaining of SOB and chest pain.     Patient reports chest pain and SOB that has worsened over the past few days. She has been unable to sleep lying down and reports a productive cough. She has not had any abdominal pain, vaginal bleeding, or abnormal vaginal discharge. She was recently started on a new chemotherapy regimen with Keytruda and Lenvima after being found to have POD on imaging and exam. Treatment was stopped secondary to side effects. She was sent in by Dr. Celaya to be evaluated after being seen in office with increased work of breathing and dec. O2 saturation. Currently, she reports sever SOB, BANUELOS, CP, and right sided chest discomfort. She denies lightheadedness, dizziness, HA, N/V. No weight loss, early satiety, abdominal girth, or changes in bowel or bladder habits.     Name of GYN Oncology Physician: Dr. Susy Price  Name of Heme/Onc Physician: Dr. LEELEE Celaya  Name of Rad Onc Physician: Dr. Kuhn    ONC History:   - 2020: Dx with Stage IA high grade Clear Cell/Serous Uterine Carcinoma, no invasion/LVSI       -- s/p TLH/BSO/SLND/Omentectomy (at [facility] w/ Dr. Zamorano)  - 2020: Completed 5 cycles of Carbo/Taxol adjuvant therapy (with Dr. Celaya)  - 10/2020: Vaginal brachytherapy (with Dr. 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  - 10/2021-2022: started on  trial s/p 6C CB/T + Pembro       -- Scans after cycle 6 showed progression of dx in chest and pelvis and patient was taken off the trial  - 2022: Pleurx removed by Thoracic team   - 2022: CT CAP with bilateral pleural effusions, vaginal cuff lesion, peritoneal nodularity in the left paracolic gutter  - 5/3/2022: elevated CA-125 to 325  - 5/10/2022: started Keytruda and Lenvima       -- Lenvima held for high BPs and Keytruda held for worsening SOB    OBHx:    GYNHx: Denies fibroids, cysts, endometriosis, STI's, Abnormal pap smears   PMHx: Asthma (last attack in )  PSHx: TLH/BSO/SLND/Omentectomy (), Uterine polypectomy (),   Meds: Tylenol prn  Allergies: NKDA    Vital Signs Last 24 Hrs  T(C): 36.3 (2022 16:29), Max: 36.3 (2022 10:43)  T(F): 97.3 (2022 16:29), Max: 97.4 (2022 10:43)  HR: 99 (2022 16:29) (78 - 110)  BP: 141/93 (2022 16:29) (131/88 - 154/114)  BP(mean): --  RR: 25 (2022 16:29) (18 - 25)  SpO2: 99% (2022 16:29) (96% - 99%)    Physical Exam:   General: inc. work of breathing, NAD   CV: RRR  Lungs: tachypneic, dec Bs at bases  Abd: Soft, non-tender, non-distended.  Bowel sounds present.    Ext: non-tender b/l, no edema     LABS:             13.1   8.48  )-----------( 246      ( 2022 11:40 )             41.2     06-  143  |  103  |  12  ----------------------------<  105<H>  3.6   |  27  |  1.16    Ca    10.0      2022 11:40  TPro  8.6<H>  /  Alb  4.0  /  TBili  0.5  /  DBili  x   /  AST  14  /  ALT  16  /  AlkPhos  178<H>  06-03  PT/INR - ( 2022 11:40 )   PT: 12.2 sec;   INR: 1.05 ratio    PTT - ( 2022 11:40 )  PTT:32.5 sec      RADIOLOGY & ADDITIONAL STUDIES:    < from: CT Angio Chest PE Protocol w/ IV Cont (22 @ 15:40) >  ACC: [id number] EXAM:  CT ANGIO CHEST PULM ART [facility]                        PROCEDURE DATE:  2022    INTERPRETATION:  CLINICAL INFORMATION: Uterine cancer with pulmonary   metastases, complicated by pleural effusion status post VATS andinterval   Pleurx catheter presenting with shortness of breath. Evaluate for   pulmonary embolism.  COMPARISON: CT chest and abdomen 2022.  CONTRAST/COMPLICATIONS:  IV Contrast: Omnipaque 350  60 cc administered   10 cc discarded  Oral Contrast: NONE  Complications: None reported at time of study completion  PROCEDURE:  CT Angiogram of the chest was obtained with intravenous contrast. Three   dimensional maximum intensity projection (MIP) images were generated.  FINDINGS:  PULMONARY ANGIOGRAM: No pulmonary embolism in the main or lobar pulmonary   arteries. Limited evaluation of the segmental and subsegmental pulmonary   arteries.  LYMPH NODES: No lymphadenopathy.  HEART/VASCULATURE: Heart size is normal. No pericardial effusion. Normal   caliber aorta. Right chest wall port tip terminates in the SVC.  AIRWAYS/LUNGS/PLEURA: The central airways are patent. Unchanged   moderate-large right and increased small left loculated pleural effusions   with underlying enhancing nodular pleural thickening and basilar   compressive atelectasis.  UPPER ABDOMEN: Subcentimeter right hepatic lobe hypodense lesion, too   small to characterize. Small volume ascites, increased from 2022.  BONES/SOFT TISSUES: Degenerative changes of the spine. Right chest wall   port.  IMPRESSION:  No pulmonary embolism in the main or lobar pulmonary arteries. Limited   evaluation of the segmental and subsegmental pulmonary arteries.  Stable moderate to large right and increased small loculated pleural   effusions with bibasilar compressive atelectasis.  Increased small volume ascites.    --- End of Report ---  SUZETTE BENAVIDES MD; Resident Radiology  This document has been electronically signed.  ZAKI DURAN M.D., Attending Radiologist  This document has been electronically signed. [place]  3 2022  4:05PM  < end of copied text >        < from: Xray Chest 1 View- PORTABLE-Urgent (22 @ 11:56) >  ACC: [id number] EXAM:  XR CHEST PORTABLE URGENT 1V                        PROCEDURE DATE:  2022    INTERPRETATION:  EXAMINATION: XR CHEST URGENT  CLINICAL INDICATION: Shortness of Breath  TECHNIQUE: Single frontal, portable view of the chest was obtained.  COMPARISON: Chest x-ray 2022  FINDINGS:  Right-sided Mediport with tip in the superior vena cava.  The heart is poorly assessed on this projection..  No focal consolidations.  Moderate sized bilateral pleural effusionswith associated atelectasis.  No pneumothorax  IMPRESSION: Follow-up bilateral effusions without significant change.  --- End of Report ---    RAYMOND RIVERA MD; Resident Radiologist  This document has been electronically signed.  LILIANA BURGOS MD; Attending Radiologist  This document has been electronically signed. [place]  3 2022 12:36PM  < end of copied text >   HPI: 57y/o  w/ a PMHx of recurrent Clear Cell/Serous Uterine Carcinoma presenting to the ED complaining of SOB and chest pain.     Patient reports chest pain and SOB that has worsened over the past few days. She has been unable to sleep lying down and reports a productive cough. She has not had any abdominal pain. She was recently started on a new chemotherapy regimen with Keytruda and Lenvima after being found to have POD on imaging and exam. Treatment was stopped secondary to side effects. She was sent in by Dr. Celaya to be evaluated after being seen in office with increased work of breathing and dec. O2 saturation. Currently, she reports sever SOB, BANUELOS, CP, and right sided chest discomfort. She denies lightheadedness, dizziness, HA, N/V. No weight loss, early satiety, abdominal girth, or changes in bowel or bladder habits.     Name of GYN Oncology Physician: Dr. Susy Price  Name of Heme/Onc Physician: Dr. LEELEE Celaya  Name of Rad Onc Physician: Dr. Kuhn    ONC History:   - 2020: Dx with Stage IA high grade Clear Cell/Serous Uterine Carcinoma, no invasion/LVSI       -- s/p TLH/BSO/SLND/Omentectomy (at [facility] w/ Dr. Zamorano)  - 2020: Completed 5 cycles of Carbo/Taxol adjuvant therapy (with Dr. Celaya)  - 10/2020: Vaginal brachytherapy (with Dr. 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  - 10/2021-2022: started on  trial s/p 6C CB/T + Pembro       -- Scans after cycle 6 showed progression of dx in chest and pelvis and patient was taken off the trial  - 2022: Pleurx removed by Thoracic team   - 2022: CT CAP with bilateral pleural effusions, vaginal cuff lesion, peritoneal nodularity in the left paracolic gutter  - 5/3/2022: elevated CA-125 to 325  - 5/10/2022: started Keytruda and Lenvima       -- Lenvima held for high BPs and Keytruda held for worsening SOB    OBHx:    GYNHx: Denies fibroids, cysts, endometriosis, STI's, Abnormal pap smears   PMHx: Asthma (last attack in )  PSHx: TLH/BSO/SLND/Omentectomy (), Uterine polypectomy (),   Meds: Tylenol prn  Allergies: NKDA    Vital Signs Last 24 Hrs  T(C): 36.3 (2022 16:29), Max: 36.3 (2022 10:43)  T(F): 97.3 (2022 16:29), Max: 97.4 (2022 10:43)  HR: 99 (2022 16:29) (78 - 110)  BP: 141/93 (2022 16:29) (131/88 - 154/114)  BP(mean): --  RR: 25 (2022 16:29) (18 - 25)  SpO2: 99% (2022 16:29) (96% - 99%)    Physical Exam:   General: inc. work of breathing, NAD   CV: RRR  Lungs: tachypneic, dec Bs at bases  Abd: Soft, non-tender, non-distended.  Bowel sounds present.    Ext: non-tender b/l, no edema     LABS:             13.1   8.48  )-----------( 246      ( 2022 11:40 )             41.2     06-  143  |  103  |  12  ----------------------------<  105<H>  3.6   |  27  |  1.16    Ca    10.0      2022 11:40  TPro  8.6<H>  /  Alb  4.0  /  TBili  0.5  /  DBili  x   /  AST  14  /  ALT  16  /  AlkPhos  178<H>  06-03  PT/INR - ( 2022 11:40 )   PT: 12.2 sec;   INR: 1.05 ratio    PTT - ( 2022 11:40 )  PTT:32.5 sec      RADIOLOGY & ADDITIONAL STUDIES:    < from: CT Angio Chest PE Protocol w/ IV Cont (22 @ 15:40) >  ACC: [id number] EXAM:  CT ANGIO CHEST PULM ART [facility]                        PROCEDURE DATE:  2022    INTERPRETATION:  CLINICAL INFORMATION: Uterine cancer with pulmonary   metastases, complicated by pleural effusion status post VATS andinterval   Pleurx catheter presenting with shortness of breath. Evaluate for   pulmonary embolism.  COMPARISON: CT chest and abdomen 2022.  CONTRAST/COMPLICATIONS:  IV Contrast: Omnipaque 350  60 cc administered   10 cc discarded  Oral Contrast: NONE  Complications: None reported at time of study completion  PROCEDURE:  CT Angiogram of the chest was obtained with intravenous contrast. Three   dimensional maximum intensity projection (MIP) images were generated.  FINDINGS:  PULMONARY ANGIOGRAM: No pulmonary embolism in the main or lobar pulmonary   arteries. Limited evaluation of the segmental and subsegmental pulmonary   arteries.  LYMPH NODES: No lymphadenopathy.  HEART/VASCULATURE: Heart size is normal. No pericardial effusion. Normal   caliber aorta. Right chest wall port tip terminates in the SVC.  AIRWAYS/LUNGS/PLEURA: The central airways are patent. Unchanged   moderate-large right and increased small left loculated pleural effusions   with underlying enhancing nodular pleural thickening and basilar   compressive atelectasis.  UPPER ABDOMEN: Subcentimeter right hepatic lobe hypodense lesion, too   small to characterize. Small volume ascites, increased from 2022.  BONES/SOFT TISSUES: Degenerative changes of the spine. Right chest wall   port.  IMPRESSION:  No pulmonary embolism in the main or lobar pulmonary arteries. Limited   evaluation of the segmental and subsegmental pulmonary arteries.  Stable moderate to large right and increased small loculated pleural   effusions with bibasilar compressive atelectasis.  Increased small volume ascites.    --- End of Report ---  SUZETTE BENAVIDES MD; Resident Radiology  This document has been electronically signed.  ZAKI DURAN M.D., Attending Radiologist  This document has been electronically signed. [place]  3 2022  4:05PM  < end of copied text >        < from: Xray Chest 1 View- PORTABLE-Urgent (22 @ 11:56) >  ACC: [id number] EXAM:  XR CHEST PORTABLE URGENT 1V                        PROCEDURE DATE:  2022    INTERPRETATION:  EXAMINATION: XR CHEST URGENT  CLINICAL INDICATION: Shortness of Breath  TECHNIQUE: Single frontal, portable view of the chest was obtained.  COMPARISON: Chest x-ray 2022  FINDINGS:  Right-sided Mediport with tip in the superior vena cava.  The heart is poorly assessed on this projection..  No focal consolidations.  Moderate sized bilateral pleural effusionswith associated atelectasis.  No pneumothorax  IMPRESSION: Follow-up bilateral effusions without significant change.  --- End of Report ---    RAYMOND RIVERA MD; Resident Radiologist  This document has been electronically signed.  LILIANA BURGOS MD; Attending Radiologist  This document has been electronically signed. [place]  3 2022 12:36PM  < end of copied text >

## 2022-06-03 NOTE — CONSULT NOTE ADULT - SUBJECTIVE AND OBJECTIVE BOX
HPI:  This patient is a 55yo lady with PMH of metastatic endometrial cancer presenting to the ED with complaint of dyspnea.  She was diagnosed with stage I high grade endometrial cancer with predominantly clear cell and focally serous feature, and no lymphovascular invasion. She underwent TLS/BSO/omentectomy. She had 5C of carb-taxol from 6/3/20-9/2/20. Her last cycle was held due to toxicity and myelosuppression.  From 10/16-10/23/20, she had vaginal brachytherapy.   In August 2021, She was found to have new R pleural effusion and elevated . 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   The patient was then started on carbo/taxo/pembro/placebo x C6 from 10/2021 to 2/22. Follow up scans found POD. She was thereafter started on pembro+ lenvima, with C1 on 5/11. Lenvima caused hypertension.  The patient had progressively worsening dyspnea x 3 weeks, with cough, BANUELOS and orthopnea. Her C2 of pembro+ lenvima was thus held. She was advised to go to the ED.     PAST MEDICAL & SURGICAL HISTORY:  Asthma  No attacks since 2017  Uterine cancer S/p chemotherapy, radiation and hysterectomy  S/P hysterectomy 2020 VINCENT/BSO    FAMILY HISTORY:     Social History:    REVIEW OF SYSTEMS  CONSTITUTIONAL: No fever, no chills, no fatigue  EYES: No eye pain, no vision changes  ENMT:  No difficulty hearing, no throat pain  RESPIRATORY: + cough,  + shortness of breath  CARDIOVASCULAR: No chest pain, no palpitations  GASTROINTESTINAL: No abdominal pain, no nausea, no vomiting, no diarrhea, no constipation,  GENITOURINARY: No dysuria, no hematuria  NEUROLOGICAL: No numbness , no loss of strength  SKIN: No itching, no rashes,  HEME/LYMPH: No easy bruising, bleeding    >>> <<<>>> <<<  >>> <<<  Allergies  No Known Allergies  Intolerances    Medications  MEDICATIONS  (STANDING):  acetaminophen   IVPB .. 1000 milliGRAM(s) IV Intermittent every 6 hours    PHYSICAL EXAM:  GENERAL: NAD, well-groomed  HEAD:  Atraumatic, Normocephalic  EYES: EOMI, PERRLA, conjunctiva and sclera clear  ENMT: No oropharyngeal exudates, Moist mucous membranes  NECK: Supple, no cervical lymphadenopathy  NERVOUS SYSTEM:  alert and conversant, moving all extremities spontaneously   CHEST/LUNG: actively coughing, crackles to mid-lung fields bilaterally  HEART: Regular rate and rhythm; No murmurs  ABDOMEN: Soft, Nontender, Nondistended  EXTREMITIES:  2+ radial Pulses, No cyanosis or edema  SKIN: warm, dry    LABS:                        13.1   8.48  )-----------( 246      ( 03 Jun 2022 11:40 )             41.2     06-03    143  |  103  |  12  ----------------------------<  105<H>  3.6   |  27  |  1.16    Ca    10.0      03 Jun 2022 11:40    TPro  8.6<H>  /  Alb  4.0  /  TBili  0.5  /  DBili  x   /  AST  14  /  ALT  16  /  AlkPhos  178<H>  06-03    PT/INR - ( 03 Jun 2022 11:40 )   PT: 12.2 sec;   INR: 1.05 ratio         PTT - ( 03 Jun 2022 11:40 )  PTT:32.5 sec      RADIOLOGY & ADDITIONAL STUDIES:    < from: Xray Chest 1 View- PORTABLE-Urgent (06.03.22 @ 11:56) >    FINDINGS:  Right-sided Mediport with tip in the superior vena cava.  The heart is poorly assessed on this projection..  No focal consolidations.  Moderate sized bilateral pleural effusionswith associated atelectasis.  No pneumothorax    IMPRESSION: Follow-up bilateral effusions without significant change.      < end of copied text >     HPI:  55 yo F with PMH of metastatic endometrial cancer presenting to the ED with complaint of dyspnea.  She was diagnosed with stage I high grade endometrial cancer with predominantly clear cell and focally serous feature, and no lymphovascular invasion. She underwent TLS/BSO/omentectomy. She had 5C of carb-taxol from 6/3/20-9/2/20. Her last cycle was held due to toxicity and myelosuppression.  From 10/16-10/23/20, she had vaginal brachytherapy.   In August 2021, She was found to have new R pleural effusion and elevated . 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   The patient was then started on carbo/taxo/pembro/placebo x C6 from 10/2021 to 2/22. Follow up scans found POD. She was thereafter started on pembro+ lenvima, with C1 on 5/11. Lenvima caused hypertension.  The patient had progressively worsening dyspnea x 3 weeks, with cough, BANUELOS and orthopnea. Her C2 of pembro+ lenvima was thus held. She was advised to go to the ED.     PAST MEDICAL & SURGICAL HISTORY:  Asthma  No attacks since 2017  Uterine cancer S/p chemotherapy, radiation and hysterectomy  S/P hysterectomy 2020 VINCENT/BSO    FAMILY HISTORY:     Social History:    REVIEW OF SYSTEMS  CONSTITUTIONAL: No fever, no chills, no fatigue  EYES: No eye pain, no vision changes  ENMT:  No difficulty hearing, no throat pain  RESPIRATORY: + cough,  + shortness of breath  CARDIOVASCULAR: No chest pain, no palpitations  GASTROINTESTINAL: No abdominal pain, no nausea, no vomiting, no diarrhea, no constipation,  GENITOURINARY: No dysuria, no hematuria  NEUROLOGICAL: No numbness , no loss of strength  SKIN: No itching, no rashes,  HEME/LYMPH: No easy bruising, bleeding    >>> <<<>>> <<<  >>> <<<  Allergies  No Known Allergies  Intolerances    Medications  MEDICATIONS  (STANDING):  acetaminophen   IVPB .. 1000 milliGRAM(s) IV Intermittent every 6 hours    PHYSICAL EXAM:  GENERAL: NAD, well-groomed  HEAD:  Atraumatic, Normocephalic  EYES: EOMI, PERRLA, conjunctiva and sclera clear  ENMT: No oropharyngeal exudates, Moist mucous membranes  NECK: Supple, no cervical lymphadenopathy  NERVOUS SYSTEM:  alert and conversant, moving all extremities spontaneously   CHEST/LUNG: actively coughing, crackles to mid-lung fields bilaterally  HEART: Regular rate and rhythm; No murmurs  ABDOMEN: Soft, Nontender, Nondistended  EXTREMITIES:  2+ radial Pulses, No cyanosis or edema  SKIN: warm, dry    LABS:                        13.1   8.48  )-----------( 246      ( 03 Jun 2022 11:40 )             41.2     06-03    143  |  103  |  12  ----------------------------<  105<H>  3.6   |  27  |  1.16    Ca    10.0      03 Jun 2022 11:40    TPro  8.6<H>  /  Alb  4.0  /  TBili  0.5  /  DBili  x   /  AST  14  /  ALT  16  /  AlkPhos  178<H>  06-03    PT/INR - ( 03 Jun 2022 11:40 )   PT: 12.2 sec;   INR: 1.05 ratio         PTT - ( 03 Jun 2022 11:40 )  PTT:32.5 sec      RADIOLOGY & ADDITIONAL STUDIES:    < from: Xray Chest 1 View- PORTABLE-Urgent (06.03.22 @ 11:56) >    FINDINGS:  Right-sided Mediport with tip in the superior vena cava.  The heart is poorly assessed on this projection..  No focal consolidations.  Moderate sized bilateral pleural effusionswith associated atelectasis.  No pneumothorax    IMPRESSION: Follow-up bilateral effusions without significant change.      < end of copied text >

## 2022-06-03 NOTE — ED ADULT NURSE REASSESSMENT NOTE - NS ED NURSE REASSESS COMMENT FT1
patient received from day RN at 1210 at baseline mental status, appears to be resting in bed comfortably, no complaints noted at this time. Breathing even and unlabored, pallor/diaphoresis not noted. Denies chest pain, shortness of breath, nausea or vomiting. IV site clean dry and intact. All safety maintained.

## 2022-06-03 NOTE — CONSULT NOTE ADULT - ASSESSMENT
ASSESSMENT: 56y Female, never smoker, w/ hx of Uterine cancer dx 2020 s/p chemo s/p VINCENT/BSO, asthma, with recent hospitalization 8/30-9/2/21 for right chest pain found to have persistent pleural effusion now s/p IR drainage during that admission with path positive for metastatic adenocarcinoma. Patient is s/p Right VATS, pleural bx and Pleurx catheter placement on 09/15/2021. 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 Per patient R Pleurex was removed sometime in March. Thoracic Surgery consulted for possible surgical intervention for pleural effusion.     PLAN:    - Recommend admission to medicine  - Possible PTC tomorrow  - NC prn  - Please preop(COVID Swab, BMP w/ mag and phos, CBC, PT, PTT, Type and Screen x2)  - Imaging to be reviewed by Dr. Peters, further plans to follow    Plan discussed with and per Dr. Peters    Thoracic Surgery  [id number]

## 2022-06-03 NOTE — CONSULT NOTE ADULT - ASSESSMENT
This patient is a 57yo lady with PMH of metastatic endometrial cancer presenting to the ED with complaint of dyspnea, with pleural effusions.     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 Will not plan to start systemic therapy while inpatient.   Given that the patient only had 1 cycle of lenvima + pembro, we cannot determine at this point if she has had any improvement/worsening of her disease with this current line of therapy.   - Agree with pursing drainage of pleural effusions. Thoracic surgery team is planning on possibly draining the effusion tomorrow.   - Can access port for use for blood draws and administration of IVF and medications.   - Patient will require further follow up with Dr. Celaya as outpatient.    Note not finalized until signed by attending.   Please do not hesitate to page with questions.     Nell Ibarra MD PGY4   [phone]  Hematology-Oncology Fellow   55 yo F with PMH of metastatic endometrial cancer presenting to the ED with complaint of dyspnea, with pleural effusions.     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 Will not plan to start systemic therapy while inpatient.   Given that the patient only had 1 cycle of lenvima + pembro, we cannot determine at this point if she has had any improvement/worsening of her disease with this current line of therapy.   - Agree with pursing drainage of pleural effusions. Thoracic surgery team is planning on possibly draining the effusion tomorrow.   - Can access port for use for blood draws and administration of IVF and medications.   - Patient will require further follow up with Dr. Celaya as outpatient.    Please do not hesitate to page with questions.     Nell Ibarra MD PGY4   [phone]  Hematology-Oncology Fellow

## 2022-06-03 NOTE — ED PROVIDER NOTE - NS ED ROS FT
Constitutional:  (-) fever, (-) chills, (-) fatigue  Eyes:  (-) eye pain (-) visual changes  ENMT: (-) nasal discharge, (-) sore throat. (-) neck pain or stiffness  Cardiac: (-) chest pain (-) palpitations  Respiratory:  (+) cough (+) shortness of breath  GI:  (-) nausea (-) vomiting (-) diarrhea (-) abdominal pain  :  (-) dysuria (-) frequency (-) burning  MS:  (-) back pain (-) joint pain  Neuro:  (-) headache (-) numbness (-) tingling (-) focal weakness  Skin:  (-) rash  Except as documented in the HPI,  all other systems are negative

## 2022-06-03 NOTE — CONSULT NOTE ADULT - ATTENDING COMMENTS
57 yo F with PMH of metastatic endometrial cancer presenting to the ED with complaint of severe dyspnea and bilateral pleural effusions. She had cycle 1 pembrolizumab three weeks ago and was started on Lenvima. Pembro held this week and Lenvima dose to be lowered. Would hold Lenvima while inpatient and consult pulmonary for bilateral thoracenteses. Please access mediport for use for blood draws and administration of IVF and medications while inpatient. No plan for inpatient chemotherapy and patient will follow up with her primary oncologist Dr. Celaya at [facility] following her discharge.

## 2022-06-03 NOTE — ED PROVIDER NOTE - PHYSICAL EXAMINATION
Gen: Mild resp distress, AAOx3, non-toxic appearing  HEENT: NCAT, normal conjunctiva, oral mucosa moist  Lung: tachypneic, speaking in shortened sentences, diminished bs at the bases  CV: tachycardic regular rhythm. cap refill <2x. peripheral pulses 2+bilaterally   Abd: soft, ND, NT  MSK: no visible deformities  Neuro: No focal deficits  Skin: Intact  Psych: normal affect

## 2022-06-03 NOTE — ED PROVIDER NOTE - ATTENDING CONTRIBUTION TO CARE
lesly 56F uterine CA sp VINCENT with mets to lung, recurrent pleffs, last chemo was wed, prog worsening SOB, prod cough, some chest heaviness worse on exertion.  Saw H/o had CXR showing L pleff.  Brianna pt.  VATS in mar, has pleurx.  Not on AC, no LE swelling, nonpleuritic CP.  Tachypneic here to 30's, cough.  Appears uncomfortable, decr BS bilat.  Rx supplemental oxygen for WOB.  CTA eval for PE.  Thoracics for eval for pleurx.  D/w Gyn Onc at bedside.    VS:  tachycardia tachypnea    GEN - mild distress SOB, unable to lay flat;   A+O x3  Pallor  HEAD - NC/AT     ENT - PEERL, EOMI, mucous membranes    moist , no discharge      NECK: Neck supple, non-tender without lymphadenopathy, no masses, no JVD  PULM - decr BS bilat,  symmetric breath sounds  COR -  normal heart sounds    ABD - , ND, NT, soft,  BACK - no CVA tenderness, nontender spine     EXTREMS - trace edema bilat, no deformity, warm and well perfused    SKIN - no rash    or bruising      NEUROLOGIC - alert, face symmetric, speech fluent, sensation nl, motor no focal deficit.

## 2022-06-03 NOTE — ED PROVIDER NOTE - OBJECTIVE STATEMENT
57 yo F with hx of asthma, Uterine Ca with mets to lung c/b right pleural effusion with VATS and pleurx catheter in March 2022 (now removed) last chemo 6/1, PSHX  VINCENT/BSO) presenting with shortness of breath x 3 days. Patient reports progressively worsening dyspnea on exertion, orthopnea and R sided chest discomfort for weeks, more severe over the last few days. Xray performed and showed worsening L sided pleural effusion. Reports productive cough. Denies fevers/chills, palpitations, leg swelling, nausea, vomiting. Spoke with oncology team today and told to come to the ER.

## 2022-06-03 NOTE — ED ADULT NURSE NOTE - CHIEF COMPLAINT QUOTE
Patient has c/o shortness of breath and fluid on her lungs that has been going on since yesterday and worse today. Pt had her last chemo treatment two days ago. Pt has uterine cancer with mets.

## 2022-06-03 NOTE — ED ADULT TRIAGE NOTE - CHIEF COMPLAINT QUOTE
Patient has c/o shortness of breath and fluid on her lungs that has been going on since yesterday and worse today. Pt had her last chemo treatment two days ago. Patient has c/o shortness of breath and fluid on her lungs that has been going on since yesterday and worse today. Pt had her last chemo treatment two days ago. Pt has uterine cancer with mets.

## 2022-06-03 NOTE — ED PROVIDER NOTE - CARE PLAN
Principal Discharge DX:	Dyspnea  Secondary Diagnosis:	Bilateral pleural effusion  Secondary Diagnosis:	Uterine cancer   1

## 2022-06-04 NOTE — PROCEDURE NOTE - PLAN
- limit drainage of left chest tube to 1L per 24 hours  - follow up results of culture  - remainder of care per primary team.

## 2022-06-04 NOTE — CHART NOTE - NSCHARTNOTEFT_GEN_A_CORE
cxr after failed R PTC attempt stable. Spoke w/ IR and primary team, IR to attemt L PTC as they feel R effusion is chronic and she last more atelectasis from left effusion. Dr Martinez aware.

## 2022-06-04 NOTE — CHART NOTE - NSCHARTNOTEFT_GEN_A_CORE
Patient Age: 56y    Patient Gender: Female    Procedure (including site / side if known): left chest tube    Diagnosis / Indication: left pleural effusion    Interventional Radiology Attending Physician: Dr. Medeiros    Ordering Attending Physician: Dr. Price    Pertinent medical history: 55 yo F with PMH of metastatic endometrial cancer presenting to the ED with complaint of dyspnea, with pleural effusions.       Pertinent labs:               11.1   6.09  )-----------( 202      ( 06-04 @ 10:00 )             35.6       06-04 @ 10:00    140  |  103  |  11  ----------------------------<  137  3.4   |  25  |  0.99    Ca    9.2      06-04 @ 10:00  Phos  3.0     06-04 @ 10:00  Mg     1.60     06-04 @ 10:00    TPro  8.6  /  Alb  4.0  /  TBili  0.5  /  DBili  x   /  AST  14  /  ALT  16  /  AlkPhos  178  06-03 @ 11:40    PT/INR - ( 06-04 @ 10:00 )   PT: 14.9 sec;   INR: 1.28 ratio    PTT - ( 06-04 @ 10:00 )  PTT:31.7 sec      Patient and Family aware: Yes

## 2022-06-04 NOTE — PROGRESS NOTE ADULT - ASSESSMENT
a/p: This 56y female, s/p     CV: hemodynamically stable, H/H  PUL: adequate on RA  GI: tolerating  regular diet  :  voiding with adequate output without dysuria  ID: afebrile, WBC stable  DVT prophylaxis:   Pain Management: controlled on current regimen  d/w [] on morning rounds  -encourage ambulation and OOB to chair  -encourage incentive spirometry  -continue IV fluids  -start d/c planning for home later today. All questions answered, medications sent to pt pharmacy.  Home instructions verbalized by both myself and the RN.  continue with current care    PATTI Sanchez  #[id number] spectra a/p: This 56y female    CV: hemodynamically stable, H/H stable  PUL: adequate on RA  GI: tolerating  regular diet  :  voiding with adequate output without dysuria  ID: afebrile, WBC stable  DVT prophylaxis: SQH  Pain Management: controlled on current regimen  d/w Dr. Wall and Dr. Vázquez on morning rounds  -encourage ambulation and OOB to chair  -encourage incentive spirometry  -continue IV fluids  - appreciate Thoracic surgery recs  continue with current care    Rekha Hernandez, PAC  #[id number] spectra

## 2022-06-04 NOTE — PROCEDURE NOTE - ADDITIONAL PROCEDURE DETAILS
Attempted chest tube placement with 3 providers but could not enter pleural space. Will consult IR for image guided placement

## 2022-06-04 NOTE — CHART NOTE - NSCHARTNOTEFT_GEN_A_CORE
3 Thoracic Surgery providers attempted pigtail catheter placement at the bedside but could not enter pleural space. Recommend IR placement of pigtail catheter.    Bar Prince, PGY-2  Cardiothoracic Surgery  [phone] 3 Thoracic Surgery providers attempted pigtail catheter placement at the bedside but could not enter pleural space. Recommend IR placement of pigtail catheter.    Bar Prince, PGY-2  Cardiothoracic Surgery  [id number]      patient with bilateral pleural effusion - will plan for placement of right sided pigtail unsuccessful . IR to place left sided tube.    will evaluate output and possible need for right vats/pleurx next week

## 2022-06-04 NOTE — PROGRESS NOTE ADULT - SUBJECTIVE AND OBJECTIVE BOX
[facility] Progress Note HD #2    Pt seen, examined at bedside and doing well.. Pt states mild abdominal pain.  Pt denies fever, chills, chest pain, SOB, nausea, vomiting, lightheadedness, dizziness.  Pt states passing flatus,     T(F): 98 (06-04-22 @ 05:10), Max: 99.1 (06-04-22 @ 01:56)  HR: 104 (06-04-22 @ 05:10) (78 - 110)  BP: 123/82 (06-04-22 @ 05:10) (117/86 - 154/114)  RR: 18 (06-04-22 @ 05:10) (17 - 26)  SpO2: 95% (06-04-22 @ 05:10) (92% - 100%)  Wt(kg): --  CAPILLARY BLOOD GLUCOSE    I&O's Detail    03 Jun 2022 07:01  -  04 Jun 2022 05:57  --------------------------------------------------------  IN:  Total IN: 0 mL    OUT:    Voided (mL): 250 mL  Total OUT: 250 mL    Total NET: -250 mL          MEDICATIONS  (STANDING):  acetaminophen   IVPB .. 1000 milliGRAM(s) IV Intermittent every 6 hours  benzonatate 100 milliGRAM(s) Oral three times a day    MEDICATIONS  (PRN):      Physical Exam:  Constitutional: WDWN female, NAD AxOx3  Skin: no breakdowns noted, warm and dry  Chest: s1s2+, RRR, clear to auscultation bilaterally, no w/r/r    Abdomen: softly distended, no guarding, no rebound, [] bowel sounds, appropriate tenderness noted   Incision site:   vertical incision clean and dry with []intact.    Extremities: no lower extremity edema or calf tenderness bilaterally; intermittent compression stockings in place     LABS:             13.1   8.48  )-----------( 246      ( 06-03 @ 11:40 )             41.2                11.4   5.06  )-----------( 171      ( 06-01 @ 11:02 )             35.6       06-03    143    |  103    |  12     ----------------------------<  105<H>  3.6     |  27     |  1.16     Ca    10.0       03 Jun 2022 11:40    TPro  8.6<H>  /  Alb  4.0    /  TBili  0.5    /  DBili  x      /  AST  14     /  ALT  16     /  AlkPhos  178<H>  06-03        PT/INR - ( 03 Jun 2022 11:40 )   PT: 12.2 sec;   INR: 1.05 ratio         PTT - ( 03 Jun 2022 11:40 )  PTT:32.5 sec      RADIOLOGY & ADDITIONAL TESTS:

## 2022-06-04 NOTE — PROCEDURE NOTE - PROCEDURE FINDINGS AND DETAILS
Moderate left pleural effusion. 10F drainage catheter placed under ultrasound guidance. 20cc of robb colored fluid manually aspirated. Sample sent for culture.

## 2022-06-05 NOTE — PROGRESS NOTE ADULT - ASSESSMENT
a/p: This 56y female, s/p     CV: hemodynamically stable, H/H  PUL: adequate on RA  GI: tolerating  regular diet  :  voiding with adequate output without dysuria  ID: afebrile, WBC stable  DVT prophylaxis:   Pain Management: controlled on current regimen  d/w [] on morning rounds  -encourage ambulation and OOB to chair  -encourage incentive spirometry  -continue IV fluids  -start d/c planning for home later today. All questions answered, medications sent to pt pharmacy.  Home instructions verbalized by both myself and the RN.  continue with current care    PATTI Sanchez  #[id number] spectra 57 yo F with hx of asthma, Uterine Ca with mets to lung c/b right pleural effusion with VATS and Pleurx catheter in March 2022 (since removed) last chemo 6/1, s/p VINCENT/BSO who presented to the ED with shortness of breath x 3 days. Patient reports progressively worsening dyspnea on exertion, orthopnea and R sided chest discomfort for weeks, more severe over the last few days. Xray performed and showed worsening L sided pleural effusion. Reports productive cough. Denies fevers/chills, palpitations, leg swelling, nausea, vomiting. Now s/p left chest tube placement for symptomatic relief.    CV: hemodynamically stable, H/H stable  PUL: adequate on RA, pt will need NC2L prn  GI: tolerating clear diet, advance to regular  :  voiding with adequate output without dysuria  ID: afebrile, WBC stable  DVT prophylaxis: SQHeparin, venodynes   Pain Management: controlled on current regimen  d/w Dr. Wall and Dr. Sanabria on morning rounds  -encourage ambulation and OOB to chair   -encourage incentive spirometry  -Appreciate Thoracic surgery involvement and recs  -continue drainage as ordered for Chest Tube  -Monday, Fran Cardoza to evaluate pt for further treatment of effusions   continue with current inpatient care    Rekha Henrandez, PAC  #[id number] spectra

## 2022-06-05 NOTE — PROGRESS NOTE ADULT - SUBJECTIVE AND OBJECTIVE BOX
PA [facility] Progress Note HD #3    Pt seen, examined at bedside and doing well. Pt states mild abdominal pain.  Pt denies fever, chills, chest pain, SOB, nausea, vomiting, lightheadedness, dizziness.  Pt states passing flatus,    T(F): 99.4 (06-05-22 @ 05:48), Max: 99.4 (06-05-22 @ 05:48)  HR: 107 (06-05-22 @ 05:48) (100 - 116)  BP: 136/84 (06-05-22 @ 05:48) (130/78 - 164/106)  RR: 18 (06-05-22 @ 05:48) (18 - 22)  SpO2: 100% (06-05-22 @ 05:48) (87% - 100%)  Wt(kg): --  CAPILLARY BLOOD GLUCOSE    I&O's Detail    03 Jun 2022 07:01  -  04 Jun 2022 07:00  --------------------------------------------------------  IN:  Total IN: 0 mL    OUT:    Voided (mL): 250 mL  Total OUT: 250 mL    Total NET: -250 mL      04 Jun 2022 07:01  -  05 Jun 2022 06:14  --------------------------------------------------------  IN:    Oral Fluid: 250 mL  Total IN: 250 mL    OUT:    Chest Tube (mL): 670 mL    Voided (mL): 900 mL  Total OUT: 1570 mL    Total NET: -1320 mL          MEDICATIONS  (STANDING):  acetaminophen   IVPB .. 1000 milliGRAM(s) IV Intermittent every 6 hours  acetaminophen   IVPB .. 1000 milliGRAM(s) IV Intermittent once  benzonatate 100 milliGRAM(s) Oral three times a day  heparin   Injectable 5000 Unit(s) SubCutaneous every 8 hours    MEDICATIONS  (PRN):  lidocaine   4% Patch 1 Patch Transdermal daily PRN back pain      Physical Exam:  Constitutional: WDWN female, NAD AxOx3  Skin: no breakdowns noted, warm and dry  Chest: s1s2+, RRR, clear to auscultation bilaterally, no w/r/r    Abdomen: softly distended, no guarding, no rebound, [] bowel sounds, appropriate tenderness noted   Incision site:   vertical incision clean and dry with []intact.    Extremities: no lower extremity edema or calf tenderness bilaterally; intermittent compression stockings in place     LABS:             10.7   5.78  )-----------( 212      ( 06-05 @ 04:40 )             33.8                11.1   6.09  )-----------( 202      ( 06-04 @ 10:00 )             35.6                13.1   8.48  )-----------( 246      ( 06-03 @ 11:40 )             41.2       06-05    138    |  103    |  9      ----------------------------<  123<H>  4.3     |  27     |  0.97   06-04    140    |  103    |  11     ----------------------------<  137<H>  3.4<L>   |  25     |  0.99     Ca    9.3        05 Jun 2022 04:40  Ca    9.2        04 Jun 2022 10:00  Phos  3.1       06-05  Phos  3.0       06-04  Mg     1.60      06-05  Mg     1.60      06-04          PT/INR - ( 05 Jun 2022 04:40 )   PT: 14.9 sec;   INR: 1.28 ratio         PTT - ( 05 Jun 2022 04:40 )  PTT:29.8 sec      RADIOLOGY & ADDITIONAL TESTS:    a/p: This 56y female, s/p     CV: hemodynamically stable, H/H  PUL: adequate on RA  GI: tolerating  regular diet  :  voiding with adequate output without dysuria  ID: afebrile, WBC stable  DVT prophylaxis:   Pain Management: controlled on current regimen  d/w [] on morning rounds  -encourage ambulation and OOB to chair  -encourage incentive spirometry  -continue IV fluids  -start d/c planning for home later today. All questions answered, medications sent to pt pharmacy.  Home instructions verbalized by both myself and the RN.  continue with current care    Rekha Hernandez, PAC  #[id number] spectra PA [facility] Progress Note HD #3    Pt seen, examined at bedside and doing well. Pt states mild abdominal pain.  Pt denies fever, chills, chest pain, SOB, nausea, vomiting, lightheadedness, dizziness.  Pt states passing flatus, + BM    T(F): 99.4 (06-05-22 @ 05:48), Max: 99.4 (06-05-22 @ 05:48)  HR: 107 (06-05-22 @ 05:48) (100 - 116)  BP: 136/84 (06-05-22 @ 05:48) (130/78 - 164/106)  RR: 18 (06-05-22 @ 05:48) (18 - 22)  SpO2: 100% (06-05-22 @ 05:48) (87% - 100%)    CAPILLARY BLOOD GLUCOSE    I&O's Detail    03 Jun 2022 07:01  -  04 Jun 2022 07:00  --------------------------------------------------------  IN:  Total IN: 0 mL    OUT:    Voided (mL): 250 mL  Total OUT: 250 mL    Total NET: -250 mL      04 Jun 2022 07:01  -  05 Jun 2022 06:14  --------------------------------------------------------  IN:    Oral Fluid: 250 mL  Total IN: 250 mL    OUT:    Chest Tube (mL): 670 mL    Voided (mL): 900 mL  Total OUT: 1570 mL    Total NET: -1320 mL    MEDICATIONS  (STANDING):  acetaminophen   IVPB .. 1000 milliGRAM(s) IV Intermittent every 6 hours  acetaminophen   IVPB .. 1000 milliGRAM(s) IV Intermittent once  benzonatate 100 milliGRAM(s) Oral three times a day  heparin   Injectable 5000 Unit(s) SubCutaneous every 8 hours    MEDICATIONS  (PRN):  lidocaine   4% Patch 1 Patch Transdermal daily PRN back pain      Physical Exam:  Constitutional: WDWN female, NAD AxOx3  Skin: no breakdowns noted, warm and dry  Chest: s1s2+, RRR, clear to auscultation bilaterally, no w/r/r    Abdomen: softly distended, no guarding, no rebound, + bowel sounds, appropriate tenderness noted   Extremities: no lower extremity edema or calf tenderness bilaterally; intermittent compression stockings in place     LABS:             10.7   5.78  )-----------( 212      ( 06-05 @ 04:40 )             33.8                11.1   6.09  )-----------( 202      ( 06-04 @ 10:00 )             35.6                13.1   8.48  )-----------( 246      ( 06-03 @ 11:40 )             41.2       06-05    138    |  103    |  9      ----------------------------<  123<H>  4.3     |  27     |  0.97   06-04    140    |  103    |  11     ----------------------------<  137<H>  3.4<L>   |  25     |  0.99     Ca    9.3        05 Jun 2022 04:40  Ca    9.2        04 Jun 2022 10:00  Phos  3.1       06-05  Phos  3.0       06-04  Mg     1.60      06-05  Mg     1.60      06-04    PT/INR - ( 05 Jun 2022 04:40 )   PT: 14.9 sec;   INR: 1.28 ratio         PTT - ( 05 Jun 2022 04:40 )  PTT:29.8 sec     PA [facility] Progress Note HD #3    Pt seen, examined at bedside and doing well. Pt states mild abdominal pain.  Pt denies fever, chills, chest pain, SOB, nausea, vomiting, lightheadedness, dizziness.  Pt states passing flatus, + BM    T(F): 99.4 (06-05-22 @ 05:48), Max: 99.4 (06-05-22 @ 05:48)  HR: 107 (06-05-22 @ 05:48) (100 - 116)  BP: 136/84 (06-05-22 @ 05:48) (130/78 - 164/106)  RR: 18 (06-05-22 @ 05:48) (18 - 22)  SpO2: 100% (06-05-22 @ 05:48) (87% - 100%)    CAPILLARY BLOOD GLUCOSE    I&O's Detail    03 Jun 2022 07:01  -  04 Jun 2022 07:00  --------------------------------------------------------  IN:  Total IN: 0 mL    OUT:    Voided (mL): 250 mL  Total OUT: 250 mL    Total NET: -250 mL      04 Jun 2022 07:01  -  05 Jun 2022 06:14  --------------------------------------------------------  IN:    Oral Fluid: 250 mL  Total IN: 250 mL    OUT:    Chest Tube (mL): 670 mL    Voided (mL): 900 mL  Total OUT: 1570 mL    Total NET: -1320 mL    MEDICATIONS  (STANDING):  acetaminophen   IVPB .. 1000 milliGRAM(s) IV Intermittent every 6 hours  acetaminophen   IVPB .. 1000 milliGRAM(s) IV Intermittent once  benzonatate 100 milliGRAM(s) Oral three times a day  heparin   Injectable 5000 Unit(s) SubCutaneous every 8 hours    MEDICATIONS  (PRN):  lidocaine   4% Patch 1 Patch Transdermal daily PRN back pain      Physical Exam:  Constitutional: WDWN female, NAD AxOx3  Skin: no breakdowns noted, warm and dry  Chest: s1s2+, RRR, clear to auscultation bilaterally, no w/r/r, some SOB with speaking full sentences, however pt is satting well 98%RA  Abdomen: softly distended, no guarding, no rebound, + bowel sounds, appropriate tenderness noted   Extremities: no lower extremity edema or calf tenderness bilaterally; intermittent compression stockings in place     LABS:             10.7   5.78  )-----------( 212      ( 06-05 @ 04:40 )             33.8                11.1   6.09  )-----------( 202      ( 06-04 @ 10:00 )             35.6                13.1   8.48  )-----------( 246      ( 06-03 @ 11:40 )             41.2       06-05    138    |  103    |  9      ----------------------------<  123<H>  4.3     |  27     |  0.97   06-04    140    |  103    |  11     ----------------------------<  137<H>  3.4<L>   |  25     |  0.99     Ca    9.3        05 Jun 2022 04:40  Ca    9.2        04 Jun 2022 10:00  Phos  3.1       06-05  Phos  3.0       06-04  Mg     1.60      06-05  Mg     1.60      06-04    PT/INR - ( 05 Jun 2022 04:40 )   PT: 14.9 sec;   INR: 1.28 ratio         PTT - ( 05 Jun 2022 04:40 )  PTT:29.8 sec

## 2022-06-05 NOTE — PROGRESS NOTE ADULT - SUBJECTIVE AND OBJECTIVE BOX
Pt seen with Dr. Martinez this am. Resting in bed  Pt states SOB improved since drainage done in IR  Pt states pain at left PTC site and pain where attempted PTC  sites were on Right side.   Taking pain meds with some relief. Pt states she's hungry and   requesting her diet to be advanced.   Appears to be in no distress    Vital Signs Last 24 Hrs  T(C): 36.8 (05 Jun 2022 09:45), Max: 37.4 (05 Jun 2022 05:48)  T(F): 98.2 (05 Jun 2022 09:45), Max: 99.4 (05 Jun 2022 05:48)  HR: 102 (05 Jun 2022 09:45) (100 - 116)  BP: 138/89 (05 Jun 2022 09:45) (134/73 - 164/106)  BP(mean): --  RR: 18 (05 Jun 2022 09:45) (18 - 22)  SpO2: 95% (05 Jun 2022 09:45) (95% - 100%)    A+O x 3  Dec BS bilat  Left PTC site c/d/i  PTC output 750cc on WS, no air leak  Serous fluid  CXR shows no change on Rt side but improved effusion   on left. No obvious ptx.     A/P;: 55yo F with metastatic Uterine CA now admitted w/ cough, SOB. Found to have   bilat Pleural effusions. Pt with h/o Rt VATS pleurx in Sept 2021 with Dr. Peters with subsequent  removal of Rt pleurx. 6/4-Attempted but unsuccessful placement of Rt PTC. IR then consulted, felt that left  side more amendable to drainage and  pt went to IR for Left PTC last night with drainage of effusion.     -CXR today with some improvement, pt symptoms improved  -Cont daily CXR  -Cont PTC to WS  -Document output Qshift  -Depending on ongoing output amount of left PTC, pt may need eventual VATS/ left Pleurx  -Dr. Peters to review case and scans in am. Will review to see if role for RE-do Rt VATS, drainage and   possible replacement of Rt Pleurx.   Above d/w pt and GYN team with understanding  Will cont to follow.

## 2022-06-05 NOTE — PROGRESS NOTE ADULT - NS ATTEND AMEND GEN_ALL_CORE FT
patient with left sided pigtail and some improvement of shortness of breath.   will have Dr. Peters review scan for possible right vats/pleurx next week.

## 2022-06-05 NOTE — CHART NOTE - NSCHARTNOTEFT_GEN_A_CORE
Patient evaluated at bedside this evening due to new fever to 38.1. Pt endorses SOB and cough, unchanged from prior. Denies abdominal pain, worsening chest pain, fevers, chills, changes in bowel / bladder function (constipated, but +BM last night) or pain / swelling in LE.     Objective  T(C): 38.1 (06-05-22 @ 19:09), Max: 38.1 (06-05-22 @ 19:09)  HR: 119 (06-05-22 @ 18:09) (109 - 119)  BP: 142/85 (06-05-22 @ 18:09) (139/90 - 142/85)  RR: 18 (06-05-22 @ 18:09) (18 - 18)  SpO2: 92% (06-05-22 @ 18:09) (92% - 95%)  Wt(kg): --    06-04 @ 07:01  -  06-05 @ 07:00  --------------------------------------------------------  IN: 250 mL / OUT: 1570 mL / NET: -1320 mL    06-05 @ 07:01  -  06-05 @ 21:53  --------------------------------------------------------  IN: 0 mL / OUT: 60 mL / NET: -60 mL        Gen: Speaking in short sentences 2/2 SOB/tachypnea  CV: tachycardic, regular rate   Pulm: diminished breath sounds in right lung with b/l crackles appreciated at bases; chest tube site on left c/d/i with serous output  Abd: Soft NT  Ext: NT BL, symmetric mild edema with no overlaying skin changes     Tele: spoke with tele tech, normal sinus tachycardia     acetaminophen   IVPB .. 1000 milliGRAM(s) IV Intermittent once  acetaminophen   IVPB .. 1000 milliGRAM(s) IV Intermittent once  benzonatate 100 milliGRAM(s) Oral three times a day  chlorhexidine 2% Cloths 1 Application(s) Topical daily  heparin   Injectable 5000 Unit(s) SubCutaneous every 8 hours  lidocaine   4% Patch 1 Patch Transdermal daily PRN  traMADol 50 milliGRAM(s) Oral every 6 hours PRN    A/P: 57 yo F with hx of asthma, Uterine Ca with mets to lung c/b right pleural effusion with VATS and Pleurx catheter in March 2022 (since removed) last chemo 6/1, s/p VINCENT/BSO who presented to the ED with shortness of breath x 3 days. Pt a/w symptomatic pleural effusions, now s/p left chest tube placement (6/4) with decreased chest pain on left. Pt with new low grade fever (38.1 on 6/5 @ 7p) without change in symptoms, possibly 2/2 inflammatory response with no clear source at this time. Chest tube site c/d/i and draining serous fluid.     - Tylenol PRN for fever  - CBC with diff  - BCx x2  - UCx  - Will continue to monitor closely for new fevers, if persistently febrile with consider empiric Abx treatment       Maria E Ball, PGY-2  d/w Dr Sanabria Patient evaluated at bedside this evening due to new fever to 38.1. Pt endorses SOB and cough, unchanged from prior. Denies abdominal pain, worsening chest pain, fevers, chills, changes in bowel / bladder function (constipated, but +BM last night) or pain / swelling in LE.     Objective  T(C): 38.1 (06-05-22 @ 19:09), Max: 38.1 (06-05-22 @ 19:09)  HR: 119 (06-05-22 @ 18:09) (109 - 119)  BP: 142/85 (06-05-22 @ 18:09) (139/90 - 142/85)  RR: 18 (06-05-22 @ 18:09) (18 - 18)  SpO2: 92% (06-05-22 @ 18:09) (92% - 95%)  Wt(kg): --    06-04 @ 07:01  -  06-05 @ 07:00  --------------------------------------------------------  IN: 250 mL / OUT: 1570 mL / NET: -1320 mL    06-05 @ 07:01  -  06-05 @ 21:53  --------------------------------------------------------  IN: 0 mL / OUT: 60 mL / NET: -60 mL        Gen: Speaking in short sentences 2/2 SOB/tachypnea  CV: tachycardic, regular rate   Pulm: diminished breath sounds in right lung with b/l crackles appreciated at bases; chest tube site on left c/d/i with serous output  Abd: Soft NT  Ext: NT BL, symmetric mild edema with no overlaying skin changes     Tele: spoke with tele tech, normal sinus tachycardia     acetaminophen   IVPB .. 1000 milliGRAM(s) IV Intermittent once  acetaminophen   IVPB .. 1000 milliGRAM(s) IV Intermittent once  benzonatate 100 milliGRAM(s) Oral three times a day  chlorhexidine 2% Cloths 1 Application(s) Topical daily  heparin   Injectable 5000 Unit(s) SubCutaneous every 8 hours  lidocaine   4% Patch 1 Patch Transdermal daily PRN  traMADol 50 milliGRAM(s) Oral every 6 hours PRN    A/P: 55 yo F with hx of asthma, Uterine Ca with mets to lung c/b right pleural effusion with VATS and Pleurx catheter in March 2022 (since removed) last chemo 6/1, s/p VINCENT/BSO who presented to the ED with shortness of breath x 3 days. Pt a/w symptomatic pleural effusions, now s/p left chest tube placement (6/4) with decreased chest pain on left. Pt with new low grade fever (38.1 on 6/5 @ 7p) without change in symptoms, possibly 2/2 inflammatory response with no clear source at this time. Chest tube site c/d/i and draining serous fluid.     - Tylenol PRN for fever  - CBC with diff  - BCx x2  - UCx  - Will continue to monitor closely for new fevers, if persistently febrile with consider empiric Abx treatment       Maria E Ball, PGY-2  d/w Dr Daniele Hernadez: CBC with diff showing no increase in leukocytosis with no left shift, favoring inflammatory over infectious response.

## 2022-06-06 NOTE — DIETITIAN INITIAL EVALUATION ADULT - NSFNSGIIOFT_GEN_A_CORE
06-05-22 @ 07:01  -  06-06-22 @ 07:00  --------------------------------------------------------  OUT:    Chest Tube (mL): 60 mL  Total OUT: 60 mL    Total NET: -60 mL      06-06-22 @ 07:01  -  06-06-22 @ 14:45  --------------------------------------------------------  OUT:    Chest Tube (mL): 5 mL  Total OUT: 5 mL    Total NET: -5 mL

## 2022-06-06 NOTE — DIETITIAN INITIAL EVALUATION ADULT - PERTINENT LABORATORY DATA
06-06    139  |  102  |  12  ----------------------------<  116<H>  3.6   |  25  |  1.00    Ca    9.2      06 Jun 2022 05:44  Phos  3.2     06-06  Mg     1.50     06-06

## 2022-06-06 NOTE — DIETITIAN INITIAL EVALUATION ADULT - PERTINENT MEDS FT
MEDICATIONS  (STANDING):  acetaminophen   IVPB .. 1000 milliGRAM(s) IV Intermittent once  benzonatate 100 milliGRAM(s) Oral three times a day  chlorhexidine 2% Cloths 1 Application(s) Topical daily  heparin   Injectable 5000 Unit(s) SubCutaneous every 8 hours  senna 2 Tablet(s) Oral at bedtime    MEDICATIONS  (PRN):  guaiFENesin Oral Liquid (Sugar-Free) 100 milliGRAM(s) Oral every 6 hours PRN Cough  lidocaine   4% Patch 1 Patch Transdermal daily PRN back pain  traMADol 50 milliGRAM(s) Oral every 6 hours PRN Severe Pain (7 - 10)

## 2022-06-06 NOTE — PROGRESS NOTE ADULT - SUBJECTIVE AND OBJECTIVE BOX
Pt seen this am. Resting in bed. C/o pain at PTC site but  improved with pain meds. Cough/SOB improved  No events overnight.     Vital Signs Last 24 Hrs  T(C): 37.2 (06 Jun 2022 05:24), Max: 38.1 (05 Jun 2022 19:09)  T(F): 98.9 (06 Jun 2022 05:24), Max: 100.6 (05 Jun 2022 19:09)  HR: 99 (06 Jun 2022 05:24) (99 - 119)  BP: 133/74 (06 Jun 2022 05:24) (116/80 - 142/85)  BP(mean): --  RR: 18 (06 Jun 2022 05:24) (18 - 19)  SpO2: 94% (06 Jun 2022 05:24) (92% - 95%)      A+O x 3  Dec BS bilat  left PTC site c/d/i  To waterseal, no air leak  Initial drainage of about 700cc serous fluid at   insertion on Saturday evening. Since then only  about another 50-60cc drained.   CXR- Rt side unchanged. left side w improved effusion.   Pleural fluid culture NGTD    A/P:  57yo F with metastatic Uterine CA now admitted w/ cough, SOB. Found to have   bilat Pleural effusions. Pt with h/o Rt VATS pleurx in Sept 2021 with Dr. Peters with subsequent  removal of Rt pleurx. 6/4-Attempted but unsuccessful placement of Rt PTC. IR then consulted, felt that left  side more amendable to drainage and  pt went to IR for Left PTC last night with drainage of effusion.     -Minimal output from left PTC but keep to waterseal for now.   -FU pleural fluid studies  -Document output Qshift  -CXR in am  -Will d/w Dr. Peters any other surgical plans.   Will follow

## 2022-06-06 NOTE — PROGRESS NOTE ADULT - SUBJECTIVE AND OBJECTIVE BOX
Gyn ONC Progress Note HD#4    Subjective:   Patient seen and examined at bedside.  No acute events overnight. No acute complaints.  Pain well controlled. Patient is ambulating to the bathroom without issue. She is tolerating PO. Patient is passing flatus and voiding spontaneously. Denies lightheadedness, dizziness, CP, SOB, N/V, fevers, and chills.    Objective:  T(F): 98.3 (06-06-22 @ 01:21), Max: 100.6 (06-05-22 @ 19:09)  HR: 102 (06-06-22 @ 01:21) (102 - 119)  BP: 116/80 (06-06-22 @ 01:21) (116/80 - 142/85)  RR: 19 (06-06-22 @ 01:21) (18 - 19)  SpO2: 94% (06-06-22 @ 01:21) (92% - 95%)  Wt(kg): --    I&O's Summary  05 Jun 2022 07:01  -  06 Jun 2022 07:00  --------------------------------------------------------  IN: 180 mL / OUT: 240 mL / NET: -60 mL    MEDICATIONS  (STANDING):  acetaminophen   IVPB .. 1000 milliGRAM(s) IV Intermittent once  benzonatate 100 milliGRAM(s) Oral three times a day  chlorhexidine 2% Cloths 1 Application(s) Topical daily  heparin   Injectable 5000 Unit(s) SubCutaneous every 8 hours  magnesium sulfate  IVPB 2 Gram(s) IV Intermittent once  potassium chloride    Tablet ER 20 milliEquivalent(s) Oral every 2 hours    MEDICATIONS  (PRN):  lidocaine   4% Patch 1 Patch Transdermal daily PRN back pain  traMADol 50 milliGRAM(s) Oral every 6 hours PRN Severe Pain (7 - 10)      Physical Exam:  Constitutional: NAD, A+O x3  CV: RRR  Lungs: tachypneic; dec. Bs at bases b/l;   Abdomen: soft, nondistended, no guarding/rebound, normal bowel sounds  Extremities: no lower extremity edema or calf tenderness bilaterally; venodynes in place    LABS:               10.2   5.27  )-----------( 200      ( 06-06 @ 05:44 )             32.1                10.6   6.96  )-----------( 236      ( 06-05 @ 21:51 )             33.3                10.7   5.78  )-----------( 212      ( 06-05 @ 04:40 )             33.8     06-06  139    |  102    |  12     ----------------------------<  116<H>  3.6     |  25     |  1.00     06-05  138    |  103    |  9      ----------------------------<  123<H>  4.3     |  27     |  0.97     Ca    9.2        06 Jun 2022 05:44  Ca    9.3        05 Jun 2022 04:40  Phos  3.2       06-06  Phos  3.1       06-05  Mg     1.50      06-06  Mg     1.60      06-05    PT/INR - ( 05 Jun 2022 04:40 )   PT: 14.9 sec;   INR: 1.28 ratio    PTT - ( 05 Jun 2022 04:40 )  PTT:29.8 sec

## 2022-06-06 NOTE — PROGRESS NOTE ADULT - ASSESSMENT
57y/o  w/ progressive Clear Cell/Serous Uterine Carcinoma who presented to the ED complaining of SOB and chest pain found to have bilateral pleural effusions admitted to the GYN Oncology service for mgmt of malignant effusions. Patient now s/p placement of a left chest tube by IR, being followed by CT surgery. Patient currently in stable condition, VSS, mild inc work of breathing and tachypnea, improved since chest tube placement.     Onc: Recurrent Clear Cell/Serous Uterine Carcinoma w/ POD  - Heme/Onc: Keytruda and Lenvima being held 2/2 side effects; chemo plan to be re-evaluated outpt with Dr. Celaya  - CT surgery: to evaluate for placement of Pleurx (unilateral vs bilateral)  Neuro: pain well controlled on Tylenol, Tramadol prn  CV: hemodynamically stable, f/u AM CBC  Pulm: O2 sat wnl on RA, increase ambulation   - chest tube in place, limit outpt to no more than 1L per day  GI: tolerating reg diet, Zofran prn  : Voiding spontaneously, UOP adequate  FEN: f/u BMP, replete electrolytes prn  Heme: HSQ and SCDs while in bed for DVT ppx; transition to Lovenox pending AM labs  ID: febrile overnight to 38.1 ( @7p), no signs of infection  - f/u BCx/UCx ()  Dispo: continue routine inpatient care, appreciate excellent Heme/Onc and CT surgery care    Patient seen and examined by gyn-onc team and attending.    NORMAN Holcomb MD PGY2

## 2022-06-06 NOTE — CHART NOTE - NSCHARTNOTEFT_GEN_A_CORE
Pt seen with DR. Peters  Images reviewed  Recommend rpt non Contrast CT of   chest tomorrow to eval for residual fluid  Further recommendations to follow based on   results of CT scan.  Discussed plan w pt who is in agreement

## 2022-06-07 NOTE — PROGRESS NOTE ADULT - SUBJECTIVE AND OBJECTIVE BOX
pt seen and examined w thoracic surgery team on AM rounds  Subjective: "I feel better since the drain but I am still short of breath"      Vital Signs:  Vital Signs Last 24 Hrs  T(C): 36.8 (06-07-22 @ 05:14), Max: 37.2 (06-06-22 @ 16:55)  T(F): 98.3 (06-07-22 @ 05:14), Max: 99 (06-06-22 @ 16:55)  HR: 102 (06-07-22 @ 05:14) (102 - 105)  BP: 118/56 (06-07-22 @ 05:14) (104/71 - 133/81)  RR: 17 (06-07-22 @ 05:14) (17 - 18)  SpO2: 97% (06-07-22 @ 05:14) (97% - 100%) on (O2)      General: WN/WD NAD  Neurology: Awake, nonfocal, BU[place] x 4  Eyes: Scleras clear, PERRLA/ EOMI, Gross vision intact  ENT:Gross hearing intact, grossly patent pharynx, no stridor  Neck: Neck supple, trachea midline, No JVD,   Respiratory: CTA B/L, No wheezing, rales, rhonchi  CV: RRR, S1S2, no murmurs, rubs or gallops  Abdominal: Soft, NT, ND +BS,   Extremities: No edema, + peripheral pulses  Skin: No Rashes, Hematoma, Ecchymosis  Lymphatic: No Neck, axilla, groin LAD  Psych: Oriented x 3, normal affect  Incisions: c,d,i  Tubes: L PTC by IR on waterseal, no air leak  Relevant labs, radiology and Medications reviewed                        9.7    6.68  )-----------( 224      ( 07 Jun 2022 06:57 )             30.4     06-07    139  |  101  |  11  ----------------------------<  117<H>  3.8   |  25  |  1.01    Ca    9.3      07 Jun 2022 06:57  Phos  2.9     06-07  Mg     1.50     06-07        MEDICATIONS  (STANDING):  acetaminophen   IVPB .. 1000 milliGRAM(s) IV Intermittent once  benzonatate 100 milliGRAM(s) Oral three times a day  chlorhexidine 2% Cloths 1 Application(s) Topical daily  heparin   Injectable 5000 Unit(s) SubCutaneous every 8 hours  magnesium sulfate  IVPB 2 Gram(s) IV Intermittent once  potassium chloride   Powder 20 milliEquivalent(s) Oral every 2 hours  senna 2 Tablet(s) Oral at bedtime    MEDICATIONS  (PRN):  guaiFENesin Oral Liquid (Sugar-Free) 100 milliGRAM(s) Oral every 6 hours PRN Cough  lidocaine   4% Patch 1 Patch Transdermal daily PRN back pain  traMADol 50 milliGRAM(s) Oral every 6 hours PRN Severe Pain (7 - 10)    Pertinent Physical Exam  I&O's Summary    06 Jun 2022 07:01  -  07 Jun 2022 07:00  --------------------------------------------------------  IN: 710 mL / OUT: 1255 mL / NET: -545 mL        Assessment  56y Female  w/ PAST MEDICAL & SURGICAL HISTORY:  Asthma  No attacks since 2017      Uterine cancer  S/p chemotherapy, radiation and hysterectomy      S/P hysterectomy  2020 [facility]ENT/BSO      56y old  Female who presents with a chief complaint of Dyspnea  found to have B/L effusion  thoracic surgery attempted R PTC at bedside but were not able to place   pt went for IR placement of L PTC yesterday     PLAN  Neuro: Pain management  Pulm: Encourage coughing, deep breathing and use of incentive spirometry. Wean off supplemental oxygen as able. Daily CXR.   Cardio: Monitor telemetry/alarms  GI: Tolerating diet. Continue stool softeners.  Renal: monitor urine output, supplement electrolytes as needed  Vasc: Heparin SC/SCDs for DVT prophylaxis  Heme: Stable H/H. .   ID: Off antibiotics. Stable.  Therapy: OOB/ambulate  Tubes: Monitor Chest tube output  Disposition: Please obtain repeat CT chest - ordered  Discussed with Cardiothoracic Team at AM rounds.  Continue care per primary team, will follow

## 2022-06-07 NOTE — PROGRESS NOTE ADULT - ASSESSMENT
57y/o  w/ progressive Clear Cell/Serous Uterine Carcinoma who presented to the ED complaining of SOB and chest pain found to have bilateral pleural effusions admitted to the GYN Oncology service for mgmt of malignant effusions. Patient now s/p placement of a left chest tube by IR, being followed by CT surgery. Patient currently in stable condition, VSS, mild inc work of breathing and tachypnea, improved since chest tube placement. Pt for CT chest today in consideration of placement of Pleurx.     Onc: Recurrent Clear Cell/Serous Uterine Carcinoma w/ POD  - Heme/Onc: Keytruda and Lenvima being held 2/2 side effects; chemo plan to be re-evaluated outpt with Dr. Celaya  - CT surgery: to evaluate for placement of Pleurx (unilateral vs bilateral)  Neuro: pain well controlled on Tylenol, Tramadol prn  CV: hemodynamically stable, f/u AM CBC  Pulm: O2 sat wnl on RA, increase ambulation   - chest tube in place, limit outpt to no more than 1L per day. Output of 5cc overnight.   - For CT chest today in consideration of placement of Pleurx.  GI: tolerating reg diet, Zofran prn  : Voiding spontaneously, UOP adequate  FEN: f/u BMP, replete electrolytes prn  Heme: HSQ and SCDs while in bed for DVT ppx;   ID: Afebrile overnight. Tmax 38.1 (6/5 @7p), no signs of infection  - f/u BCx (6/5) - NGTD   - Urine culture demonstrates contamination. Repeat if symptoms develop.   Dispo: continue routine inpatient care, appreciate excellent Heme/Onc and CT surgery care    Kandi Hargrove, PGY-1

## 2022-06-07 NOTE — PROGRESS NOTE ADULT - SUBJECTIVE AND OBJECTIVE BOX
HD#5    Subjective:   Pt seen and examined at bedside. No events overnight. SOB is improved, but still present. Reports palpitations with ambulation. Denies dyspnea/chest pain. Tolerating regular diet. Passing flatus .Pt denies fever, chills, nausea, vomiting, lightheadedness, dizziness.      Objective:  T(F): 98.3 (06-07-22 @ 05:14), Max: 99.5 (06-06-22 @ 08:50)  HR: 102 (06-07-22 @ 05:14) (102 - 105)  BP: 118/56 (06-07-22 @ 05:14) (104/71 - 134/78)  RR: 17 (06-07-22 @ 05:14) (17 - 18)  SpO2: 97% (06-07-22 @ 05:14) (96% - 100%)  Wt(kg): --  I&O's Summary    06 Jun 2022 07:01  -  07 Jun 2022 07:00  --------------------------------------------------------  IN: 710 mL / OUT: 1255 mL / NET: -545 mL      CAPILLARY BLOOD GLUCOSE        MEDICATIONS  (STANDING):  acetaminophen   IVPB .. 1000 milliGRAM(s) IV Intermittent once  benzonatate 100 milliGRAM(s) Oral three times a day  chlorhexidine 2% Cloths 1 Application(s) Topical daily  heparin   Injectable 5000 Unit(s) SubCutaneous every 8 hours  senna 2 Tablet(s) Oral at bedtime    MEDICATIONS  (PRN):  guaiFENesin Oral Liquid (Sugar-Free) 100 milliGRAM(s) Oral every 6 hours PRN Cough  lidocaine   4% Patch 1 Patch Transdermal daily PRN back pain  traMADol 50 milliGRAM(s) Oral every 6 hours PRN Severe Pain (7 - 10)      Physical Exam:  Constitutional: NAD, A+O x3  CV: Regular rate/ rhythm. +2 pulses. On continuos tele monitoring.    Lungs: Short shallow breaths. Labored. Diffuse crackles throughout lower lung field Worse R>L. L chest tube in place.   Abdomen: soft, softly-distended, appropriately-tender. no guarding, no rebound, normal bowel sounds  Extremities: no lower extremity edema or calf tenderness bilaterally;     LABS:  06-06    139    |  102    |  12     ----------------------------<  116<H>  3.6     |  25     |  1.00     Ca    9.2        06 Jun 2022 05:44  Phos  3.2       06-06  Mg     1.50      06-06

## 2022-06-08 NOTE — PROGRESS NOTE ADULT - SUBJECTIVE AND OBJECTIVE BOX
Subjective:  Patient states she is feeling "okay". Patient admits to mild SOB and difficulty catching her breath. States she gets out of breath walking to the bathroom in her room. Denies fevers, chills, or significant chest pain.    Objective:  Patient seen at bedside. Pt sitting up in bed, in mild pain, unable to lower head of bead without pain.  Pt with shallow breathing, no rhonchi or rales. Difficulty holding breath. CT performed showing R multiloculated Pleural Effusion, minimal L sided effusion.  Abdomen soft, non-tender, non-distended.  L chest tube removed. Patient with significant pain upon removal. Ordered for tylenol.  Xeroform dressing placed at chest tube site. Dressing c,d,i.    Assessment:  57 y/o F admitted with Uterine Ca. Pt with b/l pleural effusions.    Plan:  Continue plan of care as per primary team.  Continue pain control regiment.  Follow up CXR pm and tomorrow morning.  Discussion of possible VATS in OR Friday with Pleurx placement.    -JUDE Link [id number]

## 2022-06-08 NOTE — PROGRESS NOTE ADULT - SUBJECTIVE AND OBJECTIVE BOX
HD#6    Subjective:   Pt seen and examined at bedside. No events overnight. Pt refused tele monitoring. Reports feeling palpations with exertion.  Denies chest pain, lightheadedness, dizziness, dyspnea. Pt reports she feels more SOB as the day progresses. Not as bothersome this morning. Long periods of conversation bring of a cough w/ associated nausea. Tolerating regular diet, passing flatus.     Objective:  T(F): 98.4 (06-08-22 @ 06:19), Max: 99.9 (06-07-22 @ 20:41)  HR: 103 (06-08-22 @ 06:19) (65 - 105)  BP: 124/84 (06-08-22 @ 06:19) (119/78 - 135/76)  RR: 19 (06-08-22 @ 06:19) (18 - 20)  SpO2: 97% (06-08-22 @ 06:19) (93% - 99%)  Wt(kg): --  I&O's Summary    06 Jun 2022 07:01  -  07 Jun 2022 07:00  --------------------------------------------------------  IN: 710 mL / OUT: 1255 mL / NET: -545 mL    07 Jun 2022 07:01  -  08 Jun 2022 06:52  --------------------------------------------------------  IN: 960 mL / OUT: 1553 mL / NET: -593 mL      CAPILLARY BLOOD GLUCOSE          MEDICATIONS  (STANDING):  acetaminophen   IVPB .. 1000 milliGRAM(s) IV Intermittent once  acetaminophen   IVPB .. 1000 milliGRAM(s) IV Intermittent once  benzonatate 100 milliGRAM(s) Oral three times a day  chlorhexidine 2% Cloths 1 Application(s) Topical daily  heparin   Injectable 5000 Unit(s) SubCutaneous every 8 hours  senna 2 Tablet(s) Oral at bedtime    MEDICATIONS  (PRN):  guaiFENesin Oral Liquid (Sugar-Free) 100 milliGRAM(s) Oral every 6 hours PRN Cough  lidocaine   4% Patch 1 Patch Transdermal daily PRN back pain  traMADol 50 milliGRAM(s) Oral every 6 hours PRN Severe Pain (7 - 10)      Physical Exam:  Constitutional: NAD, A+O x3  CV: Regular rate/ rhythm. +2 pulses.  Lungs: Short shallow breaths. Less labored from day prior. Diffuse crackles throughout lower lung field Worse R>L. L chest tube in place. 5 cc of yellow fluid collected.   Abdomen: soft, softly-distended, appropriately-tender. no guarding, no rebound, normal bowel sounds  Extremities: no lower extremity edema or calf tenderness bilaterally;     LABS:  06-07    139    |  101    |  11     ----------------------------<  117<H>  3.8     |  25     |  1.01     Ca    9.3        07 Jun 2022 06:57  Phos  2.9       06-07  Mg     1.50      06-07

## 2022-06-08 NOTE — PROGRESS NOTE ADULT - ASSESSMENT
55y/o  w/ progressive Clear Cell/Serous Uterine Carcinoma who presented to the ED complaining of SOB and chest pain found to have bilateral pleural effusions admitted to the GYN Oncology service for mgmt of malignant effusions. Patient now s/p placement of a left chest tube by IR, being followed by CT surgery. Patient currently in stable condition, VSS, mild inc work of breathing and tachypnea, improved since chest tube placement. In consideration of Pleurx     Onc: Recurrent Clear Cell/Serous Uterine Carcinoma w/ POD  - Heme/Onc: Keytruda and Lenvima being held 2/2 side effects; chemo plan to be re-evaluated outpt with Dr. Celaya  - CT surgery: to evaluate for placement of Pleurx (unilateral vs bilateral)  Neuro: pain well controlled on Tylenol, Tramadol prn  CV: hemodynamically stable, f/u AM CBC. Plan to resume tele monitoring this am.   Pulm: O2 sat wnl on RA, increase ambulation   - chest tube in place, limit outpt to no more than 1L per day. Output of 5cc overnight.   - CT chest as above. Demonstrated interval improvement for L sided pleural effusions. In consideration of Pleurx   GI: tolerating reg diet, Zofran prn  : Voiding spontaneously, UOP adequate  FEN: f/u BMP, replete electrolytes prn  Heme: HSQ and SCDs while in bed for DVT ppx;   ID: Afebrile overnight. Tmax 38.1 ( @7p), no signs of infection  - f/u BCx () - NGTD   - Urine culture demonstrates contamination. Repeat if symptoms develop.   Dispo: continue routine inpatient care, appreciate excellent Heme/Onc and CT surgery care    Kandi Hargrove, PGY-1

## 2022-06-09 NOTE — PROGRESS NOTE ADULT - SUBJECTIVE AND OBJECTIVE BOX
HD#7     Subjective:   Pt seen and examined at bedside. Chest tube removed yesterday without complication. No events overnight. Pt reports no pain this morning. SOB and palpitations with ambulating, not present at rest. Denies chest pain or dyspnea. Pt denies lightheadedness, dizziness.  Tolerating regular diet, passing flatus and voiding spontaneously.     Objective:  T(F): 98.2 (06-09-22 @ 00:15), Max: 99 (06-08-22 @ 20:02)  HR: 101 (06-09-22 @ 00:15) (93 - 106)  BP: 94/67 (06-09-22 @ 00:15) (94/67 - 143/79)  RR: 18 (06-09-22 @ 00:15) (18 - 18)  SpO2: 100% (06-09-22 @ 00:15) (95% - 100%)  Wt(kg): --  I&O's Summary    07 Jun 2022 07:01  -  08 Jun 2022 07:00  --------------------------------------------------------  IN: 960 mL / OUT: 1553 mL / NET: -593 mL    08 Jun 2022 07:01  -  09 Jun 2022 06:39  --------------------------------------------------------  IN: 0 mL / OUT: 405 mL / NET: -405 mL      CAPILLARY BLOOD GLUCOSE      MEDICATIONS  (STANDING):  acetaminophen   IVPB .. 1000 milliGRAM(s) IV Intermittent every 6 hours  acetaminophen   IVPB .. 1000 milliGRAM(s) IV Intermittent once  acetaminophen   IVPB .. 1000 milliGRAM(s) IV Intermittent once  benzonatate 100 milliGRAM(s) Oral three times a day  chlorhexidine 2% Cloths 1 Application(s) Topical daily  heparin   Injectable 5000 Unit(s) SubCutaneous every 8 hours  senna 2 Tablet(s) Oral at bedtime    MEDICATIONS  (PRN):  guaiFENesin Oral Liquid (Sugar-Free) 100 milliGRAM(s) Oral every 6 hours PRN Cough  lidocaine   4% Patch 1 Patch Transdermal daily PRN back pain  traMADol 50 milliGRAM(s) Oral every 6 hours PRN Severe Pain (7 - 10)      Physical Exam:  Constitutional: NAD, A+O x3. Sitting straight up in bed.   CV: RR S1S2 no m/r/g  Lungs: Short shallow breaths. Less labored than day prior.   Abdomen: soft, not tender or distended, normal bowel sounds  Extremities: no lower extremity edema or calf tenderness bilaterally;      LABS:  06-08    138    |  102    |  11     ----------------------------<  101<H>  3.8     |  25     |  0.96     Ca    9.2        08 Jun 2022 06:18  Phos  3.3       06-08  Mg     1.40      06-08

## 2022-06-09 NOTE — DISCHARGE NOTE PROVIDER - NSDCFUADDINST_GEN_ALL_CORE_FT
Discharge Instructions:  1. Diet: advance as tolerated  2. No activity limitations   3. Medications:   - Senna to prevent constipation (please hold for loose stools)  - Ibuprofen 600mg every 6 hours as needed for pain  - Acetaminophen 500mg every 6 hours as needed for pain  - Oxycodone 5mg every 8 hours as needed for breakthrough pain.   4. Follow-up appointment - 2 weeks. Please call the office upon discharge to schedule your appointment if you do not have one already.   5. Precautions:  - Call the office or go to the ED if you have any of the followin) Fever >100.4 that does not resolve  2) Intractable pain  3) Unable to catch your breath   4) Unable to tolerate liquids  Discharge Instructions:  1. Diet: as tolerated  2. No activity limitations   3. Medications:   - continue with Miralax to prevent constipation (please hold for loose stools)  - Tramadol 50mg every 6 hours as needed for pain  - Lidocaine patch for back pain, use daily as needed. DO NOT put in same place each day.  - Ibuprofen 600mg every 6 hours as needed for pain  - Prednisone taper doses: 40mg x2 days/30mg x 3days/20mg x 3days/10mg x3 days  - Acetaminophen 1000mg every 6 hours as needed for pain  4. Follow-up appointments  - Dr. Price - 2 weeks. Please call the office upon discharge to schedule your appointment if you do not have one already.   - Dr. Peters - 1 week to have Pleurx catheter placed as out patient  - Pulmonologist - 2 week.   5. Precautions:  - Call the office or go to the ED if you have any of the followin) Fever >100.4 that does not resolve  2) Intractable pain  3) Unable to catch your breath   4) Unable to tolerate liquids

## 2022-06-09 NOTE — DISCHARGE NOTE PROVIDER - CARE PROVIDERS DIRECT ADDRESSES
[email or web address]

## 2022-06-09 NOTE — CHART NOTE - NSCHARTNOTEFT_GEN_A_CORE
Patient Age: 56y    Patient Gender: Female    Procedure (including site / side if known): Thoracentesis    Diagnosis / Indication: pleural effusion    Interventional Radiology Attending Physician: Dr. Bryson    Ordering Attending Physician: Dr. Price    Pertinent medical history: recurrent uterine carcinoma    Pertinent labs:               9.3    5.36  )-----------( 238      ( 06-09 @ 08:25 )             29.2       06-09 @ 08:25    136  |  100  |  12  ----------------------------<  101  3.9   |  25  |  0.97    Ca    9.2      06-09 @ 08:25  Phos  3.3     06-09 @ 08:25  Mg     1.50     06-09 @ 08:25            Patient and Family aware: Yes

## 2022-06-09 NOTE — DISCHARGE NOTE PROVIDER - NSDCCPCAREPLAN_GEN_ALL_CORE_FT
PRINCIPAL DISCHARGE DIAGNOSIS  Diagnosis: Dyspnea  Assessment and Plan of Treatment:       SECONDARY DISCHARGE DIAGNOSES  Diagnosis: Bilateral pleural effusion  Assessment and Plan of Treatment:     Diagnosis: Uterine cancer  Assessment and Plan of Treatment:      PRINCIPAL DISCHARGE DIAGNOSIS  Diagnosis: Dyspnea  Assessment and Plan of Treatment: s/p left chest tube and right thoracentesis      SECONDARY DISCHARGE DIAGNOSES  Diagnosis: Bilateral pleural effusion  Assessment and Plan of Treatment:     Diagnosis: Uterine cancer  Assessment and Plan of Treatment:

## 2022-06-09 NOTE — DISCHARGE NOTE PROVIDER - CARE PROVIDER_API CALL
Susy Price)  Gynecologic Oncology; Obstetrics and Gynecology  [address] [place] [address]  Phone: [phone]  Fax: [phone]  Follow Up Time: 2 weeks   Susy Price)  Gynecologic Oncology; Obstetrics and Gynecology  [address] [place] [address]  Phone: [phone]  Fax: [phone]  Established Patient  Follow Up Time: 2 weeks    Stevan Peters)  Surgery; Thoracic Surgery  [address] [place] [address] [place] [address]  Phone: [phone]  Fax: [phone]  Established Patient  Follow Up Time: 1 week

## 2022-06-09 NOTE — DISCHARGE NOTE PROVIDER - NSDCACTIVITY_GEN_ALL_CORE
Showering allowed/Stairs allowed/Walking - Indoors allowed/Walking - Outdoors allowed Do not drive or operate machinery/Showering allowed/Stairs allowed/Walking - Indoors allowed/No heavy lifting/straining/Walking - Outdoors allowed/Follow Instructions Provided by your Surgical Team

## 2022-06-09 NOTE — DISCHARGE NOTE PROVIDER - NSDCMRMEDTOKEN_GEN_ALL_CORE_FT
cyclobenzaprine 5 mg oral tablet: 1 tab(s) orally 3 times a day, As needed, Muscle Spasm  polyethylene glycol 3350 oral powder for reconstitution: 17 gram(s) orally once a day  prochlorperazine 10 mg oral tablet: orally every 6 hours  senna oral tablet: 2 tab(s) orally once a day (at bedtime)   acetaminophen 325 mg oral tablet: 3 tab(s) orally every 6 hours, As needed, Mild Pain (1 - 3)  cyclobenzaprine 5 mg oral tablet: 1 tab(s) orally 3 times a day, As needed, Muscle Spasm  polyethylene glycol 3350 oral powder for reconstitution: 17 gram(s) orally once a day  prochlorperazine 10 mg oral tablet: orally every 6 hours   acetaminophen 325 mg oral tablet: 3 tab(s) orally every 6 hours, As needed, Mild Pain (1 - 3)  cyclobenzaprine 5 mg oral tablet: 1 tab(s) orally 3 times a day, As needed, Muscle Spasm  lidocaine 4% topical film: Apply topically to affected area once a day MDD:1  12 hours on/12 hours off. DO NOT put in same location each day  polyethylene glycol 3350 oral powder for reconstitution: 17 gram(s) orally once a day  predniSONE 10 mg oral tablet: Take as directed:  40mg for 2 days  30mg for 3 days  20mg for 3 days  10mg for 3 days  predniSONE 20 mg oral tablet: Take as directed:  40mg for 2 days  30mg for 3 days  20mg for 3 days  10mg for 3 days  prochlorperazine 10 mg oral tablet: orally every 6 hours  traMADol 50 mg oral tablet: 1 tab(s) orally every 6 hours, As needed, Severe Pain (7 - 10) MDD:4

## 2022-06-09 NOTE — DISCHARGE NOTE PROVIDER - NSRESEARCHGRANT_OVERRIDEREC_GEN_A_CORE
Active cancer (i.e. undergoing acute, in-hospital cancer treatment) Active cancer (i.e. undergoing acute, in-hospital cancer treatment)/This is a surgical and/or non-medical patient.

## 2022-06-09 NOTE — DISCHARGE NOTE PROVIDER - NSDCFUSCHEDAPPT_GEN_ALL_CORE_FT
Mirtha Celaya  [facility]  Scheduled Appointment: 06/22/2022    [facility]  Scheduled Appointment: 06/22/2022    YANELIS Salvador  [address]  Scheduled Appointment: 08/01/2022

## 2022-06-09 NOTE — PROGRESS NOTE ADULT - ASSESSMENT
55y/o  w/ progressive Clear Cell/Serous Uterine Carcinoma who presented to the ED complaining of SOB and chest pain found to have bilateral pleural effusions admitted to the GYN Oncology service for mgmt of malignant effusions. Patient now s/p placement of a left chest tube by IR, being followed by CT surgery. Patient currently in stable condition, VSS, mild inc work of breathing and tachypnea, improved since chest tube placement. Chest tube removed yesterday.     Onc: Recurrent Clear Cell/Serous Uterine Carcinoma w/ POD  - Heme/Onc: Keytruda and Lenvima being held 2/2 side effects; chemo plan to be re-evaluated output with Dr. Celaya  Neuro: pain well controlled on Tylenol, Tramadol prn  CV: hemodynamically stable, s/p tele monitoring. F/u AM CBC.    Pulm: O2 sat wnl on RA, increase ambulation   - Chest tube removed on .  - CT chest (). Demonstrated interval improvement for L sided pleural effusions. In consideration of Pleurx   - Malignant pleural effusions: Awaiting Thoracic recs regarding long term management of effusions Pleurx vs pleurodesis)   GI: tolerating reg diet, Zofran prn  : Voiding spontaneously, UOP adequate  FEN: S/p Magnesium repletion. F/u BMP,   Heme: HSQ and SCDs while in bed for DVT ppx;   ID: Afebrile overnight. Tmax 38.1 ( @7p), no signs of infection  - f/u BCx () - NGTD   - Urine culture demonstrates contamination. Repeat if symptoms develop.   Dispo: Pending CT surgery recommendations.     Kandi Hargrove, PGY-1

## 2022-06-09 NOTE — DISCHARGE NOTE PROVIDER - PROVIDER TOKENS
PROVIDER:[TOKEN:[[id number]],FOLLOWUP:[2 weeks]] PROVIDER:[TOKEN:[[id number]],FOLLOWUP:[2 weeks],ESTABLISHEDPATIENT:[T]],PROVIDER:[TOKEN:[[id number]],FOLLOWUP:[1 week],ESTABLISHEDPATIENT:[T]]

## 2022-06-09 NOTE — CONSULT NOTE ADULT - SUBJECTIVE AND OBJECTIVE BOX
-----------------------------------------------------------  Interventional Radiology Brief Consult Note  -----------------------------------------------------------    Reason for Referral: Shortness of breath, bilateral malignant pleural effusion    HPI: 57y/o  w/ a PMHx of recurrent Clear Cell/Serous Uterine Carcinoma presenting to the ED complaining of SOB and chest pain.     Patient reports chest pain and SOB that has worsened over the past few days. She has been unable to sleep lying down and reports a productive cough. She has not had any abdominal pain. She was recently started on a new chemotherapy regimen with Keytruda and Lenvima after being found to have POD on imaging and exam. Treatment was stopped secondary to side effects. She was sent in by Dr. Celaya to be evaluated after being seen in office with increased work of breathing and dec. O2 saturation. Currently, she reports sever SOB, BANUELOS, CP, and right sided chest discomfort. She denies lightheadedness, dizziness, HA, N/V. No weight loss, early satiety, abdominal girth, or changes in bowel or bladder habits.     Vitals:  T(F): 98.2 (22 @ 16:31), Max: 99 (22 @ 20:02)  HR: 106 (22 @ 16:31) (100 - 106)  BP: 132/80 (22 @ 16:31) (94/67 - 140/75)  RR: 18 (22 @ 16:31) (16 - 18)  SpO2: 100% (22 @ 16:31) (95% - 100%)    Labs:           9.3  5.36)-----(238     (22 @ 08:25)         29.2     136 | 100 | 12  --------------------< 101     (22 @ 08:25)  3.9 | 25 | 0.97       PT: 14.9<H> 22 @ 04:40  aPTT: 29.8 22 @ 04:40   INR: 1.28<H> 22 @ 04:40    Imaging:  < from: CT Chest No Cont (22 @ 16:31) >  IMPRESSION: Small multiloculated left pleural effusion with interval   decrease in size since Yanna 3, 2022following insertion of a left pigtail   pleural catheter. Interval improved aeration of the left lower lobe.    Moderate sized multiloculated right pleural effusion appears unchanged   with associated pleural thickening and right lower lobe partial   compressive atelectasis.    A few left upper lobe nodular and patchy nonspecific groundglass   opacities new since Yanna 3, 2022. Differential diagnosis include but is   not limited to infectious/inflammatory etiologies or mild asymmetric   pulmonary edema.    < end of copied text >

## 2022-06-09 NOTE — PROGRESS NOTE ADULT - SUBJECTIVE AND OBJECTIVE BOX
Subjective:  Patient is "feeling okay today". Patient states she was in pain last night but her pain medication helped a bit. Admits to SOB and dyspnea when walking to bathroom. Denies fevers, chills or significant chest pain.    Objective:  Pt seen at bedside, sitting up in NAD.  Patient in mild pain, received tylenol which helped.  Patient tachypneic and tachycardic. No apparent airway compromise.  Patient pleasant and agreeable.  R chest tube removal site dressing c,d,i.    Vital Signs Last 24 Hrs  T(C): 37.1 (09 Jun 2022 06:50), Max: 37.2 (08 Jun 2022 20:02)  T(F): 98.7 (09 Jun 2022 06:50), Max: 99 (08 Jun 2022 20:02)  HR: 101 (09 Jun 2022 06:50) (93 - 106)  BP: 121/65 (09 Jun 2022 06:50) (94/67 - 143/79)  BP(mean): --  RR: 16 (09 Jun 2022 06:50) (16 - 18)  SpO2: 100% (09 Jun 2022 06:50) (95% - 100%)    Assessment:  55 y/o Female admitted with uterine Ca. CT consulted for B/L pleural effusions. Pt with increased Pt had R pigtail removed on 6/8. Subjective:  Patient is "feeling okay today". Patient states she was in pain last night but her pain medication helped a bit. Admits to SOB and dyspnea when walking to bathroom. Denies fevers, chills or significant chest pain.    Objective:  Pt seen at bedside, sitting up in NAD.  Patient in mild pain, received tylenol which helped.  Patient tachypneic and tachycardic. No apparent airway compromise.  Patient pleasant and agreeable.  R chest tube removal site dressing c,d,i.  CT Chest completed and reviewed.    Vital Signs Last 24 Hrs  T(C): 37.1 (09 Jun 2022 06:50), Max: 37.2 (08 Jun 2022 20:02)  T(F): 98.7 (09 Jun 2022 06:50), Max: 99 (08 Jun 2022 20:02)  HR: 101 (09 Jun 2022 06:50) (93 - 106)  BP: 121/65 (09 Jun 2022 06:50) (94/67 - 143/79)  BP(mean): --  RR: 16 (09 Jun 2022 06:50) (16 - 18)  SpO2: 100% (09 Jun 2022 06:50) (95% - 100%)    Assessment:  57 y/o Female admitted with uterine Ca. CT consulted for B/L pleural effusions. Pt with increased Pt had R pigtail removed on 6/8. Patient continues to have increased work of breathing and HR, not explained by pulmonary edema.    Plan:  Pulmonary consulted. Will follow up.  Transesophageal Echocardiogram ordered.  IR for R pigtail placement today.  Continue pain control regiment. Encourage ambulation as tolerated.  Continue Care per primary team.  Will follow closely.    -JUDE Link [id number] Subjective:  Patient is "feeling okay today". Patient states she was in pain last night but her pain medication helped a bit. Admits to SOB and dyspnea when walking to bathroom. Denies fevers, chills or significant chest pain.    Objective:    Vital Signs Last 24 Hrs  T(C): 37.1 (09 Jun 2022 06:50), Max: 37.2 (08 Jun 2022 20:02)  T(F): 98.7 (09 Jun 2022 06:50), Max: 99 (08 Jun 2022 20:02)  HR: 101 (09 Jun 2022 06:50) (93 - 106)  BP: 121/65 (09 Jun 2022 06:50) (94/67 - 143/79)  BP(mean): --  RR: 16 (09 Jun 2022 06:50) (16 - 18)  SpO2: 100% (09 Jun 2022 06:50) (95% - 100%)          Pt seen at bedside, sitting up in NAD.  Patient in mild pain, received tylenol which helped.  Patient tachypneic and tachycardic. No apparent airway compromise.  Patient pleasant and agreeable.  L chest tube removal site dressing c,d,i.            CT Chest completed and reviewed.        Assessment:  55 y/o Female admitted with uterine Ca. CT consulted for B/L pleural effusions. Thoracic attempted bedside placement of R PTC but unsuccessful.  Pt had L pigtail placed on 6/4 and removed on 6/8.   Patient continues to have increased work of breathing       Plan:  Recommend IR consult for drainage of R effusion  Pulmonary consulted. Will follow up.  Transthoracic Echocardiogram ordered to evaluate other source of dyspnea  Continue pain control regiment.  Encourage ambulation as tolerated.  Continue Care per primary team.  Will follow closely.  above d/w primary team    -JUDE Link [id number]

## 2022-06-09 NOTE — DISCHARGE NOTE PROVIDER - HOSPITAL COURSE
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 On presentation to the ED, patient found to be severely tachypneic, mildly tachycardic, without significant lab abnormalities or signs of a PE. Heme/Onc was consulted and the decision was made to told inpatient systemic chemotherapy. Thoracic surgery was consulted for drainage of pleural effusions. On HD#2 an unsuccessful attempt was made to place R chest tube. IR was consulted and a left chest tube was placed without complication.  On HD#3 pt reports improvement of SOB. In the afternoon patient had new low grade fever without change in symptoms, blood and urine cultures were drawn. Urine cultures were contaminated.  Blood cultures were ***. **(Pt remained afebrile for the remainder of hospital stay)** On HD# 5 pt had CT chest in consideration of placement of Pleurx which demonstrated  interval improvement for L sided pleural effusions. In consideration of Pleurx. On HD#6, chest tube was removed. IR was reconsulted for drainage of R sided pleural effusions.      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 On presentation to the ED, patient found to be severely tachypneic, mildly tachycardic, without significant lab abnormalities or signs of a PE. Heme/Onc was consulted and the decision was made to hold inpatient systemic chemotherapy. Thoracic surgery was consulted for drainage of pleural effusions. On HD#2 an unsuccessful attempt was made to place R chest tube. IR was consulted and a left chest tube was placed without complication.  On HD#3 pt reports improvement of SOB. In the afternoon patient had new low grade fever without change in symptoms, blood and urine cultures were drawn. Urine cultures were contaminated.  Blood cultures showed no growth. **(Pt remained afebrile for the remainder of hospital stay)** On HD# 5 pt had CT chest in consideration of placement of Pleurx which demonstrated  interval improvement for L sided pleural effusions. On HD#6, chest tube was removed. IR was reconsulted for drainage of R sided pleural effusions. On 6/10 (HD#8), patient underwent IR thoracentesis, which she tolerated well.          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 On presentation to the ED, patient found to be severely tachypneic, mildly tachycardic, without significant lab abnormalities or signs of a PE. Heme/Onc was consulted and the decision was made to hold inpatient systemic chemotherapy. Thoracic surgery was consulted for drainage of pleural effusions.   On HD#2 an unsuccessful attempt was made to place R chest tube. IR was consulted and a left chest tube was placed without complication.  On HD#3 pt reports improvement of SOB. In the afternoon patient had new low grade fever without change in symptoms, blood and urine cultures were drawn. Urine cultures were contaminated.  Blood cultures showed no growth. Pt remained afebrile for the remainder of hospital stay.   On HD# 5 pt had CT chest in consideration of placement of Pleurx which demonstrated  interval improvement for L sided pleural effusions. On HD#6, chest tube was removed. IR was reconsulted for drainage of R sided pleural effusions. On 6/10 (HD#8), patient underwent IR thoracentesis, which she tolerated well. Afterwards, patient felt better almost immediately. Pt was able to breath better. Pulmonary was consulted as well and offered patient to start a course of tapering prednisone. Pt will need to follow up with both pulmonary and CT surgery, Dr. Peters within one week of discharge.   On HD#9. CT surgery as well as our team cleared patient for discharge. Patient was given d/c instructions by both teams. Pt will have a close follow up with both teams as well    Vital Signs Last 24 Hrs  T(C): 37 (2022 07:13), Max: 37.6 (10 [place] 2022 17:16)  T(F): 98.6 (2022 07:13), Max: 99.6 (10 [place] 2022 17:16)  HR: 98 (2022 07:13) (98 - 114)  BP: 124/68 (2022 07:13) (123/81 - 150/61)  RR: 18 (2022 07:13) (16 - 20)  SpO2: 97% (2022 07:13) (93% - 97%)               9.0    5.04  )-----------( 253      (  @ 05:30 )             28.1                9.5    6.02  )-----------( 274      ( 06-10 @ 06:28 )             29.7                9.3    5.36  )-----------( 238      (  @ 08:25 )             29.2

## 2022-06-09 NOTE — CONSULT NOTE ADULT - ASSESSMENT
Assessment: 56y Female with history of uterine carcinoma, pleural metastases, and recently worsened malignant pleural effusions. IR placed a pigtail drain on the left side last week, which has since been removed. Discussion included consideration of palliative value of thoracentesis vs. chest tube, in view of removal of other chest tube shortly after placement. After discussion with Dr. Bryson, plan is for a thoracentesis 6/10/22 with IR with local anesthesia.     Recommendations:  -- hold Px anticoagulation  -- please maintain COVID PCR within 72 hours of planned procedure   -- please place IR procedure request order under Dr. Bryson.   -- Write IR pre procedure note.   -- AM labs and coags       Discussed with Dr. Bryson.

## 2022-06-10 NOTE — CHART NOTE - NSCHARTNOTEFT_GEN_A_CORE
S: Pt seen at bedside with Dr Peters. Pt just recently returned from IR s/p thoracentesis with drainage of right recurrent pleural effusion. per pt breathing is much improved but now just c.o pain at thoracentesis site as well as cough post drainage.     ICU Vital Signs Last 24 Hrs  T(C): 37.6 (10 [place] 2022 17:16), Max: 37.6 (10 [place] 2022 17:16)  T(F): 99.6 (10 [place] 2022 17:16), Max: 99.6 (10 [place] 2022 17:16)  HR: 114 (10 [place] 2022 17:16) (74 - 114)  BP: 150/61 (10 [place] 2022 17:16) (120/62 - 150/61)  BP(mean): --  ABP: --  ABP(mean): --  RR: 20 (10 [place] 2022 17:16) (16 - 23)  SpO2: 94% (10 [place] 2022 17:16) (90% - 100%)    Neuro: A+O x 3, non-focal, speech clear and intact  CV: regular rate, regular rhythm,   Pulm/chest:no accessory muscle use note      A/P:   57 y/o Female admitted with uterine Ca. CT consulted for B/L pleural effusions. Thoracic attempted bedside placement of R PTC but unsuccessful.  Pt had L pigtail placed on 6/4 and removed on 6/8.   Patient continues to have increased work of breathing    6/10: s/p R thoracentesis by IR      Plan:  Dr Peters had discussion with Dr Susy Price form primary team   From thoracic standpoint now that patient has been drained, can consider discharging patient once medically cleared and stable over next day or so.   If patient discharged from Dr Lutz service, recommend patient have a follow up with Dr Peters in the outpatient office to discussed future plans for possible replacement of PleurX       Above discussed with Patient at bedside  Thoracic   [id number]

## 2022-06-10 NOTE — PROGRESS NOTE ADULT - ASSESSMENT
55y/o  w/ progressive Clear Cell/Serous Uterine Carcinoma who presented to the ED complaining of SOB and chest pain found to have bilateral pleural effusions admitted to the GYN Oncology service for mgmt of malignant effusions. Patient now s/p placement of a left chest tube by IR, being followed by CT surgery. Patient currently in stable condition, VSS, mild inc work of breathing and tachypnea, improved since chest tube placement.     Onc: Recurrent Clear Cell/Serous Uterine Carcinoma w/ POD  - Heme/Onc: Keytruda and Lenvima being held 2/2 side effects; chemo plan to be re-evaluated output with Dr. Celaya  - Malignant pleural effusions with symptomatic improvement s/p Chest tube placement        -- s/p Chest tube (-)       -- CT chest (): Demonstrated interval improvement for L sided pleural effusions       -- CT surg rec IR consult at this time       -- IR plans for thoracentesis today; order and labs placed  Neuro: pain well controlled on Tylenol, Tramadol prn  CV: hemodynamically stable, s/p tele monitoring. F/u AM CBC.    Pulm: O2 sat wnl on RA, encourage ambulation   GI: tolerating reg diet, Zofran prn  : Voiding spontaneously, UOP adequate  FEN: S/p Magnesium repletion. F/u BMP,   Heme: HSQ and SCDs while in bed for DVT ppx;   ID: Afebrile overnight. Tmax 38.1 ( @7p), no signs of infection  - f/u BCx () - NGTD   - Urine culture demonstrates contamination. Repeat if symptoms develop.   Dispo: continue routine inpatient care, plan for IR procedure today    Seen with GYN Oncology team and attending.     Zhang PGY2

## 2022-06-10 NOTE — PROGRESS NOTE ADULT - SUBJECTIVE AND OBJECTIVE BOX
PULMONARY PROGRESS NOTE    ALEXANDER HUDDLESTON  MRN-[id number]    Patient is a 56y old  Female who presents with a chief complaint of SOB secondary to bilateral pleural effusions.  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 Peters with subsequent removal of Rt pleurx,  recently s/p left PTC placement on 64 and removed on 6/8. TODAY, she is now s/p thoracentesis by IR of right effusion.    continues to feel dyspnea. specifically now feeling pain with inspiration and at the thoracentesis site.   she is on room air. mild resp discomfort. no history of asthma. never smoker. never seen pulm not on any inhalers       -    ROS:   -    ACTIVE MEDICATION LIST:  MEDICATIONS  (STANDING):  benzonatate 100 milliGRAM(s) Oral three times a day  chlorhexidine 2% Cloths 1 Application(s) Topical daily  senna 2 Tablet(s) Oral at bedtime    MEDICATIONS  (PRN):  acetaminophen     Tablet .. 975 milliGRAM(s) Oral every 6 hours PRN Mild Pain (1 - 3)  guaiFENesin Oral Liquid (Sugar-Free) 100 milliGRAM(s) Oral every 6 hours PRN Cough  lidocaine   4% Patch 1 Patch Transdermal daily PRN back pain  traMADol 50 milliGRAM(s) Oral every 6 hours PRN Severe Pain (7 - 10)      EXAM:  Vital Signs Last 24 Hrs  T(C): 37.6 (10 [place] 2022 17:16), Max: 37.6 (10 [place] 2022 17:16)  T(F): 99.6 (10 [place] 2022 17:16), Max: 99.6 (10 [place] 2022 17:16)  HR: 114 (10 [place] 2022 17:16) (74 - 114)  BP: 150/61 (10 [place] 2022 17:16) (120/62 - 150/61)  BP(mean): --  RR: 20 (10 [place] 2022 17:16) (16 - 23)  SpO2: 94% (10 [place] 2022 17:16) (90% - 100%)    GENERAL: The patient is awake and alert in no apparent distress.     LUNGS: poor effort, no wheeze    HEART: Regular rate     no pitting edema                           9.5    6.02  )-----------( 274      ( 10 [place] 2022 06:28 )             29.7       06-10    136  |  99  |  11  ----------------------------<  111<H>  3.7   |  24  |  0.96    Ca    9.3      10 [place] 2022 06:28  Phos  3.2     06-10  Mg     1.70     06-10     rad< from: Xray Chest 1 View- PORTABLE-Routine (Xray Chest 1 View- PORTABLE-Routine in AM.) (06.09.22 @ 08:12) >    ACC: [id number] EXAM:  XR CHEST PORTABLE ROUTINE 1V                          PROCEDURE DATE:  06/09/2022          INTERPRETATION:  Chest one view    HISTORY: Pleural effusion    COMPARISON STUDY: 6/8/2022    Frontal expiratory view of the chest showsthe heart to be similar in   size. Right chest port is again noted.    The lungs show partial clearing of the lower lungs with slight reduction   of right effusion and there is no evidence of pneumothorax nor definite   left pleural effusion.    IMPRESSION:  Partial clearing of the lungs as noted.        Thank you for the courtesy of this referral.    --- End of Report ---            ALVARADO CARRANZA MD; Attending Interventional Radiologist  This document has been electronically signed. Jun 9 2022 1:38PM    < end of copied text >  < from: CT Chest No Cont (06.07.22 @ 16:31) >  HEST                          PROCEDURE DATE:  06/07/2022          INTERPRETATION:  Clinical indication: Uterine cancer, symptomatic pleural   effusions.    Axial CT images of the chest are obtained without intravenous   administration of contrast.    Comparison is made with the chest CT of Yanna 3, 2022.    Right upper anterior chest wall Mediport within the right atrium.    No enlarged axillary, mediastinal or hilar lymph nodes. No pericardial   effusion.    Evaluationof the upper abdomen demonstrate high density within the   partially imaged gallbladder which may be due to vicarious excretion of   intravenous contrast. Ascites as on the prior chest CT. Subcentimeter   hypodensities within the partially imaged liver not well characterized on   this unenhanced chest CT.    Moderate sized multiloculated right pleural effusion is without   significant interval change associated with right pleural thickening.   Interval placement of a left pigtail pleural catheter. Small left pleural   effusion with areas of loculation demonstrate interval decrease in size.   No pneumothorax.    Evaluation of the lung parenchyma demonstrate right lower lobe partial   compressive atelectasis minimally progressed since the priorstudy. Left   lower lobe linear and compressive atelectasis has improved. Other   bilateral mid to upper lung areas of linear atelectasis are without   significant interval change.    A few left upper lobe ill-defined nonspecific peripheral groundglass   nodular and patchy opacities are new since Yanna 3, 2022.    No central endobronchial lesions.    Mild degenerative changes of the spine.    IMPRESSION: Small multiloculated left pleural effusion with interval   decrease in size since Yanna 3, 2022following insertion of a left pigtail   pleural catheter. Interval improved aeration of the left lower lobe.    Moderate sized multiloculated right pleural effusion appears unchanged   with associated pleural thickening and right lower lobe partial   compressive atelectasis.    A few left upper lobe nodular and patchy nonspecific groundglass   opacities new since Yanna 3, 2022. Differential diagnosis include but is   not limited to infectious/inflammatory etiologies or mild asymmetric   pulmonary edema.    Ascites as on Yanna 3, 2022    --- End of Report ---            ALEX TRAMMELL MD; Attending Radiologist  This document has been electronically signed. Jun 7 2022  4:45P    < end of copied text >      PROBLEM LIST:  56y Female with HEALTH ISSUES - PROBLEM Dx:  Malignant pleural effusion         RECS:  dyspnea multifactorial- pleural effusions vs. metastatic disease vs. chemo agent?  despite fluid removal remains dyspneic  acceptable room air pulse ox  can consider a trial of prednisone- since pembro can cause pneumonitis. would defer to heme onco   check TTE  check ambulatory saturations  rest of care as per CTS/ gyn onco       Please call with any questions.    Kassidy Tracy, DO  [facility]  [phone]

## 2022-06-10 NOTE — PRE PROCEDURE NOTE - PRE PROCEDURE EVALUATION
HPI:  56y Female with history of uterine carcinoma, pleural metastases, and recently worsened malignant pleural effusions. IR placed a pigtail drain on the left side last week, which has since been removed. Discussion included consideration of palliative value of thoracentesis vs. chest tube, in view of removal of other chest tube shortly after placement.    Plan to perform right thoracentesis today.     Allergies: No Known Allergies    PAST MEDICAL & SURGICAL HISTORY:  Asthma  No attacks since 2017  Uterine cancer  S/p chemotherapy, radiation and hysterectomy  S/P hysterectomy  2020 VINCENT/BSO    Pertinent labs:                      9.5    6.02  )-----------( 274      ( 10 [place] 2022 06:28 )             29.7   06-10    136  |  99  |  11  ----------------------------<  111<H>  3.7   |  24  |  0.96    Ca    9.3      10 [place] 2022 06:28  Phos  3.2     06-10  Mg     1.70     06-10    PT/INR - ( 10 [place] 2022 06:28 )   PT: 13.5 sec;   INR: 1.16 ratio    PTT - ( 10 [place] 2022 06:28 )  PTT:29.8 sec    Consent: Procedure/risks/ Benefits explained. Informed consent obtained. Pt verbalizes understanding.

## 2022-06-10 NOTE — CHART NOTE - NSCHARTNOTESELECT_GEN_ALL_CORE
GYN - IR Procedure note
Gyn Onc Torres
Thoracic Surgery
Thoracic surgery
thoracic surgery PA/Event Note
IR preprocedure note/Event Note
Thoracic surgery/Event Note

## 2022-06-10 NOTE — PROGRESS NOTE ADULT - SUBJECTIVE AND OBJECTIVE BOX
Gyn ONC Progress Note HD#8    Subjective:   Patient seen and examined at bedside.  No acute events overnight. No acute complaints.  Pain well controlled. Patient is ambulating and tolerating PO. She is passing flatus and voiding spontaneously. Denies lightheadedness, dizziness, CP, SOB, N/V, fevers, and chills.    Objective:  T(F): 99.3 (06-10-22 @ 05:59), Max: 99.3 (06-10-22 @ 05:53)  HR: 114 (06-10-22 @ 05:53) (74 - 114)  BP: 147/78 (06-10-22 @ 05:59) (126/68 - 147/78)  RR: 23 (06-10-22 @ 05:59) (16 - 23)  SpO2: 93% (06-10-22 @ 05:59) (90% - 100%)  Wt(kg): --    I&O's Summary  08 Jun 2022 07:01  -  09 Jun 2022 07:00  --------------------------------------------------------  IN: 0 mL / OUT: 405 mL / NET: -405 mL    09 Jun 2022 07:01  -  10 [place] 2022 06:59  --------------------------------------------------------  IN: 1465 mL / OUT: 650 mL / NET: 815 mL      MEDICATIONS  (STANDING):  acetaminophen   IVPB .. 1000 milliGRAM(s) IV Intermittent once  acetaminophen   IVPB .. 1000 milliGRAM(s) IV Intermittent once  benzonatate 100 milliGRAM(s) Oral three times a day  chlorhexidine 2% Cloths 1 Application(s) Topical daily  lactated ringers. 1000 milliLiter(s) (75 mL/Hr) IV Continuous <Continuous>  senna 2 Tablet(s) Oral at bedtime    MEDICATIONS  (PRN):  guaiFENesin Oral Liquid (Sugar-Free) 100 milliGRAM(s) Oral every 6 hours PRN Cough  lidocaine   4% Patch 1 Patch Transdermal daily PRN back pain  traMADol 50 milliGRAM(s) Oral every 6 hours PRN Severe Pain (7 - 10)      Physical Exam:  Constitutional: NAD, A+O x3  CV: RRR  Lungs: normal respiratory effort, dec Bs at bases  Abdomen: soft, nondistended, no guarding, no rebound, normal bowel sounds  Extremities: no lower extremity edema or calf tenderness bilaterally; venodynes in place    LABS:               9.5    6.02  )-----------( 274      ( 06-10 @ 06:28 )             29.7                9.3    5.36  )-----------( 238      ( 06-09 @ 08:25 )             29.2                9.2    5.28  )-----------( 229      ( 06-08 @ 06:18 )             29.2     06-09  136    |  100    |  12     ----------------------------<  101<H>  3.9     |  25     |  0.97     Ca    9.2        09 Jun 2022 08:25  Phos  3.3       06-09  Mg     1.50      06-09    PT/INR - ( 10 [place] 2022 06:28 )   PT: 13.5 sec;   INR: 1.16 ratio    PTT - ( 10 [place] 2022 06:28 )  PTT: 29.8 sec

## 2022-06-11 NOTE — PROGRESS NOTE ADULT - ASSESSMENT
a/p: This 56y female, s/p     CV: hemodynamically stable, H/H  PUL: adequate on RA  GI: tolerating  regular diet  :  voiding with adequate output without dysuria  ID: afebrile, WBC stable  DVT prophylaxis:   Pain Management: controlled on current regimen  d/w [] on morning rounds  -encourage ambulation and OOB to chair  -encourage incentive spirometry  -continue IV fluids  -start d/c planning for home later today. All questions answered, medications sent to pt pharmacy.  Home instructions verbalized by both myself and the RN.  continue with current care    PATTI Sanchez  #[id number] spectra 55 y/o Female admitted with uterine Ca. CT consulted for B/L pleural effusions. Thoracic attempted bedside placement of right PTC but unsuccessful.  Pt had L pigtail placed on 6/4 and removed on 6/8. Patient continued to have increased work of breathing 6/10/22 s/p R thoracentesis by IR, dyspnea much improved    CV: hemodynamically stable, H/H stable  PUL: adequate on RA  GI: tolerating regular diet  :  voiding with adequate output without dysuria  ID: afebrile, WBC stable  DVT prophylaxis: SQ Heparin, venodynes and ambulation  Pain Management: controlled on current regimen  d/w Dr. Olmos and Dr. Manzano on morning rounds  -encourage ambulation and OOB to chair  -encourage incentive spirometry  -appreciate CT surgery recs - pt is cleared for discharge by team  -appreciate Pulm recs - pt to go home on Prednisone taper  -start d/c planning for home later today. All questions answered, medications sent to pt pharmacy.  Home instructions verbalized by both myself and the RN. Pt will have close follow up with both Dr. Price, and Dr. Peters. She will see Dr. Peters this coming week for possible Pleurx placement  continue with current care    Rekha Hernandez, PAC  #[id number] spectra

## 2022-06-11 NOTE — PROGRESS NOTE ADULT - PROVIDER SPECIALTY LIST ADULT
CT Surgery
Gyn Onc
Pulmonology
Pulmonology
Thoracic Surgery
CT Surgery
Gyn Onc
Thoracic Surgery
Thoracic Surgery
Gyn Onc
Thoracic Surgery
Gyn Onc
Gyn Onc

## 2022-06-11 NOTE — PROGRESS NOTE ADULT - NUTRITIONAL ASSESSMENT
This patient has been assessed with a concern for Malnutrition and has been determined to have a diagnosis/diagnoses of Severe protein-calorie malnutrition.    This patient is being managed with:   Diet Regular-  No Beef  Entered: Jun 5 2022  9:40AM    
This patient has been assessed with a concern for Malnutrition and has been determined to have a diagnosis/diagnoses of Severe protein-calorie malnutrition.    This patient is being managed with:   Diet Regular-  No Beef  Entered: Jun 5 2022  9:40AM    
This patient has been assessed with a concern for Malnutrition and has been determined to have a diagnosis/diagnoses of Severe protein-calorie malnutrition.    This patient is being managed with:   Diet NPO after Midnight-     NPO Start Date: 09-Jun-2022   NPO Start Time: 23:59  Entered: Jun 9 2022  6:46PM    Diet Regular-  No Beef  Entered: Jun 5 2022  9:40AM    
This patient has been assessed with a concern for Malnutrition and has been determined to have a diagnosis/diagnoses of Severe protein-calorie malnutrition.    This patient is being managed with:   Diet Regular-  No Beef  Entered: Jun 5 2022  9:40AM    

## 2022-06-11 NOTE — PROGRESS NOTE ADULT - ATTENDING COMMENTS
Patient seen and evaluated.   Agree with above assessment and plan.
chest tube in place  sxs improved  plan to speak with IR about permanent pleurax placement
Patient seen and examined at bedside, agree with above note, including assessment and plan. Abdomen benign. Discussed today's plan of care with patient, all questions answered.
Patient seen and examined, agree with gyn housestaff
Patient seen and examined, agree with gyn onc PA and fellow
patient seen and evaluated  plan for drainage today with CT surgery  pleurax placement on R side  covid test done

## 2022-06-11 NOTE — PROGRESS NOTE ADULT - PROBLEM SELECTOR PLAN 1
GYN ONC Fellow Addendum:    Pt seen and examined at bedside. Agree with above. Pain controlled. SOB improved. CT minimal output.    VS reviewed  Labs reviewed    - Continue current pain regimen  - Malignant pleural effusions: Awaiting Thoracic recs regarding removal of chest tube, and long term management of effusions (pleurx vs pleurodesis)   - Encourage ambulation and IS use  - Replete lytes prn  - DVT ppx: lovenox  - OOB  - Dispo: pending thoracic recs    ZACKERY Vázquez, PGY6
GYN ONC Fellow Addendum:    Pt seen and examined at bedside. Agree with above. Pain controlled. SOB unchanged.     VS reviewed  No Labs     - consult IR for R drainage per thoracic  - po analgesia prn  - Reg diet  - Encourage ambulation and IS use  - Replete lytes prn  - DVT ppx: lovenox  - Dispo: continue inpatient management    ZACKERY Vázquez, PGY6
GYN ONC Fellow Addendum:    Pt seen and examined at bedside. Agree with above. Pain controlled. Breathing unchanged.     VS reviewed  Labs reviewed    - Plan for IR drainage today  - Appreciate thoracic recs for longterm management of effusions  - Encourage ambulation and IS use  - Replete lytes prn  - DVT ppx: lovenox  - Dispo: continue inpatient management    ZACKERY Vázquez, PGY6
GYN ONC Fellow Addendum:    Pt seen and examined at bedside. Agree with above. Pain controlled. SOB improved.     VS reviewed  Labs reviewed    - Continue current pain regimen  - CT chest this AM  - Appreciate Thoracic recs for long term management of malignant effusions  - Encourage ambulation and IS use  - Replete lytes prn  - DVT ppx: lovenox  - OOB  - Dispo: continue inpatient management    ZACKERY Vázquez, PGY6
GYN ONC Fellow Addendum:    Pt seen and examined at bedside. Agree with above. Reports SOB unchanged. O2 titrated overnight.     VS reviewed  Labs Pending    - Continue current pain regimen  - Diet as tolerated  - Thoracic surgery for pigtail placement  - Encourage ambulation and IS use  - Replete lytes prn  - DVT ppx: held for procedure  - OOB  - Dispo: continue inpatient management    ZACKERY Vázquez, PGY6
Fellow Note    Patient seen and examined. Agree with above. No complaints    VS reviewed  Labs reviewed    Reg diet  OOB  VTE ppx  PO analgesia  Appreciate CTS recs. Plan for dc home with outpatient follow-up with Dr. Fajardo  Appreciate pulm recs- steroids started 6/10, will clarify taper regimen    Jose Juan STEELE
GYN ONC Fellow Addendum:    Pt seen and examined at bedside. Agree with above. Pain controlled. Some discomfort at chest tube site. SOB improved.    VS reviewed  Labs reviewed    - Continue current pain regimen  - Reg diet  - Appreciate Thoracic recs  - Encourage ambulation and IS use  - Replete lytes prn  - DVT ppx: lovenox  - Dispo: continue inpatient management    ZACKERY Vázquez, PGY6

## 2022-06-11 NOTE — PROGRESS NOTE ADULT - PROBLEM SELECTOR PROBLEM 1
Malignant pleural effusion

## 2022-06-11 NOTE — PROGRESS NOTE ADULT - SUBJECTIVE AND OBJECTIVE BOX
[facility] Progress Note HD #9    Pt seen, examined at bedside and doing well. Pt states mild abdominal pain.  Pt denies fever, chills, chest pain, SOB, nausea, vomiting, lightheadedness, dizziness.  Pt states passing flatus, + BM    T(F): 98.6 (06-11-22 @ 00:23), Max: 99.6 (06-10-22 @ 17:16)  HR: 103 (06-11-22 @ 00:23) (103 - 114)  BP: 123/81 (06-11-22 @ 00:23) (120/62 - 150/61)  RR: 19 (06-11-22 @ 00:23) (16 - 20)  SpO2: 97% (06-11-22 @ 00:23) (93% - 97%)  Wt(kg): --  CAPILLARY BLOOD GLUCOSE    I&O's Detail    09 Jun 2022 07:01  -  10 [place] 2022 07:00  --------------------------------------------------------  IN:    Lactated Ringers: 825 mL    Oral Fluid: 640 mL  Total IN: 1465 mL    OUT:    Voided (mL): 650 mL  Total OUT: 650 mL    Total NET: 815 mL      10 [place] 2022 07:01  -  11 Jun 2022 06:04  --------------------------------------------------------  IN:    Oral Fluid: 550 mL  Total IN: 550 mL    OUT:    Stool (mL): 0 mL    Voided (mL): 1350 mL  Total OUT: 1350 mL    Total NET: -800 mL          MEDICATIONS  (STANDING):  benzonatate 100 milliGRAM(s) Oral three times a day  chlorhexidine 2% Cloths 1 Application(s) Topical daily  heparin   Injectable 5000 Unit(s) SubCutaneous every 8 hours  predniSONE   Tablet 60 milliGRAM(s) Oral daily  senna 2 Tablet(s) Oral at bedtime    MEDICATIONS  (PRN):  acetaminophen     Tablet .. 975 milliGRAM(s) Oral every 6 hours PRN Mild Pain (1 - 3)  guaiFENesin Oral Liquid (Sugar-Free) 100 milliGRAM(s) Oral every 6 hours PRN Cough  lidocaine   4% Patch 1 Patch Transdermal daily PRN back pain  traMADol 50 milliGRAM(s) Oral every 6 hours PRN Severe Pain (7 - 10)      Physical Exam:  Constitutional: WDWN female, NAD AxOx3  Skin: no breakdowns noted, warm and dry  Chest: s1s2+, RRR, clear to auscultation bilaterally, no w/r/r    Abdomen: softly distended, no guarding, no rebound, [] bowel sounds, appropriate tenderness noted   Incision site:   vertical incision clean and dry with []intact.    Extremities: no lower extremity edema or calf tenderness bilaterally; intermittent compression stockings in place     LABS:             9.5    6.02  )-----------( 274      ( 06-10 @ 06:28 )             29.7                9.3    5.36  )-----------( 238      ( 06-09 @ 08:25 )             29.2                9.2    5.28  )-----------( 229      ( 06-08 @ 06:18 )             29.2       06-10    136    |  99     |  11     ----------------------------<  111<H>  3.7     |  24     |  0.96     Ca    9.3        10 [place] 2022 06:28  Phos  3.2       06-10  Mg     1.70      06-10      PT/INR - ( 10 [place] 2022 06:28 )   PT: 13.5 sec;   INR: 1.16 ratio       PTT - ( 10 [place] 2022 06:28 )  PTT:29.8 sec   [facility] Progress Note HD #9    Pt seen, examined at bedside and doing well. Pt states her breathing is much better than when she came in last week.  Pt denies fever, chills, chest pain, SOB, nausea, vomiting, lightheadedness, dizziness.  Pt states passing flatus, + BM    T(F): 98.6 (06-11-22 @ 00:23), Max: 99.6 (06-10-22 @ 17:16)  HR: 103 (06-11-22 @ 00:23) (103 - 114)  BP: 123/81 (06-11-22 @ 00:23) (120/62 - 150/61)  RR: 19 (06-11-22 @ 00:23) (16 - 20)  SpO2: 97% (06-11-22 @ 00:23) (93% - 97%)    CAPILLARY BLOOD GLUCOSE    I&O's Detail    09 Jun 2022 07:01  -  10 [place] 2022 07:00  --------------------------------------------------------  IN:    Lactated Ringers: 825 mL    Oral Fluid: 640 mL  Total IN: 1465 mL    OUT:    Voided (mL): 650 mL  Total OUT: 650 mL    Total NET: 815 mL      10 [place] 2022 07:01  -  11 Jun 2022 06:04  --------------------------------------------------------  IN:    Oral Fluid: 550 mL  Total IN: 550 mL    OUT:    Stool (mL): 0 mL    Voided (mL): 1350 mL  Total OUT: 1350 mL    Total NET: -800 mL    MEDICATIONS  (STANDING):  benzonatate 100 milliGRAM(s) Oral three times a day  chlorhexidine 2% Cloths 1 Application(s) Topical daily  heparin   Injectable 5000 Unit(s) SubCutaneous every 8 hours  predniSONE   Tablet 60 milliGRAM(s) Oral daily  senna 2 Tablet(s) Oral at bedtime    MEDICATIONS  (PRN):  acetaminophen     Tablet .. 975 milliGRAM(s) Oral every 6 hours PRN Mild Pain (1 - 3)  guaiFENesin Oral Liquid (Sugar-Free) 100 milliGRAM(s) Oral every 6 hours PRN Cough  lidocaine   4% Patch 1 Patch Transdermal daily PRN back pain  traMADol 50 milliGRAM(s) Oral every 6 hours PRN Severe Pain (7 - 10)      Physical Exam:  Constitutional: WDWN female, NAD AxOx3  Skin: no breakdowns noted, warm and dry  Chest: s1s2+, RRR, clear to auscultation bilaterally, no w/r/r    Chest: bandage clean and dry over right thoracic area  Abdomen: softly distended, no guarding, no rebound, + bowel sounds, appropriate tenderness noted   Extremities: no lower extremity edema or calf tenderness bilaterally; intermittent compression stockings in place     LABS:             9.0    5.04  )-----------( 253      ( 06-11 @ 05:30 )             28.1                9.5    6.02  )-----------( 274      ( 06-10 @ 06:28 )             29.7                9.3    5.36  )-----------( 238      ( 06-09 @ 08:25 )             29.2       06-11    137    |  99     |  10     ----------------------------<  96     3.9     |  23     |  0.94   06-10    136    |  99     |  11     ----------------------------<  111<H>  3.7     |  24     |  0.96     Ca    9.2        11 Jun 2022 05:30  Ca    9.3        10 [place] 2022 06:28  Phos  3.3       06-11  Phos  3.2       06-10  Mg     1.40      06-11  Mg     1.70      06-10          PT/INR - ( 10 [place] 2022 06:28 )   PT: 13.5 sec;   INR: 1.16 ratio       PTT - ( 10 [place] 2022 06:28 )  PTT:29.8 sec

## 2022-06-11 NOTE — PROGRESS NOTE ADULT - SUBJECTIVE AND OBJECTIVE BOX
Pulmonary Consult Note    ALEXANDER HUDDLESTON  MRN-[id number]    Chief Complaint: Patient is a 56y old  Female who presents with a chief complaint of SOB secondary to bilateral pleural effusions.  (09 Jun 2022 14:09)  Patient is a 56y old  Female who presents with a chief complaint of SOB secondary to bilateral pleural effusions.  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 Peters with subsequent removal of Rt pleurx,  recently s/p left PTC placement on 64 and removed on 6/8. TODAY, she is now s/p thoracentesis by IR of right effusion.    continues to feel dyspnea. specifically now feeling pain with inspiration and at the thoracentesis site.   she is on room air. mild resp discomfort. no history of asthma. never smoker. never seen pulm not on any inhalers         HPI:  56yFemale   -no chest[pain  no sputum   no hemoptysis  -  -  -    ROS:  -Asthma  No attacks since 2017  Uterine cancer  S/p chemotherapy, radiation and hysterectomy  S/P hysterectomy  2020 VINCENT/BSO  -  All other systems reviewed and negative    PAST MEDICAL HISTORY: HEALTH ISSUES - PROBLEM Dx:  Malignant pleural effusion            SOCIAL HISTORY:     ACTIVE MEDICATION LIST:  MEDICATIONS  (STANDING):  benzonatate 100 milliGRAM(s) Oral three times a day  chlorhexidine 2% Cloths 1 Application(s) Topical daily  heparin   Injectable 5000 Unit(s) SubCutaneous every 8 hours  predniSONE   Tablet 60 milliGRAM(s) Oral daily  senna 2 Tablet(s) Oral at bedtime    MEDICATIONS  (PRN):  acetaminophen     Tablet .. 975 milliGRAM(s) Oral every 6 hours PRN Mild Pain (1 - 3)  guaiFENesin Oral Liquid (Sugar-Free) 100 milliGRAM(s) Oral every 6 hours PRN Cough  lidocaine   4% Patch 1 Patch Transdermal daily PRN back pain  traMADol 50 milliGRAM(s) Oral every 6 hours PRN Severe Pain (7 - 10)      EXAM:  Vital Signs Last 24 Hrs  T(C): 37 (11 Jun 2022 00:23), Max: 37.6 (10 [place] 2022 17:16)  T(F): 98.6 (11 Jun 2022 00:23), Max: 99.6 (10 [place] 2022 17:16)  HR: 103 (11 Jun 2022 00:23) (103 - 114)  BP: 123/81 (11 Jun 2022 00:23) (120/62 - 150/61)  BP(mean): --  RR: 19 (11 Jun 2022 00:23) (16 - 20)  SpO2: 97% (11 Jun 2022 00:23) (93% - 97%)  GENERAL: No acute distress  NEURO: Alert and oriented x 3  LUNGS: fair BS no wheze rhonchi dec  No crckles  CV: S1/S2, no murmur  ABDOMEN: +BS, nontender  EXTREMITIES: no clubbing, cyanosis, edema    < from: Xray Chest 1 View- PORTABLE-Routine (Xray Chest 1 View- PORTABLE-Routine in AM.) (06.09.22 @ 08:12) >    PROCEDURE DATE:  06/09/2022          INTERPRETATION:  Chest one view    HISTORY: Pleural effusion    COMPARISON STUDY: 6/8/2022    Frontal expiratory view of the chest showsthe heart to be similar in   size. Right chest port is again noted.    The lungs show partial clearing of the lower lungs with slight reduction   of right effusion and there is no evidence of pneumothorax nor definite   left pleural effusion.    IMPRESSION:  Partial clearing of the lungs as noted.    < from: CT Chest No Cont (06.07.22 @ 16:31) >  ACC: [id number] EXAM:  CT CHEST                          PROCEDURE DATE:  06/07/2022          INTERPRETATION:  Clinical indication: Uterine cancer, symptomatic pleural   effusions.    Axial CT images of the chest are obtained without intravenous   administration of contrast.    Comparison is made with the chest CT of Yanna 3, 2022.    Right upper anterior chest wall Mediport within the right atrium.    No enlarged axillary, mediastinal or hilar lymph nodes. No pericardial   effusion.    Evaluationof the upper abdomen demonstrate high density within the   partially imaged gallbladder which may be due to vicarious excretion of   intravenous contrast. Ascites as on the prior chest CT. Subcentimeter   hypodensities within the partially imaged liver not well characterized on   this unenhanced chest CT.    Moderate sized multiloculated right pleural effusion is without   significant interval change associated with right pleural thickening.   Interval placement of a left pigtail pleural catheter. Small left pleural   effusion with areas of loculation demonstrate interval decrease in size.   No pneumothorax.    Evaluation of the lung parenchyma demonstrate right lower lobe partial   compressive atelectasis minimally progressed since the priorstudy. Left   lower lobe linear and compressive atelectasis has improved. Other   bilateral mid to upper lung areas of linear atelectasis are without   significant interval change.    A few left upper lobe ill-defined nonspecific peripheral groundglass   nodular and patchy opacities are new since Yanna 3, 2022.    No central endobronchial lesions.    Mild degenerative changes of the spine.    IMPRESSION: Small multiloculated left pleural effusion with interval   decrease in size since Yanna 3, 2022following insertion of a left pigtail   pleural catheter. Interval improved aeration of the left lower lobe.    Moderate sized multiloculated right pleural effusion appears unchanged   with associated pleural thickening and right lower lobe partial   compressive atelectasis.    A few left upper lobe nodular and patchy nonspecific groundglass   opacities new since Yanna 3, 2022. Differential diagnosis include but is   not limited to infectious/inflammatory etiologies or mild asymmetric   pulmonary edema.    Ascites as on Yanna 3, 2022    < end of copied text >        PROBLEM LIST:  56yFemale with HEALTH ISSUES - PROBLEM Dx:  Malignant pleural effusion  Uterine cancer  excudative pleural effusion      RECS:  dyspnea multifactorial- pleural effusions vs. metastatic disease vs. chemo agent?  despite fluid removal remains dyspneic  acceptable room air pulse ox  can consider a trial of prednisone- since pembro can cause pneumonitis. would defer to heme onco   check TTE  check ambulatory saturations  serial CXR  rest of care as per CTS/ gyn onco      Thank you for this consultation, please feel free to call with any questions [phone]  Ti Horton DO [facility]

## 2022-06-11 NOTE — PROGRESS NOTE ADULT - SUBJECTIVE AND OBJECTIVE BOX
Subjective: pt admits symptoms of dyspnea improved after IR thoracentesis of R effusion  L PTC removed earlier this week    Vital Signs:  Vital Signs Last 24 Hrs  T(C): 37 (06-11-22 @ 07:13), Max: 37.6 (06-10-22 @ 17:16)  T(F): 98.6 (06-11-22 @ 07:13), Max: 99.6 (06-10-22 @ 17:16)  HR: 98 (06-11-22 @ 07:13) (98 - 114)  BP: 124/68 (06-11-22 @ 07:13) (123/81 - 150/61)  RR: 18 (06-11-22 @ 07:13) (16 - 20)  SpO2: 97% (06-11-22 @ 07:13) (93% - 97%) on (O2)    PHYSICAL EXAM  General: WN/WD NAD  Neurology: Awake, nonfocal, BUCHANAN x 4  Eyes: Scleras clear, PERRLA/ EOMI, Gross vision intact  ENT:Gross hearing intact, grossly patent pharynx, no stridor  Neck: Neck supple, trachea midline, No JVD,   Respiratory: CTA B/L, No wheezing, rales, rhonchi  CV: RRR, S1S2, no murmurs, rubs or gallops  Abdominal: Soft, NT, ND +BS,   Extremities: No edema, + peripheral pulses  Skin: No Rashes, Hematoma, Ecchymosis  Lymphatic: No Neck, axilla, groin LAD  Psych: Oriented x 3, normal affect    Relevant labs, radiology and Medications reviewed                        9.0    5.04  )-----------( 253      ( 11 Jun 2022 05:30 )             28.1     06-11    137  |  99  |  10  ----------------------------<  96  3.9   |  23  |  0.94    Ca    9.2      11 Jun 2022 05:30  Phos  3.3     06-11  Mg     1.40     06-11      PT/INR - ( 10 [place] 2022 06:28 )   PT: 13.5 sec;   INR: 1.16 ratio         PTT - ( 10 [place] 2022 06:28 )  PTT:29.8 sec  MEDICATIONS  (STANDING):  benzonatate 100 milliGRAM(s) Oral three times a day  chlorhexidine 2% Cloths 1 Application(s) Topical daily  heparin   Injectable 5000 Unit(s) SubCutaneous every 8 hours  predniSONE   Tablet 60 milliGRAM(s) Oral daily  senna 2 Tablet(s) Oral at bedtime    MEDICATIONS  (PRN):  acetaminophen     Tablet .. 975 milliGRAM(s) Oral every 6 hours PRN Mild Pain (1 - 3)  guaiFENesin Oral Liquid (Sugar-Free) 100 milliGRAM(s) Oral every 6 hours PRN Cough  lidocaine   4% Patch 1 Patch Transdermal daily PRN back pain  traMADol 50 milliGRAM(s) Oral every 6 hours PRN Severe Pain (7 - 10)    Pertinent Physical Exam  I&O's Summary    10 [place] 2022 07:01  -  11 Jun 2022 07:00  --------------------------------------------------------  IN: 550 mL / OUT: 1350 mL / NET: -800 mL        Assessment  55 y/o Female admitted with uterine Ca. CT consulted for B/L pleural effusions. Thoracic attempted bedside placement of R PTC but unsuccessful.  Pt had L pigtail placed on 6/4 and removed on 6/8. Patient continued to have increased work of breathing  6/10: s/p R thoracentesis by IR, dyspnea improved        PLAN  no thoracic objection to discharge  follow up information for Dr Peters in discharge plan  Dr Peters will see pt as an outpatient [place] [phone]

## 2022-06-11 NOTE — DISCHARGE NOTE NURSING/CASE MANAGEMENT/SOCIAL WORK - PATIENT PORTAL LINK FT
You can access the FollowMyHealth Patient Portal offered by [facility] by registering at the following website: [email or web address]. By joining [email or web address]’s FollowMyHealth portal, you will also be able to view your health information using other applications (apps) compatible with our system.

## 2022-06-11 NOTE — DISCHARGE NOTE NURSING/CASE MANAGEMENT/SOCIAL WORK - NSDCPEFALRISK_GEN_ALL_CORE
For information on Fall & Injury Prevention, visit: [email or web address] OR  [email or web address] OR  https://www.cdc.gov/steadi/patient.html

## 2022-06-15 NOTE — H&P PST ADULT - PROBLEM SELECTOR PLAN 1
Scheduled for right video assisted thoracoscopy, pleurx catheter placement Scheduled for right video assisted thoracoscopy, pleurx catheter placement on 6/20/2022  Written & verbal preop instructions, gi prophylaxis & surgical soap given  Pt verbalized good understanding.  Teach back done on surgical soap instructions. Scheduled for right video assisted thoracoscopy, pleurx catheter placement on 6/20/2022  Written & verbal preop instructions, gi prophylaxis & surgical soap given  Pt verbalized good understanding.  Teach back done on surgical soap instructions.  copy of echo report in chart

## 2022-06-15 NOTE — H&P PST ADULT - NEGATIVE FEMALE-SPECIFIC SYMPTOMS
no abnormal vaginal bleeding/no pelvic pain h/o uterine cancer with adl's, house chores to lung/no abnormal vaginal bleeding/no pelvic pain/not applicable

## 2022-06-15 NOTE — H&P PST ADULT - NEGATIVE CARDIOVASCULAR SYMPTOMS
no chest pain/no palpitations/no dyspnea on exertion/no peripheral edema no chest pain/no palpitations/no peripheral edema

## 2022-06-15 NOTE — H&P PST ADULT - HISTORY OF PRESENT ILLNESS
56 year old female with PMH of Asthma, uterine cancer  s/p chemo 9/2020, presents to Presurgical testing with diagnosis of pleural effusion not elsewhere classified scheduled for right video assisted thoracoscopy, Pleurx catheter placement.     Patient was recently admitted to [facility] with right chest pain found to have right sided pleural effusion. Drain by IR. Patient reports that the right pleural effusion has been increasing recently  56 year old female with PMH of Asthma, uterine cancer, h/o hysterectomy 9/2020,  chemo &radiation, presents to Presurgical testing with diagnosis of pleural effusion not elsewhere classified scheduled for right video assisted thoracoscopy, Pleurx catheter placement.  Pt had catheter in past, and was eventually removed.  Pt with recent [facility] hospitalization for right side chest pain and pleural effusion. Fluid drained and discharged on 6/11/22.  Currently on tapered oral prednisone.     56 year old female with PMH of Asthma, uterine cancer, h/o hysterectomy 9/2020,  chemo &radiation, presents to Presurgical testing with diagnosis of pleural effusion not elsewhere classified scheduled for right video assisted thoracoscopy, Pleurx catheter placement.  Pt had catheter in past, and was eventually removed.  Pt with recent [facility] hospitalization for right side chest pain and pleural effusion. Fluid drained and discharged on 6/11/22.  Currently on tapered oral prednisone. per pt to complete in two days.

## 2022-06-15 NOTE — H&P PST ADULT - RESPIRATORY
clear to auscultation bilaterally/breath sounds equal/respirations non-labored clear to auscultation bilaterally/respirations non-labored/diminished breath sounds, R

## 2022-06-22 NOTE — HISTORY OF PRESENT ILLNESS
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 Patient was seen initially by Dr. Arciniega at [place].\par She then saw Dr Robin Zamorano, at [facility] and had a repeat biopsy that showed clear cell carcinoma.\par On 4/20/2020 she underwent surgical staging and is post TLH/BSO/SLND/Omentectomy.\par Final pathology showed stage IA,high grade endometrial carcinoma involving predominantly clear cell and focally serous features, no invasion, no LVSI.\par \par Ct C/A/P with contrast done on 3/12/2020 showed no metastatic sites.\par She completed five cycles of CT 6/3/20- 9/2/20) Last cycle held due to toxicity and myelosuppression.She received vaginal brachytherapy to a total of 2100 cGy in 3 fractions. Treatment was delivered from 10/16/20- 10/23/20. \par Scan showed CINDY.\par \par 8/10/21: Surveillance scan showed New partially visualized small right pleural effusion. Correlation with dedicated chest CT is recommended. Few subcentimeter hepatic hypo densities, too small to characterize, but similar in appearance to prior. Small pelvic ascites, similar in appearance to prior.\par - 29( previously 15)\par \par 8/30/21: CT chest: Limited. No pulmonary embolus to level of the lobar pulmonary arteries. Interval increase in small right pleural effusion since 8/19/2021 with associated adjacent compressive atelectasis.Small perihepatic ascites.\par \par Patient was admitted to the hospital with SOB and pleural effusion. She had  thoracentesis and cytology was consistent with relapsed uterine cancer.\par \par Final Diagnosis\par PLEURAL FLUID, RIGHT\par POSITIVE FOR MALIGNANT CELLS.\par Metastatic adenocarcinoma\par \par Cytology slides and cell block reveal a hypercellular specimen\par composed of crowded 3-dimensional groups and single lying\par malignant cells with enlarged nuclei containing prominent\par nucleoli and vacuolated cytoplasm, among benign mesothelial\par cells.\par \par Immunohistochemical stains are performed on the cell block. The\par tumor cells are positive for PAX-8 (focal and weak), p16, napsin\par (focal) and p53 (overexpression), while they are negative for ER,\par WT-1 and TTF-1. In the absence of another primary, it is most\par consistent with involvement by patient's known endometrial\par adenocarcinoma. [de-identified] : Carboplatin and Taxol 6/3/20- 9/2/20( five cycles) [de-identified] : Patient is here to discuss treatment plan after recent hospital admission for pleural effusion.  Did not have pleurex placed yesterday because there was minimal fluid.

## 2022-06-22 NOTE — PHYSICAL EXAM
[Ambulatory and capable of all self care but unable to carry out any work activities] : Status 2- Ambulatory and capable of all self care but unable to carry out any work activities. Up and about more than 50% of waking hours [Normal] : affect appropriate [de-identified] : b/l decreased BS

## 2022-07-11 NOTE — ED ADULT TRIAGE NOTE - CHIEF COMPLAINT QUOTE
Pt. c/o  fever x 2 days( max temp 102 at home), Endorses chronic productive cough, chills, weakness, chest pain. Denies sob. .  PMHx: Uterine Cancer and Lung Cancer currently on chemo last treatment 3 weeks ago.

## 2022-07-12 NOTE — ED PROVIDER NOTE - NS ED ROS FT
CONST: +fevers, +chills  ENT: no sore throat  CV: no chest pain, no leg swelling  RESP: +shortness of breath, +cough  ABD: no abdominal pain, no nausea, no vomiting, no diarrhea  : no dysuria, no flank pain, no hematuria  MSK: no back pain, no extremity pain  SKIN:  no rash

## 2022-07-12 NOTE — ED PROVIDER NOTE - PHYSICAL EXAMINATION
Physical Exam:  Gen: awake alert   HEENT: normal conjunctiva, oral mucosa moist  Lung: tachypneic, R sided rhonchi   CV: Regular, tachycardic  Abd: soft, NT, ND, no guarding, no rigidity, no rebound tenderness  MSK: no visible deformities   Skin: Warm, well perfused, no rash, no leg swelling  ~Cole Cormier MD (PGY-3)

## 2022-07-12 NOTE — H&P ADULT - PROBLEM SELECTOR PLAN 1
- Patient came in w/ worsening SOB, sore throat with a chronic dry cough  - COVID- vaccinated 2x  - Patient tested positive for COVID-19 (July 12th)  - Isolation and quarantine for 10 days  - Patient is a candidate for remedesvir and dexamethasone, pt consented  - Order CBC, ferritin, D-dimer, monitor coags  - Continue NC O2 when SOB and hypoxic  - Continue Tylenol for fever - Patient came in w/ worsening SOB, sore throat with a chronic dry cough, as well as fever 102.8F at home  - Tmax here 101.6, found to be Covid positive   - COVID- vaccinated 2x Moderna 2021 - not boosted   - Maintain airborne/contact isolation   - Given hypoxia to 91% on room air and immunocompromised status, will start patient on remdesivir and decadron - she is in agreement  - Check inflammatory markers including D-dimer in AM   - Lovenox ppx 40mg daily pending weight and D-dimer in AM   - Supportive care with O2 via NC PRN (was on room air during my exam), tylenol PRN, cough meds  - Hold off on further abx in setting of viral infection  - Monitor on continuous pulse ox

## 2022-07-12 NOTE — H&P ADULT - NSICDXPASTMEDICALHX_GEN_ALL_CORE_FT
PAST MEDICAL HISTORY:  Metastatic carcinoma to lung     Uterine cancer S/p chemotherapy, radiation and hysterectomy     PAST MEDICAL HISTORY:  Asthma     Malignant pleural effusion with hx loculation on R    Uterine cancer S/p chemotherapy, radiation and hysterectomy

## 2022-07-12 NOTE — H&P ADULT - NSICDXPASTSURGICALHX_GEN_ALL_CORE_FT
PAST SURGICAL HISTORY:  H/O ovarian cystectomy 2009    History of thoracentesis R VATS    S/P hysterectomy 2020 VINCENT/BSO     PAST SURGICAL HISTORY:  H/O ovarian cystectomy 2009    History of thoracentesis R VATS 10/21    S/P hysterectomy 2020 VINCENT/BSO

## 2022-07-12 NOTE — ED ADULT NURSE NOTE - OBJECTIVE STATEMENT
A&ox4 female presenting for fever. on active chemo for uterine ca with mets to lungs. last chemo 3 weeks ago. had plural tap 1 month ago. pt placed on 3L NC with o2 of 98. pt tearful c/o cough and pain. right chest wall port in place and confirmed by XR. awaiting results

## 2022-07-12 NOTE — H&P ADULT - PROBLEM SELECTOR PLAN 3
- Continue tramadol for pain  - Follow up with oncologist and CT surgeon  - Continue chemotherapy, next appointment 7/18 - Patient follows with Dr. Mirtha Celaya at [facility] [place], last chemo 6/27  - Oncology consulted via email for help with management   - will follow up recommendations

## 2022-07-12 NOTE — H&P ADULT - NS ATTEND AMEND GEN_ALL_CORE FT
56 year old female with a history of uterine carcinoma (s/p hysterectomy VINCENT BSO, 2020, ongoing chemotherapy (last session 6/27 with [facility]) s/p radiation, b/l malignant pleural effusions s/p R VATS 10/2201, PleurX catheter removed 3/22, and recent R thoracentesis by IR and L chest tube by thoracic surgery on last admission 6/11/22 presenting with SOB, cough, and fever. patient reports chronic cough at baseline but it has worsened over the past few days. also has baseline SOB that has worsened. noted fever around 102 as well. had 1 episode of nausea and vomiting on Saturday and poor appetite over the past week.     on exam appears comfortable talking on the phone with her daughter. has persistent dry cough. lungs with b/l decreased breath sounds, RLL crackles. patient amenable to starting dexamethasone and Remdesivir.    #COVID-19  -not currently hypoxic but 91% on room air on admission  -supplemental O2 as needed  -start dexamethasone and Remdesivir  -airborne and contact isolation  -tessalon perles and Robitussin PRN for cough  -Tylenol PRN for fever  -trend inflammatory markers, CMP while on Remdesivir  -Lovenox for DVT ppx    #Uterine cancer with lung metastases  -will email oncology  -outpatient follow up    d/w ACP

## 2022-07-12 NOTE — H&P ADULT - ASSESSMENT
57 y/o AA F, with PMHX of uterine carcinoma (s/p hysterectomy VINCENT BSO, 2020, ongoing chemotherapy (last session 3 weeks ago), s/p radiation), metastatic lung cancer w/ hx of b/l malignant pleural effusions s/p R VATS, R chest port and R thoracentesis, presents to the ED with worsening SOB x3 days, fever (TMAX 102.8) x2 days. Patient is being admitted into the hospital d/t COVID-19+, and b/l malignant pleural effusions with loculated fluid w/ associated SOB.  57 y/o AA F, with PMHX of uterine carcinoma (s/p hysterectomy VINCENT BSO, 2020, ongoing chemotherapy (last session 6/27; known to Dr. Danny Celaya) s/p radiation, w/ hx of b/l malignant pleural effusions s/p R VATS 10/2201, PleurX catheter removed 3/22, and recent R thoracentesis by IR and L chest tube by thoracic surgery on last admission 6/11/22. Patient presents to the ED with worsening SOB x3 days, fever (TMAX 102.8) x2 days, found to have Covid-19.

## 2022-07-12 NOTE — PATIENT PROFILE ADULT - FALL HARM RISK - HARM RISK INTERVENTIONS

## 2022-07-12 NOTE — ED PROVIDER NOTE - OBJECTIVE STATEMENT
56F PMH asthma, Uterine Carcinoma w/ ?lung mets on chemo/radiation (last chemo 3 weeks ago) p/w fevers since yest. Endorsing cough, SOB; vaccinated against covid. Had pleural tap about a month ago; has had pleurx catheters in the past. O2 sat 91% RA and tachypneic; placed on 3L NC. No sore throat, abd pain, n/v, urinary symptoms, leg swelling 56F PMH asthma, Uterine Carcinoma w/ ?lung mets on chemo/radiation (last chemo 2 weeks ago) p/w fevers since yest. Endorsing cough, SOB; vaccinated against covid. Had pleural tap about a month ago; has had pleurx catheters in the past. O2 sat 91% RA and tachypneic; placed on 3L NC. No sore throat, abd pain, n/v, urinary symptoms, leg swelling

## 2022-07-12 NOTE — H&P ADULT - NSHPLABSRESULTS_GEN_ALL_CORE
CBC:            9.7    8.99  )-----------( 280      ( 07-12-22 @ 01:25 )             31.9         Chem:         ( 07-12-22 @ 01:25 )    139  |  100  |  10  ----------------------------<  109<H>  4.3   |  26  |  1.09        Liver Functions: ( 07-12-22 @ 01:25 )  Alb: 3.7 g/dL / Pro: 8.1 g/dL / ALK PHOS: 152 U/L / ALT: 15 U/L / AST: 24 U/L / GGT: x          EKG: NSR,   CXR: Similar appearance of bilateral pleural effusions and lower lung atelectasis, no change from prior CXR last week  (7/6) CXR: The cardiac silhouette is normal in size. There is a right-sided Mediport with tip overlying the superior vena cava/right atrial junction. There is bilateral linear atelectasis. There is a small probable loculated right pleural effusion and questionable trace left pleural effusion.    COVID-19+, RVP neg  Negative WBC  UA: +protein (constant), +leukocytes and +WBC  INR: 14.9 CBC:            9.7    8.99  )-----------( 280      ( 07-12-22 @ 01:25 )             31.9         Chem:         ( 07-12-22 @ 01:25 )    139  |  100  |  10  ----------------------------<  109<H>  4.3   |  26  |  1.09        Liver Functions: ( 07-12-22 @ 01:25 )  Alb: 3.7 g/dL / Pro: 8.1 g/dL / ALK PHOS: 152 U/L / ALT: 15 U/L / AST: 24 U/L / GGT: x          BCx pending in labx 2  UCx pending in lab  UA contaminated  Covid ++  Lactate 2.5->1    EKG: sinus tach @ 110 bpm, QTc 435, TWI III, no acute ischemic changes, unchanged compared to prior     CXR: Similar appearance of bilateral pleural effusions and lower lung atelectasis, no change from prior CXR last week  (7/6) CXR: The cardiac silhouette is normal in size. There is a right-sided Mediport with tip overlying the superior vena cava/right atrial junction. There is bilateral linear atelectasis. There is a small probable loculated right pleural effusion and questionable trace left pleural effusion. CBC:            9.7    8.99  )-----------( 280      ( 07-12-22 @ 01:25 )             31.9         Chem:         ( 07-12-22 @ 01:25 )    139  |  100  |  10  ----------------------------<  109<H>  4.3   |  26  |  1.09        Liver Functions: ( 07-12-22 @ 01:25 )  Alb: 3.7 g/dL / Pro: 8.1 g/dL / ALK PHOS: 152 U/L / ALT: 15 U/L / AST: 24 U/L / GGT: x          BCx pending in labx 2  UCx pending in lab  UA contaminated  Covid ++  Lactate 2.5->1    EKG personally reviewed: : sinus tach @ 110 bpm, QTc 435, TWI III, no acute ischemic changes, unchanged compared to prior     CXR personally reviewed: b/l pleural effusions    CXR: Similar appearance of bilateral pleural effusions and lower lung atelectasis, no change from prior CXR last week  (7/6) CXR: The cardiac silhouette is normal in size. There is a right-sided Mediport with tip overlying the superior vena cava/right atrial junction. There is bilateral linear atelectasis. There is a small probable loculated right pleural effusion and questionable trace left pleural effusion.

## 2022-07-12 NOTE — H&P ADULT - RESPIRATORY
shallow/no use of accessory muscles/airway patent/diminished breath sounds, L/diminished breath sounds, R/rhonchi Rhonci at R mid lung field. Decreased breath sounds at bases./no wheezes/no rales/no respiratory distress/no use of accessory muscles/airway patent/good air movement/no intercostal retractions/diminished breath sounds, L/diminished breath sounds, R/rhonchi

## 2022-07-12 NOTE — H&P ADULT - NSHPOUTPATIENTPROVIDERS_GEN_ALL_CORE
PCP: Dr. Edgar Hernandez- Thoracic Surgeon  Dr. Mirtha Celaya- oncologist PCP: Dr. Edgar Peters- Thoracic Surgeon  Dr. Mirtha Celaya- oncologist

## 2022-07-12 NOTE — H&P ADULT - NEGATIVE GENERAL GENITOURINARY SYMPTOMS
no hematuria/no urine discoloration/no dysuria/normal urinary frequency no hematuria/no flank pain L/no flank pain R/no urine discoloration/no dysuria/normal urinary frequency

## 2022-07-12 NOTE — H&P ADULT - NEGATIVE NEUROLOGICAL SYMPTOMS
no transient paralysis/no weakness/no paresthesias/no syncope/no vertigo/no loss of sensation/no difficulty walking no transient paralysis/no weakness/no paresthesias/no syncope/no vertigo/no loss of sensation/no difficulty walking/no headache/no confusion

## 2022-07-12 NOTE — H&P ADULT - NEGATIVE HEMATOLOGY SYMPTOMS
I have personally performed a face to face diagnostic evaluation on this patient. I have reviewed the ACP note and agree with the history, exam and plan of care, except as noted. no gum bleeding/no nose bleeding

## 2022-07-12 NOTE — H&P ADULT - SKIN
chemo port noted/warm and dry/color normal/normal/no rashes/no ulcers chemo port noted on R chest wall/warm and dry/color normal/normal/no rashes/no ulcers

## 2022-07-12 NOTE — ED PROVIDER NOTE - CLINICAL SUMMARY MEDICAL DECISION MAKING FREE TEXT BOX
56F PMH asthma, Uterine Carcinoma w/ ?lung mets on chemo/radiation (last chemo 3 weeks ago) p/w fevers, SOB, cough. Tachycardic, febrile, hypoxia on 3L NC, tachypneic. Rhonchi R lung sounds, abd NT, no leg swelling  Will order sepsis labs, cxr, antipyretics, ua/ucx, abx, reassess symptoms

## 2022-07-12 NOTE — H&P ADULT - PROBLEM SELECTOR PLAN 2
- Patient has known history of malignant b/l pleural effusions  - Patient had prior IR drainage in R lungs  - CXR: Similar appearance of bilateral pleural effusions and lower lung atelectasis, no change from prior CXR last week. Prior (7/6) CXR: The cardiac silhouette is normal in size. There is a right-sided Mediport with tip overlying the superior vena cava/right atrial junction. There is bilateral linear atelectasis. There is a small probable loculated right pleural effusion and questionable trace left pleural effusion.  - As per CT surgery, no intervention needed at this time d/t no gross change  - continue supportive care with Robitussin - Patient has known history of malignant b/l pleural effusions  - Patient had prior IR drainage in R lungs early in June and L chest tube as well   - CXR: Similar appearance of bilateral pleural effusions and lower lung atelectasis  - Thoracic surgery consulted and case discussed with team - as per CT surgery, no intervention needed at this time d/t no gross change, no drainage needed at this time  - continue supportive care with cough meds, monitor pulse ox

## 2022-07-12 NOTE — ED PROVIDER NOTE - ATTENDING CONTRIBUTION TO CARE
HPI: 56F Past Medical History asthma, Uterine Carcinoma w/ ?lung mets on chemo/radiation (last chemo 3 weeks ago) p/w fevers since yest. Endorsing cough, SOB; vaccinated against covid. Had pleural tap approx 2-3 wks ago; has had pleurx catheters in the past. O2 sat 91% RA and tachypneic; placed on 3L NC. No sore throat, abd pain, n/v, urinary symptoms, leg swelling  EXAM: SOB, increased WOB, tachpneic, lungs with decreased breath sounds RLL and LLL, heart tachy, abd soft, no pitting edema BLE.   MDM: pt with known CA with lung mets that has had a pleura cath in place that was placed by IR in past here for SOB. Likely pleural effusion from lung mets. Will likely require imaging, labs and CT surgery consult given pt has been on their service for this in past but ultimately may require IR consult while in hospital for placement of another catheter. In meantime will support pt with Supp O2 as pt is saturating low on RA and working to breath, will obtain CXR, labs and admit.

## 2022-07-12 NOTE — H&P ADULT - NSHPSOCIALHISTORY_GEN_ALL_CORE
Patient denies alcohol use. tobacco use, marijuana, vaping or other illicit drug use. Patient is single and lives with her mother. Patient is currently on disability but works as an . Patient is COVID-19 vaccinated 2x, no booster.

## 2022-07-12 NOTE — H&P ADULT - PROBLEM SELECTOR PLAN 4
- DVT prophylaxis with lovenox 40mg subq - DVT prophylaxis with lovenox 40mg subq pending weight and D-dimer per covid protocol   - Case and plan discussed with Dr. Hernandez

## 2022-07-12 NOTE — H&P ADULT - CARDIOVASCULAR
details… normal/regular rate and rhythm/S1 S2 present/no gallops/no rub/no murmur/tachycardia/vascular normal/S1 S2 present/no gallops/no rub/no murmur/no pedal edema/tachycardia/vascular

## 2022-07-12 NOTE — ED PROVIDER NOTE - PROGRESS NOTE DETAILS
Casa STEELE (PGY-3)  spoke to ct surg for possible thora vs. pigtail placement. will come see pt in ED Casa STEELE (PGY-3)  ct surg recs: pt usually has loculated effusions that cannot be drained at bedside. will need IR c/s nonemergently in the AM for either thora or pigtail catheter. pt currently resting comfortably on 2L NC in no apparent acute resp distress. informed pt of updates and she is aware she will be admitted. hospitalist paged; awaiting call back Casa STEELE (PGY-3)  pt's covid test resulted positive. will give 6mg decadron

## 2022-07-12 NOTE — H&P ADULT - NEGATIVE PSYCHIATRIC SYMPTOMS
no suicidal ideation/no depression/no anxiety/no mood swings/no visual hallucinations/no auditory hallucinations

## 2022-07-12 NOTE — H&P ADULT - NEGATIVE CARDIOVASCULAR SYMPTOMS
no chest pain/no palpitations/no orthopnea/no paroxysmal nocturnal dyspnea/no peripheral edema no chest pain/no palpitations/no orthopnea/no peripheral edema

## 2022-07-12 NOTE — H&P ADULT - HISTORY OF PRESENT ILLNESS
57 y/o AA F, with PMHX of uterine carcinoma (s/p hysterectomy [facility], 2020, ongoing chemotherapy (last session 3 weeks ago), s/p radiation), metastatic lung cancer w/ hx of b/l malignant pleural effusions s/p R VATS, R chest port and R thoracentesis, presents to the ED with worsening SOB x3 days, fever (TMAX 102.8) x2 days. Patient states she chronically has a dry, non-productive cough and shortness of breath d/t the lung mets. Patient states she takes chronic cough medicine with relief. Patient states she has SOB when walking up stairs and exerting herself but denies SOB at rest. Over the past 3 days patient developed worsening SOB upon exertion and noticed being more winded than normal. Patient took Tylenol for fever relief. Patient also became more nauseous and NBNB vomited x1 episode on Saturday (7/9). Patient states she has extensive history with cardiothoracic surgery with drainage of her malignant pleural effusion. Patient also endorses diarrhea of loose stools that started yesterday and a sore throat. Patient denies any sick contacts or recent travel. Patient denies chills, weight loss, night sweats, myalgias, nasal congestion, dizziness, palpitations, productive cough, constipation, abdominal pain, extremity swelling, dysuria, hematuria, melena, or hematochezia.  57 y/o AA F, with PMHX of uterine carcinoma (s/p hysterectomy [facility] BSO, 2020, ongoing chemotherapy (last session 3 weeks ago), s/p radiation), metastatic lung cancer w/ hx of b/l malignant pleural effusions s/p R VATS, R chest port and R thoracentesis and L chest tube thoracostomy?, presents to the ED with worsening SOB x3 days, fever (TMAX 102.8) x2 days. Patient states she chronically has a dry, non-productive cough and shortness of breath d/t the lung mets. Patient states she takes chronic cough medicine with relief. Patient states she has SOB when walking up stairs and exerting herself but denies SOB at rest. Over the past 3 days patient developed worsening SOB upon exertion and noticed being more winded than normal. Patient took Tylenol for fever relief. Patient also became more nauseous and NBNB vomited x1 episode on Saturday (7/9). Patient states she has extensive history with cardiothoracic surgery with drainage of her malignant pleural effusion. Patient also endorses diarrhea of loose stools that started yesterday and a sore throat. Patient denies any sick contacts or recent travel. Patient denies chills, weight loss, night sweats, myalgias, nasal congestion, dizziness, palpitations, productive cough, constipation, abdominal pain, extremity swelling, dysuria, hematuria, melena, or hematochezia. In the ED, patient received IV zosyn, IV vancomycin, IVF, IV decadron and IV Tylenol for fever. Patient tested as COVID-19 +. CXR showed b/l pleural effusions with loculated fluid.  57 y/o AA F, with PMHX of uterine carcinoma (s/p hysterectomy [facility], 2020, ongoing chemotherapy (last session 6/27; known to Dr. Danny Celaya) s/p radiation, w/ hx of b/l malignant pleural effusions s/p R VATS 10/2201, PleurX catheter removed 3/22, and recent R thoracentesis by IR and L chest tube by thoracic surgery on last admission 6/11/22. Patient presents to the ED with worsening SOB x3 days, fever (TMAX 102.8) x2 days. Patient states she chronically has a dry, non-productive cough and shortness of breath. Patient states she takes chronic cough medicine with relief. She has SOB when walking up stairs and exerting herself but denies SOB at rest. Over the past 3 days patient developed worsening SOB upon exertion and noticed being more winded than normal. Patient took Tylenol for fever relief. Patient also became more nauseous and vomited x1 NBNB episode on Saturday (7/9). She also endorses mild sore throat. Patient denies any sick contacts or recent travel. Patient denies chest pain, chills, weight loss, night sweats, myalgias, nasal congestion, dizziness, palpitations, productive cough, constipation, abdominal pain, extremity swelling, dysuria, hematuria, melena, or hematochezia. In the ED, patient received IV zosyn, IV vancomycin, IVF, IV decadron and IV Tylenol for fever. Patient tested as COVID-19 +. CXR showed b/l pleural effusions with loculated fluid, similar appearing to to prior imaging.

## 2022-07-12 NOTE — H&P ADULT - NEGATIVE GENERAL SYMPTOMS
no fever/no chills/no sweating/no anorexia/no polyphagia/no polyuria/no malaise no sweating/no anorexia

## 2022-07-12 NOTE — H&P ADULT - NEGATIVE ENMT SYMPTOMS
no hearing difficulty/no ear pain/no tinnitus/no vertigo/no sinus symptoms/no nasal congestion no hearing difficulty/no ear pain/no tinnitus/no vertigo/no sinus symptoms/no nasal congestion/no dysphagia

## 2022-07-12 NOTE — H&P ADULT - GASTROINTESTINAL
details… normal/soft/nontender/nondistended/normal active bowel sounds normal/soft/nontender/nondistended/normal active bowel sounds/no guarding/no rigidity/no masses palpable

## 2022-07-12 NOTE — H&P ADULT - NEGATIVE GASTROINTESTINAL SYMPTOMS
no abdominal pain/no melena/no hematochezia/no steatorrhea/no jaundice no diarrhea/no constipation/no abdominal pain/no melena/no hematochezia

## 2022-07-12 NOTE — H&P ADULT - MUSCULOSKELETAL
details… normal/no joint swelling/no joint erythema/no joint warmth/no calf tenderness/strength 5/5 bilateral upper extremities/strength 5/5 bilateral lower extremities/no chest wall tenderness normal/ROM intact/no joint swelling/no joint erythema/no joint warmth/no calf tenderness/strength 5/5 bilateral upper extremities/strength 5/5 bilateral lower extremities

## 2022-07-12 NOTE — H&P ADULT - NSICDXFAMILYHX_GEN_ALL_CORE_FT
FAMILY HISTORY:  Father  Still living? No  Family history of type 2 diabetes mellitus, Age at diagnosis: Age Unknown    Mother  Still living? Yes, Estimated age: Age Unknown  Maternal family history of hypertension, Age at diagnosis: Age Unknown

## 2022-07-13 NOTE — PROGRESS NOTE ADULT - SUBJECTIVE AND OBJECTIVE BOX
Cher Lopez MD  [facility] Division of Hospital Medicine  Pager [phone] (M-F 8AM-5PM)  Other Times: [phone]    Patient is a 56y old  Female who presents with a chief complaint of Fevers, mild SOB (2022 14:07)    SUBJECTIVE / OVERNIGHT EVENTS: complaining of persistent cough    MEDICATIONS  (STANDING):  chlorhexidine 2% Cloths 1 Application(s) Topical daily  dexAMETHasone  Injectable 6 milliGRAM(s) IV Push every 24 hours  enoxaparin Injectable 40 milliGRAM(s) SubCutaneous every 12 hours  remdesivir  IVPB   IV Intermittent   remdesivir  IVPB 100 milliGRAM(s) IV Intermittent every 24 hours    MEDICATIONS  (PRN):  acetaminophen     Tablet .. 650 milliGRAM(s) Oral every 6 hours PRN Temp greater or equal to 38C (100.4F), Mild Pain (1 - 3)  benzonatate 100 milliGRAM(s) Oral three times a day PRN Cough  guaiFENesin Oral Liquid (Sugar-Free) 100 milliGRAM(s) Oral every 6 hours PRN Cough  hydrocodone/homatropine Syrup 5 milliLiter(s) Oral at bedtime PRN Cough      PHYSICAL EXAM:  Vital Signs Last 24 Hrs  T(C): 37 (2022 08:56), Max: 37.3 (2022 04:56)  T(F): 98.6 (2022 08:56), Max: 99.1 (2022 04:56)  HR: 92 (2022 08:56) (89 - 99)  BP: 132/89 (2022 08:56) (130/84 - 146/69)  RR: 18 (2022 08:56) (18 - 20)  SpO2: 96% (2022 08:56) (93% - 96%)    Parameters below as of 2022 08:56  Patient On (Oxygen Delivery Method): room air      CONSTITUTIONAL: NAD, well-developed, well-groomed  RESPIRATORY: Normal respiratory effort; lungs are clear to auscultation bilaterally  CARDIOVASCULAR: Regular rate and rhythm, normal S1 and S2, no murmur/rub/gallop; No lower extremity edema  GASTROINTESTINAL: Nontender to palpation, normoactive bowel sounds, no rebound/guarding; No hepatosplenomegaly  MUSCULOSKELETAL:  no clubbing or cyanosis of digits; no joint swelling or tenderness to palpation  NEUROLOGY: non-focal; no gross sensory deficits   PSYCH: A+O to person, place, and time; affect appropriate  SKIN: No rashes; warm     LABS:                        9.0    7.08  )-----------( 253      ( 2022 06:22 )             28.8     07-13    138  |  101  |  14  ----------------------------<  104<H>  3.9   |  23  |  0.88    Ca    9.4      2022 06:22  Phos  3.1     07-13  Mg     1.60     07-    TPro  7.4  /  Alb  3.4  /  TBili  0.2  /  DBili  <0.2  /  AST  16  /  ALT  10  /  AlkPhos  122<H>  07-13    PT/INR - ( 2022 01:25 )   PT: 14.9 sec;   INR: 1.28 ratio         PTT - ( 2022 01:25 )  PTT:33.7 sec      Urinalysis Basic - ( 2022 02:41 )    Color: Yellow / Appearance: Slightly Turbid / S.035 / pH: x  Gluc: x / Ketone: Trace  / Bili: Negative / Urobili: <2 mg/dL   Blood: x / Protein: 30 mg/dL / Nitrite: Negative   Leuk Esterase: Moderate / RBC: 3 /HPF / WBC 8 /HPF   Sq Epi: x / Non Sq Epi: 17 /HPF / Bacteria: Few        Culture - Blood (collected 2022 01:28)  Source: .Blood Blood-Peripheral  Preliminary Report (2022 09:01):    No growth to date.    Culture - Blood (collected 2022 01:20)  Source: .Blood Blood-Peripheral  Preliminary Report (2022 09:01):    No growth to date.    Culture - Urine (collected 2022 00:41)  Source: Clean Catch Clean Catch (Midstream)  Final Report (2022 06:16):    <10,000 CFU/mL Normal Urogenital Danielle        RADIOLOGY & ADDITIONAL TESTS:  Results Reviewed:   Imaging Personally Reviewed:  Electrocardiogram Personally Reviewed:    COORDINATION OF CARE:  Care Discussed with Consultants/Other Providers [Y/N]:  Prior or Outpatient Records Reviewed [Y/N]:

## 2022-07-13 NOTE — PROGRESS NOTE ADULT - PROBLEM SELECTOR PLAN 1
presented with worsening cough/SOB and fevers at home  - Tmax here 101.6, found to be COVID positive   - COVID- vaccinated 2x Moderna 2021 - not boosted   - given hypoxia to 91% on room air and immunocompromised status, started on remdesivir and decadron (patient in agreement with treatment)  - D-dimer elevated - LE duplex pending, trend inflammatory markers q3days  - c/w lovenox 40mg SQ BID per DDimer  - monitor oxygen saturations, tessalon PRN, Tylenol PRN, isolation

## 2022-07-14 NOTE — PROVIDER CONTACT NOTE (OTHER) - SITUATION
patient complaining of pain in bilateral back of an 8 s/p frequent coughing. Patient requesting tramadol, states she had it last 3 weeks ago when she was at [facility] for thoracentesis.

## 2022-07-14 NOTE — CHART NOTE - NSCHARTNOTEFT_GEN_A_CORE
Informed by RN that pt has a nose bleed. Pt seen at assessed at bedside.     Patient states she was having a coughing bit and had a few drops of blood from her nose. No gushing of blood from nose noted. States she did not have any hemoptysis. Denies fevers, chills, nausea, vomiting, chest pain, shortness of breath, current dizziness, or other complaints.     Plan:  - Saline nasal spray ordered.  - Educated patient on how to stop bleeding if bleeding re-occurs or increases.  - Will continue to monitor.

## 2022-07-14 NOTE — CONSULT NOTE ADULT - ASSESSMENT
57 y/o AA F, with PMHX of uterine carcinoma (s/p hysterectomy [facility] BSO, 2020, on systemic treatment with single agent zirabev s/p first dose 6/27, s/p POD last line of therapy with was pembrolizumab and lenvima (last Pembrolizumab 6/1) s/p radiation, w/ hx of b/l malignant pleural effusions s/p R VATS 10/2201, PleurX catheter removed 3/22, and recent R thoracentesis by IR and L chest tube by thoracic surgery on last admission 6/11/22 p/w SOB and fever, found to have COVID19 infection.     -On remdesivir, decadrone, lovenox for COVID infection  -Hold Lenvima   -No plans for inpatient systemic treatment for uterine ca  -Thoracic surg input appreciated  -Supportive care, pain control, Nutrition, PT, DVT ppx  -Outpatient oncology f/u    Will follow. Please do not hesitate to call back with questions.     Marilynn Watson MD  Medical Oncology Attending  C: [phone]     time spent on direct patient care, interdisciplinary discussions and chart review.
ASSESSMENT:   Bilateral malignant pleural effusions with h/o uterine cancer    PLAN:  Admit to medicine  Dr Peters to review case and films- IR drainage if indicated  Thoracic surgery to follow up

## 2022-07-14 NOTE — CONSULT NOTE ADULT - SUBJECTIVE AND OBJECTIVE BOX
HPI:  This 56 year old female is known to Thoracic Surgery for her malignant pleural effusions.  She has a h/o uterine cancer with lung mets and is being treated with chemotherapy.  The uterine cancer was diagnosed in 2020 and in 8/2021 she was found to have a R pleural effusion.  IR drained it and metastatic adenocarcinoma was found.  On 9/15/21, she underwent a R VATS pleural bx, PleurX placement.  That PleurX was removed on 3/8/22 due to lack of drainage.  In 6/2022 she returned with bilateral pleural effusions.  A right PTC was unable to be placed at the bedside, but IR was able to place a L PTC.  The pt continued to have SOB and tachypnea out of proportion to the amount of fluid on the R, so IR was recalled.  The R effusion was noted to be complex and loculated on US but IR was able to perform a R thoracentesis.  She still had her chronic productive cough after discharge.  However, for the past two days, she has had fevers at home up to 102 and that is why she came to the ER.  She admits to feeling better after having been given cough syrup and O2 2L via NC.      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)-> TAHBSO 2020, Chemotherapy, RT  R chest Mediport placement  h/o ovarian cystectomy and uterine polypectomy    REVIEW OF SYSTEMS  General: + Fever/ No Weight change/ No Fatigue/ No HA/ No Dizziness	  Skin/Breast: No Rashes/ No Lesions/ No Masses  Ophthalmologic: No Blurry vision  ENMT: No Hearing loss	  Respiratory and Thorax: + Chronic Cough productive of yellow sputum/ No Wheezing/ No SOB/ No Hemoptysis  Cardiovascular: No Chest pain/ No Palpitations/ No Diaphoresis	  Gastrointestinal: No Nausea/ No Vomiting/ No Constipation/ No Appetite Change	  Genitourinary: No Hematuria/ No Dysuria/ No Frequency change	  Musculoskeletal: No Pain/ No Weakness/ No Claudication	  Neurological: No Seizures/ No Paresthesias	  Psychiatric: No Dementia/ No Depression	  Hematology/Lymphatics: No hx of bleeding/ No Edema	  Endocrine: No Hyperglycemia/ No Hypoglycemia  Allergic/Immunologic: No Anaphylaxis/ No Intolerance/ No Recent illnesses    MEDICATIONS  (ER):  NS IV Bolus 500 mL x 2  LR IV Bolus 500 mL x 1  Zosyn 3.375 gm IVPB x 1  Vancomycin 1 gm IVPB x 1  Ofirmev 1 gm IVPB x 1  Hycodan 5 mL PO x 1  Simethicone 80 mg PO x 1    MEDICATIONS (Home):  Hydromet 5/15 PRN    Allergies  NKA    SOCIAL HISTORY:  No Tobacco, No EtOH, No Recreational drugs    FAMILY HISTORY:  Rectal cancer - mother    Vital Signs Last 24 Hrs  T(C): 38.7 (11 Jul 2022 23:26), Max: 38.7 (11 Jul 2022 23:26)  T(F): 101.6 (11 Jul 2022 23:26), Max: 101.6 (11 Jul 2022 23:26)  HR: 113 (11 Jul 2022 23:26) (113 - 118)  BP: 151/92 (11 Jul 2022 23:26) (151/92 - 155/87)  RR: 18 (11 Jul 2022 23:26) (18 - 19)  SpO2: 98% (11 Jul 2022 23:26) on 2L NC    General: WN/WD NAD; speaking in full sentences  Neurology: Awake, nonfocal, BUCHANAN x 4  Eyes: Scleras clear, PERRLA/ EOMI, Gross vision intact  ENT: Gross hearing intact, grossly patent pharynx, no stridor  Neck: Neck supple, trachea midline, No JVD,   Respiratory: Unlabored breathing, upper lungs fields CTA, decreased breath sounds at the bases.    CV: RRR (ST), S1S2, no murmurs  Abdominal: Soft, NT, ND +BS,   Extremities: No edema  Skin: No Rashes, Hematoma, Ecchymosis  Psych: Oriented x 3, normal affect    LABS:                        9.7    8.99  )-----------( 280      ( 12 Jul 2022 01:25 )             31.9     07-12    139  |  100  |  10  ----------------------------<  109<H>  4.3   |  26  |  1.09    Ca    9.7      12 Jul 2022 01:25    TPro  8.1  /  Alb  3.7  /  TBili  0.3  /  DBili  x   /  AST  24  /  ALT  15  /  AlkPhos  152<H>  07-12    PT/INR - ( 12 Jul 2022 01:25 )   PT: 14.9 sec;   INR: 1.28 ratio    PTT - ( 12 Jul 2022 01:25 )  PTT:33.7 sec    Blood cultures sent  Urine cultures sent    RADIOLOGY & ADDITIONAL STUDIES:  CXR 7/12/22 Similar appearance of bilateral pleural effusions and lower lung atelectasis compared with 7/6/22. The R effusion is loculated.  There is bilateral lower lung consolidation.  No PTX  CXR 7/6/22 Bilateral linear atelectasis.  Small probably loculated R pleural effusion and questionable trace L pleural effusion    
Patient is a 57y old  Female who presents with a chief complaint of Fevers, mild SOB (14 Jul 2022 11:16)      HPI:  57 y/o AA F, with PMHX of uterine carcinoma (s/p hysterectomy [facility]ENT BSO, 2020, ongoing chemotherapy (last session 6/27; known to Dr. Danny Celaya) s/p radiation, w/ hx of b/l malignant pleural effusions s/p R VATS 10/2201, PleurX catheter removed 3/22, and recent R thoracentesis by IR and L chest tube by thoracic surgery on last admission 6/11/22. Patient presents to the ED with worsening SOB x3 days, fever (TMAX 102.8) x2 days. Patient states she chronically has a dry, non-productive cough and shortness of breath. Patient states she takes chronic cough medicine with relief. She has SOB when walking up stairs and exerting herself but denies SOB at rest. Over the past 3 days patient developed worsening SOB upon exertion and noticed being more winded than normal. Patient took Tylenol for fever relief. Patient also became more nauseous and vomited x1 NBNB episode on Saturday (7/9). She also endorses mild sore throat. Patient denies any sick contacts or recent travel. Patient denies chest pain, chills, weight loss, night sweats, myalgias, nasal congestion, dizziness, palpitations, productive cough, constipation, abdominal pain, extremity swelling, dysuria, hematuria, melena, or hematochezia. In the ED, patient received IV zosyn, IV vancomycin, IVF, IV decadron and IV Tylenol for fever. Patient tested as COVID-19 +. CXR showed b/l pleural effusions with loculated fluid, similar appearing to to prior imaging. (12 Jul 2022 14:07)       Oncologic History:      ROS: as above     PAST MEDICAL & SURGICAL HISTORY:  Uterine cancer  S/p chemotherapy, radiation and hysterectomy      Malignant pleural effusion  with hx loculation on R      Asthma      S/P hysterectomy  2020 [facility]INCENT/BSO      H/O ovarian cystectomy  2009      History of thoracentesis  R VATS 10/21          SOCIAL HISTORY:    FAMILY HISTORY:  Maternal family history of hypertension (Mother)    Family history of type 2 diabetes mellitus (Father)        MEDICATIONS  (STANDING):  chlorhexidine 2% Cloths 1 Application(s) Topical daily  dexAMETHasone  Injectable 6 milliGRAM(s) IV Push every 24 hours  enoxaparin Injectable 40 milliGRAM(s) SubCutaneous every 12 hours  magnesium oxide 400 milliGRAM(s) Oral three times a day with meals  remdesivir  IVPB   IV Intermittent   remdesivir  IVPB 100 milliGRAM(s) IV Intermittent every 24 hours    MEDICATIONS  (PRN):  acetaminophen     Tablet .. 650 milliGRAM(s) Oral every 6 hours PRN Temp greater or equal to 38C (100.4F), Mild Pain (1 - 3)  benzonatate 100 milliGRAM(s) Oral three times a day PRN Cough  guaiFENesin Oral Liquid (Sugar-Free) 100 milliGRAM(s) Oral every 6 hours PRN Cough  hydrocodone/homatropine Syrup 5 milliLiter(s) Oral at bedtime PRN Cough      Allergies    No Known Allergies    Intolerances        Vital Signs Last 24 Hrs  T(C): 36.6 (14 Jul 2022 08:40), Max: 37.3 (13 Jul 2022 16:56)  T(F): 97.9 (14 Jul 2022 08:40), Max: 99.2 (13 Jul 2022 16:56)  HR: 82 (14 Jul 2022 08:40) (79 - 99)  BP: 139/79 (14 Jul 2022 08:40) (135/78 - 152/88)  BP(mean): --  RR: 19 (14 Jul 2022 08:40) (18 - 20)  SpO2: 96% (14 Jul 2022 08:40) (95% - 97%)    Parameters below as of 14 Jul 2022 08:40  Patient On (Oxygen Delivery Method): room air        PHYSICAL EXAM  General: adult in NAD  HEENT: clear oropharynx, anicteric sclera, pink conjunctiva  Neck: supple  CV: normal S1/S2 with no murmur rubs or gallops  Lungs: positive air movement b/l ant lungs, clear to auscultation, no wheezes, no rales  Abdomen: soft non-tender non-distended, no hepatosplenomegaly  Ext: no clubbing cyanosis or edema  Skin: no rashes and no petechiae  Neuro: alert and oriented X 3, none focal    LABS:                          8.8    5.12  )-----------( 249      ( 14 Jul 2022 05:05 )             28.8         Mean Cell Volume : 82.3 fL  Mean Cell Hemoglobin : 25.1 pg  Mean Cell Hemoglobin Concentration : 30.6 gm/dL  Auto Neutrophil # : 3.39 K/uL  Auto Lymphocyte # : 1.01 K/uL  Auto Monocyte # : 0.69 K/uL  Auto Eosinophil # : 0.00 K/uL  Auto Basophil # : 0.01 K/uL  Auto Neutrophil % : 66.2 %  Auto Lymphocyte % : 19.7 %  Auto Monocyte % : 13.5 %  Auto Eosinophil % : 0.0 %  Auto Basophil % : 0.2 %      Serial CBC's  07-14 @ 05:05  Hct-28.8 / Hgb-8.8 / Plat-249 / RBC-3.50 / WBC-5.12  Serial CBC's  07-13 @ 06:22  Hct-28.8 / Hgb-9.0 / Plat-253 / RBC-3.60 / WBC-7.08  Serial CBC's  07-12 @ 01:25  Hct-31.9 / Hgb-9.7 / Plat-280 / RBC-3.93 / WBC-8.99      07-14    137  |  100  |  17  ----------------------------<  99  3.5   |  25  |  0.87    Ca    9.2      14 Jul 2022 05:05  Phos  3.1     07-14  Mg     1.50     07-14    TPro  6.9  /  Alb  3.5  /  TBili  <0.2  /  DBili  x   /  AST  12  /  ALT  8   /  AlkPhos  108  07-14          Ferritin, Serum: 374 ng/mL (07-13 @ 06:22)              RADIOLOGY & ADDITIONAL STUDIES:

## 2022-07-14 NOTE — PROGRESS NOTE ADULT - SUBJECTIVE AND OBJECTIVE BOX
Cher Lopez MD  [facility] Division of Hospital Medicine  Pager [phone] (M-F 8AM-5PM)  Other Times: [phone]    Patient is a 57y old  Female who presents with a chief complaint of Fevers, mild SOB (13 Jul 2022 14:02)    SUBJECTIVE / OVERNIGHT EVENTS: cough improving, sob improving    MEDICATIONS  (STANDING):  chlorhexidine 2% Cloths 1 Application(s) Topical daily  dexAMETHasone  Injectable 6 milliGRAM(s) IV Push every 24 hours  enoxaparin Injectable 40 milliGRAM(s) SubCutaneous every 12 hours  magnesium oxide 400 milliGRAM(s) Oral three times a day with meals  remdesivir  IVPB   IV Intermittent   remdesivir  IVPB 100 milliGRAM(s) IV Intermittent every 24 hours    MEDICATIONS  (PRN):  acetaminophen     Tablet .. 650 milliGRAM(s) Oral every 6 hours PRN Temp greater or equal to 38C (100.4F), Mild Pain (1 - 3)  benzonatate 100 milliGRAM(s) Oral three times a day PRN Cough  guaiFENesin Oral Liquid (Sugar-Free) 100 milliGRAM(s) Oral every 6 hours PRN Cough  hydrocodone/homatropine Syrup 5 milliLiter(s) Oral at bedtime PRN Cough      PHYSICAL EXAM:  Vital Signs Last 24 Hrs  T(C): 36.6 (14 Jul 2022 08:40), Max: 37.3 (13 Jul 2022 16:56)  T(F): 97.9 (14 Jul 2022 08:40), Max: 99.2 (13 Jul 2022 16:56)  HR: 82 (14 Jul 2022 08:40) (79 - 99)  BP: 139/79 (14 Jul 2022 08:40) (135/78 - 152/88)  BP(mean): --  RR: 19 (14 Jul 2022 08:40) (18 - 20)  SpO2: 96% (14 Jul 2022 08:40) (95% - 97%)    Parameters below as of 14 Jul 2022 08:40  Patient On (Oxygen Delivery Method): room air        CONSTITUTIONAL: NAD, well-developed, well-groomed  RESPIRATORY: Normal respiratory effort; lungs are clear to auscultation bilaterally  CARDIOVASCULAR: Regular rate and rhythm, normal S1 and S2, no murmur/rub/gallop; No lower extremity edema  GASTROINTESTINAL: Nontender to palpation, normoactive bowel sounds, no rebound/guarding; No hepatosplenomegaly  MUSCULOSKELETAL:  no clubbing or cyanosis of digits; no joint swelling or tenderness to palpation  NEUROLOGY: non-focal; no gross sensory deficits   PSYCH: A+O to person, place, and time; affect appropriate  SKIN: No rashes; warm     LABS:                        8.8    5.12  )-----------( 249      ( 14 Jul 2022 05:05 )             28.8     07-14    137  |  100  |  17  ----------------------------<  99  3.5   |  25  |  0.87    Ca    9.2      14 Jul 2022 05:05  Phos  3.1     07-14  Mg     1.50     07-14    TPro  6.9  /  Alb  3.5  /  TBili  <0.2  /  DBili  x   /  AST  12  /  ALT  8   /  AlkPhos  108  07-14              Culture - Blood (collected 12 Jul 2022 01:28)  Source: .Blood Blood-Peripheral  Preliminary Report (13 Jul 2022 09:01):    No growth to date.    Culture - Blood (collected 12 Jul 2022 01:20)  Source: .Blood Blood-Peripheral  Preliminary Report (13 Jul 2022 09:01):    No growth to date.    Culture - Urine (collected 12 Jul 2022 00:41)  Source: Clean Catch Clean Catch (Midstream)  Final Report (13 Jul 2022 06:16):    <10,000 CFU/mL Normal Urogenital Danielle        RADIOLOGY & ADDITIONAL TESTS:  Results Reviewed:   Imaging Personally Reviewed:  Electrocardiogram Personally Reviewed:    COORDINATION OF CARE:  Care Discussed with Consultants/Other Providers [Y/N]:  Prior or Outpatient Records Reviewed [Y/N]:

## 2022-07-14 NOTE — PROGRESS NOTE ADULT - PROBLEM SELECTOR PLAN 1
presented with worsening cough/SOB and fevers at home  - Tmax here 101.6, found to be COVID positive   - COVID- vaccinated 2x Moderna 2021 - not boosted   - given hypoxia to 91% on room air and immunocompromised status, started on remdesivir and decadron (patient in agreement with treatment) - remedesevir d2/5 + decadron d2/9  - D-dimer elevated - LE duplex pending, trend inflammatory markers q3days  - c/w lovenox 40mg SQ BID per DDimer  - monitor oxygen saturations, tessalon PRN, Tylenol PRN, isolation

## 2022-07-15 NOTE — DISCHARGE NOTE PROVIDER - CARE PROVIDERS DIRECT ADDRESSES
[email or web address]

## 2022-07-15 NOTE — DISCHARGE NOTE PROVIDER - NSDCMRMEDTOKEN_GEN_ALL_CORE_FT
acetaminophen 500 mg oral tablet: 2 tab(s) orally every 6 hours, As Needed  benzonatate 100 mg oral capsule: 1 cap(s) orally 3 times a day, As Needed  hydrocodone-homatropine 5 mg-1.5 mg/5 mL oral syrup: 5 milliliter(s) orally once a day (at bedtime), As Needed   acetaminophen 500 mg oral tablet: 2 tab(s) orally every 6 hours, As Needed  benzonatate 100 mg oral capsule: 1 cap(s) orally 3 times a day, As Needed  guaiFENesin 100 mg/5 mL oral liquid: 5 milliliter(s) orally every 6 hours, As needed, Cough  hydrocodone-homatropine 5 mg-1.5 mg/5 mL oral syrup: 5 milliliter(s) orally once a day (at bedtime), As Needed  lidocaine 4% topical film: Apply topically to affected area once a day  Xarelto 10 mg oral tablet: 1 tab(s) orally once a day

## 2022-07-15 NOTE — PROGRESS NOTE ADULT - PROBLEM SELECTOR PLAN 1
presented with worsening cough/SOB and fevers at home  - Tmax here 101.6, found to be COVID positive   - COVID- vaccinated 2x Moderna 2021 - not boosted   - given hypoxia to 91% on room air and immunocompromised status, started on remdesivir and decadron (patient in agreement with treatment) - remedesivir d3/5 + decadron d3/9  - D-dimer elevated - LE duplex pending, trend inflammatory markers q3days  - c/w lovenox 40mg SQ BID per DDimer  - monitor oxygen saturations, tessalon PRN, Tylenol PRN, isolation

## 2022-07-15 NOTE — PROGRESS NOTE ADULT - SUBJECTIVE AND OBJECTIVE BOX
Cher Lopez MD  [facility] Division of Hospital Medicine  Pager [phone] (M-F 8AM-5PM)  Other Times: [phone]    Patient is a 57y old  Female who presents with a chief complaint of Fevers, mild SOB (15 Jul 2022 10:02)    SUBJECTIVE / OVERNIGHT EVENTS: no acute events overnight    MEDICATIONS  (STANDING):  chlorhexidine 2% Cloths 1 Application(s) Topical daily  dexAMETHasone  Injectable 6 milliGRAM(s) IV Push every 24 hours  enoxaparin Injectable 40 milliGRAM(s) SubCutaneous every 12 hours  lidocaine   4% Patch 1 Patch Transdermal once  potassium chloride   Powder 40 milliEquivalent(s) Oral once  remdesivir  IVPB 100 milliGRAM(s) IV Intermittent every 24 hours  remdesivir  IVPB   IV Intermittent     MEDICATIONS  (PRN):  acetaminophen     Tablet .. 650 milliGRAM(s) Oral every 6 hours PRN Temp greater or equal to 38C (100.4F), Mild Pain (1 - 3)  benzonatate 100 milliGRAM(s) Oral three times a day PRN Cough  guaiFENesin Oral Liquid (Sugar-Free) 100 milliGRAM(s) Oral every 6 hours PRN Cough  hydrocodone/homatropine Syrup 5 milliLiter(s) Oral at bedtime PRN Cough      PHYSICAL EXAM:  Vital Signs Last 24 Hrs  T(C): 36.9 (15 Jul 2022 06:52), Max: 37.1 (15 Jul 2022 00:00)  T(F): 98.4 (15 Jul 2022 06:52), Max: 98.7 (15 Jul 2022 00:00)  HR: 82 (15 Jul 2022 06:52) (82 - 91)  BP: 137/84 (15 Jul 2022 06:52) (130/78 - 139/88)  BP(mean): --  RR: 18 (15 Jul 2022 06:52) (17 - 19)  SpO2: 99% (15 Jul 2022 06:52) (96% - 99%)    Parameters below as of 15 Jul 2022 06:52  Patient On (Oxygen Delivery Method): room air        CONSTITUTIONAL: NAD, well-developed, well-groomed  RESPIRATORY: Normal respiratory effort; lungs are clear to auscultation bilaterally  CARDIOVASCULAR: Regular rate and rhythm, normal S1 and S2, no murmur/rub/gallop; No lower extremity edema  GASTROINTESTINAL: Nontender to palpation, normoactive bowel sounds, no rebound/guarding; No hepatosplenomegaly  MUSCULOSKELETAL:  no clubbing or cyanosis of digits; no joint swelling or tenderness to palpation  NEUROLOGY: non-focal; no gross sensory deficits   PSYCH: A+O to person, place, and time; affect appropriate  SKIN: No rashes; warm     LABS:                        9.8    5.39  )-----------( 281      ( 15 Jul 2022 06:47 )             32.1     07-15    137  |  100  |  17  ----------------------------<  96  3.4<L>   |  26  |  0.85    Ca    9.6      15 Jul 2022 06:47  Phos  2.8     07-15  Mg     1.70     07-15    TPro  8.2  /  Alb  3.2<L>  /  TBili  <0.2  /  DBili  x   /  AST  14  /  ALT  10  /  AlkPhos  109  07-15                RADIOLOGY & ADDITIONAL TESTS:  Results Reviewed:   Imaging Personally Reviewed:  Electrocardiogram Personally Reviewed:    COORDINATION OF CARE:  Care Discussed with Consultants/Other Providers [Y/N]:  Prior or Outpatient Records Reviewed [Y/N]:

## 2022-07-15 NOTE — DISCHARGE NOTE PROVIDER - NSDCFUSCHEDAPPT_GEN_ALL_CORE_FT
Mirtha Celaya  [facility]  [facility]  Scheduled Appointment: 07/18/2022    [facility]  [facility]  Scheduled Appointment: 07/18/2022    YANELIS Salvador  [address]  Scheduled Appointment: 08/01/2022    Mirtha Celaya  [facility]  [facility]  Scheduled Appointment: 08/08/2022    [facility]  [facility]  Scheduled Appointment: 08/08/2022     Mirtha Celaya  [facility]  [facility]  Scheduled Appointment: 07/25/2022    [facility]  [facility]  Scheduled Appointment: 07/25/2022    YANELIS Salvador  [address]  Scheduled Appointment: 08/01/2022    Mirtha Celaya  [facility]  [facility]  Scheduled Appointment: 08/15/2022    [facility]  [facility]  Scheduled Appointment: 08/15/2022

## 2022-07-15 NOTE — DISCHARGE NOTE PROVIDER - HOSPITAL COURSE
56F PMHX uterine carcinoma (s/p hysterectomy [facility], 2020, ongoing chemotherapy (last session 6/27; known to Dr. Danny Celaya) s/p radiation, w/ hx of b/l malignant pleural effusions s/p R VATS 10/2201, PleurX catheter removed 3/22, and recent R thoracentesis by IR and L chest tube by thoracic surgery on last admission 6/11/22 presented with shortness of breath found with COVID. Pt given supplement oxygen, started on remdesivir and decadron. Pt noted with improvement and weaned from NC to RA. Inflammatory marker noted for elevated D-Dimer. LE duplex *****.     Pt with hx of malignant pleural effusion CXR noted unchanged bilateral pleural effusion, CTS consulted recommedned no acute intervention at this time. Oncology recommended outpatient followup.      PT recommended outpatient PT       Case discussed with  ***. Reviewed discharge medications with patient; All new medications requiring new prescription sent to pharmacy of patients choice. Reviewed need for prescription for previous home medication and new prescriptions sent if requested. Patient in agreement and understands. 56F PMHX uterine carcinoma (s/p hysterectomy [facility], 2020, ongoing chemotherapy (last session 6/27; known to Dr. Danny Celaya) s/p radiation, w/ hx of b/l malignant pleural effusions s/p R VATS 10/2201, PleurX catheter removed 3/22, and recent R thoracentesis by IR and L chest tube by thoracic surgery on last admission 6/11/22 presented with shortness of breath found with COVID. Pt given supplement oxygen, started on remdesivir and decadron. Pt noted with improvement and weaned from NC to RA. Inflammatory marker noted for elevated D-Dimer. LE duplex *****.     Pt with hx of malignant pleural effusion CXR noted unchanged bilateral pleural effusion, CTS consulted recommedned no acute intervention at this time. Oncology recommended outpatient followup.        Case discussed with Dr ***. Reviewed discharge medications with patient; All new medications requiring new prescription sent to pharmacy of patients choice. Reviewed need for prescription for previous home medication and new prescriptions sent if requested. Patient in agreement and understands. 56F PMHX uterine carcinoma (s/p hysterectomy [facility], 2020, ongoing chemotherapy (last session 6/27; known to Dr. Danny Celaya) s/p radiation, w/ hx of b/l malignant pleural effusions s/p R VATS 10/2201, PleurX catheter removed 3/22, and recent R thoracentesis by IR and L chest tube by thoracic surgery on last admission 6/11/22 presented with shortness of breath found with COVID. Pt given supplement oxygen, started on remdesivir and decadron. Pt noted with improvement and weaned from NC to RA. Inflammatory marker noted for elevated D-Dimer. LE duplex negative.     Pt with hx of malignant pleural effusion CXR noted unchanged bilateral pleural effusion, CTS consulted recommedned no acute intervention at this time. Oncology recommended outpatient followup.        Case discussed with Dr Larsen on 7/17 pt medically optimized for discharge. Reviewed discharge medications with patient; All new medications requiring new prescription sent to pharmacy of patients choice. Reviewed need for prescription for previous home medication and new prescriptions sent if requested. Patient in agreement and understands. 56F PMHX uterine carcinoma (s/p hysterectomy [facility]SO, 2020, ongoing chemotherapy (last session 6/27; known to Dr. Danny Celaya) s/p radiation, w/ hx of b/l malignant pleural effusions s/p R VATS 10/2201, PleurX catheter removed 3/22, and recent R thoracentesis by IR and L chest tube by thoracic surgery on last admission 6/11/22 presented with shortness of breath found with COVID. Patient admitted to the hospital with Sepsis due to COVID-19 infection, sepsis present on admission evidenced by fever and tachycardia. Pt given supplement oxygen, started on remdesivir and decadron. Pt noted with improvement and weaned from NC to RA. Inflammatory marker noted for elevated D-Dimer. LE duplex negative.     Pt with hx of malignant pleural effusion CXR noted unchanged bilateral pleural effusion, CTS consulted recommedned no acute intervention at this time. Oncology recommended outpatient followup.        Case discussed with Dr Larsen on 7/17 pt medically optimized for discharge. Reviewed discharge medications with patient; All new medications requiring new prescription sent to pharmacy of patients choice. Reviewed need for prescription for previous home medication and new prescriptions sent if requested. Patient in agreement and understands.

## 2022-07-15 NOTE — DISCHARGE NOTE PROVIDER - NSDCCPCAREPLAN_GEN_ALL_CORE_FT
PRINCIPAL DISCHARGE DIAGNOSIS  Diagnosis: COVID-19  Assessment and Plan of Treatment: you are admitted with COVID infection, you have completed medication remdesivir in the hospital and given steroid.   You must self quarantine for 5 days starting 7/12 and continue wearing mask for additional 5 days while around other person/indoor.  Monitor for fevers, shortness of breath and cough primarily.     1. You should restrict activities outside your home, except for getting medical care.  2. Do not go to work, school, or public areas.  3. Avoid using public transportation, ride-sharing, or taxis.  4. Separate yourself from other people and animals in your home.  5. Call ahead before visiting your doctor.  6. Wear a facemask.  7. Cover your coughs and sneezes.  8. Clean your hands often.  9. Avoid sharing personal household items.  10. Clean all “high-touch” surfaces everyday.  11. Monitor your symptoms.  If you have a medical emergency and need to call 911, notify the dispatch personnel that you have COVID-19 If possible, put on a facemask before emergency medical services arrive.  12. Stopping home isolation.  Patients with confirmed COVID-19 should remain under home isolation precautions for 14 days since the positive COVID-19 test and until the risk of secondary transmission to others is thought to be low. The decision to discontinue home isolation precautions should be made on a case-by-case basis, in consultation with healthcare providers and state and local health departments. Your [facility] Department of Health can be reached at [phone] for further information about COVID-19.      SECONDARY DISCHARGE DIAGNOSES  Diagnosis: Malignant pleural effusion  Assessment and Plan of Treatment: you have fluid accumulation in the lung lining and were seen by thoracic service. No need for any drinage at this time. Followup with Dr Peters in 1-2 weeks    Diagnosis: Uterine cancer  Assessment and Plan of Treatment: followup with Dr. Mirtha Celaya at [facility] for continue care     PRINCIPAL DISCHARGE DIAGNOSIS  Diagnosis: COVID-19  Assessment and Plan of Treatment: you are admitted with COVID infection, you have completed medication remdesivir in the hospital and given steroid.   You must self quarantine for 5 days starting 7/12 and continue wearing mask for additional 5 days while around other person/indoor.  Monitor for fevers, shortness of breath and cough primarily.    please take xarelto fo 30 days only to prevent blood clot given recent COVID infeciton and admission   1. You should restrict activities outside your home, except for getting medical care.  2. Do not go to work, school, or public areas.  3. Avoid using public transportation, ride-sharing, or taxis.  4. Separate yourself from other people and animals in your home.  5. Call ahead before visiting your doctor.  6. Wear a facemask.  7. Cover your coughs and sneezes.  8. Clean your hands often.  9. Avoid sharing personal household items.  10. Clean all “high-touch” surfaces everyday.  11. Monitor your symptoms.  If you have a medical emergency and need to call 911, notify the dispatch personnel that you have COVID-19 If possible, put on a facemask before emergency medical services arrive.  12. Stopping home isolation.  Patients with confirmed COVID-19 should remain under home isolation precautions for 14 days since the positive COVID-19 test and until the risk of secondary transmission to others is thought to be low. The decision to discontinue home isolation precautions should be made on a case-by-case basis, in consultation with healthcare providers and state and local health departments. Your [facility] Department of Health can be reached at [phone] for further information about COVID-19.      SECONDARY DISCHARGE DIAGNOSES  Diagnosis: Malignant pleural effusion  Assessment and Plan of Treatment: you have fluid accumulation in the lung lining and were seen by thoracic service. No need for any drinage at this time. Followup with Dr Peters in 1-2 weeks    Diagnosis: Uterine cancer  Assessment and Plan of Treatment: followup with Dr. Mirtha Celaya at [facility] for continue care     PRINCIPAL DISCHARGE DIAGNOSIS  Diagnosis: COVID-19  Assessment and Plan of Treatment: you are admitted with COVID infection, you have completed medication remdesivir in the hospital and given steroid.   You must self quarantine for 5 days starting 7/12 and continue wearing mask for additional 5 days while around other person/indoor.  Monitor for fevers, shortness of breath and cough primarily.    please take xarelto fo 30 days only to prevent blood clot given recent COVID infection and admission to hospital   1. You should restrict activities outside your home, except for getting medical care.  2. Do not go to work, school, or public areas.  3. Avoid using public transportation, ride-sharing, or taxis.  4. Separate yourself from other people and animals in your home.  5. Call ahead before visiting your doctor.  6. Wear a facemask.  7. Cover your coughs and sneezes.  8. Clean your hands often.  9. Avoid sharing personal household items.  10. Clean all “high-touch” surfaces everyday.  11. Monitor your symptoms.  If you have a medical emergency and need to call 911, notify the dispatch personnel that you have COVID-19 If possible, put on a facemask before emergency medical services arrive.  12. Stopping home isolation.  Patients with confirmed COVID-19 should remain under home isolation precautions for 14 days since the positive COVID-19 test and until the risk of secondary transmission to others is thought to be low. The decision to discontinue home isolation precautions should be made on a case-by-case basis, in consultation with healthcare providers and state and local health departments. Your [facility] Department of Health can be reached at [phone] for further information about COVID-19.      SECONDARY DISCHARGE DIAGNOSES  Diagnosis: Malignant pleural effusion  Assessment and Plan of Treatment: you have fluid accumulation in the lung lining and were seen by thoracic service. No need for any drinage at this time. Followup with Dr Peters in 1-2 weeks    Diagnosis: Uterine cancer  Assessment and Plan of Treatment: followup with Dr. Mirtha Celaya at [facility] for continue care

## 2022-07-15 NOTE — DISCHARGE NOTE PROVIDER - CARE PROVIDER_API CALL
Mirtha Celaya)  Hematology; Internal Medicine; Medical Oncology  [address] [place] [address]  Phone: [phone]  Fax: [phone]  Follow Up Time:     Stevan Peetrs)  Surgery; Thoracic Surgery  [address] [place] [address] Oncology [facility] [place] [address]  Phone: [phone]  Fax: [phone]  Follow Up Time:

## 2022-07-16 NOTE — PROGRESS NOTE ADULT - PROBLEM SELECTOR PLAN 1
- SARS-CoV-2 PCR positive.  - Vaccinated x2 (Moderna, no booster).  - Started on remdesivir (day 4/5), dexamethasone (day 4/9).  - Trend inflammatory markers.  - Continue enoxaparin.  - Supportive care.

## 2022-07-16 NOTE — PROGRESS NOTE ADULT - SUBJECTIVE AND OBJECTIVE BOX
Oralia Larsen MD  Attending Physician | Internal Medicine  [phone] / [phone]    Patient is a 57y old  Female who presents with a chief complaint of Fevers, mild SOB (15 Jul 2022 13:55)      SUBJECTIVE / OVERNIGHT EVENTS:    MEDICATIONS  (STANDING):  chlorhexidine 2% Cloths 1 Application(s) Topical daily  dexAMETHasone  Injectable 6 milliGRAM(s) IV Push every 24 hours  enoxaparin Injectable 40 milliGRAM(s) SubCutaneous every 12 hours  lidocaine   4% Patch 1 Patch Transdermal daily  remdesivir  IVPB 100 milliGRAM(s) IV Intermittent every 24 hours  remdesivir  IVPB   IV Intermittent     MEDICATIONS  (PRN):  acetaminophen     Tablet .. 650 milliGRAM(s) Oral every 6 hours PRN Temp greater or equal to 38C (100.4F), Mild Pain (1 - 3)  benzonatate 100 milliGRAM(s) Oral three times a day PRN Cough  guaiFENesin Oral Liquid (Sugar-Free) 100 milliGRAM(s) Oral every 6 hours PRN Cough  hydrocodone/homatropine Syrup 5 milliLiter(s) Oral at bedtime PRN Cough      T(C): 36.7 (07-16-22 @ 04:53), Max: 36.7 (07-15-22 @ 15:00)  HR: 80 (07-16-22 @ 04:53) (75 - 86)  BP: 138/82 (07-16-22 @ 04:53) (126/91 - 143/95)  RR: 18 (07-16-22 @ 04:53) (18 - 18)  SpO2: 96% (07-16-22 @ 04:53) (96% - 97%)    CAPILLARY BLOOD GLUCOSE        I&O's Summary    15 Jul 2022 07:01  -  16 Jul 2022 07:00  --------------------------------------------------------  IN: 120 mL / OUT: 0 mL / NET: 120 mL        PHYSICAL EXAM:  GENERAL:  HEAD:  EYES:  NECK:  CHEST/LUNG:  HEART:  ABDOMEN:  EXTREMITIES:  PSYCH:  NEUROLOGY:  SKIN:    LABS:                        9.0    6.23  )-----------( 252      ( 16 Jul 2022 07:35 )             28.4     07-15    137  |  100  |  17  ----------------------------<  96  3.4<L>   |  26  |  0.85    Ca    9.6      15 Jul 2022 06:47  Phos  2.8     07-15  Mg     1.70     07-15    TPro  8.2  /  Alb  3.2<L>  /  TBili  <0.2  /  DBili  x   /  AST  14  /  ALT  10  /  AlkPhos  109  07-15              RADIOLOGY & ADDITIONAL TESTS:    Imaging Personally Reviewed: LE duplex    Consultant(s) Notes Reviewed: no new consultant notes at this time    Care Discussed with Consultants/Other Providers:  Oralia Larsen MD  Attending Physician | Internal Medicine  [phone] / [phone]    Patient is a 57y old  Female who presents with a chief complaint of Fevers, mild SOB (15 Jul 2022 13:55)      SUBJECTIVE / OVERNIGHT EVENTS: The patient reports continued cough and phlegm. She denies fevers. She is afebrile. She denies N/V.    MEDICATIONS  (STANDING):  chlorhexidine 2% Cloths 1 Application(s) Topical daily  dexAMETHasone  Injectable 6 milliGRAM(s) IV Push every 24 hours  enoxaparin Injectable 40 milliGRAM(s) SubCutaneous every 12 hours  lidocaine   4% Patch 1 Patch Transdermal daily  remdesivir  IVPB 100 milliGRAM(s) IV Intermittent every 24 hours  remdesivir  IVPB   IV Intermittent     MEDICATIONS  (PRN):  acetaminophen     Tablet .. 650 milliGRAM(s) Oral every 6 hours PRN Temp greater or equal to 38C (100.4F), Mild Pain (1 - 3)  benzonatate 100 milliGRAM(s) Oral three times a day PRN Cough  guaiFENesin Oral Liquid (Sugar-Free) 100 milliGRAM(s) Oral every 6 hours PRN Cough  hydrocodone/homatropine Syrup 5 milliLiter(s) Oral at bedtime PRN Cough      T(C): 36.7 (07-16-22 @ 04:53), Max: 36.7 (07-15-22 @ 15:00)  HR: 80 (07-16-22 @ 04:53) (75 - 86)  BP: 138/82 (07-16-22 @ 04:53) (126/91 - 143/95)  RR: 18 (07-16-22 @ 04:53) (18 - 18)  SpO2: 96% (07-16-22 @ 04:53) (96% - 97%)    CAPILLARY BLOOD GLUCOSE        I&O's Summary    15 Jul 2022 07:01  -  16 Jul 2022 07:00  --------------------------------------------------------  IN: 120 mL / OUT: 0 mL / NET: 120 mL    T(C): 36.5 (07-16-22 @ 12:57), Max: 36.7 (07-15-22 @ 15:00)  HR: 76 (07-16-22 @ 12:57) (75 - 86)  BP: 132/95 (07-16-22 @ 12:57) (132/95 - 143/95)  RR: 18 (07-16-22 @ 12:57) (18 - 18)  SpO2: 97% (07-16-22 @ 12:57) (96% - 97%)    Gen: awake, alert  HENT: neck soft / supple; MMM  Lymph: no LAD noted in neck  Eye: sclerae anicteric  CV: normal rate, regular rhythm  Pulm: mild congestion noted  Abd: +BS, soft, NT, ND  Skin: warm, dry  Ext: 1+ pitting edema  Neuro: answering questions appropriately, following commands appropriately, recent and remote memory intact  Psych: normal mood / affect    LABS:                        9.0    6.23  )-----------( 252      ( 16 Jul 2022 07:35 )             28.4     07-15    137  |  100  |  17  ----------------------------<  96  3.4<L>   |  26  |  0.85    Ca    9.6      15 Jul 2022 06:47  Phos  2.8     07-15  Mg     1.70     07-15    TPro  8.2  /  Alb  3.2<L>  /  TBili  <0.2  /  DBili  x   /  AST  14  /  ALT  10  /  AlkPhos  109  07-15              RADIOLOGY & ADDITIONAL TESTS:    Imaging Personally Reviewed: LE duplex    Consultant(s) Notes Reviewed: no new consultant notes at this time    Care Discussed with Consultants/Other Providers:

## 2022-07-17 NOTE — DISCHARGE NOTE NURSING/CASE MANAGEMENT/SOCIAL WORK - NSDCPEFALRISK_GEN_ALL_CORE
For information on Fall & Injury Prevention, visit: [email or web address] OR  [email or web address] OR  https://www.cdc.gov/steadi/patient.html

## 2022-07-17 NOTE — DISCHARGE NOTE NURSING/CASE MANAGEMENT/SOCIAL WORK - PATIENT PORTAL LINK FT
You can access the FollowMyHealth Patient Portal offered by [facility] by registering at the following website: [email or web address]. By joining [facility]’s FollowMyHealth portal, you will also be able to view your health information using other applications (apps) compatible with our system.

## 2022-07-17 NOTE — PROGRESS NOTE ADULT - PROBLEM SELECTOR PROBLEM 2
Malignant pleural effusion
COVID-19

## 2022-07-17 NOTE — PROGRESS NOTE ADULT - SUBJECTIVE AND OBJECTIVE BOX
Oralia Larsen MD  Attending Physician | Internal Medicine  [phone] / [phone]    Patient is a 57y old  Female who presents with a chief complaint of Fevers, mild SOB (16 Jul 2022 09:06)      SUBJECTIVE / OVERNIGHT EVENTS: Afebrile, WBC WNL. Hgb 7.4 this AM, pending repeat.    MEDICATIONS  (STANDING):  chlorhexidine 2% Cloths 1 Application(s) Topical daily  dexAMETHasone  Injectable 6 milliGRAM(s) IV Push every 24 hours  enoxaparin Injectable 40 milliGRAM(s) SubCutaneous every 12 hours  lidocaine   4% Patch 1 Patch Transdermal daily  lidocaine   4% Patch 1 Patch Transdermal daily  magnesium oxide 400 milliGRAM(s) Oral three times a day with meals    MEDICATIONS  (PRN):  acetaminophen     Tablet .. 650 milliGRAM(s) Oral every 6 hours PRN Temp greater or equal to 38C (100.4F), Mild Pain (1 - 3)  benzonatate 100 milliGRAM(s) Oral three times a day PRN Cough  guaiFENesin Oral Liquid (Sugar-Free) 100 milliGRAM(s) Oral every 6 hours PRN Cough  hydrocodone/homatropine Syrup 5 milliLiter(s) Oral at bedtime PRN Cough      T(C): 36.5 (07-17-22 @ 08:30), Max: 36.9 (07-17-22 @ 01:00)  HR: 70 (07-17-22 @ 08:30) (63 - 76)  BP: 140/95 (07-17-22 @ 08:30) (132/80 - 140/95)  RR: 18 (07-17-22 @ 08:30) (18 - 18)  SpO2: 96% (07-17-22 @ 08:30) (95% - 97%)    CAPILLARY BLOOD GLUCOSE        I&O's Summary    16 Jul 2022 07:01  -  17 Jul 2022 07:00  --------------------------------------------------------  IN: 150 mL / OUT: 0 mL / NET: 150 mL        PHYSICAL EXAM:  GENERAL:  HEAD:  EYES:  NECK:  CHEST/LUNG:  HEART:  ABDOMEN:  EXTREMITIES:  PSYCH:  NEUROLOGY:  SKIN:    LABS:                        7.4    5.46  )-----------( 203 ( 17 Jul 2022 04:56 )             23.5     07-17    136  |  102  |  17  ----------------------------<  93  3.6   |  25  |  0.81    Ca    8.9      17 Jul 2022 04:56  Phos  3.3     07-17  Mg     1.50     07-17    TPro  6.3  /  Alb  3.1<L>  /  TBili  0.2  /  DBili  x   /  AST  22  /  ALT  24  /  AlkPhos  92  07-17              RADIOLOGY & ADDITIONAL TESTS:    Imaging Personally Reviewed: no new imaging    Consultant(s) Notes Reviewed: no new consultant notes at this time    Care Discussed with Consultants/Other Providers:  Oralia Larsen MD  Attending Physician | Internal Medicine  [phone] / [phone]    Patient is a 57y old  Female who presents with a chief complaint of Fevers, mild SOB (16 Jul 2022 09:06)      SUBJECTIVE / OVERNIGHT EVENTS: Afebrile, WBC WNL. Hgb 7.4 this AM, repeat 10.4.    MEDICATIONS  (STANDING):  chlorhexidine 2% Cloths 1 Application(s) Topical daily  dexAMETHasone  Injectable 6 milliGRAM(s) IV Push every 24 hours  enoxaparin Injectable 40 milliGRAM(s) SubCutaneous every 12 hours  lidocaine   4% Patch 1 Patch Transdermal daily  lidocaine   4% Patch 1 Patch Transdermal daily  magnesium oxide 400 milliGRAM(s) Oral three times a day with meals    MEDICATIONS  (PRN):  acetaminophen     Tablet .. 650 milliGRAM(s) Oral every 6 hours PRN Temp greater or equal to 38C (100.4F), Mild Pain (1 - 3)  benzonatate 100 milliGRAM(s) Oral three times a day PRN Cough  guaiFENesin Oral Liquid (Sugar-Free) 100 milliGRAM(s) Oral every 6 hours PRN Cough  hydrocodone/homatropine Syrup 5 milliLiter(s) Oral at bedtime PRN Cough      T(C): 36.5 (07-17-22 @ 08:30), Max: 36.9 (07-17-22 @ 01:00)  HR: 70 (07-17-22 @ 08:30) (63 - 76)  BP: 140/95 (07-17-22 @ 08:30) (132/80 - 140/95)  RR: 18 (07-17-22 @ 08:30) (18 - 18)  SpO2: 96% (07-17-22 @ 08:30) (95% - 97%)    CAPILLARY BLOOD GLUCOSE        I&O's Summary    16 Jul 2022 07:01  -  17 Jul 2022 07:00  --------------------------------------------------------  IN: 150 mL / OUT: 0 mL / NET: 150 mL        PHYSICAL EXAM:  GENERAL:  HEAD:  EYES:  NECK:  CHEST/LUNG:  HEART:  ABDOMEN:  EXTREMITIES:  PSYCH:  NEUROLOGY:  SKIN:    LABS:                        7.4    5.46  )-----------( 203      ( 17 Jul 2022 04:56 )             23.5     07-17    136  |  102  |  17  ----------------------------<  93  3.6   |  25  |  0.81    Ca    8.9      17 Jul 2022 04:56  Phos  3.3     07-17  Mg     1.50     07-17    TPro  6.3  /  Alb  3.1<L>  /  TBili  0.2  /  DBili  x   /  AST  22  /  ALT  24  /  AlkPhos  92  07-17              RADIOLOGY & ADDITIONAL TESTS:    Imaging Personally Reviewed: no new imaging    Consultant(s) Notes Reviewed: no new consultant notes at this time    Care Discussed with Consultants/Other Providers:  Oralia Larsen MD  Attending Physician | Internal Medicine  [phone] / [phone]    Patient is a 57y old  Female who presents with a chief complaint of Fevers, mild SOB (16 Jul 2022 09:06)      SUBJECTIVE / OVERNIGHT EVENTS: Afebrile, WBC WNL. Hgb 7.4 this AM, repeat 10.4. The patient reports continued cough but otherwise denies fever, chills, N/V/D, chest pain, SOB. She states that she is ready to go home.    MEDICATIONS  (STANDING):  chlorhexidine 2% Cloths 1 Application(s) Topical daily  dexAMETHasone  Injectable 6 milliGRAM(s) IV Push every 24 hours  enoxaparin Injectable 40 milliGRAM(s) SubCutaneous every 12 hours  lidocaine   4% Patch 1 Patch Transdermal daily  lidocaine   4% Patch 1 Patch Transdermal daily  magnesium oxide 400 milliGRAM(s) Oral three times a day with meals    MEDICATIONS  (PRN):  acetaminophen     Tablet .. 650 milliGRAM(s) Oral every 6 hours PRN Temp greater or equal to 38C (100.4F), Mild Pain (1 - 3)  benzonatate 100 milliGRAM(s) Oral three times a day PRN Cough  guaiFENesin Oral Liquid (Sugar-Free) 100 milliGRAM(s) Oral every 6 hours PRN Cough  hydrocodone/homatropine Syrup 5 milliLiter(s) Oral at bedtime PRN Cough      T(C): 36.5 (07-17-22 @ 08:30), Max: 36.9 (07-17-22 @ 01:00)  HR: 70 (07-17-22 @ 08:30) (63 - 76)  BP: 140/95 (07-17-22 @ 08:30) (132/80 - 140/95)  RR: 18 (07-17-22 @ 08:30) (18 - 18)  SpO2: 96% (07-17-22 @ 08:30) (95% - 97%)    CAPILLARY BLOOD GLUCOSE        I&O's Summary    16 Jul 2022 07:01  -  17 Jul 2022 07:00  --------------------------------------------------------  IN: 150 mL / OUT: 0 mL / NET: 150 mL      T(C): 36.5 (07-17-22 @ 08:30), Max: 36.9 (07-17-22 @ 01:00)  HR: 70 (07-17-22 @ 08:30) (63 - 71)  BP: 140/95 (07-17-22 @ 08:30) (132/80 - 140/95)  RR: 18 (07-17-22 @ 08:30) (18 - 18)  SpO2: 96% (07-17-22 @ 08:30) (95% - 97%)    Gen: awake, alert  HENT: neck soft / supple; MMM  Lymph: no LAD noted in neck  Eye: sclerae anicteric  CV: normal rate, regular rhythm  Pulm: CTAB, no w/r/r auscultated  Abd: +BS, soft, NT, ND  Skin: warm, dry  Ext: no LE edema  Neuro: answering questions appropriately, following commands appropriately, recent and remote memory intact  Psych: normal mood / affect    LABS:                        7.4    5.46  )-----------( 203      ( 17 Jul 2022 04:56 )             23.5     07-17    136  |  102  |  17  ----------------------------<  93  3.6   |  25  |  0.81    Ca    8.9      17 Jul 2022 04:56  Phos  3.3     07-17  Mg     1.50     07-17    TPro  6.3  /  Alb  3.1<L>  /  TBili  0.2  /  DBili  x   /  AST  22  /  ALT  24  /  AlkPhos  92  07-17              RADIOLOGY & ADDITIONAL TESTS:    Imaging Personally Reviewed: no new imaging    Consultant(s) Notes Reviewed: no new consultant notes at this time    Care Discussed with Consultants/Other Providers:

## 2022-07-17 NOTE — PROGRESS NOTE ADULT - PROBLEM SELECTOR PLAN 3
[place] [facility] [place]  - oncology consulted, f/u recs
follows with Dr. Mirtha Celaya at [facility] [place], last chemo 6/27  - onc recs appreciated: drew Vazquez, no plans for inpatient treatment
follows with Dr. Mirtha Celaya at [facility] [place], last chemo 6/27  - oncology consulted, f/u recs
- History of malignant b/l pleural effusions.  - Had prior IR drainage in R lungs and L chest tube early 06/2022.  - Per CT surgery, no intervention needed at this time d/t no gross change, no drainage needed at this time.  - Continue supportive care.
- [place] with [place]. Mirtha Celaya [place] [facility] [place], [place] 6/27.  - Onc recs appreciated: hold lenvatinib, no plans for inpatient treatment.

## 2022-07-17 NOTE — PROGRESS NOTE ADULT - ASSESSMENT
56F with hx of uterine carcinoma (s/p hysterectomy VINCENT BSO, 2020, ongoing chemotherapy (last session 6/27; known to Dr. Danny Celaya) s/p radiation, w/ hx of b/l malignant pleural effusions s/p R VATS 10/2201, PleurX catheter removed 3/22, and recent R thoracentesis by IR and L chest tube by thoracic surgery on last admission 6/11/22, who presents with worsening SOB and fever, a/w COVID. 
56F with hx of uterine carcinoma (s/p hysterectomy VINCENT BSO, 2020, ongoing chemotherapy (last session 6/27; known to Dr. Danny Celaya) s/p radiation, w/ hx of b/l malignant pleural effusions s/p R VATS 10/2201, PleurX catheter removed 3/22, and recent R thoracentesis by IR and L chest tube by thoracic surgery on last admission 6/11/22, who presents with worsening SOB and fever, a/w COVID. 
56F h/o uterine carcinoma (s/p VINCENT/BSO 2020, s/p RT, currently on chemo LD 6/27/2022, known to Dr. Mirtha Celaya) c/b bilateral malignant pleural effusions s/p R VATS (10/2021), PleurX catheter (removed 3/22/2022), recent R thoracentesis, recent L chest tube (6/11/2022) admitted 7/12 with fever / SOB found to have COVID-19.
56F with hx of uterine carcinoma (s/p hysterectomy VINCENT BSO, 2020, ongoing chemotherapy (last session 6/27; known to Dr. Danny Celaya) s/p radiation, w/ hx of b/l malignant pleural effusions s/p R VATS 10/2201, PleurX catheter removed 3/22, and recent R thoracentesis by IR and L chest tube by thoracic surgery on last admission 6/11/22, who presents with worsening SOB and fever, a/w COVID. 
56F h/o uterine carcinoma (s/p VINCENT/BSO 2020, s/p RT, currently on chemo LD 6/27/2022, known to Dr. Mirtha Celaya) c/b bilateral malignant pleural effusions s/p R VATS (10/2021), PleurX catheter (removed 3/22/2022), recent R thoracentesis, recent L chest tube (6/11/2022) admitted 7/12 with fever / SOB found to have COVID-19, currently improving.

## 2022-07-17 NOTE — PROGRESS NOTE ADULT - PROBLEM SELECTOR PLAN 1
- Unclear etiology at this time.  - Pending repeat CBC, T+S. - Likely spurious, repeat Hgb 10.4. - Likely spurious, repeat Hgb 10.4.  - Patient to follow up with PCP in 2-4 weeks.

## 2022-07-17 NOTE — PROGRESS NOTE ADULT - PROBLEM SELECTOR PLAN 4
DVT ppx: lovenox  DIET: Regular  DISPO: Home. Pending medical optimization.
DVT prophylaxis: enoxaparin.  Diet: regular.  Dispo: pending completion of remdesivir, to home.
- Follows with Dr. Mirtha Celaya at [facility] [place], last chemo 6/27.  - Onc recs appreciated: hold lenvatinib, no plans for inpatient treatment.
DVT ppx: lovenox  DIET: Regular  DISPO: Home. Pending medical optimization.
DVT ppx: lovenox  DIET: Regular  DISPO: Home. Pending medical optimization.

## 2022-07-17 NOTE — PROGRESS NOTE ADULT - PROBLEM SELECTOR PLAN 2
history of malignant b/l pleural effusions  - had prior IR drainage in R lungs early in June and L chest tube as well   - CXR: Similar appearance of bilateral pleural effusions and lower lung atelectasis  - per CT surgery, no intervention needed at this time d/t no gross change, no drainage needed at this time  - continue supportive care with cough meds, monitor pulse ox
- SARS-CoV-2 PCR positive.  - Vaccinated x2 (Moderna, no booster).  - Completed 5 days of remdesivir on 7/17, dexamethasone (day 5/9). At discharge, can discontinue dexamethasone if course has not been completed.  - Trend inflammatory markers.  - Continue enoxaparin.  - Supportive care.
- History of malignant b/l pleural effusions.  - Had prior IR drainage in R lungs and L chest tube early 06/2022.  - Per CT surgery, no intervention needed at this time d/t no gross change, no drainage needed at this time.  - Continue supportive care.

## 2022-07-17 NOTE — PROGRESS NOTE ADULT - PROBLEM SELECTOR PLAN 5
DVT prophylaxis: enoxaparin.  Diet: regular.  Dispo: pending repeat CBC and any subsequent necessary workup, to home. DVT prophylaxis: enoxaparin.  Diet: regular.  Dispo: likely today 7/17, to home.

## 2022-07-20 PROBLEM — J45.909 UNSPECIFIED ASTHMA, UNCOMPLICATED: Chronic | Status: INACTIVE | Noted: 2021-08-30 | Resolved: 2022-01-01

## 2022-07-25 NOTE — REVIEW OF SYSTEMS
[Shortness Of Breath] : shortness of breath [Cough] : cough [Constipation] : constipation [Negative] : Genitourinary

## 2022-07-26 NOTE — HISTORY OF PRESENT ILLNESS
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 Patient was seen initially by Dr. Arciniega at [place].\par She then saw Dr Robin Zamorano, at [facility] and had a repeat biopsy that showed clear cell carcinoma.\par On 4/20/2020 she underwent surgical staging and is post TLH/BSO/SLND/Omentectomy.\par Final pathology showed stage IA,high grade endometrial carcinoma involving predominantly clear cell and focally serous features, no invasion, no LVSI.\par \par Ct C/A/P with contrast done on 3/12/2020 showed no metastatic sites.\par She completed five cycles of CT 6/3/20- 9/2/20) Last cycle held due to toxicity and myelosuppression.She received vaginal brachytherapy to a total of 2100 cGy in 3 fractions. Treatment was delivered from 10/16/20- 10/23/20. \par Scan showed CINDY.\par \par 8/10/21: Surveillance scan showed New partially visualized small right pleural effusion. Correlation with dedicated chest CT is recommended. Few subcentimeter hepatic hypo densities, too small to characterize, but similar in appearance to prior. Small pelvic ascites, similar in appearance to prior.\par - 29( previously 15)\par \par 8/30/21: CT chest: Limited. No pulmonary embolus to level of the lobar pulmonary arteries. Interval increase in small right pleural effusion since 8/19/2021 with associated adjacent compressive atelectasis.Small perihepatic ascites.\par \par Patient was admitted to the hospital with SOB and pleural effusion. She had  thoracentesis and cytology was consistent with relapsed uterine cancer.\par \par Final Diagnosis\par PLEURAL FLUID, RIGHT\par POSITIVE FOR MALIGNANT CELLS.\par Metastatic adenocarcinoma\par \par Cytology slides and cell block reveal a hypercellular specimen\par composed of crowded 3-dimensional groups and single lying\par malignant cells with enlarged nuclei containing prominent\par nucleoli and vacuolated cytoplasm, among benign mesothelial\par cells.\par \par Immunohistochemical stains are performed on the cell block. The\par tumor cells are positive for PAX-8 (focal and weak), p16, napsin\par (focal) and p53 (overexpression), while they are negative for ER,\par WT-1 and TTF-1. In the absence of another primary, it is most\par consistent with involvement by patient's known endometrial\par adenocarcinoma. [de-identified] : Carboplatin and Taxol 6/3/20- 9/2/20( five cycles) [FreeTextEntry1] : Mvasi\par   [de-identified] : Patient is here for follow up and treatment.  She is feeling better from her recent hospitalization for COVID but continues to have SOB and cough, slightly more than baseline.  She has occasional pleuritic pain with coughing. She has been eating but has lost weight.  She denies fever, chills, abdominal pain, nausea, vomiting, diarrhea.  She has constipation, takes occasional MOM but thinks it may be too strong.

## 2022-07-26 NOTE — PHYSICAL EXAM
[Ambulatory and capable of all self care but unable to carry out any work activities] : Status 2- Ambulatory and capable of all self care but unable to carry out any work activities. Up and about more than 50% of waking hours [Normal] : affect appropriate [de-identified] : b/l decreased BS

## 2022-07-26 NOTE — ASSESSMENT
[Palliative] : Goals of care discussed with patient: Palliative [Palliative Care Plan] : not applicable at this time [FreeTextEntry1] : 57 year old woman with uterine cancer on Mvasi, cycle 2 is today.\par Will get scans after 3-4 cycles to evaluate treatment response or sooner if clinically indicated.\par Continue cough medications PRN.\par Follow up with Dr. Peters for close monitoring of pleural effusions.\par Constipation- recommended taking Senna daily.\par Check labs today - CBC, CMP, tumor markers, UA.\par RTC 3 weeks.

## 2022-08-07 NOTE — DISCHARGE NOTE PROVIDER - NSDCFUADDINST_GEN_ALL_CORE_FT
Patient's (Body mass index is 25.86 kg/m².) indicates that they are overweight with health conditions that include hypertension . Weight is improving with treatment. BMI is is above average; BMI management plan is completed. We discussed low calorie, low carb based diet program, portion control and increasing exercise.    Walk 4-5 x per day. Increase as tolerated. You may climb stairs. Continue to use incentive spirometer.   You must keep Pleurx site clean and dry. Sponge bathe or if you choose to shower, you must cover site entirely so dressing stays dry.   Visiting Nurse to drain Pleurx cath 3x per week  up to 1 liter at a time.

## 2022-08-09 PROBLEM — J45.909 UNSPECIFIED ASTHMA, UNCOMPLICATED: Chronic | Status: ACTIVE | Noted: 2022-01-01

## 2022-08-09 PROBLEM — J91.0 MALIGNANT PLEURAL EFFUSION: Chronic | Status: ACTIVE | Noted: 2022-01-01

## 2022-08-17 NOTE — REVIEW OF SYSTEMS
[Shortness Of Breath] : shortness of breath [Cough] : cough [Constipation] : constipation [Negative] : Allergic/Immunologic

## 2022-08-19 NOTE — HISTORY OF PRESENT ILLNESS
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 Patient was seen initially by Dr. Arciniega at [place].\par She then saw Dr Robin Zamorano, at [facility] and had a repeat biopsy that showed clear cell carcinoma.\par On 4/20/2020 she underwent surgical staging and is post TLH/BSO/SLND/Omentectomy.\par Final pathology showed stage IA,high grade endometrial carcinoma involving predominantly clear cell and focally serous features, no invasion, no LVSI.\par \par Ct C/A/P with contrast done on 3/12/2020 showed no metastatic sites.\par She completed five cycles of CT 6/3/20- 9/2/20) Last cycle held due to toxicity and myelosuppression.She received vaginal brachytherapy to a total of 2100 cGy in 3 fractions. Treatment was delivered from 10/16/20- 10/23/20. \par Scan showed CINDY.\par \par 8/10/21: Surveillance scan showed New partially visualized small right pleural effusion. Correlation with dedicated chest CT is recommended. Few subcentimeter hepatic hypo densities, too small to characterize, but similar in appearance to prior. Small pelvic ascites, similar in appearance to prior.\par - 29( previously 15)\par \par 8/30/21: CT chest: Limited. No pulmonary embolus to level of the lobar pulmonary arteries. Interval increase in small right pleural effusion since 8/19/2021 with associated adjacent compressive atelectasis.Small perihepatic ascites.\par \par Patient was admitted to the hospital with SOB and pleural effusion. She had  thoracentesis and cytology was consistent with relapsed uterine cancer.\par \par Final Diagnosis\par PLEURAL FLUID, RIGHT\par POSITIVE FOR MALIGNANT CELLS.\par Metastatic adenocarcinoma\par \par Cytology slides and cell block reveal a hypercellular specimen\par composed of crowded 3-dimensional groups and single lying\par malignant cells with enlarged nuclei containing prominent\par nucleoli and vacuolated cytoplasm, among benign mesothelial\par cells.\par \par Immunohistochemical stains are performed on the cell block. The\par tumor cells are positive for PAX-8 (focal and weak), p16, napsin\par (focal) and p53 (overexpression), while they are negative for ER,\par WT-1 and TTF-1. In the absence of another primary, it is most\par consistent with involvement by patient's known endometrial\par adenocarcinoma. [de-identified] : Carboplatin and Taxol 6/3/20- 9/2/20( five cycles) [FreeTextEntry1] : Mvasi\par   [de-identified] : Patient still has some SOB. She takes hycodan for cough with some relief.\par She is eating better.\par Denies any pain or bowel or bladder issues. She has an appt with Dr. Peters next week.

## 2022-08-19 NOTE — REASON FOR VISIT
[Home] : at home, [unfilled] , at the time of the visit. [Medical Office: ([facility])___] : at the medical office located in  [Patient] : the patient [Follow-Up Visit] : a follow-up [Parent] : parent [FreeTextEntry2] : Relapsed UPSC

## 2022-08-22 NOTE — REVIEW OF SYSTEMS
[Feeling Tired] : feeling tired [Shortness Of Breath] : shortness of breath [Cough] : cough [SOB on Exertion] : shortness of breath during exertion [Negative] : Heme/Lymph

## 2022-08-29 PROBLEM — R18.8 ASCITES: Status: ACTIVE | Noted: 2022-01-01

## 2022-08-31 NOTE — HISTORY OF PRESENT ILLNESS
[FreeTextEntry1] : Ms. ALEXANDER HUDDLESTON, 57 year old female, never smoker, w/ hx of Uterine cancer dx 2020 s/p chemo s/p VINCENT/BSO, asthma, found to have persistent pleural effusion in 2021 s/p IR drainage during that admission with path positive for metastatic adenocarcinoma. \par \par Patient is s/p Right VATS, pleural bx and Pleurx catheter placement on 09/15/2021. 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 \par \par Followed by Mirtha Celaya; completed 6 cycles of chemo on 02/10/2022. \par \par Patient is s/p Right Pleural catheter removal on 03/08/2022. \par \[facility] Patient c/o SOB, was sent for a CTA on 04/05/2022, Small right pleural effusion has increased in size and tracks along the fissures, no intervention needed and continue observation, However, patient is symptomatic, will contact IR for possible drainage. \par \par On 04/16/2022: Preprocedure ultrasound confirmed presence of trace amount of right pleural effusion. Thoracentesis was not performed. \par \par Patient has started combined immunotherapy with pembro + lenvima in 05/2022? \par \par Patient was hospitalized on 06/03/2022 for SOB secondary to bilateral pleural effusions. unsuccessful attempt was made to place R chest tube. IR was consulted and a left chest tube was placed without complication, s/p US guided right thoracentesis on 06/10/2022, A total of 300 cc of yellow fluid was then aspirated. \par \par Patient went back to hospital on 07/12/2022 for fever and SOB, found Sepsis due to COVID-19 infection, started on Remdesivir and decadron.\par \[facility] CXR today reviewed. \par \par Patient is here today for a follow up. She experiences SOB and cough with phlegm.

## 2022-08-31 NOTE — CONSULT LETTER
[Dear  ___] : Dear  [unfilled], [Consult Letter:] : I had the pleasure of evaluating your patient, [unfilled]. [( Thank you for referring [unfilled] for consultation for _____ )] : Thank you for referring [unfilled] for consultation for [unfilled] [Please see my note below.] : Please see my note below. [Consult Closing:] : Thank you very much for allowing me to participate in the care of this patient.  If you have any questions, please do not hesitate to contact me. [Sincerely,] : Sincerely, [FreeTextEntry2] : Dr. Mirtha Celaya ([facility]/ref) \par  [FreeTextEntry3] : Stevan Peters MD \par Attending Surgeon \par Division of Thoracic Surgery \par , Cardiovascular and Thoracic Surgery \par [facility] [employer] at [facility]\par \par

## 2022-08-31 NOTE — ASSESSMENT
[FreeTextEntry1] : Ms. ALEXANDER HUDDLESTON, 57 year old female, never smoker, w/ hx of Uterine cancer dx 2020 s/p chemo s/p VINCENT/BSO, asthma, found to have persistent pleural effusion in 2021 s/p IR drainage during that admission with path positive for metastatic adenocarcinoma. \par \par Patient is s/p Right VATS, pleural bx and Pleurx catheter placement on 09/15/2021. 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 \par \par I have reviewed the patient's medical records and diagnostic images at time of this office consultation and have made the following recommendation:\par 1. CT Chest scheduled for tomorrow. Return to clinic Wed via telehealth to review latest CT Chest. \par 2. Mucinex\par \par I personally performed the services described in the documentation, reviewed the documentation recorded by the scribe in my presence and it accurately and completely records my words and actions.\par \par I, SHAHIDA Andres, am scribing for and in the presence of NAVNEET Mari, the following sections HISTORY OF PRESENT ILLNESS, PAST MEDICAL/FAMILY/SOCIAL HISTORY; REVIEW OF SYSTEMS; VITAL SIGNS; PHYSICAL EXAM; DISPOSITION.\par   	\par \par

## 2022-08-31 NOTE — ASSESSMENT
[FreeTextEntry1] : Ms. ALEXANDER HUDDLESTON, 57 year old female, never smoker, w/ hx of Uterine cancer dx 2020 s/p chemo s/p VINCENT/BSO, asthma, found to have persistent pleural effusion in 2021 s/p IR drainage during that admission with path positive for metastatic adenocarcinoma. \par \par Patient is s/p Right VATS, pleural bx and Pleurx catheter placement on 09/15/2021. 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 \par \par Followed by Mirtha Celaya; completed 6 cycles of chemo on 02/10/2022. \par \par Patient is s/p Right Pleural catheter removal on 03/08/2022. \par \par I have reviewed the patient's medical records and diagnostic images at time of this office consultation and have made the following recommendation:\par 1. CT chest reviewed, bilateral pleural effusion, Left > right, will sent for left thoracentesis. \par 2. Give Benzonatate, and trial Mucinex OTC, take Hydromet as needed. \par \par I personally performed the services described in the documentation, reviewed the documentation recorded by the scribe in my presence and it accurately and completely records my words and actions.\par \par KYLE, Adriana Sheth NP, am scribing for and the presence of NAVNEET Mari, the following sections HISTORY OF PRESENT ILLNESS, PAST MEDICAL/FAMILY/SOCIAL HISTORY; REVIEW OF SYSTEMS; VITAL SIGNS; PHYSICAL EXAM; DISPOSITION.

## 2022-08-31 NOTE — CONSULT LETTER
[Dear  ___] : Dear  [unfilled], [Consult Letter:] : I had the pleasure of evaluating your patient, [unfilled]. [( Thank you for referring [unfilled] for consultation for _____ )] : Thank you for referring [unfilled] for consultation for [unfilled] [Please see my note below.] : Please see my note below. [Consult Closing:] : Thank you very much for allowing me to participate in the care of this patient.  If you have any questions, please do not hesitate to contact me. [Sincerely,] : Sincerely, [FreeTextEntry2] : Dr. Mirtha Celaya ([facility]/ref) \par  [FreeTextEntry3] : Stevan Peters MD \par Attending Surgeon \par Division of Thoracic Surgery \par , Cardiovascular and Thoracic Surgery \par [facility] at [facility]\par \par

## 2022-09-02 NOTE — HISTORY OF PRESENT ILLNESS
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 Patient was seen initially by Dr. Arciniega at [place].\par She then saw Dr Robin Zamorano, at [facility] and had a repeat biopsy that showed clear cell carcinoma.\par On 4/20/2020 she underwent surgical staging and is post TLH/BSO/SLND/Omentectomy.\par Final pathology showed stage IA,high grade endometrial carcinoma involving predominantly clear cell and focally serous features, no invasion, no LVSI.\par \par Ct C/A/P with contrast done on 3/12/2020 showed no metastatic sites.\par She completed five cycles of CT 6/3/20- 9/2/20) Last cycle held due to toxicity and myelosuppression.She received vaginal brachytherapy to a total of 2100 cGy in 3 fractions. Treatment was delivered from 10/16/20- 10/23/20. \par Scan showed CINDY.\par \par 8/10/21: Surveillance scan showed New partially visualized small right pleural effusion. Correlation with dedicated chest CT is recommended. Few subcentimeter hepatic hypo densities, too small to characterize, but similar in appearance to prior. Small pelvic ascites, similar in appearance to prior.\par - 29( previously 15)\par \par 8/30/21: CT chest: Limited. No pulmonary embolus to level of the lobar pulmonary arteries. Interval increase in small right pleural effusion since 8/19/2021 with associated adjacent compressive atelectasis.Small perihepatic ascites.\par \par Patient was admitted to the hospital with SOB and pleural effusion. She had  thoracentesis and cytology was consistent with relapsed uterine cancer.\par \par Final Diagnosis\par PLEURAL FLUID, RIGHT\par POSITIVE FOR MALIGNANT CELLS.\par Metastatic adenocarcinoma\par \par Cytology slides and cell block reveal a hypercellular specimen\par composed of crowded 3-dimensional groups and single lying\par malignant cells with enlarged nuclei containing prominent\par nucleoli and vacuolated cytoplasm, among benign mesothelial\par cells.\par \par Immunohistochemical stains are performed on the cell block. The\par tumor cells are positive for PAX-8 (focal and weak), p16, napsin\par (focal) and p53 (overexpression), while they are negative for ER,\par WT-1 and TTF-1. In the absence of another primary, it is most\par consistent with involvement by patient's known endometrial\par adenocarcinoma. [de-identified] : Carboplatin and Taxol 6/3/20- 9/2/20( five cycles) [de-identified] : Patient here for a f/u visit. She had therapeutic para on Monday . Feel s better, she still has lot of cough. She is on Hycodan with good relief.\par

## 2022-09-02 NOTE — HISTORY OF PRESENT ILLNESS
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 Patient was seen initially by Dr. Arciniega at [place].\par She then saw Dr Robin Zamorano, at [facility] and had a repeat biopsy that showed clear cell carcinoma.\par On 4/20/2020 she underwent surgical staging and is post TLH/BSO/SLND/Omentectomy.\par Final pathology showed stage IA,high grade endometrial carcinoma involving predominantly clear cell and focally serous features, no invasion, no LVSI.\par \par Ct C/A/P with contrast done on 3/12/2020 showed no metastatic sites.\par She completed five cycles of CT 6/3/20- 9/2/20) Last cycle held due to toxicity and myelosuppression.She received vaginal brachytherapy to a total of 2100 cGy in 3 fractions. Treatment was delivered from 10/16/20- 10/23/20. \par Scan showed CINDY.\par \par 8/10/21: Surveillance scan showed New partially visualized small right pleural effusion. Correlation with dedicated chest CT is recommended. Few subcentimeter hepatic hypo densities, too small to characterize, but similar in appearance to prior. Small pelvic ascites, similar in appearance to prior.\par - 29( previously 15)\par \par 8/30/21: CT chest: Limited. No pulmonary embolus to level of the lobar pulmonary arteries. Interval increase in small right pleural effusion since 8/19/2021 with associated adjacent compressive atelectasis.Small perihepatic ascites.\par \par Patient was admitted to the hospital with SOB and pleural effusion. She had  thoracentesis and cytology was consistent with relapsed uterine cancer.\par \par Final Diagnosis\par PLEURAL FLUID, RIGHT\par POSITIVE FOR MALIGNANT CELLS.\par Metastatic adenocarcinoma\par \par Cytology slides and cell block reveal a hypercellular specimen\par composed of crowded 3-dimensional groups and single lying\par malignant cells with enlarged nuclei containing prominent\par nucleoli and vacuolated cytoplasm, among benign mesothelial\par cells.\par \par Immunohistochemical stains are performed on the cell block. The\par tumor cells are positive for PAX-8 (focal and weak), p16, napsin\par (focal) and p53 (overexpression), while they are negative for ER,\par WT-1 and TTF-1. In the absence of another primary, it is most\par consistent with involvement by patient's known endometrial\par adenocarcinoma. [de-identified] : Carboplatin and Taxol 6/3/20- 9/2/20( five cycles) [de-identified] : Patient here for a f/u visit. She had therapeutic para on Monday . Feel s better, she still has lot of cough. She is on Hycodan with good relief.\par

## 2022-09-02 NOTE — PHYSICAL EXAM
[Restricted in physically strenuous activity but ambulatory and able to carry out work of a light or sedentary nature] : Status 1- Restricted in physically strenuous activity but ambulatory and able to carry out work of a light or sedentary nature, e.g., light house work, office work [Normal] : affect appropriate [de-identified] : b/l decreased BS

## 2022-09-02 NOTE — PHYSICAL EXAM
[Restricted in physically strenuous activity but ambulatory and able to carry out work of a light or sedentary nature] : Status 1- Restricted in physically strenuous activity but ambulatory and able to carry out work of a light or sedentary nature, e.g., light house work, office work [Normal] : affect appropriate [de-identified] : b/l decreased BS

## 2022-09-30 NOTE — ED ADULT NURSE REASSESSMENT NOTE - NS ED NURSE REASSESS COMMENT FT1
Pt removed nasal canula stating it was drying her throat. Pt o2 saturation is 98% on room air. States does not want to put the NC back on, denies SOB. Denies CP, SOB, headache, lightheadedness, dizziness, vision, changes, nausea, vomiting, fever or chills. Respirations even and unlabored at this time. Pt speaking in full and complete sentences. Sinus tachy on bedside cardiac monitor - MD resident Marisol Calles made aware. Pt refused one medication, but took the second medication as per emar. Family at bedside. Bed in lowest position, call bell in reach, wheels locked, side rails up, safety maintained. Awaiting further orders.

## 2022-09-30 NOTE — ED PROVIDER NOTE - CLINICAL SUMMARY MEDICAL DECISION MAKING FREE TEXT BOX
56-year-old female with a past medical history of uterine cancer currently on chemo presented to the ED with acute shortness of breath.  Outpatient chest x-ray reviewed demonstrating pleural effusion bilaterally at the bases.  Initial vitals notable for tachypnea.  Patient not hypoxic.  Patient seen at bedside taking multiple breaths speaking in short sentences however not hypoxic.  No respiratory distress.  Tolerating secretions without difficulty.  Differential diagnosis includes but not limited to pleural effusions affecting respiratory physiology versus metabolic derangements versus viral syndrome versus pneumonia versus PE.  Will order labs, EKG, meds, imaging, and reassess.

## 2022-09-30 NOTE — ED PROVIDER NOTE - ATTENDING CONTRIBUTION TO CARE
56-year-old female with a past medical history of uterine cancer currently undergoing chemo at [facility], history of pleural effusion with a Pleurx catheter removed on 3/22 presents to the ED with acute shortness of breath.  Patient endorses coughing over the last few days.  No exacerbating or alleviating factors.  No sick contacts at home.  Outpatient chest x-ray performed today demonstrating reaccumulation of pleural effusion.  Patient denies any fevers, chills, nausea, vomiting, diarrhea, chest pain.  She denies any other symptoms at bedside.

## 2022-09-30 NOTE — ED PROVIDER NOTE - PHYSICAL EXAMINATION
GENERAL: no acute distress  HEAD: normocephalic, atraumatic  HEENT: normal conjunctiva, oral mucosa moist, neck supple  CARDIAC: tachycardiac  PULM: tachypneic, decreased breath sounds at bases, tussive episodes noted at bedside.   GI: abdomen nondistended, soft, nontender, no guarding or rebound tenderness  : no CVA tenderness, no suprapubic tenderness  NEURO: alert and oriented x 3  MSK: no visible deformities, no peripheral edema, calf tenderness/redness/swelling  SKIN: no visible rashes, dry, well-perfused  PSYCH: appropriate mood and affect

## 2022-09-30 NOTE — ED PROVIDER NOTE - OBJECTIVE STATEMENT
56-year-old female with a past medical history of uterine cancer currently undergoing chemo at [facility], history of pleural effusion with a Pleurx catheter removed on 3/22 presents to the ED with acute shortness of breath.  Patient endorses coughing over the last few days.  No exacerbating or alleviating factors.  No sick contacts at home.  Outpatient chest x-ray performed today demonstrating reaccumulation of pleural effusion.  Patient denies any fevers, chills, nausea, vomiting, diarrhea, chest pain.  She denies any other symptoms at bedside.

## 2022-09-30 NOTE — ED PROVIDER NOTE - NSICDXPASTMEDICALHX_GEN_ALL_CORE_FT
PAST MEDICAL HISTORY:  Asthma     Malignant pleural effusion with hx loculation on R    Uterine cancer S/p chemotherapy, radiation and hysterectomy

## 2022-09-30 NOTE — ED ADULT NURSE REASSESSMENT NOTE - NS ED NURSE REASSESS COMMENT FT1
Pt verbalizes no relief from cough medicine. Requesting another medication. MD attending Ajith Finnegan made aware. States will put an order in. No further orders at this time. Awaiting further orders.

## 2022-09-30 NOTE — ED ADULT NURSE REASSESSMENT NOTE - NS ED NURSE REASSESS COMMENT FT1
Report received from day RN, Nahomi MCGARRY&Ox4. Appears dry coughing. Sitting upright in bed. Medicated as ordered. V/S charted. Right upper chest port noted. Accessed by EMANUEL DARDEN as per provider to RN order. Labs drawn and sent as ordered, nasal swab and urine sent as ordered. Sinus tachy on bedside cardiac monitor. Denies CP, SOB, headache, nausea, vomiting, lightheadedness, dizziness, or chills. Pt verbalizes improvement in SOB with 2 L NC. Bed in lowest position, call bell in reach, wheels locked, side rails up, safety maintained. Awaiting CT. Report received from day RN, Nahomi MCGARRY&Ox4. Appears dry coughing. Sitting upright in bed. Medicated as ordered. V/S charted. Right upper chest port noted. Accessed by EMANUEL DARDEN as per provider to RN order. Labs drawn and sent as ordered, nasal swab and urine sent as ordered. Sinus tachy on bedside cardiac monitor. MD resident Stevan Damon made aware. Denies CP, SOB, headache, nausea, vomiting, lightheadedness, dizziness, or chills. Pt verbalizes improvement in SOB with 2 L NC. Family at bedside. Bed in lowest position, call bell in reach, wheels locked, side rails up, safety maintained. Awaiting CT.

## 2022-09-30 NOTE — ED ADULT NURSE REASSESSMENT NOTE - NS ED NURSE REASSESS COMMENT FT1
SIMIN RN: Chest port accessed utilizing sterile technique per MD order. Labs sent. Comfort measures provided, safety measures implemented, call bell in reach.

## 2022-09-30 NOTE — ED ADULT TRIAGE NOTE - CHIEF COMPLAINT QUOTE
Pt with a hx of uterine cancer. Pt has SOB and cough. Pt has CXR, showed pleural effusion. Pt on chemo.

## 2022-09-30 NOTE — ED ADULT NURSE NOTE - OBJECTIVE STATEMENT
Pt received to room 2 A&OX4 ambulatory at baseline c/o worsening cough, SOB x 4 days. Hx uterine Ca on chemo. Sent to ED by [facility] for pleural effusion. Hx multiple pleural effusions requiring draining. Pt arriving to room on 4L NC. O2 sat 100% on 4L NC. Pt mildly tachypneic. Sinus tachy on CM. Pt has right upper chest port. MD at bedside for eval. Labs pending.

## 2022-10-01 NOTE — H&P ADULT - PROBLEM SELECTOR PLAN 1
Likely secondary to pleural effusion, however should R/O HF given pt on doxorubicin and PE given malignancy  #malignant pleural effusion  - IR consult for drainage  CTA rules out PE  Pts last echo 6/9/22 shows normal LV function, consider repeat echo Likely secondary to pleural effusion, however should R/O HF given pt on doxorubicin and PE given malignancy  #malignant pleural effusion  - IR consult for drainage  CTA rules out PE  Pts last echo 6/9/22 shows normal LV function, f/up repeat echo Likely secondary to pleural effusion, however should R/O HF given pt on doxorubicin and PE given malignancy  #malignant pleural effusion  - Thoracic surgery consult for drainage (follows with Dr Peters)  CTA rules out PE  Pts last echo 9/8/22 shows normal LV function

## 2022-10-01 NOTE — PROGRESS NOTE ADULT - PROBLEM SELECTOR PLAN 2
Patient has known history of malignant b/l pleural effusions  - Patient had prior IR drainage in R lungs early in June and L chest tube as well   - IR consult for therapeutic thora  - continue supportive care with cough meds, monitor pulse ox. Patient has known history of malignant b/l pleural effusions  - Patient had prior IR drainage in R lungs early in June and L chest tube as well   - IR consult for therapeutic thora  - continue supportive care with cough meds, monitor pulse ox  - contact Dr Peters thorac sx

## 2022-10-01 NOTE — H&P ADULT - HISTORY OF PRESENT ILLNESS
56-year-old female with a PMHx of uterine cancer (S/p chemotherapy, radiation and hysterectomy) w/ mets to the lungs and peritoneal carcinomatosis (on chemo w/ Brianna), pleural effusions and chronic anemia is px to the ED w progressively worsening SOB over the past week. Pt originally dx in 2020 w uterine cancer. 4/2020 she underwent TLH/BSO/SLND/Omentectomy. In 8/2021 she was found to have a R pleural effusion and was found to have relapsed uterine cancer. She recently had a paracentesis in august, and states she had a pleurx catheter that was removed in 3/22. Today the patients SOB is worse on exertion, she has also had a cough for 2 weeks that causes musculoskeletal chest pain and limits her ability to sleep. She denies chest pain with exertion. Her outpt CXR showed reaccumulation of pleural effusion. She denies hemoptysis, N/V/D, fevers, chills, abdominal pain, dysuria, hematuria, melena or hematemesis.       On arrival to the ED patients vitals were 102/89, tachycardic to 111, RR 24, satting 99% on RA.    In the ED the pt received tessalon perle, mucinex and hydromet for cough.    56-year-old female with a PMHx of uterine cancer (S/p chemotherapy, radiation and hysterectomy) w/ mets to the lungs and peritoneal carcinomatosis (on doxorubicin w/ Brianna, last session 9/14/22), pleural effusions and chronic anemia is px to the ED w progressively worsening SOB over the past week. Pt originally dx in 2020 w uterine cancer. 4/2020 she underwent TLH/BSO/SLND/Omentectomy. In 8/2021 she was found to have a R pleural effusion and was found to have relapsed uterine cancer. For her malignant pleural effusions, she recieved R VATs 10/2021 w/ removal of pleurx catheter in 3/22 and R thoracentesis by IR and L chest tube by thoracic surg in 6/11/22. Pt states she recently had a paracentesis in august. Today the patients SOB is worse on exertion, she has also had a cough for 2 weeks that causes musculoskeletal chest pain and limits her ability to sleep. She denies chest pain with exertion. Her outpt CXR showed reaccumulation of pleural effusion. She denies hemoptysis, N/V/D, fevers, chills, abdominal pain, dysuria, hematuria, melena or hematemesis.     On arrival to the ED patients vitals were 102/89, tachycardic to 111, RR 24, satting 99% on RA.    In the ED the pt received tessalon perle, mucinex and hydromet for cough.

## 2022-10-01 NOTE — CONSULT NOTE ADULT - ASSESSMENT
57 yo F with a PMH papillary serous endometrial carcinoma (diagnosed 04/2020, s/p VINCENT-BSO and chemotherapy, most recently C1D1 Doxil 9/14/22) c/b lung/pleural and peritoneal metastasis with pleural effusions and chronic anemia who p/w progressively worsening SOB due to worsening bilateral pleural effusions.    #Metastatic Uterine Carcinoma/Bilateral Pleural Effusions  - Follows with Dr. Mirtha Celaya at [facility]  - Originally diagnosed with stage IA papillary serous endometrial carcinoma in 04/2020  - S/p TLH/BSO/SLND/Omentectomy, followed by 5 cycles of Carbo/Taxol (06-09/2020) and vaginal brachytherapy (10/2020, 3 fractions)  - developed metastatic disease in the pleura (R effusion, later bilateral) in 8/2021 s/p PleurX 10/2021 - 03/2022 with recurrence again in 06/2022  - S/p Carbo/Taxol/Keytruda ( trial), 6 cycles, from 10/2021 - 02/2022, then maintenance Keytruda and added Lenvima 05/2022 due to POD, then stopped 06/2022  - Switched to Bevacizumab 6/27/22 - 8/15/22 for 3 cycles with POD  - Now on Doxil with C1D1 9/14/22  - Symptoms/slightly worsening pleural effusions likely from underlying aggressive malignancy  - Would not pursue inpatient treatment, next Doxil due 10/12/22  - Appreciate IR and thoracic surgery consults. Would recommend thoracentesis and PleurX if possible      Aryles Hedjar, MD, PGY-5  Hematology/Oncology Fellow  [facility]  Pager: [phone]  After 5PM and on weekends and holidays, please call the inpatient fellow on call.

## 2022-10-01 NOTE — H&P ADULT - PROBLEM SELECTOR PLAN 3
Cc:  abd/pelvic pain    HPI:  23 yr G0 who uses depoprovera for contraception presents with SO on referral for abd/pelvic pain.  Pt states she feels cramps (like a period) even though she is not bleeding.  Pt has been dx'd with IBS and is awaiting a GI consult.  Pt has bouts of diarrhea as well as constipation.  Pt states pain is all over abd and into pelvic area.  Pt admits pain with intercourse but usually not enough to make her stop.  Pt states no change in abd pain since starting depoprovera which pt has been using for at least 2.5 yrs.    Pelvic sono:  The uterus measures 8 x 3.7 x 4.2 cm and the endometrium measures 9 mm.     The right ovary measures 3.7 x 1.9 x 3.3 cm, resistive index 0.67.     Left ovary measures 3.8 x 2.8 x 2.2 cm, resistive index 0.55.     There is a left ovarian cyst measuring 2.1 cm.  There is no free fluid.     Impression:   No acute process seen.   Simple cyst left ovary    Vitals:    09/13/22 0955   BP: 116/72   Weight: 81 kg (178 lb 9.2 oz)     Physical Exam:   Constitutional: She is oriented to person, place, and time. She appears well-developed and well-nourished. No distress.    HENT:   Head: Normocephalic and atraumatic.       Pulmonary/Chest: Effort normal. No respiratory distress.        Abdominal: Soft. She exhibits no distension and no mass. There is abdominal tenderness (diffuse across all quadrants, worse LLQ). There is no guarding. No hernia.     Genitourinary:    Vagina, uterus, right adnexa and left adnexa normal.      Pelvic exam was performed with patient supine.   The external female genitalia was normal.   No external genitalia lesions identified,Genitalia hair distrobution normal .   Labial bartholins normal.There is no rash, tenderness or lesion on the right labia. There is no rash, tenderness, lesion or injury on the left labia. Cervix is normal. Right adnexum displays no mass, no tenderness and no fullness. Left adnexum displays no mass, no tenderness and no  fullness. Vagina exhibits no lesion. No erythema,  no vaginal discharge, tenderness, bleeding, rectocele, cystocele or unspecified prolapse of vaginal walls in the vagina.    No foreign body in the vagina.      No signs of injury in the vagina.   Vagina was moist.Cervix exhibits no motion tenderness, no lesion, no discharge, no friability, no lesion, no tenderness and no polyp. Uterus consistancy normal. and Uerus contour normal  Uterus is not deviated, not enlarged, not fixed, not tender, not hosting fibroids and no uterine prolapse. Normal urethral meatus.Urethral Meatus exhibits: urethral lesion and prolapsedUrethra findings: no urethral mass, no tenderness and no urethral scarringBladder findings: no bladder distention and no bladder tenderness          Musculoskeletal: Normal range of motion and moves all extremeties. No tenderness.       Neurological: She is alert and oriented to person, place, and time. No cranial nerve deficit. Coordination normal.    Skin: She is not diaphoretic.    Psychiatric: She has a normal mood and affect. Her behavior is normal. Judgment and thought content normal.     Assessment/Plan  1. Generalized abdominal pain    2. Pelvic pain      Pain not reproduced with bimanual/pelvic exam.  Pain not related to small ovarian cyst which was been stable through various imaging.  Pain most likely related to IBS for which pt has not received any treatment.   Moderate volume of the bilateral malignant pleural effusions seen on imaging  - IR consult for therapeutic paracentesis stable volume of malignant ascites.  - IR assessment for drainage stable volume of malignant ascites.  - IR assessment for possible paracentesis

## 2022-10-01 NOTE — ED ADULT NURSE REASSESSMENT NOTE - NS ED NURSE REASSESS COMMENT FT1
Pt verbalizes continuation of coughing, denies any relief from medications. MD made aware. Verbalizes improvement in SOB, continues to be on room air. O2 saturation is 97% room air at this time. Respirations even and unlabored. Pt speaking in full and complete sentences. Denies CP, SOB, headache, lightheadedness, dizziness, vision changes, fever or chills. Sinus tachy on bedside cardiac monitor. MD made aware. Family at bedside. Bed in lowest position, call bell in reach, wheels locked, side rails up, safety maintained. Awaiting further orders. Pt verbalizes continuation of coughing, denies any relief from medications. However no coughing noted during reassessing pt. Pt able to speaking in complete sentences without coughing. MD resident Stevan Damon made aware. States will put medication order in. Verbalizes improvement in SOB, continues to be on room air. O2 saturation is 97% room air at this time. Respirations even and unlabored. Denies CP, SOB, headache, lightheadedness, dizziness, vision changes, fever or chills. Sinus tachy on bedside cardiac monitor. MD resident Stevan Damon made aware. Family at bedside. Bed in lowest position, call bell in reach, wheels locked, side rails up, safety maintained. Awaiting further orders.

## 2022-10-01 NOTE — ED ADULT NURSE REASSESSMENT NOTE - NS ED NURSE REASSESS COMMENT FT1
Pt returned from CT scan, currently c/o SOB on room air. States was taken to CT and moving around caused her SOB to return. Pt o2 saturation upon arrival in room was 93% on room air and pt appeared coughing. 2L NC applied to pt, o2 saturation went up to 100% with NC. Pt currently appears comfortable after catching her breath. Pt speaking in full and complete sentences. Appears sitting up in bed, currently coughing. Sinus tachy on bedside cardiac monitor. MD resident Ravi Braun made aware of everything mentioned in this note and asked to add medication order to relief pt's cough. No further orders at this time. Awaiting orders.

## 2022-10-01 NOTE — H&P ADULT - ASSESSMENT
57F w PMHX of uterine carcinoma (s/p hysterectomy VINCENT BSO, 2020, ongoing chemotherapy (last session 9/14; known to Dr. Danny Celaya) s/p radiation, w/ hx of b/l malignant pleural effusions s/p R VATS 10/2021, PleurX catheter removed 3/22, and R thoracentesis by IR and L chest tube by thoracic surgery on prev admission 6/11/22, presenting for worsening SOB and found to have reaccumulated b/l pleural effusions

## 2022-10-01 NOTE — H&P ADULT - ATTENDING COMMENTS
Pt with uterine ca on chemo s/p VINCENT/BSO and prior malignant effusion s/p pleurx placement/removal now p/w SOB.    Currently dyspnea is unchanged  On exam: Pt tachypneic, mild resp distress.  Lungs with decreased BS at bases.  heart RRR.  No edema.  Mild abd distension without tenderness  Labs reviewed  CT chest reviewed  Will contact thoracic surgery, Dr Peters for thoracentesis  Reviewed TTE from 9/8 in [employer] showing normal LV function and mod concentric LVH

## 2022-10-01 NOTE — H&P ADULT - NSICDXPASTMEDICALHX_GEN_ALL_CORE_FT
PAST MEDICAL HISTORY:  Asthma     Chronic anemia     Malignant pleural effusion with hx loculation on R    Uterine cancer S/p chemotherapy, radiation and hysterectomy

## 2022-10-01 NOTE — PATIENT PROFILE ADULT - FALL HARM RISK - HARM RISK INTERVENTIONS
Assistance with ambulation/Assistance OOB with selected safe patient handling equipment/Communicate Risk of Fall with Harm to all staff/Discuss with provider need for PT consult/Monitor gait and stability/Provide patient with walking aids - walker, cane, crutches/Reinforce activity limits and safety measures with patient and family/Tailored Fall Risk Interventions/Use of alarms - bed, chair and/or voice tab/Visual Cue: Yellow wristband and red socks/Bed in lowest position, wheels locked, appropriate side rails in place/Call bell, personal items and telephone in reach/Instruct patient to call for assistance before getting out of bed or chair/Non-slip footwear when patient is out of bed/[place] to call system/Physically safe environment - no spills, clutter or unnecessary equipment/Purposeful Proactive Rounding/Room/bathroom lighting operational, light cord in reach

## 2022-10-01 NOTE — H&P ADULT - PROBLEM SELECTOR PLAN 4
Patient follows with Dr. Mirtha Celaya at [facility], last chemo 9/14O  ncology consulted via email for help with management   Follow up onc recommendations. [place] [facility] [place]  Oncology consulted via email for management and role of inpt chemo  Follow up onc recommendations.

## 2022-10-01 NOTE — H&P ADULT - NSHPLABSRESULTS_GEN_ALL_CORE
labs sig for anemia w H/H 9.1/30.7, neutropenia w wbcs 2.9    LABS:                        9.1    2.90  )-----------( 226      ( 30 Sep 2022 20:57 )             30.7     30 Sep 2022 20:57    139    |  99     |  9      ----------------------------<  100    3.6     |  27     |  0.77     Ca    9.4        30 Sep 2022 20:57  Mg     1.40      30 Sep 2022 20:57    TPro  7.1    /  Alb  3.3    /  TBili  0.3    /  DBili  x      /  AST  9      /  ALT  5      /  AlkPhos  94     30 Sep 2022 20:57    PT/INR - ( 30 Sep 2022 20:57 )   PT: 15.3 sec;   INR: 1.32 ratio         PTT - ( 30 Sep 2022 20:57 )  PTT:35.9 sec  CAPILLARY BLOOD GLUCOSE        BLOOD CULTURE    RADIOLOGY & ADDITIONAL TESTS:  CTA neg for PE, did show increased b/l pleural effusions   IMPRESSION:    1. No evidence of acute central pulmonary embolus. Suboptimal evaluation   of the segmental and subsegmental branches due to degraded contrast bolus   timing and respiratory motion artifact.  2. Increased, now moderate volume of the bilateral malignant pleural   effusions.  3. Peritoneal carcinomatosis, with stable volume of malignant ascites.  4. Grossly unchanged metastatic implant along the left vaginal cuff.  5. Indeterminate subcentimeter hepatic hypodensities, unchanged.      Imaging Personally Reviewed:  [ ] YES

## 2022-10-01 NOTE — PATIENT PROFILE ADULT - DO YOU FEEL LIKE HURTING YOURSELF OR OTHERS?
Upcoming appt 2/18/20    Refill request is for a maintenance medication and has met the criteria specified in the Ambulatory Medication Refill Standing Order for eligibility, visits, laboratory, alerts and was sent to the requested pharmacy.     Requested P no

## 2022-10-01 NOTE — CONSULT NOTE ADULT - ATTENDING COMMENTS
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  Patient now facing  lung/pleural and peritoneal metastasis with pleural effusions.  Patient significantly short of breath.  Recommend Thoracentesis or more optimally  pleurX catheter if possible for symptomatic relief.  Effusions appear loculated on exam, remains position independent on auscultation.

## 2022-10-01 NOTE — H&P ADULT - NSHPREVIEWOFSYSTEMS_GEN_ALL_CORE
CONSTITUTIONAL:  No weight loss, fever, chills. +fatigue/weakness  HEENT:  Eyes:  No visual loss, blurred vision, double vision or yellow sclerae. Ears, Nose, Throat:  No hearing loss, sneezing, congestion, runny nose or sore throat.  CARDIOVASCULAR:  No chest pain, chest pressure or chest discomfort. No palpitations.  RESPIRATORY:  + shortness of breath and cough .  GASTROINTESTINAL:  No anorexia, nausea, vomiting or diarrhea. No abdominal pain or blood.  GENITOURINARY:  Denies hematuria, dysuria.   NEUROLOGICAL:  No dizziness, syncope, paralysis, ataxia, numbness or tingling in the extremities. No change in bowel or bladder control. +HA  MUSCULOSKELETAL:  No muscle, back pain, joint pain or stiffness.  HEMATOLOGIC:  No anemia, bleeding or bruising.  ENDOCRINOLOGIC:  No reports of sweating, cold or heat intolerance. No polyuria or polydipsia.  ALLERGIES:  No history of asthma, hives, eczema or rhinitis.

## 2022-10-01 NOTE — PROGRESS NOTE ADULT - PROBLEM SELECTOR PLAN 1
Likely secondary to pleural effusion, however should R/O HF given pt on doxorubicin and PE given malignancy  #malignant pleural effusion  - IR consult for drainage  CTA rules out PE  Pts last echo 6/9/22 shows normal LV function, f/up repeat echo Likely secondary to pleural effusion, however should R/O HF given pt on doxorubicin and PE given malignancy  #malignant pleural effusion  - IR consult for drainage  - CTA rules out PE  - Pts last echo 9/8/22 shows normal LV function, Ef=68% with mild concentric hypertrophy

## 2022-10-01 NOTE — H&P ADULT - PROBLEM SELECTOR PLAN 5
DVT ppx w lovenox  - pt noted to prev be on xarelto but per surescripts not picked up since July, clarify xarelto usage with patient

## 2022-10-01 NOTE — PROGRESS NOTE ADULT - SUBJECTIVE AND OBJECTIVE BOX
Internal Medicine Progress Note    Patient is a 57y old  Female who presents with a chief complaint of shortness of breath (01 Oct 2022 05:36)    OVERNIGHT EVENTS/SUBJECTIVE:    OBJECTIVE:  Vital Signs Last 24 Hrs  T(C): 37.1 (01 Oct 2022 06:47), Max: 37.4 (30 Sep 2022 18:17)  T(F): 98.8 (01 Oct 2022 06:47), Max: 99.3 (30 Sep 2022 18:17)  HR: 114 (01 Oct 2022 06:47) (111 - 120)  BP: 141/80 (01 Oct 2022 06:47) (102/89 - 146/100)  BP(mean): --  RR: 17 (01 Oct 2022 06:47) (17 - 24)  SpO2: 96% (01 Oct 2022 06:47) (96% - 100%)    Parameters below as of 01 Oct 2022 06:47  Patient On (Oxygen Delivery Method): room air      I&O's Detail    Daily Height in cm: 165.1 (01 Oct 2022 06:47)    Daily   Physical Exam:  General: NAD, resting comfortably in bed  Neuro: A&Ox4, 5/5 strength in all ext  HEENT: NC/AT, EOMI, normal hearing, oral mucosa moist, no oral lesions noted, no pharyngeal erythema, uvula midline  Neck: supple, thyroid not enlarged, no LAD  Resp: Breathing comfortably on RA, LCTA b/l  CV: Normal sinus rhythm, S1 and S2, no r/m/g  Abd: soft, non-distended, non-tender. No HSM.  Ext: ROMIx4, no edema, +2 pulses bilaterally  Skin: Warm and dry, no rashes or discolorations  Psych: Appropriate affect    Medications:  MEDICATIONS  (STANDING):  enoxaparin Injectable 40 milliGRAM(s) SubCutaneous every 24 hours  influenza   Vaccine 0.5 milliLiter(s) IntraMuscular once    MEDICATIONS  (PRN):  acetaminophen     Tablet .. 650 milliGRAM(s) Oral every 6 hours PRN Mild Pain (1 - 3), Moderate Pain (4 - 6)  guaiFENesin Oral Liquid (Sugar-Free) 400 milliGRAM(s) Oral every 6 hours PRN Cough  hydrocodone/homatropine Syrup 5 milliLiter(s) Oral every 6 hours PRN Cough      Labs:                        9.1    2.90  )-----------( 226      ( 30 Sep 2022 20:57 )             30.7         139  |  99  |  9   ----------------------------<  100<H>  3.6   |  27  |  0.77    Ca    9.4      30 Sep 2022 20:57  Mg     1.40         TPro  7.1  /  Alb  3.3  /  TBili  0.3  /  DBili  x   /  AST  9   /  ALT  5   /  AlkPhos  94      PT/INR - ( 30 Sep 2022 20:57 )   PT: 15.3 sec;   INR: 1.32 ratio         PTT - ( 30 Sep 2022 20:57 )  PTT:35.9 sec  Urinalysis Basic - ( 30 Sep 2022 21:04 )    Color: Yellow / Appearance: Clear / S.028 / pH: x  Gluc: x / Ketone: Small  / Bili: Negative / Urobili: <2 mg/dL   Blood: x / Protein: 30 mg/dL / Nitrite: Negative   Leuk Esterase: Negative / RBC: 7 /HPF / WBC 2 /HPF   Sq Epi: x / Non Sq Epi: 8 /HPF / Bacteria: Occasional      SARS-CoV-2: Detected (2022 02:00)  COVID-19 PCR: NotDetec (2022 19:40)      Radiology: Internal Medicine Progress Note    Patient is a 57y old  Female who presents with a chief complaint of shortness of breath (01 Oct 2022 05:36)    OVERNIGHT EVENTS/SUBJECTIVE: Pt admitted for SOB and cough that she says feels the same as when she has previously had malignant effusions. Endorses cough causing cp, but no fever, n/v/d, abd pain, ha.    OBJECTIVE:  Vital Signs Last 24 Hrs  T(C): 37.1 (01 Oct 2022 06:47), Max: 37.4 (30 Sep 2022 18:17)  T(F): 98.8 (01 Oct 2022 06:47), Max: 99.3 (30 Sep 2022 18:17)  HR: 114 (01 Oct 2022 06:47) (111 - 120)  BP: 141/80 (01 Oct 2022 06:47) (102/89 - 146/100)  BP(mean): --  RR: 17 (01 Oct 2022 06:47) (17 - 24)  SpO2: 96% (01 Oct 2022 06:47) (96% - 100%)    Parameters below as of 01 Oct 2022 06:47  Patient On (Oxygen Delivery Method): room air      I&O's Detail    Daily Height in cm: 165.1 (01 Oct 2022 06:47)    Daily   Physical Exam:  General: NAD, resting comfortably in bed  Neuro: A&Ox4, no gross deficits   HEENT: NC/AT, EOMI, normal hearing, oral mucosa moist  Resp: Breathing comfortably on RA, lung sounds decreased throughout fields   CV: tachycardic, S1 and S2, no r/m/g  Abd: soft, non-distended, non-tender  Ext: no edema, +2 pulses bilaterally, cool to touch  Skin: Warm and dry, no rashes or discolorations  Psych: Appropriate affect    Medications:  MEDICATIONS  (STANDING):  enoxaparin Injectable 40 milliGRAM(s) SubCutaneous every 24 hours  influenza   Vaccine 0.5 milliLiter(s) IntraMuscular once    MEDICATIONS  (PRN):  acetaminophen     Tablet .. 650 milliGRAM(s) Oral every 6 hours PRN Mild Pain (1 - 3), Moderate Pain (4 - 6)  guaiFENesin Oral Liquid (Sugar-Free) 400 milliGRAM(s) Oral every 6 hours PRN Cough  hydrocodone/homatropine Syrup 5 milliLiter(s) Oral every 6 hours PRN Cough      Labs:                        9.1    2.90  )-----------( 226      ( 30 Sep 2022 20:57 )             30.7         139  |  99  |  9   ----------------------------<  100<H>  3.6   |  27  |  0.77    Ca    9.4      30 Sep 2022 20:57  Mg     1.40         TPro  7.1  /  Alb  3.3  /  TBili  0.3  /  DBili  x   /  AST  9   /  ALT  5   /  AlkPhos  94      PT/INR - ( 30 Sep 2022 20:57 )   PT: 15.3 sec;   INR: 1.32 ratio         PTT - ( 30 Sep 2022 20:57 )  PTT:35.9 sec  Urinalysis Basic - ( 30 Sep 2022 21:04 )    Color: Yellow / Appearance: Clear / S.028 / pH: x  Gluc: x / Ketone: Small  / Bili: Negative / Urobili: <2 mg/dL   Blood: x / Protein: 30 mg/dL / Nitrite: Negative   Leuk Esterase: Negative / RBC: 7 /HPF / WBC 2 /HPF   Sq Epi: x / Non Sq Epi: 8 /HPF / Bacteria: Occasional      SARS-CoV-2: Detected (2022 02:00)  COVID-19 PCR: NotDetec (2022 19:40)      Radiology:

## 2022-10-01 NOTE — CONSULT NOTE ADULT - SUBJECTIVE AND OBJECTIVE BOX
HPI:  56-year-old female with a PMHx of uterine cancer (S/p chemotherapy, radiation and hysterectomy) w/ mets to the lungs and peritoneal carcinomatosis (on doxorubicin w/ Brianna, last session 22), pleural effusions and chronic anemia is px to the ED w progressively worsening SOB over the past week. Pt originally dx in  w uterine cancer. 2020 she underwent TLH/BSO/SLND/Omentectomy. In 2021 she was found to have a R pleural effusion and was found to have relapsed uterine cancer. For her malignant pleural effusions, she recieved R VATs 10/2021 w/ removal of pleurx catheter in 3/22 and R thoracentesis by IR and L chest tube by thoracic surg in 22. Pt states she recently had a paracentesis in august. Today the patients SOB is worse on exertion, she has also had a cough for 2 weeks that causes musculoskeletal chest pain and limits her ability to sleep. She denies chest pain with exertion. Her outpt CXR showed reaccumulation of pleural effusion. She denies hemoptysis, N/V/D, fevers, chills, abdominal pain, dysuria, hematuria, melena or hematemesis.     On arrival to the ED patients vitals were 102/89, tachycardic to 111, RR 24, satting 99% on RA.    In the ED the pt received tessalon perle, mucinex and hydromet for cough.    (01 Oct 2022 05:36)    Subjective:  Patient seen and examined by Thoracic Surgery at bedside.  Patient with complaint of shortness of breath and difficulty catching her breath that started progressively just over a week ago. Patient states that she has also had a decreased appetite and       PAST MEDICAL & SURGICAL HISTORY:  Uterine cancer  S/p chemotherapy, radiation and hysterectomy      Malignant pleural effusion  with hx loculation on R      Asthma      Chronic anemia      S/P hysterectomy   VINCENT/BSO      H/O ovarian cystectomy        History of thoracentesis  R VATS 10/21          REVIEW OF SYSTEMS      General:No Weight change/ Fatigue/ HA/Dizzy	    Skin/Breast: No Rashes/ Lesions/ Masses  	  Ophthalmologic: No Blurry vision/ Glaucoma/ Blindness  	  ENMT: No Hearing loss/ Drainage/ Lesions	    Respiratory and Thorax: No Cough/ Wheezing/ SOB/ Hemoptysis/ Sputum production  	  Cardiovascular: No Chest pain/ Palpitations/ Diaphoresis	    Gastrointestinal: No Nausea/ Vomiting/ Constipation/ Appetite Change	    Genitourinary: No Heamturia/ Dysuria/ Frequency change/ Impotence	    Musculoskeletal: No Pain/ Weakness/ Claudication	    Neurological: No Seizures/ TIA/CVA/ Parastesias	    Psychiatric: No Dementia/ Depression/ SI/HI	    Hematology/Lymphatics: No hx of bleeding/ Edema	    Endocrine:	No Hyperglycemia/ Hypoglycemia    Allergic/Immunologic:	 No Anaphylaxis/ Intolerance/ Recent illnesses    MEDICATIONS  (STANDING):  enoxaparin Injectable 40 milliGRAM(s) SubCutaneous every 24 hours  influenza   Vaccine 0.5 milliLiter(s) IntraMuscular once    MEDICATIONS  (PRN):  acetaminophen     Tablet .. 650 milliGRAM(s) Oral every 6 hours PRN Mild Pain (1 - 3), Moderate Pain (4 - 6)  guaiFENesin Oral Liquid (Sugar-Free) 400 milliGRAM(s) Oral every 6 hours PRN Cough  hydrocodone/homatropine Syrup 5 milliLiter(s) Oral every 6 hours PRN Cough      Allergies    No Known Allergies    Intolerances        SOCIAL HISTORY:  Occupation:  Smoking Hx:   Etoh Hx:   IVDA Hx:     FAMILY HISTORY:  Maternal family history of hypertension (Mother)    Family history of type 2 diabetes mellitus (Father)        Vital Signs Last 24 Hrs  T(C): 37.1 (01 Oct 2022 06:47), Max: 37.4 (30 Sep 2022 18:17)  T(F): 98.8 (01 Oct 2022 06:47), Max: 99.3 (30 Sep 2022 18:17)  HR: 114 (01 Oct 2022 06:47) (111 - 120)  BP: 141/80 (01 Oct 2022 06:47) (102/89 - 146/100)  BP(mean): --  RR: 17 (01 Oct 2022 06:47) (17 - 24)  SpO2: 96% (01 Oct 2022 06:47) (96% - 100%)    Parameters below as of 01 Oct 2022 06:47  Patient On (Oxygen Delivery Method): room air        General: WN/WD NAD  Neurology: Awake, nonfocal, BUCHANAN x 4  Eyes: Scleras clear, PERRLA/ EOMI, Gross vision intact  ENT:Gross hearing intact, grossly patent pharynx, no stridor  Neck: Neck supple, trachea midline, No JVD,   Respiratory: CTA B/L, No wheezing, rales, rhonchi  CV: RRR, S1S2, no murmurs, rubs or gallops  Abdominal: Soft, NT, ND +BS,   Extremities: No edema, + peripheral pulses  Skin: No Rashes, Hematoma, Ecchymosis  Lymphatic: No Neck, axilla, groin LAD  Psych: Oriented x 3, normal affect  Incisions:   Tubes:    LABS:                        8.7    2.45  )-----------( 222      ( 01 Oct 2022 07:45 )             28.7     10-01    139  |  99  |  8   ----------------------------<  105<H>  3.6   |  27  |  0.78    Ca    9.3      01 Oct 2022 07:45  Phos  3.3     10-01  Mg     1.50     10-01    TPro  7.1  /  Alb  3.2<L>  /  TBili  0.3  /  DBili  x   /  AST  7   /  ALT  6   /  AlkPhos  84  10-01    PT/INR - ( 30 Sep 2022 20:57 )   PT: 15.3 sec;   INR: 1.32 ratio         PTT - ( 30 Sep 2022 20:57 )  PTT:35.9 sec  Urinalysis Basic - ( 30 Sep 2022 21:04 )    Color: Yellow / Appearance: Clear / S.028 / pH: x  Gluc: x / Ketone: Small  / Bili: Negative / Urobili: <2 mg/dL   Blood: x / Protein: 30 mg/dL / Nitrite: Negative   Leuk Esterase: Negative / RBC: 7 /HPF / WBC 2 /HPF   Sq Epi: x / Non Sq Epi: 8 /HPF / Bacteria: Occasional        RADIOLOGY & ADDITIONAL STUDIES:    ASSESSMENT:   57yFemalePAST MEDICAL & SURGICAL HISTORY:  Uterine cancer  S/p chemotherapy, radiation and hysterectomy      Malignant pleural effusion  with hx loculation on R      Asthma      Chronic anemia      S/P hysterectomy   VINCENT/BSO      H/O ovarian cystectomy        History of thoracentesis  R VATS 10/21      HEALTH ISSUES - PROBLEM Dx:  Acute dyspnea    Malignant pleural effusion    Uterine cancer    Prophylactic measure    Peritoneal carcinomatosis        HEALTH ISSUES - R/O PROBLEM Dx:      PLAN:    Kaitlin ROQUE  Thoracic Surgery  #[phone] HPI:  56-year-old female with a PMHx of uterine cancer (S/p chemotherapy, radiation and hysterectomy) w/ mets to the lungs and peritoneal carcinomatosis (on doxorubicin w/ Brianna, last session 22), pleural effusions and chronic anemia is px to the ED w progressively worsening SOB over the past week. Pt originally dx in  w uterine cancer. 2020 she underwent TLH/BSO/SLND/Omentectomy. In 2021 she was found to have a R pleural effusion and was found to have relapsed uterine cancer. For her malignant pleural effusions, she recieved R VATs 10/2021 w/ removal of pleurx catheter in 3/22 and R thoracentesis by IR and L chest tube by thoracic surg in 22. Pt states she recently had a paracentesis in august. Today the patients SOB is worse on exertion, she has also had a cough for 2 weeks that causes musculoskeletal chest pain and limits her ability to sleep. She denies chest pain with exertion. Her outpt CXR showed reaccumulation of pleural effusion. She denies hemoptysis, N/V/D, fevers, chills, abdominal pain, dysuria, hematuria, melena or hematemesis.     On arrival to the ED patients vitals were 102/89, tachycardic to 111, RR 24, satting 99% on RA.    In the ED the pt received tessalon perle, mucinex and hydromet for cough.    (01 Oct 2022 05:36)    Subjective:  Patient seen and examined by Thoracic Surgery at bedside.  Patient with complaint of shortness of breath and cough that started progressively just over a week ago. Patient states that she has also had a decreased appetite and a 8lb weight loss over the last 2 weeks. She reports SOB with minimal activity (ie: walking to bathroom). Patient states that she was seen by IR to drain the fluid in her chest but did not want to proceed with the procedure without Dr. Peters's consent. She reported that on an outpatient CXR that she had increasing effusions compared to her previous CXR. Patient had CT scan in ED which showed b/l moderate pleural effusions.       PAST MEDICAL & SURGICAL HISTORY:  Uterine cancer  S/p chemotherapy, radiation and hysterectomy      Malignant pleural effusion  with hx loculation on R      Asthma      Chronic anemia      S/P hysterectomy   VINCENT/BSO      H/O ovarian cystectomy        History of thoracentesis  R VATS 10/21          REVIEW OF SYSTEMS  Negative except noted in HPI.    MEDICATIONS  (STANDING):  enoxaparin Injectable 40 milliGRAM(s) SubCutaneous every 24 hours  influenza   Vaccine 0.5 milliLiter(s) IntraMuscular once    MEDICATIONS  (PRN):  acetaminophen     Tablet .. 650 milliGRAM(s) Oral every 6 hours PRN Mild Pain (1 - 3), Moderate Pain (4 - 6)  guaiFENesin Oral Liquid (Sugar-Free) 400 milliGRAM(s) Oral every 6 hours PRN Cough  hydrocodone/homatropine Syrup 5 milliLiter(s) Oral every 6 hours PRN Cough      Allergies    No Known Allergies    Intolerances    FAMILY HISTORY:  Maternal family history of hypertension (Mother)    Family history of type 2 diabetes mellitus (Father)        Vital Signs Last 24 Hrs  T(C): 37.1 (01 Oct 2022 06:47), Max: 37.4 (30 Sep 2022 18:17)  T(F): 98.8 (01 Oct 2022 06:47), Max: 99.3 (30 Sep 2022 18:17)  HR: 114 (01 Oct 2022 06:47) (111 - 120)  BP: 141/80 (01 Oct 2022 06:47) (102/89 - 146/100)  BP(mean): --  RR: 17 (01 Oct 2022 06:47) (17 - 24)  SpO2: 96% (01 Oct 2022 06:47) (96% - 100%)    Parameters below as of 01 Oct 2022 06:47  Patient On (Oxygen Delivery Method): room air        General: Appears older than stated age  Neurology: Awake, nonfocal, BUCHANAN x 4  Eyes: Scleras clear, PERRLA/ EOMI, Gross vision intact  ENT:Gross hearing intact, grossly patent pharynx, no stridor  Neck: Neck supple, trachea midline, No JVD,   Respiratory: Dec BS at b/l bases. Mild resp distress noted but resolves with rest.   CV: Tachycardic, S1S2  Abdominal: Soft, NT, ND +BS  Extremities: No edema, + peripheral pulses  Skin: No Rashes, Hematoma, Ecchymosis  Lymphatic: No Neck, axilla, groin LAD  Psych: Oriented x 3, normal affect    LABS:                        8.7    2.45  )-----------( 222      ( 01 Oct 2022 07:45 )             28.7     10-01    139  |  99  |  8   ----------------------------<  105<H>  3.6   |  27  |  0.78    Ca    9.3      01 Oct 2022 07:45  Phos  3.3     10-01  Mg     1.50     10-01    TPro  7.1  /  Alb  3.2<L>  /  TBili  0.3  /  DBili  x   /  AST  7   /  ALT  6   /  AlkPhos  84  10-01    PT/INR - ( 30 Sep 2022 20:57 )   PT: 15.3 sec;   INR: 1.32 ratio         PTT - ( 30 Sep 2022 20:57 )  PTT:35.9 sec  Urinalysis Basic - ( 30 Sep 2022 21:04 )    Color: Yellow / Appearance: Clear / S.028 / pH: x  Gluc: x / Ketone: Small  / Bili: Negative / Urobili: <2 mg/dL   Blood: x / Protein: 30 mg/dL / Nitrite: Negative   Leuk Esterase: Negative / RBC: 7 /HPF / WBC 2 /HPF   Sq Epi: x / Non Sq Epi: 8 /HPF / Bacteria: Occasional        RADIOLOGY & ADDITIONAL STUDIES:  < from: CT Angio Chest PE Protocol w/ IV Cont (10.01.22 @ 00:56) >  ACC: [id number] EXAM:  CT ABDOMEN AND PELVIS IC                        ACC: [id number] [facility]                          PROCEDURE DATE:  10/01/2022          INTERPRETATION:  CLINICAL INFORMATION: Acute shortness of breath,   tachypnea and new onset cough, metastatic uterine cancer on chemotherapy.   History of radiation and surgery.    COMPARISON: CT of the chest, abdomen, pelvis with IV contrast dated   2022.    CONTRAST/COMPLICATIONS:  IV Contrast: Omnipaque 350 63 cc administered   7 cc discarded  Oral Contrast: NONE  Complications: None reported at time of study completion    PROCEDURE:  CT Angiography of the Chest was performed followed by portal venous phase   imaging of the Abdomen and Pelvis.  Sagittal and coronal reformats were performed as well as 3D (MIP)   reconstructions.    FINDINGS:  CHEST:  LUNGS AND LARGE AIRWAYS: Compressive atelectasis in the lower lobes.  PLEURA: Loculated bilateral moderate voume pleural effusions, increased   bilaterally compared to the prior.  VESSELS: Pulmonary arteries are suboptimally opacified, which in   combination with respiratory motion artifact degrades evaluation of the   segmental and subsegmental branches. No central or lobar embolus. Main   pulmonary artery is normal in caliber.  No thoracic aortic aneurysm or dissection.  HEART: Heart size is normal. No pericardial effusion.  MEDIASTINUM AND DANE: No enlarged lymph node measuring greater than 10 mm   in short axis  CHEST WALL AND LOWER NECK: Right chest wall Mediport with tip in SVC.    ABDOMEN AND PELVIS:  LIVER: Subcentimeter low-density hepatic nodules, similar to the prior  BILE DUCTS: Unremarkable  GALLBLADDER: Unremarkable  SPLEEN: Unremarkable  PANCREAS: Unremarkable  ADRENALS: Unremarkable  KIDNEYS/URETERS: Unremarkable    BLADDER: Unremarkable  REPRODUCTIVE ORGANS: Hysterectomy. Complex cystic mass at the vaginal   cuff measures 3.8 x 2.7 x 2.5 cm, previously measured 4.3 x 2.1 x 2.6 cm.    BOWEL: No bowel obstruction. Appendixis normal.  PERITONEUM: Moderate volume 4 quadrant ascites, most prominent in the   pelvis. Areas of peritoneal thickening and nodularity, for example in the   left hemipelvis on axial series 3, image 106 in the left upper quadrant.  VESSELS: Normal caliber  RETROPERITONEUM/LYMPH NODES: No enlarged lymph node measuring greater   than 10 mm in short axis  ABDOMINAL WALL: Unremarkable  BONES: No destructive osseous lesion.    IMPRESSION:    1. No evidence of acute central pulmonary embolus. Suboptimal evaluation   of the segmental and subsegmental branches due to degraded contrast bolus   timing and respiratory motion artifact.  2. Increased, now moderate volume of the bilateral malignant pleural   effusions.  3. Peritoneal carcinomatosis, withstable volume of malignant ascites.  4. Grossly unchanged metastatic implant along the left vaginal cuff.  5. Indeterminate subcentimeter hepatic hypodensities, unchanged.        --- End of Report ---    < end of copied text >      ASSESSMENT:   56-year-old female with a PMHx of uterine cancer (S/p chemotherapy, radiation and hysterectomy) w/ mets to the lungs (s/p RVATS, PleurX in 10/21, removed 3/22) and peritoneal carcinomatosis presents to [facility] for worsening cough and SOB x1-2 weeks. CT chest found b/l moderate sized effusions. IR consulted for drainage of effusion, planning on draining Left side next week. Thoracic c/s as patient is known patient of Dr. Peters's and pt requesting Dr. Peters's approval prior to procedure.    Plan:  - IR drainage of L pleural effusion planned for next week, f/u time/date (IR Procedure - Left Thoracentesis, approving attending Dr. Mckeon)  - Will need to hold AC for any drainage of effusion.  - Case d/w Dr. Peters, recs to follow.  - Continue care per primary team, contact with concerns.   HPI:  56-year-old female with a PMHx of uterine cancer (S/p chemotherapy, radiation and hysterectomy) w/ mets to the lungs and peritoneal carcinomatosis (on doxorubicin w/ Brianna, last session 22), pleural effusions and chronic anemia is px to the ED w progressively worsening SOB over the past week. Pt originally dx in  w uterine cancer. 2020 she underwent TLH/BSO/SLND/Omentectomy. In 2021 she was found to have a R pleural effusion and was found to have relapsed uterine cancer. For her malignant pleural effusions, she recieved R VATs 10/2021 w/ removal of pleurx catheter in 3/22 and R thoracentesis by IR and L chest tube by thoracic surg in 22. Pt states she recently had a paracentesis in august. Today the patients SOB is worse on exertion, she has also had a cough for 2 weeks that causes musculoskeletal chest pain and limits her ability to sleep. She denies chest pain with exertion. Her outpt CXR showed reaccumulation of pleural effusion. She denies hemoptysis, N/V/D, fevers, chills, abdominal pain, dysuria, hematuria, melena or hematemesis.     On arrival to the ED patients vitals were 102/89, tachycardic to 111, RR 24, satting 99% on RA.    In the ED the pt received tessalon perle, mucinex and hydromet for cough.    (01 Oct 2022 05:36)    Subjective:  Patient seen and examined by Thoracic Surgery at bedside.  Patient with complaint of shortness of breath and cough that started progressively just over a week ago. Patient states that she has also had a decreased appetite and a 8lb weight loss over the last 2 weeks. She reports SOB with minimal activity (ie: walking to bathroom). Patient states that she was seen by IR to drain the fluid in her chest but did not want to proceed with the procedure without Dr. Peters's consent. She reported that on an outpatient CXR that she had increasing effusions compared to her previous CXR. Patient had CT scan in ED which showed b/l moderate pleural effusions.       PAST MEDICAL & SURGICAL HISTORY:  Uterine cancer  S/p chemotherapy, radiation and hysterectomy      Malignant pleural effusion  with hx loculation on R      Asthma      Chronic anemia      S/P hysterectomy   VINCENT/BSO      H/O ovarian cystectomy        History of thoracentesis  R VATS 10/21          REVIEW OF SYSTEMS  Negative except noted in HPI.    MEDICATIONS  (STANDING):  enoxaparin Injectable 40 milliGRAM(s) SubCutaneous every 24 hours  influenza   Vaccine 0.5 milliLiter(s) IntraMuscular once    MEDICATIONS  (PRN):  acetaminophen     Tablet .. 650 milliGRAM(s) Oral every 6 hours PRN Mild Pain (1 - 3), Moderate Pain (4 - 6)  guaiFENesin Oral Liquid (Sugar-Free) 400 milliGRAM(s) Oral every 6 hours PRN Cough  hydrocodone/homatropine Syrup 5 milliLiter(s) Oral every 6 hours PRN Cough      Allergies    No Known Allergies    Intolerances    FAMILY HISTORY:  Maternal family history of hypertension (Mother)    Family history of type 2 diabetes mellitus (Father)        Vital Signs Last 24 Hrs  T(C): 37.1 (01 Oct 2022 06:47), Max: 37.4 (30 Sep 2022 18:17)  T(F): 98.8 (01 Oct 2022 06:47), Max: 99.3 (30 Sep 2022 18:17)  HR: 114 (01 Oct 2022 06:47) (111 - 120)  BP: 141/80 (01 Oct 2022 06:47) (102/89 - 146/100)  BP(mean): --  RR: 17 (01 Oct 2022 06:47) (17 - 24)  SpO2: 96% (01 Oct 2022 06:47) (96% - 100%)    Parameters below as of 01 Oct 2022 06:47  Patient On (Oxygen Delivery Method): room air        General: Appears older than stated age  Neurology: Awake, nonfocal, BUCHANAN x 4  Eyes: Scleras clear, PERRLA/ EOMI, Gross vision intact  ENT:Gross hearing intact, grossly patent pharynx, no stridor  Neck: Neck supple, trachea midline, No JVD,   Respiratory: Dec BS at b/l bases. Mild resp distress noted but resolves with rest.   CV: Tachycardic, S1S2  Abdominal: Soft, NT, ND +BS  Extremities: No edema, + peripheral pulses  Skin: No Rashes, Hematoma, Ecchymosis  Lymphatic: No Neck, axilla, groin LAD  Psych: Oriented x 3, normal affect    LABS:                        8.7    2.45  )-----------( 222      ( 01 Oct 2022 07:45 )             28.7     10-01    139  |  99  |  8   ----------------------------<  105<H>  3.6   |  27  |  0.78    Ca    9.3      01 Oct 2022 07:45  Phos  3.3     10-01  Mg     1.50     10-01    TPro  7.1  /  Alb  3.2<L>  /  TBili  0.3  /  DBili  x   /  AST  7   /  ALT  6   /  AlkPhos  84  10-01    PT/INR - ( 30 Sep 2022 20:57 )   PT: 15.3 sec;   INR: 1.32 ratio         PTT - ( 30 Sep 2022 20:57 )  PTT:35.9 sec  Urinalysis Basic - ( 30 Sep 2022 21:04 )    Color: Yellow / Appearance: Clear / S.028 / pH: x  Gluc: x / Ketone: Small  / Bili: Negative / Urobili: <2 mg/dL   Blood: x / Protein: 30 mg/dL / Nitrite: Negative   Leuk Esterase: Negative / RBC: 7 /HPF / WBC 2 /HPF   Sq Epi: x / Non Sq Epi: 8 /HPF / Bacteria: Occasional        RADIOLOGY & ADDITIONAL STUDIES:  < from: CT Angio Chest PE Protocol w/ IV Cont (10.01.22 @ 00:56) >  ACC: [id number] EXAM:  CT ABDOMEN AND PELVIS IC                        ACC: [id number] [facility]                          PROCEDURE DATE:  10/01/2022          INTERPRETATION:  CLINICAL INFORMATION: Acute shortness of breath,   tachypnea and new onset cough, metastatic uterine cancer on chemotherapy.   History of radiation and surgery.    COMPARISON: CT of the chest, abdomen, pelvis with IV contrast dated   2022.    CONTRAST/COMPLICATIONS:  IV Contrast: Omnipaque 350 63 cc administered   7 cc discarded  Oral Contrast: NONE  Complications: None reported at time of study completion    PROCEDURE:  CT Angiography of the Chest was performed followed by portal venous phase   imaging of the Abdomen and Pelvis.  Sagittal and coronal reformats were performed as well as 3D (MIP)   reconstructions.    FINDINGS:  CHEST:  LUNGS AND LARGE AIRWAYS: Compressive atelectasis in the lower lobes.  PLEURA: Loculated bilateral moderate voume pleural effusions, increased   bilaterally compared to the prior.  VESSELS: Pulmonary arteries are suboptimally opacified, which in   combination with respiratory motion artifact degrades evaluation of the   segmental and subsegmental branches. No central or lobar embolus. Main   pulmonary artery is normal in caliber.  No thoracic aortic aneurysm or dissection.  HEART: Heart size is normal. No pericardial effusion.  MEDIASTINUM AND DANE: No enlarged lymph node measuring greater than 10 mm   in short axis  CHEST WALL AND LOWER NECK: Right chest wall Mediport with tip in SVC.    ABDOMEN AND PELVIS:  LIVER: Subcentimeter low-density hepatic nodules, similar to the prior  BILE DUCTS: Unremarkable  GALLBLADDER: Unremarkable  SPLEEN: Unremarkable  PANCREAS: Unremarkable  ADRENALS: Unremarkable  KIDNEYS/URETERS: Unremarkable    BLADDER: Unremarkable  REPRODUCTIVE ORGANS: Hysterectomy. Complex cystic mass at the vaginal   cuff measures 3.8 x 2.7 x 2.5 cm, previously measured 4.3 x 2.1 x 2.6 cm.    BOWEL: No bowel obstruction. Appendixis normal.  PERITONEUM: Moderate volume 4 quadrant ascites, most prominent in the   pelvis. Areas of peritoneal thickening and nodularity, for example in the   left hemipelvis on axial series 3, image 106 in the left upper quadrant.  VESSELS: Normal caliber  RETROPERITONEUM/LYMPH NODES: No enlarged lymph node measuring greater   than 10 mm in short axis  ABDOMINAL WALL: Unremarkable  BONES: No destructive osseous lesion.    IMPRESSION:    1. No evidence of acute central pulmonary embolus. Suboptimal evaluation   of the segmental and subsegmental branches due to degraded contrast bolus   timing and respiratory motion artifact.  2. Increased, now moderate volume of the bilateral malignant pleural   effusions.  3. Peritoneal carcinomatosis, withstable volume of malignant ascites.  4. Grossly unchanged metastatic implant along the left vaginal cuff.  5. Indeterminate subcentimeter hepatic hypodensities, unchanged.        --- End of Report ---    < end of copied text >      ASSESSMENT:   56-year-old female with a PMHx of uterine cancer (S/p chemotherapy, radiation and hysterectomy) w/ mets to the lungs (s/p RVATS, PleurX in 10/21, removed 3/22) and peritoneal carcinomatosis presents to [facility] for worsening cough and SOB x1-2 weeks. CT chest found b/l moderate sized effusions. IR consulted for drainage of effusion, planning on draining Left side next week. Thoracic c/s as patient is known patient of Dr. Peters's and pt requesting Dr. Peters's approval prior to procedure.    Plan:  - IR drainage of L pleural effusion planned for next week, f/u time/date (IR Procedure - Left Thoracentesis, approving attending Dr. Mckeon)  - Case d/w Dr. Peters, who agrees with IR drainage of L effusion.  - If patient clinically deteriorates prior to drainage of L pleural effusion, will consider bedside drainage. Contact with concerns.  - Will need to hold Enoxaparin morning of procedure.  - Continue care per primary team, contact with concerns.

## 2022-10-01 NOTE — H&P ADULT - PROBLEM SELECTOR PLAN 2
Patient has known history of malignant b/l pleural effusions  - Patient had prior IR drainage in R lungs early in June and L chest tube as well   - IR consult for therapeutic para  - Consider replacement of pleurex   - continue supportive care with cough meds, monitor pulse ox. Patient has known history of malignant b/l pleural effusions  - Patient had prior IR drainage in R lungs early in June and L chest tube as well   - IR consult for therapeutic thora  - continue supportive care with cough meds, monitor pulse ox.

## 2022-10-01 NOTE — ED ADULT NURSE REASSESSMENT NOTE - NS ED NURSE REASSESS COMMENT FT1
Pt appears comfortable laying in bed. Verbalizes improvement of symptoms. No coughing noted. Removal of nasal canula noted, pt states removed NC because dry throat again. Denies SOB or difficulty breathing. O2 saturation is 100% on room air. Respirations even and unlabored. Pt speaking in full and complete sentences. No apparent distress noted. Denies CP, headache, lightheadedness, dizziness, nausea, vomiting, fever or chills. Sinus tachy on bedside cardiac monitor. Bed in lowest position, call bell in reach, wheels locked, side rails up, safety maintained. Report given to EMANUEL Marrufo. Awaiting transport.

## 2022-10-01 NOTE — PROGRESS NOTE ADULT - ATTENDING COMMENTS
57F w PMHX of uterine carcinoma (s/p hysterectomy VINCENT BSO, 2020, ongoing chemotherapy (last session 9/14; known to Dr. Danny Celaya) s/p radiation, w/ hx of b/l malignant pleural effusions s/p R VATS 10/2021, PleurX catheter removed 3/22, and R thoracentesis by IR and L chest tube by thoracic surgery on prev admission 6/11/22, presenting for worsening SOB and found to have reaccumulated b/l pleural effusions 57F w PMHX of uterine carcinoma (s/p hysterectomy VINCENT BSO, 2020, ongoing chemotherapy (last session 9/14; known to Dr. Danny Celaya) s/p radiation, w/ hx of b/l malignant pleural effusions s/p R VATS 10/2021, PleurX catheter removed 3/22, and R thoracentesis by IR and L chest tube by thoracic surgery on prev admission 6/11/22, presenting for worsening SOB and found to have reaccumulated b/l pleural effusions.    NAD on exam. Does get tired from talking at the end of interview. Decreased breath on auscultation.    #SOB: in the setting of likely malignant pleural effusion. Imaging results reviewed. CT SX and IR consulted.    #Uterine cancer: f/u onc recs    Discussed with Team 7.

## 2022-10-01 NOTE — CONSULT NOTE ADULT - ASSESSMENT
Assessment: 57y Female with small volume ascites and bilateral malignant pleural effusions. Ascites is too small in volume for drainage. Pleural effusions are amenabel to percutaneous drainage via a thoracentesis. Will plan for left sided thoracentesis.    Plan:   -Please place order for IR Procedure - Left Thoracentesis, approving attending Dr. Mckeon  - hold therapeutic/prophylactic anticoagulants  - AM CBC, BMP, and coags morning of procedure  - COVID PCR within 5 days of procedure  - discussed with primary team    Luis Welsh MD  PGY-V, Interventional Radiology    For EMERGENT inquiries/questions:  [facility]-[phone]  [facility]-p.[phone] ([phone])    Available on [employer] TEAMS for all non-urgent questions  Non-emergent consults: Please place a sunrise order "Consult-Interventional Radiology" with an appropriate callback number. Assessment: 57y Female with small volume ascites and bilateral malignant pleural effusions. Ascites is too small for safe paracentesis. Pleural effusions (L>R). Will plan for left thoracentesis on Monday.    Plan:   -Please place order for IR Procedure - Left Thoracentesis, approving attending Dr. Mckeon  - hold therapeutic/prophylactic anticoagulants  - AM CBC, BMP, and coags morning of procedure  - COVID PCR within 5 days of procedure  - discussed with primary team    Luis Welsh MD  PGY-V, Interventional Radiology    For EMERGENT inquiries/questions:  [facility]-p.[phone]  [facility]p.[phone] ([phone])    Available on Microsoft TEAMS for all non-urgent questions  Non-emergent consults: Please place a sunrise order "Consult-Interventional Radiology" with an appropriate callback number.

## 2022-10-01 NOTE — CONSULT NOTE ADULT - SUBJECTIVE AND OBJECTIVE BOX
55 yo F with a PMH papillary serous endometrial carcinoma (diagnosed 04/2020, s/p VINCENT-BSO and chemotherapy, most recently C1D1 Doxil 9/14/22) c/b lung/pleural and peritoneal metastasis with pleural effusions and chronic anemia who p/w progressively worsening SOB.    In terms of her Oncological history, she was originally diagnosed with stage IA uterine cancer in 04/2020. She then underwent TLH/BSO/SLND/Omentectomy, followed by 5 cycles of Carbo/Taxol (06-09/2020) chemotherapy, and followed by vaginal brachytherapy (2100 cGy in 3 fractions). In 8/2021 she was found to have a R pleural effusion and was found to have biopsy proven relapsed uterine cancer. For her malignant pleural effusions, she received R VATs 10/2021 w/ removal of PleurX catheter in 03/2022. She then had a R thoracentesis by IR and L chest tube (removed inpatient) by thoracic surgery in 06/2022 for recurrence. She also had a paracentesis in 08/2022 (her only one) for malignant ascites. After her initial pleural effusion, she was started on Carbo/Taxol/Keytruda ( trial), 6 cycles, from 10/2021 - 02/2022. She was then switched to maintenance Keytruda and added Lenvima 05/2022 due to POD, then stopped 06/2022. She was switched to Bevacizumab 6/27/22 - 8/15/22 for 3 cycles but stopped again due to POD. She was most recently started on Doxil with C1D1 9/14/22, next cycle due 10/12/22.  However, she feels like her symptoms began slightly before her most recent chemo. The patient's SOB is worse on exertion (ok at rest). She has also had a cough for 2 weeks that causes musculoskeletal chest pain and limits her ability to sleep. She denies chest pain with exertion. Her outpt CXR showed reaccumulation of pleural effusion. She endorses some nausea but denies hemoptysis, vomiting, diarrhea, fevers, chills, abdominal pain, dysuria, hematuria, melena or hematemesis.         Allergies    No Known Allergies    Intolerances        MEDICATIONS  (STANDING):  enoxaparin Injectable 40 milliGRAM(s) SubCutaneous every 24 hours  influenza   Vaccine 0.5 milliLiter(s) IntraMuscular once  magnesium oxide 400 milliGRAM(s) Oral three times a day with meals    MEDICATIONS  (PRN):  acetaminophen     Tablet .. 650 milliGRAM(s) Oral every 6 hours PRN Mild Pain (1 - 3), Moderate Pain (4 - 6)  guaiFENesin Oral Liquid (Sugar-Free) 400 milliGRAM(s) Oral every 6 hours PRN Cough  hydrocodone/homatropine Syrup 5 milliLiter(s) Oral every 6 hours PRN Cough      PAST MEDICAL & SURGICAL HISTORY:  Uterine cancer  S/p chemotherapy, radiation and hysterectomy      Malignant pleural effusion  with hx loculation on R      Asthma      Chronic anemia      S/P hysterectomy  2020 VINCENT/BSO      H/O ovarian cystectomy  2009      History of thoracentesis  R VATS 10/21          FAMILY HISTORY:  Maternal family history of hypertension (Mother)    Family history of type 2 diabetes mellitus (Father)        SOCIAL HISTORY: No current alcohol, tobacco, or drug use. Recently moved in with her mother due to physical deconditioning. Ambulates without assistance, but over the past month, has only been able to ambulate 20-30 feet.    REVIEW OF SYSTEMS:  CONSTITUTIONAL: no fever  EYES/ENT: No visual changes; no throat pain  NECK: No pain or stiffness  RESPIRATORY: + SOB, cough  CARDIOVASCULAR: +chest soreness from coughing. No clear chest pain or palpitations  GASTROINTESTINAL: + nausea, constipation. No abdominal pain. No diarrhea  GENITOURINARY: No dysuria or hematuria  NEUROLOGICAL: No numbness or focal weakness  SKIN: No itching, burning, rashes, or lesions  Psych: No depression  MSK: no joint pain  Allergy: no urticaria    Height (cm): 165.1 (10-01 @ 06:47)  Weight (kg): 85.5 (10-01 @ 06:47)  BMI (kg/m2): 31.4 (10-01 @ 06:47)  BSA (m2): 1.93 (10-01 @ 06:47)    T(F): 98.6 (10-01-22 @ 15:15), Max: 98.8 (10-01-22 @ 06:47)  HR: 101 (10-01-22 @ 15:15)  BP: 132/86 (10-01-22 @ 15:15)  RR: 17 (10-01-22 @ 15:15)  SpO2: 100% (10-01-22 @ 15:15)  Wt(kg): --    GENERAL: NAD  HEENT: EOMI, MMM, no oropharyngeal lesions or erythema appreciated  Pulm: slightly labored WOB, decreased breath sounds jail up the b/l lungs, occasional rales at the bases. No wheezing  CV: RRR, S1, S2, no m/g/r  ABDOMEN: soft, NT, ND, no masses felt, no HSM  MSK: nl ROM  EXTREMITIES: nonpitting b/l LE edema  Neuro: A&Ox3, no focal deficits  SKIN: warm and dry, no visible rash                          8.7    2.45  )-----------( 222      ( 01 Oct 2022 07:45 )             28.7       10-01    139  |  99  |  8   ----------------------------<  105<H>  3.6   |  27  |  0.78    Ca    9.3      01 Oct 2022 07:45  Phos  3.3     10-01  Mg     1.50     10-01    TPro  7.1  /  Alb  3.2<L>  /  TBili  0.3  /  DBili  x   /  AST  7   /  ALT  6   /  AlkPhos  84  10-01      Magnesium, Serum: 1.50 mg/dL (10-01 @ 07:45)  Phosphorus Level, Serum: 3.3 mg/dL (10-01 @ 07:45)  Magnesium, Serum: 1.40 mg/dL (09-30 @ 20:57)

## 2022-10-01 NOTE — CONSULT NOTE ADULT - SUBJECTIVE AND OBJECTIVE BOX
Reason for Consult:   History of Present Illness:  56-year-old female with a PMHx of uterine cancer (S/p chemotherapy, radiation and hysterectomy) w/ mets to the lungs and peritoneal carcinomatosis (on doxorubicin w/ Brianna, last session 9/14/22), pleural effusions and chronic anemia is px to the ED w progressively worsening SOB over the past week. In 8/2021 she was found to have a R pleural effusion and was found to have relapsed uterine cancer. For her malignant pleural effusions, she recieved R VATs 10/2021 w/ removal of pleurx catheter in 3/22 and R thoracentesis by IR and L chest tube by thoracic surg in 6/11/22. Pt states she recently had a paracentesis in august. Today the patients SOB is worse on exertion, she has also had a cough for 2 weeks that causes musculoskeletal chest pain and limits her ability to sleep. Her outpt CXR showed reaccumulation of pleural effusion.     Pertinent PMH/PSH:   Uterine cancer S/p chemotherapy, radiation and hysterectomy  Malignant pleural effusion with hx loculation on Right  Asthma  Chronic anemia  S/P hysterectomy; 2020 VINCENT/BSO  History of thoracentesis R VATS 10/21    Allergies: No Known Allergies    Medications:   hydrocodone/homatropine Syrup  guaiFENesin Oral Liquid (Sugar-Free)    Vital Signs:  T(C): 37.1 (10-01-22 @ 06:47), Max: 37.4 (09-30-22 @ 18:17)  HR: 114 (10-01-22 @ 06:47) (111 - 120)  BP: 141/80 (10-01-22 @ 06:47) (102/89 - 146/100)  RR: 17 (10-01-22 @ 06:47) (17 - 24)  SpO2: 96% (10-01-22 @ 06:47) (96% - 100%)    Relevant Lab Results:            8.7  2.45)-----(222     (10-01-22 @ 07:45)         28.7     139 | 99 | 8  --------------------< 105     (10-01-22 @ 07:45)  3.6 | 27 | 0.78       PT: 15.3<H> 09-30-22 @ 20:57  aPTT: 35.9 09-30-22 @ 20:57   INR: 1.32<H> 09-30-22 @ 20:57      Imaging: Recent CT demonstrates small volume ascites and bilateral pleural effusions.   Reason for Consult:   History of Present Illness:  56-year-old female with a PMHx of uterine cancer (S/p chemotherapy, radiation and hysterectomy) w/ mets to the lungs and peritoneal carcinomatosis (on doxorubicin w/ Brianna, last session 9/14/22), pleural effusions and chronic anemia is px to the ED w progressively worsening SOB over the past week. In 8/2021 she was found to have a R pleural effusion and was found to have relapsed uterine cancer. For her malignant pleural effusions, she recieved R VATs 10/2021 w/ removal of pleurx catheter in 3/22 and R thoracentesis by IR and L chest tube by thoracic surg in 6/11/22. Pt states she recently had a paracentesis in august. Today the patients SOB is worse on exertion, she has also had a cough for 2 weeks that causes musculoskeletal chest pain and limits her ability to sleep. Her outpt CXR showed reaccumulation of pleural effusion.     Pertinent PMH/PSH:   Uterine cancer S/p chemotherapy, radiation and hysterectomy  Malignant pleural effusion with hx loculation on Right  Asthma  Chronic anemia  S/P hysterectomy; 2020 VINCENT/BSO  History of thoracentesis R VATS 10/21    Allergies: No Known Allergies    Medications:   hydrocodone/homatropine Syrup  guaiFENesin Oral Liquid (Sugar-Free)    Vital Signs:  T(C): 37.1 (10-01-22 @ 06:47), Max: 37.4 (09-30-22 @ 18:17)  HR: 114 (10-01-22 @ 06:47) (111 - 120)  BP: 141/80 (10-01-22 @ 06:47) (102/89 - 146/100)  RR: 17 (10-01-22 @ 06:47) (17 - 24)  SpO2: 96% (10-01-22 @ 06:47) (96% - 100%)    Relevant Lab Results:            8.7  2.45)-----(222     (10-01-22 @ 07:45)         28.7     139 | 99 | 8  --------------------< 105     (10-01-22 @ 07:45)  3.6 | 27 | 0.78       PT: 15.3<H> 09-30-22 @ 20:57  aPTT: 35.9 09-30-22 @ 20:57   INR: 1.32<H> 09-30-22 @ 20:57    Imaging: Recent CT demonstrates small volume ascites and bilateral pleural effusions.

## 2022-10-01 NOTE — H&P ADULT - NSICDXPASTSURGICALHX_GEN_ALL_CORE_FT
PAST SURGICAL HISTORY:  H/O ovarian cystectomy 2009    History of thoracentesis R VATS 10/21    S/P hysterectomy 2020 VINCENT/BSO

## 2022-10-01 NOTE — PROGRESS NOTE ADULT - PROBLEM SELECTOR PLAN 4
Patient follows with Dr. Mirtha Celaya at [facility], last chemo 9/14  Oncology consulted via email for management and role of inpt chemo  Follow up onc recommendations. Patient follows with Dr. Mirtha Celaya at [facility], last chemo 9/14  Oncology consulted via email for management and role of inpt chemo  - Follow up onc recommendations.

## 2022-10-02 NOTE — DIETITIAN INITIAL EVALUATION ADULT - ADD RECOMMEND
1) continue on current diet   2) continue to provide Ensure Enlive 3x daily (1050 ari and 60 gm protein)   3) Consider appetite stimulant if poor PO intake continues  4) Continue to Monitor weights, labs, BM's, skin integrity, p.o. intake.  5) Encourage PO intake and honor food preferences as able.  6) Reconsult RD as needed

## 2022-10-02 NOTE — DIETITIAN INITIAL EVALUATION ADULT - NS FNS DIET ORDER
Diet, NPO after Midnight:      NPO Start Date: 02-Oct-2022,   NPO Start Time: 23:59  Except Medications (10-02-22 @ 13:30)  Diet, Regular:   Supplement Feeding Modality:  Oral  Ensure Enlive Cans or Servings Per Day:  3       Frequency:  Daily (10-01-22 @ 17:08)

## 2022-10-02 NOTE — DISCHARGE NOTE PROVIDER - NSDCCPTREATMENT_GEN_ALL_CORE_FT
PRINCIPAL PROCEDURE  Procedure: Chest tube insertion  Findings and Treatment: TECHNIQUE: The procedure including risks and benefits was discussed with   the patient. Informed written consent was obtained and placed in the   patient's chart.  The patient was placed in the left anterior oblique position on the CT   examination table and connected to physiologic monitoring.  Limited   transaxial images of the lower thorax were obtained without the use of   oral or intravenous contrast materials.  The left pleural effusion which   had been seen on a recent CT scan was again identified and the overlying   skin was prepped and draped in the usual sterile fashion.  Lidocaine 2%   was administered for local anesthesia.  Using CT guidance, a 21-gauge needle was advanced into the left pleural   fluid using a posterolateral approach.  Access into the collection was   confirmed upon aspirating 1cc of yellow slightly cloudy fluid.  The   needle was then removed over a mandril wire and a micropuncture sheath   was advanced over the wire into the pleural space. The micropuncture   sheath was exchanged over an Axis wire.  A 10 Swedish pigtail drainage   catheter was then advanced into the collection over the wire.    Approximately 70 cc of yellow cloudy were manually aspirated. A sample of   fluid was collected for microbiology, cytology, and chemistry. Repeat   images demonstrated good positioning of the catheter tip within the   posterior pleuralspace.  The catheter was secured to the patient's skin   with a silk suture and then connected to a Pleur-evac.  A dry gauze   dressing was then applied.  The patient tolerated the procedure well and   was discharged from the radiology department in stable condition and   without complaints.  There were no immediate complications.  Impression:  CT-guided left-sided 10 Swedish chest tube insertion. Yellow slightly   cloudy fluid aspirated. Samples sent to microbiology, cytology, and   chemistry.

## 2022-10-02 NOTE — PROGRESS NOTE ADULT - SUBJECTIVE AND OBJECTIVE BOX
57y Female seen at bedside with Dr Peters. Patient with c/o of increased sob over last few days but stable today feels okay. She is currently on 2 liters nasal canula with o2 sat at 100% and with no sob with talking.     HPI: 56-year-old female with a PMHx of uterine cancer (S/p chemotherapy, radiation and hysterectomy) w/ mets to the lungs and peritoneal carcinomatosis (on doxorubicin w/ Brianna, last session 9/14/22), pleural effusions and chronic anemia is px to the ED w progressively worsening SOB over the past week. Pt originally dx in 2020 w uterine cancer. 4/2020 she underwent TLH/BSO/SLND/Omentectomy. In 8/2021 she was found to have a R pleural effusion and was found to have relapsed uterine cancer. For her malignant pleural effusions, she recieved R VATs 10/2021 w/ removal of pleurx catheter in 3/22 and R thoracentesis by IR and L chest tube by thoracic surg in 6/11/22. Pt states she recently had a paracentesis in august. Today the patients SOB is worse on exertion, she has also had a cough for 2 weeks that causes musculoskeletal chest pain and limits her ability to sleep. She denies chest pain with exertion. Her outpt CXR showed reaccumulation of pleural effusion. She denies hemoptysis, N/V/D, fevers, chills, abdominal pain, dysuria, hematuria, melena or hematemesis.     Vital Signs:  Vital Signs Last 24 Hrs  T(C): 36.9 (10-02-22 @ 06:46), Max: 37 (10-01-22 @ 15:15)  T(F): 98.4 (10-02-22 @ 06:46), Max: 98.6 (10-01-22 @ 15:15)  HR: 91 (10-02-22 @ 06:46) (91 - 102)  BP: 128/84 (10-02-22 @ 06:46) (128/84 - 132/86)  RR: 18 (10-02-22 @ 06:46) (17 - 18)  SpO2: 99% (10-02-22 @ 06:46) (99% - 100%) on (O2)    Telemetry/Alarms:    Relevant labs, Radiology and Medications reviewed    CT Chest: Reviewed by Dr Peters  < from: CT Angio Chest PE Protocol w/ IV Cont (10.01.22 @ 00:56) >  IMPRESSION:  1. No evidence of acute central pulmonary embolus. Suboptimal evaluation   of the segmental and subsegmental branches due to degraded contrast bolus   timing and respiratory motion artifact.  2. Increased, now moderate volume of the bilateral malignant pleural   effusions.  3. Peritoneal carcinomatosis, withstable volume of malignant ascites.  4. Grossly unchanged metastatic implant along the left vaginal cuff.  5. Indeterminate subcentimeter hepatic hypodensities, unchanged.  < end of copied text >    Pertinent Physical Exam  Gen: WN/WD, NAD  CV: RRR  Abd: Soft, NT, ND +BS  Ext: No edema, +peripheral pulses    Assessment  56-year-old female with a PMHx of uterine cancer (S/p chemotherapy, radiation and hysterectomy) w/ mets to the lungs (s/p RVATS, PleurX in 10/21, removed 3/22) and peritoneal carcinomatosis presents to [facility] for worsening cough and SOB x1-2 weeks. CT chest found b/l moderate sized effusions. IR consulted for drainage of effusion, planning on draining Left side next week. Thoracic c/s as patient is known patient of Dr. Peters's and pt requesting Dr. Peters's approval prior to procedure    Plan  Continue care per primary team  From respiratory standpoint at time of visit patient appears stable and conformable on 2L NC without sob  On review of CT chest imaging, bilateral diaphragms appear elevated and this could be a factor in patient SOB as well.  Left pleural effusion does not appear to be large, therefore safest to do under IR guidance.   Recommend IR PTC placement as opposed to thoracentesis to evaluate if leaving drain will alleviate patients SOB and eventual Pleur-X if this is the case.   Above discussed with Dr Peters and primary team MD   Will discuss with IR as well    Thoracic [id number]    Discussed at AM rounds

## 2022-10-02 NOTE — DISCHARGE NOTE PROVIDER - NSDCCPCAREPLAN_GEN_ALL_CORE_FT
PRINCIPAL DISCHARGE DIAGNOSIS  Diagnosis: Pleural effusion  Assessment and Plan of Treatment: You were found to have reaccumulation of the fluid around your left lung, likely related to your metastatic uterine cancer. You were evaluated by thoracic surgery and interventional radiology during your stay. you udnerwent chest tube placement on the left side of your lung to drain the fluid around your lung. Following drainage of the fluid, you had improvement in your shortness of breath and discomfort. You should follow up with Dr. Peters from thoracic syurgery after discharge to discuss the need for a more permanent catheter placement (pleurX catheter) for the future.       PRINCIPAL DISCHARGE DIAGNOSIS  Diagnosis: Pleural effusion  Assessment and Plan of Treatment: You were found to have reaccumulation of the fluid around your left lung, likely related to your metastatic uterine cancer. You were evaluated by thoracic surgery and interventional radiology during your stay. You underwent chest tube placement on the left side of your chest to drain the fluid around your lung. Following drainage of the fluid, you had improvement in your shortness of breath and discomfort. THe chest tube was removed later in your stay as your sympotms and oxygen requirements significantly improved along with decreased drainage from the left side. You should follow up with Dr. Peters from thoracic syurgery after discharge to discuss the need for a more permanent catheter placement (pleurX catheter) for the future.       PRINCIPAL DISCHARGE DIAGNOSIS  Diagnosis: Pleural effusion  Assessment and Plan of Treatment: You were found to have reaccumulation of the fluid around your left lung, likely related to your metastatic uterine cancer. You were evaluated by thoracic surgery and interventional radiology during your stay. You underwent chest tube placement on the left side of your chest to drain the fluid around your lung. Following drainage of the fluid, you had improvement in your shortness of breath and discomfort. THe chest tube was removed later in your stay as your sympotms and oxygen requirements significantly improved along with decreased drainage from the left side. You should follow up with Dr. Peters from thoracic syurgery after discharge to discuss the need for a more permanent catheter placement (pleurX catheter) for the future. Please also follow up with your primary care doctor for further management. If you experience worsening shortness of breath at home please return to the ED for treatment.

## 2022-10-02 NOTE — PROGRESS NOTE ADULT - PROBLEM SELECTOR PLAN 3
stable volume of malignant ascites.  - IR assessment for drainage stable volume of malignant ascites.  - IR: no abdominal drainage indicated

## 2022-10-02 NOTE — PROGRESS NOTE ADULT - ATTENDING COMMENTS
57F w PMHX of uterine carcinoma (s/p hysterectomy VINCENT BSO, 2020, ongoing chemotherapy (last session 9/14; known to Dr. Danny Celaya) s/p radiation, w/ hx of b/l malignant pleural effusions s/p R VATS 10/2021, PleurX catheter removed 3/22, and R thoracentesis by IR and L chest tube by thoracic surgery on prev admission 6/11/22, presenting for worsening SOB and found to have reaccumulated b/l pleural effusions.    Able to get some sleep overnight. NAD on exam. Does get tired from talking at the end of interview. Decreased breath on auscultation.    #SOB: in the setting of likely malignant pleural effusion. Imaging results reviewed. CT SX and IR recs appreciated. Plan for L thoracentesis vs PTC by IR tomorrow    #Uterine cancer: f/u onc recs    Discussed with Team 7.

## 2022-10-02 NOTE — DISCHARGE NOTE PROVIDER - DETAILS OF MALNUTRITION DIAGNOSIS/DIAGNOSES
This patient has been assessed with a concern for Malnutrition and was treated during this hospitalization for the following Nutrition diagnosis/diagnoses:     -  10/02/2022: Severe protein-calorie malnutrition

## 2022-10-02 NOTE — PROGRESS NOTE ADULT - PROBLEM SELECTOR PLAN 2
Patient has known history of malignant b/l pleural effusions  - Patient had prior IR drainage in R lungs early in June and L chest tube as well   - IR consult for therapeutic thora  - continue supportive care with cough meds, monitor pulse ox  - contact Dr Peters thorac sx Patient has known history of malignant b/l pleural effusions  - Patient had prior IR drainage in R lungs early in June and L chest tube as well   - IR  for therapeutic thora  - continue supportive care with cough meds, monitor pulse ox  - Dr Peters thorac sx informed of plan for thora

## 2022-10-02 NOTE — DIETITIAN INITIAL EVALUATION ADULT - ORAL INTAKE PTA/DIET HISTORY
Pt lives at home with her mother. Pt is not able to drive or cook for herself at this time so she has been living with her Mother. Her mother has an aide that helps them cook at home and obtain groceries. No specific diet followed PTA. No supplements/Vitamins taken PTA.

## 2022-10-02 NOTE — DISCHARGE NOTE PROVIDER - PROVIDER TOKENS
PROVIDER:[TOKEN:[[id number]],FOLLOWUP:[1 week]] PROVIDER:[TOKEN:[[id number]],FOLLOWUP:[1 week]],PROVIDER:[TOKEN:[[id number]]]

## 2022-10-02 NOTE — CHART NOTE - NSCHARTNOTEFT_GEN_A_CORE
PRE-INTERVENTIONAL RADIOLOGY PROCEDURE NOTE      Patient Age: 57y    Patient Gender: Female    Procedure: L thoracentesis    Diagnosis/Indication: malignant effusion    Interventional Radiology Attending Physician: Linda     Ordering Attending Physician: Dr Damon     Pertinent Medical History: malignant effusions d/t uterine cancer, peritoneal carcinomatosis. Multiple thoras, pleurx in past.     Pertinent labs:                      9.2    2.40  )-----------( 272      ( 02 Oct 2022 07:40 )             30.4       10-02    140  |  100  |  7   ----------------------------<  120<H>  3.4<L>   |  28  |  0.76    Ca    9.8      02 Oct 2022 07:40  Phos  3.5     10-02  Mg     1.50     10-02    TPro  7.5  /  Alb  3.5  /  TBili  0.2  /  DBili  x   /  AST  9   /  ALT  6   /  AlkPhos  92  10-02      PT/INR - ( 30 Sep 2022 20:57 )   PT: 15.3 sec;   INR: 1.32 ratio         PTT - ( 30 Sep 2022 20:57 )  PTT:35.9 sec        Patient and Family Aware ? Yes

## 2022-10-02 NOTE — DISCHARGE NOTE PROVIDER - NSDCMRMEDTOKEN_GEN_ALL_CORE_FT
benzonatate 100 mg oral capsule: 1 cap(s) orally 3 times a day, As Needed  hydrocodone-homatropine 5 mg-1.5 mg/5 mL oral syrup: 5 milliliter(s) orally once a day (at bedtime), As Needed  prochlorperazine 10 mg oral tablet: 1 tab(s) orally every 6 hours  Xarelto 10 mg oral tablet: 1 tab(s) orally once a day    hydrocodone-homatropine 5 mg-1.5 mg/5 mL oral syrup: 5 milliliter(s) orally once a day (at bedtime), As Needed  Xarelto 10 mg oral tablet: 1 tab(s) orally once a day    bisacodyl 10 mg rectal suppository: 1 suppository(ies) rectal once a day, As needed, Constipation  hydrocodone-homatropine 5 mg-1.5 mg/5 mL oral syrup: 5 milliliter(s) orally once a day (at bedtime), As Needed  MiraLax oral powder for reconstitution: 17 gram(s) orally 2 times a day   senna leaf extract oral tablet: 2 tab(s) orally once a day (at bedtime)

## 2022-10-02 NOTE — DIETITIAN INITIAL EVALUATION ADULT - OTHER INFO
Per chart, 57F w PMHX of uterine carcinoma (s/p hysterectomy [facility], 2020, ongoing chemotherapy (last session 9/14; known to Dr. Danny Celaya) s/p radiation, w/ hx of b/l malignant pleural effusions s/p R VATS 10/2021, PleurX catheter removed 3/22, and R thoracentesis by IR and L chest tube by thoracic surgery on prev admission 6/11/22, presenting for worsening SOB and found to have reaccumulated b/l pleural effusions    Nutrition interview: No recent episodes of nausea, vomiting, diarrhea. Pt c/o constipation occasionally. BM noted yesterday per Pt. Bowel regimen in place. Denies any chewing/swallowing difficulties. No food allergies. Stated UBW: ~204# x 1 months ago. Current wt (10/1): 188.8#. Pt noted with significant wt loss x 1 month (-7.4%) believed to be attributed to poor PO intake. Food preferences explored and noted. Intake is 25-50% per RN flowsheets and per pt. Pt receiving Ensure Enlive 3x daily (1050 ari and 60 gm protein) to promote optimal PO intake. Consuming 50% of Ensure at this time, as observed at time of visit. Feeding skills: set up help required by staff for eating    Given pt prognosis education not appropriate at this time. RD and Pt spoke about contining to try to eat as much as she can to try to maintain weight with no further loss at this time.

## 2022-10-02 NOTE — PROGRESS NOTE ADULT - PROBLEM SELECTOR PLAN 4
[place] [facility] [place]  Oncology consulted via email for management and role of inpt chemo  - Follow up onc recommendations.

## 2022-10-02 NOTE — DIETITIAN INITIAL EVALUATION ADULT - PERTINENT MEDS FT
MEDICATIONS  (STANDING):  benzocaine 15 mG/menthol 3.6 mG Lozenge 1 Lozenge Oral two times a day  influenza   Vaccine 0.5 milliLiter(s) IntraMuscular once  magnesium oxide 400 milliGRAM(s) Oral three times a day with meals  polyethylene glycol 3350 17 Gram(s) Oral daily  senna 1 Tablet(s) Oral at bedtime    MEDICATIONS  (PRN):  acetaminophen     Tablet .. 650 milliGRAM(s) Oral every 6 hours PRN Mild Pain (1 - 3), Moderate Pain (4 - 6)  bisacodyl Suppository 10 milliGRAM(s) Rectal daily PRN Constipation  guaiFENesin Oral Liquid (Sugar-Free) 400 milliGRAM(s) Oral every 6 hours PRN Cough  hydrocodone/homatropine Syrup 5 milliLiter(s) Oral every 6 hours PRN Cough

## 2022-10-02 NOTE — DISCHARGE NOTE PROVIDER - CARE PROVIDER_API CALL
Stevan Peters)  Surgery; Thoracic Surgery  [phone] [place] [address], [place] [address]  Phone: [phone]  Fax: [phone]  Follow Up Time: 1 week   Stevan Peters)  Surgery; Thoracic Surgery  [address] [place] [address], [place] [address]  Phone: [phone]  Fax: [phone]  Follow Up Time: 1 week    Mirtha Celaya)  Hematology; Internal Medicine; Medical Oncology  [address] [place] [address]  Phone: [phone]  Fax: [phone]  Follow Up Time:

## 2022-10-02 NOTE — PROGRESS NOTE ADULT - SUBJECTIVE AND OBJECTIVE BOX
Internal Medicine Progress Note    Patient is a 57y old  Female who presents with a chief complaint of shortness of breath (01 Oct 2022 19:42)    OVERNIGHT EVENTS/SUBJECTIVE:    OBJECTIVE:  Vital Signs Last 24 Hrs  T(C): 36.9 (02 Oct 2022 06:46), Max: 37 (01 Oct 2022 15:15)  T(F): 98.4 (02 Oct 2022 06:46), Max: 98.6 (01 Oct 2022 15:15)  HR: 91 (02 Oct 2022 06:46) (91 - 102)  BP: 128/84 (02 Oct 2022 06:46) (128/84 - 132/86)  BP(mean): --  RR: 18 (02 Oct 2022 06:46) (17 - 18)  SpO2: 99% (02 Oct 2022 06:46) (99% - 100%)    Parameters below as of 02 Oct 2022 06:46  Patient On (Oxygen Delivery Method): nasal cannula  O2 Flow (L/min): 2    I&O's Detail    01 Oct 2022 07:01  -  02 Oct 2022 06:54  --------------------------------------------------------  IN:    Oral Fluid: 150 mL  Total IN: 150 mL    OUT:  Total OUT: 0 mL    Total NET: 150 mL        Daily     Daily   Physical Exam:  General: NAD, resting comfortably in bed  Neuro: A&Ox4, 5/5 strength in all ext  HEENT: NC/AT, EOMI, normal hearing, oral mucosa moist, no oral lesions noted, no pharyngeal erythema, uvula midline  Neck: supple, thyroid not enlarged, no LAD  Resp: Breathing comfortably on RA, LCTA b/l  CV: Normal sinus rhythm, S1 and S2, no r/m/g  Abd: soft, non-distended, non-tender. No HSM.  Ext: ROMIx4, no edema, +2 pulses bilaterally  Skin: Warm and dry, no rashes or discolorations  Psych: Appropriate affect    Medications:  MEDICATIONS  (STANDING):  enoxaparin Injectable 40 milliGRAM(s) SubCutaneous every 24 hours  influenza   Vaccine 0.5 milliLiter(s) IntraMuscular once  magnesium oxide 400 milliGRAM(s) Oral three times a day with meals  polyethylene glycol 3350 17 Gram(s) Oral daily  senna 1 Tablet(s) Oral at bedtime    MEDICATIONS  (PRN):  acetaminophen     Tablet .. 650 milliGRAM(s) Oral every 6 hours PRN Mild Pain (1 - 3), Moderate Pain (4 - 6)  guaiFENesin Oral Liquid (Sugar-Free) 400 milliGRAM(s) Oral every 6 hours PRN Cough  hydrocodone/homatropine Syrup 5 milliLiter(s) Oral every 6 hours PRN Cough      Labs:                        8.7    2.45  )-----------( 222      ( 01 Oct 2022 07:45 )             28.7     10    139  |  99  |  8   ----------------------------<  105<H>  3.6   |  27  |  0.78    Ca    9.3      01 Oct 2022 07:45  Phos  3.3     10-01  Mg     1.50     10-01    TPro  7.1  /  Alb  3.2<L>  /  TBili  0.3  /  DBili  x   /  AST  7   /  ALT  6   /  AlkPhos  84  10    PT/INR - ( 30 Sep 2022 20:57 )   PT: 15.3 sec;   INR: 1.32 ratio         PTT - ( 30 Sep 2022 20:57 )  PTT:35.9 sec  Urinalysis Basic - ( 30 Sep 2022 21:04 )    Color: Yellow / Appearance: Clear / S.028 / pH: x  Gluc: x / Ketone: Small  / Bili: Negative / Urobili: <2 mg/dL   Blood: x / Protein: 30 mg/dL / Nitrite: Negative   Leuk Esterase: Negative / RBC: 7 /HPF / WBC 2 /HPF   Sq Epi: x / Non Sq Epi: 8 /HPF / Bacteria: Occasional      SARS-CoV-2: Detected (2022 02:00)  COVID-19 PCR: NotDetec (2022 19:40)      Radiology: Internal Medicine Progress Note    Patient is a 57y old  Female who presents with a chief complaint of shortness of breath (01 Oct 2022 19:42)    OVERNIGHT EVENTS/SUBJECTIVE: No overnight events. She reports feeling constipated, has only had a small BM since being in hospital. Otherwise she feels about the same - BANUELOS when walks to the bathroom but keeps her o2 on. Sitting up helps her breathe better. No fever, no ha, no cp, no abd pain, no n/v/d. Is understanding of plan to go to  tomorrow for [employer] [facility].     OBJECTIVE:  Vital Signs Last 24 Hrs  T(C): 36.9 (02 Oct 2022 06:46), Max: 37 (01 Oct 2022 15:15)  T(F): 98.4 (02 Oct 2022 06:46), Max: 98.6 (01 Oct 2022 15:15)  HR: 91 (02 Oct 2022 06:46) (91 - 102)  BP: 128/84 (02 Oct 2022 06:46) (128/84 - 132/86)  BP(mean): --  RR: 18 (02 Oct 2022 06:46) (17 - 18)  SpO2: 99% (02 Oct 2022 06:46) (99% - 100%)    Parameters below as of 02 Oct 2022 06:46  Patient On (Oxygen Delivery Method): nasal cannula  O2 Flow (L/min): 2    I&O's Detail    01 Oct 2022 07:01  -  02 Oct 2022 06:54  --------------------------------------------------------  IN:    Oral Fluid: 150 mL  Total IN: 150 mL    OUT:  Total OUT: 0 mL    Total NET: 150 mL        Daily     Daily   Physical Exam:  General: NAD, resting comfortably in bed  Neuro: A&Ox4, no gross deficits   HEENT: NC/AT, EOMI, normal hearing, reports hard to speak in long sentences  Resp: Breathing comfortably on NC, lung sounds decreased b/l  CV: Normal sinus rhythm, S1 and S2, no r/m/g  Abd: soft, non-distended, non-tender  Ext: no edema, +2 pulses bilaterally  Skin: Warm and dry, no rashes or discolorations  Psych: Appropriate affect    Medications:  MEDICATIONS  (STANDING):  enoxaparin Injectable 40 milliGRAM(s) SubCutaneous every 24 hours  influenza   Vaccine 0.5 milliLiter(s) IntraMuscular once  magnesium oxide 400 milliGRAM(s) Oral three times a day with meals  polyethylene glycol 3350 17 Gram(s) Oral daily  senna 1 Tablet(s) Oral at bedtime    MEDICATIONS  (PRN):  acetaminophen     Tablet .. 650 milliGRAM(s) Oral every 6 hours PRN Mild Pain (1 - 3), Moderate Pain (4 - 6)  guaiFENesin Oral Liquid (Sugar-Free) 400 milliGRAM(s) Oral every 6 hours PRN Cough  hydrocodone/homatropine Syrup 5 milliLiter(s) Oral every 6 hours PRN Cough      Labs:                        8.7    2.45  )-----------( 222      ( 01 Oct 2022 07:45 )             28.7     10-    139  |  99  |  8   ----------------------------<  105<H>  3.6   |  27  |  0.78    Ca    9.3      01 Oct 2022 07:45  Phos  3.3     10-  Mg     1.50     10-    TPro  7.1  /  Alb  3.2<L>  /  TBili  0.3  /  DBili  x   /  AST  7   /  ALT  6   /  AlkPhos  84  10-    PT/INR - ( 30 Sep 2022 20:57 )   PT: 15.3 sec;   INR: 1.32 ratio         PTT - ( 30 Sep 2022 20:57 )  PTT:35.9 sec  Urinalysis Basic - ( 30 Sep 2022 21:04 )    Color: Yellow / Appearance: Clear / S.028 / pH: x  Gluc: x / Ketone: Small  / Bili: Negative / Urobili: <2 mg/dL   Blood: x / Protein: 30 mg/dL / Nitrite: Negative   Leuk Esterase: Negative / RBC: 7 /HPF / WBC 2 /HPF   Sq Epi: x / Non Sq Epi: 8 /HPF / Bacteria: Occasional      SARS-CoV-2: Detected (2022 02:00)  COVID-19 PCR: NotDetec (2022 19:40)      Radiology:

## 2022-10-02 NOTE — DISCHARGE NOTE PROVIDER - HOSPITAL COURSE
56-year-old female with a PMHx of uterine cancer (S/p chemotherapy, radiation and hysterectomy) w/ mets to the lungs and peritoneal carcinomatosis (on doxorubicin w/ Brianna, last session 9/14/22), pleural effusions and chronic anemia is px to the ED w progressively worsening SOB over the past week. Pt originally dx in 2020 w uterine cancer. 4/2020 she underwent TLH/BSO/SLND/Omentectomy. In 8/2021 she was found to have a R pleural effusion and was found to have relapsed uterine cancer. For her malignant pleural effusions, she recieved R VATs 10/2021 w/ removal of pleurx catheter in 3/22 and R thoracentesis by IR and L chest tube by thoracic surg in 6/11/22. Pt states she recently had a paracentesis in august. Today the patients SOB is worse on exertion, she has also had a cough for 2 weeks that causes musculoskeletal chest pain and limits her ability to sleep. She denies chest pain with exertion. Her outpt CXR showed reaccumulation of pleural effusion. She denies hemoptysis, N/V/D, fevers, chills, abdominal pain, dysuria, hematuria, melena or hematemesis.     She was evaluated by thoracic surgery and IR. IR performed 56-year-old female with a PMHx of uterine cancer (S/p chemotherapy, radiation and hysterectomy) w/ mets to the lungs and peritoneal carcinomatosis (on doxorubicin w/ Brianna, last session 9/14/22), pleural effusions and chronic anemia is px to the ED w progressively worsening SOB over the past week. Pt originally dx in 2020 w uterine cancer. 4/2020 she underwent TLH/BSO/SLND/Omentectomy. In 8/2021 she was found to have a R pleural effusion and was found to have relapsed uterine cancer. For her malignant pleural effusions, she recieved R VATs 10/2021 w/ removal of pleurx catheter in 3/22 and R thoracentesis by IR and L chest tube by thoracic surg in 6/11/22. Pt states she recently had a paracentesis in august. Today the patients SOB is worse on exertion, she has also had a cough for 2 weeks that causes musculoskeletal chest pain and limits her ability to sleep. She denies chest pain with exertion. Her outpt CXR showed reaccumulation of pleural effusion. She denies hemoptysis, N/V/D, fevers, chills, abdominal pain, dysuria, hematuria, melena or hematemesis.     She was evaluated by thoracic surgery and IR. IR performed thoracentesis with L chest tube placement on 10/4. Chest tube initial output 350 cc in first 24 hours followed by minimal output. Following chest tube placement, patient had symptomatic improvement in her orthopnea and dyspnea. Thoracic surgery and IR determined that there is no need for L pleurX catheter placement at this time. Patient will require follow up with Dr. Peters to further discuss possible need for PleurX going forward. 56-year-old female with a PMHx of uterine cancer (S/p chemotherapy, radiation and hysterectomy) w/ mets to the lungs and peritoneal carcinomatosis (on doxorubicin w/ Brianna, last session 9/14/22), pleural effusions and chronic anemia is px to the ED w progressively worsening SOB over the past week. Pt originally dx in 2020 w uterine cancer. 4/2020 she underwent TLH/BSO/SLND/Omentectomy. In 8/2021 she was found to have a R pleural effusion and was found to have relapsed uterine cancer. For her malignant pleural effusions, she recieved R VATs 10/2021 w/ removal of pleurx catheter in 3/22 and R thoracentesis by IR and L chest tube by thoracic surg in 6/11/22. Pt states she recently had a paracentesis in august. Today the patients SOB is worse on exertion, she has also had a cough for 2 weeks that causes musculoskeletal chest pain and limits her ability to sleep. She denies chest pain with exertion. Her outpt CXR showed reaccumulation of pleural effusion. She denies hemoptysis, N/V/D, fevers, chills, abdominal pain, dysuria, hematuria, melena or hematemesis.     She was evaluated by thoracic surgery and IR. IR performed thoracentesis with L chest tube placement on 10/4. Chest tube initial output 350 cc in first 24 hours followed by minimal output. Following chest tube placement, patient had symptomatic improvement in her orthopnea and dyspnea. Thoracic surgery and IR determined that there is no need for L pleurX catheter placement at this time. Chest tube removed on 10/8 for minimal drainage. Patient with improved shortness of breath and decreased cough. Patient will require follow up with Dr. Peters to further discuss possible need for PleurX going forward. 56-year-old female with a PMHx of uterine cancer (S/p chemotherapy, radiation and hysterectomy) w/ mets to the lungs and peritoneal carcinomatosis (on doxorubicin w/ Brianna, last session 9/14/22), pleural effusions and chronic anemia is px to the ED w progressively worsening SOB over the past week. Pt originally dx in 2020 w uterine cancer. 4/2020 she underwent TLH/BSO/SLND/Omentectomy. In 8/2021 she was found to have a R pleural effusion and was found to have relapsed uterine cancer. For her malignant pleural effusions, she recieved R VATs 10/2021 w/ removal of pleurx catheter in 3/22 and R thoracentesis by IR and L chest tube by thoracic surg in 6/11/22. Pt states she recently had a paracentesis in august. Today the patients SOB is worse on exertion, she has also had a cough for 2 weeks that causes musculoskeletal chest pain and limits her ability to sleep. She denies chest pain with exertion. Her outpt CXR showed reaccumulation of pleural effusion. She denies hemoptysis, N/V/D, fevers, chills, abdominal pain, dysuria, hematuria, melena or hematemesis.     She was evaluated by thoracic surgery and IR. IR performed thoracentesis with L chest tube placement on 10/4. Chest tube initial output 350 cc in first 24 hours followed by minimal output. Following chest tube placement, patient had symptomatic improvement in her orthopnea and dyspnea. Thoracic surgery and IR determined that there is no need for L pleurX catheter placement at this time. Chest tube removed on 10/8 for minimal drainage. Patient with improved shortness of breath and decreased cough. Patient will require follow up with Dr. Peters to further discuss possible need for PleurX going forward.     Patient now stable for discharge home with home care

## 2022-10-02 NOTE — DISCHARGE NOTE PROVIDER - CARE PROVIDERS DIRECT ADDRESSES
,[email or web address]

## 2022-10-02 NOTE — PROGRESS NOTE ADULT - PROBLEM SELECTOR PLAN 1
Likely secondary to pleural effusion, however should R/O HF given pt on doxorubicin and PE given malignancy  #malignant pleural effusion  - IR consult for drainage  - CTA rules out PE  - Pts last echo 9/8/22 shows normal LV function, Ef=68% with mild concentric hypertrophy Likely secondary to pleural effusion, however should R/O HF given pt on doxorubicin and PE given malignancy  #malignant pleural effusion  - IR for L thora tomorrow - NPO midnite, draw am labs, COVID swab  - CTA rules out PE  - Pts last echo 9/8/22 shows normal LV function, Ef=68% with mild concentric hypertrophy

## 2022-10-03 NOTE — PROGRESS NOTE ADULT - PROBLEM SELECTOR PLAN 4
Patient follows with Dr. Mirtha Celaya at [facility] [place], last chemo 9/14  Oncology consulted via email for management and role of inpt chemo  - Follow up onc recommendations.

## 2022-10-03 NOTE — REVIEW OF SYSTEMS
[Negative] : Allergic/Immunologic [Fatigue] : fatigue [Recent Change In Weight] : ~T recent weight change [Shortness Of Breath] : shortness of breath [Cough] : cough

## 2022-10-03 NOTE — PROGRESS NOTE ADULT - ATTENDING COMMENTS
57F w PMHX of uterine carcinoma (s/p hysterectomy [facility], 2020, ongoing chemotherapy (last session 9/14; known to Dr. Danny Celaya) s/p radiation, w/ hx of b/l malignant pleural effusions s/p R VATS 10/2021, PleurX catheter removed 3/22, and R thoracentesis by IR and L chest tube by thoracic surgery on prev admission 6/11/22, presenting for worsening SOB and found to have reaccumulated b/l pleural effusions.      #SOB: in the setting of likely malignant pleural effusion. Imaging results reviewed. CT SX and IR recs appreciated. Plan for L thoracentesis vs PTC by IR today    #Uterine cancer: f/u onc recs    Discussed with Team 7.

## 2022-10-03 NOTE — PROGRESS NOTE ADULT - PROBLEM SELECTOR PLAN 5
DVT ppx w lovenox  - pt noted to prev be on xarelto but per surescripts not picked up since July, clarify xarelto usage with patient DVT ppx w lovenox  - pt noted to prev be on xarelto but per surescripts not picked up since July, clarify xarelto usage with patient  - held iso IR procedure today

## 2022-10-03 NOTE — HISTORY OF PRESENT ILLNESS
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 Patient was seen initially by Dr. Arciniega at [place].\par She then saw Dr Robin Zamorano, at [facility] and had a repeat biopsy that showed clear cell carcinoma.\par On 4/20/2020 she underwent surgical staging and is post TLH/BSO/SLND/Omentectomy.\par Final pathology showed stage IA,high grade endometrial carcinoma involving predominantly clear cell and focally serous features, no invasion, no LVSI.\par \par Ct C/A/P with contrast done on 3/12/2020 showed no metastatic sites.\par She completed five cycles of CT 6/3/20- 9/2/20) Last cycle held due to toxicity and myelosuppression.She received vaginal brachytherapy to a total of 2100 cGy in 3 fractions. Treatment was delivered from 10/16/20- 10/23/20. \par Scan showed CINDY.\par \par 8/10/21: Surveillance scan showed New partially visualized small right pleural effusion. Correlation with dedicated chest CT is recommended. Few subcentimeter hepatic hypo densities, too small to characterize, but similar in appearance to prior. Small pelvic ascites, similar in appearance to prior.\par - 29( previously 15)\par \par 8/30/21: CT chest: Limited. No pulmonary embolus to level of the lobar pulmonary arteries. Interval increase in small right pleural effusion since 8/19/2021 with associated adjacent compressive atelectasis.Small perihepatic ascites.\par \par Patient was admitted to the hospital with SOB and pleural effusion. She had  thoracentesis and cytology was consistent with relapsed uterine cancer.\par \par Final Diagnosis\par PLEURAL FLUID, RIGHT\par POSITIVE FOR MALIGNANT CELLS.\par Metastatic adenocarcinoma\par \par Cytology slides and cell block reveal a hypercellular specimen\par composed of crowded 3-dimensional groups and single lying\par malignant cells with enlarged nuclei containing prominent\par nucleoli and vacuolated cytoplasm, among benign mesothelial\par cells.\par \par Immunohistochemical stains are performed on the cell block. The\par tumor cells are positive for PAX-8 (focal and weak), p16, napsin\par (focal) and p53 (overexpression), while they are negative for ER,\par WT-1 and TTF-1. In the absence of another primary, it is most\par consistent with involvement by patient's known endometrial\par adenocarcinoma. [de-identified] : Carboplatin and Taxol 6/3/20- 9/2/20( five cycles) [FreeTextEntry1] : Doxil\par C1 - 9/14/22\par  [de-identified] : Patient is here for follow up after her first cycle of chemo with Doxil 2 weeks ago.\par She complains of increased weakness, decreased appetite and weight loss, increased cough and SOB.\par She is unable to eat because every times she eats she starts coughing.\par Coughing is also worse at nights, gets very little relief from cough medicines.\par Denies fever, chills, abdominal pain, bloating.\par She has some constipation.

## 2022-10-03 NOTE — PROGRESS NOTE ADULT - ASSESSMENT
57F w PMHX of uterine carcinoma (s/p hysterectomy [facility], 2020, ongoing chemotherapy (last session 9/14; known to Dr. Danny Celaya) s/p radiation, w/ hx of b/l malignant pleural effusions s/p R VATS 10/2021, PleurX catheter removed 3/22, and R thoracentesis by IR and L chest tube by thoracic surgery on prev admission 6/11/22, presenting for worsening SOB and found to have reaccumulated b/l pleural effusions 57F w PMHX of uterine carcinoma (s/p hysterectomy [facility], 2020, ongoing chemotherapy (last session 9/14; known to Dr. Danny Celaya) s/p radiation, w/ hx of b/l malignant pleural effusions s/p R VATS 10/2021, PleurX catheter removed 3/22, and R thoracentesis by IR and L chest tube by thoracic surgery on prev admission 6/11/22, presenting for worsening SOB and found to have reaccumulated b/l pleural effusions. Plan at this time for IR procedure today involving either L thoracentesis or L pigtail catheter placement.

## 2022-10-03 NOTE — PROGRESS NOTE ADULT - PROBLEM SELECTOR PLAN 1
Likely secondary to pleural effusion, however should R/O HF given pt on doxorubicin and PE given malignancy  #malignant pleural effusion  - IR for L thora tomorrow - NPO midnite, draw am labs, COVID swab  - CTA rules out PE  - Pts last echo 9/8/22 shows normal LV function, Ef=68% with mild concentric hypertrophy Likely secondary to pleural effusion, however should R/O HF given pt on doxorubicin and PE given malignancy  #malignant pleural effusion  - Plan for IR thora/pigtail placement today, DVT prophylaxis held  - CTA rules out PE  - Pts last echo 9/8/22 shows normal LV function, Ef=68% with mild concentric hypertrophy  - c/w oxygen therapy, will add humidified O2 for comfort  - f/u IR/CTS recs

## 2022-10-03 NOTE — PROGRESS NOTE ADULT - SUBJECTIVE AND OBJECTIVE BOX
INTERVAL HPI/OVERNIGHT EVENTS:  Patient seen at bedside.      VITAL SIGNS:  T(F): 98.6 (10-03-22 @ 05:43)  HR: 113 (10-03-22 @ 05:43)  BP: 139/91 (10-03-22 @ 05:43)  RR: 19 (10-03-22 @ 05:43)  SpO2: 98% (10-03-22 @ 05:43)  Wt(kg): --    PHYSICAL EXAM:    Constitutional: NAD, resting in bed comfortably  Eyes: EOMI, sclera non-icteric  Neck: supple, no LAP  Respiratory: CTA b/l, good air entry b/l, no wheezing, rhonchi or crackels  Cardiovascular: RRR, normal S1S2, no M/R/G  Gastrointestinal: soft, NTND  Extremities: no edema  Neurological: AAOx3, non focal  Skin: Normal temperature    MEDICATIONS  (STANDING):  benzocaine 15 mG/menthol 3.6 mG Lozenge 1 Lozenge Oral two times a day  influenza   Vaccine 0.5 milliLiter(s) IntraMuscular once  magnesium oxide 400 milliGRAM(s) Oral three times a day with meals  polyethylene glycol 3350 17 Gram(s) Oral daily  senna 1 Tablet(s) Oral at bedtime    MEDICATIONS  (PRN):  acetaminophen     Tablet .. 650 milliGRAM(s) Oral every 6 hours PRN Mild Pain (1 - 3), Moderate Pain (4 - 6)  bisacodyl Suppository 10 milliGRAM(s) Rectal daily PRN Constipation  guaiFENesin Oral Liquid (Sugar-Free) 400 milliGRAM(s) Oral every 6 hours PRN Cough  hydrocodone/homatropine Syrup 5 milliLiter(s) Oral every 6 hours PRN Cough      Allergies    No Known Allergies    Intolerances        LABS:                        9.0    2.62  )-----------( 314      ( 03 Oct 2022 05:50 )             30.1     10-03    135  |  96<L>  |  8   ----------------------------<  120<H>  3.3<L>   |  26  |  0.77    Ca    9.8      03 Oct 2022 05:50  Phos  3.5     10-02  Mg     1.50     10-02    TPro  7.6  /  Alb  3.3  /  TBili  0.2  /  DBili  x   /  AST  13  /  ALT  <5  /  AlkPhos  93  10-03    PT/INR - ( 03 Oct 2022 05:50 )   PT: 14.5 sec;   INR: 1.25 ratio         PTT - ( 03 Oct 2022 05:50 )  PTT:30.2 sec      RADIOLOGY & ADDITIONAL TESTS:  Studies reviewed.   INTERVAL HPI/OVERNIGHT EVENTS:  Patient seen at bedside.  Feels SOB and anxious    VITAL SIGNS:  T(F): 98.6 (10-03-22 @ 05:43)  HR: 113 (10-03-22 @ 05:43)  BP: 139/91 (10-03-22 @ 05:43)  RR: 19 (10-03-22 @ 05:43)  SpO2: 98% (10-03-22 @ 05:43)  Wt(kg): --    PHYSICAL EXAM:    Constitutional: NAD, resting in bed comfortably  Eyes: EOMI, sclera non-icteric  Neck: supple, no LAP  Respiratory:  crackels  Cardiovascular: RRR, normal S1S2, no M/R/G  Gastrointestinal: soft, NTND  Extremities: no edema  Neurological: AAOx3, non focal  Skin: Normal temperature    MEDICATIONS  (STANDING):  benzocaine 15 mG/menthol 3.6 mG Lozenge 1 Lozenge Oral two times a day  influenza   Vaccine 0.5 milliLiter(s) IntraMuscular once  magnesium oxide 400 milliGRAM(s) Oral three times a day with meals  polyethylene glycol 3350 17 Gram(s) Oral daily  senna 1 Tablet(s) Oral at bedtime    MEDICATIONS  (PRN):  acetaminophen     Tablet .. 650 milliGRAM(s) Oral every 6 hours PRN Mild Pain (1 - 3), Moderate Pain (4 - 6)  bisacodyl Suppository 10 milliGRAM(s) Rectal daily PRN Constipation  guaiFENesin Oral Liquid (Sugar-Free) 400 milliGRAM(s) Oral every 6 hours PRN Cough  hydrocodone/homatropine Syrup 5 milliLiter(s) Oral every 6 hours PRN Cough      Allergies    No Known Allergies    Intolerances        LABS:                        9.0    2.62  )-----------( 314      ( 03 Oct 2022 05:50 )             30.1     10-03    135  |  96<L>  |  8   ----------------------------<  120<H>  3.3<L>   |  26  |  0.77    Ca    9.8      03 Oct 2022 05:50  Phos  3.5     10-02  Mg     1.50     10-02    TPro  7.6  /  Alb  3.3  /  TBili  0.2  /  DBili  x   /  AST  13  /  ALT  <5  /  AlkPhos  93  10-03    PT/INR - ( 03 Oct 2022 05:50 )   PT: 14.5 sec;   INR: 1.25 ratio         PTT - ( 03 Oct 2022 05:50 )  PTT:30.2 sec      RADIOLOGY & ADDITIONAL TESTS:  Studies reviewed.

## 2022-10-03 NOTE — PHYSICAL EXAM
[Normal] : affect appropriate [Ambulatory and capable of all self care but unable to carry out any work activities] : Status 2- Ambulatory and capable of all self care but unable to carry out any work activities. Up and about more than 50% of waking hours [de-identified] : appears weak [de-identified] : b/l decreased BS (left > right), tachypneic

## 2022-10-03 NOTE — ASSESSMENT
[Palliative] : Goals of care discussed with patient: Palliative [Palliative Care Plan] : not applicable at this time [FreeTextEntry1] : 57 year old woman with relapsed refractory uterine cancer on Doxil.\par She completed her first cycle and has many complaints today likely related mostly to the disease and pleural effusions.\par SOB/cough -sent patient for cxr then she presented back to clinic complaining of worsening symptoms and she was sent directly to [facility] ED via EMS for further management.\par Follow up upon discharge.

## 2022-10-03 NOTE — CHART NOTE - NSCHARTNOTEFT_GEN_A_CORE
Following 57y female with PMHx uterine CA and B/L malignant effusions   Dr Peters spoke directly with IR attending Dr Washington who will placed a left pigtail catheter either today or tomorrow  discussed plan with medical team  will follow    Pooja ROQUE [id number]

## 2022-10-03 NOTE — PROGRESS NOTE ADULT - SUBJECTIVE AND OBJECTIVE BOX
***************************************************************  Chivo Ferguson, PGY1  Internal Medicine   TEAMS Preferred  [facility] Pager [phone]  [facility] Pager [phone]  ***************************************************************    ALEXANDER HUDDLESTON  57y  MRN: [id number]  10-01-22 (2d)    Patient is a 57y old  Female who presents with a chief complaint of shortness of breath (02 Oct 2022 20:54)      SUBJECTIVE / OVERNIGHT EVENTS:   No acute overnight events. Pt seen and examined at bedside. Denies fevers, chills, CP, SOB, Abdominal pain, N/V, Constipation, Diarrhea. Last BM     12 Point ROS negative with the exception of the above    MEDICATIONS  (STANDING):  benzocaine 15 mG/menthol 3.6 mG Lozenge 1 Lozenge Oral two times a day  influenza   Vaccine 0.5 milliLiter(s) IntraMuscular once  magnesium oxide 400 milliGRAM(s) Oral three times a day with meals  polyethylene glycol 3350 17 Gram(s) Oral daily  senna 1 Tablet(s) Oral at bedtime    MEDICATIONS  (PRN):  acetaminophen     Tablet .. 650 milliGRAM(s) Oral every 6 hours PRN Mild Pain (1 - 3), Moderate Pain (4 - 6)  bisacodyl Suppository 10 milliGRAM(s) Rectal daily PRN Constipation  guaiFENesin Oral Liquid (Sugar-Free) 400 milliGRAM(s) Oral every 6 hours PRN Cough  hydrocodone/homatropine Syrup 5 milliLiter(s) Oral every 6 hours PRN Cough      OBJECTIVE:  Vital Signs Last 24 Hrs  T(C): 37 (03 Oct 2022 05:43), Max: 37.2 (02 Oct 2022 15:05)  T(F): 98.6 (03 Oct 2022 05:43), Max: 99 (02 Oct 2022 15:05)  HR: 113 (03 Oct 2022 05:43) (91 - 116)  BP: 139/91 (03 Oct 2022 05:43) (126/89 - 139/91)  BP(mean): --  RR: 19 (03 Oct 2022 05:43) (18 - 19)  SpO2: 98% (03 Oct 2022 05:43) (85% - 99%)    Parameters below as of 03 Oct 2022 05:43  Patient On (Oxygen Delivery Method): nasal cannula  O2 Flow (L/min): 2      I&O's Summary    01 Oct 2022 07:01  -  02 Oct 2022 07:00  --------------------------------------------------------  IN: 150 mL / OUT: 350 mL / NET: -200 mL    02 Oct 2022 07:01  -  03 Oct 2022 06:35  --------------------------------------------------------  IN: 0 mL / OUT: 300 mL / NET: -300 mL        PHYSICAL EXAM:  GENERAL: Laying comfortably, NAD  HEENT: NCAT, PERRLA, EOMI, no scleral icterus, no LAD  NECK: No JVD  LUNG: CTABL; No wheezes, crackles, or rhonchi  HEART: RRR; normal S1/S2; No murmurs, rubs, or gallops  ABDOMEN: +BS, soft, nontender, nondistended, no HSM; No rebound, guarding, or rigidity  EXTREMITIES:  No LE edema b/l, 2+ Peripheral Pulses, No clubbing or cyanosis  NEUROLOGY: AOx3, non-focal, strength 5/5 in all extremities, sensation intact  PSYCH: calm and cooperative  SKIN: No rashes or lesions    LABS:                        9.0    2.62  )-----------( 314      ( 03 Oct 2022 05:50 )             30.1     Auto Eosinophil # x     / Auto Eosinophil % x     / Auto Neutrophil # x     / Auto Neutrophil % x     / BANDS % x                            9.2    2.40  )-----------( 272      ( 02 Oct 2022 07:40 )             30.4     Auto Eosinophil # x     / Auto Eosinophil % x     / Auto Neutrophil # x     / Auto Neutrophil % x     / BANDS % x                            8.7    2.45  )-----------( 222      ( 01 Oct 2022 07:45 )             28.7     Auto Eosinophil # x     / Auto Eosinophil % x     / Auto Neutrophil # x     / Auto Neutrophil % x     / BANDS % x        10-02    140  |  100  |  7   ----------------------------<  120<H>  3.4<L>   |  28  |  0.76  10-01    139  |  99  |  8   ----------------------------<  105<H>  3.6   |  27  |  0.78  09-30    139  |  99  |  9   ----------------------------<  100<H>  3.6   |  27  |  0.77    Ca    9.8      02 Oct 2022 07:40  Ca    9.3      01 Oct 2022 07:45  Ca    9.4      30 Sep 2022 20:57  Phos  3.5     10-02  Mg     1.50     10-02    TPro  7.5  /  Alb  3.5  /  TBili  0.2  /  DBili  x   /  AST  9   /  ALT  6   /  AlkPhos  92  10-02  TPro  7.1  /  Alb  3.2<L>  /  TBili  0.3  /  DBili  x   /  AST  7   /  ALT  6   /  AlkPhos  84  10-01  TPro  7.1  /  Alb  3.3  /  TBili  0.3  /  DBili  x   /  AST  9   /  ALT  5   /  AlkPhos  94  09-30    PT/INR - ( 03 Oct 2022 05:50 )   PT: 14.5 sec;   INR: 1.25 ratio         PTT - ( 03 Oct 2022 05:50 )  PTT:30.2 sec            CAPILLARY BLOOD GLUCOSE            RADIOLOGY & ADDITIONAL TESTS:  CT Abdomen and Pelvis w/ IV Cont:   ACC: [id number] EXAM:  CT ABDOMEN AND PELVIS IC                        ACC: [id number] EXAM:  CT ANGIO CHEST PULM ART [facility]                          PROCEDURE DATE:  10/01/2022          INTERPRETATION:  CLINICAL INFORMATION: Acute shortness of breath,   tachypnea and new onset cough, metastatic uterine cancer on chemotherapy.   History of radiation and surgery.    COMPARISON: CT of the chest, abdomen, pelvis with IV contrast dated   8/23/2022.    CONTRAST/COMPLICATIONS:  IV Contrast: Omnipaque 350 63 cc administered   7 cc discarded  Oral Contrast: NONE  Complications: None reported at time of study completion    PROCEDURE:  CT Angiography of the Chest was performed followed by portal venous phase   imaging of the Abdomen and Pelvis.  Sagittal and coronal reformats were performed as well as 3D (MIP)   reconstructions.    FINDINGS:  CHEST:  LUNGS AND LARGE AIRWAYS: Compressive atelectasis in the lower lobes.  PLEURA: Loculated bilateral moderate voume pleural effusions, increased   bilaterally compared to the prior.  VESSELS: Pulmonary arteries are suboptimally opacified, which in   combination with respiratory motion artifact degrades evaluation of the   segmental and subsegmental branches. No central or lobar embolus. Main   pulmonary artery is normal in caliber.  No thoracic aortic aneurysm or dissection.  HEART: Heart size is normal. No pericardial effusion.  MEDIASTINUM AND DANE: No enlarged lymph node measuring greater than 10 mm   in short axis  CHEST WALL AND LOWER NECK: Right chest wall Mediport with tip in SVC.    ABDOMEN AND PELVIS:  LIVER: Subcentimeter low-density hepatic nodules, similar to the prior  BILE DUCTS: Unremarkable  GALLBLADDER: Unremarkable  SPLEEN: Unremarkable  PANCREAS: Unremarkable  ADRENALS: Unremarkable  KIDNEYS/URETERS: Unremarkable    BLADDER: Unremarkable  REPRODUCTIVE ORGANS: Hysterectomy. Complex cystic mass at the vaginal   cuff measures 3.8 x 2.7 x 2.5 cm, previously measured 4.3 x 2.1 x 2.6 cm.    BOWEL: No bowel obstruction. Appendixis normal.  PERITONEUM: Moderate volume 4 quadrant ascites, most prominent in the   pelvis. Areas of peritoneal thickening and nodularity, for example in the   left hemipelvis on axial series 3, image 106 in the left upper quadrant.  VESSELS: Normal caliber  RETROPERITONEUM/LYMPH NODES: No enlarged lymph node measuring greater   than 10 mm in short axis  ABDOMINAL WALL: Unremarkable  BONES: No destructive osseous lesion.    IMPRESSION:    1. No evidence of acute central pulmonary embolus. Suboptimal evaluation   of the segmental and subsegmental branches due to degraded contrast bolus   timing and respiratory motion artifact.  2. Increased, now moderate volume of the bilateral malignant pleural   effusions.  3. Peritoneal carcinomatosis, withstable volume of malignant ascites.  4. Grossly unchanged metastatic implant along the left vaginal cuff.  5. Indeterminate subcentimeter hepatic hypodensities, unchanged.        --- End of Report ---          MICAH VALDEZ DO; Resident Radiologist  This document has been electronically signed.  RIZWAN ROBERTSON MD; Attending Radiologist  This document has been electronically signed. Oct  1 2022  2:28AM (10-01-22 @ 00:56)     ***************************************************************  Chivo Ferguson, PGY1  Internal Medicine   TEAMS Preferred  [facility] Pager [phone]  [facility] Pager [phone]  ***************************************************************    ALEXANDER HUDDLESTON  57y  MRN: [id number]  10-01-22 (2d)    Patient is a 57y old  Female who presents with a chief complaint of shortness of breath (02 Oct 2022 20:54)      SUBJECTIVE / OVERNIGHT EVENTS:   No acute overnight events. Pt seen and examined at bedside. Reports mild throat pain overnight with a dry cough that she reports is secondary to the supplemental oxygen drying her airway. Reports continued shortness of breath on exertion and orthopnea. Patient is comfortable when sitting upright. Last Bm was yesterday evening.     12 Point ROS negative with the exception of the above    MEDICATIONS  (STANDING):  benzocaine 15 mG/menthol 3.6 mG Lozenge 1 Lozenge Oral two times a day  influenza   Vaccine 0.5 milliLiter(s) IntraMuscular once  magnesium oxide 400 milliGRAM(s) Oral three times a day with meals  polyethylene glycol 3350 17 Gram(s) Oral daily  senna 1 Tablet(s) Oral at bedtime    MEDICATIONS  (PRN):  acetaminophen     Tablet .. 650 milliGRAM(s) Oral every 6 hours PRN Mild Pain (1 - 3), Moderate Pain (4 - 6)  bisacodyl Suppository 10 milliGRAM(s) Rectal daily PRN Constipation  guaiFENesin Oral Liquid (Sugar-Free) 400 milliGRAM(s) Oral every 6 hours PRN Cough  hydrocodone/homatropine Syrup 5 milliLiter(s) Oral every 6 hours PRN Cough      OBJECTIVE:  Vital Signs Last 24 Hrs  T(C): 37 (03 Oct 2022 05:43), Max: 37.2 (02 Oct 2022 15:05)  T(F): 98.6 (03 Oct 2022 05:43), Max: 99 (02 Oct 2022 15:05)  HR: 113 (03 Oct 2022 05:43) (91 - 116)  BP: 139/91 (03 Oct 2022 05:43) (126/89 - 139/91)  BP(mean): --  RR: 19 (03 Oct 2022 05:43) (18 - 19)  SpO2: 98% (03 Oct 2022 05:43) (85% - 99%)    Parameters below as of 03 Oct 2022 05:43  Patient On (Oxygen Delivery Method): nasal cannula  O2 Flow (L/min): 2      I&O's Summary    01 Oct 2022 07:01  -  02 Oct 2022 07:00  --------------------------------------------------------  IN: 150 mL / OUT: 350 mL / NET: -200 mL    02 Oct 2022 07:01  -  03 Oct 2022 06:35  --------------------------------------------------------  IN: 0 mL / OUT: 300 mL / NET: -300 mL        PHYSICAL EXAM:  GENERAL: Laying comfortably, NAD  HEENT: NCAT, PERRLA, EOMI, no scleral icterus  LUNG: poor inspiratory effort, breathing comfortably on NC sitting upright, decreased lung sounds bilaterally  HEART: RRR; normal S1/S2; No murmurs, rubs, or gallops  ABDOMEN: +BS, soft, nontender, nondistended  EXTREMITIES:  No LE edema b/l, 2+ Peripheral Pulses  NEUROLOGY: AOx3, non-focal, strength 5/5 in all extremities, sensation intact  PSYCH: calm and cooperative  SKIN: No rashes or lesions    LABS:                        9.0    2.62  )-----------( 314      ( 03 Oct 2022 05:50 )             30.1     Auto Eosinophil # x     / Auto Eosinophil % x     / Auto Neutrophil # x     / Auto Neutrophil % x     / BANDS % x                            9.2    2.40  )-----------( 272      ( 02 Oct 2022 07:40 )             30.4     Auto Eosinophil # x     / Auto Eosinophil % x     / Auto Neutrophil # x     / Auto Neutrophil % x     / BANDS % x                            8.7    2.45  )-----------( 222      ( 01 Oct 2022 07:45 )             28.7     Auto Eosinophil # x     / Auto Eosinophil % x     / Auto Neutrophil # x     / Auto Neutrophil % x     / BANDS % x      10-03    135  |  96<L>  |  8   ----------------------------<  120<H>  3.3<L>   |  26  |  0.77    Ca    9.8      03 Oct 2022 05:50  Phos  3.5     10-02  Mg     1.50     10-02    TPro  7.6  /  Alb  3.3  /  TBili  0.2  /  DBili  x   /  AST  13  /  ALT  <5  /  AlkPhos  93  10-03    10-02    140  |  100  |  7   ----------------------------<  120<H>  3.4<L>   |  28  |  0.76  10-01    139  |  99  |  8   ----------------------------<  105<H>  3.6   |  27  |  0.78  09-30    139  |  99  |  9   ----------------------------<  100<H>  3.6   |  27  |  0.77    Ca    9.8      02 Oct 2022 07:40  Ca    9.3      01 Oct 2022 07:45  Ca    9.4      30 Sep 2022 20:57  Phos  3.5     10-02  Mg     1.50     10-02    TPro  7.5  /  Alb  3.5  /  TBili  0.2  /  DBili  x   /  AST  9   /  ALT  6   /  AlkPhos  92  10-02  TPro  7.1  /  Alb  3.2<L>  /  TBili  0.3  /  DBili  x   /  AST  7   /  ALT  6   /  AlkPhos  84  10-01  TPro  7.1  /  Alb  3.3  /  TBili  0.3  /  DBili  x   /  AST  9   /  ALT  5   /  AlkPhos  94  09-30    PT/INR - ( 03 Oct 2022 05:50 )   PT: 14.5 sec;   INR: 1.25 ratio         PTT - ( 03 Oct 2022 05:50 )  PTT:30.2 sec            CAPILLARY BLOOD GLUCOSE            RADIOLOGY & ADDITIONAL TESTS:  CT Abdomen and Pelvis w/ IV Cont:   ACC: [id number] EXAM:  CT ABDOMEN AND PELVIS IC                        ACC: [id number] EXAM:  CT ANGIO CHEST PULM ART [facility]                          PROCEDURE DATE:  10/01/2022          INTERPRETATION:  CLINICAL INFORMATION: Acute shortness of breath,   tachypnea and new onset cough, metastatic uterine cancer on chemotherapy.   History of radiation and surgery.    COMPARISON: CT of the chest, abdomen, pelvis with IV contrast dated   8/23/2022.    CONTRAST/COMPLICATIONS:  IV Contrast: Omnipaque 350 63 cc administered   7 cc discarded  Oral Contrast: NONE  Complications: None reported at time of study completion    PROCEDURE:  CT Angiography of the Chest was performed followed by portal venous phase   imaging of the Abdomen and Pelvis.  Sagittal and coronal reformats were performed as well as 3D (MIP)   reconstructions.    FINDINGS:  CHEST:  LUNGS AND LARGE AIRWAYS: Compressive atelectasis in the lower lobes.  PLEURA: Loculated bilateral moderate voume pleural effusions, increased   bilaterally compared to the prior.  VESSELS: Pulmonary arteries are suboptimally opacified, which in   combination with respiratory motion artifact degrades evaluation of the   segmental and subsegmental branches. No central or lobar embolus. Main   pulmonary artery is normal in caliber.  No thoracic aortic aneurysm or dissection.  HEART: Heart size is normal. No pericardial effusion.  MEDIASTINUM AND DANE: No enlarged lymph node measuring greater than 10 mm   in short axis  CHEST WALL AND LOWER NECK: Right chest wall Mediport with tip in SVC.    ABDOMEN AND PELVIS:  LIVER: Subcentimeter low-density hepatic nodules, similar to the prior  BILE DUCTS: Unremarkable  GALLBLADDER: Unremarkable  SPLEEN: Unremarkable  PANCREAS: Unremarkable  ADRENALS: Unremarkable  KIDNEYS/URETERS: Unremarkable    BLADDER: Unremarkable  REPRODUCTIVE ORGANS: Hysterectomy. Complex cystic mass at the vaginal   cuff measures 3.8 x 2.7 x 2.5 cm, previously measured 4.3 x 2.1 x 2.6 cm.    BOWEL: No bowel obstruction. Appendixis normal.  PERITONEUM: Moderate volume 4 quadrant ascites, most prominent in the   pelvis. Areas of peritoneal thickening and nodularity, for example in the   left hemipelvis on axial series 3, image 106 in the left upper quadrant.  VESSELS: Normal caliber  RETROPERITONEUM/LYMPH NODES: No enlarged lymph node measuring greater   than 10 mm in short axis  ABDOMINAL WALL: Unremarkable  BONES: No destructive osseous lesion.    IMPRESSION:    1. No evidence of acute central pulmonary embolus. Suboptimal evaluation   of the segmental and subsegmental branches due to degraded contrast bolus   timing and respiratory motion artifact.  2. Increased, now moderate volume of the bilateral malignant pleural   effusions.  3. Peritoneal carcinomatosis, withstable volume of malignant ascites.  4. Grossly unchanged metastatic implant along the left vaginal cuff.  5. Indeterminate subcentimeter hepatic hypodensities, unchanged.        --- End of Report ---          MICAH VALDEZ DO; Resident Radiologist  This document has been electronically signed.  RIZWAN ROBERTSON MD; Attending Radiologist  This document has been electronically signed. Oct  1 2022  2:28AM (10-01-22 @ 00:56)

## 2022-10-03 NOTE — PROGRESS NOTE ADULT - ASSESSMENT
57 yo F with a PMH papillary serous endometrial carcinoma (diagnosed 04/2020, s/p VINCENT-BSO and chemotherapy, most recently C1D1 Doxil 9/14/22) c/b lung/pleural and peritoneal metastasis with pleural effusions and chronic anemia who p/w progressively worsening SOB due to worsening bilateral pleural effusions.    With regards to Metastatic Uterine Carcinoma/Bilateral Pleural Effusions  Originally diagnosed with stage IA papillary serous endometrial carcinoma in 04/2020  S/p TLH/BSO/SLND/Omentectomy, followed by 5 cycles of Carbo/Taxol (06-09/2020) and vaginal brachytherapy (10/2020, 3 fractions)  developed metastatic disease in the pleura (R effusion, later bilateral) in 8/2021 s/p PleurX 10/2021 - 03/2022 with recurrence again in 06/2022  S/p Carbo/Taxol/Keytruda ( trial), 6 cycles, from 10/2021 - 02/2022, then maintenance Keytruda and added Lenvima 05/2022 due to POD, then stopped 06/2022  Switched to Bevacizumab 6/27/22 - 8/15/22 for 3 cycles with POD  Now on Doxil with C1D1 9/14/22    Symptoms/slightly worsening pleural effusions likely from underlying aggressive malignancy    -Plan is for thoracentesis / pleurX placement by IR  -No plan for inpatient chemotherapy, next Doxil due 10/12/22  -Supportive care, pain control, Nutrition, PT, DVT ppx  -Outpatient oncology f/u    Will follow. Please do not hesitate to call back with questions.     Marilynn Watson MD  Medical Oncology Attending  C: [phone]     time spent on direct patient care, interdisciplinary discussions and chart review.

## 2022-10-03 NOTE — PROGRESS NOTE ADULT - PROBLEM SELECTOR PLAN 2
Patient has known history of malignant b/l pleural effusions  - Patient had prior IR drainage in R lungs early in June and L chest tube as well   - IR  for therapeutic thora  - continue supportive care with cough meds, monitor pulse ox  - Dr Peters thorac sx informed of plan for thora Patient has known history of malignant b/l pleural effusions  - Patient had prior IR drainage in R lungs early in June and L chest tube as well   - IR  for therapeutic thora/pigtail placement today  - continue supportive care with cough meds, monitor pulse ox  - Dr Peters thorac sx informed of plan for thora

## 2022-10-04 NOTE — PRE PROCEDURE NOTE - HISTORY OF PRESENT ILLNESS
Interventional Radiology  Pre-Procedure Note    This is a 57y  Female  presenting for left chest tube insertion    HPI:  56-year-old female with a PMHx of uterine cancer (S/p chemotherapy, radiation and hysterectomy) w/ mets to the lungs and peritoneal carcinomatosis (on doxorubicin w/ Brianna, last session 9/14/22), pleural effusions and chronic anemia is px to the ED w progressively worsening SOB over the past week. Pt originally dx in 2020 w uterine cancer. 4/2020 she underwent TLH/BSO/SLND/Omentectomy. In 8/2021 she was found to have a R pleural effusion and was found to have relapsed uterine cancer. For her malignant pleural effusions, she recieved R VATs 10/2021 w/ removal of pleurx catheter in 3/22 and R thoracentesis by IR and L chest tube by thoracic surg in 6/11/22. Pt states she recently had a paracentesis in august. Today the patients SOB is worse on exertion, she has also had a cough for 2 weeks that causes musculoskeletal chest pain and limits her ability to sleep. She denies chest pain with exertion. Her outpt CXR showed reaccumulation of pleural effusion. She denies hemoptysis, N/V/D, fevers, chills, abdominal pain, dysuria, hematuria, melena or hematemesis.     On arrival to the ED patients vitals were 102/89, tachycardic to 111, RR 24, satting 99% on RA.    In the ED the pt received tessalon perle, mucinex and hydromet for cough.    (01 Oct 2022 05:36)      PAST MEDICAL & SURGICAL HISTORY:  Uterine cancer  S/p chemotherapy, radiation and hysterectomy      Malignant pleural effusion  with hx loculation on R      Asthma      Chronic anemia      S/P hysterectomy  2020 [facility]/BSO      H/O ovarian cystectomy  2009      History of thoracentesis  R VATS 10/21          Social History:     FAMILY HISTORY:  Maternal family history of hypertension (Mother)    Family history of type 2 diabetes mellitus (Father)        Allergies: No Known Allergies      Current Medications: acetaminophen     Tablet .. 650 milliGRAM(s) Oral every 6 hours PRN  benzocaine 15 mG/menthol 3.6 mG Lozenge 1 Lozenge Oral two times a day  bisacodyl Suppository 10 milliGRAM(s) Rectal daily PRN  chlorhexidine 2% Cloths 1 Application(s) Topical daily  guaiFENesin Oral Liquid (Sugar-Free) 400 milliGRAM(s) Oral every 6 hours PRN  hydrocodone/homatropine Syrup 5 milliLiter(s) Oral every 6 hours PRN  influenza   Vaccine 0.5 milliLiter(s) IntraMuscular once  magnesium oxide 400 milliGRAM(s) Oral three times a day with meals  magnesium sulfate  IVPB 2 Gram(s) IV Intermittent once  polyethylene glycol 3350 17 Gram(s) Oral daily  senna 1 Tablet(s) Oral at bedtime      Labs:                         8.3    2.22  )-----------( 315      ( 04 Oct 2022 07:28 )             27.5       10-04    138  |  101  |  6<L>  ----------------------------<  88  4.1   |  28  |  0.75    Ca    9.4      04 Oct 2022 07:28  Phos  3.5     10-04  Mg     1.50     10-04    TPro  6.8  /  Alb  2.8<L>  /  TBili  0.2  /  DBili  x   /  AST  9   /  ALT  8   /  AlkPhos  80  10-04    Radiology:  < from: CT Angio Chest PE Protocol w/ IV Cont (10.01.22 @ 00:56) >    ACC: [id number] EXAM:  CT ABDOMEN AND PELVIS IC                        ACC: [id number] EXAM:  CT ANGIO CHEST PULM [facility]                          PROCEDURE DATE:  10/01/2022          INTERPRETATION:  CLINICAL INFORMATION: Acute shortness of breath,   tachypnea and new onset cough, metastatic uterine cancer on chemotherapy.   History of radiation and surgery.    COMPARISON: CT of the chest, abdomen, pelvis with IV contrast dated   8/23/2022.    CONTRAST/COMPLICATIONS:  IV Contrast: Omnipaque 350 63 cc administered   7 cc discarded  Oral Contrast: NONE  Complications: None reported at time of study completion    PROCEDURE:  CT Angiography of the Chest was performed followed by portal venous phase   imaging of the Abdomen and Pelvis.  Sagittal and coronal reformats were performed as well as 3D (MIP)   reconstructions.    FINDINGS:  CHEST:  LUNGS AND LARGE AIRWAYS: Compressive atelectasis in the lower lobes.  PLEURA: Loculated bilateral moderate voume pleural effusions, increased   bilaterally compared to the prior.  VESSELS: Pulmonary arteries are suboptimally opacified, which in   combination with respiratory motion artifact degrades evaluation of the   segmental and subsegmental branches. No central or lobar embolus. Main   pulmonary artery is normal in caliber.  No thoracic aortic aneurysm or dissection.  HEART: Heart size is normal. No pericardial effusion.  MEDIASTINUM AND DANE: No enlarged lymph node measuring greater than 10 mm   in short axis  CHEST WALL AND LOWER NECK: Right chest wall Mediport with tip in SVC.    ABDOMEN AND PELVIS:  LIVER: Subcentimeter low-density hepatic nodules, similar to the prior  BILE DUCTS: Unremarkable  GALLBLADDER: Unremarkable  SPLEEN: Unremarkable  PANCREAS: Unremarkable  ADRENALS: Unremarkable  KIDNEYS/URETERS: Unremarkable    BLADDER: Unremarkable  REPRODUCTIVE ORGANS: Hysterectomy. Complex cystic mass at the vaginal   cuff measures 3.8 x 2.7 x 2.5 cm, previously measured 4.3 x 2.1 x 2.6 cm.    BOWEL: No bowel obstruction. Appendixis normal.  PERITONEUM: Moderate volume 4 quadrant ascites, most prominent in the   pelvis. Areas of peritoneal thickening and nodularity, for example in the   left hemipelvis on axial series 3, image 106 in the left upper quadrant.  VESSELS: Normal caliber  RETROPERITONEUM/LYMPH NODES: No enlarged lymph node measuring greater   than 10 mm in short axis  ABDOMINAL WALL: Unremarkable  BONES: No destructive osseous lesion.    IMPRESSION:    1. No evidence of acute central pulmonary embolus. Suboptimal evaluation   of the segmental and subsegmental branches due to degraded contrast bolus   timing and respiratory motion artifact.  2. Increased, now moderate volume of the bilateral malignant pleural   effusions.  3. Peritoneal carcinomatosis, withstable volume of malignant ascites.  4. Grossly unchanged metastatic implant along the left vaginal cuff.  5. Indeterminate subcentimeter hepatic hypodensities, unchanged.        --- End of Report ---          MICAH VALDEZ DO; Resident Radiologist  This document has been electronically signed.  RIZWAN ROBERTSON MD; Attending Radiologist  This document has been electronically signed. Oct  1 2022  2:28AM    < end of copied text >      Assessment/Plan:   This is a 57y Female  presents with shortness of breath and bilateral pleural effusions. Case discussed with Dr Peters. Pt requires left pleural drainage. Pigtail catheter requested for decompression  Patient presents to IR for left chest tube placement.  Procedure/ risks/ benefits/ goals/ alternatives were explained. All questions answered. Informed content obtained from patient. Consent placed in chart.

## 2022-10-04 NOTE — PROGRESS NOTE ADULT - ATTENDING COMMENTS
57F w PMHX of uterine carcinoma (s/p hysterectomy VINCENT BSO, 2020, ongoing chemotherapy (last session 9/14; known to Dr. Danny Celaya) s/p radiation, w/ hx of b/l malignant pleural effusions s/p R VATS 10/2021, PleurX catheter removed 3/22, and R thoracentesis by IR and L chest tube by thoracic surgery on prev admission 6/11/22, presenting for worsening SOB and found to have reaccumulated b/l pleural effusions.      #SOB: in the setting of likely malignant pleural effusion. Imaging results reviewed. CT SX and IR recs appreciated. s/p L thoracentesis with chest tube. f/u IR and CTS recs. Monitor output. Pain control. Wean O2 as tolerated     #Uterine cancer: f/u onc recs    Discussed with Team 7.

## 2022-10-04 NOTE — PROGRESS NOTE ADULT - SUBJECTIVE AND OBJECTIVE BOX
***************************************************************  Chivo Ferguson, PGY1  Internal Medicine   TEAMS Preferred  [facility] Pager [phone]  [facility] Pager [phone]  ***************************************************************    ALEXANDER HUDDLESTON  57y  MRN: [id number]  10-01-22 (3d)    Patient is a 57y old  Female who presents with a chief complaint of shortness of breath (03 Oct 2022 12:57)      SUBJECTIVE / OVERNIGHT EVENTS:   No acute overnight events. Pt seen and examined at bedside. Denies fevers, chills, CP, SOB, Abdominal pain, N/V, Constipation, Diarrhea. Last BM     12 Point ROS negative with the exception of the above    MEDICATIONS  (STANDING):  benzocaine 15 mG/menthol 3.6 mG Lozenge 1 Lozenge Oral two times a day  chlorhexidine 2% Cloths 1 Application(s) Topical daily  influenza   Vaccine 0.5 milliLiter(s) IntraMuscular once  magnesium oxide 400 milliGRAM(s) Oral three times a day with meals  polyethylene glycol 3350 17 Gram(s) Oral daily  senna 1 Tablet(s) Oral at bedtime    MEDICATIONS  (PRN):  acetaminophen     Tablet .. 650 milliGRAM(s) Oral every 6 hours PRN Mild Pain (1 - 3), Moderate Pain (4 - 6)  bisacodyl Suppository 10 milliGRAM(s) Rectal daily PRN Constipation  guaiFENesin Oral Liquid (Sugar-Free) 400 milliGRAM(s) Oral every 6 hours PRN Cough  hydrocodone/homatropine Syrup 5 milliLiter(s) Oral every 6 hours PRN Cough      OBJECTIVE:  Vital Signs Last 24 Hrs  T(C): 37 (03 Oct 2022 21:55), Max: 37 (03 Oct 2022 14:29)  T(F): 98.6 (03 Oct 2022 21:55), Max: 98.6 (03 Oct 2022 14:29)  HR: 117 (03 Oct 2022 21:55) (106 - 117)  BP: 131/91 (03 Oct 2022 21:55) (131/91 - 141/94)  BP(mean): --  RR: 18 (03 Oct 2022 21:55) (18 - 18)  SpO2: 100% (03 Oct 2022 21:55) (100% - 100%)    Parameters below as of 03 Oct 2022 21:55  Patient On (Oxygen Delivery Method): nasal cannula  O2 Flow (L/min): 2      I&O's Summary    03 Oct 2022 07:01  -  04 Oct 2022 07:00  --------------------------------------------------------  IN: 800 mL / OUT: 0 mL / NET: 800 mL        PHYSICAL EXAM:  GENERAL: Laying comfortably, NAD  HEENT: NCAT, PERRLA, EOMI, no scleral icterus, no LAD  NECK: No JVD  LUNG: CTABL; No wheezes, crackles, or rhonchi  HEART: RRR; normal S1/S2; No murmurs, rubs, or gallops  ABDOMEN: +BS, soft, nontender, nondistended, no HSM; No rebound, guarding, or rigidity  EXTREMITIES:  No LE edema b/l, 2+ Peripheral Pulses, No clubbing or cyanosis  NEUROLOGY: AOx3, non-focal, strength 5/5 in all extremities, sensation intact  PSYCH: calm and cooperative  SKIN: No rashes or lesions    LABS:                        9.0    2.62  )-----------( 314      ( 03 Oct 2022 05:50 )             30.1     Auto Eosinophil # x     / Auto Eosinophil % x     / Auto Neutrophil # x     / Auto Neutrophil % x     / BANDS % x                            9.2    2.40  )-----------( 272      ( 02 Oct 2022 07:40 )             30.4     Auto Eosinophil # x     / Auto Eosinophil % x     / Auto Neutrophil # x     / Auto Neutrophil % x     / BANDS % x        10-03    135  |  96<L>  |  8   ----------------------------<  120<H>  3.3<L>   |  26  |  0.77  10-02    140  |  100  |  7   ----------------------------<  120<H>  3.4<L>   |  28  |  0.76  10-01    139  |  99  |  8   ----------------------------<  105<H>  3.6   |  27  |  0.78    Ca    9.8      03 Oct 2022 05:50  Ca    9.8      02 Oct 2022 07:40  Ca    9.3      01 Oct 2022 07:45  Phos  3.5     10-02  Mg     1.50     10-02    TPro  7.6  /  Alb  3.3  /  TBili  0.2  /  DBili  x   /  AST  13  /  ALT  <5  /  AlkPhos  93  10-03  TPro  7.5  /  Alb  3.5  /  TBili  0.2  /  DBili  x   /  AST  9   /  ALT  6   /  AlkPhos  92  10-02  TPro  7.1  /  Alb  3.2<L>  /  TBili  0.3  /  DBili  x   /  AST  7   /  ALT  6   /  AlkPhos  84  10-01    PT/INR - ( 03 Oct 2022 05:50 )   PT: 14.5 sec;   INR: 1.25 ratio         PTT - ( 03 Oct 2022 05:50 )  PTT:30.2 sec            CAPILLARY BLOOD GLUCOSE            RADIOLOGY & ADDITIONAL TESTS:     ***************************************************************  Chivo Ferguson, PGY1  Internal Medicine   TEAMS Preferred  [facility] Pager [phone]  [facility] Pager [phone]  ***************************************************************    ALEXANDER HUDDLESTON  57y  MRN: [id number]  10-01-22 (3d)    Patient is a 57y old  Female who presents with a chief complaint of shortness of breath (03 Oct 2022 12:57)      SUBJECTIVE / OVERNIGHT EVENTS:   No acute overnight events. Pt seen and examined at bedside. Pt had chest tube placement by IR this am. Patient seen following IR procedure and complaining of severe pain at the site. Denies fevers, chills, nausea, vomiting, and chest pain.     12 Point ROS negative with the exception of the above    MEDICATIONS  (STANDING):  benzocaine 15 mG/menthol 3.6 mG Lozenge 1 Lozenge Oral two times a day  chlorhexidine 2% Cloths 1 Application(s) Topical daily  influenza   Vaccine 0.5 milliLiter(s) IntraMuscular once  magnesium oxide 400 milliGRAM(s) Oral three times a day with meals  polyethylene glycol 3350 17 Gram(s) Oral daily  senna 1 Tablet(s) Oral at bedtime    MEDICATIONS  (PRN):  acetaminophen     Tablet .. 650 milliGRAM(s) Oral every 6 hours PRN Mild Pain (1 - 3), Moderate Pain (4 - 6)  bisacodyl Suppository 10 milliGRAM(s) Rectal daily PRN Constipation  guaiFENesin Oral Liquid (Sugar-Free) 400 milliGRAM(s) Oral every 6 hours PRN Cough  hydrocodone/homatropine Syrup 5 milliLiter(s) Oral every 6 hours PRN Cough      OBJECTIVE:  Vital Signs Last 24 Hrs  T(C): 37 (03 Oct 2022 21:55), Max: 37 (03 Oct 2022 14:29)  T(F): 98.6 (03 Oct 2022 21:55), Max: 98.6 (03 Oct 2022 14:29)  HR: 117 (03 Oct 2022 21:55) (106 - 117)  BP: 131/91 (03 Oct 2022 21:55) (131/91 - 141/94)  BP(mean): --  RR: 18 (03 Oct 2022 21:55) (18 - 18)  SpO2: 100% (03 Oct 2022 21:55) (100% - 100%)    Parameters below as of 03 Oct 2022 21:55  Patient On (Oxygen Delivery Method): nasal cannula  O2 Flow (L/min): 2      I&O's Summary    03 Oct 2022 07:01  -  04 Oct 2022 07:00  --------------------------------------------------------  IN: 800 mL / OUT: 0 mL / NET: 800 mL      PHYSICAL EXAM:  GENERAL: uncomfortable 2/2 pain  HEENT: NCAT, PERRLA, EOMI, no scleral icterus  LUNG: poor inspiratory effort, decreased lung sounds bilaterally  HEART: RRR; normal S1/S2; No murmurs, rubs, or gallops  ABDOMEN: +BS, soft, nontender, nondistended  BACK: chest tube in place, draining red serosanguinous fluid  EXTREMITIES:  No LE edema b/l, 2+ Peripheral Pulses  NEUROLOGY: AOx3, non-focal, strength 5/5 in all extremities, sensation intact  PSYCH: anxious  SKIN: No rashes or lesions    LABS:                         8.3    2.22  )-----------( 315      ( 04 Oct 2022 07:28 )             27.5                        9.0    2.62  )-----------( 314      ( 03 Oct 2022 05:50 )             30.1     Auto Eosinophil # x     / Auto Eosinophil % x     / Auto Neutrophil # x     / Auto Neutrophil % x     / BANDS % x                            9.2    2.40  )-----------( 272      ( 02 Oct 2022 07:40 )             30.4     Auto Eosinophil # x     / Auto Eosinophil % x     / Auto Neutrophil # x     / Auto Neutrophil % x     / BANDS % x      10-04    138  |  101  |  6<L>  ----------------------------<  88  4.1   |  28  |  0.75    Ca    9.4      04 Oct 2022 07:28  Phos  3.5     10-04  Mg     1.50     10-04    TPro  6.8  /  Alb  2.8<L>  /  TBili  0.2  /  DBili  x   /  AST  9   /  ALT  8   /  AlkPhos  80  10-04    10-03    135  |  96<L>  |  8   ----------------------------<  120<H>  3.3<L>   |  26  |  0.77  10-02    140  |  100  |  7   ----------------------------<  120<H>  3.4<L>   |  28  |  0.76  10-01    139  |  99  |  8   ----------------------------<  105<H>  3.6   |  27  |  0.78    Ca    9.8      03 Oct 2022 05:50  Ca    9.8      02 Oct 2022 07:40  Ca    9.3      01 Oct 2022 07:45  Phos  3.5     10-02  Mg     1.50     10-02    TPro  7.6  /  Alb  3.3  /  TBili  0.2  /  DBili  x   /  AST  13  /  ALT  <5  /  AlkPhos  93  10-03  TPro  7.5  /  Alb  3.5  /  TBili  0.2  /  DBili  x   /  AST  9   /  ALT  6   /  AlkPhos  92  10-02  TPro  7.1  /  Alb  3.2<L>  /  TBili  0.3  /  DBili  x   /  AST  7   /  ALT  6   /  AlkPhos  84  10-01    PT/INR - ( 03 Oct 2022 05:50 )   PT: 14.5 sec;   INR: 1.25 ratio         PTT - ( 03 Oct 2022 05:50 )  PTT:30.2 sec

## 2022-10-04 NOTE — PROGRESS NOTE ADULT - PROBLEM SELECTOR PLAN 5
DVT ppx w lovenox  - pt noted to prev be on xarelto but per surescripts not picked up since July, clarify xarelto usage with patient  - held iso IR procedure today Patient follows with Dr. Mirtha eClaya at [facility], last chemo 9/14  Oncology consulted via email for management and role of inpt chemo  - Follow up onc recommendations.

## 2022-10-04 NOTE — PROGRESS NOTE ADULT - PROBLEM SELECTOR PLAN 3
stable volume of malignant ascites.  - IR: no abdominal drainage indicated Been in sinus tachycardia throughout stay  - likely 2/2 pain, anxiety, and discomfort, low c/f infection  - EKG 9/30 sinus tachycardia

## 2022-10-04 NOTE — PROCEDURE NOTE - PROCEDURE FINDINGS AND DETAILS
CT guided left chest tube insertion. 10 Fr drain placed. Appx 70cc robb slightly cloudy fluid aspirated at time of procedure. Samples sent for cyto, micro, chemistry. Drain placed to Pleur Evac. Drain should be connected to low suction on floor. monitor output.

## 2022-10-04 NOTE — PROGRESS NOTE ADULT - ASSESSMENT
57F w PMHX of uterine carcinoma (s/p hysterectomy [facility] BSO, 2020, ongoing chemotherapy (last session 9/14; known to Dr. Danny Celaya) s/p radiation, w/ hx of b/l malignant pleural effusions s/p R VATS 10/2021, PleurX catheter removed 3/22, and R thoracentesis by IR and L chest tube by thoracic surgery on prev admission 6/11/22, presenting for worsening SOB and found to have reaccumulated b/l pleural effusions. Plan at this time for IR procedure today involving either L thoracentesis or L pigtail catheter placement.

## 2022-10-04 NOTE — PROGRESS NOTE ADULT - PROBLEM SELECTOR PLAN 6
DVT ppx w lovenox  - pt noted to prev be on xarelto but per surescripts not picked up since July, clarify xarelto usage with patient  - held iso IR procedure today

## 2022-10-04 NOTE — PROGRESS NOTE ADULT - PROBLEM SELECTOR PLAN 4
[place] [facility]14  Oncology consulted via email for management and role of inpt chemo  - Follow up onc recommendations. stable volume of malignant ascites.  - IR: no abdominal drainage indicated

## 2022-10-04 NOTE — PROGRESS NOTE ADULT - PROBLEM SELECTOR PLAN 2
Patient has known history of malignant b/l pleural effusions  - Patient had prior IR drainage in R lungs early in June and L chest tube as well   - IR  for therapeutic thora/pigtail placement today  - continue supportive care with cough meds, monitor pulse ox  - Dr Peters thorac sx informed of plan for thora Patient has known history of malignant b/l pleural effusions  - Patient had prior IR drainage in R lungs early in June and L chest tube as well   - now s/p chest tube placement by IR 10/4  - continue supportive care with cough meds, monitor pulse ox

## 2022-10-04 NOTE — PROGRESS NOTE ADULT - PROBLEM SELECTOR PLAN 1
Likely secondary to pleural effusion, however should R/O HF given pt on doxorubicin and PE given malignancy  #malignant pleural effusion  - Plan for IR thora/pigtail placement today, DVT prophylaxis held  - CTA rules out PE  - Pts last echo 9/8/22 shows normal LV function, Ef=68% with mild concentric hypertrophy  - c/w oxygen therapy, will add humidified O2 for comfort  - f/u IR/CTS recs Likely secondary to pleural effusion, however should R/O HF given pt on doxorubicin and PE given malignancy  #malignant pleural effusion  - Plan for IR thora/pigtail placement today, DVT prophylaxis held  - CTA rules out PE  - Pts last echo 9/8/22 shows normal LV function, Ef=68% with mild concentric hypertrophy  - c/w oxygen therapy, will add humidified O2 for comfort  - now s/p chest tube placement by IR 10/4  - f/u IR/CTS recs

## 2022-10-05 NOTE — PROGRESS NOTE ADULT - ASSESSMENT
57y Female with pleural effusion s/p left chest tube placement on 10/4 in Interventional Radiology.       Plan:  - Continue drainage  - Monitor output      [id number]

## 2022-10-05 NOTE — PROGRESS NOTE ADULT - SUBJECTIVE AND OBJECTIVE BOX
57y Female seen at bedside, no acute events overnight. Patient c/o pain from new drain placed yesterday as well as cough. Otherwise feels like her breathing has improved since drainage, aside from pain. Left PTC in place to SXN. No AL noted.    24hr Events: Left PTC placed by IR -70cc drained initially, now 330 output since placement.       HPI: 56-year-old female with a PMHx of uterine cancer (S/p chemotherapy, radiation and hysterectomy) w/ mets to the lungs and peritoneal carcinomatosis (on doxorubicin w/ Brianna, last session 9/14/22), pleural effusions and chronic anemia is px to the ED w progressively worsening SOB over the past week. Pt originally dx in 2020 w uterine cancer. 4/2020 she underwent TLH/BSO/SLND/Omentectomy. In 8/2021 she was found to have a R pleural effusion and was found to have relapsed uterine cancer. For her malignant pleural effusions, she recieved R VATs 10/2021 w/ removal of pleurx catheter in 3/22 and R thoracentesis by IR and L chest tube by thoracic surg in 6/11/22. Pt states she recently had a paracentesis in august. Today the patients SOB is worse on exertion, she has also had a cough for 2 weeks that causes musculoskeletal chest pain and limits her ability to sleep. She denies chest pain with exertion. Her outpt CXR showed reaccumulation of pleural effusion. She denies hemoptysis, N/V/D, fevers, chills, abdominal pain, dysuria, hematuria, melena or hematemesis.     On arrival to the ED patients vitals were 102/89, tachycardic to 111, RR 24, satting 99% on RA.    In the ED the pt received tessalon perle, mucinex and hydromet for cough.    (01 Oct 2022 05:36)      Vital Signs:  Vital Signs Last 24 Hrs  T(C): 37.2 (10-05-22 @ 05:58), Max: 37.2 (10-04-22 @ 14:05)  T(F): 99 (10-05-22 @ 05:58), Max: 99 (10-04-22 @ 14:05)  HR: 109 (10-05-22 @ 05:58) (108 - 110)  BP: 126/88 (10-05-22 @ 05:58) (122/90 - 133/88)  RR: 20 (10-05-22 @ 05:58) (18 - 20)  SpO2: 95% (10-05-22 @ 05:58) (95% - 99%) on (O2)    Telemetry/Alarms:    Chest Tube:     L      Output:   400total                Air Leaks:    []Yes    [xNo    Relevant labs, Radiology and Medications reviewed    CXR: Left PTC in place  Pending official read.     Pertinent Physical Exam  Gen: WN/WD, NAD  Pulm: No wheezing, No muscle retractions    Assessment  56-year-old female with a PMHx of uterine cancer (S/p chemotherapy, radiation and hysterectomy) w/ mets to the lungs (s/p RVATS, PleurX in 10/21, removed 3/22) and peritoneal carcinomatosis presents to [facility] for worsening cough and SOB x1-2 weeks. CT chest found b/l moderate sized effusions. IR consulted for drainage of effusion, planning on draining Left side next week. Thoracic c/s as patient is known patient of Dr. Peters's and pt requesting Dr. Peters's approval prior to procedure    10/5 POD 1 from IR left ptc placement for pleural effusion     Plan  Continue care per primary team  CXR in AM   Maintain PTC to SXN  Record drainage   Pain management   F/U pleural fluid sent for chemistry, Fluid sent for cytology, Fluid sent for gram stain and culture  Discussed at AM rounds     Thoracic   [id number]

## 2022-10-05 NOTE — PROGRESS NOTE ADULT - ATTENDING COMMENTS
57F w PMHX of uterine carcinoma (s/p hysterectomy VINCENT BSO, 2020, ongoing chemotherapy (last session 9/14; known to Dr. Danny Celaya) s/p radiation, w/ hx of b/l malignant pleural effusions s/p R VATS 10/2021, PleurX catheter removed 3/22, and R thoracentesis by IR and L chest tube by thoracic surgery on prev admission 6/11/22, presenting for worsening SOB and found to have reaccumulated b/l pleural effusions.      #SOB: in the setting of likely malignant pleural effusion. Imaging results reviewed. CT SX and IR recs appreciated. s/p L thoracentesis with chest tube. f/u IR and CTS recs. Monitor output. Pain control. Wean O2 as tolerated.    #Uterine cancer: f/u onc recs    Discussed with Team 7.

## 2022-10-05 NOTE — PROGRESS NOTE ADULT - SUBJECTIVE AND OBJECTIVE BOX
57y Female s/p left chest tube placement on 10/4 in Interventional Radiology.   Patient seen and examined bedside resting comfortably. No complaints offered.    T(F): 99 (10-05-22 @ 05:58), Max: 99 (10-04-22 @ 14:05)  HR: 109 (10-05-22 @ 05:58) (108 - 110)  BP: 126/88 (10-05-22 @ 05:58) (122/90 - 133/88)  RR: 20 (10-05-22 @ 05:58) (18 - 20)  SpO2: 95% (10-05-22 @ 05:58) (95% - 99%)  Wt(kg): --    LABS:                        8.9    2.63  )-----------( 370      ( 05 Oct 2022 06:00 )             29.5     10-05    138  |  100  |  8   ----------------------------<  97  4.0   |  25  |  0.77    Ca    9.6      05 Oct 2022 06:00  Phos  3.0     10-05  Mg     1.80     10-05    TPro  7.2  /  Alb  3.1<L>  /  TBili  <0.2  /  DBili  x   /  AST  12  /  ALT  <5<L>  /  AlkPhos  83  10-05    PT/INR - ( 05 Oct 2022 06:00 )   PT: 14.3 sec;   INR: 1.23 ratio         PTT - ( 05 Oct 2022 06:00 )  PTT:31.7 sec  I&O's Detail    04 Oct 2022 07:01  -  05 Oct 2022 07:00  --------------------------------------------------------  IN:    IV PiggyBack: 50 mL  Total IN: 50 mL    OUT:    Chest Tube (mL): 350 mL    Voided (mL): 300 mL  Total OUT: 650 mL    Total NET: -600 mL            PHYSICAL EXAM:  General: Nontoxic, in NAD  Chest Tube: Dressing c/d/i

## 2022-10-05 NOTE — PROGRESS NOTE ADULT - PROBLEM SELECTOR PLAN 3
Been in sinus tachycardia throughout stay  - likely 2/2 pain, anxiety, and discomfort, low c/f infection  - EKG 9/30 sinus tachycardia

## 2022-10-05 NOTE — PROGRESS NOTE ADULT - PROBLEM SELECTOR PLAN 5
Patient follows with Dr. Mirtha Celaya at [facility] [place], last chemo 9/14  Oncology consulted via email for management and role of inpt chemo  - Follow up onc recommendations.

## 2022-10-05 NOTE — PROGRESS NOTE ADULT - PROBLEM SELECTOR PLAN 2
Patient has known history of malignant b/l pleural effusions  - Patient had prior IR drainage in R lungs early in June and L chest tube as well   - now s/p chest tube placement by IR 10/4  - continue supportive care with cough meds, monitor pulse ox

## 2022-10-05 NOTE — PROGRESS NOTE ADULT - PROBLEM SELECTOR PLAN 1
Likely secondary to pleural effusion, however should R/O HF given pt on doxorubicin and PE given malignancy  #malignant pleural effusion  - Plan for IR thora/pigtail placement today, DVT prophylaxis held  - CTA rules out PE  - Pts last echo 9/8/22 shows normal LV function, Ef=68% with mild concentric hypertrophy  - c/w oxygen therapy, will add humidified O2 for comfort  - now s/p chest tube placement by IR 10/4  - f/u IR/CTS recs Likely secondary to pleural effusion, however should R/O HF given pt on doxorubicin and PE given malignancy  #malignant pleural effusion  - Plan for IR thora/pigtail placement today, DVT prophylaxis held  - CTA rules out PE  - Pts last echo 9/8/22 shows normal LV function, Ef=68% with mild concentric hypertrophy  - c/w oxygen therapy, will add humidified O2 for comfort  - now s/p L chest tube placement by IR 10/4, effusion Light's criteria positive  - per IR, low need for CT placement on R side  - f/u IR/CTS recs

## 2022-10-05 NOTE — PROGRESS NOTE ADULT - SUBJECTIVE AND OBJECTIVE BOX
***************************************************************  Chivo Ferguson, PGY1  Internal Medicine   TEAMS Preferred  [facility] Pager [phone]  [facility] Pager [phone]  ***************************************************************    ALEXANDER HUDDLESTON  57y  MRN: [id number]  10-01-22 (4d)    Patient is a 57y old  Female who presents with a chief complaint of shortness of breath (04 Oct 2022 07:04)      SUBJECTIVE / OVERNIGHT EVENTS:   No acute overnight events. Pt seen and examined at bedside. Denies fevers, chills, CP, SOB, Abdominal pain, N/V, Constipation, Diarrhea. Last BM     12 Point ROS negative with the exception of the above    MEDICATIONS  (STANDING):  benzocaine 15 mG/menthol 3.6 mG Lozenge 1 Lozenge Oral two times a day  chlorhexidine 2% Cloths 1 Application(s) Topical daily  enoxaparin Injectable 40 milliGRAM(s) SubCutaneous every 24 hours  influenza   Vaccine 0.5 milliLiter(s) IntraMuscular once  magnesium oxide 400 milliGRAM(s) Oral three times a day with meals  polyethylene glycol 3350 17 Gram(s) Oral daily  senna 1 Tablet(s) Oral at bedtime    MEDICATIONS  (PRN):  acetaminophen     Tablet .. 650 milliGRAM(s) Oral every 6 hours PRN Mild Pain (1 - 3), Moderate Pain (4 - 6)  bisacodyl Suppository 10 milliGRAM(s) Rectal daily PRN Constipation  guaiFENesin Oral Liquid (Sugar-Free) 400 milliGRAM(s) Oral every 6 hours PRN Cough  hydrocodone/homatropine Syrup 5 milliLiter(s) Oral every 6 hours PRN Cough      OBJECTIVE:  Vital Signs Last 24 Hrs  T(C): 37.2 (05 Oct 2022 05:58), Max: 37.2 (04 Oct 2022 10:15)  T(F): 99 (05 Oct 2022 05:58), Max: 99 (04 Oct 2022 10:15)  HR: 109 (05 Oct 2022 05:58) (108 - 118)  BP: 126/88 (05 Oct 2022 05:58) (122/78 - 133/88)  BP(mean): --  RR: 20 (05 Oct 2022 05:58) (18 - 20)  SpO2: 95% (05 Oct 2022 05:58) (95% - 100%)    Parameters below as of 05 Oct 2022 05:58  Patient On (Oxygen Delivery Method): nasal cannula  O2 Flow (L/min): 2      I&O's Summary    04 Oct 2022 07:01  -  05 Oct 2022 07:00  --------------------------------------------------------  IN: 50 mL / OUT: 650 mL / NET: -600 mL        PHYSICAL EXAM:  GENERAL: Laying comfortably, NAD  HEENT: NCAT, PERRLA, EOMI, no scleral icterus, no LAD  NECK: No JVD  LUNG: CTABL; No wheezes, crackles, or rhonchi  HEART: RRR; normal S1/S2; No murmurs, rubs, or gallops  ABDOMEN: +BS, soft, nontender, nondistended, no HSM; No rebound, guarding, or rigidity  EXTREMITIES:  No LE edema b/l, 2+ Peripheral Pulses, No clubbing or cyanosis  NEUROLOGY: AOx3, non-focal, strength 5/5 in all extremities, sensation intact  PSYCH: calm and cooperative  SKIN: No rashes or lesions    LABS:                        8.3    2.22  )-----------( 315      ( 04 Oct 2022 07:28 )             27.5     Auto Eosinophil # 0.06  / Auto Eosinophil % 2.6   / Auto Neutrophil # 1.02  / Auto Neutrophil % 46.1  / BANDS % x        10-04    138  |  101  |  6<L>  ----------------------------<  88  4.1   |  28  |  0.75  10-03    135  |  96<L>  |  8   ----------------------------<  120<H>  3.3<L>   |  26  |  0.77  10-02    140  |  100  |  7   ----------------------------<  120<H>  3.4<L>   |  28  |  0.76    Ca    9.4      04 Oct 2022 07:28  Ca    9.8      03 Oct 2022 05:50  Ca    9.8      02 Oct 2022 07:40  Phos  3.5     10-04  Mg     1.50     10-04    TPro  6.8  /  Alb  2.8<L>  /  TBili  0.2  /  DBili  x   /  AST  9   /  ALT  8   /  AlkPhos  80  10-04  TPro  7.6  /  Alb  3.3  /  TBili  0.2  /  DBili  x   /  AST  13  /  ALT  <5  /  AlkPhos  93  10-03  TPro  7.5  /  Alb  3.5  /  TBili  0.2  /  DBili  x   /  AST  9   /  ALT  6   /  AlkPhos  92  10-02    PT/INR - ( 05 Oct 2022 06:00 )   PT: 14.3 sec;   INR: 1.23 ratio         PTT - ( 05 Oct 2022 06:00 )  PTT:31.7 sec            CAPILLARY BLOOD GLUCOSE          Culture - Acid Fast - Body Fluid w/Smear (collected 10-04-22 @ 17:06)  Source: .Body Fluid    Culture - Body Fluid with Gram Stain (collected 10-04-22 @ 09:40)  Source: Pleural Fl Malignant Pleural Effussion  Gram Stain (10-04-22 @ 21:09):    polymorphonuclear leukocytes seen    No organisms seen    by cytocentrifuge        RADIOLOGY & ADDITIONAL TESTS:     ***************************************************************  Chivo Ferguson, PGY1  Internal Medicine   TEAMS Preferred  [facility] Pager [phone]  [facility] Pager [phone]  ***************************************************************    ALEXANDER HUDDLESTON  57y  MRN: [id number]  10-01-22 (4d)    Patient is a 57y old  Female who presents with a chief complaint of shortness of breath (04 Oct 2022 07:04)      SUBJECTIVE / OVERNIGHT EVENTS:   No acute overnight events. Pt seen and examined at bedside. Denies SOB, fevers, chills, nausea, vomiting. Reports mildly improved orthopnea, mildly improved SOB, and dry throat.     12 Point ROS negative with the exception of the above    MEDICATIONS  (STANDING):  benzocaine 15 mG/menthol 3.6 mG Lozenge 1 Lozenge Oral two times a day  chlorhexidine 2% Cloths 1 Application(s) Topical daily  enoxaparin Injectable 40 milliGRAM(s) SubCutaneous every 24 hours  influenza   Vaccine 0.5 milliLiter(s) IntraMuscular once  magnesium oxide 400 milliGRAM(s) Oral three times a day with meals  polyethylene glycol 3350 17 Gram(s) Oral daily  senna 1 Tablet(s) Oral at bedtime    MEDICATIONS  (PRN):  acetaminophen     Tablet .. 650 milliGRAM(s) Oral every 6 hours PRN Mild Pain (1 - 3), Moderate Pain (4 - 6)  bisacodyl Suppository 10 milliGRAM(s) Rectal daily PRN Constipation  guaiFENesin Oral Liquid (Sugar-Free) 400 milliGRAM(s) Oral every 6 hours PRN Cough  hydrocodone/homatropine Syrup 5 milliLiter(s) Oral every 6 hours PRN Cough      OBJECTIVE:  Vital Signs Last 24 Hrs  T(C): 37.2 (05 Oct 2022 05:58), Max: 37.2 (04 Oct 2022 10:15)  T(F): 99 (05 Oct 2022 05:58), Max: 99 (04 Oct 2022 10:15)  HR: 109 (05 Oct 2022 05:58) (108 - 118)  BP: 126/88 (05 Oct 2022 05:58) (122/78 - 133/88)  BP(mean): --  RR: 20 (05 Oct 2022 05:58) (18 - 20)  SpO2: 95% (05 Oct 2022 05:58) (95% - 100%)    Parameters below as of 05 Oct 2022 05:58  Patient On (Oxygen Delivery Method): nasal cannula  O2 Flow (L/min): 2      I&O's Summary    04 Oct 2022 07:01  -  05 Oct 2022 07:00  --------------------------------------------------------  IN: 50 mL / OUT: 650 mL / NET: -600 mL        PHYSICAL EXAM:  GENERAL: Laying comfortably, NAD  HEENT: NCAT, PERRLA, EOMI, no scleral icterus,  LUNG: L sided posterior chest tube drain in place (warm around it, no erythema or edema), cough, bilateral decreased lung sounds, poor inspiratory effort  HEART: RRR; normal S1/S2; No murmurs, rubs, or gallops  ABDOMEN: +BS, soft, nontender, nondistended  EXTREMITIES:  No LE edema b/l, 2+ Peripheral Pulses, No clubbing or cyanosis  NEUROLOGY: AOx3, non-focal, strength 5/5 in all extremities, sensation intact  PSYCH: calm and cooperative  SKIN: No rashes or lesions    LABS:                        8.9    2.63  )-----------( 370      ( 05 Oct 2022 06:00 )             29.5                         8.3    2.22  )-----------( 315      ( 04 Oct 2022 07:28 )             27.5     Auto Eosinophil # 0.06  / Auto Eosinophil % 2.6   / Auto Neutrophil # 1.02  / Auto Neutrophil % 46.1  / BANDS % x        10-05    138  |  100  |  8   ----------------------------<  97  4.0   |  25  |  0.77    Ca    9.6      05 Oct 2022 06:00  Phos  3.0     10-05  Mg     1.80     10-05    TPro  7.2  /  Alb  3.1<L>  /  TBili  <0.2  /  DBili  x   /  AST  12  /  ALT  <5<L>  /  AlkPhos  83  10-05    10-05    138  |  100  |  8   ----------------------------<  97  4.0   |  25  |  0.77    Ca    9.6      05 Oct 2022 06:00  Phos  3.0     10-05  Mg     1.80     10-05    TPro  7.2  /  Alb  3.1<L>  /  TBili  <0.2  /  DBili  x   /  AST  12  /  ALT  <5<L>  /  AlkPhos  83  10-05    10-04    138  |  101  |  6<L>  ----------------------------<  88  4.1   |  28  |  0.75  10-03    135  |  96<L>  |  8   ----------------------------<  120<H>  3.3<L>   |  26  |  0.77  10-02    140  |  100  |  7   ----------------------------<  120<H>  3.4<L>   |  28  |  0.76    Ca    9.4      04 Oct 2022 07:28  Ca    9.8      03 Oct 2022 05:50  Ca    9.8      02 Oct 2022 07:40  Phos  3.5     10-04  Mg     1.50     10-04    TPro  6.8  /  Alb  2.8<L>  /  TBili  0.2  /  DBili  x   /  AST  9   /  ALT  8   /  AlkPhos  80  10-04  TPro  7.6  /  Alb  3.3  /  TBili  0.2  /  DBili  x   /  AST  13  /  ALT  <5  /  AlkPhos  93  10-03  TPro  7.5  /  Alb  3.5  /  TBili  0.2  /  DBili  x   /  AST  9   /  ALT  6   /  AlkPhos  92  10-02    PT/INR - ( 05 Oct 2022 06:00 )   PT: 14.3 sec;   INR: 1.23 ratio         PTT - ( 05 Oct 2022 06:00 )  PTT:31.7 sec            CAPILLARY BLOOD GLUCOSE          Culture - Acid Fast - Body Fluid w/Smear (collected 10-04-22 @ 17:06)  Source: .Body Fluid    Culture - Body Fluid with Gram Stain (collected 10-04-22 @ 09:40)  Source: Pleural Fl Malignant Pleural Effussion  Gram Stain (10-04-22 @ 21:09):    polymorphonuclear leukocytes seen    No organisms seen    by cytocentrifuge        RADIOLOGY & ADDITIONAL TESTS:

## 2022-10-06 PROBLEM — D64.9 ANEMIA, UNSPECIFIED: Chronic | Status: ACTIVE | Noted: 2022-01-01

## 2022-10-06 NOTE — PROGRESS NOTE ADULT - PROBLEM SELECTOR PLAN 2
Patient has known history of malignant b/l pleural effusions  - Patient had prior IR drainage in R lungs early in June and L chest tube as well   - now s/p chest tube placement by IR 10/4  - continue supportive care with cough meds, monitor pulse ox Patient has known history of malignant b/l pleural effusions  - Patient had prior IR drainage in R lungs early in June and L chest tube as well   - now s/p chest tube placement by IR 10/4  - continue supportive care with cough meds, monitor pulse ox, see above  - wean Oxygen as tolerated

## 2022-10-06 NOTE — PROGRESS NOTE ADULT - ASSESSMENT
55 yo F with a PMH papillary serous endometrial carcinoma (diagnosed 04/2020, s/p VINCENT-BSO and chemotherapy, most recently C1D1 Doxil 9/14/22) c/b lung/pleural and peritoneal metastasis with pleural effusions and chronic anemia who p/w progressively worsening SOB due to worsening bilateral pleural effusions.    With regards to Metastatic Uterine Carcinoma/Bilateral Pleural Effusions  Originally diagnosed with stage IA papillary serous endometrial carcinoma in 04/2020  S/p TLH/BSO/SLND/Omentectomy, followed by 5 cycles of Carbo/Taxol (06-09/2020) and vaginal brachytherapy (10/2020, 3 fractions)  developed metastatic disease in the pleura (R effusion, later bilateral) in 8/2021 s/p PleurX 10/2021 - 03/2022 with recurrence again in 06/2022  S/p Carbo/Taxol/Keytruda ( trial), 6 cycles, from 10/2021 - 02/2022, then maintenance Keytruda and added Lenvima 05/2022 due to POD, then stopped 06/2022  Switched to Bevacizumab 6/27/22 - 8/15/22 for 3 cycles with POD  Now on Doxil with C1D1 9/14/22    Symptoms/slightly worsening pleural effusions likely from underlying aggressive malignancy    -s/p Left PTC placement by IR -70cc drained initially, still draining output since placement.   -No plan for inpatient chemotherapy, next Doxil due 10/12/22  -Rest of care as per primary team  -Patient to followup with Dr. Celaya ([facility]) upon discharge  -C/w Supportive care, pain control, Nutrition, PT, DVT ppx  -Oncology will continue to follow with you      Case discussed with Dr. Shirley ROQUE  Oncology Physician Assistant  Trac[facility]ICE/[facility]  Pager [phone] also available on Teams    If before 8am/after 5pm or on weekends please page On-call Oncology Fellow

## 2022-10-06 NOTE — PROGRESS NOTE ADULT - SUBJECTIVE AND OBJECTIVE BOX
INTERVAL HPI/OVERNIGHT EVENTS:  Patient seen at bedside.  Patient s/p thoracentesis now with chest tube  States that symptoms have since improved  No other acute complaints      VITAL SIGNS:  T(F): 98.9 (10-06-22 @ 07:03)  HR: 107 (10-06-22 @ 12:59)  BP: 130/83 (10-06-22 @ 07:03)  RR: 18 (10-06-22 @ 12:59)  SpO2: 98% (10-06-22 @ 12:59)  Wt(kg): --    PHYSICAL EXAM:  GENERAL: Laying comfortably, NAD  HEENT: NCAT, PERRLA, EOMI, no scleral icterus  LUNG: poor inspiratory effort with shallow breaths,   HEART: sinus tachycardia; normal S1/S2; No murmurs, rubs, or gallops  ABDOMEN: +BS, soft, nontender, nondistended  EXTREMITIES:  No LE edema b/l, 2+ Peripheral Pulses, No clubbing or cyanosis  NEUROLOGY: AOx3, non-focal, strength 5/5 in all extremities, sensation intact  PSYCH: calm and cooperative  SKIN: No rashes or lesions    MEDICATIONS  (STANDING):  chlorhexidine 2% Cloths 1 Application(s) Topical daily  enoxaparin Injectable 40 milliGRAM(s) SubCutaneous every 24 hours  influenza   Vaccine 0.5 milliLiter(s) IntraMuscular once  magnesium oxide 400 milliGRAM(s) Oral three times a day with meals  polyethylene glycol 3350 17 Gram(s) Oral daily  senna 1 Tablet(s) Oral at bedtime    MEDICATIONS  (PRN):  acetaminophen     Tablet .. 650 milliGRAM(s) Oral every 6 hours PRN Mild Pain (1 - 3), Moderate Pain (4 - 6)  benzocaine 15 mG/menthol 3.6 mG Lozenge 1 Lozenge Oral every 2 hours PRN Cough  bisacodyl Suppository 10 milliGRAM(s) Rectal daily PRN Constipation  guaiFENesin Oral Liquid (Sugar-Free) 400 milliGRAM(s) Oral every 6 hours PRN Cough  hydrocodone/homatropine Syrup 5 milliLiter(s) Oral every 6 hours PRN Cough      Allergies    No Known Allergies    Intolerances        LABS:                        8.6    2.89  )-----------( 387      ( 06 Oct 2022 07:44 )             28.7     10-06    140  |  100  |  8   ----------------------------<  103<H>  3.9   |  30  |  0.78    Ca    9.7      06 Oct 2022 07:44  Phos  3.6     10-06  Mg     1.50     10-06    TPro  7.0  /  Alb  3.0<L>  /  TBili  0.2  /  DBili  x   /  AST  9   /  ALT  <5<L>  /  AlkPhos  79  10-06    PT/INR - ( 06 Oct 2022 07:44 )   PT: 15.1 sec;   INR: 1.30 ratio         PTT - ( 06 Oct 2022 07:44 )  PTT:29.4 sec      RADIOLOGY & ADDITIONAL TESTS:  Studies reviewed.

## 2022-10-06 NOTE — H&P PST ADULT - EXTREMITIES
Neurosurgery Orthopedics No cyanosis, clubbing or edema clean wound with saline pat dry apply aqacel ag and cover with gauze, secure with cling daily and as needed. supplies provided to pt for many days

## 2022-10-06 NOTE — CHART NOTE - NSCHARTNOTEFT_GEN_A_CORE
Pt seen for malnutrition follow up.    Medical Course:  - Per chart, pt is 57 year old female PMH uterine carcinoma s/p hysterectomy VINCENT BSO (2020), ongoing chemotherapy, s/p radiation, history of bilateral malignant pleural effusions s/p R VATS 10/2021, PleurX catheter removed (3/22), and R thoracentesis by IR and L chest tube by thoracic surgery on previous admission (6/11/22) presenting for worsening SOB found to have reaccumulated bilateral pleural effusions now s/p L chest tube placement (10/4).    Nutrition Course:  - Pt reports continued fair appetite/PO intake secondary to chest pain, tolerating diet and enjoying nutrition shakes. Of note, pt has been receiving Ensure Plus High Protein to reflect in house formulary. Pt vocalizes preference for turkey sandwich.   - Pt endorses constipation, last BM 10/4 per chart; ordered for bowel regimen. Pt amenable to provision of prune juice.  - Labs notable for low Mg.     Diet Prescription:  - Regular  - Ensure Enlive 3 PO Daily    Pertinent Medications  - magnesium oxide, polyethylene glycol, senna, bisacodyl Suppository PRN    Pertinent Labs:   - (10/6) Na 140 mmol/L Glu 103 mg/dL<H> K+ 3.9 mmol/L Cr 0.78 mg/dL BUN 8 mg/dL Phos 3.6 mg/dL Alb 3.0 g/dL<L>    Weight: (10/1 dosing) 188.4 lbs / 85.5 kg  Height: 65 in / 165.1 cm  IBW: 125 lbs / 56.8 kg +/-10%  BMI: 31.3 kg/m^2    Physical Assessment, per flowsheets:  Edema: 1+ bilateral foot  Pressure Injury: none noted    Estimated Needs:   [X] No change since previous assessment, based on actual weight 188.8 lbs / 85.6 kg  6890-5837 kcal daily @28-32 kcal/kg, 102..84 gm protein daily @1.2-1.4 gm/kg     Previous Nutrition Diagnosis: [X] Severe malnutrition in the context of acute illness, remains appropriate  New Nutrition Diagnosis: [X] not applicable     Interventions:   1) Recommend continue regular diet to maximize menu item availability.  2) Recommend change diet order to reflect in house formulary- Ensure Plus HP 1 PO 3x daily (provides 350 kcal, 20 gm protein per 8oz serving).   3) Obtain weekly weights.   4) Continue to monitor electrolytes (K+, Mg, P) and replete to within desired limits as clinically indicated.   5) Monitor BMs, adjust bowel regimen as appropriate.  6) Encouraged PO intake as tolerated. Obtained food preferences and will provide as able.    Monitor & Evaluate:  PO intake, nutrition related lab values, weight trends, BMs/GI distress, hydration status, skin integrity.  Maribel Nowak, MAXWELL, CDN #[id number]  Also available on [employer] Teams

## 2022-10-06 NOTE — PROGRESS NOTE ADULT - PROBLEM SELECTOR PLAN 6
DVT ppx w lovenox  - pt noted to prev be on xarelto but per surescripts not picked up since July, clarify xarelto usage with patient  - held iso IR procedure today DVT ppx w lovenox  - pt noted to prev be on xarelto but per surescripts not picked up since July, clarify xarelto usage with patient

## 2022-10-06 NOTE — PROGRESS NOTE ADULT - PROBLEM SELECTOR PLAN 1
Likely secondary to pleural effusion, however should R/O HF given pt on doxorubicin and PE given malignancy  #malignant pleural effusion  - Plan for IR thora/pigtail placement today, DVT prophylaxis held  - CTA rules out PE  - Pts last echo 9/8/22 shows normal LV function, Ef=68% with mild concentric hypertrophy  - c/w oxygen therapy, will add humidified O2 for comfort  - now s/p L chest tube placement by IR 10/4, effusion Light's criteria positive  - per IR, low need for CT placement on R side  - f/u IR/CTS recs Likely secondary to pleural effusion, however should R/O HF given pt on doxorubicin and PE given malignancy  #malignant pleural effusion  - CTA rules out PE  - Pts last echo 9/8/22 shows normal LV function, Ef=68% with mild concentric hypertrophy  - c/w oxygen therapy, will add humidified O2 for comfort  - now s/p L chest tube placement by IR 10/4, effusion Light's criteria positive  - per IR, low need for CT placement on R side  - f/u IR/CTS recs  - plan to monitor drainage for 3-5 days to determine need for PleurX Likely secondary to pleural effusion, however should R/O HF given pt on doxorubicin and PE given malignancy  #malignant pleural effusion  - CTA rules out PE  - Pts last echo 9/8/22 shows normal LV function, Ef=68% with mild concentric hypertrophy  - c/w oxygen therapy w/ humidified O2 for comfort  - s/p L chest tube placement by IR 10/4, effusion Light's criteria positive  - initially 350 output from tube, decreased drain output today 10/6 with residual pleural effusion on CXR, contacted IR regarding possible need for repositioning of tube  - per IR, low need for CT placement on R side  - f/u IR/CTS recs  - plan to monitor drainage for 3-5 days to determine need for PleurX on L

## 2022-10-06 NOTE — PROGRESS NOTE ADULT - PROBLEM SELECTOR PLAN 5
Patient follows with Dr. Mirtha Celaya at [facility], last chemo 9/14  Oncology consulted via email for management and role of inpt chemo  - Follow up onc recommendations.

## 2022-10-06 NOTE — PROGRESS NOTE ADULT - ASSESSMENT
57F w PMHX of uterine carcinoma (s/p hysterectomy VINCENT BSO, 2020, ongoing chemotherapy (last session 9/14; known to Dr. Danny Celaya) s/p radiation, w/ hx of b/l malignant pleural effusions s/p R VATS 10/2021, PleurX catheter removed 3/22, and R thoracentesis by IR and L chest tube by thoracic surgery on prev admission 6/11/22, presenting for worsening SOB and found to have reaccumulated b/l pleural effusions. Plan at this time for IR procedure today involving either L thoracentesis or L pigtail catheter placement. 57F w PMHX of uterine carcinoma (s/p hysterectomy VINCENT BSO, 2020, ongoing chemotherapy (last session 9/14; known to Dr. Danny Celaya) s/p radiation, w/ hx of b/l malignant pleural effusions s/p R VATS 10/2021, PleurX catheter removed 3/22, and R thoracentesis by IR and L chest tube by thoracic surgery on prev admission 6/11/22, presenting for worsening SOB and found to have reaccumulated b/l pleural effusions. Patient now s/p L chest tube placement on 10/4

## 2022-10-06 NOTE — CHART NOTE - NSCHARTNOTEFT_GEN_A_CORE
Pt seen with Dr. Peters. Resting in bed.   Currently on RA, O2 sats remaining in 90s.  Pt states some improvement in SOB since IR drainage  Currently in no distress    Vital Signs Last 24 Hrs  T(C): 37.2 (06 Oct 2022 07:03), Max: 37.2 (06 Oct 2022 07:03)  T(F): 98.9 (06 Oct 2022 07:03), Max: 98.9 (06 Oct 2022 07:03)  HR: 107 (06 Oct 2022 12:59) (102 - 118)  BP: 130/83 (06 Oct 2022 07:03) (130/83 - 145/93)  BP(mean): --  RR: 18 (06 Oct 2022 12:59) (18 - 20)  SpO2: 98% (06 Oct 2022 12:59) (92% - 100%)    Parameters below as of 06 Oct 2022 12:59  Patient On (Oxygen Delivery Method): room air    A+O x 3  Dec BS bilat  Left PTC to sxn, about 100cc in 24hrs  CXR improved left effusion.     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 CT chest found b/l moderate sized effusions. IR consulted for drainage of effusion, planning on draining Left side next week. Thoracic c/s as patient is known patient of Dr. Peters's and pt requesting Dr. Peters's approval prior to procedure    10/5 IR left ptc placement for pleural effusion 10/6- Output decreased. pt with some improvement of symptoms    -Continue PTC for now  -Placed to [facility]  -Will re-assess output and pt clinically tomorrow.   -Although pt feels better, it's still unclear if symptoms are from pleural effusion  vs. progression of disease. In that case, Pleurx may not benefit pt much.   -CXR in am  Will continue to follow  Above d/w primary team and pt.

## 2022-10-06 NOTE — PROGRESS NOTE ADULT - SUBJECTIVE AND OBJECTIVE BOX
***************************************************************  Chivo Ferguson, PGY1  Internal Medicine   TEAMS Preferred  [facility] Pager [phone]  [facility] Pager [phone]  ***************************************************************    ALEXANDER HUDDLESTON  57y  MRN: [id number]  10-01-22 (5d)    Patient is a 57y old  Female who presents with a chief complaint of shortness of breath (05 Oct 2022 11:52)      SUBJECTIVE / OVERNIGHT EVENTS:   No acute overnight events. Pt seen and examined at bedside. Denies fevers, chills, CP, SOB, Abdominal pain, N/V, Constipation, Diarrhea. Last BM     12 Point ROS negative with the exception of the above    MEDICATIONS  (STANDING):  chlorhexidine 2% Cloths 1 Application(s) Topical daily  enoxaparin Injectable 40 milliGRAM(s) SubCutaneous every 24 hours  influenza   Vaccine 0.5 milliLiter(s) IntraMuscular once  magnesium oxide 400 milliGRAM(s) Oral three times a day with meals  polyethylene glycol 3350 17 Gram(s) Oral daily  senna 1 Tablet(s) Oral at bedtime    MEDICATIONS  (PRN):  acetaminophen     Tablet .. 650 milliGRAM(s) Oral every 6 hours PRN Mild Pain (1 - 3), Moderate Pain (4 - 6)  benzocaine 15 mG/menthol 3.6 mG Lozenge 1 Lozenge Oral every 2 hours PRN Cough  bisacodyl Suppository 10 milliGRAM(s) Rectal daily PRN Constipation  guaiFENesin Oral Liquid (Sugar-Free) 400 milliGRAM(s) Oral every 6 hours PRN Cough  hydrocodone/homatropine Syrup 5 milliLiter(s) Oral every 6 hours PRN Cough      OBJECTIVE:  Vital Signs Last 24 Hrs  T(C): 36.9 (05 Oct 2022 21:38), Max: 36.9 (05 Oct 2022 14:42)  T(F): 98.4 (05 Oct 2022 21:38), Max: 98.5 (05 Oct 2022 14:42)  HR: 118 (05 Oct 2022 21:38) (106 - 118)  BP: 145/93 (05 Oct 2022 21:38) (128/86 - 145/93)  BP(mean): --  RR: 20 (05 Oct 2022 21:38) (18 - 20)  SpO2: 92% (05 Oct 2022 21:38) (92% - 97%)    Parameters below as of 05 Oct 2022 21:38  Patient On (Oxygen Delivery Method): nasal cannula        I&O's Summary    04 Oct 2022 07:01  -  05 Oct 2022 07:00  --------------------------------------------------------  IN: 50 mL / OUT: 650 mL / NET: -600 mL        PHYSICAL EXAM:  GENERAL: Laying comfortably, NAD  HEENT: NCAT, PERRLA, EOMI, no scleral icterus, no LAD  NECK: No JVD  LUNG: CTABL; No wheezes, crackles, or rhonchi  HEART: RRR; normal S1/S2; No murmurs, rubs, or gallops  ABDOMEN: +BS, soft, nontender, nondistended, no HSM; No rebound, guarding, or rigidity  EXTREMITIES:  No LE edema b/l, 2+ Peripheral Pulses, No clubbing or cyanosis  NEUROLOGY: AOx3, non-focal, strength 5/5 in all extremities, sensation intact  PSYCH: calm and cooperative  SKIN: No rashes or lesions    LABS:                        8.9    2.63  )-----------( 370      ( 05 Oct 2022 06:00 )             29.5     Auto Eosinophil # x     / Auto Eosinophil % x     / Auto Neutrophil # x     / Auto Neutrophil % x     / BANDS % x                            8.3    2.22  )-----------( 315      ( 04 Oct 2022 07:28 )             27.5     Auto Eosinophil # 0.06  / Auto Eosinophil % 2.6   / Auto Neutrophil # 1.02  / Auto Neutrophil % 46.1  / BANDS % x        10-05    138  |  100  |  8   ----------------------------<  97  4.0   |  25  |  0.77  10-04    138  |  101  |  6<L>  ----------------------------<  88  4.1   |  28  |  0.75    Ca    9.6      05 Oct 2022 06:00  Ca    9.4      04 Oct 2022 07:28  Phos  3.0     10-05  Mg     1.80     10-05    TPro  7.2  /  Alb  3.1<L>  /  TBili  <0.2  /  DBili  x   /  AST  12  /  ALT  <5<L>  /  AlkPhos  83  10-05  TPro  6.8  /  Alb  2.8<L>  /  TBili  0.2  /  DBili  x   /  AST  9   /  ALT  8   /  AlkPhos  80  10-04    PT/INR - ( 05 Oct 2022 06:00 )   PT: 14.3 sec;   INR: 1.23 ratio         PTT - ( 05 Oct 2022 06:00 )  PTT:31.7 sec            CAPILLARY BLOOD GLUCOSE            RADIOLOGY & ADDITIONAL TESTS:     ***************************************************************  Chivo Ferguson, PGY1  Internal Medicine   TEAMS Preferred  [facility] Pager [phone]  [facility] Pager [phone]  ***************************************************************    ALEXANDER HUDDLESTON  57y  MRN: [id number]  10-01-22 (5d)    Patient is a 57y old  Female who presents with a chief complaint of shortness of breath (05 Oct 2022 11:52)      SUBJECTIVE / OVERNIGHT EVENTS:   No acute overnight events. Pt seen and examined at bedside. Pt reports improvement in her orthopnea and dyspnea at rest with improved sleep overnight. Denies any further back pain at the chest tube insertion site at this time. Last BM was 2 days ago.    12 Point ROS negative with the exception of the above    MEDICATIONS  (STANDING):  chlorhexidine 2% Cloths 1 Application(s) Topical daily  enoxaparin Injectable 40 milliGRAM(s) SubCutaneous every 24 hours  influenza   Vaccine 0.5 milliLiter(s) IntraMuscular once  magnesium oxide 400 milliGRAM(s) Oral three times a day with meals  polyethylene glycol 3350 17 Gram(s) Oral daily  senna 1 Tablet(s) Oral at bedtime    MEDICATIONS  (PRN):  acetaminophen     Tablet .. 650 milliGRAM(s) Oral every 6 hours PRN Mild Pain (1 - 3), Moderate Pain (4 - 6)  benzocaine 15 mG/menthol 3.6 mG Lozenge 1 Lozenge Oral every 2 hours PRN Cough  bisacodyl Suppository 10 milliGRAM(s) Rectal daily PRN Constipation  guaiFENesin Oral Liquid (Sugar-Free) 400 milliGRAM(s) Oral every 6 hours PRN Cough  hydrocodone/homatropine Syrup 5 milliLiter(s) Oral every 6 hours PRN Cough      OBJECTIVE:  Vital Signs Last 24 Hrs  T(C): 36.9 (05 Oct 2022 21:38), Max: 36.9 (05 Oct 2022 14:42)  T(F): 98.4 (05 Oct 2022 21:38), Max: 98.5 (05 Oct 2022 14:42)  HR: 118 (05 Oct 2022 21:38) (106 - 118)  BP: 145/93 (05 Oct 2022 21:38) (128/86 - 145/93)  BP(mean): --  RR: 20 (05 Oct 2022 21:38) (18 - 20)  SpO2: 92% (05 Oct 2022 21:38) (92% - 97%)    Parameters below as of 05 Oct 2022 21:38  Patient On (Oxygen Delivery Method): nasal cannula        I&O's Summary    04 Oct 2022 07:01  -  05 Oct 2022 07:00  --------------------------------------------------------  IN: 50 mL / OUT: 650 mL / NET: -600 mL        PHYSICAL EXAM:  GENERAL: Laying comfortably, NAD  HEENT: NCAT, PERRLA, EOMI, no scleral icterus  LUNG: poor inspiratory effort with shallow breaths,   HEART: sinus tachycardia; normal S1/S2; No murmurs, rubs, or gallops  ABDOMEN: +BS, soft, nontender, nondistended  EXTREMITIES:  No LE edema b/l, 2+ Peripheral Pulses, No clubbing or cyanosis  NEUROLOGY: AOx3, non-focal, strength 5/5 in all extremities, sensation intact  PSYCH: calm and cooperative  SKIN: No rashes or lesions    LABS:                        8.6    2.89  )-----------( 387      ( 06 Oct 2022 07:44 )             28.7                         8.9    2.63  )-----------( 370      ( 05 Oct 2022 06:00 )             29.5     Auto Eosinophil # x     / Auto Eosinophil % x     / Auto Neutrophil # x     / Auto Neutrophil % x     / BANDS % x                            8.3    2.22  )-----------( 315      ( 04 Oct 2022 07:28 )             27.5     Auto Eosinophil # 0.06  / Auto Eosinophil % 2.6   / Auto Neutrophil # 1.02  / Auto Neutrophil % 46.1  / BANDS % x      10-06    140  |  100  |  8   ----------------------------<  103<H>  3.9   |  30  |  0.78    Ca    9.7      06 Oct 2022 07:44  Phos  3.6     10-06  Mg     1.50     10-06    TPro  7.0  /  Alb  3.0<L>  /  TBili  0.2  /  DBili  x   /  AST  9   /  ALT  <5<L>  /  AlkPhos  79  10-06    10-05    138  |  100  |  8   ----------------------------<  97  4.0   |  25  |  0.77  10-04    138  |  101  |  6<L>  ----------------------------<  88  4.1   |  28  |  0.75    Ca    9.6      05 Oct 2022 06:00  Ca    9.4      04 Oct 2022 07:28  Phos  3.0     10-05  Mg     1.80     10-05    TPro  7.2  /  Alb  3.1<L>  /  TBili  <0.2  /  DBili  x   /  AST  12  /  ALT  <5<L>  /  AlkPhos  83  10-05  TPro  6.8  /  Alb  2.8<L>  /  TBili  0.2  /  DBili  x   /  AST  9   /  ALT  8   /  AlkPhos  80  10-04    PT/INR - ( 05 Oct 2022 06:00 )   PT: 14.3 sec;   INR: 1.23 ratio         PTT - ( 05 Oct 2022 06:00 )  PTT:31.7 sec

## 2022-10-07 NOTE — PROGRESS NOTE ADULT - ATTENDING COMMENTS
57F w PMHX of uterine carcinoma (s/p hysterectomy [facility], 2020, ongoing chemotherapy (last session 9/14; known to Dr. Danny Celaya) s/p radiation, w/ hx of b/l malignant pleural effusions s/p R VATS 10/2021, PleurX catheter removed 3/22, and R thoracentesis by IR and L chest tube by thoracic surgery on prev admission 6/11/22, presenting for worsening SOB and found to have reaccumulated b/l pleural effusions.      #SOB: in the setting of likely malignant pleural effusion. Imaging results reviewed. CT SX and IR recs appreciated. s/p L thoracentesis with chest tube. f/u IR and CTS recs. Monitor output. Pain control. Wean O2 as tolerated.    #Uterine cancer: f/u onc recs

## 2022-10-07 NOTE — PROGRESS NOTE ADULT - NS ATTEND AMEND GEN_ALL_CORE FT
Patient seen and examined agree with above note as modified, where appropriate, by me.
Pt seen at bedside with PA  Plan as above

## 2022-10-07 NOTE — PROGRESS NOTE ADULT - ASSESSMENT
57F w PMHX of uterine carcinoma (s/p hysterectomy [facility], 2020, ongoing chemotherapy (last session 9/14; known to Dr. Danny Celaya) s/p radiation, w/ hx of b/l malignant pleural effusions s/p R VATS 10/2021, PleurX catheter removed 3/22, and R thoracentesis by IR and L chest tube by thoracic surgery on prev admission 6/11/22, presenting for worsening SOB and found to have reaccumulated b/l pleural effusions. Patient now s/p L chest tube placement on 10/4

## 2022-10-07 NOTE — PROGRESS NOTE ADULT - SUBJECTIVE AND OBJECTIVE BOX
***************************************************************  Chivo Ferguson, PGY1  Internal Medicine   TEAMS Preferred  [facility] Pager [phone]  [facility] Pager [phone]  ***************************************************************    ALEXANDER HUDDLESTON  57y  MRN: [id number]  10-01-22 (6d)    Patient is a 57y old  Female who presents with a chief complaint of shortness of breath (06 Oct 2022 14:54)      SUBJECTIVE / OVERNIGHT EVENTS:   No acute overnight events. Pt seen and examined at bedside. Denies fevers, chills, CP, SOB, Abdominal pain, N/V, Constipation, Diarrhea. Last BM     12 Point ROS negative with the exception of the above    MEDICATIONS  (STANDING):  chlorhexidine 2% Cloths 1 Application(s) Topical daily  enoxaparin Injectable 40 milliGRAM(s) SubCutaneous every 24 hours  influenza   Vaccine 0.5 milliLiter(s) IntraMuscular once  magnesium oxide 400 milliGRAM(s) Oral three times a day with meals  polyethylene glycol 3350 17 Gram(s) Oral daily  senna 1 Tablet(s) Oral at bedtime    MEDICATIONS  (PRN):  acetaminophen     Tablet .. 650 milliGRAM(s) Oral every 6 hours PRN Mild Pain (1 - 3), Moderate Pain (4 - 6)  benzocaine 15 mG/menthol 3.6 mG Lozenge 1 Lozenge Oral every 2 hours PRN Cough  bisacodyl Suppository 10 milliGRAM(s) Rectal daily PRN Constipation  guaiFENesin Oral Liquid (Sugar-Free) 400 milliGRAM(s) Oral every 6 hours PRN Cough  hydrocodone/homatropine Syrup 5 milliLiter(s) Oral every 6 hours PRN Cough      OBJECTIVE:  Vital Signs Last 24 Hrs  T(C): 36.8 (06 Oct 2022 23:38), Max: 37.2 (06 Oct 2022 07:03)  T(F): 98.2 (06 Oct 2022 23:38), Max: 98.9 (06 Oct 2022 07:03)  HR: 109 (06 Oct 2022 23:38) (102 - 109)  BP: 125/78 (06 Oct 2022 23:38) (125/78 - 130/83)  BP(mean): --  RR: 17 (06 Oct 2022 23:38) (17 - 20)  SpO2: 98% (06 Oct 2022 23:38) (98% - 100%)    Parameters below as of 06 Oct 2022 23:38  Patient On (Oxygen Delivery Method): nasal cannula  O2 Flow (L/min): 1      I&O's Summary    06 Oct 2022 07:01  -  07 Oct 2022 06:41  --------------------------------------------------------  IN: 0 mL / OUT: 0 mL / NET: 0 mL        PHYSICAL EXAM:  GENERAL: Laying comfortably, NAD  HEENT: NCAT, PERRLA, EOMI, no scleral icterus, no LAD  NECK: No JVD  LUNG: CTABL; No wheezes, crackles, or rhonchi  HEART: RRR; normal S1/S2; No murmurs, rubs, or gallops  ABDOMEN: +BS, soft, nontender, nondistended, no HSM; No rebound, guarding, or rigidity  EXTREMITIES:  No LE edema b/l, 2+ Peripheral Pulses, No clubbing or cyanosis  NEUROLOGY: AOx3, non-focal, strength 5/5 in all extremities, sensation intact  PSYCH: calm and cooperative  SKIN: No rashes or lesions    LABS:                        8.6    2.89  )-----------( 387      ( 06 Oct 2022 07:44 )             28.7     Auto Eosinophil # x     / Auto Eosinophil % x     / Auto Neutrophil # x     / Auto Neutrophil % x     / BANDS % x        10-06    140  |  100  |  8   ----------------------------<  103<H>  3.9   |  30  |  0.78  10-05    138  |  100  |  8   ----------------------------<  97  4.0   |  25  |  0.77  10-04    138  |  101  |  6<L>  ----------------------------<  88  4.1   |  28  |  0.75    Ca    9.7      06 Oct 2022 07:44  Ca    9.6      05 Oct 2022 06:00  Ca    9.4      04 Oct 2022 07:28  Phos  3.6     10-06  Mg     1.50     10-06    TPro  7.0  /  Alb  3.0<L>  /  TBili  0.2  /  DBili  x   /  AST  9   /  ALT  <5<L>  /  AlkPhos  79  10-06  TPro  7.2  /  Alb  3.1<L>  /  TBili  <0.2  /  DBili  x   /  AST  12  /  ALT  <5<L>  /  AlkPhos  83  10-05  TPro  6.8  /  Alb  2.8<L>  /  TBili  0.2  /  DBili  x   /  AST  9   /  ALT  8   /  AlkPhos  80  10-04    PT/INR - ( 06 Oct 2022 07:44 )   PT: 15.1 sec;   INR: 1.30 ratio         PTT - ( 06 Oct 2022 07:44 )  PTT:29.4 sec            CAPILLARY BLOOD GLUCOSE            RADIOLOGY & ADDITIONAL TESTS:     ***************************************************************  Chivo Ferguson, PGY1  Internal Medicine   TEAMS Preferred  [facility] Pager [phone]  [facility] Pager [phone]  ***************************************************************    ALEXANDER HUDDLESTON  57y  MRN: [id number]  10-01-22 (6d)    Patient is a 57y old  Female who presents with a chief complaint of shortness of breath (06 Oct 2022 14:54)      SUBJECTIVE / OVERNIGHT EVENTS:   No acute overnight events. Pt seen and examined at bedside. Reports mildly improved dyspnea and 5/10 pain at site of chest tube. Denies chest pain, fevers, chills. Reports a mild cough and feeling tired.     12 Point ROS negative with the exception of the above    MEDICATIONS  (STANDING):  chlorhexidine 2% Cloths 1 Application(s) Topical daily  enoxaparin Injectable 40 milliGRAM(s) SubCutaneous every 24 hours  influenza   Vaccine 0.5 milliLiter(s) IntraMuscular once  magnesium oxide 400 milliGRAM(s) Oral three times a day with meals  polyethylene glycol 3350 17 Gram(s) Oral daily  senna 1 Tablet(s) Oral at bedtime    MEDICATIONS  (PRN):  acetaminophen     Tablet .. 650 milliGRAM(s) Oral every 6 hours PRN Mild Pain (1 - 3), Moderate Pain (4 - 6)  benzocaine 15 mG/menthol 3.6 mG Lozenge 1 Lozenge Oral every 2 hours PRN Cough  bisacodyl Suppository 10 milliGRAM(s) Rectal daily PRN Constipation  guaiFENesin Oral Liquid (Sugar-Free) 400 milliGRAM(s) Oral every 6 hours PRN Cough  hydrocodone/homatropine Syrup 5 milliLiter(s) Oral every 6 hours PRN Cough      OBJECTIVE:  Vital Signs Last 24 Hrs  T(C): 36.8 (06 Oct 2022 23:38), Max: 37.2 (06 Oct 2022 07:03)  T(F): 98.2 (06 Oct 2022 23:38), Max: 98.9 (06 Oct 2022 07:03)  HR: 109 (06 Oct 2022 23:38) (102 - 109)  BP: 125/78 (06 Oct 2022 23:38) (125/78 - 130/83)  BP(mean): --  RR: 17 (06 Oct 2022 23:38) (17 - 20)  SpO2: 98% (06 Oct 2022 23:38) (98% - 100%)    Parameters below as of 06 Oct 2022 23:38  Patient On (Oxygen Delivery Method): nasal cannula  O2 Flow (L/min): 1      I&O's Summary    06 Oct 2022 07:01  -  07 Oct 2022 06:41  --------------------------------------------------------  IN: 0 mL / OUT: 0 mL / NET: 0 mL        PHYSICAL EXAM:  GENERAL: Laying comfortably, NAD  HEENT: NCAT, PERRLA, EOMI, no scleral icterus  LUNG: CTABL anteriorly, unable to assess posteriorly  HEART: RRR; normal S1/S2; No murmurs, rubs, or gallops  ABDOMEN: +BS, soft, nontender, mild abdominal distension  EXTREMITIES:  No LE edema b/l, 2+ Peripheral Pulses, No clubbing or cyanosis  NEUROLOGY: AOx3, non-focal, strength 5/5 in all extremities, sensation intact  PSYCH: calm and cooperative  SKIN: No rashes or lesions    LABS:                         8.6    3.46  )-----------( 384      ( 07 Oct 2022 07:31 )             28.5                        8.6    2.89  )-----------( 387      ( 06 Oct 2022 07:44 )             28.7     Auto Eosinophil # x     / Auto Eosinophil % x     / Auto Neutrophil # x     / Auto Neutrophil % x     / BANDS % x      10-07    140  |  101  |  9   ----------------------------<  104<H>  3.6   |  30  |  0.79    Ca    9.7      07 Oct 2022 07:31  Phos  4.1     10-07  Mg     1.50     10-07    TPro  7.0  /  Alb  3.0<L>  /  TBili  0.2  /  DBili  x   /  AST  9   /  ALT  <5<L>  /  AlkPhos  79  10-06    10-06    140  |  100  |  8   ----------------------------<  103<H>  3.9   |  30  |  0.78  10-05    138  |  100  |  8   ----------------------------<  97  4.0   |  25  |  0.77  10-04    138  |  101  |  6<L>  ----------------------------<  88  4.1   |  28  |  0.75    Ca    9.7      06 Oct 2022 07:44  Ca    9.6      05 Oct 2022 06:00  Ca    9.4      04 Oct 2022 07:28  Phos  3.6     10-06  Mg     1.50     10-06    TPro  7.0  /  Alb  3.0<L>  /  TBili  0.2  /  DBili  x   /  AST  9   /  ALT  <5<L>  /  AlkPhos  79  10-06  TPro  7.2  /  Alb  3.1<L>  /  TBili  <0.2  /  DBili  x   /  AST  12  /  ALT  <5<L>  /  AlkPhos  83  10-05  TPro  6.8  /  Alb  2.8<L>  /  TBili  0.2  /  DBili  x   /  AST  9   /  ALT  8   /  AlkPhos  80  10-04    PT/INR - ( 06 Oct 2022 07:44 )   PT: 15.1 sec;   INR: 1.30 ratio         PTT - ( 06 Oct 2022 07:44 )  PTT:29.4 sec

## 2022-10-07 NOTE — PROGRESS NOTE ADULT - PROBLEM SELECTOR PLAN 2
Patient has known history of malignant b/l pleural effusions  - Patient had prior IR drainage in R lungs early in June and L chest tube as well   - now s/p chest tube placement by IR 10/4  - continue supportive care with cough meds, monitor pulse ox, see above  - wean Oxygen as tolerated

## 2022-10-07 NOTE — PROGRESS NOTE ADULT - PROBLEM SELECTOR PLAN 1
Likely secondary to pleural effusion, however should R/O HF given pt on doxorubicin and PE given malignancy  #malignant pleural effusion  - CTA rules out PE  - Pts last echo 9/8/22 shows normal LV function, Ef=68% with mild concentric hypertrophy  - c/w oxygen therapy w/ humidified O2 for comfort  - s/p L chest tube placement by IR 10/4, effusion Light's criteria positive  - initially 350 output from tube, decreased drain output today 10/6 with residual pleural effusion on CXR, contacted IR regarding possible need for repositioning of tube  - per IR, low need for CT placement on R side  - f/u IR/CTS recs  - plan to monitor drainage for 3-5 days to determine need for PleurX on L Likely secondary to pleural effusion, however should R/O HF given pt on doxorubicin and PE given malignancy  #malignant pleural effusion  - CTA rules out PE  - Pts last echo 9/8/22 shows normal LV function, Ef=68% with mild concentric hypertrophy  - c/w oxygen therapy w/ humidified O2 for comfort  - s/p L chest tube placement by IR 10/4, effusion Light's criteria positive  - initially 350 output from tube, decreased drain output 10/6, zero output 10/7  - per IR, low need for CT placement on R side  - f/u IR/CTS recs  - plan to monitor drainage for 3-5 days to determine need for PleurX on L

## 2022-10-07 NOTE — PROGRESS NOTE ADULT - SUBJECTIVE AND OBJECTIVE BOX
Patient seen and evaluated at bedside with Dr. Peters.  Patient states she is feeling much better, stating her breathing is not as labored and she doesn't feel SOB.  Denies any other acute complaints, no n/v/d, fevers chills.    Vital Signs:  Vital Signs Last 24 Hrs  T(C): 37.2 (10-07-22 @ 09:30), Max: 37.2 (10-07-22 @ 09:30)  T(F): 98.9 (10-07-22 @ 09:30), Max: 98.9 (10-07-22 @ 09:30)  HR: 109 (10-07-22 @ 09:30) (107 - 109)  BP: 117/74 (10-07-22 @ 09:30) (117/74 - 144/87)  RR: 17 (10-07-22 @ 09:30) (17 - 18)  SpO2: 99% (10-07-22 @ 09:30) (96% - 99%)     PHYSICAL EXAM:  General: Appears stated age, NAD  Eyes: Vision grossly intact, EOMI  ENMT: Hearing grossly intact. Airway grossly patent, no stridor  Neck: Neck supple, trachea midline  Cardiovascular: Sinus tachy, well perfused  Respiratory: Breathing comfortably on RA, no respiratory distress, no accessory muscle use.  Gastrointestinal: Soft, NT, ND, +BS  Extremities: BUCHANAN x4  Vascular: Well perfused  Neurological: Nonfocal, no deficits  Psychiatric: Appropriate affect  Tubes: L PTC to WS, no AL, -0cc output/24hr  Relevant labs, radiology and Medications reviewed                        8.6    3.46  )-----------( 384      ( 07 Oct 2022 07:31 )             28.5     10-07    140  |  101  |  9   ----------------------------<  104<H>  3.6   |  30  |  0.79    Ca    9.7      07 Oct 2022 07:31  Phos  4.1     10-07  Mg     1.50     10-07    TPro  7.0  /  Alb  3.0<L>  /  TBili  0.2  /  DBili  x   /  AST  9   /  ALT  <5<L>  /  AlkPhos  79  10-06    PT/INR - ( 06 Oct 2022 07:44 )   PT: 15.1 sec;   INR: 1.30 ratio         PTT - ( 06 Oct 2022 07:44 )  PTT:29.4 sec  MEDICATIONS  (STANDING):  chlorhexidine 2% Cloths 1 Application(s) Topical daily  enoxaparin Injectable 40 milliGRAM(s) SubCutaneous every 24 hours  influenza   Vaccine 0.5 milliLiter(s) IntraMuscular once  magnesium oxide 400 milliGRAM(s) Oral three times a day with meals  magnesium sulfate  IVPB 2 Gram(s) IV Intermittent once  polyethylene glycol 3350 17 Gram(s) Oral daily  senna 1 Tablet(s) Oral at bedtime    MEDICATIONS  (PRN):  acetaminophen     Tablet .. 650 milliGRAM(s) Oral every 6 hours PRN Mild Pain (1 - 3), Moderate Pain (4 - 6)  benzocaine 15 mG/menthol 3.6 mG Lozenge 1 Lozenge Oral every 2 hours PRN Cough  bisacodyl Suppository 10 milliGRAM(s) Rectal daily PRN Constipation  guaiFENesin Oral Liquid (Sugar-Free) 400 milliGRAM(s) Oral every 6 hours PRN Cough  hydrocodone/homatropine Syrup 5 milliLiter(s) Oral every 6 hours PRN Cough    Pertinent Physical Exam  I&O's Summary    06 Oct 2022 07:01  -  07 Oct 2022 07:00  --------------------------------------------------------  IN: 0 mL / OUT: 0 mL / NET: 0 mL        Assessment  57y Female w/ pmhx of uterine ca, chronic anemia and asthma, admitted to [facility] for SOB/cough. CT scan showed b/l moderate pleural effusions w/ L>R. IR placed PTC on 10/4 which drained 400cc. Patient with clinical improvement, PTC to WS on 10/6.   10/7 - PTC w/ minimal output.    PLAN  -Per Dr. Peters, keep PTC to WS today, will follow up in AM for outputs. Wants to see the output over 24hrs.  -Please continue to document outputs.  -If continued little/no output in AM, anticipate d/c PTC tomorrow.  -No need for PleurX placement this admission, can f/u with Dr. Peters as outpatient to discuss possible PleurX  -If patient reaccumulates fluid, would consider PleurX

## 2022-10-07 NOTE — PROGRESS NOTE ADULT - PROBLEM SELECTOR PROBLEM 2
Malignant pleural effusion 71 yo female with a pmh of CAD, anxiety, depression, ulcerative colitis, , CRI, b/l Kidney stones started to feel right sided abdominal pain 10 days ago.  The pain radiated to her right flank with chills and loss of appetite.  Frequent falls per family.    Patient found to have right moderate hydronephrosis with small stone on CT a/p and underwent right nephrostomy tube placement by IR on 9/13/17.

## 2022-10-07 NOTE — PROGRESS NOTE ADULT - PROBLEM SELECTOR PLAN 5
[place] [facility] [place]  Oncology consulted via email for management and role of inpt chemo  - Follow up onc recommendations.

## 2022-10-08 NOTE — PROGRESS NOTE ADULT - ATTENDING COMMENTS
57F w PMHX of uterine carcinoma (s/p hysterectomy [facility], 2020, ongoing chemotherapy (last session 9/14; known to Dr. Danny Celaya) s/p radiation, w/ hx of b/l malignant pleural effusions s/p R VATS 10/2021, PleurX catheter removed 3/22, and R thoracentesis by IR and L chest tube by thoracic surgery on prev admission 6/11/22, presenting for worsening SOB and found to have reaccumulated b/l pleural effusions.      #SOB: in the setting of likely malignant pleural effusion. Imaging results reviewed. CT SX and IR recs appreciated. s/p L thoracentesis with chest tube. f/CTS planning to remove chest tube today. Monitor output. Pain control. Pt was weaned off O2, however, pt intermittently placed on NC for comfort. Discussed with pt that she does not need oxygen and pt stated that it may be psychological.     #Uterine cancer: f/u onc recs

## 2022-10-08 NOTE — PROGRESS NOTE ADULT - PROBLEM SELECTOR PLAN 5
Patient follows with Dr. Mirtha Celaya at [facility] [place], last chemo 9/14  Oncology consulted via email for management and role of inpt chemo  - Follow up onc recommendations.

## 2022-10-08 NOTE — PROGRESS NOTE ADULT - PROBLEM SELECTOR PLAN 1
Likely secondary to pleural effusion, however should R/O HF given pt on doxorubicin and PE given malignancy  #malignant pleural effusion  - CTA rules out PE  - Pts last echo 9/8/22 shows normal LV function, Ef=68% with mild concentric hypertrophy  - c/w oxygen therapy w/ humidified O2 for comfort  - s/p L chest tube placement by IR 10/4, effusion Light's criteria positive  - initially 350 output from tube, decreased drain output 10/6, zero output 10/7  - per IR, low need for CT placement on R side  - f/u IR/CTS recs  - plan to monitor drainage for 3-5 days to determine need for PleurX on L Likely secondary to pleural effusion, however should R/O HF given pt on doxorubicin and PE given malignancy  #malignant pleural effusion  - CTA rules out PE  - Pts last echo 9/8/22 shows normal LV function, Ef=68% with mild concentric hypertrophy  - c/w oxygen therapy w/ humidified O2 for comfort  - s/p L chest tube placement by IR 10/4, effusion Light's criteria positive  - initially 350 output from tube, decreased drain output 10/6, zero output 10/7-10/8  - per IR, low need for CT placement on R side  - f/u IR/CTS recs  - plan to monitor drainage for 3-5 days to determine need for PleurX on L  [ ] FU CT surg regarding CT discontinuation

## 2022-10-08 NOTE — PROGRESS NOTE ADULT - PROBLEM SELECTOR PLAN 6
DVT ppx w lovenox  - pt noted to prev be on xarelto but per surescripts not picked up since July, clarify xarelto usage with patient DVT ppx w lovenox  - pt noted to prev be on xarelto but per surescripts not picked up since July, clarify xarelto usage with patient    [ ] Pending PT consult and chest tube plan

## 2022-10-08 NOTE — PROGRESS NOTE ADULT - ASSESSMENT
57F PMHx of uterine ca, chronic anemia and asthma, admitted to [facility] for SOB/cough. CT scan showed b/l moderate pleural effusions w/ L>R now s/p L PTC by IR on 10/4     PLAN  -L PTC with no drainage in over 30H  -will d/c PTC today, f/u CXR post removal  -No need for PleurX placement this admission, can f/u with Dr. Peters as outpatient to discuss possible PleurX  -If patient reaccumulates fluid, would consider PleurX  - care per primary     Plan discussed with and approved by attending, Dr. Fran Monzon MD PGY-2  Thoracic surgery  [id number]

## 2022-10-08 NOTE — PROGRESS NOTE ADULT - SUBJECTIVE AND OBJECTIVE BOX
Thoracic Surgery Daily Progress Note   ALEXANDER HUDDLESTON | MRN-[id number] | [facility] [place]  --------------------------------------------------------------------------------------------------------------------  SUBJECTIVE / 24H EVENTS  Patient seen and examined on morning rounds. No acute events overnight. Denies fevers, chills, NVD, SOB, lightheadedness, CP.   --------------------------------------------------------------------------------------------------------------------  OBJECTIVE:    VITAL SIGNS:  T(C): 37 (10-08-22 @ 14:30), Max: 37 (10-08-22 @ 14:30)  HR: 102 (10-08-22 @ 14:30) (102 - 110)  BP: 127/87 (10-08-22 @ 14:30) (118/73 - 127/91)  RR: 18 (10-08-22 @ 14:30) (16 - 18)  SpO2: 93% (10-08-22 @ 14:30) (93% - 100%)  Daily     Daily       PHYSICAL EXAM:  Gen: NAD  LS: Respirations unlabored, no accessory muscle use  Card: RRR. No m/r/g.   GI: Soft. Nontender. Nondistended.  Ext: Warm, well perfused    Tubes: L PTC to WS, no AL, no output in last 24H         10-07-22 @ 07:01  -  10-08-22 @ 07:00  --------------------------------------------------------  IN:  Total IN: 0 mL    OUT:    Chest Tube (mL): 0 mL  Total OUT: 0 mL    Total NET: 0 mL          LAB VALUES:  10-07    140  |  101  |  9   ----------------------------<  104<H>  3.6   |  30  |  0.79    Ca    9.7      07 Oct 2022 07:31  Phos  4.1     10-07  Mg     1.50     10-07                                 8.6    3.46  )-----------( 384      ( 07 Oct 2022 07:31 )             28.5                   MICROBIOLOGY:    No new microbiology data for review.     RADIOLOGY:  PACS Image: Image(s) Available (10-08-22 @ 08:20)    No new radiographic images for review.    MEDICATIONS  (STANDING):  chlorhexidine 2% Cloths 1 Application(s) Topical daily  enoxaparin Injectable 40 milliGRAM(s) SubCutaneous every 24 hours  influenza   Vaccine 0.5 milliLiter(s) IntraMuscular once  magnesium oxide 400 milliGRAM(s) Oral three times a day with meals  phenylephrine 0.25% Suppository 1 Suppository(s) Rectal three times a day  polyethylene glycol 3350 17 Gram(s) Oral two times a day  senna 1 Tablet(s) Oral at bedtime    MEDICATIONS  (PRN):  acetaminophen     Tablet .. 650 milliGRAM(s) Oral every 6 hours PRN Mild Pain (1 - 3), Moderate Pain (4 - 6)  benzocaine 15 mG/menthol 3.6 mG Lozenge 1 Lozenge Oral every 2 hours PRN Cough  bisacodyl Suppository 10 milliGRAM(s) Rectal daily PRN Constipation  guaiFENesin Oral Liquid (Sugar-Free) 400 milliGRAM(s) Oral every 6 hours PRN Cough  hydrocodone/homatropine Syrup 5 milliLiter(s) Oral every 6 hours PRN Cough

## 2022-10-08 NOTE — PROGRESS NOTE ADULT - SUBJECTIVE AND OBJECTIVE BOX
PROGRESS NOTE:   Authored by Madison Xie MD  Pager [phone] [facility] | [id number] [address]    Patient is a 57y old  Female who presents with a chief complaint of shortness of breath (07 Oct 2022 11:24)      SUBJECTIVE / OVERNIGHT EVENTS:    MEDICATIONS  (STANDING):  chlorhexidine 2% Cloths 1 Application(s) Topical daily  enoxaparin Injectable 40 milliGRAM(s) SubCutaneous every 24 hours  influenza   Vaccine 0.5 milliLiter(s) IntraMuscular once  magnesium oxide 400 milliGRAM(s) Oral three times a day with meals  phenylephrine 0.25% Suppository 1 Suppository(s) Rectal three times a day  polyethylene glycol 3350 17 Gram(s) Oral daily  senna 1 Tablet(s) Oral at bedtime    MEDICATIONS  (PRN):  acetaminophen     Tablet .. 650 milliGRAM(s) Oral every 6 hours PRN Mild Pain (1 - 3), Moderate Pain (4 - 6)  benzocaine 15 mG/menthol 3.6 mG Lozenge 1 Lozenge Oral every 2 hours PRN Cough  bisacodyl Suppository 10 milliGRAM(s) Rectal daily PRN Constipation  guaiFENesin Oral Liquid (Sugar-Free) 400 milliGRAM(s) Oral every 6 hours PRN Cough  hydrocodone/homatropine Syrup 5 milliLiter(s) Oral every 6 hours PRN Cough      I&O's Summary    07 Oct 2022 07:01  -  08 Oct 2022 07:00  --------------------------------------------------------  IN: 0 mL / OUT: 0 mL / NET: 0 mL        PHYSICAL EXAM:  Vital Signs Last 24 Hrs  T(C): 36.4 (08 Oct 2022 06:40), Max: 37.2 (07 Oct 2022 09:30)  T(F): 97.6 (08 Oct 2022 06:40), Max: 98.9 (07 Oct 2022 09:30)  HR: 108 (08 Oct 2022 06:40) (108 - 110)  BP: 123/79 (08 Oct 2022 06:40) (117/74 - 127/91)  BP(mean): --  RR: 18 (08 Oct 2022 06:40) (16 - 18)  SpO2: 99% (08 Oct 2022 06:40) (98% - 100%)    Parameters below as of 08 Oct 2022 06:40  Patient On (Oxygen Delivery Method): room air            LABS:                        8.6    3.46  )-----------( 384      ( 07 Oct 2022 07:31 )             28.5     10-07    140  |  101  |  9   ----------------------------<  104<H>  3.6   |  30  |  0.79    Ca    9.7      07 Oct 2022 07:31  Phos  4.1     10-07  Mg     1.50     10-07    TPro  7.0  /  Alb  3.0<L>  /  TBili  0.2  /  DBili  x   /  AST  9   /  ALT  <5<L>  /  AlkPhos  79  10-06    PT/INR - ( 06 Oct 2022 07:44 )   PT: 15.1 sec;   INR: 1.30 ratio         PTT - ( 06 Oct 2022 07:44 )  PTT:29.4 sec           PROGRESS NOTE:   Authored by Madison Xie MD  Pager [phone] [facility] | [id number] [address]    Patient is a 57y old  Female who presents with a chief complaint of shortness of breath (07 Oct 2022 11:24)      SUBJECTIVE / OVERNIGHT EVENTS:  Afebrile. Didn't get much sleep last night and this AM as room mate very loud. Some pain around CT site.     MEDICATIONS  (STANDING):  chlorhexidine 2% Cloths 1 Application(s) Topical daily  enoxaparin Injectable 40 milliGRAM(s) SubCutaneous every 24 hours  influenza   Vaccine 0.5 milliLiter(s) IntraMuscular once  magnesium oxide 400 milliGRAM(s) Oral three times a day with meals  phenylephrine 0.25% Suppository 1 Suppository(s) Rectal three times a day  polyethylene glycol 3350 17 Gram(s) Oral daily  senna 1 Tablet(s) Oral at bedtime    MEDICATIONS  (PRN):  acetaminophen     Tablet .. 650 milliGRAM(s) Oral every 6 hours PRN Mild Pain (1 - 3), Moderate Pain (4 - 6)  benzocaine 15 mG/menthol 3.6 mG Lozenge 1 Lozenge Oral every 2 hours PRN Cough  bisacodyl Suppository 10 milliGRAM(s) Rectal daily PRN Constipation  guaiFENesin Oral Liquid (Sugar-Free) 400 milliGRAM(s) Oral every 6 hours PRN Cough  hydrocodone/homatropine Syrup 5 milliLiter(s) Oral every 6 hours PRN Cough      I&O's Summary    07 Oct 2022 07:01  -  08 Oct 2022 07:00  --------------------------------------------------------  IN: 0 mL / OUT: 0 mL / NET: 0 mL        PHYSICAL EXAM:  Vital Signs Last 24 Hrs  T(C): 36.4 (08 Oct 2022 06:40), Max: 37.2 (07 Oct 2022 09:30)  T(F): 97.6 (08 Oct 2022 06:40), Max: 98.9 (07 Oct 2022 09:30)  HR: 108 (08 Oct 2022 06:40) (108 - 110)  BP: 123/79 (08 Oct 2022 06:40) (117/74 - 127/91)  BP(mean): --  RR: 18 (08 Oct 2022 06:40) (16 - 18)  SpO2: 99% (08 Oct 2022 06:40) (98% - 100%)    Parameters below as of 08 Oct 2022 06:40  Patient On (Oxygen Delivery Method): room air      GENERAL: Laying comfortably, NAD  HEENT: NCAT, PERRLA, EOMI, no scleral icterus  LUNG: CTABL anteriorly, unable to assess posteriorly  HEART: RRR; normal S1/S2; No murmurs, rubs, or gallops  ABDOMEN: +BS, soft, nontender, mild abdominal distension  EXTREMITIES:  No LE edema b/l, 2+ Peripheral Pulses, No clubbing or cyanosis  NEUROLOGY: AOx3, non-focal, strength 5/5 in all extremities, sensation intact  PSYCH: calm and cooperative  SKIN: No rashes or lesions      LABS:                        8.6    3.46  )-----------( 384      ( 07 Oct 2022 07:31 )             28.5     10-07    140  |  101  |  9   ----------------------------<  104<H>  3.6   |  30  |  0.79    Ca    9.7      07 Oct 2022 07:31  Phos  4.1     10-07  Mg     1.50     10-07    TPro  7.0  /  Alb  3.0<L>  /  TBili  0.2  /  DBili  x   /  AST  9   /  ALT  <5<L>  /  AlkPhos  79  10-06    PT/INR - ( 06 Oct 2022 07:44 )   PT: 15.1 sec;   INR: 1.30 ratio         PTT - ( 06 Oct 2022 07:44 )  PTT:29.4 sec

## 2022-10-09 NOTE — PROGRESS NOTE ADULT - PROBLEM SELECTOR PLAN 2
Patient has known history of malignant b/l pleural effusions  - Patient had prior IR drainage in R lungs early in June and L chest tube as well   - now s/p chest tube placement by IR 10/4  - continue supportive care with cough meds, monitor pulse ox, see above  - wean Oxygen as tolerated Patient has known history of malignant b/l pleural effusions  - Patient had prior IR drainage in R lungs early in June and L chest tube as well   - now s/p chest tube placement by IR 10/4, removed 10/8  - continue supportive care with cough meds, monitor pulse ox, see above  - wean Oxygen as tolerated

## 2022-10-09 NOTE — CHART NOTE - NSCHARTNOTEFT_GEN_A_CORE
L PTC removed, f/u cxr no obvious ptx. Recommend outpt f/u w/ Dr Peters w/ CXR in 1 week. [phone]. Please give pt a script for cxr and advise to have an cxr done 1 day befre appointment and bring a copy to the office on day of appointment

## 2022-10-09 NOTE — PROGRESS NOTE ADULT - PROBLEM SELECTOR PLAN 1
Likely secondary to pleural effusion, however should R/O HF given pt on doxorubicin and PE given malignancy  #malignant pleural effusion  - CTA rules out PE  - Pts last echo 9/8/22 shows normal LV function, Ef=68% with mild concentric hypertrophy  - c/w oxygen therapy w/ humidified O2 for comfort  - s/p L chest tube placement by IR 10/4, effusion Light's criteria positive  - initially 350 output from tube, decreased drain output 10/6, zero output 10/7-10/8  - per IR, low need for CT placement on R side  - f/u IR/CTS recs  - plan to monitor drainage for 3-5 days to determine need for PleurX on L  [ ] FU CT surg regarding CT discontinuation Likely secondary to pleural effusion, however should R/O HF given pt on doxorubicin and PE given malignancy  #malignant pleural effusion  - CTA rules out PE  - Pts last echo 9/8/22 shows normal LV function, Ef=68% with mild concentric hypertrophy  - c/w oxygen therapy w/ humidified O2 for comfort  - s/p L chest tube placement by IR 10/4, effusion Light's criteria positive  - initially 350 output from tube, decreased drain output 10/6, zero output 10/7-10/8, CT removed 10/8  - per IR, low need for CT placement on R side  - f/u IR/CTS recs

## 2022-10-09 NOTE — PROGRESS NOTE ADULT - SUBJECTIVE AND OBJECTIVE BOX
***************************************************************  Chivo Ferguson, PGY1  Internal Medicine   TEAMS Preferred  [facility] Pager [phone]  [facility] Pager [phone]  ***************************************************************    ALEXANDER HUDDLESTON  57y  MRN: [id number]  10-01-22 (8d)    Patient is a 57y old  Female who presents with a chief complaint of shortness of breath (08 Oct 2022 14:43)      SUBJECTIVE / OVERNIGHT EVENTS:   No acute overnight events. Pt seen and examined at bedside. Denies fevers, chills, CP, SOB, Abdominal pain, N/V, Constipation, Diarrhea. Last BM     12 Point ROS negative with the exception of the above    MEDICATIONS  (STANDING):  chlorhexidine 2% Cloths 1 Application(s) Topical daily  enoxaparin Injectable 40 milliGRAM(s) SubCutaneous every 24 hours  fluticasone propionate 50 MICROgram(s)/spray Nasal Spray 1 Spray(s) Both Nostrils two times a day  influenza   Vaccine 0.5 milliLiter(s) IntraMuscular once  magnesium oxide 400 milliGRAM(s) Oral three times a day with meals  phenylephrine 0.25% Suppository 1 Suppository(s) Rectal three times a day  polyethylene glycol 3350 17 Gram(s) Oral two times a day  senna 1 Tablet(s) Oral at bedtime    MEDICATIONS  (PRN):  acetaminophen     Tablet .. 650 milliGRAM(s) Oral every 6 hours PRN Mild Pain (1 - 3), Moderate Pain (4 - 6)  benzocaine 15 mG/menthol 3.6 mG Lozenge 1 Lozenge Oral every 2 hours PRN Cough  bisacodyl Suppository 10 milliGRAM(s) Rectal daily PRN Constipation  guaiFENesin Oral Liquid (Sugar-Free) 400 milliGRAM(s) Oral every 6 hours PRN Cough      OBJECTIVE:  Vital Signs Last 24 Hrs  T(C): 36.8 (09 Oct 2022 06:05), Max: 37 (08 Oct 2022 14:30)  T(F): 98.2 (09 Oct 2022 06:05), Max: 98.6 (08 Oct 2022 14:30)  HR: 104 (09 Oct 2022 06:05) (102 - 107)  BP: 127/82 (09 Oct 2022 06:05) (126/89 - 136/87)  BP(mean): --  RR: 18 (09 Oct 2022 06:05) (18 - 18)  SpO2: 97% (09 Oct 2022 06:05) (93% - 98%)    Parameters below as of 09 Oct 2022 06:05  Patient On (Oxygen Delivery Method): room air        I&O's Summary      PHYSICAL EXAM:  GENERAL: Laying comfortably, NAD  HEENT: NCAT, PERRLA, EOMI, no scleral icterus, no LAD  NECK: No JVD  LUNG: CTABL; No wheezes, crackles, or rhonchi  HEART: RRR; normal S1/S2; No murmurs, rubs, or gallops  ABDOMEN: +BS, soft, nontender, nondistended, no HSM; No rebound, guarding, or rigidity  EXTREMITIES:  No LE edema b/l, 2+ Peripheral Pulses, No clubbing or cyanosis  NEUROLOGY: AOx3, non-focal, strength 5/5 in all extremities, sensation intact  PSYCH: calm and cooperative  SKIN: No rashes or lesions    LABS:    10-07    140  |  101  |  9   ----------------------------<  104<H>  3.6   |  30  |  0.79    Ca    9.7      07 Oct 2022 07:31                  CAPILLARY BLOOD GLUCOSE            RADIOLOGY & ADDITIONAL TESTS:     ***************************************************************  Chivo Ferguson, PGY1  Internal Medicine   TEAMS Preferred  [facility] Pager [phone]  [facility] Pager [phone]  ***************************************************************    ALEXANDER HUDDLESTON  57y  MRN: [id number]  10-01-22 (8d)    Patient is a 57y old  Female who presents with a chief complaint of shortness of breath (08 Oct 2022 14:43)      SUBJECTIVE / OVERNIGHT EVENTS:   Patient reports shortness of breath and coughing after getting up to use the restroom. Patient additionally reports increased sputum production. Denies fevers, chills, and chest pain. Patient having small BMs. Patient hesitant about going home 2/2 shortness of breath.     12 Point ROS negative with the exception of the above    MEDICATIONS  (STANDING):  chlorhexidine 2% Cloths 1 Application(s) Topical daily  enoxaparin Injectable 40 milliGRAM(s) SubCutaneous every 24 hours  fluticasone propionate 50 MICROgram(s)/spray Nasal Spray 1 Spray(s) Both Nostrils two times a day  influenza   Vaccine 0.5 milliLiter(s) IntraMuscular once  magnesium oxide 400 milliGRAM(s) Oral three times a day with meals  phenylephrine 0.25% Suppository 1 Suppository(s) Rectal three times a day  polyethylene glycol 3350 17 Gram(s) Oral two times a day  senna 1 Tablet(s) Oral at bedtime    MEDICATIONS  (PRN):  acetaminophen     Tablet .. 650 milliGRAM(s) Oral every 6 hours PRN Mild Pain (1 - 3), Moderate Pain (4 - 6)  benzocaine 15 mG/menthol 3.6 mG Lozenge 1 Lozenge Oral every 2 hours PRN Cough  bisacodyl Suppository 10 milliGRAM(s) Rectal daily PRN Constipation  guaiFENesin Oral Liquid (Sugar-Free) 400 milliGRAM(s) Oral every 6 hours PRN Cough      OBJECTIVE:  Vital Signs Last 24 Hrs  T(C): 36.8 (09 Oct 2022 06:05), Max: 37 (08 Oct 2022 14:30)  T(F): 98.2 (09 Oct 2022 06:05), Max: 98.6 (08 Oct 2022 14:30)  HR: 104 (09 Oct 2022 06:05) (102 - 107)  BP: 127/82 (09 Oct 2022 06:05) (126/89 - 136/87)  BP(mean): --  RR: 18 (09 Oct 2022 06:05) (18 - 18)  SpO2: 97% (09 Oct 2022 06:05) (93% - 98%)    Parameters below as of 09 Oct 2022 06:05  Patient On (Oxygen Delivery Method): room air      PHYSICAL EXAM:  GENERAL: Laying comfortably, NAD  HEENT: NCAT, PERRLA, EOMI, no scleral icterus  LUNG: decreased lung sounds in bilaterally bases   HEART: RRR; normal S1/S2; No murmurs, rubs, or gallops  ABDOMEN: +BS, soft, nontender, mild distension  EXTREMITIES:  No LE edema b/l, 2+ Peripheral Pulses, No clubbing or cyanosis  NEUROLOGY: AOx3, non-focal, strength 5/5 in all extremities, sensation intact  PSYCH: calm and cooperative  SKIN: No rashes or lesions    LABS:    10-07    140  |  101  |  9   ----------------------------<  104<H>  3.6   |  30  |  0.79    Ca    9.7      07 Oct 2022 07:31

## 2022-10-09 NOTE — PHYSICAL THERAPY INITIAL EVALUATION ADULT - PERTINENT HX OF CURRENT PROBLEM, REHAB EVAL
56-year-old female with a PMHx of uterine cancer (S/p chemotherapy, radiation and hysterectomy) with mets to the lungs and peritoneal carcinomatosis (on doxorubicin w/ Brianna, last session 9/14/22), pleural effusions and chronic anemia is presenting  to the ED with progressively worsening SOB

## 2022-10-09 NOTE — PHYSICAL THERAPY INITIAL EVALUATION ADULT - NSPTDISCHREC_GEN_A_CORE
anticipated discharge to home with no skilled restorative physical therapy services upon [facility]. Please follow therapy for continued assessment.

## 2022-10-09 NOTE — PROGRESS NOTE ADULT - ASSESSMENT
57F w PMHX of uterine carcinoma (s/p hysterectomy [facility], 2020, ongoing chemotherapy (last session 9/14; known to Dr. Danny Celaya) s/p radiation, w/ hx of b/l malignant pleural effusions s/p R VATS 10/2021, PleurX catheter removed 3/22, and R thoracentesis by IR and L chest tube by thoracic surgery on prev admission 6/11/22, presenting for worsening SOB and found to have reaccumulated b/l pleural effusions. Patient now s/p L chest tube placement on 10/4 57F w PMHX of uterine carcinoma (s/p hysterectomy [facility], 2020, ongoing chemotherapy (last session 9/14; known to Dr. Danny Celaya) s/p radiation, w/ hx of b/l malignant pleural effusions s/p R VATS 10/2021, PleurX catheter removed 3/22, and R thoracentesis by IR and L chest tube by thoracic surgery on prev admission 6/11/22, presenting for worsening SOB and found to have reaccumulated b/l pleural effusions. Patient now s/p L chest tube placement on 10/4. Chest tube removed 10/8.

## 2022-10-09 NOTE — PROGRESS NOTE ADULT - PROBLEM SELECTOR PLAN 5
Patient follows with Dr. Mirtha Celaya at [facility], last chemo 9/14  Oncology consulted via email for management and role of inpt chemo  - Follow up onc recommendations.

## 2022-10-09 NOTE — CHART NOTE - NSCHARTNOTEFT_GEN_A_CORE
57F uterine Ca with right chest port, admitted for SOB with pleural effusions s/p left chest tube with IR 10/4, removed 10/8 now with difficulty aspirating from port.      plan for port dye study Monday 10/10  can put order for IR procedure under Dr Halaibeh  ensure covid swab within 5 days of procedure

## 2022-10-09 NOTE — PROGRESS NOTE ADULT - PROBLEM SELECTOR PLAN 6
DVT ppx w lovenox  - pt noted to prev be on xarelto but per surescripts not picked up since July, clarify xarelto usage with patient    [ ] Pending PT consult and chest tube plan DVT ppx w lovenox  - pt noted to prev be on xarelto but per surescripts not picked up since July, clarify xarelto usage with patient    Dispo: home without PT needs, pending improved SOB

## 2022-10-09 NOTE — PROGRESS NOTE ADULT - ATTENDING COMMENTS
57F w PMHX of uterine carcinoma (s/p hysterectomy [facility], 2020, ongoing chemotherapy (last session 9/14; known to Dr. Danny Celaya) s/p radiation, w/ hx of b/l malignant pleural effusions s/p R VATS 10/2021, PleurX catheter removed 3/22, and R thoracentesis by IR and L chest tube by thoracic surgery on prev admission 6/11/22, presenting for worsening SOB and found to have reaccumulated b/l pleural effusions.      #SOB: in the setting of likely malignant pleural effusion. Imaging results reviewed. CT SX and IR recs appreciated. s/p L thoracentesis with chest tube. Chest tube removed on 10/8. Pt was weaned off O2, however, pt intermittently placed on NC for comfort. O2 sat while ambulating was 92%. Discussed with pt that she does not need oxygen and pt stated that it may be psychological.     #Uterine cancer: f/u onc recs

## 2022-10-10 NOTE — PROGRESS NOTE ADULT - PROBLEM SELECTOR PLAN 5
Patient follows with Dr. Mirtha Celaya at [facility], last chemo 9/14  Oncology consulted via email for management and role of inpt chemo  - Follow up onc recommendations. stable volume of malignant ascites.  - IR: no abdominal drainage indicated

## 2022-10-10 NOTE — PROVIDER CONTACT NOTE (OTHER) - ASSESSMENT
Patient alert and oriented x4, no s/s of acute distress
Pt R chest port dressing is due to be changed, but pt refused. Pt wants to wait until she goes to IR for evaluation
Pt refused senna and hemoroidal suppository.  Pt R chest port does not have blood return and pt does not want peripheral IV
Pt refused to have labs drawn this am
pt complains of R Flank pain while ambulating to stretcher being transported to IR for catheter assessment.
Pt HR was 116 bpm. Asymptomatic
Pt is stable at this time
No s/s of acute distress, no SOB, no acute resp distress. Pt with chest port access that has no blood return.
Patient alert and oriented x4.

## 2022-10-10 NOTE — PROVIDER CONTACT NOTE (OTHER) - BACKGROUND
Pt was admitted for pleural effusion
Pt was admitted plueral effusion
Pt was admitted for pleural effusion
Pt was admitted for pleural effusion
Pt was admitted for plueral effusion
Patient admitted for pleural effusion
Patient admitted for pleural effusion
Yanni admitted for pleural effusion

## 2022-10-10 NOTE — PROGRESS NOTE ADULT - PROBLEM SELECTOR PLAN 1
Likely secondary to pleural effusion, however should R/O HF given pt on doxorubicin and PE given malignancy  #malignant pleural effusion  - CTA rules out PE  - Pts last echo 9/8/22 shows normal LV function, Ef=68% with mild concentric hypertrophy  - c/w oxygen therapy w/ humidified O2 for comfort  - s/p L chest tube placement by IR 10/4, effusion Light's criteria positive  - initially 350 output from tube, decreased drain output 10/6, zero output 10/7-10/8, CT removed 10/8  - per IR, low need for CT placement on R side  - f/u IR/CTS recs Likely secondary to pleural effusion, however should R/O HF given pt on doxorubicin and PE given malignancy  #malignant pleural effusion  - CTA rules out PE  - Pts last echo 9/8/22 shows normal LV function, Ef=68% with mild concentric hypertrophy  - c/w oxygen therapy w/ humidified O2 for comfort  - s/p L chest tube placement by IR 10/4, effusion Light's criteria positive  - initially 350 output from tube, decreased drain output 10/6, zero output 10/7-10/8, CT removed 10/8  - per IR, low need for CT placement on R side  - f/u IR/CTS recs  - Pt is stable at this team and is ready for discharge Likely secondary to pleural effusion, however should R/O HF given pt on doxorubicin and PE given malignancy  #malignant pleural effusion  - CTA rules out PE  - Pts last echo 9/8/22 shows normal LV function, Ef=68% with mild concentric hypertrophy  - c/w oxygen therapy w/ humidified O2 for comfort  - s/p L chest tube placement by IR 10/4, effusion Light's criteria positive  - initially 350 output from tube, decreased drain output 10/6, zero output 10/7-10/8, CT removed 10/8  - per IR, low need for CT placement on R side  - f/u IR/CTS recs  - Pt is stable at this time and is ready for discharge

## 2022-10-10 NOTE — PROGRESS NOTE ADULT - PROBLEM SELECTOR PLAN 6
DVT ppx w lovenox  - pt noted to prev be on xarelto but per surescripts not picked up since July, clarify xarelto usage with patient    Dispo: home without PT needs, pending improved SOB DVT ppx w lovenox    Dispo: home without PT needs Patient follows with Dr. Mirtha Celaya at [facility], last chemo 9/14  Oncology consulted via email for management and role of inpt chemo  - Follow up onc recommendations.

## 2022-10-10 NOTE — PROGRESS NOTE ADULT - SUBJECTIVE AND OBJECTIVE BOX
***************************************************************  Chivo Ferguson, PGY1  Internal Medicine   TEAMS Preferred  [facility] Pager [phone]  [facility] Pager [phone]  ***************************************************************    ALEXANDER HUDDLESTON  57y  MRN: [id number]  10-01-22 (9d)    Patient is a 57y old  Female who presents with a chief complaint of shortness of breath (09 Oct 2022 10:02)      SUBJECTIVE / OVERNIGHT EVENTS:   No acute overnight events. Pt seen and examined at bedside. Denies fevers, chills, CP, SOB, Abdominal pain, N/V, Constipation, Diarrhea. Last BM     12 Point ROS negative with the exception of the above    MEDICATIONS  (STANDING):  chlorhexidine 2% Cloths 1 Application(s) Topical daily  enoxaparin Injectable 40 milliGRAM(s) SubCutaneous every 24 hours  influenza   Vaccine 0.5 milliLiter(s) IntraMuscular once  magnesium oxide 400 milliGRAM(s) Oral three times a day with meals  phenylephrine 0.25% Suppository 1 Suppository(s) Rectal three times a day  polyethylene glycol 3350 17 Gram(s) Oral two times a day  senna 1 Tablet(s) Oral at bedtime    MEDICATIONS  (PRN):  acetaminophen     Tablet .. 650 milliGRAM(s) Oral every 6 hours PRN Mild Pain (1 - 3), Moderate Pain (4 - 6)  benzocaine 15 mG/menthol 3.6 mG Lozenge 1 Lozenge Oral every 2 hours PRN Cough  bisacodyl Suppository 10 milliGRAM(s) Rectal daily PRN Constipation  guaiFENesin Oral Liquid (Sugar-Free) 400 milliGRAM(s) Oral every 6 hours PRN Cough  hydrocodone/homatropine Syrup 5 milliLiter(s) Oral every 6 hours PRN Cough      OBJECTIVE:  Vital Signs Last 24 Hrs  T(C): 36.5 (10 Oct 2022 06:25), Max: 37.1 (09 Oct 2022 22:20)  T(F): 97.7 (10 Oct 2022 06:25), Max: 98.8 (09 Oct 2022 22:20)  HR: 104 (10 Oct 2022 06:25) (104 - 120)  BP: 124/86 (10 Oct 2022 06:25) (124/86 - 133/90)  BP(mean): --  RR: 18 (10 Oct 2022 06:25) (17 - 18)  SpO2: 95% (10 Oct 2022 06:25) (92% - 99%)    Parameters below as of 10 Oct 2022 06:25  Patient On (Oxygen Delivery Method): room air        I&O's Summary      PHYSICAL EXAM:  GENERAL: Laying comfortably, NAD  HEENT: NCAT, PERRLA, EOMI, no scleral icterus, no LAD  NECK: No JVD  LUNG: CTABL; No wheezes, crackles, or rhonchi  HEART: RRR; normal S1/S2; No murmurs, rubs, or gallops  ABDOMEN: +BS, soft, nontender, nondistended, no HSM; No rebound, guarding, or rigidity  EXTREMITIES:  No LE edema b/l, 2+ Peripheral Pulses, No clubbing or cyanosis  NEUROLOGY: AOx3, non-focal, strength 5/5 in all extremities, sensation intact  PSYCH: calm and cooperative  SKIN: No rashes or lesions    LABS:    10-07    140  |  101  |  9   ----------------------------<  104<H>  3.6   |  30  |  0.79    Ca    9.7      07 Oct 2022 07:31                  CAPILLARY BLOOD GLUCOSE            RADIOLOGY & ADDITIONAL TESTS:     ***************************************************************  Chivo Ferguson, PGY1  Internal Medicine   TEAMS Preferred  [facility] Pager [phone]  [facility] Pager [phone]  ***************************************************************    ALEXANDER HUDDLESTON  57y  MRN: [id number]  10-01-22 (9d)    Patient is a 57y old  Female who presents with a chief complaint of shortness of breath (09 Oct 2022 10:02)      SUBJECTIVE / OVERNIGHT EVENTS:   No acute overnight events. Pt seen and examined at bedside. Denies fevers, chills, nausea, vomiting. Continues to endorse cramping pain and shortness of breath when trying to defecate; producing small stools. Reports improvements in her shortness of breath and cough. Patient refusing flonase and am labs.     MEDICATIONS  (STANDING):  chlorhexidine 2% Cloths 1 Application(s) Topical daily  enoxaparin Injectable 40 milliGRAM(s) SubCutaneous every 24 hours  influenza   Vaccine 0.5 milliLiter(s) IntraMuscular once  magnesium oxide 400 milliGRAM(s) Oral three times a day with meals  phenylephrine 0.25% Suppository 1 Suppository(s) Rectal three times a day  polyethylene glycol 3350 17 Gram(s) Oral two times a day  senna 1 Tablet(s) Oral at bedtime    MEDICATIONS  (PRN):  acetaminophen     Tablet .. 650 milliGRAM(s) Oral every 6 hours PRN Mild Pain (1 - 3), Moderate Pain (4 - 6)  benzocaine 15 mG/menthol 3.6 mG Lozenge 1 Lozenge Oral every 2 hours PRN Cough  bisacodyl Suppository 10 milliGRAM(s) Rectal daily PRN Constipation  guaiFENesin Oral Liquid (Sugar-Free) 400 milliGRAM(s) Oral every 6 hours PRN Cough  hydrocodone/homatropine Syrup 5 milliLiter(s) Oral every 6 hours PRN Cough      OBJECTIVE:  Vital Signs Last 24 Hrs  T(C): 36.5 (10 Oct 2022 06:25), Max: 37.1 (09 Oct 2022 22:20)  T(F): 97.7 (10 Oct 2022 06:25), Max: 98.8 (09 Oct 2022 22:20)  HR: 104 (10 Oct 2022 06:25) (104 - 120)  BP: 124/86 (10 Oct 2022 06:25) (124/86 - 133/90)  BP(mean): --  RR: 18 (10 Oct 2022 06:25) (17 - 18)  SpO2: 95% (10 Oct 2022 06:25) (92% - 99%)    Parameters below as of 10 Oct 2022 06:25  Patient On (Oxygen Delivery Method): room air        I&O's Summary      PHYSICAL EXAM:  GENERAL: Laying comfortably, NAD  HEENT: NCAT, PERRLA, EOMI, no scleral icterus, no LAD  LUNG: CTABL; No wheezes, crackles, or rhonchi  HEART: RRR; normal S1/S2; No murmurs, rubs, or gallops  ABDOMEN: +BS, soft, nontender, nondistended  BACK: wound dressing over site of prior CT, clean dry and intact  EXTREMITIES:  No LE edema b/l, 2+ Peripheral Pulses, No clubbing or cyanosis  NEUROLOGY: AOx3, non-focal, sensation intact  PSYCH: cooperative, anxious  SKIN: No rashes or lesions    LABS:    10-07    140  |  101  |  9   ----------------------------<  104<H>  3.6   |  30  |  0.79    Ca    9.7      07 Oct 2022 07:31                  CAPILLARY BLOOD GLUCOSE            RADIOLOGY & ADDITIONAL TESTS:     ***************************************************************  Chivo Ferguson, PGY1  Internal Medicine   TEAMS Preferred  [facility] Pager [phone]  [facility] Pager [phone]  ***************************************************************    ALEXANDER HUDDLESTON  57y  MRN: [id number]  10-01-22 (9d)    Patient is a 57y old  Female who presents with a chief complaint of shortness of breath (09 Oct 2022 10:02)      SUBJECTIVE / OVERNIGHT EVENTS:   No acute overnight events. Pt seen and examined at bedside. Denies fevers, chills, nausea, vomiting. Continues to endorse cramping pain and shortness of breath when trying to defecate; producing small stools. Reports improvements in her shortness of breath and cough. Patient refusing flonase and am labs.     MEDICATIONS  (STANDING):  chlorhexidine 2% Cloths 1 Application(s) Topical daily  enoxaparin Injectable 40 milliGRAM(s) SubCutaneous every 24 hours  influenza   Vaccine 0.5 milliLiter(s) IntraMuscular once  magnesium oxide 400 milliGRAM(s) Oral three times a day with meals  phenylephrine 0.25% Suppository 1 Suppository(s) Rectal three times a day  polyethylene glycol 3350 17 Gram(s) Oral two times a day  senna 1 Tablet(s) Oral at bedtime    MEDICATIONS  (PRN):  acetaminophen     Tablet .. 650 milliGRAM(s) Oral every 6 hours PRN Mild Pain (1 - 3), Moderate Pain (4 - 6)  benzocaine 15 mG/menthol 3.6 mG Lozenge 1 Lozenge Oral every 2 hours PRN Cough  bisacodyl Suppository 10 milliGRAM(s) Rectal daily PRN Constipation  guaiFENesin Oral Liquid (Sugar-Free) 400 milliGRAM(s) Oral every 6 hours PRN Cough  hydrocodone/homatropine Syrup 5 milliLiter(s) Oral every 6 hours PRN Cough      OBJECTIVE:  Vital Signs Last 24 Hrs  T(C): 36.5 (10 Oct 2022 06:25), Max: 37.1 (09 Oct 2022 22:20)  T(F): 97.7 (10 Oct 2022 06:25), Max: 98.8 (09 Oct 2022 22:20)  HR: 104 (10 Oct 2022 06:25) (104 - 120)  BP: 124/86 (10 Oct 2022 06:25) (124/86 - 133/90)  BP(mean): --  RR: 18 (10 Oct 2022 06:25) (17 - 18)  SpO2: 95% (10 Oct 2022 06:25) (92% - 99%)    Parameters below as of 10 Oct 2022 06:25  Patient On (Oxygen Delivery Method): room air        I&O's Summary      PHYSICAL EXAM:  GENERAL: Laying comfortably, NAD  HEENT: NCAT, PERRLA, EOMI, no scleral icterus, no LAD  LUNG: CTABL; No wheezes, crackles, or rhonchi  HEART: sinus tachycardia; normal S1/S2; No murmurs, rubs, or gallops  ABDOMEN: +BS, soft, nontender, nondistended  BACK: wound dressing over site of prior CT, clean dry and intact  EXTREMITIES:  No LE edema b/l, 2+ Peripheral Pulses, No clubbing or cyanosis  NEUROLOGY: AOx3, non-focal, sensation intact  PSYCH: cooperative, anxious  SKIN: No rashes or lesions    LABS:    10-07    140  |  101  |  9   ----------------------------<  104<H>  3.6   |  30  |  0.79    Ca    9.7      07 Oct 2022 07:31                  CAPILLARY BLOOD GLUCOSE            RADIOLOGY & ADDITIONAL TESTS:     ***************************************************************  Chivo Ferguson, PGY1  Internal Medicine   TEAMS Preferred  [facility] Pager [phone]  [facility] Pager [phone]  ***************************************************************    ALEXANDER HUDDLESTON  57y  MRN: [id number]  10-01-22 (9d)    Patient is a 57y old  Female who presents with a chief complaint of shortness of breath (09 Oct 2022 10:02)      SUBJECTIVE / OVERNIGHT EVENTS:   No acute overnight events. Pt seen and examined at bedside. Denies fevers, chills, nausea, vomiting. Continues to endorse cramping pain and shortness of breath when trying to defecate; producing small stools. Reports improvements in her shortness of breath and cough. Patient refusing flonase and am labs.     MEDICATIONS  (STANDING):  chlorhexidine 2% Cloths 1 Application(s) Topical daily  enoxaparin Injectable 40 milliGRAM(s) SubCutaneous every 24 hours  influenza   Vaccine 0.5 milliLiter(s) IntraMuscular once  magnesium oxide 400 milliGRAM(s) Oral three times a day with meals  phenylephrine 0.25% Suppository 1 Suppository(s) Rectal three times a day  polyethylene glycol 3350 17 Gram(s) Oral two times a day  senna 1 Tablet(s) Oral at bedtime    MEDICATIONS  (PRN):  acetaminophen     Tablet .. 650 milliGRAM(s) Oral every 6 hours PRN Mild Pain (1 - 3), Moderate Pain (4 - 6)  benzocaine 15 mG/menthol 3.6 mG Lozenge 1 Lozenge Oral every 2 hours PRN Cough  bisacodyl Suppository 10 milliGRAM(s) Rectal daily PRN Constipation  guaiFENesin Oral Liquid (Sugar-Free) 400 milliGRAM(s) Oral every 6 hours PRN Cough  hydrocodone/homatropine Syrup 5 milliLiter(s) Oral every 6 hours PRN Cough      OBJECTIVE:  Vital Signs Last 24 Hrs  T(C): 36.5 (10 Oct 2022 06:25), Max: 37.1 (09 Oct 2022 22:20)  T(F): 97.7 (10 Oct 2022 06:25), Max: 98.8 (09 Oct 2022 22:20)  HR: 104 (10 Oct 2022 06:25) (104 - 120)  BP: 124/86 (10 Oct 2022 06:25) (124/86 - 133/90)  BP(mean): --  RR: 18 (10 Oct 2022 06:25) (17 - 18)  SpO2: 95% (10 Oct 2022 06:25) (92% - 99%)    Parameters below as of 10 Oct 2022 06:25  Patient On (Oxygen Delivery Method): room air        I&O's Summary      PHYSICAL EXAM:  GENERAL: Laying comfortably, NAD  HEENT: NCAT, PERRLA, EOMI, no scleral icterus, no LAD  LUNG: CTABL; No wheezes, crackles, or rhonchi  HEART: sinus tachycardia; normal S1/S2; No murmurs, rubs, or gallops  ABDOMEN: +BS, soft, nontender, nondistended  BACK: wound dressing over site of prior CT, clean dry and intact  EXTREMITIES:  No LE edema b/l, 2+ Peripheral Pulses, No clubbing or cyanosis  NEUROLOGY: AOx3, non-focal, sensation intact  PSYCH: cooperative, anxious  SKIN: No rashes or lesions    LABS:    10-07    140  |  101  |  9   ----------------------------<  104<H>  3.6   |  30  |  0.79    Ca    9.7      07 Oct 2022 07:31

## 2022-10-10 NOTE — PROGRESS NOTE ADULT - ASSESSMENT
57F w PMHX of uterine carcinoma (s/p hysterectomy [facility], 2020, ongoing chemotherapy (last session 9/14; known to Dr. Danny Celaya) s/p radiation, w/ hx of b/l malignant pleural effusions s/p R VATS 10/2021, PleurX catheter removed 3/22, and R thoracentesis by IR and L chest tube by thoracic surgery on prev admission 6/11/22, presenting for worsening SOB and found to have reaccumulated b/l pleural effusions. Patient now s/p L chest tube placement on 10/4. Chest tube removed 10/8.

## 2022-10-10 NOTE — PROVIDER CONTACT NOTE (OTHER) - ACTION/TREATMENT ORDERED:
MD said to not worry about blood draws at this time and to continue to monitor pt
MD notified and made aware.
MD Ferguson came to bedside and stated ok to give tylenol and OK to send patient to IR.
MD made aware, MD stated he will let day team know.
MD made aware, no new interventions ordered at this time
No interventions presently
No interventions presently
MD said the pt will go to IR for chest port evaluation. No cath damari at this time.
No interventions presently.

## 2022-10-10 NOTE — DISCHARGE NOTE NURSING/CASE MANAGEMENT/SOCIAL WORK - PATIENT PORTAL LINK FT
You can access the FollowMyHealth Patient Portal offered by  by registering at the following website: [email or web address]. By joining [email or web address]’s FollowMyHealth portal, you will also be able to view your health information using other applications (apps) compatible with our system.

## 2022-10-10 NOTE — PROGRESS NOTE ADULT - PROBLEM SELECTOR PLAN 2
Patient has known history of malignant b/l pleural effusions  - Patient had prior IR drainage in R lungs early in June and L chest tube as well   - now s/p chest tube placement by IR 10/4, removed 10/8  - continue supportive care with cough meds, monitor pulse ox, see above  - wean Oxygen as tolerated Patient has known history of malignant b/l pleural effusions  - Patient had prior IR drainage in R lungs early in June and L chest tube as well   - now s/p chest tube placement by IR 10/4, removed 10/8  - continue supportive care with cough meds, monitor pulse ox, see above  - now satting well on RA - Pt reports feeling constipated, despite bowel regimen. She is passing small amounts of stool w/ +flatu  - She has been refusing enemas and some of the bowel regimen  - Disimpaction attempted 10/10 but unsuccessful. Low suspicion for obstruction  - Will try SMOG enema today  - f/u portable x ray

## 2022-10-10 NOTE — PROVIDER CONTACT NOTE (OTHER) - REASON
Pt refused senna and hemoroidal suppository.  Pt R chest port does not have blood return and pt does not want peripheral IV

## 2022-10-10 NOTE — PROVIDER CONTACT NOTE (OTHER) - NAME OF MD/NP/PA/DO NOTIFIED:
Gregorio Craven
Juliana Lee
Dr Henley [id number] aware
MD Chivo Ferguson
Dr Henley [id number] aware
Dr Henley [id number] aware
MD Marty
Dr Henley aware
Madison Xie MD

## 2022-10-10 NOTE — PROGRESS NOTE ADULT - PROBLEM SELECTOR PLAN 3
Been in sinus tachycardia throughout stay  - likely 2/2 pain, anxiety, and discomfort, low c/f infection  - EKG 9/30 sinus tachycardia Patient has known history of malignant b/l pleural effusions  - Patient had prior IR drainage in R lungs early in June and L chest tube as well   - now s/p chest tube placement by IR 10/4, removed 10/8  - continue supportive care with cough meds, monitor pulse ox, see above  - now satting well on RA

## 2022-10-10 NOTE — PROVIDER CONTACT NOTE (OTHER) - RECOMMENDATIONS
Continue to monitor pt at this time
Have IR or onc team come and assess site. Do not use or flush site in case there is clot
Monitor
Notify MD
MD Notification
MD can speak to pt
Notify MD

## 2022-10-10 NOTE — PROVIDER CONTACT NOTE (OTHER) - DATE AND TIME:
09-Oct-2022 22:30
09-Oct-2022 21:30
10-Oct-2022 09:46
10-Oct-2022 04:00
10-Oct-2022 06:25
08-Oct-2022 06:40
09-Oct-2022 08:52
08-Oct-2022 11:30
01-Oct-2022 07:15

## 2022-10-11 NOTE — CHART NOTE - NSCHARTNOTESELECT_GEN_ALL_CORE
IR pre-procedure/Event Note
Thoracic/Event Note
thoracic surgery/Event Note
Follow Up/Nutrition Services
Follow Up/Nutrition Services
IR
Thoracic surgery

## 2022-10-11 NOTE — PROGRESS NOTE ADULT - PROBLEM SELECTOR PLAN 2
- Pt reports feeling constipated, despite bowel regimen. She is passing small amounts of stool w/ +flatu  - She has been refusing enemas and some of the bowel regimen  - Disimpaction attempted 10/10 but unsuccessful. Low suspicion for obstruction  - Will try SMOG enema today  - f/u portable x ray - Pt reports feeling constipated, despite bowel regimen. She is passing small amounts of stool w/ +flatu  - She has been refusing enemas and some of the bowel regimen  - Disimpaction attempted 10/10 but unsuccessful. Low suspicion for obstruction  - Will try SMOG enema today  - X-ray - mildly dilated small bowel, low risk for obstruction - Pt reports feeling constipated, despite bowel regimen. She is passing small amounts of stool w/ +flatu  - She has been refusing enemas and some of the bowel regimen  - Disimpaction attempted 10/10 but unsuccessful. Low suspicion for obstruction  - SMOG enema 10/10 evening with resultant diarrhea  - X-ray - mildly dilated small bowel, low risk for obstruction

## 2022-10-11 NOTE — PROGRESS NOTE ADULT - ASSESSMENT
57F w PMHX of uterine carcinoma (s/p hysterectomy VINCENT BSO, 2020, ongoing chemotherapy (last session 9/14; known to Dr. Danny Celaya) s/p radiation, w/ hx of b/l malignant pleural effusions s/p R VATS 10/2021, PleurX catheter removed 3/22, and R thoracentesis by IR and L chest tube by thoracic surgery on prev admission 6/11/22, presenting for worsening SOB and found to have reaccumulated b/l pleural effusions. Patient now s/p L chest tube placement on 10/4. Chest tube removed 10/8.  57F w PMHX of uterine carcinoma (s/p hysterectomy VINCENT BSO, 2020, ongoing chemotherapy (last session 9/14; known to Dr. Danny Celaya) s/p radiation, w/ hx of b/l malignant pleural effusions s/p R VATS 10/2021, PleurX catheter removed 3/22, and R thoracentesis by IR and L chest tube by thoracic surgery on prev admission 6/11/22, presenting for worsening SOB and found to have reaccumulated b/l pleural effusions. Patient now s/p L chest tube placement on 10/4. Chest tube removed 10/8. Course complicated by constipation. Plan for discharge to home.

## 2022-10-11 NOTE — PROGRESS NOTE ADULT - PROBLEM SELECTOR PLAN 3
Patient has known history of malignant b/l pleural effusions  - Patient had prior IR drainage in R lungs early in June and L chest tube as well   - now s/p chest tube placement by IR 10/4, removed 10/8  - continue supportive care with cough meds, monitor pulse ox, see above  - now satting well on RA

## 2022-10-11 NOTE — PROGRESS NOTE ADULT - PROBLEM SELECTOR PROBLEM 1
Acute dyspnea

## 2022-10-11 NOTE — PROGRESS NOTE ADULT - PROBLEM/PLAN-3
DISPLAY PLAN FREE TEXT
none

## 2022-10-11 NOTE — PROGRESS NOTE ADULT - PROBLEM SELECTOR PLAN 7
DVT ppx w lovenox    Dispo: home without PT needs DVT ppx w lovenox    Dispo: home without PT needs    [facility] - [facility]

## 2022-10-11 NOTE — PROGRESS NOTE ADULT - PROBLEM SELECTOR PLAN 1
Likely secondary to pleural effusion, however should R/O HF given pt on doxorubicin and PE given malignancy  #malignant pleural effusion  - CTA rules out PE  - Pts last echo 9/8/22 shows normal LV function, Ef=68% with mild concentric hypertrophy  - c/w oxygen therapy w/ humidified O2 for comfort  - s/p L chest tube placement by IR 10/4, effusion Light's criteria positive  - initially 350 output from tube, decreased drain output 10/6, zero output 10/7-10/8, CT removed 10/8  - per IR, low need for CT placement on R side  - f/u IR/CTS recs  - Pt is stable at this time and is ready for discharge Likely secondary to pleural effusion, however should R/O HF given pt on doxorubicin and PE given malignancy  #malignant pleural effusion  - CTA rules out PE  - Pts last echo 9/8/22 shows normal LV function, Ef=68% with mild concentric hypertrophy  - c/w oxygen therapy w/ humidified O2 for comfort  - s/p L chest tube placement by IR 10/4, effusion Light's criteria positive  - initially 350 output from tube, decreased drain output 10/6, zero output 10/7-10/8, CT removed 10/8  - per IR, low need for CT placement on R side  - Pt is stable at this time and is ready for discharge

## 2022-10-11 NOTE — PROGRESS NOTE ADULT - PROVIDER SPECIALTY LIST ADULT
Thoracic Surgery
Heme/Onc
Heme/Onc
Internal Medicine
Intervent Radiology
Thoracic Surgery
Internal Medicine

## 2022-10-11 NOTE — PROGRESS NOTE ADULT - NUTRITIONAL ASSESSMENT
This patient has been assessed with a concern for Malnutrition and has been determined to have a diagnosis/diagnoses of Severe protein-calorie malnutrition.    This patient is being managed with:   Diet Regular-  Supplement Feeding Modality:  Oral  Ensure Enlive Cans or Servings Per Day:  3       Frequency:  Daily  Entered: Oct  4 2022  8:13AM    
This patient has been assessed with a concern for Malnutrition and has been determined to have a diagnosis/diagnoses of Severe protein-calorie malnutrition.    This patient is being managed with:   Diet Regular-  Supplement Feeding Modality:  Oral  Ensure Enlive Cans or Servings Per Day:  3       Frequency:  Daily  Entered: Oct  1 2022  5:08PM    
This patient has been assessed with a concern for Malnutrition and has been determined to have a diagnosis/diagnoses of Severe protein-calorie malnutrition.    This patient is being managed with:   Diet Regular-  Supplement Feeding Modality:  Oral  Ensure Enlive Cans or Servings Per Day:  3       Frequency:  Daily  Entered: Oct  4 2022  8:13AM    
This patient has been assessed with a concern for Malnutrition and has been determined to have a diagnosis/diagnoses of Severe protein-calorie malnutrition.    This patient is being managed with:   Diet NPO after Midnight-     NPO Start Date: 02-Oct-2022   NPO Start Time: 23:59  Except Medications  Entered: Oct  2 2022  1:30PM    Diet Regular-  Supplement Feeding Modality:  Oral  Ensure Enlive Cans or Servings Per Day:  3       Frequency:  Daily  Entered: Oct  1 2022  5:08PM    
This patient has been assessed with a concern for Malnutrition and has been determined to have a diagnosis/diagnoses of Severe protein-calorie malnutrition.    This patient is being managed with:   Diet Regular-  Supplement Feeding Modality:  Oral  Ensure Enlive Cans or Servings Per Day:  3       Frequency:  Daily  Entered: Oct  4 2022  8:13AM

## 2022-10-11 NOTE — PROGRESS NOTE ADULT - SUBJECTIVE AND OBJECTIVE BOX
***************************************************************  Chivo Ferguson, PGY1  Internal Medicine   TEAMS Preferred  [facility] Pager [phone]  [facility] Pager [phone]  ***************************************************************    ALEXANDER HUDDLESTON  57y  MRN: [id number]  10-01-22 (10d)    Patient is a 57y old  Female who presents with a chief complaint of shortness of breath (10 Oct 2022 07:15)      SUBJECTIVE / OVERNIGHT EVENTS:   No acute overnight events. Pt seen and examined at bedside. Denies fevers, chills, CP, SOB, Abdominal pain, N/V, Constipation, Diarrhea. Last BM     12 Point ROS negative with the exception of the above    MEDICATIONS  (STANDING):  chlorhexidine 2% Cloths 1 Application(s) Topical daily  enoxaparin Injectable 40 milliGRAM(s) SubCutaneous every 24 hours  influenza   Vaccine 0.5 milliLiter(s) IntraMuscular once  magnesium oxide 400 milliGRAM(s) Oral three times a day with meals  phenylephrine 0.25% Suppository 1 Suppository(s) Rectal three times a day  polyethylene glycol 3350 17 Gram(s) Oral two times a day  senna 1 Tablet(s) Oral at bedtime    MEDICATIONS  (PRN):  acetaminophen     Tablet .. 650 milliGRAM(s) Oral every 6 hours PRN Mild Pain (1 - 3), Moderate Pain (4 - 6)  benzocaine 15 mG/menthol 3.6 mG Lozenge 1 Lozenge Oral every 2 hours PRN Cough  bisacodyl Suppository 10 milliGRAM(s) Rectal daily PRN Constipation  guaiFENesin Oral Liquid (Sugar-Free) 400 milliGRAM(s) Oral every 6 hours PRN Cough  hydrocodone/homatropine Syrup 5 milliLiter(s) Oral every 6 hours PRN Cough      OBJECTIVE:  Vital Signs Last 24 Hrs  T(C): 36.6 (10 Oct 2022 21:17), Max: 37.1 (10 Oct 2022 10:10)  T(F): 97.9 (10 Oct 2022 21:17), Max: 98.8 (10 Oct 2022 10:10)  HR: 112 (10 Oct 2022 21:17) (112 - 116)  BP: 139/96 (10 Oct 2022 21:17) (124/56 - 140/102)  BP(mean): 74 (10 Oct 2022 10:10) (74 - 74)  RR: 18 (10 Oct 2022 21:17) (18 - 20)  SpO2: 100% (10 Oct 2022 21:17) (95% - 100%)    Parameters below as of 10 Oct 2022 21:17  Patient On (Oxygen Delivery Method): room air        I&O's Summary      PHYSICAL EXAM:  GENERAL: Laying comfortably, NAD  HEENT: NCAT, PERRLA, EOMI, no scleral icterus, no LAD  NECK: No JVD  LUNG: CTABL; No wheezes, crackles, or rhonchi  HEART: RRR; normal S1/S2; No murmurs, rubs, or gallops  ABDOMEN: +BS, soft, nontender, nondistended, no HSM; No rebound, guarding, or rigidity  EXTREMITIES:  No LE edema b/l, 2+ Peripheral Pulses, No clubbing or cyanosis  NEUROLOGY: AOx3, non-focal, strength 5/5 in all extremities, sensation intact  PSYCH: calm and cooperative  SKIN: No rashes or lesions    LABS:                      CAPILLARY BLOOD GLUCOSE            RADIOLOGY & ADDITIONAL TESTS:     ***************************************************************  Chivo Ferguson, PGY1  Internal Medicine   TEAMS Preferred  [facility] Pager [phone]  [facility] Pager [phone]  ***************************************************************    ALEXANDER HUDDLESTON  57y  MRN: [id number]  10-01-22 (10d)    Patient is a 57y old  Female who presents with a chief complaint of shortness of breath (10 Oct 2022 07:15)      SUBJECTIVE / OVERNIGHT EVENTS:   Patient complaining of diarrhea overnight following smog enema. Patient continues to endorse feeling stool in her rectum with mild abdominal cramping. Patient declining bowel regimen but requesting potential repeat disimpaction today if she is unable to relieve the feeling of stool in her rectum.  Denies fevers, chills, CP, SOB, N/V.    12 Point ROS negative with the exception of the above    MEDICATIONS  (STANDING):  chlorhexidine 2% Cloths 1 Application(s) Topical daily  enoxaparin Injectable 40 milliGRAM(s) SubCutaneous every 24 hours  influenza   Vaccine 0.5 milliLiter(s) IntraMuscular once  magnesium oxide 400 milliGRAM(s) Oral three times a day with meals  phenylephrine 0.25% Suppository 1 Suppository(s) Rectal three times a day  polyethylene glycol 3350 17 Gram(s) Oral two times a day  senna 1 Tablet(s) Oral at bedtime    MEDICATIONS  (PRN):  acetaminophen     Tablet .. 650 milliGRAM(s) Oral every 6 hours PRN Mild Pain (1 - 3), Moderate Pain (4 - 6)  benzocaine 15 mG/menthol 3.6 mG Lozenge 1 Lozenge Oral every 2 hours PRN Cough  bisacodyl Suppository 10 milliGRAM(s) Rectal daily PRN Constipation  guaiFENesin Oral Liquid (Sugar-Free) 400 milliGRAM(s) Oral every 6 hours PRN Cough  hydrocodone/homatropine Syrup 5 milliLiter(s) Oral every 6 hours PRN Cough      OBJECTIVE:  Vital Signs Last 24 Hrs  T(C): 36.6 (10 Oct 2022 21:17), Max: 37.1 (10 Oct 2022 10:10)  T(F): 97.9 (10 Oct 2022 21:17), Max: 98.8 (10 Oct 2022 10:10)  HR: 112 (10 Oct 2022 21:17) (112 - 116)  BP: 139/96 (10 Oct 2022 21:17) (124/56 - 140/102)  BP(mean): 74 (10 Oct 2022 10:10) (74 - 74)  RR: 18 (10 Oct 2022 21:17) (18 - 20)  SpO2: 100% (10 Oct 2022 21:17) (95% - 100%)    Parameters below as of 10 Oct 2022 21:17  Patient On (Oxygen Delivery Method): room air        I&O's Summary      PHYSICAL EXAM:  GENERAL: Laying comfortably, NAD  HEENT: NCAT, PERRLA, EOMI, no scleral icterus, no LAD  LUNG: CTABL; No wheezes, crackles, or rhonchi  HEART: RRR; normal S1/S2; No murmurs, rubs, or gallops  ABDOMEN: +BS, soft, nondistended, nontender, upon pressing on abdominal patient felt she had to defecate  EXTREMITIES:  No LE edema b/l, 2+ Peripheral Pulses, No clubbing or cyanosis  NEUROLOGY: AOx3, non-focal, strength 5/5 in all extremities, sensation intact  PSYCH: calm and cooperative  SKIN: No rashes or lesions

## 2022-10-11 NOTE — PROGRESS NOTE ADULT - ATTENDING COMMENTS
57F w PMHX of uterine carcinoma (s/p hysterectomy VINCENT BSO, 2020, ongoing chemotherapy (last session 9/14; known to Dr. Danny Celaya) s/p radiation, w/ hx of b/l malignant pleural effusions s/p R VATS 10/2021, PleurX catheter removed 3/22, and R thoracentesis by IR and L chest tube by thoracic surgery on prev admission 6/11/22, presenting for worsening SOB and found to have reaccumulated b/l pleural effusions.      #SOB: in the setting of likely malignant pleural effusion. Imaging results reviewed. CT SX and IR recs appreciated. s/p L thoracentesis with chest tube. Chest tube removed on 10/8. Pt was weaned off O2, however, pt intermittently placed on NC for comfort. O2 sat while ambulating was 92%. Discussed with pt that she does not need oxygen and pt stated that it may be psychological.     # constipation: Pt has been on bowel regimen and reports feeling constipated, passing small amounts of stool, having flatus. She has been refusing enemas and some of the bowel regimen. Disimpaction attempted yesterday but unsuccessful. Pt given SMOG enema yesterday and now having BMs     #Uterine cancer: f/u onc recs    Time spent on discharge: 35min

## 2022-10-11 NOTE — CHART NOTE - NSCHARTNOTEFT_GEN_A_CORE
Pt seen for malnutrition follow up.    Medical Course:  - Per chart, pt is 57 year old female PMH uterine carcinoma s/p hysterectomy VINCENT BSO (2020), ongoing chemotherapy, s/p radiation, history of bilateral malignant pleural effusions s/p R VATS 10/2021, PleurX catheter removed (3/22), and R thoracentesis by IR and L chest tube by thoracic surgery on previous admission (6/11/22) presenting for worsening SOB found to have reaccumulated bilateral pleural effusions now s/p L chest tube placement (10/4) and removal (10/8).     Nutrition Course:  - Pt reports poor appetite/PO intake secondary to constipation. S/p enema yesterday with BM. Ordered for bowel regimen. Multiple Ensure shakes stacked at bedside. Pt amenable to provision of prune juice.     Diet Prescription:  - Regular  - Ensure Enlive 3 PO Daily    Pertinent Medications:   - magnesium oxide, polyethylene glycol, senna, bisacodyl Suppository PRN    Pertinent Labs:    - (10/7) Phos 4.1 mg/dL    Weight: (10/1 dosing) 188.4 lbs / 85.5 kg  Height: 65 in / 165.1 cm  IBW: 125 lbs / 56.8 kg +/-10%  BMI: 31.3 kg/m^2    Physical Assessment, per flowsheets:  Edema: 1+ bilateral ankle  Pressure Injury: none noted    Estimated Needs:   [X] No change since previous assessment, based on actual weight 188.8 lbs / 85.6 kg  1252-5546 kcal daily @28-32 kcal/kg, 102..84 gm protein daily @1.2-1.4 gm/kg     Previous Nutrition Diagnosis: [X] Severe malnutrition in the context of acute illness, remains appropriate  New Nutrition Diagnosis: [X] not applicable     Interventions:   1) Recommend continue regular diet to maximize menu item availability.  2) Recommend change diet order to reflect in house formulary- Ensure Plus HP 1 PO 3x daily (provides 350 kcal, 20 gm protein per 8oz serving).   3) Obtain weekly weights.   4) Monitor BMs, adjust bowel regimen as appropriate.  5) Encouraged PO intake as tolerated. Will continue to honor food preferences as able.    Monitor & Evaluate:  PO intake, nutrition related lab values, weight trends, BMs/GI distress, hydration status, skin integrity.  Maribel Nowak RDN, CDN #[id number]  Also available on [employer] Teams. Pt seen for malnutrition follow up.     Medical Course:  - Per chart, pt is 57 year old female PMH uterine carcinoma s/p hysterectomy VINCENT BSO (2020), ongoing chemotherapy, s/p radiation, history of bilateral malignant pleural effusions s/p R VATS 10/2021, PleurX catheter removed (3/22), and R thoracentesis by IR and L chest tube by thoracic surgery on previous admission (6/11/22) presenting for worsening SOB found to have reaccumulated bilateral pleural effusions now s/p L chest tube placement (10/4) and removal (10/8).     Nutrition Course:  - Pt reports poor appetite/PO intake secondary to constipation. S/p enema yesterday with BM. Ordered for bowel regimen. Multiple Ensure shakes stacked at bedside. Pt amenable to provision of prune juice.     Diet Prescription:  - Regular  - Ensure Enlive 3 PO Daily    Pertinent Medications:   - magnesium oxide, polyethylene glycol, senna, bisacodyl Suppository PRN    Pertinent Labs:    - (10/7) Phos 4.1 mg/dL    Weight: (10/1 dosing) 188.4 lbs / 85.5 kg  Height: 65 in / 165.1 cm  IBW: 125 lbs / 56.8 kg +/-10%  BMI: 31.3 kg/m^2    Physical Assessment, per flowsheets:  Edema: 1+ bilateral ankle  Pressure Injury: none noted    Estimated Needs:   [X] No change since previous assessment, based on actual weight 188.8 lbs / 85.6 kg  8267-5345 kcal daily @28-32 kcal/kg, 102..84 gm protein daily @1.2-1.4 gm/kg     Previous Nutrition Diagnosis: [X] Severe malnutrition in the context of acute illness, remains appropriate  New Nutrition Diagnosis: [X] not applicable     Interventions:   1) Recommend continue regular diet to maximize menu item availability.  2) Recommend change diet order to reflect in house formulary- Ensure Plus HP 1 PO 3x daily (provides 350 kcal, 20 gm protein per 8oz serving).   3) Obtain weekly weights.   4) Monitor BMs, adjust bowel regimen as appropriate.  5) Encouraged PO intake as tolerated. Will continue to honor food preferences as able.    Monitor & Evaluate:  PO intake, nutrition related lab values, weight trends, BMs/GI distress, hydration status, skin integrity.  Maribel Nowak RDN, CDN #[id number]  Also available on [employer] Teams.

## 2022-10-19 PROBLEM — J91.0 MALIGNANT PLEURAL EFFUSION: Status: ACTIVE | Noted: 2021-09-21

## 2022-10-24 NOTE — PHYSICAL EXAM
[Normal Rate] : the respiratory rate was normal [Normal Rhythm/Effort] : normal respiratory rhythm and effort [Decreased Breath Sounds Bilaterally Bases] : breath sounds were diminished over both bases [Heart Rate And Rhythm] : heart rate was normal and rhythm regular [Heart Sounds] : normal S1 and S2 [Heart Sounds Gallop] : no gallops [Murmurs] : no murmurs [Heart Sounds Pericardial Friction Rub] : no pericardial rub [Examination Of The Chest] : the chest was normal in appearance [Chest Visual Inspection Thoracic Asymmetry] : no chest asymmetry [Diminished Respiratory Excursion] : normal chest expansion

## 2022-10-26 NOTE — CONSULT LETTER
[Dear  ___] : Dear  [unfilled], [Consult Letter:] : I had the pleasure of evaluating your patient, [unfilled]. [( Thank you for referring [unfilled] for consultation for _____ )] : Thank you for referring [unfilled] for consultation for [unfilled] [Please see my note below.] : Please see my note below. [Consult Closing:] : Thank you very much for allowing me to participate in the care of this patient.  If you have any questions, please do not hesitate to contact me. [Sincerely,] : Sincerely, [FreeTextEntry2] : Dr. Mirtha Celaya ([facility]/ref) \par  [FreeTextEntry3] : Stevan Peters MD \par Attending Surgeon \par Division of Thoracic Surgery \par , Cardiovascular and Thoracic Surgery \par [facility] [employer] at hospitals/[facility]\par \par

## 2022-10-26 NOTE — ASSESSMENT
[FreeTextEntry1] : Ms. ALEXANDER HUDDLESTON, 57 year old female, never smoker, w/ hx of Uterine cancer dx 2020 s/p chemo s/p VINCENT/BSO, asthma, found to have persistent pleural effusion in 2021 s/p IR drainage during that admission with path positive for metastatic adenocarcinoma. \par \par Patient is s/p Right VATS, pleural bx and Pleurx catheter placement on 09/15/2021. 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 \par \par Followed by Mirtha Celaya; completed 6 cycles of chemo on 02/10/2022. \par \par Patient is s/p Right Pleural catheter removal on 03/08/2022. \par \par Patient has started combined immunotherapy with pembro + lenvima in 05/2022. \par \par Patient was hospitalized on 06/03/2022 for SOB secondary to bilateral pleural effusions. unsuccessful attempt was made to place R chest tube. IR was consulted and a left chest tube was placed without complication, s/p US guided right thoracentesis on 06/10/2022, A total of 300 cc of yellow fluid was then aspirated. \par \par Patient was seen on 08/24/2022, bilateral pleural effusion, Left > right, sent for left thoracentesis and paracentesis. \par \par Patient is s/p paracentesis on 08/31/2022, 1000 cc of fluid was aspirated and subsequently drain was removed. There is only small amount of left pleural effusion. Thoracentesis was not performed.\par \par Patient went to ED on 10/01/2022 for SOB, CXR showed reaccumulation of pleural effusion, s/p left thoracentesis with chest tube placement on 10/04/2022, removed on 10/08/2022.\par \par CTA on 09/30/2022:\par - . No evidence of acute central pulmonary embolus. Suboptimal evaluation of the segmental and subsegmental branches due to degraded contrast bolus timing and respiratory motion artifact.\par - Increased, now moderate volume of the bilateral malignant pleural effusions.\par - Peritoneal carcinomatosis, with stable volume of malignant ascites.\par - Grossly unchanged metastatic implant along the left vaginal cuff.\par - Indeterminate subcentimeter hepatic hypodensities, unchanged.\par \par Patient started Chemo on 10/19/2022, currently on steroid. \par \par I have reviewed the patient's medical records and diagnostic images at time of this office consultation and have made the following recommendation:\par 1. CXR reviewed and explained to patient, no left pleural effusion with small right pleural effusion, no surgical intervention needed at current time, I recommended patient to return to office in 3 months with CT Chest without contrast. \par 2. F/u with oncologist. \par \par I personally performed the services described in the documentation, reviewed the documentation recorded by the scribe in my presence and it accurately and completely records my words and actions.\par \par I, Adriana Sheth NP, am scribing for and the presence of NAVNEET Mari, the following sections HISTORY OF PRESENT ILLNESS, PAST MEDICAL/FAMILY/SOCIAL HISTORY; REVIEW OF SYSTEMS; VITAL SIGNS; PHYSICAL EXAM; DISPOSITION.

## 2022-10-26 NOTE — HISTORY OF PRESENT ILLNESS
[FreeTextEntry1] : Ms. ALEXANDER HUDDLESTON, 57 year old female, never smoker, w/ hx of Uterine cancer dx 2020 s/p chemo s/p VINCENT/BSO, asthma, found to have persistent pleural effusion in 2021 s/p IR drainage during that admission with path positive for metastatic adenocarcinoma. \par \par Patient is s/p Right VATS, pleural bx and Pleurx catheter placement on 09/15/2021. 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 \par \par Followed by Mirtha Celaya; completed 6 cycles of chemo on 02/10/2022. \par \par Patient is s/p Right Pleural catheter removal on 03/08/2022. \par \par Patient c/o SOB, was sent for a CTA on 04/05/2022, Small right pleural effusion has increased in size and tracks along the fissures, no intervention needed and continue observation, However, patient is symptomatic, will contact IR for possible drainage. \par \par On 04/16/2022: Preprocedure ultrasound confirmed presence of trace amount of right pleural effusion. Thoracentesis was not performed. \par \par Patient has started combined immunotherapy with pembro + lenvima in 05/2022. \par \par Patient was hospitalized on 06/03/2022 for SOB secondary to bilateral pleural effusions. unsuccessful attempt was made to place R chest tube. IR was consulted and a left chest tube was placed without complication, s/p US guided right thoracentesis on 06/10/2022, A total of 300 cc of yellow fluid was then aspirated. \par \par Patient went back to hospital on 07/12/2022 for fever and SOB, found Sepsis due to COVID-19 infection, started on Remdesivir and decadron.\par \par CT chest on 08/23/2022: reviewed. \par \par Patient was seen on 08/24/2022, bilateral pleural effusion, Left > right, sent for left thoracentesis and paracentesis. \par \par Patient is s/p paracentesis on 08/31/2022, 1000 cc of fluid was aspirated and subsequently drain was removed. There is only small amount of left pleural effusion. Thoracentesis was not performed.\par \par Patient went to ED on 10/01/2022 for SOB, CXR showed reaccumulation of pleural effusion, s/p left  thoracentesis with chest tube placement on 10/04/2022, removed on 10/08/2022.\par \par CTA on 09/30/2022:\par - . No evidence of acute central pulmonary embolus. Suboptimal evaluation of the segmental and subsegmental branches due to degraded contrast bolus timing and respiratory motion artifact.\par - Increased, now moderate volume of the bilateral malignant pleural effusions.\par - Peritoneal carcinomatosis, with stable volume of malignant ascites.\par - Grossly unchanged metastatic implant along the left vaginal cuff.\par - Indeterminate subcentimeter hepatic hypodensities, unchanged.\par \par Patient started Chemo on 10/19/2022, currently on steroid. \par \par CXR today: reviewed. \par \par Patient is here today for a follow up. Patient c/o SOB on exertion, c/o cough with whitish sputum, denies chest pain, fever, chills. \par

## 2022-11-01 NOTE — HISTORY OF PRESENT ILLNESS
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 Patient was seen initially by Dr. Arciniega at [facility].\par She then saw Dr Robin Zamorano, at [facility] and had a repeat biopsy that showed clear cell carcinoma.\par On 4/20/2020 she underwent surgical staging and is post TLH/BSO/SLND/Omentectomy.\par Final pathology showed stage IA,high grade endometrial carcinoma involving predominantly clear cell and focally serous features, no invasion, no LVSI.\par \par Ct C/A/P with contrast done on 3/12/2020 showed no metastatic sites.\par She completed five cycles of CT 6/3/20- 9/2/20) Last cycle held due to toxicity and myelosuppression.She received vaginal brachytherapy to a total of 2100 cGy in 3 fractions. Treatment was delivered from 10/16/20- 10/23/20. \par Scan showed CINDY.\par \par 8/10/21: Surveillance scan showed New partially visualized small right pleural effusion. Correlation with dedicated chest CT is recommended. Few subcentimeter hepatic hypo densities, too small to characterize, but similar in appearance to prior. Small pelvic ascites, similar in appearance to prior.\par - 29( previously 15)\par \par 8/30/21: CT chest: Limited. No pulmonary embolus to level of the lobar pulmonary arteries. Interval increase in small right pleural effusion since 8/19/2021 with associated adjacent compressive atelectasis.Small perihepatic ascites.\par \par Patient was admitted to the hospital with SOB and pleural effusion. She had  thoracentesis and cytology was consistent with relapsed uterine cancer.\par \par Final Diagnosis\par PLEURAL FLUID, RIGHT\par POSITIVE FOR MALIGNANT CELLS.\par Metastatic adenocarcinoma\par \par Cytology slides and cell block reveal a hypercellular specimen\par composed of crowded 3-dimensional groups and single lying\par malignant cells with enlarged nuclei containing prominent\par nucleoli and vacuolated cytoplasm, among benign mesothelial\par cells.\par \par Immunohistochemical stains are performed on the cell block. The\par tumor cells are positive for PAX-8 (focal and weak), p16, napsin\par (focal) and p53 (overexpression), while they are negative for ER,\par WT-1 and TTF-1. In the absence of another primary, it is most\par consistent with involvement by patient's known endometrial\par adenocarcinoma. [de-identified] : Carboplatin and Taxol 6/3/20- 9/2/20( five cycles) [FreeTextEntry1] : Doxil\par C1 - 9/14/22\par C2 - 10/19/22 [de-identified] : Patient is here for follow up after second cycle of Doxil.\par She continue to have cough, taking cough medicine once a day only. She feels distended and feels that belly is getting bigger.\par She denies any pain.

## 2022-11-01 NOTE — PHYSICAL EXAM
[Ambulatory and capable of all self care but unable to carry out any work activities] : Status 2- Ambulatory and capable of all self care but unable to carry out any work activities. Up and about more than 50% of waking hours [Normal] : affect appropriate [de-identified] : appears weak [de-identified] : b/l decreased BS (left > right), tachypneic

## 2022-11-01 NOTE — REVIEW OF SYSTEMS
[Fatigue] : fatigue [Recent Change In Weight] : ~T recent weight change [Shortness Of Breath] : shortness of breath [Cough] : cough [Negative] : Allergic/Immunologic

## 2022-11-15 PROBLEM — R07.81 RIB PAIN: Status: ACTIVE | Noted: 2022-01-01

## 2022-11-15 PROBLEM — F43.21 ADJUSTMENT DISORDER WITH DEPRESSED MOOD: Status: ACTIVE | Noted: 2022-01-01

## 2022-11-15 NOTE — DATA REVIEWED
[FreeTextEntry1] : CTA Chest (9/30/2022):\par \par - . No evidence of acute central pulmonary embolus. Suboptimal evaluation of the segmental and subsegmental branches due to degraded contrast bolus timing and respiratory motion artifact.\par - Increased, now moderate volume of the bilateral malignant pleural effusions.\par - Peritoneal carcinomatosis, with stable volume of malignant ascites.\par - Grossly unchanged metastatic implant along the left vaginal cuff.\par - Indeterminate subcentimeter hepatic hypodensities, unchanged.\par \par \par CT C/A/P  (08/23/2022): \par \par COMPARISON: CT scan of the chest, abdomen and pelvis from 6/20/2022\par \par FINDINGS:\par CHEST:\par LUNGS AND LARGE AIRWAYS: Patent central airways. No pulmonary nodules. Atelectatic changes.\par PLEURA: Moderate left and mild right pleural effusions with loculation which is grossly stable in the right and mildly increased on the left with 6/20/2022.. Mild pleural enhancement and nodularity at the left lung base raising concern for metastatic disease. Correlate clinically. This was not well seen on 4/22/2022 and limited in evaluation on 6/22/2022 due to lack of contrast.\par VESSELS: Right-sided central line with its tip in the distal SVC.\par HEART: Heart size is normal. No pericardial effusion.\par MEDIASTINUM AND DANE: No lymphadenopathy.\par CHEST WALL AND LOWER NECK: Within normal limits.\par \par ABDOMEN AND PELVIS:\par LIVER: Small hepatic cysts and less than 1 cm low-attenuation lesions which are too small to characterize.\par BILE DUCTS: Normal caliber.\par GALLBLADDER: Within normal limits.\par SPLEEN: Within normal limits.\par PANCREAS: Within normal limits.\par ADRENALS: Within normal limits.\par KIDNEYS/URETERS: Within normal limits.\par \par BLADDER: Underdistended bladder which limits evaluation.\par REPRODUCTIVE ORGANS: Status post hysterectomy. Cystic structure at the left vaginal cuff measuring 4.3 x 2.1 cm on series 2 image 138 measuring 3.4 x 2.6 cm previously.\par \par BOWEL: No bowel obstruction. Appendix not well visualized.\par PERITONEUM: Moderate ascites has increased. Mild nodularity measuring up to 6 mm in the omentum such as in the left lateral abdomen on series 2 image and series 2 image 93 raises concern for carcinomatosis. Mild nodularity along the left posterior peritoneal reflection in the pelvis on series 2 image 127 and series 2 image 133. This was not appreciated on the prior study although artifact may also limited evaluation.\par VESSELS: Within normal limits.\par RETROPERITONEUM/LYMPH NODES: No lymphadenopathy.\par ABDOMINAL WALL: Within normal limits.\par BONES: Mild degenerative changes of bone.\par \par IMPRESSION:\par Increase in ascites when compared with 4/22/2022.. Findings suggesting very mild carcinomatosis not well appreciated on the prior study. Cystic implants of the left vaginal cuff measuring 4.3 x 2.1 cm, previously measuring 3.4 x 2.6 cm. This variation may be affected by differences in patient positioning. Bilateral pleural effusions which is grossly stable in the right and mildly increased on the left. Suggestion of mild enhancement of the pleura which may be due to inflammation or metastatic disease.

## 2022-11-15 NOTE — ASSESSMENT
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 Med Onc follow up. \par \par # Cough - Suggest pt try to use the hydromet during the day. \par Discussed option of gabapentin, pt does not wish to use it due to her wish to limit the amount of pharmaceuticals she is on.\par \par # Loss of appetite - Medical cannabis certification completed today. Provided cannabis education, overview of state program, and discussed adverse effects in detail. Counseled that vaporized cannabis is not the preferred route of administration due to the fact that both short-term and long-term risks associated with vaporizing oils are not yet fully understood. Recommend starting with 1:1 THC:CBD formulation at low dose of THC (~2mg/dose).\par \par # Rib pain related to coughing - cough suppressant as above, suggest pt use PRN Tylenol 500mg\par \par # Encounter for palliative care- Emotional support provided \par Explained the role of palliative care in enhancing quality of life in the setting of serious illness.\par Health Care Proxy (HCP) form completed in office today after explanation of the role of a HCP.\par \par Follow up in 1 month, call sooner with questions or issues.

## 2022-11-15 NOTE — PHYSICAL EXAM
[Oriented To Time, Place, And Person] : oriented to person, place, and time [General Appearance - Alert] : alert [Sclera] : the sclera and conjunctiva were normal [Normal Oral Mucosa] : normal oral mucosa [Neck Appearance] : the appearance of the neck was normal [Heart Sounds] : normal S1 and S2 [FreeTextEntry1] : +tachycardic [Edema] : there was no peripheral edema [Bowel Sounds] : normal bowel sounds [Abdomen Soft] : soft [Abdomen Tenderness] : non-tender [Skin Color & Pigmentation] : normal skin color and pigmentation [No Focal Deficits] : no focal deficits

## 2022-11-30 NOTE — HISTORY OF PRESENT ILLNESS
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 Patient was seen initially by Dr. Arciniega at [facility].\par She then saw Dr Robin Zamorano, at [facility] and had a repeat biopsy that showed clear cell carcinoma.\par On 4/20/2020 she underwent surgical staging and is post TLH/BSO/SLND/Omentectomy.\par Final pathology showed stage IA,high grade endometrial carcinoma involving predominantly clear cell and focally serous features, no invasion, no LVSI.\par \par Ct C/A/P with contrast done on 3/12/2020 showed no metastatic sites.\par She completed five cycles of CT 6/3/20- 9/2/20) Last cycle held due to toxicity and myelosuppression.She received vaginal brachytherapy to a total of 2100 cGy in 3 fractions. Treatment was delivered from 10/16/20- 10/23/20. \par Scan showed CINDY.\par \par 8/10/21: Surveillance scan showed New partially visualized small right pleural effusion. Correlation with dedicated chest CT is recommended. Few subcentimeter hepatic hypo densities, too small to characterize, but similar in appearance to prior. Small pelvic ascites, similar in appearance to prior.\par - 29( previously 15)\par \par 8/30/21: CT chest: Limited. No pulmonary embolus to level of the lobar pulmonary arteries. Interval increase in small right pleural effusion since 8/19/2021 with associated adjacent compressive atelectasis.Small perihepatic ascites.\par \par Patient was admitted to the hospital with SOB and pleural effusion. She had  thoracentesis and cytology was consistent with relapsed uterine cancer.\par \par Final Diagnosis\par PLEURAL FLUID, RIGHT\par POSITIVE FOR MALIGNANT CELLS.\par Metastatic adenocarcinoma\par \par Cytology slides and cell block reveal a hypercellular specimen\par composed of crowded 3-dimensional groups and single lying\par malignant cells with enlarged nuclei containing prominent\par nucleoli and vacuolated cytoplasm, among benign mesothelial\par cells.\par \par Immunohistochemical stains are performed on the cell block. The\par tumor cells are positive for PAX-8 (focal and weak), p16, napsin\par (focal) and p53 (overexpression), while they are negative for ER,\par WT-1 and TTF-1. In the absence of another primary, it is most\par consistent with involvement by patient's known endometrial\par adenocarcinoma. [de-identified] : Carboplatin and Taxol 6/3/20- 9/2/20( five cycles) [FreeTextEntry1] : Doxil\par C1 - 9/14/22\par C2 - 10/19/22\par C3 - 11/16/22 [de-identified] : Patient is here for follow up after having her third cycle of chemo with Doxil.\par She continues to have SOB and cough, taking Hydromet at night and Robitussin during day with some relief.\par Last paracentesis was a about one month ago (left side).\par She had abdominal US this AM which showed no abdominal ascites.\par Appetite is low, taking mostly fluids including Ensure, continued weight loss.\par Has pleuritis chest pain with coughing - stable.\par Has constipation, only using prune juice.\par Continues to have skin dryness and hyperpigmentation, no blisters.\par Has occasional nausea and spitting up when coughing.\par

## 2022-11-30 NOTE — ASSESSMENT
[Palliative] : Goals of care discussed with patient: Palliative [Palliative Care Plan] : not applicable at this time [FreeTextEntry1] : 57 year old woman with refractory uterine cancer on Doxil.\par She has completed 3  cycles and is tolerating treatment fairly,\par Will get CT scans today in order to assess status of pleural effusions and since it is difficulty for patient to travel back and forth due to weakness.\par Will arrange for thoracentesis if needed.\par Labs done today - CBC, CMP, Ca125, CEA, coags and COVID PCR in preparation for possible procedure.\par Continue Hydromet at night for cough and Robitussin/Delsym during day PRN.\par Advised to start Senna 2 tabs qhs for constipation and to increase to BID if needed.\par RTC in 4 weeks or sooner if clinically needed.

## 2022-11-30 NOTE — PHYSICAL EXAM
[Ambulatory and capable of all self care but unable to carry out any work activities] : Status 2- Ambulatory and capable of all self care but unable to carry out any work activities. Up and about more than 50% of waking hours [Normal] : affect appropriate [de-identified] : appears weak [de-identified] : b/l decreased BS (lright >left), tachypneic [de-identified] : skin is dry, hyperpigmentation of hands and tongue

## 2022-12-08 PROBLEM — R05.9 COUGH: Status: ACTIVE | Noted: 2022-01-01

## 2022-12-08 PROBLEM — R11.2 NAUSEA AND/OR VOMITING: Status: ACTIVE | Noted: 2022-01-01

## 2022-12-08 PROBLEM — K59.00 CONSTIPATION: Status: ACTIVE | Noted: 2020-06-22

## 2022-12-08 PROBLEM — R63.0 APPETITE LOSS: Status: ACTIVE | Noted: 2022-01-01

## 2022-12-08 PROBLEM — Z51.5 ENCOUNTER FOR PALLIATIVE CARE: Status: ACTIVE | Noted: 2022-01-01

## 2022-12-09 PROBLEM — C55: Status: ACTIVE | Noted: 2022-01-01

## 2022-12-09 NOTE — PHYSICAL EXAM
[General Appearance - Alert] : alert [Sclera] : the sclera and conjunctiva were normal [Normal Oral Mucosa] : normal oral mucosa [Neck Appearance] : the appearance of the neck was normal [Heart Sounds] : normal S1 and S2 [Edema] : there was no peripheral edema [Bowel Sounds] : normal bowel sounds [Abdomen Soft] : soft [Abdomen Tenderness] : non-tender [Skin Color & Pigmentation] : normal skin color and pigmentation [No Focal Deficits] : no focal deficits [Oriented To Time, Place, And Person] : oriented to person, place, and time [General Appearance - In No Acute Distress] : in no acute distress [Outer Ear] : the ears and nose were normal in appearance [Hearing Threshold Finger Rub Not [place]] : hearing was normal [Examination Of The Oral Cavity] : the lips and gums were normal [Abnormal Walk] : normal gait [Affect] : the affect was normal [FreeTextEntry1] : +tachycardic

## 2022-12-09 NOTE — HISTORY OF PRESENT ILLNESS
[FreeTextEntry1] : 57yoF with refractory UPSC presents for follow up palliative care visit, referred by Dr. Celaya\par No significant past medical history. \par \par Patient is s/p pap c/w BETI, followed by endometrial biopsy with findings of serous cancer.\par On 4/20/2020 she underwent surgical staging and is post TLH/BSO/SLND/Omentectomy.\par Final pathology showed stage IA,high grade endometrial carcinoma involving predominantly clear cell and focally serous features, no invasion, no LVSI.\par \par Ct C/A/P with contrast done on 3/12/2020 showed no metastatic sites.\par She completed 5 cycles of CT 6/3/20- 9/2/20) Last cycle held due to toxicity and myelosuppression.She received vaginal brachytherapy to a total of 2100 cGy in 3 fractions. Treatment was delivered from 10/16/20- 10/23/20. \par 8/10/21: Surveillance scan showed New partially visualized small right pleural effusion. Correlation with dedicated chest CT is recommended. Few subcentimeter hepatic hypo densities, too small to characterize, but similar in appearance to prior. Small pelvic ascites, similar in appearance to prior.\par - 29( previously 15)\par Patient was admitted to the hospital 10/1 - 10/11 with SOB and pleural effusion. She had thoracentesis and cytology was c/w relapsed uterine cancer.\par \par Carboplatin and Taxol 6/3/20- 9/2/20, now on Doxil.  \par \par Main reason for which patient is referred is symptom management. \par \par Main symptom plaguing patient has been chronic cough, productive of mucous.  \par Has not found benzonatate to be helpful. They hydromet helps her to sleep at night. She does not like to use it during the day as it is sedating.  Robitussin is used PRN. \par \par Previously had a PleurX tube in her R chest wall, was taken out months ago. During hospitalization last month she had a L PleurX catheter in place for 5 days. \par \par Interval history (12/7/22):\par Patient seen for follow up. \par Most persistent issue is cough, has incorporated hydromet during the day which has been beneficial. Continues with PRN Robitussin. Cough and appetite improved while on steroids.  Eating small portions throughout the day and supplementing with Ensure. Recently gained 2 pounds. Reports feeling queasy senior care through eating. She felt unsure the feeling was nausea so has not used antiemetics. Has been unable to register for medical cannabis program due to issues with the website but plans to incorporate soon. Prune juice and senna 2 tablets QHS help to control constipation. This allows her to move her bowels daily. Mood has improved, she is pleased her scan results showed stable disease. No recent complaints of pain. \par \par ROS:\par +poor appetite with associated weight loss - reports that a recent steroid course \par + thinning of hair and drying of skin since being on Doxil \par + occasional n/v after chemotherapy\par +constipation - uses MoM, miralax, senna as needed\par + mood is low - she prays a lot and is hopeful but gets "depressed, just like anyone else would."  She gets very irritable at times, gets in arguments with her mother. She speaks with her  regularly, this helps. \par +pain in her ribs and sternum, exacerbated with coughing.   Applying hot packs to the area which helps. Uses Tylenol 500mg PRN, which helps. On rare occasions takes Tramadol 50mg if pain is severe (an old Rx)\par \par Patient is single, has no children. Since becoming more ill she moved in with her mother, who is . She has two brothers who are involved and supportive. 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 \par Pt is independent with ADLs. She has a lot of support from family and friends, as well as her [employer]. Pt was active in Syn[employer] and hopes when she returns to work to become active again. Enjoys reading and doing puzzles.\par She and her mother pay privately for HHA 5 days/week.\par Lately they have been taking Uber taxis to get to medical appointments. \par \par She hopes to be able to eat better and see her coughing subside. \par She denies having any fears. \par \par I-STOP Ref#: [id number]

## 2022-12-09 NOTE — PHYSICAL EXAM
[Ambulatory and capable of all self care but unable to carry out any work activities] : Status 2- Ambulatory and capable of all self care but unable to carry out any work activities. Up and about more than 50% of waking hours [Normal] : affect appropriate [de-identified] : appears weak [de-identified] : b/l decreased BS (lright >left), tachypneic [de-identified] : skin is dry, hyperpigmentation of hands and tongue

## 2022-12-09 NOTE — ASSESSMENT
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 Med Onc follow up. \par \par # Cough - C/w Hydromet and Robitussin. \par Discussed option of gabapentin, pt does not wish to use it due to her wish to limit the amount of pharmaceuticals she is on.\par \par # Nausea - Patient agreeable to use PRN prochlorperazine. Medical cannabis may be beneficial. \par \par # Loss of appetite - Previously certified for medical cannabis. Provided cannabis education, overview of state program, and discussed adverse effects in detail. Counseled that vaporized cannabis is not the preferred route of administration due to the fact that both short-term and long-term risks associated with vaporizing oils are not yet fully understood. Recommend starting with 1:1 THC:CBD formulation at low dose of THC (~2mg/dose).\par - Recommend small, frequent meals with nutritional supplementation. \par \par # Rib pain related to coughing - cough suppressant as above, suggest pt use PRN Tylenol 500mg\par \par # Encounter for palliative care- Emotional support provided \par Explained the role of palliative care in enhancing quality of life in the setting of serious illness.\par Health Care Proxy (HCP) form on file.\par \par Follow up in 3 weeks, call sooner with questions or issues.

## 2022-12-09 NOTE — DATA REVIEWED
[FreeTextEntry1] : CT C/A/P  (11/28/2022): \par \par COMPARISON: CT 10/1/2022\par \par FINDINGS:\par CHEST:\par LUNGS AND LARGE AIRWAYS: Patent central airways. No pulmonary nodules. Scattered areas of linear type atelectasis as well as partial compressive atelectasis of both lower lobes.\par PLEURA: Bilateral loculated pleural effusions mildly decreased from prior imaging. Enhancement of the pleura is again noted.\par VESSELS: Within normal limits.\par HEART: Heart size is normal. No pericardial effusion.\par MEDIASTINUM AND DANE: No lymphadenopathy.\par CHEST WALL AND LOWER NECK: Right chest wall Mediport with catheter tip at the cavoatrial junction.\par \par ABDOMEN AND PELVIS:\par LIVER: A few scattered subcentimeter hypodense hepatic foci are too small to characterize and unchanged.\par BILE DUCTS: Normal caliber.\par GALLBLADDER: Within normal limits.\par SPLEEN: Within normal limits.\par PANCREAS: Within normal limits.\par ADRENALS: Within normal limits.\par KIDNEYS/URETERS: Symmetric enhancement. No hydronephrosis.\par \par BLADDER: Within normal limits.\par REPRODUCTIVE ORGANS: Hysterectomy. Low-density lesion at the left vaginal cuff (2:41) 3.5 x 2.2 cm previously 3.8 x 2.7 cm.\par \par BOWEL: No bowel obstruction. Appendix is normal.\par PERITONEUM: Small ascites decreased from prior imaging 10/1/2022. Peritoneal carcinomatosis with poorly defined nodularity along the peritoneal lining in the pelvis not significantly changed.\par VESSELS: Within normal limits.\par RETROPERITONEUM/LYMPH NODES: No lymphadenopathy.\par ABDOMINAL WALL: Within normal limits.\par BONES: Within normal limits.\par \par IMPRESSION:\par Peritoneal carcinomatosis and enhancement of the pleura without significant change.\par \par Lesion at the left vaginal cuff without significant change.\par \par Bilateral loculated pleural effusions and ascites with interval decrease.

## 2022-12-09 NOTE — HISTORY OF PRESENT ILLNESS
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 Patient was seen initially by Dr. Arciniega at [place].\par She then saw Dr Robin Zamorano, at [facility] and had a repeat biopsy that showed clear cell carcinoma.\par On 4/20/2020 she underwent surgical staging and is post TLH/BSO/SLND/Omentectomy.\par Final pathology showed stage IA,high grade endometrial carcinoma involving predominantly clear cell and focally serous features, no invasion, no LVSI.\par \par Ct C/A/P with contrast done on 3/12/2020 showed no metastatic sites.\par She completed five cycles of CT 6/3/20- 9/2/20) Last cycle held due to toxicity and myelosuppression.She received vaginal brachytherapy to a total of 2100 cGy in 3 fractions. Treatment was delivered from 10/16/20- 10/23/20. \par Scan showed CINDY.\par \par 8/10/21: Surveillance scan showed New partially visualized small right pleural effusion. Correlation with dedicated chest CT is recommended. Few subcentimeter hepatic hypo densities, too small to characterize, but similar in appearance to prior. Small pelvic ascites, similar in appearance to prior.\par - 29( previously 15)\par \par 8/30/21: CT chest: Limited. No pulmonary embolus to level of the lobar pulmonary arteries. Interval increase in small right pleural effusion since 8/19/2021 with associated adjacent compressive atelectasis.Small perihepatic ascites.\par \par Patient was admitted to the hospital with SOB and pleural effusion. She had  thoracentesis and cytology was consistent with relapsed uterine cancer.\par \par Final Diagnosis\par PLEURAL FLUID, RIGHT\par POSITIVE FOR MALIGNANT CELLS.\par Metastatic adenocarcinoma\par \par Cytology slides and cell block reveal a hypercellular specimen\par composed of crowded 3-dimensional groups and single lying\par malignant cells with enlarged nuclei containing prominent\par nucleoli and vacuolated cytoplasm, among benign mesothelial\par cells.\par \par Immunohistochemical stains are performed on the cell block. The\par tumor cells are positive for PAX-8 (focal and weak), p16, napsin\par (focal) and p53 (overexpression), while they are negative for ER,\par WT-1 and TTF-1. In the absence of another primary, it is most\par consistent with involvement by patient's known endometrial\par adenocarcinoma. [de-identified] : Carboplatin and Taxol 6/3/20- 9/2/20( five cycles) [FreeTextEntry1] : Doxil\par C1 - 9/14/22\par C2 - 10/19/22\par C3 - 11/16/22 [de-identified] : Patient is here for follow up.\par Breathing and appetite improved with steroids.\par Cough is still bothersome, gets some relief with Hydromet.\par Denies any new pain.

## 2022-12-14 NOTE — H&P ADULT - PROBLEM SELECTOR PLAN 3
- patient meets SIRS criteria with tachycardia and tachypnea  - no clear source of infection  - f/u blood, urine and sputum cultures  - monitor off antibiotics for now - patient meets SIRS criteria with tachycardia and tachypnea  - no clear source of infection  - f/u blood, urine and sputum cultures  - continue cefepime for now - Patient meets SIRS criteria with tachycardia and tachypnea  - No clear source of infection  - F/u blood, urine and sputum cxs  - Continue cefepime for now

## 2022-12-14 NOTE — H&P ADULT - PROBLEM SELECTOR PLAN 4
DVT ppx: sq lovenox  Diet: soft and bite sized regular  Dispo: admit for further w/u of hypoxia    ***patient needs full med rec in AM, current one is based on last hospital discharge as patient did not want to talk to me overnight*** - Hgb 9.3 on admission (baseline 9-10)  - continue to monitor with daily CBC  - likely in setting of malignancy and chemotherapy  - transfuse goal >7 - Hgb 9.3 on admission (baseline 9-10)  - Continue to monitor with daily CBC  - Likely in setting of malignancy and chemotherapy  - No S/S of active bleed  - Transfuse for Hgb goal >7

## 2022-12-14 NOTE — ED ADULT NURSE NOTE - NSIMPLEMENTINTERV_GEN_ALL_ED
Implemented All Fall Risk Interventions:  [place] to call system. Call bell, personal items and telephone within reach. Instruct patient to call for assistance. Room bathroom lighting operational. Non-slip footwear when patient is off stretcher. Physically safe environment: no spills, clutter or unnecessary equipment. Stretcher in lowest position, wheels locked, appropriate side rails in place. Provide visual cue, wrist band, yellow gown, etc. Monitor gait and stability. Monitor for mental status changes and reorient to person, place, and time. Review medications for side effects contributing to fall risk. Reinforce activity limits and safety measures with patient and family.

## 2022-12-14 NOTE — H&P ADULT - NSHPLABSRESULTS_GEN_ALL_CORE
LABS:                        9.3    10.37 )-----------( 337      ( 14 Dec 2022 18:17 )             31.0     12-14    143  |  104  |  16  ----------------------------<  117<H>  5.6<H>   |  25  |  0.94    Ca    10.1      14 Dec 2022 18:17    TPro  7.2  /  Alb  3.0<L>  /  TBili  0.4  /  DBili  x   /  AST  27  /  ALT  10  /  AlkPhos  160<H>  12-14    PT/INR - ( 14 Dec 2022 18:17 )   PT: 16.3 sec;   INR: 1.40 ratio         PTT - ( 14 Dec 2022 18:17 )  PTT:29.6 sec          RADIOLOGY & ADDITIONAL TESTS:    Imaging Personally Reviewed:    Consultant(s) Notes Reviewed:      Care Discussed with Consultants/Other Providers: LABS:                        9.3    10.37 )-----------( 337      ( 14 Dec 2022 18:17 )             31.0     12-14    143  |  104  |  16  ----------------------------<  117<H>  5.6<H>   |  25  |  0.94    Ca    10.1      14 Dec 2022 18:17    TPro  7.2  /  Alb  3.0<L>  /  TBili  0.4  /  DBili  x   /  AST  27  /  ALT  10  /  AlkPhos  160<H>  12-14    PT/INR - ( 14 Dec 2022 18:17 )   PT: 16.3 sec;   INR: 1.40 ratio         PTT - ( 14 Dec 2022 18:17 )  PTT:29.6 sec          RADIOLOGY & ADDITIONAL TESTS:    Imaging Personally Reviewed:  CXR  FINDINGS:    A right-sided port is unchanged in position.  Patchy bibasilar opacities, increased interstitial markings, increased   from prior. Small bilateral pleural effusions are unchanged. No   pneumothorax.  Cardiomediastinal silhouette is poorly evaluated on this projection.  No acute bony pathology.    IMPRESSION:    Small bilateral pleural effusions.  Bibasilar atelectasis and pulmonary edema mildly increased from prior.    Consultant(s) Notes Reviewed:      Care Discussed with Consultants/Other Providers: EKG personally reviewed.  Sinus tach at 107 bpm, no acute ischemic changes, QTc 484 ms.    Labs personally reviewed.                        9.3    10.37 )-----------( 337      ( 14 Dec 2022 18:17 )             31.0     12-14    143  |  104  |  16  ----------------------------<  117<H>  5.6<H>   |  25  |  0.94    Ca    10.1      14 Dec 2022 18:17    TPro  7.2  /  Alb  3.0<L>  /  TBili  0.4  /  DBili  x   /  AST  27  /  ALT  10  /  AlkPhos  160<H>  12-14    PT/INR - ( 14 Dec 2022 18:17 )   PT: 16.3 sec;   INR: 1.40 ratio       PTT - ( 14 Dec 2022 18:17 )  PTT:29.6 sec    Imaging personally reviewed.  CXR  FINDINGS:  A right-sided port is unchanged in position.  Patchy bibasilar opacities, increased interstitial markings, increased from prior. Small bilateral pleural effusions are unchanged. No pneumothorax.  Cardiomediastinal silhouette is poorly evaluated on this projection.  No acute bony pathology.    IMPRESSION:  Small bilateral pleural effusions.  Bibasilar atelectasis and pulmonary edema mildly increased from prior.

## 2022-12-14 NOTE — ED PROVIDER NOTE - CLINICAL SUMMARY MEDICAL DECISION MAKING FREE TEXT BOX
58 y/o female pmh uterine CA w/ mets to lung s/p radiation and chemo, peritoneal carinomatosis, malignant pleural effusions s/p VATS, last drained in Oct 2022, c/o worsening sob x2 weeks. Pt found to be hypoxic to 84% on RA, 99% on NC at 4L- concern for worsening pleural effusions vs respiratory infection- will  check labs, cultures, cxr, abx,- if CXR significant for pleural effusions will admit, if unremarkale will proceed to CTA to r/o PE.

## 2022-12-14 NOTE — ED PROVIDER NOTE - OBJECTIVE STATEMENT
56 y/o female pmh Uterine CA w/ mets to lung(s/p radiation, chemo), peritoneal carcinomatosis, malignant pleural effusions s/p VATS c/o sob. Pt admits to cough and sob worsening over the last 2 weeks. Pt went to chemo therapy today and was found to be hypoxic and sent to the ER. Pt admits to chest pain with coughing. Denies diaphoresis, abd pain, n/v/d, numbness, tingling, weakness, dizziness, syncope, fever or chills.

## 2022-12-14 NOTE — H&P ADULT - PROBLEM SELECTOR PLAN 1
- possibly multifactorial: patient with history of pleural effusions requiring drainage in past , though appear unchanged. patient does appear slightly volume overloaded on exam- possible heart failure with pulmonary edema on CXR. Also need to consider infectious etiologies and PE (given tachycardia and tachypnea in setting of malignancy)  - Well's PE score 2.5 (moderate risk), given malignancy, d-dimer may likely be elevated  - evaluate further with CTA Chest; CT will also further evaluate pleural effusions or infectious causes  - patient s/p 20IV lasix in ED, monitor I/Os, daily standing weights  - redose as needed  - repeat TTE given doxorubicin chemotherapy  - patient s/p vanc and cefepime in ED  - sputum culture. follow up blood and urine cx  - monitor off abx for now  - xopinex inhaler q6h (given tachycardia) - possibly multifactorial: patient with history of pleural effusions requiring drainage in past , though appear unchanged. patient does appear slightly volume overloaded on exam- possible heart failure with pulmonary edema on CXR. Also need to consider infectious etiologies and PE (given tachycardia and tachypnea in setting of malignancy)  - Well's PE score 2.5 (moderate risk), given malignancy, d-dimer may likely be elevated  - evaluate further with CTA Chest; CT will also further evaluate pleural effusions or infectious causes  - patient s/p 20IV lasix in ED, monitor I/Os, daily standing weights (proBNP 5128)  - redose as needed  - repeat TTE given doxorubicin chemotherapy  - patient s/p vanc and cefepime in ED  - sputum culture. follow up blood and urine cx  - monitor off abx for now  - xopinex inhaler q6h (given tachycardia) - possibly multifactorial: patient with history of pleural effusions requiring drainage in past , though appear unchanged. patient does appear slightly volume overloaded on exam- possible heart failure with pulmonary edema on CXR. Also need to consider infectious etiologies and PE (given tachycardia and tachypnea in setting of malignancy)  - Well's PE score 2.5 (moderate risk), given malignancy, d-dimer may likely be elevated  - evaluate further with CTA Chest; CT will also further evaluate pleural effusions or infectious causes (called to expedite the study)  - patient s/p 20IV lasix in ED, monitor I/Os, daily standing weights (proBNP 5128)  - redose as needed  - repeat TTE given doxorubicin chemotherapy can be cardiotoxic  - patient s/p vanc and cefepime in ED  - sputum culture. follow up blood and urine cx  - continue cefepime for now   - xopinex inhaler q6h (given tachycardia) - possibly multifactorial: patient with history of pleural effusions requiring drainage in past , though appear unchanged. patient does appear slightly volume overloaded on exam- possible heart failure with pulmonary edema on CXR. Also need to consider infectious etiologies and PE (given tachycardia and tachypnea in setting of malignancy)  - Well's PE score 2.5 (moderate risk), given malignancy, d-dimer may likely be elevated  - evaluate further with CTA Chest; CT will also further evaluate pleural effusions or infectious causes (called to expedite the study)  - patient s/p 20IV lasix in ED, monitor I/Os, daily standing weights (proBNP 5128)  - redose as needed  - repeat TTE given doxorubicin chemotherapy can be cardiotoxic  - patient s/p vanc and cefepime in ED  - sputum culture. follow up blood and urine cx  - continue cefepime for now   - xopinex inhaler q6h PRN (given tachycardia)  - pulm consult in AM to eval pleural effusions, possible need for thoracentesis - Possibly multifactorial: patient with history of pleural effusions requiring drainage in past , though appear unchanged on admission CXR. patient does appear slightly volume overloaded on exam, so possible heart failure with pulmonary edema on CXR. Also need to consider infectious etiologies and PE (given tachycardia and tachypnea in setting of malignancy).  - Well's PE score 2.5 (moderate risk). Given malignancy, D-dimer will likely be elevated  - Evaluate further with CTA Chest with IV contrast. CTA will also further evaluate pleural effusions or infectious causes (called ED charge nurse and CT tech to expedite the study overnight, will be done by 5 am per ED charge nurse)  - S/p IV Lasix 20 mg x1 in ED, monitor I/Os, daily standing weights (pro-BNP 5128)  - Re-dose IV Lasix as needed  - TTE ordered given doxorubicin chemotherapy can be cardiotoxic  - S/p vanc and cefepime in ED, continue cefepime for now   - F/u blood cxs. Sputum cx ordered.  - Xopenex inhaler q6hrs PRN (given tachycardia)  - Pulm consult to be called in AM to evaluate pleural effusions, possible need for thoracentesis  - C/w supplemental O2 PRN  - Monitor on continuous pulse ox

## 2022-12-14 NOTE — ED ADULT TRIAGE NOTE - ARRIVAL FROM
[facility]CTORS OFFICE Paramedian Forehead Flap Text: A decision was made to reconstruct the defect utilizing an interpolation axial flap and a staged reconstruction.  A telfa template was made of the defect.  This telfa template was then used to outline the paramedian forehead pedicle flap.  The donor area for the pedicle flap was then injected with anesthesia.  The flap was excised through the skin and subcutaneous tissue down to the layer of the underlying musculature.  The pedicle flap was carefully excised within this deep plane to maintain its blood supply.  The edges of the donor site were undermined.   The donor site was closed in a primary fashion.  The pedicle was then rotated into position and sutured.  Once the tube was sutured into place, adequate blood supply was confirmed with blanching and refill.  The pedicle was then wrapped with xeroform gauze and dressed appropriately with a telfa and gauze bandage to ensure continued blood supply and protect the attached pedicle.

## 2022-12-14 NOTE — ED ADULT NURSE REASSESSMENT NOTE - NS ED NURSE REASSESS COMMENT FT1
Report received from EMANUEL Hopper. Pt A&OX4, resting in stretcher. Pt on 5L NC, satting 100%. Pt denies any CP, difficulty breathing at this time. Pt sinus tach on CM with HR at 115. Pt placed on prima fit. Safety measures in place, call bell in reach.

## 2022-12-14 NOTE — PATIENT PROFILE ADULT - FALL HARM RISK - HARM RISK INTERVENTIONS

## 2022-12-14 NOTE — ED PROVIDER NOTE - PROGRESS NOTE DETAILS
YANELIS Delvalle PGY2 pt feeling similar to prior pleural effusions. discussed with hospitalist who will follow up on pro-bnp results. tba tele.

## 2022-12-14 NOTE — ED ADULT NURSE NOTE - OBJECTIVE STATEMENT
Patient presents to the ED for SOB, cough at chemo center today. She states that she was unable to tolerate treatment today. Reports cough and SOB has been worsening over the past 2 weeks. Patient is AA&Ox4. EMS noted that patient saturates 90% on room air. Patient is now on 5 L NC saturating 100%. Respirations even, with occasional cough. Denies CP, dizziness, N/V. She has hx of uterine cancer with metastasis to lungs. IV placed right hand 20 g, labs drawn and sent. Medicated as ordered.

## 2022-12-14 NOTE — H&P ADULT - NSHPPHYSICALEXAM_GEN_ALL_CORE
Vital Signs Last 24 Hrs  T(C): 36.6 (14 Dec 2022 20:30), Max: 37.3 (14 Dec 2022 15:43)  T(F): 97.9 (14 Dec 2022 20:30), Max: 99.1 (14 Dec 2022 15:43)  HR: 112 (14 Dec 2022 20:30) (87 - 138)  BP: 112/83 (14 Dec 2022 20:30) (112/83 - 136/94)  BP(mean): --  RR: 22 (14 Dec 2022 20:30) (18 - 28)  SpO2: 100% (14 Dec 2022 20:30) (69% - 100%)    Parameters below as of 14 Dec 2022 20:30  Patient On (Oxygen Delivery Method): nasal cannula  O2 Flow (L/min): 5    CAPILLARY BLOOD GLUCOSE        I&O's Summary      PHYSICAL EXAM:  GENERAL: NAD, well-developed  HEAD:  Atraumatic, Normocephalic  EYES: EOMI, PERRLA, conjunctiva and sclera clear  NECK: Supple, No JVD  CHEST/LUNG: Clear to auscultation bilaterally; No wheeze  HEART: Regular rate and rhythm; No murmurs, rubs, or gallops  ABDOMEN: Soft, Nontender, Nondistended; Bowel sounds present  EXTREMITIES:  2+ Peripheral Pulses, No clubbing, cyanosis, or edema  PSYCH: AAOx3  NEUROLOGY: non-focal  SKIN: No rashes or lesions Vital Signs Last 24 Hrs  T(C): 36.6 (14 Dec 2022 20:30), Max: 37.3 (14 Dec 2022 15:43)  T(F): 97.9 (14 Dec 2022 20:30), Max: 99.1 (14 Dec 2022 15:43)  HR: 112 (14 Dec 2022 20:30) (87 - 138)  BP: 112/83 (14 Dec 2022 20:30) (112/83 - 136/94)  BP(mean): --  RR: 22 (14 Dec 2022 20:30) (18 - 28)  SpO2: 100% (14 Dec 2022 20:30) (69% - 100%)    Parameters below as of 14 Dec 2022 20:30  Patient On (Oxygen Delivery Method): nasal cannula  O2 Flow (L/min): 5    CAPILLARY BLOOD GLUCOSE        I&O's Summary      PHYSICAL EXAM:  GENERAL: ill appearing  HEAD:  Atraumatic, Normocephalic  EYES: EOMI, PERRLA, canicteric sclera  NECK: Supple, JVD to low neck  CHEST/LUNG: increased work of breathing. decreased lung sounds at bases bilaterally. crackles mid lungs bilaterally   HEART: tachycardic rate and regular rhythm; No murmurs, rubs, or gallops  ABDOMEN: Soft, Nontender, Nondistended; Bowel sounds present  EXTREMITIES:  2+ Peripheral Pulses, trace edema bilateral lower extremities  PSYCH: AAOx3  NEUROLOGY: non-focal  SKIN: No rashes or lesions Vital Signs Last 24 Hrs  T(C): 36.6 (14 Dec 2022 20:30), Max: 37.3 (14 Dec 2022 15:43)  T(F): 97.9 (14 Dec 2022 20:30), Max: 99.1 (14 Dec 2022 15:43)  HR: 112 (14 Dec 2022 20:30) (87 - 138)  BP: 112/83 (14 Dec 2022 20:30) (112/83 - 136/94)  BP(mean): --  RR: 22 (14 Dec 2022 20:30) (18 - 28)  SpO2: 100% (14 Dec 2022 20:30) (69% - 100%)    Parameters below as of 14 Dec 2022 20:30  Patient On (Oxygen Delivery Method): nasal cannula  O2 Flow (L/min): 5    Vital Signs Last 24 Hrs  T(C): 36.6 (15 Dec 2022 08:00), Max: 37.6 (14 Dec 2022 22:37)  T(F): 97.9 (15 Dec 2022 08:00), Max: 99.7 (14 Dec 2022 22:37)  HR: 106 (15 Dec 2022 08:00) (87 - 138)  BP: 134/90 (15 Dec 2022 08:00) (112/83 - 136/94)  BP(mean): --  RR: 19 (15 Dec 2022 08:00) (18 - 28)  SpO2: 98% (15 Dec 2022 08:00) (69% - 100%)    PHYSICAL EXAM:  General: Chronically ill appearing.  Awake and alert.  No acute distress.  Head: Normocephalic, atraumatic.    Eyes: PERRL.  EOMI.  No scleral icterus.  No conjunctival pallor.  Mouth: Minimal oral secretions.  Moist MM.  No oropharyngeal exudates.    Neck: Supple.  Full range of motion.  No JVD.  No LAD.  No thyromegaly.  Trachea midline.    Heart: Tachycardic, regular rhythm.  Normal S1 and S2.  No murmurs, rubs, or gallops.  Trace pitting LE edema b/l.   Lungs: Increased work of breathing, good inspiratory effort but with shallow breaths.  Crackles to mid lung fields b/l, decreased lung sounds at bases b/l.   Abdomen: BS+, soft, nontender with no rebound or guarding, nondistended.  No hepatomegaly.   Skin: Warm and dry.  No rashes.  Mediport at R chest wall with no overlying skin changes.  Extremities: No cyanosis.  2+ peripheral pulses b/l.  Musculoskeletal: No joint deformities.  No spinal or paraspinal tenderness.  Neuro: A&Ox3.  CN II-XII intact.  5/5 motor strength in UE and LE b/l.  Tactile sensation intact in UE and LE b/l.  No focal deficits.  Psychiatric: Mildly anxious appearing, full affect.

## 2022-12-14 NOTE — H&P ADULT - HISTORY OF PRESENT ILLNESS
57-year-old female with a PMHx of uterine cancer (S/p chemotherapy, radiation and hysterectomy) w/ mets to the lungs and peritoneal carcinomatosis (on doxorubicin w/ Brianna, last session 11/2022), pleural effusions and chronic anemia presenting  57-year-old female with a PMHx of uterine cancer (S/p chemotherapy, radiation and hysterectomy) w/ mets to the lungs and peritoneal carcinomatosis (on doxorubicin w/ Brianna, last session 11/2022), pleural effusions (s/p VATs) and chronic anemia presenting with shortness of breath. Patient was scheduled for outpatient chemo today, but was found to be hypoxic so she was sent to the ED.     Patient was having cough and felt tired, stating that she did not want to talk to me.    History as per ED note. Patient notes chest pain with coughing. Denies abd pain, n/v, syncope, fevers or chills.  57-year-old female with a PMHx of uterine cancer (S/p chemotherapy, radiation and hysterectomy) w/ mets to the lungs and peritoneal carcinomatosis (on doxorubicin w/ Brianna, last session 11/2022), pleural effusions (s/p VATs) and chronic anemia presenting with shortness of breath. Patient was scheduled for outpatient chemo today, but was found to be hypoxic so she was sent to the ED. Outpatient oncologist note reviewed. Patient noted worsening SOB for 2 days with white sputum for past 10 days. Patient hypoxic to 69% on RA, placed on O2 and given albuterol with improvement to 90% O2. Sent to ED for further w/u.     Patient was having cough and felt tired, stating that she did not want to talk to me.    History as per ED note. Patient notes chest pain with coughing. Denies abd pain, n/v, syncope, fevers or chills. In ED, patient CXR demonstrated pulmonary edema. Patient given 20VI lasix and has been urinating. Patient also given cefepime and vancomycin and albuterol.  57-year-old female with a PMHx of uterine cancer (s/p chemotherapy, radiation and hysterectomy) w/ lung mets and peritoneal carcinomatosis (on doxorubicin w/ Brianna, last session 11/2022), pleural effusions (s/p VATs), and chronic anemia presenting with shortness of breath. Patient was scheduled for outpatient chemo today, but was found to be hypoxic so she was sent to the ED. Outpatient oncologist note reviewed. Patient noted worsening SOB for 2 days with cough productive of white sputum for past 10 days. Patient hypoxic to 69% on RA, placed on O2 and given albuterol with improvement to 90% O2. Sent to ED for further w/u.     Patient was having cough and felt tired, stating that she did not want to talk to me.    History as per ED note. Patient notes chest pain with coughing. Denies abd pain, n/v, syncope, fevers or chills. In ED, patient CXR demonstrated pulmonary edema. Patient given IV 20 mg Lasix and has been urinating. Patient also given cefepime and vancomycin and albuterol.

## 2022-12-14 NOTE — H&P ADULT - NSHPREVIEWOFSYSTEMS_GEN_ALL_CORE
CONSTITUTIONAL:  No weight loss, fever, chills, weakness or fatigue.  HEENT:  Eyes:  No visual loss, blurred vision, double vision or yellow sclerae. Ears, Nose, Throat:  No hearing loss, sneezing, congestion, runny nose or sore throat.  SKIN:  No rash or itching.  CARDIOVASCULAR:  No chest pain, chest pressure or chest discomfort. No palpitations or edema.  RESPIRATORY:  No shortness of breath, cough or sputum.  GASTROINTESTINAL:  No anorexia, nausea, vomiting or diarrhea. No abdominal pain or blood.  GENITOURINARY:  No dysuria, hematuria or increased urinary frequency  NEUROLOGICAL:  No headache, dizziness, syncope, paralysis, ataxia, numbness or tingling in the extremities. No change in bowel or bladder control.  MUSCULOSKELETAL:  No muscle, back pain, joint pain or stiffness.  HEMATOLOGIC:  No anemia, bleeding or bruising.  LYMPHATICS:  No enlarged nodes. No history of splenectomy.  PSYCHIATRIC:  No history of depression or anxiety.  ENDOCRINOLOGIC:  No reports of sweating, cold or heat intolerance. No polyuria or polydipsia.  ALLERGIES:  No history of asthma, hives, eczema or rhinitis. CONSTITUTIONAL:  No fever, chills, +weakness/ fatigue.  HEENT:  Eyes:  No visual loss, blurred vision, double vision or yellow sclerae. Ears, Nose, Throat:  No hearing loss, sneezing, congestion, runny nose or sore throat.  SKIN:  No rash or itching.  CARDIOVASCULAR:  +chest pain with coughing. No chest pressure or chest discomfort. No palpitations. +edema.  RESPIRATORY:  +shortness of breath, cough and whitis sputum.  GASTROINTESTINAL:  No anorexia, nausea, vomiting or diarrhea. No abdominal pain or blood.  GENITOURINARY:  No dysuria, hematuria or increased urinary frequency  NEUROLOGICAL:  No headache, dizziness, syncope, paralysis, ataxia, numbness or tingling in the extremities. No change in bowel or bladder control.  MUSCULOSKELETAL:  No muscle, back pain, joint pain or stiffness.  HEMATOLOGIC:  No anemia, bleeding or bruising.  LYMPHATICS:  No enlarged nodes. No history of splenectomy.  PSYCHIATRIC:  No history of depression or anxiety.  ENDOCRINOLOGIC:  No reports of sweating, cold or heat intolerance. No polyuria or polydipsia.  ALLERGIES:  No history of asthma, hives, eczema or rhinitis. Constitutional: +Generalized weakness and fatigue. No fevers, chills, or weight loss.  Eyes: No visual changes, double vision, or eye pain  Ears, Nose, Mouth, Throat: +Mild dysphagia. No runny nose, sinus pain, ear pain, tinnitus, sore throat, or odynophagia.  Cardiovascular: +Chest/rib pain with coughing only. +Chronic LE edema. No palpitations.  Respiratory: +Shortness of breath and cough. No wheezing or hemoptysis.  Gastrointestinal: No abdominal pain, nausea/vomiting, diarrhea/constipation, hematemesis, melena, or BRBPR  Genitourinary: No dysuria, frequency, urgency, or hematuria  Musculoskeletal: No back pain or joint pain, swelling, or decreased ROM  Skin: No pruritus or rashes  Neurologic: No syncope, seizures, headache, paresthesias, numbness, or limb weakness  Psychiatric: No depression, anxiety, or agitation  Endocrine: No heat/cold intolerance, mood swings, sweats, polydipsia, or polyuria  Hematologic/lymphatic: No purpura, petechia, or prolonged or excessive bleeding after dental extraction / injury  Allergic/Immunologic: No anaphylaxis or allergic response to materials, foods, animals    Positives and pertinent negatives noted and all other systems negative.

## 2022-12-14 NOTE — H&P ADULT - PROBLEM SELECTOR PLAN 2
- patient with hx of metastatic uterine cancer with peritoneal carcinomatosis  - email oncology to make them aware patient is admitted  - planned for chemotherapy today (12/14/2022) as outpatient - patient with hx of metastatic uterine cancer with peritoneal carcinomatosis  - emailed oncology overnight to make them aware patient is admitted  - planned for chemotherapy today (12/14/2022) as outpatient - Patient with hx of metastatic uterine cancer with peritoneal carcinomatosis  - Emailed oncology overnight to make them aware patient is admitted, f/u recs  - Planned for chemotherapy today (12/14/2022) as outpatient

## 2022-12-14 NOTE — ED ADULT TRIAGE NOTE - CHIEF COMPLAINT QUOTE
Pt brought in by EMS from [facility] for SOB, was unable to receive her chemo today. Pt spO2 was 90s on RA, placed on 6L NC by EMS. pt denies chest pain. Pt has a hx of uterine ca.

## 2022-12-14 NOTE — H&P ADULT - NSHPSOCIALHISTORY_GEN_ALL_CORE
Patient did not want to talk with me. Per outpatient palliative care team note, patient is single, lives at home with mother, Has HHA who visits. Denies tobacco, alcohol, or illicit drug use.

## 2022-12-14 NOTE — H&P ADULT - ASSESSMENT
57-year-old female with a PMHx of uterine cancer (S/p chemotherapy, radiation and hysterectomy) w/ mets to the lungs and peritoneal carcinomatosis (on doxorubicin w/ Brianna, last session 11/2022), pleural effusions (s/p VATs) and chronic anemia presenting with shortness of breath.  57-year-old female with a PMHx of uterine cancer (s/p chemotherapy, radiation and hysterectomy) w/ mets to the lungs and peritoneal carcinomatosis (on doxorubicin w/ Brianna, last session 11/2022), pleural effusions (s/p VATs) and chronic anemia presenting with shortness of breath.

## 2022-12-14 NOTE — ED ADULT NURSE NOTE - DRUG PRE-SCREENING (DAST -1)
General Surgery-Progress Note


Subjective


Procedure Performed


uterine fibroid embolization


Symptoms:  pain decreased





Objective





Last 24 Hour Vital Signs








  Date Time  Temp Pulse Resp B/P (MAP) Pulse Ox O2 Delivery O2 Flow Rate FiO2


 


6/14/20 10:00 98.5       


 


6/14/20 08:00 101.2 99 18 98/59 (72) 97   


 


6/14/20 04:00 97.5 89 18 104/69 (81) 100   


 


6/13/20 23:45 97.9 77 20 93/65 (74) 96   


 


6/13/20 21:00      Room Air  


 


6/13/20 20:00 98.5 77 20 109/69 (82) 100   


 


6/13/20 16:00 97.5 71 18 104/54 (71) 99   








I&O











Intake and Output  


 


 6/13/20 6/14/20





 19:00 07:00


 


Intake Total 520 ml 500 ml


 


Balance 520 ml 500 ml


 


  


 


Intake Oral 520 ml 500 ml


 


# Voids 5 3








Dressing:  dry


Wound:  clean


Drains:  none


Cardiovascular:  RSR


Respiratory:  clear


Abdomen:  non-tender, present bowel sounds


Extremities:  no edema, no tenderness, no cyanosis





Laboratory Tests








Test


  6/14/20


06:42


 


White Blood Count


  14.0 K/UL


(4.8-10.8)  H


 


Red Blood Count


  4.69 M/UL


(4.20-5.40)


 


Hemoglobin


  12.6 G/DL


(12.0-16.0)


 


Hematocrit


  41.1 %


(37.0-47.0)


 


Mean Corpuscular Volume 88 FL (80-99)  


 


Mean Corpuscular Hemoglobin


  27.0 PG


(27.0-31.0)


 


Mean Corpuscular Hemoglobin


Concent 30.7 G/DL


(32.0-36.0)  L


 


Red Cell Distribution Width


  13.5 %


(11.6-14.8)


 


Platelet Count


  278 K/UL


(150-450)


 


Mean Platelet Volume


  7.9 FL


(6.5-10.1)


 


Neutrophils (%) (Auto)


  80.7 %


(45.0-75.0)  H


 


Lymphocytes (%) (Auto)


  11.1 %


(20.0-45.0)  L


 


Monocytes (%) (Auto)


  4.9 %


(1.0-10.0)


 


Eosinophils (%) (Auto)


  2.9 %


(0.0-3.0)


 


Basophils (%) (Auto)


  0.4 %


(0.0-2.0)


 


Sodium Level


  135 MMOL/L


(136-145)  L


 


Potassium Level


  3.4 MMOL/L


(3.5-5.1)  L


 


Chloride Level


  98 MMOL/L


()


 


Carbon Dioxide Level


  28 MMOL/L


(21-32)


 


Anion Gap


  9 mmol/L


(5-15)


 


Blood Urea Nitrogen


  2 mg/dL (7-18)


L


 


Creatinine


  0.9 MG/DL


(0.55-1.30)


 


Estimat Glomerular Filtration


Rate > 60 mL/min


(>60)


 


Glucose Level


  90 MG/DL


()


 


Calcium Level


  8.5 MG/DL


(8.5-10.1)


 


Total Bilirubin


  1.0 MG/DL


(0.2-1.0)


 


Aspartate Amino Transf


(AST/SGOT) 21 U/L (15-37)


 


 


Alanine Aminotransferase


(ALT/SGPT) 19 U/L (12-78)


 


 


Alkaline Phosphatase


  52 U/L


()


 


Total Protein


  7.1 G/DL


(6.4-8.2)


 


Albumin


  3.3 G/DL


(3.4-5.0)  L


 


Globulin 3.8 g/dL  


 


Albumin/Globulin Ratio


  0.9 (1.0-2.7)


L











Plan


Additional Comments


one time temperature spike. will observe, anticipate am discharge.











Lincoln Alvarado MD Jun 14, 2020 15:12 Statement Selected

## 2022-12-14 NOTE — ED PROVIDER NOTE - ATTENDING APP SHARED VISIT CONTRIBUTION OF CARE
I have evaluated the patient and agree with the documentation and assessment as made by the EMANUEL. We have discussed plan of care and work up for the patient.   This was a shared visit with the EMANUEL. I reviewed and verified the documentation and independently performed the documented:   History, Exam and Medical Decision Making.    57F, uterine ca, actively getting chemo, peritoneal carcainotosis, hx of pleural effusions requiring chest tube placement, s/p VATS who presents with shortness of breath. Has had a progressive hx of worsening SOB over the past two weeks. Went to scheduled chemotherapy session today and was found to by hypoxic to low to mid 80s and thus sent to the ED for evaluation. Here in the ED, patient complaining of shortness of breath and cough. Physically: afebrile, tachypneic to mid 20s, hypoxic to low 80s on RA but responds to 100% on 4-6L NC, decreased breath sounds at bases bilaterally, abdomen soft, tachycardic, 1+ pitting edema of the LE b/l. Plan: labs, cultures, VBG, CXR, admit. Concern for recurrence of pleural effusions.

## 2022-12-14 NOTE — H&P ADULT - ATTENDING COMMENTS
57-year-old female with a PMHx of uterine cancer (s/p chemotherapy, radiation and hysterectomy) w/ lung mets and peritoneal carcinomatosis (on doxorubicin w/ Brianna, last session 11/2022), pleural effusions (s/p VATs), and chronic anemia presenting with shortness of breath, admitted with acute hypoxic respiratory failure 2/2 b/l pleural effusions, pulm edema, PNA, and/or PE. S/p IV Lasix 20 mg x1 in ED, will reassess volume status in AM and c/w diuresis PRN. On cefepime for now for possible PNA. CTA chest with IV contrast ordered to r/o PE and TTE pending to evaluate for possible structural heart disease, causing acute decompensated heart failure. Pulm consult to be obtained in AM for possible thoracentesis. S&S eval ordered, as pt with history of dysphagia. Monitor on continuous pulse ox.

## 2022-12-14 NOTE — H&P ADULT - PROBLEM SELECTOR PLAN 5
DVT ppx: sq lovenox  Diet: soft and bite sized regular  Dispo: admit for further w/u of hypoxia    ***patient needs full med rec in AM, current one is based on last hospital discharge as patient did not want to talk to me overnight*** DVT ppx: Lovenox SQ  Diet: Per pt, eats soft and bite sized diet at home, will continue for now. S&S eval ordered.  Dispo: admit for further w/u of hypoxia    ***patient needs full med rec in AM, current one is based on last hospital discharge as patient did not want to talk to me overnight***

## 2022-12-15 NOTE — CONSULT NOTE ADULT - SUBJECTIVE AND OBJECTIVE BOX
HPI:  57-year-old female with a PMHx of uterine cancer (s/p chemotherapy, radiation and hysterectomy) w/ lung mets and peritoneal carcinomatosis (on doxorubicin w/ Brianna, last session 11/2022), pleural effusions (s/p VATs), and chronic anemia presenting with shortness of breath. Patient was scheduled for outpatient chemo today, but was found to be hypoxic so she was sent to the ED. Outpatient oncologist note reviewed. Patient noted worsening SOB for 2 days with cough productive of white sputum for past 10 days. Patient hypoxic to 69% on RA, placed on O2 and given albuterol with improvement to 90% O2. Sent to ED for further w/u.     Patient was having cough and felt tired, stating that she did not want to talk to me.    History as per ED note. Patient notes chest pain with coughing. Denies abd pain, n/v, syncope, fevers or chills. In ED, patient CXR demonstrated pulmonary edema. Patient given IV 20 mg Lasix and has been urinating. Patient also given cefepime and vancomycin and albuterol.  (14 Dec 2022 22:29)    Thoracic:  Patient seen and examined at bedside, resting in bed. Patient on 6L NC, saturating at 100%.   Patient states that this shortness of breath started progressively over the last 2 weeks and she was sent into the hospital yesterday by her PCP/Oncologist with low O2 saturations. She states she has had a worsening productive cough, clear/white sputum, and worsening SOB. Noted to have some mild coughing at bedside with oxygenation drop, but saturation returns shortly after resolution of coughing. Patient states that she got an Xray last night, but is requesting not to have the CT chest done today, as she is overwhelmed. Pt is agreeable to CT scan on this admission, but was counseled on importance of this test for diagnosis and treatment. Patient states she understands this and will accept the CT.   PAST MEDICAL & SURGICAL HISTORY:  Uterine cancer  S/p chemotherapy, radiation and hysterectomy      Malignant pleural effusion  with hx loculation on R      Asthma      Chronic anemia      S/P hysterectomy  2020 VINCENT/BSO      H/O ovarian cystectomy  2009      History of thoracentesis  R VATS 10/21          REVIEW OF SYSTEMS      General:No Weight change/ Fatigue/ HA/Dizzy	    Skin/Breast: No Rashes/ Lesions/ Masses  	  Ophthalmologic: No Blurry vision/ Glaucoma/ Blindness  	  ENMT: No Hearing loss/ Drainage/ Lesions	    Respiratory and Thorax: No Cough/ Wheezing/ SOB/ Hemoptysis/ Sputum production  	  Cardiovascular: No Chest pain/ Palpitations/ Diaphoresis	    Gastrointestinal: No Nausea/ Vomiting/ Constipation/ Appetite Change	    Genitourinary: No Heamturia/ Dysuria/ Frequency change/ Impotence	    Musculoskeletal: No Pain/ Weakness/ Claudication	    Neurological: No Seizures/ TIA/CVA/ Parastesias	    Psychiatric: No Dementia/ Depression/ SI/HI	    Hematology/Lymphatics: No hx of bleeding/ Edema	    Endocrine:	No Hyperglycemia/ Hypoglycemia    Allergic/Immunologic:	 No Anaphylaxis/ Intolerance/ Recent illnesses    MEDICATIONS  (STANDING):  azithromycin  IVPB 500 milliGRAM(s) IV Intermittent every 24 hours  cefTRIAXone   IVPB 1000 milliGRAM(s) IV Intermittent every 24 hours  enoxaparin Injectable 40 milliGRAM(s) SubCutaneous every 24 hours  influenza   Vaccine 0.5 milliLiter(s) IntraMuscular once  magnesium oxide 400 milliGRAM(s) Oral three times a day with meals    MEDICATIONS  (PRN):  acetaminophen     Tablet .. 650 milliGRAM(s) Oral every 6 hours PRN Temp greater or equal to 38C (100.4F), Mild Pain (1 - 3)  guaiFENesin Oral Liquid (Sugar-Free) 100 milliGRAM(s) Oral every 6 hours PRN Cough  hydrocodone/homatropine Syrup 5 milliLiter(s) Oral every 8 hours PRN Cough  levalbuterol Inhalation 0.63 milliGRAM(s) Inhalation every 6 hours PRN Shortness of breath/wheezing      Allergies    No Known Allergies    Intolerances        SOCIAL HISTORY:  Occupation:  Smoking Hx:   Etoh Hx:   IVDA Hx:     FAMILY HISTORY:  Maternal family history of hypertension (Mother)    Family history of type 2 diabetes mellitus (Father)        Vital Signs Last 24 Hrs  T(C): 36.6 (15 Dec 2022 08:00), Max: 37.6 (14 Dec 2022 22:37)  T(F): 97.9 (15 Dec 2022 08:00), Max: 99.7 (14 Dec 2022 22:37)  HR: 106 (15 Dec 2022 08:00) (87 - 138)  BP: 134/90 (15 Dec 2022 08:00) (112/83 - 136/94)  BP(mean): --  RR: 19 (15 Dec 2022 08:00) (18 - 28)  SpO2: 98% (15 Dec 2022 08:00) (69% - 100%)    Parameters below as of 15 Dec 2022 08:00  Patient On (Oxygen Delivery Method): nasal cannula  O2 Flow (L/min): 5      General: Appears stated age, appears uncomfortable  Neurology: Awake, nonfocal, BUCHANAN x 4  Eyes: Scleras clear, PERRLA/ EOMI, Gross vision intact  ENT:Gross hearing intact, grossly patent pharynx, no stridor  Neck: Neck supple, trachea midline, No JVD,   Respiratory: CTA B/L, No wheezing, rales, rhonchi  CV: RRR, S1S2, no murmurs, rubs or gallops  Abdominal: Soft, NT, ND +BS,   Extremities: No edema, + peripheral pulses  Skin: No Rashes, Hematoma, Ecchymosis  Lymphatic: No Neck, axilla, groin LAD  Psych: Oriented x 3, normal affect  Incisions:   Tubes:    LABS:                        8.6    10.37 )-----------( 284      ( 15 Dec 2022 05:50 )             29.2     12-15    145  |  104  |  15  ----------------------------<  121<H>  3.8   |  31  |  0.94    Ca    9.8      15 Dec 2022 05:50  Phos  4.1     12-15  Mg     1.50     12-15    TPro  6.9  /  Alb  3.1<L>  /  TBili  0.3  /  DBili  x   /  AST  12  /  ALT  6   /  AlkPhos  141<H>  12-15    PT/INR - ( 14 Dec 2022 18:17 )   PT: 16.3 sec;   INR: 1.40 ratio         PTT - ( 14 Dec 2022 18:17 )  PTT:29.6 sec      RADIOLOGY & ADDITIONAL STUDIES:    ASSESSMENT:   57-year-old female with a PMHx of uterine cancer (s/p chemotherapy, radiation and hysterectomy) w/ lung mets and peritoneal carcinomatosis (on doxorubicin w/ Brianna, last session 11/2022), pleural effusions (s/p VATs), and chronic anemia admitted to [facility] for hypoxia with tachycardia. Concern for pulmonary edema vs pleural effusion vs PE.  Thoracic consulted for possible Thoracentesis as patient is known to thoracic service. Prior thoracentesis done in October by IR. HPI:  57-year-old female with a PMHx of uterine cancer (s/p chemotherapy, radiation and hysterectomy) w/ lung mets and peritoneal carcinomatosis (on doxorubicin w/ Brianna, last session 11/2022), pleural effusions (s/p VATs), and chronic anemia presenting with shortness of breath. Patient was scheduled for outpatient chemo today, but was found to be hypoxic so she was sent to the ED. Outpatient oncologist note reviewed. Patient noted worsening SOB for 2 days with cough productive of white sputum for past 10 days. Patient hypoxic to 69% on RA, placed on O2 and given albuterol with improvement to 90% O2. Sent to ED for further w/u.     Patient was having cough and felt tired, stating that she did not want to talk to me.    History as per ED note. Patient notes chest pain with coughing. Denies abd pain, n/v, syncope, fevers or chills. In ED, patient CXR demonstrated pulmonary edema. Patient given IV 20 mg Lasix and has been urinating. Patient also given cefepime and vancomycin and albuterol.  (14 Dec 2022 22:29)    Thoracic:  Patient seen and examined at bedside, resting in bed. Patient on 6L NC, saturating at 100%.   Patient states that this shortness of breath started progressively over the last 2 weeks and she was sent into the hospital yesterday by her PCP/Oncologist with low O2 saturations. She states she has had a worsening productive cough, clear/white sputum, and worsening SOB. Noted to have some mild coughing at bedside with oxygenation drop, but saturation returns shortly after resolution of coughing. Patient states that she got an Xray last night, but is requesting not to have the CT chest done today, as she is overwhelmed. Pt is agreeable to CT scan on this admission, but was counseled on importance of this test for diagnosis and treatment. Patient states she understands this and will accept the CT. She mentioned that over the last few days, she has had some increasing difficulty eating, feeling as if she was choking, but felt that this was because she was having difficulty breathing. Patient also reported b/l leg swelling over past few days which has improved slightly. Patient denies fevers or chills, n/v/d.    PAST MEDICAL & SURGICAL HISTORY:  Uterine cancer  S/p chemotherapy, radiation and hysterectomy      Malignant pleural effusion  with hx loculation on R      Asthma      Chronic anemia      S/P hysterectomy  2020 VINCENT/BSO      H/O ovarian cystectomy  2009      History of thoracentesis  R VATS 10/21          REVIEW OF SYSTEMS  ROS negative except noted above/hpi.    MEDICATIONS  (STANDING):  azithromycin  IVPB 500 milliGRAM(s) IV Intermittent every 24 hours  cefTRIAXone   IVPB 1000 milliGRAM(s) IV Intermittent every 24 hours  enoxaparin Injectable 40 milliGRAM(s) SubCutaneous every 24 hours  influenza   Vaccine 0.5 milliLiter(s) IntraMuscular once  magnesium oxide 400 milliGRAM(s) Oral three times a day with meals    MEDICATIONS  (PRN):  acetaminophen     Tablet .. 650 milliGRAM(s) Oral every 6 hours PRN Temp greater or equal to 38C (100.4F), Mild Pain (1 - 3)  guaiFENesin Oral Liquid (Sugar-Free) 100 milliGRAM(s) Oral every 6 hours PRN Cough  hydrocodone/homatropine Syrup 5 milliLiter(s) Oral every 8 hours PRN Cough  levalbuterol Inhalation 0.63 milliGRAM(s) Inhalation every 6 hours PRN Shortness of breath/wheezing      Allergies    No Known Allergies    Intolerances        SOCIAL HISTORY:  Occupation:  Smoking Hx: No  Etoh Hx: No  IVDA Hx: No    FAMILY HISTORY:  Maternal family history of hypertension (Mother)    Family history of type 2 diabetes mellitus (Father)        Vital Signs Last 24 Hrs  T(C): 36.6 (15 Dec 2022 08:00), Max: 37.6 (14 Dec 2022 22:37)  T(F): 97.9 (15 Dec 2022 08:00), Max: 99.7 (14 Dec 2022 22:37)  HR: 106 (15 Dec 2022 08:00) (87 - 138)  BP: 134/90 (15 Dec 2022 08:00) (112/83 - 136/94)  BP(mean): --  RR: 19 (15 Dec 2022 08:00) (18 - 28)  SpO2: 98% (15 Dec 2022 08:00) (69% - 100%)    Parameters below as of 15 Dec 2022 08:00  Patient On (Oxygen Delivery Method): nasal cannula  O2 Flow (L/min): 5      General: Appears stated age, appears uncomfortable  Neurology: Awake, nonfocal, BUCHANAN x 4  Eyes: Scleras clear, PERRLA/ EOMI, Gross vision intact  ENT: Gross hearing intact, grossly patent pharynx, no stridor  Neck: Neck supple, trachea midline, No JVD,   Respiratory: Shallow breathing, dec breath sounds b/l. On 6L O2 satting at 100% - coughing fits with desat but quickly returns.  CV: Tachycardic  Abdominal: Soft, NT, ND +BS, Last BM 2 days ago.   Extremities: 1+edema, + peripheral pulses. No calf pain.  Skin: No Rashes, Hematoma, Ecchymosis  Lymphatic: No Neck, axilla, groin LAD  Psych: Oriented x 3, normal affect  Tubes: Mediport R shoulder.    LABS:                        8.6    10.37 )-----------( 284      ( 15 Dec 2022 05:50 )             29.2     12-15    145  |  104  |  15  ----------------------------<  121<H>  3.8   |  31  |  0.94    Ca    9.8      15 Dec 2022 05:50  Phos  4.1     12-15  Mg     1.50     12-15    TPro  6.9  /  Alb  3.1<L>  /  TBili  0.3  /  DBili  x   /  AST  12  /  ALT  6   /  AlkPhos  141<H>  12-15    PT/INR - ( 14 Dec 2022 18:17 )   PT: 16.3 sec;   INR: 1.40 ratio         PTT - ( 14 Dec 2022 18:17 )  PTT:29.6 sec      RADIOLOGY & ADDITIONAL STUDIES:    ASSESSMENT:   57-year-old female with a PMHx of uterine cancer (s/p chemotherapy, radiation and hysterectomy) w/ lung mets and peritoneal carcinomatosis (on doxorubicin w/ Brianna, last session 11/2022), pleural effusions (s/p VATs), and chronic anemia admitted to [facility] for hypoxia with tachycardia. Concern for pulmonary edema vs pleural effusion vs PE.  Thoracic consulted for possible Thoracentesis as patient is known to thoracic service. Prior thoracentesis done in October by IR.    PLAN:  -Patient will need CT chest.  -Case, labs and imaging to be d/w Dr. Peters, recs to follow.  -Care per primary team. HPI:  57-year-old female with a PMHx of uterine cancer (s/p chemotherapy, radiation and hysterectomy) w/ lung mets and peritoneal carcinomatosis (on doxorubicin w/ Brianna, last session 11/2022), pleural effusions (s/p VATs), and chronic anemia presenting with shortness of breath. Patient was scheduled for outpatient chemo today, but was found to be hypoxic so she was sent to the ED. Outpatient oncologist note reviewed. Patient noted worsening SOB for 2 days with cough productive of white sputum for past 10 days. Patient hypoxic to 69% on RA, placed on O2 and given albuterol with improvement to 90% O2. Sent to ED for further w/u.     Patient was having cough and felt tired, stating that she did not want to talk to me.    History as per ED note. Patient notes chest pain with coughing. Denies abd pain, n/v, syncope, fevers or chills. In ED, patient CXR demonstrated pulmonary edema. Patient given IV 20 mg Lasix and has been urinating. Patient also given cefepime and vancomycin and albuterol.  (14 Dec 2022 22:29)    Thoracic:  Patient seen and examined at bedside, resting in bed. Patient on 6L NC, saturating at 100%.   Patient states that this shortness of breath started progressively over the last 2 weeks and she was sent into the hospital yesterday by her PCP/Oncologist with low O2 saturations. She states she has had a worsening productive cough, clear/white sputum, and worsening SOB. Noted to have some mild coughing at bedside with oxygenation drop, but saturation returns shortly after resolution of coughing. Patient states that she got an Xray last night, but is requesting not to have the CT chest done today, as she is overwhelmed. Pt is agreeable to CT scan on this admission, but was counseled on importance of this test for diagnosis and treatment. Patient states she understands this and will accept the CT. She mentioned that over the last few days, she has had some increasing difficulty eating, feeling as if she was choking, but felt that this was because she was having difficulty breathing. Patient also reported b/l leg swelling over past few days which has improved slightly. Patient denies fevers or chills, n/v/d.    PAST MEDICAL & SURGICAL HISTORY:  Uterine cancer  S/p chemotherapy, radiation and hysterectomy      Malignant pleural effusion  with hx loculation on R      Asthma      Chronic anemia      S/P hysterectomy  2020 VINCENT/BSO      H/O ovarian cystectomy  2009      History of thoracentesis  R VATS 10/21          REVIEW OF SYSTEMS  ROS negative except noted above/hpi.    MEDICATIONS  (STANDING):  azithromycin  IVPB 500 milliGRAM(s) IV Intermittent every 24 hours  cefTRIAXone   IVPB 1000 milliGRAM(s) IV Intermittent every 24 hours  enoxaparin Injectable 40 milliGRAM(s) SubCutaneous every 24 hours  influenza   Vaccine 0.5 milliLiter(s) IntraMuscular once  magnesium oxide 400 milliGRAM(s) Oral three times a day with meals    MEDICATIONS  (PRN):  acetaminophen     Tablet .. 650 milliGRAM(s) Oral every 6 hours PRN Temp greater or equal to 38C (100.4F), Mild Pain (1 - 3)  guaiFENesin Oral Liquid (Sugar-Free) 100 milliGRAM(s) Oral every 6 hours PRN Cough  hydrocodone/homatropine Syrup 5 milliLiter(s) Oral every 8 hours PRN Cough  levalbuterol Inhalation 0.63 milliGRAM(s) Inhalation every 6 hours PRN Shortness of breath/wheezing      Allergies    No Known Allergies    Intolerances        SOCIAL HISTORY:  Occupation:  Smoking Hx: No  Etoh Hx: No  IVDA Hx: No    FAMILY HISTORY:  Maternal family history of hypertension (Mother)    Family history of type 2 diabetes mellitus (Father)        Vital Signs Last 24 Hrs  T(C): 36.6 (15 Dec 2022 08:00), Max: 37.6 (14 Dec 2022 22:37)  T(F): 97.9 (15 Dec 2022 08:00), Max: 99.7 (14 Dec 2022 22:37)  HR: 106 (15 Dec 2022 08:00) (87 - 138)  BP: 134/90 (15 Dec 2022 08:00) (112/83 - 136/94)  BP(mean): --  RR: 19 (15 Dec 2022 08:00) (18 - 28)  SpO2: 98% (15 Dec 2022 08:00) (69% - 100%)    Parameters below as of 15 Dec 2022 08:00  Patient On (Oxygen Delivery Method): nasal cannula  O2 Flow (L/min): 5      General: Appears stated age, appears uncomfortable  Neurology: Awake, nonfocal, BUCHANAN x 4  Eyes: Scleras clear, PERRLA/ EOMI, Gross vision intact  ENT: Gross hearing intact, grossly patent pharynx, no stridor  Neck: Neck supple, trachea midline, No JVD,   Respiratory: Shallow breathing, dec breath sounds b/l. On 6L O2 satting at 100% - coughing fits with desat but quickly returns.  CV: Tachycardic  Abdominal: Soft, NT, ND +BS, Last BM 2 days ago.   Extremities: 1+edema, + peripheral pulses. No calf pain.  Skin: No Rashes, Hematoma, Ecchymosis  Lymphatic: No Neck, axilla, groin LAD  Psych: Oriented x 3, normal affect  Tubes: Mediport R shoulder.    LABS:                        8.6    10.37 )-----------( 284      ( 15 Dec 2022 05:50 )             29.2     12-15    145  |  104  |  15  ----------------------------<  121<H>  3.8   |  31  |  0.94    Ca    9.8      15 Dec 2022 05:50  Phos  4.1     12-15  Mg     1.50     12-15    TPro  6.9  /  Alb  3.1<L>  /  TBili  0.3  /  DBili  x   /  AST  12  /  ALT  6   /  AlkPhos  141<H>  12-15    PT/INR - ( 14 Dec 2022 18:17 )   PT: 16.3 sec;   INR: 1.40 ratio         PTT - ( 14 Dec 2022 18:17 )  PTT:29.6 sec      RADIOLOGY & ADDITIONAL STUDIES:    ASSESSMENT:   57-year-old female with a PMHx of uterine cancer (s/p chemotherapy, radiation and hysterectomy) w/ lung mets and peritoneal carcinomatosis (on doxorubicin w/ Brianna, last session 11/2022), pleural effusions (s/p VATs), and chronic anemia admitted to [facility] for hypoxia with tachycardia. Concern for pulmonary edema vs pleural effusion vs PE.  Thoracic consulted for possible Thoracentesis as patient is known to thoracic service. Prior thoracentesis done in October by IR.    PLAN:  -Case, labs and imaging d/w Dr. Peters  -Patient will need CT chest, recs will follow once completed.  -Care per primary team.

## 2022-12-15 NOTE — PROGRESS NOTE ADULT - PROBLEM SELECTOR PLAN 3
SIRS criteria (tachycardia, tachypnea). No clear source of infection though has ascites and pleural effusions.  - f/u cultures as above  - f/u MRSA swab  - continue cefepime SIRS criteria (tachycardia, tachypnea). No clear source of infection though has ascites and pleural effusions. Procalcitonin elevated.  - f/u cultures as above  - f/u MRSA swab  - Switch cefepime to CTX/azithromycin

## 2022-12-15 NOTE — PROGRESS NOTE ADULT - ASSESSMENT
57F hx metastatic uterine cancer (s/p chemotherapy, radiation and hysterectomy) to the lungs and peritoneal carcinomatosis (on doxorubicin w/ Brianna, last session 11/2022), pleural effusions (s/p VATs), and chronic anemia presenting with shortness of breath c/f progression of pleural effusions vs. PE vs. new dx CHF. 57F hx metastatic uterine cancer (s/p chemotherapy, radiation and hysterectomy) to the lungs and peritoneal carcinomatosis (on doxorubicin w/ Brianna, last session 11/2022), pleural effusions (s/p VATs), and chronic anemia presenting with shortness of breath c/f progression of pleural effusions vs. PE vs. new dx CHF vs CAP.

## 2022-12-15 NOTE — CONSULT NOTE ADULT - NS ATTEND AMEND GEN_ALL_CORE FT
Patient seen and examined agree with above note as modified, where appropriate, by me. metastatic CA with worsening pulmonary disease causing worsening dyspnea. Small b/l effusions, no role for drainage. Reconsult PRN
57y Female with PMH of met uterine cancer c/b pleural effusions s/p carbo/taxol, Keytruda Avastin and now most recently on Doxil (cycle 3 11/16) presenting with hypoxia , sating 69% on RA.  Admitted for Acute hypoxemic respiratory failure.   12/14 CXR- Small bilateral pleural effusions.  Bibasilar atelectasis and pulmonary edema mildly increased from prior.  CTA chest  Echo  Pulm consult  Will follow

## 2022-12-15 NOTE — PROGRESS NOTE ADULT - PROBLEM SELECTOR PLAN 2
Hx of metastatic uterine cancer with peritoneal carcinomatosis.  - oncology emailed to make them aware patient is admitted  - was planned for chemotherapy today (12/14/2022) as outpatient, last dose in 11/2022 Hx of metastatic uterine cancer with peritoneal carcinomatosis.  - oncology consulted, appreciate recommendations  - was planned for chemotherapy (doxorubicin) 12/14/2022, as outpatient, last dose in 11/2022

## 2022-12-15 NOTE — PROGRESS NOTE ADULT - PROBLEM SELECTOR PLAN 5
DVT PPx: Lovenox 40mg qD  Diet: Soft and Bite Sized Regular  Code:   Dispo: PT/OT Pending Hospital Course  Communication: brother Max Liao ([phone]). mom Nicole Liao ([phone])    Discharge information:  Pharmacy -   PCP -

## 2022-12-15 NOTE — PROGRESS NOTE ADULT - PROBLEM SELECTOR PLAN 4
Hgb 9.3 on admission (baseline 9-10). Likely i/s/o malignancy and chemotherapy.  - Continue to monitor with daily CBC  - Transfuse goal >7

## 2022-12-15 NOTE — PROGRESS NOTE ADULT - PROBLEM SELECTOR PLAN 1
Hx of pleural effusions (s/p VATS), though unchanged on imaging. Mildly hypervolemic on exam and possible new dx CHF w/ pulmonary edema on CXR. Lower ddx includes infectious vs. PE (tachy and tachypneic i/s/o underlying malignancy). Well's 2.5 (malignancy, D-dimer).  - pending CTA chest eval for PE and pleural effusions vs. infection (expedited)  - s/p 20mg IV Lasix in ED (proBNP 5128); continue diuresis as warranted  - repeat TTE, risk of cardiotoxicity with doxorubicin  - s/p vanc and cefepime in ED; continue cefepime; Bcx cooking; MRSA ordered; Ucx and Scx ordered  - Xopenex inhaler q6h PRN (though tachycardia)  - Pulm consult to eval pleural effusions, possible need for thoracentesis Hx of pleural effusions (s/p VATS, multiple thoracenteses/pigtail catheters - last 10/2022), though unchanged on imaging. Mildly hypervolemic on exam and possible new dx CHF w/ pulmonary edema on CXR. Lower ddx includes infectious vs. PE (tachy and tachypneic i/s/o underlying malignancy). Well's 2.5 (malignancy, D-dimer).  - pending CTA chest eval for PE and pleural effusions vs. infection (expedited)  - s/p 20mg IV Lasix in ED (proBNP 5128); continue diuresis as warranted  - repeat TTE, risk of cardiotoxicity with doxorubicin, eval for RH strain suggestive of PE  - s/p vanc and cefepime in ED; switch to CTX/azithro for CAP; Bcx cooking; MRSA ordered; Ucx and Scx ordered  - Xopenex inhaler q6h PRN (though tachycardia)  - Thoracic surgery consult to eval pleural effusions, possible need for thoracentesis  - Discuss need for PleurX with thoracic surgery and oncology

## 2022-12-15 NOTE — CONSULT NOTE ADULT - ASSESSMENT
57y Female  with PMH of ------presenting with  Currently/ Most recently on   Admitted for  Oncology consulted for further evaluation       -Rest of care as per primary team  -Patient to followup with  ([facility]) upon discharge  -C/w Supportive care, pain control, Nutrition, PT, DVT ppx  -Oncology will continue to follow with you      Case discussed with Dr. Shirley ROQUE  Oncology Physician Assistant  [facility]  Pager [phone] also available on Teams    If before 8am/after 5pm or on weekends please page On-call Oncology Fellow   57y Female  with PMH of  met uterine cancer c/b pleural effusions s/p carbo/taxol, Keytruda Avastin and now most recently on Doxil (cycle 3 11/16) presenting with hypoxia , sating 69% on RA.  Admitted for Acute hypoxemic respiratory failure..Oncology consulted for further evaluation     12/14 CXR- Small bilateral pleural effusions.  Bibasilar atelectasis and pulmonary edema mildly increased from prior.      Agree with CT CHEST will followup  Agree with PULM and CT SX consult  Patient on diuresis with some improvement in symptoms  CXR with signs of pulmonary edema  pro BNP 5128   Echo pending  -Rest of care as per primary team  -Patient to followup with Dr. Mirtha Celaya ([facility]) upon discharge  -C/w Supportive care, pain control, Nutrition, PT, DVT ppx  -Oncology will continue to follow with you      Case discussed with Dr. Shirley ROQUE  Oncology Physician Assistant  [facility]  Pager [phone] also available on Teams    If before 8am/after 5pm or on weekends please page On-call Oncology Fellow   57y Female  with PMH of  met uterine cancer c/b pleural effusions s/p carbo/taxol, Keytruda Avastin and now most recently on Doxil (cycle 3 11/16) presenting with hypoxia , sating 69% on RA.  Admitted for Acute hypoxemic respiratory failure. Oncology consulted for further evaluation.     12/14 CXR- Small bilateral pleural effusions.  Bibasilar atelectasis and pulmonary edema mildly increased from prior.      Agree with CT CHEST will followup  Agree with PULM and CT SX consult  Patient on diuresis with some improvement in symptoms  CXR with signs of pulmonary edema  pro BNP 5128   Echo pending  -Rest of care as per primary team  -Patient to followup with Dr. Mirtha Celaya ([facility]) upon discharge  -C/w Supportive care, pain control, Nutrition, PT, DVT ppx  -Oncology will continue to follow with you      Case discussed with Dr. Shirley ROQUE  Oncology Physician Assistant  [facility]  Pager [phone] also available on Teams    If before 8am/after 5pm or on weekends please page On-call Oncology Fellow

## 2022-12-15 NOTE — CONSULT NOTE ADULT - SUBJECTIVE AND OBJECTIVE BOX
Patient is a 57y old  Female who presents with a chief complaint of Hypoxia (15 Dec 2022 06:52)      HPI:  57-year-old female with a PMHx of uterine cancer (S/p chemotherapy, radiation and hysterectomy) w/ mets to the lungs and peritoneal carcinomatosis (on doxorubicin w/ Brianna, last session 11/2022), pleural effusions (s/p VATs) and chronic anemia presenting with shortness of breath. Patient was scheduled for outpatient chemo today, but was found to be hypoxic so she was sent to the ED. Outpatient oncologist note reviewed. Patient noted worsening SOB for 2 days with white sputum for past 10 days. Patient hypoxic to 69% on RA, placed on O2 and given albuterol with improvement to 90% O2. Sent to ED for further w/u.     Patient was having cough and felt tired, stating that she did not want to talk to me.    History as per ED note. Patient notes chest pain with coughing. Denies abd pain, n/v, syncope, fevers or chills. In ED, patient CXR demonstrated pulmonary edema. Patient given 20VI lasix and has been urinating. Patient also given cefepime and vancomycin and albuterol.  (14 Dec 2022 22:29)       Oncologic History:      ROS: as above     PAST MEDICAL & SURGICAL HISTORY:  Uterine cancer  S/p chemotherapy, radiation and hysterectomy      Malignant pleural effusion  with hx loculation on R      Asthma      Chronic anemia      S/P hysterectomy  2020 VINCENT/BSO      H/O ovarian cystectomy  2009      History of thoracentesis  R VATS 10/21          SOCIAL HISTORY:    FAMILY HISTORY:  Maternal family history of hypertension (Mother)    Family history of type 2 diabetes mellitus (Father)        MEDICATIONS  (STANDING):  cefepime   IVPB      cefepime   IVPB 2000 milliGRAM(s) IV Intermittent every 8 hours  enoxaparin Injectable 40 milliGRAM(s) SubCutaneous every 24 hours  influenza   Vaccine 0.5 milliLiter(s) IntraMuscular once  magnesium sulfate  IVPB 2 Gram(s) IV Intermittent every 2 hours    MEDICATIONS  (PRN):  acetaminophen     Tablet .. 650 milliGRAM(s) Oral every 6 hours PRN Temp greater or equal to 38C (100.4F), Mild Pain (1 - 3)  guaiFENesin Oral Liquid (Sugar-Free) 100 milliGRAM(s) Oral every 6 hours PRN Cough  levalbuterol Inhalation 0.63 milliGRAM(s) Inhalation every 6 hours PRN Shortness of breath/wheezing      Allergies    No Known Allergies    Intolerances        Vital Signs Last 24 Hrs  T(C): 36.6 (15 Dec 2022 08:00), Max: 37.6 (14 Dec 2022 22:37)  T(F): 97.9 (15 Dec 2022 08:00), Max: 99.7 (14 Dec 2022 22:37)  HR: 106 (15 Dec 2022 08:00) (87 - 138)  BP: 134/90 (15 Dec 2022 08:00) (112/83 - 136/94)  BP(mean): --  RR: 19 (15 Dec 2022 08:00) (18 - 28)  SpO2: 98% (15 Dec 2022 08:00) (69% - 100%)    Parameters below as of 15 Dec 2022 08:00  Patient On (Oxygen Delivery Method): nasal cannula  O2 Flow (L/min): 5      PHYSICAL EXAM  General: adult in NAD  HEENT: clear oropharynx, anicteric sclera, pink conjunctiva  Neck: supple  CV: normal S1/S2 with no murmur rubs or gallops  Lungs: positive air movement b/l ant lungs, clear to auscultation, no wheezes, no rales  Abdomen: soft non-tender non-distended, no hepatosplenomegaly  Ext: no clubbing cyanosis or edema  Skin: no rashes and no petechiae  Neuro: alert and oriented X 3, none focal    LABS:                          8.6    10.37 )-----------( 284      ( 15 Dec 2022 05:50 )             29.2         Mean Cell Volume : 83.2 fL  Mean Cell Hemoglobin : 24.5 pg  Mean Cell Hemoglobin Concentration : 29.5 gm/dL  Auto Neutrophil # : 7.45 K/uL  Auto Lymphocyte # : 0.73 K/uL  Auto Monocyte # : 2.03 K/uL  Auto Eosinophil # : 0.11 K/uL  Auto Basophil # : 0.02 K/uL  Auto Neutrophil % : 71.8 %  Auto Lymphocyte % : 7.0 %  Auto Monocyte % : 19.6 %  Auto Eosinophil % : 1.1 %  Auto Basophil % : 0.2 %      Serial CBC's  12-15 @ 05:50  Hct-29.2 / Hgb-8.6 / Plat-284 / RBC-3.51 / WBC-10.37  Serial CBC's  12-14 @ 18:17  Hct-31.0 / Hgb-9.3 / Plat-337 / RBC-3.88 / WBC-10.37  Serial CBC's  12-14 @ 12:39  Hct-30.0 / Hgb-9.4 / Plat-309 / RBC-3.82 / WBC-10.82      12-15    145  |  104  |  15  ----------------------------<  121<H>  3.8   |  31  |  0.94    Ca    9.8      15 Dec 2022 05:50  Phos  4.1     12-15  Mg     1.50     12-15    TPro  6.9  /  Alb  3.1<L>  /  TBili  0.3  /  DBili  x   /  AST  12  /  ALT  6   /  AlkPhos  141<H>  12-15      PT/INR - ( 14 Dec 2022 18:17 )   PT: 16.3 sec;   INR: 1.40 ratio         PTT - ( 14 Dec 2022 18:17 )  PTT:29.6 sec                RADIOLOGY & ADDITIONAL STUDIES:     Patient is a 57y old  Female who presents with a chief complaint of Hypoxia (15 Dec 2022 06:52)      HPI:  57-year-old female with a PMHx of uterine cancer (S/p chemotherapy, radiation and hysterectomy) w/ mets to the lungs and peritoneal carcinomatosis (on doxil, last session 11/16/2022), pleural effusions (s/p VATs) and chronic anemia presenting with shortness of breath. Patient was scheduled for outpatient chemo on 12/13, but was found to be hypoxic so she was sent to the ED. Outpatient oncologist note reviewed. Patient noted worsening SOB for 2 days with white sputum for past 10 days. Patient hypoxic to 69% on RA, placed on O2 and given albuterol with improvement to 90% O2. Sent to ED for further w/u.     Patient was having cough and felt tired, stating that she did not want to talk to me.    History as per ED note. Patient notes chest pain with coughing. Denies abd pain, n/v, syncope, fevers or chills. In ED, patient CXR demonstrated pulmonary edema. Patient given 20VI lasix and has been urinating. Patient also given cefepime and vancomycin and albuterol.  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  Patient is s/p pap c/w BETI, followed by endometrial biopsy with findings of serous cancer. Patient was seen initially by Dr. Arciniega at [facility].  She then saw Dr. Zamorano, at [facility] and had a repeat biopsy that showed clear cell carcinoma.  On 4/20/2020 she underwent surgical staging and is post TLH/BSO/SLND/Omentectomy.  Final pathology showed stage IA,high grade endometrial carcinoma involving predominantly clear cell and focally serous features, no invasion, no LVSI.    Ct C/A/P with contrast done on 3/12/2020 showed no metastatic sites.  She completed five cycles of CT 6/3/20- 9/2/20) Last cycle held due to toxicity and myelosuppression.She received vaginal brachytherapy to a total of 2100 cGy in 3 fractions. Treatment was delivered from 10/16/20- 10/23/20.  Scan showed CINDY.    8/10/21: Surveillance scan showed New partially visualized small right pleural effusion. Correlation with dedicated chest CT is recommended. Few subcentimeter hepatic hypo densities, too small to characterize, but similar in appearance to prior. Small pelvic ascites, similar in appearance to prior.  - 29( previously 15)    8/30/21: CT chest: Limited. No pulmonary embolus to level of the lobar pulmonary arteries. Interval increase in small right pleural effusion since 8/19/2021 with associated adjacent compressive atelectasis.Small perihepatic ascites.    Patient was admitted to the hospital with SOB and pleural effusion. She had thoracentesis and cytology was consistent with relapsed uterine cancer.    Final Diagnosis  PLEURAL FLUID, RIGHT  POSITIVE FOR MALIGNANT CELLS.  Metastatic adenocarcinoma    Cytology slides and cell block reveal a hypercellular specimen  composed of crowded 3-dimensional groups and single lying  malignant cells with enlarged nuclei containing prominent  nucleoli and vacuolated cytoplasm, among benign mesothelial  cells.    Immunohistochemical stains are performed on the cell block. The  tumor cells are positive for PAX-8 (focal and weak), p16, napsin  (focal) and p53 (overexpression), while they are negative for ER,  WT-1 and TTF-1. In the absence of another primary, it is most  consistent with involvement by patient's known endometrial  adenocarcinoma.    Disease: high grade clear cell/serous carcnoma of the uterus.  AJCC Stage: IA    Carboplatin and Taxol 6/3/20- 9/2/20( five cycles)    Current Treatment Status:. Doxil  C1 - 9/14/22  C2 - 10/19/22  C3 - 11/16/22.          ROS: as above     PAST MEDICAL & SURGICAL HISTORY:  Uterine cancer  S/p chemotherapy, radiation and hysterectomy      Malignant pleural effusion  with hx loculation on R      Asthma      Chronic anemia      S/P hysterectomy  2020 VINCENT/BSO      H/O ovarian cystectomy  2009      History of thoracentesis  R VATS 10/21          SOCIAL HISTORY:    FAMILY HISTORY:  Maternal family history of hypertension (Mother)    Family history of type 2 diabetes mellitus (Father)        MEDICATIONS  (STANDING):  cefepime   IVPB      cefepime   IVPB 2000 milliGRAM(s) IV Intermittent every 8 hours  enoxaparin Injectable 40 milliGRAM(s) SubCutaneous every 24 hours  influenza   Vaccine 0.5 milliLiter(s) IntraMuscular once  magnesium sulfate  IVPB 2 Gram(s) IV Intermittent every 2 hours    MEDICATIONS  (PRN):  acetaminophen     Tablet .. 650 milliGRAM(s) Oral every 6 hours PRN Temp greater or equal to 38C (100.4F), Mild Pain (1 - 3)  guaiFENesin Oral Liquid (Sugar-Free) 100 milliGRAM(s) Oral every 6 hours PRN Cough  levalbuterol Inhalation 0.63 milliGRAM(s) Inhalation every 6 hours PRN Shortness of breath/wheezing      Allergies    No Known Allergies    Intolerances        Vital Signs Last 24 Hrs  T(C): 36.6 (15 Dec 2022 08:00), Max: 37.6 (14 Dec 2022 22:37)  T(F): 97.9 (15 Dec 2022 08:00), Max: 99.7 (14 Dec 2022 22:37)  HR: 106 (15 Dec 2022 08:00) (87 - 138)  BP: 134/90 (15 Dec 2022 08:00) (112/83 - 136/94)  BP(mean): --  RR: 19 (15 Dec 2022 08:00) (18 - 28)  SpO2: 98% (15 Dec 2022 08:00) (69% - 100%)    Parameters below as of 15 Dec 2022 08:00  Patient On (Oxygen Delivery Method): nasal cannula  O2 Flow (L/min): 5      PHYSICAL EXAM  General: adult in NAD  HEENT: clear oropharynx, anicteric sclera, pink conjunctiva  Neck: supple  CV: normal S1/S2 with no murmur rubs or gallops  Lungs: positive air movement b/l ant lungs, clear to auscultation, no wheezes, no rales  Abdomen: soft non-tender non-distended, no hepatosplenomegaly  Ext: no clubbing cyanosis or edema  Skin: no rashes and no petechiae  Neuro: alert and oriented X 3, none focal    LABS:                          8.6    10.37 )-----------( 284      ( 15 Dec 2022 05:50 )             29.2         Mean Cell Volume : 83.2 fL  Mean Cell Hemoglobin : 24.5 pg  Mean Cell Hemoglobin Concentration : 29.5 gm/dL  Auto Neutrophil # : 7.45 K/uL  Auto Lymphocyte # : 0.73 K/uL  Auto Monocyte # : 2.03 K/uL  Auto Eosinophil # : 0.11 K/uL  Auto Basophil # : 0.02 K/uL  Auto Neutrophil % : 71.8 %  Auto Lymphocyte % : 7.0 %  Auto Monocyte % : 19.6 %  Auto Eosinophil % : 1.1 %  Auto Basophil % : 0.2 %      Serial CBC's  12-15 @ 05:50  Hct-29.2 / Hgb-8.6 / Plat-284 / RBC-3.51 / WBC-10.37  Serial CBC's  12-14 @ 18:17  Hct-31.0 / Hgb-9.3 / Plat-337 / RBC-3.88 / WBC-10.37  Serial CBC's  12-14 @ 12:39  Hct-30.0 / Hgb-9.4 / Plat-309 / RBC-3.82 / WBC-10.82      12-15    145  |  104  |  15  ----------------------------<  121<H>  3.8   |  31  |  0.94    Ca    9.8      15 Dec 2022 05:50  Phos  4.1     12-15  Mg     1.50     12-15    TPro  6.9  /  Alb  3.1<L>  /  TBili  0.3  /  DBili  x   /  AST  12  /  ALT  6   /  AlkPhos  141<H>  12-15      PT/INR - ( 14 Dec 2022 18:17 )   PT: 16.3 sec;   INR: 1.40 ratio         PTT - ( 14 Dec 2022 18:17 )  PTT:29.6 sec                RADIOLOGY & ADDITIONAL STUDIES:     Patient is a 57y old  Female who presents with a chief complaint of Hypoxia (15 Dec 2022 06:52)      HPI:  57-year-old female with a PMHx of uterine cancer (S/p chemotherapy, radiation and hysterectomy) w/ mets to the lungs and peritoneal carcinomatosis (on doxil, last session 11/16/2022), pleural effusions (s/p VATs) and chronic anemia presenting with shortness of breath. Patient was scheduled for outpatient chemo on 12/13, but was found to be hypoxic so she was sent to the ED. Outpatient oncologist note reviewed. Patient noted worsening SOB for 2 days with white sputum for past 10 days. Patient hypoxic to 69% on RA, placed on O2 and given albuterol with improvement to 90% O2. Sent to ED for further w/u.     Patient was having cough and felt tired, stating that she did not want to talk to me.    History as per ED note. Patient notes chest pain with coughing. Denies abd pain, n/v, syncope, fevers or chills. In ED, patient CXR demonstrated pulmonary edema. Patient given 20VI lasix and has been urinating. Patient also given cefepime and vancomycin and albuterol.  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  Patient is s/p pap c/w BETI, followed by endometrial biopsy with findings of serous cancer. Patient was seen initially by Dr. Arciniega at [facility].  She then saw Dr. Zamorano, at [facility] and had a repeat biopsy that showed clear cell carcinoma.  On 4/20/2020 she underwent surgical staging and is post TLH/BSO/SLND/Omentectomy.  Final pathology showed stage IA,high grade endometrial carcinoma involving predominantly clear cell and focally serous features, no invasion, no LVSI.    Ct C/A/P with contrast done on 3/12/2020 showed no metastatic sites.  She completed five cycles of CT 6/3/20- 9/2/20) Last cycle held due to toxicity and myelosuppression.She received vaginal brachytherapy to a total of 2100 cGy in 3 fractions. Treatment was delivered from 10/16/20- 10/23/20.  Scan showed CINDY.    8/10/21: Surveillance scan showed New partially visualized small right pleural effusion. Correlation with dedicated chest CT is recommended. Few subcentimeter hepatic hypo densities, too small to characterize, but similar in appearance to prior. Small pelvic ascites, similar in appearance to prior.  - 29( previously 15)    8/30/21: CT chest: Limited. No pulmonary embolus to level of the lobar pulmonary arteries. Interval increase in small right pleural effusion since 8/19/2021 with associated adjacent compressive atelectasis.Small perihepatic ascites.    Patient was admitted to the hospital with SOB and pleural effusion. She had thoracentesis and cytology was consistent with relapsed uterine cancer.    Final Diagnosis  PLEURAL FLUID, RIGHT  POSITIVE FOR MALIGNANT CELLS.  Metastatic adenocarcinoma    Cytology slides and cell block reveal a hypercellular specimen  composed of crowded 3-dimensional groups and single lying  malignant cells with enlarged nuclei containing prominent  nucleoli and vacuolated cytoplasm, among benign mesothelial  cells.    Immunohistochemical stains are performed on the cell block. The  tumor cells are positive for PAX-8 (focal and weak), p16, napsin  (focal) and p53 (overexpression), while they are negative for ER,  WT-1 and TTF-1. In the absence of another primary, it is most  consistent with involvement by patient's known endometrial  adenocarcinoma.    Disease: high grade clear cell/serous carcnoma of the uterus.  AJCC Stage: IA    Carboplatin and Taxol 6/3/20- 9/2/20( five cycles)    Current Treatment Status:. Doxil  C1 - 9/14/22  C2 - 10/19/22  C3 - 11/16/22.          ROS: as above     PAST MEDICAL & SURGICAL HISTORY:  Uterine cancer  S/p chemotherapy, radiation and hysterectomy      Malignant pleural effusion  with hx loculation on R      Asthma      Chronic anemia      S/P hysterectomy  2020 VINCENT/BSO      H/O ovarian cystectomy  2009      History of thoracentesis  R VATS 10/21          SOCIAL HISTORY:    FAMILY HISTORY:  Maternal family history of hypertension (Mother)    Family history of type 2 diabetes mellitus (Father)        MEDICATIONS  (STANDING):  cefepime   IVPB      cefepime   IVPB 2000 milliGRAM(s) IV Intermittent every 8 hours  enoxaparin Injectable 40 milliGRAM(s) SubCutaneous every 24 hours  influenza   Vaccine 0.5 milliLiter(s) IntraMuscular once  magnesium sulfate  IVPB 2 Gram(s) IV Intermittent every 2 hours    MEDICATIONS  (PRN):  acetaminophen     Tablet .. 650 milliGRAM(s) Oral every 6 hours PRN Temp greater or equal to 38C (100.4F), Mild Pain (1 - 3)  guaiFENesin Oral Liquid (Sugar-Free) 100 milliGRAM(s) Oral every 6 hours PRN Cough  levalbuterol Inhalation 0.63 milliGRAM(s) Inhalation every 6 hours PRN Shortness of breath/wheezing      Allergies    No Known Allergies    Intolerances        Vital Signs Last 24 Hrs  T(C): 36.6 (15 Dec 2022 08:00), Max: 37.6 (14 Dec 2022 22:37)  T(F): 97.9 (15 Dec 2022 08:00), Max: 99.7 (14 Dec 2022 22:37)  HR: 106 (15 Dec 2022 08:00) (87 - 138)  BP: 134/90 (15 Dec 2022 08:00) (112/83 - 136/94)  BP(mean): --  RR: 19 (15 Dec 2022 08:00) (18 - 28)  SpO2: 98% (15 Dec 2022 08:00) (69% - 100%)    Parameters below as of 15 Dec 2022 08:00  Patient On (Oxygen Delivery Method): nasal cannula  O2 Flow (L/min): 5      PHYSICAL EXAM  General: adult in NAD  HEENT: clear oropharynx, anicteric sclera, pink conjunctiva  Neck: supple  CV: normal S1/S2 with no murmur rubs or gallops  Lungs: positive air movement b/l ant lungs, clear to auscultation, no wheezes, no rales  Abdomen: soft non-tender non-distended, no hepatosplenomegaly  Ext: no clubbing cyanosis or edema  Skin: no rashes and no petechiae  Neuro: alert and oriented X 3, none focal    LABS:                          8.6    10.37 )-----------( 284      ( 15 Dec 2022 05:50 )             29.2         Mean Cell Volume : 83.2 fL  Mean Cell Hemoglobin : 24.5 pg  Mean Cell Hemoglobin Concentration : 29.5 gm/dL  Auto Neutrophil # : 7.45 K/uL  Auto Lymphocyte # : 0.73 K/uL  Auto Monocyte # : 2.03 K/uL  Auto Eosinophil # : 0.11 K/uL  Auto Basophil # : 0.02 K/uL  Auto Neutrophil % : 71.8 %  Auto Lymphocyte % : 7.0 %  Auto Monocyte % : 19.6 %  Auto Eosinophil % : 1.1 %  Auto Basophil % : 0.2 %      Serial CBC's  12-15 @ 05:50  Hct-29.2 / Hgb-8.6 / Plat-284 / RBC-3.51 / WBC-10.37  Serial CBC's  12-14 @ 18:17  Hct-31.0 / Hgb-9.3 / Plat-337 / RBC-3.88 / WBC-10.37  Serial CBC's  12-14 @ 12:39  Hct-30.0 / Hgb-9.4 / Plat-309 / RBC-3.82 / WBC-10.82      12-15    145  |  104  |  15  ----------------------------<  121<H>  3.8   |  31  |  0.94    Ca    9.8      15 Dec 2022 05:50  Phos  4.1     12-15  Mg     1.50     12-15    TPro  6.9  /  Alb  3.1<L>  /  TBili  0.3  /  DBili  x   /  AST  12  /  ALT  6   /  AlkPhos  141<H>  12-15      PT/INR - ( 14 Dec 2022 18:17 )   PT: 16.3 sec;   INR: 1.40 ratio         PTT - ( 14 Dec 2022 18:17 )  PTT:29.6 sec                RADIOLOGY & ADDITIONAL STUDIES:     Patient is a 57y old  Female who presents with a chief complaint of Hypoxia (15 Dec 2022 06:52)      HPI:  57-year-old female with a PMHx of uterine cancer (S/p chemotherapy, radiation and hysterectomy) w/ mets to the lungs and peritoneal carcinomatosis (on doxil, last session 11/16/2022), pleural effusions (s/p VATs) and chronic anemia presenting with shortness of breath. Patient was scheduled for outpatient chemo on 12/13, but was found to be hypoxic so she was sent to the ED. Outpatient oncologist note reviewed. Patient noted worsening SOB for 2 days with white sputum for past 10 days. Patient hypoxic to 69% on RA, placed on O2 and given albuterol with improvement to 90% O2. Sent to ED for further w/u.     Patient was having cough and felt tired, stating that she did not want to talk to me.    History as per ED note. Patient notes chest pain with coughing. Denies abd pain, n/v, syncope, fevers or chills. In ED, patient CXR demonstrated pulmonary edema. Patient given 20VI lasix and has been urinating. Patient also given cefepime and vancomycin and albuterol.  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  Patient is s/p pap c/w BETI, followed by endometrial biopsy with findings of serous cancer. Patient was seen initially by Dr. Arciniega at [facility].  She then saw Dr. Zamorano, at [facility] and had a repeat biopsy that showed clear cell carcinoma.  On 4/20/2020 she underwent surgical staging and is post TLH/BSO/SLND/Omentectomy.  Final pathology showed stage IA,high grade endometrial carcinoma involving predominantly clear cell and focally serous features, no invasion, no LVSI.    Ct C/A/P with contrast done on 3/12/2020 showed no metastatic sites.  She completed five cycles of CT 6/3/20- 9/2/20) Last cycle held due to toxicity and myelosuppression.She received vaginal brachytherapy to a total of 2100 cGy in 3 fractions. Treatment was delivered from 10/16/20- 10/23/20.  Scan showed CINDY.    8/10/21: Surveillance scan showed New partially visualized small right pleural effusion. Correlation with dedicated chest CT is recommended. Few subcentimeter hepatic hypo densities, too small to characterize, but similar in appearance to prior. Small pelvic ascites, similar in appearance to prior.  - 29( previously 15)    8/30/21: CT chest: Limited. No pulmonary embolus to level of the lobar pulmonary arteries. Interval increase in small right pleural effusion since 8/19/2021 with associated adjacent compressive atelectasis.Small perihepatic ascites.    Patient was admitted to the hospital with SOB and pleural effusion. She had thoracentesis and cytology was consistent with relapsed uterine cancer.    Final Diagnosis  PLEURAL FLUID, RIGHT  POSITIVE FOR MALIGNANT CELLS.  Metastatic adenocarcinoma    Cytology slides and cell block reveal a hypercellular specimen  composed of crowded 3-dimensional groups and single lying  malignant cells with enlarged nuclei containing prominent  nucleoli and vacuolated cytoplasm, among benign mesothelial  cells.    Immunohistochemical stains are performed on the cell block. The  tumor cells are positive for PAX-8 (focal and weak), p16, napsin  (focal) and p53 (overexpression), while they are negative for ER,  WT-1 and TTF-1. In the absence of another primary, it is most  consistent with involvement by patient's known endometrial  adenocarcinoma.    Disease: high grade clear cell/serous carcnoma of the uterus.  AJCC Stage: IA    Carboplatin and Taxol 6/3/20- 9/2/20( five cycles)    Current Treatment Status:. Doxil  C1 - 9/14/22  C2 - 10/19/22  C3 - 11/16/22.          ROS: as above     PAST MEDICAL & SURGICAL HISTORY:  Uterine cancer  S/p chemotherapy, radiation and hysterectomy      Malignant pleural effusion  with hx loculation on R      Asthma      Chronic anemia      S/P hysterectomy  2020 VINCENT/BSO      H/O ovarian cystectomy  2009      History of thoracentesis  R VATS 10/21          SOCIAL HISTORY:    FAMILY HISTORY:  Maternal family history of hypertension (Mother)    Family history of type 2 diabetes mellitus (Father)        MEDICATIONS  (STANDING):  cefepime   IVPB      cefepime   IVPB 2000 milliGRAM(s) IV Intermittent every 8 hours  enoxaparin Injectable 40 milliGRAM(s) SubCutaneous every 24 hours  influenza   Vaccine 0.5 milliLiter(s) IntraMuscular once  magnesium sulfate  IVPB 2 Gram(s) IV Intermittent every 2 hours    MEDICATIONS  (PRN):  acetaminophen     Tablet .. 650 milliGRAM(s) Oral every 6 hours PRN Temp greater or equal to 38C (100.4F), Mild Pain (1 - 3)  guaiFENesin Oral Liquid (Sugar-Free) 100 milliGRAM(s) Oral every 6 hours PRN Cough  levalbuterol Inhalation 0.63 milliGRAM(s) Inhalation every 6 hours PRN Shortness of breath/wheezing      Allergies    No Known Allergies    Intolerances        Vital Signs Last 24 Hrs  T(C): 36.6 (15 Dec 2022 08:00), Max: 37.6 (14 Dec 2022 22:37)  T(F): 97.9 (15 Dec 2022 08:00), Max: 99.7 (14 Dec 2022 22:37)  HR: 106 (15 Dec 2022 08:00) (87 - 138)  BP: 134/90 (15 Dec 2022 08:00) (112/83 - 136/94)  BP(mean): --  RR: 19 (15 Dec 2022 08:00) (18 - 28)  SpO2: 98% (15 Dec 2022 08:00) (69% - 100%)    Parameters below as of 15 Dec 2022 08:00  Patient On (Oxygen Delivery Method): nasal cannula  O2 Flow (L/min): 5      PHYSICAL EXAM  GENERAL: ill appearing  HEAD:  Atraumatic, Normocephalic  EYES: EOMI, PERRLA, anicteric sclera  NECK: Supple, JVD to low neck  CHEST/LUNG: increased work of breathing. decreased lung sounds at bases bilaterally. crackles mid lungs bilaterally   HEART: tachycardic rate and regular rhythm; No murmurs, rubs, or gallops  ABDOMEN: Soft, Nontender, Nondistended; Bowel sounds present  EXTREMITIES:  2+ Peripheral Pulses, trace edema bilateral lower extremities  PSYCH: AAOx3  NEUROLOGY: non-focal  SKIN: No rashes or lesions    LABS:                          8.6    10.37 )-----------( 284      ( 15 Dec 2022 05:50 )             29.2         Mean Cell Volume : 83.2 fL  Mean Cell Hemoglobin : 24.5 pg  Mean Cell Hemoglobin Concentration : 29.5 gm/dL  Auto Neutrophil # : 7.45 K/uL  Auto Lymphocyte # : 0.73 K/uL  Auto Monocyte # : 2.03 K/uL  Auto Eosinophil # : 0.11 K/uL  Auto Basophil # : 0.02 K/uL  Auto Neutrophil % : 71.8 %  Auto Lymphocyte % : 7.0 %  Auto Monocyte % : 19.6 %  Auto Eosinophil % : 1.1 %  Auto Basophil % : 0.2 %      Serial CBC's  12-15 @ 05:50  Hct-29.2 / Hgb-8.6 / Plat-284 / RBC-3.51 / WBC-10.37  Serial CBC's  12-14 @ 18:17  Hct-31.0 / Hgb-9.3 / Plat-337 / RBC-3.88 / WBC-10.37  Serial CBC's  12-14 @ 12:39  Hct-30.0 / Hgb-9.4 / Plat-309 / RBC-3.82 / WBC-10.82      12-15    145  |  104  |  15  ----------------------------<  121<H>  3.8   |  31  |  0.94    Ca    9.8      15 Dec 2022 05:50  Phos  4.1     12-15  Mg     1.50     12-15    TPro  6.9  /  Alb  3.1<L>  /  TBili  0.3  /  DBili  x   /  AST  12  /  ALT  6   /  AlkPhos  141<H>  12-15      PT/INR - ( 14 Dec 2022 18:17 )   PT: 16.3 sec;   INR: 1.40 ratio         PTT - ( 14 Dec 2022 18:17 )  PTT:29.6 sec                RADIOLOGY & ADDITIONAL STUDIES:

## 2022-12-15 NOTE — PROGRESS NOTE ADULT - SUBJECTIVE AND OBJECTIVE BOX
************************  Kt Flowers MD-PhD  Internal Medicine PGY-1  ************************    Patient is a 57y old  Female who presents with a chief complaint of Hypoxia (14 Dec 2022 22:29)    No events overnight, no acute complaints this morning. Patient with appropriate PO intake and urinating well with BM(s). Denies CP, SOB, fevers/chills, N/V, or abdominal pain. Patient reminded of ongoing careplan.    MEDICATIONS  (STANDING):  cefepime   IVPB      cefepime   IVPB 2000 milliGRAM(s) IV Intermittent every 8 hours  enoxaparin Injectable 40 milliGRAM(s) SubCutaneous every 24 hours  influenza   Vaccine 0.5 milliLiter(s) IntraMuscular once    MEDICATIONS  (PRN):  acetaminophen     Tablet .. 650 milliGRAM(s) Oral every 6 hours PRN Temp greater or equal to 38C (100.4F), Mild Pain (1 - 3)  guaiFENesin Oral Liquid (Sugar-Free) 100 milliGRAM(s) Oral every 6 hours PRN Cough  levalbuterol Inhalation 0.63 milliGRAM(s) Inhalation every 6 hours PRN Shortness of breath/wheezing      CAPILLARY BLOOD GLUCOSE        I&O's Summary      PHYSICAL EXAM:  Vital Signs Last 24 Hrs  T(C): 36.8 (15 Dec 2022 04:30), Max: 37.6 (14 Dec 2022 22:37)  T(F): 98.3 (15 Dec 2022 04:30), Max: 99.7 (14 Dec 2022 22:37)  HR: 112 (15 Dec 2022 04:30) (87 - 138)  BP: 135/85 (15 Dec 2022 04:30) (112/83 - 136/94)  BP(mean): --  RR: 19 (15 Dec 2022 04:30) (18 - 28)  SpO2: 99% (15 Dec 2022 04:30) (69% - 100%)    Parameters below as of 15 Dec 2022 04:30  Patient On (Oxygen Delivery Method): nasal cannula  O2 Flow (L/min): 5      T(C): 36.8 (12-15-22 @ 04:30), Max: 37.6 (12-14-22 @ 22:37)  HR: 112 (12-15-22 @ 04:30) (87 - 138)  BP: 135/85 (12-15-22 @ 04:30) (112/83 - 136/94)  RR: 19 (12-15-22 @ 04:30) (18 - 28)  SpO2: 99% (12-15-22 @ 04:30) (69% - 100%)    GENERAL: ill appearing  HEAD:  Atraumatic, Normocephalic  EYES: EOMI, PERRLA, canicteric sclera  NECK: Supple, JVD to low neck  CHEST/LUNG: increased work of breathing. decreased lung sounds at bases bilaterally. crackles mid lungs bilaterally   HEART: tachycardic rate and regular rhythm; No murmurs, rubs, or gallops  ABDOMEN: Soft, Nontender, Nondistended; Bowel sounds present  EXTREMITIES:  2+ Peripheral Pulses, trace edema bilateral lower extremities  PSYCH: AAOx3  NEUROLOGY: non-focal  SKIN: No rashes or lesions    LABS:                        8.6    10.37 )-----------( 284      ( 15 Dec 2022 05:50 )             29.2     12-14    143  |  104  |  16  ----------------------------<  117<H>  5.6<H>   |  25  |  0.94    Ca    10.1      14 Dec 2022 18:17    TPro  7.2  /  Alb  3.0<L>  /  TBili  0.4  /  DBili  x   /  AST  27  /  ALT  10  /  AlkPhos  160<H>  12-14    PT/INR - ( 14 Dec 2022 18:17 )   PT: 16.3 sec;   INR: 1.40 ratio         PTT - ( 14 Dec 2022 18:17 )  PTT:29.6 sec            IMAGING & OTHER TESTS:    < from: CT Chest w/ IV Cont (11.28.22 @ 17:45) >  IMPRESSION:  Peritoneal carcinomatosis and enhancement of the pleura without   significant change.    Lesion at the left vaginal cuff without significant change.    Bilateral loculated pleural effusions and ascites with interval decrease.    < end of copied text >    < from: Xray Chest 1 View- PORTABLE-Urgent (Xray Chest 1 View- PORTABLE-Urgent .) (12.14.22 @ 19:52) >  IMPRESSION:    Small bilateral pleural effusions.  Bibasilar atelectasis and pulmonary edema mildly increased from prior.    < end of copied text >

## 2022-12-16 NOTE — PROGRESS NOTE ADULT - PROBLEM SELECTOR PLAN 2
Hx of metastatic uterine cancer with peritoneal carcinomatosis.  - oncology consulted, appreciate recommendations  - was planned for chemotherapy (doxorubicin) 12/14/2022, as outpatient, last dose in 11/2022 Hx of metastatic uterine cancer with peritoneal carcinomatosis.  - oncology following  - was planned for chemotherapy (doxorubicin) 12/14/2022, as outpatient, last dose in 11/2022

## 2022-12-16 NOTE — PROGRESS NOTE ADULT - SUBJECTIVE AND OBJECTIVE BOX
INTERVAL HPI/OVERNIGHT EVENTS:  Patient seen at bedside.  Remains on supplemental O2  Patient en route to CT scan  No acute events reported overnight        VITAL SIGNS:  T(F): 99.2 (22 @ 12:15)  HR: 123 (22 @ 12:15)  BP: 129/86 (22 @ 12:15)  RR: 20 (22 @ 12:15)  SpO2: 94% (22 @ 12:15)  Wt(kg): --    PHYSICAL EXAM:  GENERAL: ill appearing  HEAD:  Atraumatic, Normocephalic  EYES: EOMI, PERRLA,   NECK: Supple, JVD to low neck  CHEST/LUNG: increased work of breathing. decreased lung sounds at bases bilaterally. crackles mid lungs bilaterally   HEART: tachycardic rate and regular rhythm; No murmurs, rubs, or gallops  ABDOMEN: Soft, Nontender, Nondistended; Bowel sounds present  EXTREMITIES:  2+ Peripheral Pulses, trace edema bilateral lower extremities  PSYCH: AAOx3  NEUROLOGY: non-focal  SKIN: No rashes or lesions    MEDICATIONS  (STANDING):  azithromycin  IVPB 500 milliGRAM(s) IV Intermittent every 24 hours  cefTRIAXone   IVPB 1000 milliGRAM(s) IV Intermittent every 24 hours  chlorhexidine 2% Cloths 1 Application(s) Topical daily  heparin  Infusion.  Unit(s)/Hr (15 mL/Hr) IV Continuous <Continuous>  influenza   Vaccine 0.5 milliLiter(s) IntraMuscular once  levalbuterol Inhalation 0.63 milliGRAM(s) Inhalation every 6 hours    MEDICATIONS  (PRN):  acetaminophen     Tablet .. 650 milliGRAM(s) Oral every 6 hours PRN Temp greater or equal to 38C (100.4F), Mild Pain (1 - 3)  guaiFENesin Oral Liquid (Sugar-Free) 100 milliGRAM(s) Oral every 6 hours PRN Cough  hydrocodone/homatropine Syrup 5 milliLiter(s) Oral every 8 hours PRN Cough      Allergies    No Known Allergies    Intolerances        LABS:                        9.0    11.40 )-----------( 321      ( 16 Dec 2022 08:10 )             31.2     -    144  |  101  |  18  ----------------------------<  114<H>  3.9   |  33<H>  |  0.81    Ca    10.2      16 Dec 2022 08:10  Phos  3.4     12-16  Mg     1.70     12-16    TPro  6.9  /  Alb  3.1<L>  /  TBili  0.3  /  DBili  x   /  AST  12  /  ALT  6   /  AlkPhos  141<H>  12-15    PT/INR - ( 16 Dec 2022 08:10 )   PT: 14.9 sec;   INR: 1.28 ratio         PTT - ( 16 Dec 2022 08:10 )  PTT:62.4 sec  Urinalysis Basic - ( 15 Dec 2022 12:20 )    Color: Light Yellow / Appearance: Clear / S.022 / pH: x  Gluc: x / Ketone: Negative  / Bili: Negative / Urobili: <2 mg/dL   Blood: x / Protein: Trace / Nitrite: Negative   Leuk Esterase: Negative / RBC: x / WBC x   Sq Epi: x / Non Sq Epi: x / Bacteria: x        RADIOLOGY & ADDITIONAL TESTS:  Studies reviewed.   INTERVAL HPI/OVERNIGHT EVENTS:  Patient seen at bedside.  Remains on supplemental O2  Patient en route to CT scan  No acute events reported overnight      VITAL SIGNS:  T(F): 99.2 (22 @ 12:15)  HR: 123 (22 @ 12:15)  BP: 129/86 (22 @ 12:15)  RR: 20 (22 @ 12:15)  SpO2: 94% (22 @ 12:15)  Wt(kg): --    PHYSICAL EXAM:  GENERAL: ill appearing  HEAD:  Atraumatic, Normocephalic  EYES: EOMI, PERRLA,   NECK: Supple, JVD to low neck  CHEST/LUNG: increased work of breathing. decreased lung sounds at bases bilaterally. crackles mid lungs bilaterally   HEART: tachycardic rate and regular rhythm; No murmurs, rubs, or gallops  ABDOMEN: Soft, Nontender, Nondistended; Bowel sounds present  EXTREMITIES:  2+ Peripheral Pulses, trace edema bilateral lower extremities  PSYCH: AAOx3  NEUROLOGY: non-focal  SKIN: No rashes or lesions    MEDICATIONS  (STANDING):  azithromycin  IVPB 500 milliGRAM(s) IV Intermittent every 24 hours  cefTRIAXone   IVPB 1000 milliGRAM(s) IV Intermittent every 24 hours  chlorhexidine 2% Cloths 1 Application(s) Topical daily  heparin  Infusion.  Unit(s)/Hr (15 mL/Hr) IV Continuous <Continuous>  influenza   Vaccine 0.5 milliLiter(s) IntraMuscular once  levalbuterol Inhalation 0.63 milliGRAM(s) Inhalation every 6 hours    MEDICATIONS  (PRN):  acetaminophen     Tablet .. 650 milliGRAM(s) Oral every 6 hours PRN Temp greater or equal to 38C (100.4F), Mild Pain (1 - 3)  guaiFENesin Oral Liquid (Sugar-Free) 100 milliGRAM(s) Oral every 6 hours PRN Cough  hydrocodone/homatropine Syrup 5 milliLiter(s) Oral every 8 hours PRN Cough      Allergies    No Known Allergies    Intolerances        LABS:                        9.0    11.40 )-----------( 321      ( 16 Dec 2022 08:10 )             31.2     -    144  |  101  |  18  ----------------------------<  114<H>  3.9   |  33<H>  |  0.81    Ca    10.2      16 Dec 2022 08:10  Phos  3.4     12-16  Mg     1.70     12-16    TPro  6.9  /  Alb  3.1<L>  /  TBili  0.3  /  DBili  x   /  AST  12  /  ALT  6   /  AlkPhos  141<H>  12-15    PT/INR - ( 16 Dec 2022 08:10 )   PT: 14.9 sec;   INR: 1.28 ratio         PTT - ( 16 Dec 2022 08:10 )  PTT:62.4 sec  Urinalysis Basic - ( 15 Dec 2022 12:20 )    Color: Light Yellow / Appearance: Clear / S.022 / pH: x  Gluc: x / Ketone: Negative  / Bili: Negative / Urobili: <2 mg/dL   Blood: x / Protein: Trace / Nitrite: Negative   Leuk Esterase: Negative / RBC: x / WBC x   Sq Epi: x / Non Sq Epi: x / Bacteria: x        RADIOLOGY & ADDITIONAL TESTS:  Studies reviewed.

## 2022-12-16 NOTE — PROGRESS NOTE ADULT - ASSESSMENT
57y Female  with PMH of  met uterine cancer c/b pleural effusions s/p carbo/taxol, Keytruda Avastin and now most recently on Doxil (cycle 3 11/16) presenting with hypoxia , sating 69% on RA.  Admitted for Acute hypoxemic respiratory failure..Oncology consulted for further evaluation     12/14 CXR- Small bilateral pleural effusions.  Bibasilar atelectasis and pulmonary edema mildly increased from prior.      Agree with CT CHEST will followup  Agree with PULM and CT SX consult  Patient on diuresis with some improvement in symptoms  CXR with signs of pulmonary edema  pro BNP 5128   Echo pending  -Rest of care as per primary team  -Patient to followup with Dr. Mirtha Celaya ([facility]) upon discharge  -C/w Supportive care, pain control, Nutrition, PT, DVT ppx  -Oncology will continue to follow with you      Case discussed with Dr. Shirley ROQUE  Oncology Physician Assistant  [facility] JUSTICE/JEANNIE [facility]  Pager [phone] also available on Teams    If before 8am/after 5pm or on weekends please page On-call Oncology Fellow   57y Female  with PMH of  met uterine cancer c/b pleural effusions s/p carbo/taxol, Keytruda Avastin and now most recently on Doxil (cycle 3 11/16) presenting with hypoxia , sating 69% on RA.  Admitted for Acute hypoxemic respiratory failure..Oncology consulted for further evaluation     12/14 CXR- Small bilateral pleural effusions.  Bibasilar atelectasis and pulmonary edema mildly increased from prior.    CTA chest  Echo  Pulm consult  CT SX consult  Patient on diuresis with some improvement in symptoms  CXR with signs of pulmonary edema  pro BNP 5128   -Rest of care as per primary team  -Patient to followup with Dr. Mirtha Celaya ([facility]) upon discharge  -C/w Supportive care, pain control, Nutrition, PT, DVT ppx  -Oncology will continue to follow with you      Case discussed with Dr. Shirley ROQUE  Oncology Physician Assistant  [facility] JUSTICE/[facility]ANN[facility]  Pager [phone] also available on Teams    If before 8am/after 5pm or on weekends please page On-call Oncology Fellow

## 2022-12-16 NOTE — CHART NOTE - NSCHARTNOTEFT_GEN_A_CORE
Pt w/ uterine ca and lung metastasis, well known to our service w/ recurrent pleural effusions. Readmitted w/ SOB. CXR shows small b/l effusions and  patchy bibasilar opcities and inc interstitial markings.   pt seen in bed, c/o SOB, but refusing CT scan. Will reassess after CT scan is done Pt w/ uterine ca and lung metastasis, well known to our service w/ recurrent pleural effusions. Readmitted w/ SOB. CXR shows small b/l effusions and  patchy bibasilar opcities and inc interstitial markings.   pt seen in bed, c/o SOB, but refusing CT scan. Will reassess after CT scan is done    Addendum- CT scan reviewed w/ Dr Peters, large tumor burden, small effusions, no benefit to draining such small effusions. D/W promary team and pt. will sign off

## 2022-12-16 NOTE — SWALLOW BEDSIDE ASSESSMENT ADULT - ADDITIONAL RECOMMENDATIONS
1. This service to follow up as schedule permits.  2. Medical team advised to reconsult this service as needed.

## 2022-12-16 NOTE — PROGRESS NOTE ADULT - NS ATTEND AMEND GEN_ALL_CORE FT
Patient seen at bedside. Case discussed with JUDE Judd. Plan as above.   CTA chest  Pulm consult  Echo  Will follow

## 2022-12-16 NOTE — CONSULT NOTE ADULT - SUBJECTIVE AND OBJECTIVE BOX
CHIEF COMPLAINT:    HPI:    PAST MEDICAL & SURGICAL HISTORY:  Uterine cancer  S/p chemotherapy, radiation and hysterectomy      Malignant pleural effusion  with hx loculation on R      Asthma      Chronic anemia      S/P hysterectomy   VINCENT/BSO      H/O ovarian cystectomy  2009      History of thoracentesis  R VATS 10/21          FAMILY HISTORY:  Maternal family history of hypertension (Mother)    Family history of type 2 diabetes mellitus (Father)        SOCIAL HISTORY:  Smoking: __ packs x ___ years  EtOH Use:  Marital Status:  Occupation:  Recent Travel:  Country of Birth:  Advance Directives:    Allergies    No Known Allergies    Intolerances        HOME MEDICATIONS:    REVIEW OF SYSTEMS:  Constitutional:   Eyes:  ENT:  CV:  Resp:  GI:  :  MSK:  Integumentary:  Neurological:  Psychiatric:  Endocrine:  Hematologic/Lymphatic:  Allergic/Immunologic:  [ ] All other systems negative  [ ] Unable to assess ROS because ________    OBJECTIVE:  ICU Vital Signs Last 24 Hrs  T(C): 37.3 (16 Dec 2022 20:25), Max: 37.3 (16 Dec 2022 12:15)  T(F): 99.2 (16 Dec 2022 20:25), Max: 99.2 (16 Dec 2022 12:15)  HR: 130 (16 Dec 2022 21:30) (120 - 136)  BP: 137/93 (16 Dec 2022 21:10) (128/97 - 144/93)  BP(mean): --  ABP: --  ABP(mean): --  RR: 20 (16 Dec 2022 20:25) (20 - 25)  SpO2: 98% (16 Dec 2022 21:30) (81% - 98%)    O2 Parameters below as of 16 Dec 2022 21:30  Patient On (Oxygen Delivery Method): BiPAP/CPAP              12-15 @ :  -   @ 07:00  --------------------------------------------------------  IN: 0 mL / OUT: 500 mL / NET: -500 mL     @ 07:  -   @ 22:23  --------------------------------------------------------  IN: 0 mL / OUT: 500 mL / NET: -500 mL      CAPILLARY BLOOD GLUCOSE          PHYSICAL EXAM  General: Fatigued, on BiPAP  HEENT:   Lymph Nodes:  Neck:   Respiratory: Decreased breath sounds in bases with crackles/course rhonchi in mid and lower lung fields, no increased work of   Cardiovascular: S1, S2, tachycardic  Abdomen: Soft, non-tender, non-distended, +BS  Extremities: Trace pitting edema to shins  Skin: Warm and intact   Neurological: Able to follow commands, oriented to self and place, able to answer questions appropriately by shaking head "Yes" or "No"      HOSPITAL MEDICATIONS:  MEDICATIONS  (STANDING):  chlorhexidine 2% Cloths 1 Application(s) Topical daily  dextrose 5% + lactated ringers. 1000 milliLiter(s) (50 mL/Hr) IV Continuous <Continuous>  enoxaparin Injectable 40 milliGRAM(s) SubCutaneous every 24 hours  influenza   Vaccine 0.5 milliLiter(s) IntraMuscular once  levalbuterol Inhalation 0.63 milliGRAM(s) Inhalation every 6 hours    MEDICATIONS  (PRN):  acetaminophen     Tablet .. 650 milliGRAM(s) Oral every 6 hours PRN Temp greater or equal to 38C (100.4F), Mild Pain (1 - 3)  guaiFENesin Oral Liquid (Sugar-Free) 100 milliGRAM(s) Oral every 6 hours PRN Cough  hydrocodone/homatropine Syrup 5 milliLiter(s) Oral every 8 hours PRN Cough      LABS:                        9.0    11.40 )-----------( 321      ( 16 Dec 2022 08:10 )             31.2     12-16    144  |  101  |  18  ----------------------------<  114<H>  3.9   |  33<H>  |  0.81    Ca    10.2      16 Dec 2022 08:10  Phos  3.4     12-16  Mg     1.70     12-16    TPro  6.9  /  Alb  3.1<L>  /  TBili  0.3  /  DBili  x   /  AST  12  /  ALT  6   /  AlkPhos  141<H>  12-15    PT/INR - ( 16 Dec 2022 08:10 )   PT: 14.9 sec;   INR: 1.28 ratio         PTT - ( 16 Dec 2022 08:10 )  PTT:62.4 sec  Urinalysis Basic - ( 15 Dec 2022 12:20 )    Color: Light Yellow / Appearance: Clear / S.022 / pH: x  Gluc: x / Ketone: Negative  / Bili: Negative / Urobili: <2 mg/dL   Blood: x / Protein: Trace / Nitrite: Negative   Leuk Esterase: Negative / RBC: x / WBC x   Sq Epi: x / Non Sq Epi: x / Bacteria: x      Arterial Blood Gas:   @ 20:50  7.57/40/57/37/95.7/13.4  ABG lactate: --  Arterial Blood Gas:   @ 20:10  7.28/89/56/42/83.1/11.3  ABG lactate: --  Arterial Blood Gas:   @ 18:48  7.30/81/81/40/96.6/11.4  ABG lactate: --        MICROBIOLOGY:     RADIOLOGY:  [ ] Reviewed and interpreted by me    EKG: No

## 2022-12-16 NOTE — SWALLOW BEDSIDE ASSESSMENT ADULT - ASR SWALLOW RECOMMEND DIAG
Cinesophagram to objectively assess the swallow function to determine if cough is dysphagia related.

## 2022-12-16 NOTE — CONSULT NOTE ADULT - ASSESSMENT
57F hx metastatic uterine cancer (s/p chemotherapy, radiation and hysterectomy) to the lungs and peritoneal carcinomatosis (on doxorubicin w/ Brianna, last session 11/2022), pleural effusions (s/p VATs), and chronic anemia presenting with shortness of breath, found to be acute hypoxic respiratory failure. MICU consulted for new work of breathing and acute hypercapnic/hypoxic respiratory failure.     Assessment:  -Unclear reason for acute worsening in respiratory status.    -CT Scan reviewed showing stable pleural effusions, too small for drainage per Thoracic Surgery  -DDx pulmonary edema/increased pulmonary HTN, superimposed infectious process, pneumonitis from recent chemotherapy  -Blood gas 20:10 likely VBG given O2 level and taken while patient not on BiPAP for long. Repeat ABGs after BiPAP for 2+ hours, noted improved pH and pCO2.   -Patient mentating and more comfortable now on BiPAP, no longer in respiratory distress.     Recommendations:  -Continue Lasix 40 IV BID with goal net negative 1-2L   -Continue Solumedrol   -Broaden antibiotics to Vancomycin and Cefepime i/s/o acute illness. Can de-escalate if infectious workup negative  -Trend ABGs  -GOC Conversation: Patient does not have designated HCP. Patient made medical decisions with her 2 brothers (1 is respiratory therapist who was bedside) and patient's mother. Patient amenable to intubation if needed, remains FULL CODE.      NOT MICU Candidate at this time. Please re-consult as needed.

## 2022-12-16 NOTE — PROGRESS NOTE ADULT - SUBJECTIVE AND OBJECTIVE BOX
************************  Kt Flowers MD-PhD  Internal Medicine PGY-1  ************************    Patient is a 57y old  Female who presents with a chief complaint of Hypoxia (15 Dec 2022 12:35)    SOB overnight with pleuritic pain, started on Hep gtt and given toradol. Patient with appropriate PO intake and urinating well with BM(s). Denies CP, SOB, fevers/chills, N/V, or abdominal pain. Patient reminded of ongoing careplan.    MEDICATIONS  (STANDING):  azithromycin  IVPB 500 milliGRAM(s) IV Intermittent every 24 hours  cefTRIAXone   IVPB 1000 milliGRAM(s) IV Intermittent every 24 hours  chlorhexidine 2% Cloths 1 Application(s) Topical daily  heparin  Infusion.  Unit(s)/Hr (15 mL/Hr) IV Continuous <Continuous>  influenza   Vaccine 0.5 milliLiter(s) IntraMuscular once  levalbuterol Inhalation 0.63 milliGRAM(s) Inhalation every 6 hours  magnesium oxide 400 milliGRAM(s) Oral three times a day with meals    MEDICATIONS  (PRN):  acetaminophen     Tablet .. 650 milliGRAM(s) Oral every 6 hours PRN Temp greater or equal to 38C (100.4F), Mild Pain (1 - 3)  guaiFENesin Oral Liquid (Sugar-Free) 100 milliGRAM(s) Oral every 6 hours PRN Cough  hydrocodone/homatropine Syrup 5 milliLiter(s) Oral every 8 hours PRN Cough      CAPILLARY BLOOD GLUCOSE        I&O's Summary    15 Dec 2022 07:01  -  16 Dec 2022 06:42  --------------------------------------------------------  IN: 0 mL / OUT: 500 mL / NET: -500 mL        PHYSICAL EXAM:  Vital Signs Last 24 Hrs  T(C): 36.9 (15 Dec 2022 22:52), Max: 37.3 (15 Dec 2022 16:00)  T(F): 98.4 (15 Dec 2022 22:52), Max: 99.1 (15 Dec 2022 16:00)  HR: 132 (16 Dec 2022 04:56) (106 - 136)  BP: 136/74 (15 Dec 2022 22:52) (110/75 - 139/88)  BP(mean): --  RR: 20 (15 Dec 2022 22:52) (19 - 22)  SpO2: 90% (16 Dec 2022 04:56) (90% - 100%)    Parameters below as of 16 Dec 2022 04:56  Patient On (Oxygen Delivery Method): nasal cannula        T(C): 36.9 (12-15-22 @ 22:52), Max: 37.3 (12-15-22 @ 16:00)  HR: 132 (22 @ 04:56) (106 - 136)  BP: 136/74 (12-15-22 @ 22:52) (110/75 - 139/88)  RR: 20 (12-15-22 @ 22:52) (19 - )  SpO2: 90% (22 @ 04:56) (90% - 100%)    GENERAL: ill appearing  HEAD:  Atraumatic, Normocephalic  EYES: EOMI, PERRLA, canicteric sclera  NECK: Supple, JVD to low neck  CHEST/LUNG: increased work of breathing. decreased lung sounds at bases bilaterally. crackles mid lungs bilaterally   HEART: tachycardic rate and regular rhythm; No murmurs, rubs, or gallops  ABDOMEN: Soft, Nontender, Nondistended; Bowel sounds present  EXTREMITIES:  2+ Peripheral Pulses, trace edema bilateral lower extremities  PSYCH: AAOx3  NEUROLOGY: non-focal  SKIN: No rashes or lesions    LABS:                        8.6    10.37 )-----------( 284      ( 15 Dec 2022 05:50 )             29.2     12-15    145  |  104  |  15  ----------------------------<  121<H>  3.8   |  31  |  0.94    Ca    9.8      15 Dec 2022 05:50  Phos  4.1     12-15  Mg     1.50     12-15    TPro  6.9  /  Alb  3.1<L>  /  TBili  0.3  /  DBili  x   /  AST  12  /  ALT  6   /  AlkPhos  141<H>  12-15    PT/INR - ( 14 Dec 2022 18:17 )   PT: 16.3 sec;   INR: 1.40 ratio         PTT - ( 14 Dec 2022 18:17 )  PTT:29.6 sec      Urinalysis Basic - ( 15 Dec 2022 12:20 )    Color: Light Yellow / Appearance: Clear / S.022 / pH: x  Gluc: x / Ketone: Negative  / Bili: Negative / Urobili: <2 mg/dL   Blood: x / Protein: Trace / Nitrite: Negative   Leuk Esterase: Negative / RBC: x / WBC x   Sq Epi: x / Non Sq Epi: x / Bacteria: x        Culture - Sputum (collected 15 Dec 2022 14:40)  Source: .Sputum Sputum  Gram Stain (15 Dec 2022 22:54):    Numerous polymorphonuclear leukocytes per low power field    Numerous Squamous epithelial cells per low power field    Numerous Yeast like cells per oil power field    Moderate Gram Positive Rods per oil power field    Few Gram Negative Rods per oil power field    Results consistent with oropharyngeal contamination    Culture - Blood (collected 14 Dec 2022 18:00)  Source: .Blood Blood-Peripheral  Preliminary Report (15 Dec 2022 22:02):    No growth to date.    Culture - Blood (collected 14 Dec 2022 18:00)  Source: .Blood Blood-Peripheral  Preliminary Report (15 Dec 2022 22:02):    No growth to date.   ************************  Kt Folwers MD-PhD  Internal Medicine PGY-1  ************************    Patient is a 57y old  Female who presents with a chief complaint of Hypoxia (15 Dec 2022 12:35)    SOB overnight with pleuritic pain, started on Hep gtt and given toradol. Patient with appropriate PO intake and urinating well with BM(s). Denies CP, SOB, fevers/chills, N/V, or abdominal pain. Patient reminded of ongoing careplan.    MEDICATIONS  (STANDING):  azithromycin  IVPB 500 milliGRAM(s) IV Intermittent every 24 hours  cefTRIAXone   IVPB 1000 milliGRAM(s) IV Intermittent every 24 hours  chlorhexidine 2% Cloths 1 Application(s) Topical daily  heparin  Infusion.  Unit(s)/Hr (15 mL/Hr) IV Continuous <Continuous>  influenza   Vaccine 0.5 milliLiter(s) IntraMuscular once  levalbuterol Inhalation 0.63 milliGRAM(s) Inhalation every 6 hours  magnesium oxide 400 milliGRAM(s) Oral three times a day with meals    MEDICATIONS  (PRN):  acetaminophen     Tablet .. 650 milliGRAM(s) Oral every 6 hours PRN Temp greater or equal to 38C (100.4F), Mild Pain (1 - 3)  guaiFENesin Oral Liquid (Sugar-Free) 100 milliGRAM(s) Oral every 6 hours PRN Cough  hydrocodone/homatropine Syrup 5 milliLiter(s) Oral every 8 hours PRN Cough      CAPILLARY BLOOD GLUCOSE        I&O's Summary    15 Dec 2022 07:01  -  16 Dec 2022 06:42  --------------------------------------------------------  IN: 0 mL / OUT: 500 mL / NET: -500 mL        PHYSICAL EXAM:  Vital Signs Last 24 Hrs  T(C): 36.9 (15 Dec 2022 22:52), Max: 37.3 (15 Dec 2022 16:00)  T(F): 98.4 (15 Dec 2022 22:52), Max: 99.1 (15 Dec 2022 16:00)  HR: 132 (16 Dec 2022 04:56) (106 - 136)  BP: 136/74 (15 Dec 2022 22:52) (110/75 - 139/88)  BP(mean): --  RR: 20 (15 Dec 2022 22:52) (19 - 22)  SpO2: 90% (16 Dec 2022 04:56) (90% - 100%)    Parameters below as of 16 Dec 2022 04:56  Patient On (Oxygen Delivery Method): nasal cannula        T(C): 36.9 (12-15-22 @ 22:52), Max: 37.3 (12-15-22 @ 16:00)  HR: 132 (22 @ 04:56) (106 - 136)  BP: 136/74 (12-15-22 @ 22:52) (110/75 - 139/88)  RR: 20 (12-15-22 @ 22:52) (19 - )  SpO2: 90% (22 @ 04:56) (90% - 100%)    GENERAL: ill appearing  HEAD:  Atraumatic, Normocephalic  EYES: EOMI, PERRLA, canicteric sclera  NECK: Supple, JVD to low neck  CHEST/LUNG: increased work of breathing. decreased lung sounds at bases bilaterally. crackles mid lungs bilaterally   HEART: tachycardic rate and regular rhythm; No murmurs, rubs, or gallops  ABDOMEN: Soft, Nontender, Nondistended; Bowel sounds present  EXTREMITIES:  2+ Peripheral Pulses, trace edema bilateral lower extremities  PSYCH: AAOx3  NEUROLOGY: non-focal  SKIN: No rashes or lesions    LABS:                        8.6    10.37 )-----------( 284      ( 15 Dec 2022 05:50 )             29.2     12-15    145  |  104  |  15  ----------------------------<  121<H>  3.8   |  31  |  0.94    Ca    9.8      15 Dec 2022 05:50  Phos  4.1     12-15  Mg     1.50     12-15    TPro  6.9  /  Alb  3.1<L>  /  TBili  0.3  /  DBili  x   /  AST  12  /  ALT  6   /  AlkPhos  141<H>  12-15    PT/INR - ( 14 Dec 2022 18:17 )   PT: 16.3 sec;   INR: 1.40 ratio         PTT - ( 14 Dec 2022 18:17 )  PTT:29.6 sec      Urinalysis Basic - ( 15 Dec 2022 12:20 )    Color: Light Yellow / Appearance: Clear / S.022 / pH: x  Gluc: x / Ketone: Negative  / Bili: Negative / Urobili: <2 mg/dL   Blood: x / Protein: Trace / Nitrite: Negative   Leuk Esterase: Negative / RBC: x / WBC x   Sq Epi: x / Non Sq Epi: x / Bacteria: x        Culture - Sputum (collected 15 Dec 2022 14:40)  Source: .Sputum Sputum  Gram Stain (15 Dec 2022 22:54):    Numerous polymorphonuclear leukocytes per low power field    Numerous Squamous epithelial cells per low power field    Numerous Yeast like cells per oil power field    Moderate Gram Positive Rods per oil power field    Few Gram Negative Rods per oil power field    Results consistent with oropharyngeal contamination    Culture - Blood (collected 14 Dec 2022 18:00)  Source: .Blood Blood-Peripheral  Preliminary Report (15 Dec 2022 22:02):    No growth to date.    Culture - Blood (collected 14 Dec 2022 18:00)  Source: .Blood Blood-Peripheral  Preliminary Report (15 Dec 2022 22:02):    No growth to date.      IMAGING:    < from: CT Angio Chest PE Protocol w/ IV Cont (22 @ 12:31) >  IMPRESSION:    No pulmonary embolus.    Worsening bilateral consolidations throughout both lungs when compared   with prior exam. This may be due to multifocal infection, edema, or drug   reaction.    Stable bilateral pleural effusions.    < end of copied text >

## 2022-12-16 NOTE — SWALLOW BEDSIDE ASSESSMENT ADULT - COMMENTS
Attempted to see patient at bedside this afternoon for a swallow assessment, however, patient being transported for CT scan. This service to follow up as schedule permits.
H&P "57-year-old female with a PMHx of uterine cancer (s/p chemotherapy, radiation and hysterectomy) w/ lung mets and peritoneal carcinomatosis (on doxorubicin w/ Brianna, last session 11/2022), pleural effusions (s/p VATs), and chronic anemia presenting with shortness of breath. Patient was scheduled for outpatient chemo today, but was found to be hypoxic so she was sent to the ED. Outpatient oncologist note reviewed. Patient noted worsening SOB for 2 days with cough productive of white sputum for past 10 days. Patient hypoxic to 69% on RA, placed on O2 and given albuterol with improvement to 90% O2. Sent to ED for further w/u. "    CXR 12/14 "Small bilateral pleural effusions. Bibasilar atelectasis and pulmonary edema mildly increased from prior."    Patient seen at bedside this afternoon for an initial assessment of the swallow function. RN reporting that previous RN from yesterday stated patient "aspirated" when taking PO medication with water. Patient noted to have a baseline cough prior to initiation of PO trials with emesis bag present at bedside. Patient receiving supplemental O2 via nasal cannula.

## 2022-12-16 NOTE — PROGRESS NOTE ADULT - ASSESSMENT
57F hx metastatic uterine cancer (s/p chemotherapy, radiation and hysterectomy) to the lungs and peritoneal carcinomatosis (on doxorubicin w/ Brianna, last session 11/2022), pleural effusions (s/p VATs), and chronic anemia presenting with shortness of breath c/f progression of pleural effusions vs. PE vs. new dx CHF vs CAP.

## 2022-12-16 NOTE — SWALLOW BEDSIDE ASSESSMENT ADULT - SWALLOW EVAL: DIAGNOSIS
1. Mild-moderate oral dysphagia for puree, moderately thick liquids and mildly thick liquids marked by slow stripping from teaspoon with prolonged volitional bolus holding and delayed transfer. 2. Moderate/severe pharyngeal dysphagia for the aforementioned consistencies marked by a suspected delayed pharyngeal swallow trigger with hyolaryngeal elevation upon palpation with intermittently coughing throughout assessment and increased clavicular breathing, suggestive of airway penetration/aspiration.

## 2022-12-16 NOTE — CHART NOTE - NSCHARTNOTEFT_GEN_A_CORE
Called by RN to assess patient for desaturation and respiratory distress. When arrived at bedside, patient A&O3 with adequate oxygen sat on NRB but using accessory muscles and tachypneic. ABG sent with pCO2 81 and pH 7.3. RT called and patient started on BiPAP. Two brothers Dashawn (RT also) and Max called and updated provided. Specifically explained CT angio findings of likely progression of lung mets and persistent pleff and edema. Verified with patient that should she not respond to BiPAP, we may need to proceed with intubation, to which she nodded head and said "yes" to. Additionally informed patient given severity of abnormal lung findings on CT, concern for unsuccessful extubation once intubated. She expresses understanding and nods "yes" when asked if she would still want to be intubated in that situation.     Nahomi Saunders MD  PGY-3  Internal Medicine  Available on TEAMS Called by RN to assess patient for desaturation and respiratory distress. When arrived at bedside, patient A&O3 with adequate oxygen sat on NRB but using accessory muscles and tachypneic. ABG sent with pCO2 81 and pH 7.3. RT called and patient started on BiPAP. Two brothers Dashawn (RT also) and Max called and updated provided. Specifically explained CT angio findings of likely progression of lung mets and persistent pleff and edema. Verified with patient that should she not respond to BiPAP, we may need to proceed with intubation, to which she nodded head and said "yes" to. Additionally informed patient given severity of abnormal lung findings on CT, concern for unsuccessful extubation once intubated. She expresses understanding and nods "yes" when asked if she would still want to be intubated in that situation.       Patient noted to be persistently tachypneic to 40-50s and low Tv 200-210s. MICU consulted for c/f need for intubation in s/o acute hypercarbic respiratory failure (see consult note and recs).     Plan as below:   - lasix 40 mg IV x 1   - solumedrol 40mg IV x 1   - broaden to zosyn   - send repeat blood cultures   - reassess in an hour (9:30)    - repeat ABG to monitor pCO2      Nahomi Saunders MD   PGY-3 Internal Medicine

## 2022-12-16 NOTE — PROGRESS NOTE ADULT - PROBLEM SELECTOR PLAN 1
Hx of pleural effusions (s/p VATS, multiple thoracenteses/pigtail catheters - last 10/2022), though unchanged on imaging. Mildly hypervolemic on exam and possible new dx CHF w/ pulmonary edema on CXR. Lower ddx includes infectious vs. PE (tachy and tachypneic i/s/o underlying malignancy). Well's 2.5 (malignancy, D-dimer).  - pending CTA chest eval for PE and pleural effusions vs. infection (expedited)  - s/p 20mg IV Lasix in ED (proBNP 5128); continue diuresis as warranted  - repeat TTE, risk of cardiotoxicity with doxorubicin, eval for RH strain suggestive of PE  - s/p vanc and cefepime in ED; switch to CTX/azithro for CAP; Bcx cooking; MRSA ordered; Ucx and Scx ordered  - Xopenex inhaler q6h PRN (though tachycardia)  - Thoracic surgery consult to eval pleural effusions, possible need for thoracentesis  - Discuss need for PleurX with thoracic surgery and oncology  - placed on Hep gtt O/N given pleuritic CP, tachy, and still on 6L Hx of pleural effusions (s/p VATS, multiple thoracenteses/pigtail catheters - last 10/2022), though unchanged on imaging. Mildly hypervolemic on exam and possible new dx CHF w/ pulmonary edema on CXR. Lower ddx includes infectious vs. PE (tachy and tachypneic i/s/o underlying malignancy). Well's 2.5 (malignancy, D-dimer).  - CTA chest without PE but showing worsening bilateral consolidations throughout both lungs and stable bi/l pleural effusions; likely multifocal infection vs. edema  - s/p 20mg IV Lasix in ED (proBNP 5128); continue diuresis as warranted  - repeat TTE, risk of cardiotoxicity with doxorubicin, eval for RH strain suggestive of PE  - continue CTX/azithro for CAP given CTA chest findings; Bcx cooking; MRSA ordered; f/u Ucx, Scx with respiratory hema  - Xopenex inhaler q6h  - Thoracic surgery consult to eval pleural effusions, possible need for thoracentesis  - Discuss need for PleurX with thoracic surgery and oncology  - stopped Hep gtt given CTA chest neg for PE, though still on 6L Hx of pleural effusions (s/p VATS, multiple thoracenteses/pigtail catheters - last 10/2022), though unchanged on imaging. Mildly hypervolemic on exam and possible new dx CHF w/ pulmonary edema on CXR. Lower ddx includes infectious vs. PE (tachy and tachypneic i/s/o underlying malignancy). Well's 2.5 (malignancy, D-dimer).  - CTA chest without PE but showing worsening bilateral consolidations throughout both lungs and stable bi/l pleural effusions; likely multifocal infection vs. edema vs most likely tumor progression  - s/p 20mg IV Lasix in ED (proBNP 5128); continue diuresis as warranted  - repeat TTE, risk of cardiotoxicity with doxorubicin, eval for RH strain suggestive of PE  - continue CTX/azithro for CAP given CTA chest findings; Bcx cooking; MRSA ordered; f/u Ucx, Scx with respiratory hema  - Xopenex inhaler q6h  - Thoracic surgery consult to eval pleural effusions, possible need for thoracentesis - after reviewing CT they feel not indicated at this time  - stopped Hep gtt given CTA chest neg for PE

## 2022-12-16 NOTE — PROGRESS NOTE ADULT - PROBLEM SELECTOR PLAN 3
SIRS criteria (tachycardia, tachypnea). No clear source of infection though has ascites and pleural effusions. Procalcitonin elevated.  - f/u cultures as above  - f/u MRSA swab  - Switch cefepime to CTX/azithromycin SIRS criteria (tachycardia, tachypnea). No clear source of infection though has ascites and pleural effusions. Procalcitonin elevated.  - f/u cultures as above  - f/u MRSA swab  - continue CTX/azithromycin

## 2022-12-16 NOTE — PROGRESS NOTE ADULT - PROBLEM SELECTOR PLAN 5
DVT PPx: Lovenox 40mg qD  Diet: Soft and Bite Sized Regular  Code:   Dispo: PT/OT Pending Hospital Course  Communication: brother Max Liao ([phone]). mom Nicole Liao ([phone])    Discharge information:  Pharmacy -   PCP - DVT PPx: Lovenox 40mg qD  Diet: Soft and Bite Sized Regular  Code:   Dispo: PT/OT Pending Hospital Course  Communication: brother Max Liao ([phone]). mom Nicole Liao ([phone]). Dashawn ([phone]). spoke 8:45AM 12/16    Discharge information:  Pharmacy -   PCP - DVT PPx: SCDs  Diet: Soft and Bite Sized Regular, pending S+S  Code: full code  Dispo: PT/OT Pending Hospital Course  Communication: brother Max Liao ([phone]). mom Nicole Liao ([phone]). Dashawn ([phone]). spoke 8:45AM 12/16    Discharge information:  Pharmacy -   PCP -

## 2022-12-17 NOTE — DISCHARGE NOTE PROVIDER - PROVIDER TOKENS
PROVIDER:[TOKEN:[[id number]],FOLLOWUP:[1 week],ESTABLISHEDPATIENT:[T]] PROVIDER:[TOKEN:[[id number]],FOLLOWUP:[1 week],ESTABLISHEDPATIENT:[T]],PROVIDER:[TOKEN:[[id number]],FOLLOWUP:[1 week],ESTABLISHEDPATIENT:[T]]

## 2022-12-17 NOTE — DISCHARGE NOTE PROVIDER - CARE PROVIDER_API CALL
Mirtha Celaya)  Hematology; Internal Medicine; Medical Oncology  [address] [place] [address]  Phone: [phone]  Fax: [phone]  Established Patient  Follow Up Time: 1 week   Mirtha Celaya)  Hematology; Internal Medicine; Medical Oncology  [address] [place] [address]  Phone: [phone]  Fax: [phone]  Established Patient  Follow Up Time: 1 week    Christopher Tracy (DO)  Internal Medicine; Pulmonary Disease; Sleep Medicine  [address] [facility], [address]  [place] [address]  Phone: [phone]  Fax: [phone]  Established Patient  Follow Up Time: 1 week

## 2022-12-17 NOTE — PROGRESS NOTE ADULT - PROBLEM SELECTOR PLAN 1
Hx of recurrent pleural effusions (s/p VATS, multiple thoracenteses/pigtail catheters - last 10/2022). Mildly hypervolemic on exam w/ pulmonary edema/pleural effusions on CXR. Unclear cause of acute worsening started 12/16. Ddx pulmonary edema/increased pulmonary HTN, superimposed infectious process, pneumonitis from recent chemotherapy.  - CTA chest without PE but showing worsening bilateral consolidations throughout both lungs and stable bi/l pleural effusions; likely multifocal infection vs. edema vs most likely tumor progression  - repeat TTE, risk of cardiotoxicity with doxorubicin  - Xopenex inhaler q6h  - Thoracic surgery consult to eval pleural effusions, possible need for thoracentesis - after reviewing CT they feel not indicated at this time  - outpatient pulm consulted, to see patient 12/17  - worsen SOB and WOB starting 12/16, MICU consulted and rejected at this time  -- placed on BIPAP (12 over 5) with improvement in respiratory distress and hypercapnia  -- continue aggressive diuresis with Lasix 40mg BID IV (goal net negative 1-2L) for component of fluid overload and possible secondary pulmonary hypertension  -- continue Solu-Medrol for possible underlying pneumonitis vs. lymphangitic spread  -- antibiotics escalated to Zosyn 12/16 given acute illness (can de-escalate if infectious w/u negative), treatment for CAP initially given CTA chest findings; repeat Bcx cooking; MRSA ordered; Scx with respiratory hema, Ucx neg

## 2022-12-17 NOTE — CHART NOTE - NSCHARTNOTEFT_GEN_A_CORE
Pt seen and examined at bedside last night around 10pm. Was recently placed on BiPAP by primary team. At that time, pt pulling tidal volume 270-300 mL, respiratory rate in 50s. Using cervical accessory muscles. Got a blood gas 2 hours after being on BiPAP showing improvement in hypercarbia from 89 to 58. Pt also seen by MICU team overnight, recommending aggressively fluid diuresis so IV lasix 40 bid was continued. Also recommending to continue with solu-medrol. Antibiotics broadened from ceftriaxone to zosyn. Pt seen and examined at bedside later at 5AM, with improved respiratory rate to 30s, sleeping comfortably, notably less work of breathing.

## 2022-12-17 NOTE — PROGRESS NOTE ADULT - PROBLEM SELECTOR PLAN 5
DVT PPx: SCDs  Diet: Soft and Bite Sized Regular, pending S+S  Code: full code  Dispo: PT/OT Pending Hospital Course  Communication: brother Max Liao ([phone]). mom Nicole Liao ([phone]). Dashawn ([phone]). spoke 8:45AM 12/16  GOC: no defined HCP (Dashawn Santana, Nicole making decisions collectively). remains full code, patient aware of likely possibility that would not be extubated if requiring intubation DVT PPx: Lovenox 40mg qD  Diet: NPO per S+S eval  Code: full code  Dispo: PT/OT Pending Hospital Course  Communication: brother Max Liao ([phone]). mom Nicole Liao ([phone]). Dashawn ([phone]). spoke 8:45AM 12/16  GOC: no defined HCP (Dashawn Santana, Nicole making decisions collectively). remains full code, patient aware of likely possibility that would not be extubated if requiring intubation

## 2022-12-17 NOTE — PROGRESS NOTE ADULT - SUBJECTIVE AND OBJECTIVE BOX
************************  Kt Flowers MD-PhD  Internal Medicine PGY-1  ************************    Patient is a 57y old  Female who presents with a chief complaint of Hypoxia (16 Dec 2022 21:54)    Active overnight, with increased SOB and WOB requiring BIPAP, with improvement in respiratory symptoms. MICU consulted and rejected currently. Anxious and tearful this morning. Patient with appropriate PO intake and urinating well with BM(s). Denies CP, SOB, fevers/chills, N/V, or abdominal pain. Patient reminded of ongoing careplan.    MEDICATIONS  (STANDING):  chlorhexidine 2% Cloths 1 Application(s) Topical daily  dextrose 5% + lactated ringers. 1000 milliLiter(s) (50 mL/Hr) IV Continuous <Continuous>  enoxaparin Injectable 40 milliGRAM(s) SubCutaneous every 24 hours  furosemide   Injectable 40 milliGRAM(s) IV Push every 12 hours  influenza   Vaccine 0.5 milliLiter(s) IntraMuscular once  levalbuterol Inhalation 0.63 milliGRAM(s) Inhalation every 6 hours  methylPREDNISolone sodium succinate Injectable 40 milliGRAM(s) IV Push daily  piperacillin/tazobactam IVPB.. 3.375 Gram(s) IV Intermittent every 8 hours    MEDICATIONS  (PRN):  acetaminophen     Tablet .. 650 milliGRAM(s) Oral every 6 hours PRN Temp greater or equal to 38C (100.4F), Mild Pain (1 - 3)  guaiFENesin Oral Liquid (Sugar-Free) 100 milliGRAM(s) Oral every 6 hours PRN Cough  hydrocodone/homatropine Syrup 5 milliLiter(s) Oral every 8 hours PRN Cough      CAPILLARY BLOOD GLUCOSE      POCT Blood Glucose.: 184 mg/dL (17 Dec 2022 02:01)    I&O's Summary    15 Dec 2022 07:01  -  16 Dec 2022 07:00  --------------------------------------------------------  IN: 0 mL / OUT: 500 mL / NET: -500 mL    16 Dec 2022 07:01  -  17 Dec 2022 06:56  --------------------------------------------------------  IN: 0 mL / OUT: 1050 mL / NET: -1050 mL        PHYSICAL EXAM:  Vital Signs Last 24 Hrs  T(C): 36.6 (17 Dec 2022 05:35), Max: 37.3 (16 Dec 2022 12:15)  T(F): 97.9 (17 Dec 2022 05:35), Max: 99.2 (16 Dec 2022 12:15)  HR: 110 (17 Dec 2022 05:35) (110 - 132)  BP: 132/91 (17 Dec 2022 05:35) (127/85 - 144/93)  BP(mean): --  RR: 25 (17 Dec 2022 05:35) (20 - 35)  SpO2: 100% (17 Dec 2022 05:35) (81% - 100%)    Parameters below as of 17 Dec 2022 05:35  Patient On (Oxygen Delivery Method): BiPAP/CPAP        T(C): 36.6 (22 @ 05:35), Max: 37.3 (22 @ 12:15)  HR: 110 (22 @ 05:35) (110 - 132)  BP: 132/91 (22 @ 05:35) (127/85 - 144/93)  RR: 25 (22 @ 05:35) (20 - 35)  SpO2: 100% (22 @ 05:35) (81% - 100%)    GENERAL: ill-appearing  HEAD: Atraumatic, Normocephalic  EYES: EOMI, anicteric sclera  NECK: Supple  CHEST/LUNG: increased work of breathing. decreased lung sounds at bases bilaterally. crackles mid lungs bilaterally   HEART: tachycardic rate and regular rhythm; No murmurs, rubs, or gallops  ABDOMEN: Soft, Nontender, Nondistended; Bowel sounds present  EXTREMITIES:  2+ Peripheral Pulses, trace edema bilateral lower extremities  PSYCH: AAOx3  NEUROLOGY: non-focal  SKIN: No rashes or lesions    LABS:                        9.0    11.40 )-----------( 321      ( 16 Dec 2022 08:10 )             31.2     12-16    144  |  101  |  18  ----------------------------<  114<H>  3.9   |  33<H>  |  0.81    Ca    10.2      16 Dec 2022 08:10  Phos  3.4     12-  Mg     1.70     12-16      PT/INR - ( 16 Dec 2022 08:10 )   PT: 14.9 sec;   INR: 1.28 ratio         PTT - ( 16 Dec 2022 08:10 )  PTT:62.4 sec      Urinalysis Basic - ( 15 Dec 2022 12:20 )    Color: Light Yellow / Appearance: Clear / S.022 / pH: x  Gluc: x / Ketone: Negative  / Bili: Negative / Urobili: <2 mg/dL   Blood: x / Protein: Trace / Nitrite: Negative   Leuk Esterase: Negative / RBC: x / WBC x   Sq Epi: x / Non Sq Epi: x / Bacteria: x        Culture - Sputum (collected 15 Dec 2022 14:40)  Source: .Sputum Sputum  Gram Stain (15 Dec 2022 22:54):    Numerous polymorphonuclear leukocytes per low power field    Numerous Squamous epithelial cells per low power field    Numerous Yeast like cells per oil power field    Moderate Gram Positive Rods per oil power field    Few Gram Negative Rods per oil power field    Results consistent with oropharyngeal contamination  Preliminary Report (16 Dec 2022 17:59):    Normal Respiratory Danielle present    Culture - Nose (collected 15 Dec 2022 12:07)  Source: .Nose  Final Report (16 Dec 2022 21:20):    No staphylococcus aureus isolated.    "PCR is more Sensitive for identifying MRSA/MSSA."    Culture - Nose (collected 15 Dec 2022 12:05)  Source: .Nose  Final Report (16 Dec 2022 18:42):    No staphylococcus aureus isolated.    "PCR is more Sensitive for identifying MRSA/MSSA."    Culture - Urine (collected 15 Dec 2022 11:45)  Source: Clean Catch Clean Catch (Midstream)  Final Report (16 Dec 2022 23:49):    No growth    Culture - Blood (collected 14 Dec 2022 18:00)  Source: .Blood Blood-Peripheral  Preliminary Report (15 Dec 2022 22:02):    No growth to date.    Culture - Blood (collected 14 Dec 2022 18:00)  Source: .Blood Blood-Peripheral  Preliminary Report (15 Dec 2022 22:02):    No growth to date.

## 2022-12-17 NOTE — PROVIDER CONTACT NOTE (OTHER) - SITUATION
Pt chest port access does not return blood.
Patient 
Unable to draw morning labs, chest port isn't giving back adequate blood flow. Patient refusing venipuncture.
, pt complains of R Flank pain while ambulating to stretcher being transported to IR for catheter assessment.
Night shift RN stated that she could not get blood draw from port this morning but pt does not want to be stuck
Pt HR was 116 bpm
Pt R chest port dressing is due to be changed, but pt refused
Pt refused senna and hemoroidal suppository.  Pt R chest port does not have blood return and pt does not want peripheral IV
Pt refused to have labs drawn this am
Alert-The patient is alert, awake and responds to voice. The patient is oriented to time, place, and person. The triage nurse is able to obtain subjective information.

## 2022-12-17 NOTE — PROGRESS NOTE ADULT - PROBLEM SELECTOR PLAN 2
Hx of metastatic uterine cancer with peritoneal carcinomatosis.  - oncology following  - was planned for chemotherapy (doxorubicin) 12/14/2022, as outpatient, last dose in 11/2022

## 2022-12-17 NOTE — DISCHARGE NOTE PROVIDER - CARE PROVIDERS DIRECT ADDRESSES
[email or web address]

## 2022-12-17 NOTE — DISCHARGE NOTE PROVIDER - NSDCMRMEDTOKEN_GEN_ALL_CORE_FT
bisacodyl 10 mg rectal suppository: 1 suppository(ies) rectal once a day, As needed, Constipation  hydrocodone-homatropine 5 mg-1.5 mg/5 mL oral syrup: 5 milliliter(s) orally once a day (at bedtime), As Needed  MiraLax oral powder for reconstitution: 17 gram(s) orally 2 times a day   senna leaf extract oral tablet: 2 tab(s) orally once a day (at bedtime)    bisacodyl 10 mg rectal suppository: 1 suppository(ies) rectal once a day, As needed, Constipation  calcium carbonate 500 mg (200 mg elemental calcium) oral tablet, chewable: 2 tab(s) orally every 4 hours, As needed, Heartburn  hydrocodone-homatropine 5 mg-1.5 mg/5 mL oral syrup: 5 milliliter(s) orally once a day (at bedtime), As Needed  MiraLax oral powder for reconstitution: 17 gram(s) orally 2 times a day   senna leaf extract oral tablet: 2 tab(s) orally once a day (at bedtime)    albuterol 90 mcg/inh inhalation aerosol: 1 puff(s) inhaled every 6 hours, As needed, Shortness of Breath and/or Wheezing  atovaquone 750 mg/5 mL oral suspension: 5 milliliter(s) orally every 12 hours  calcium carbonate 500 mg (200 mg elemental calcium) oral tablet, chewable: 2 tab(s) orally every 4 hours, As needed, Heartburn  enoxaparin: 40 milligram(s) subcutaneous once a day  guaiFENesin 100 mg/5 mL oral liquid: 5 milliliter(s) orally every 6 hours, As needed, Cough  hydrocodone-homatropine 5 mg-1.5 mg/5 mL oral syrup: 5 milliliter(s) orally once a day (at bedtime), As Needed  loratadine 10 mg oral tablet: 1 tab(s) orally once a day  pantoprazole 40 mg oral delayed release tablet: 1 tab(s) orally once a day (before a meal)  predniSONE: 40 milligram(s) orally once a day till Jan 2  30 miligram(s) orally once a day till Jan 9  20 miligram(s) orally once a day till Jan 16  10 milligram(s) orally once a day till Jan 23  Xopenex 0.63 mg/3 mL inhalation solution: 3 milliliter(s) inhaled every 6 hours, As needed, SOB/Wheezing   albuterol 90 mcg/inh inhalation aerosol: 1 puff(s) inhaled every 6 hours, As needed, Shortness of Breath and/or Wheezing  atovaquone 750 mg/5 mL oral suspension: 5 milliliter(s) orally every 12 hours  calcium carbonate 500 mg (200 mg elemental calcium) oral tablet, chewable: 2 tab(s) orally every 4 hours, As needed, Heartburn  enoxaparin: 40 milligram(s) subcutaneous once a day  guaiFENesin 100 mg/5 mL oral liquid: 5 milliliter(s) orally every 6 hours, As needed, Cough  hydrocodone-homatropine 5 mg-1.5 mg/5 mL oral syrup: 5 milliliter(s) orally once a day (at bedtime), As Needed  loratadine 10 mg oral tablet: 1 tab(s) orally once a day  pantoprazole 40 mg oral delayed release tablet: 1 tab(s) orally once a day (before a meal)  predniSONE: 40 milligram(s) orally once a day till Jan 2  30 miligram(s) orally once a day till Jan 9  20 miligram(s) orally once a day till Jan 16  10 milligram(s) orally once a day till Jan 23

## 2022-12-17 NOTE — DISCHARGE NOTE PROVIDER - NSDCFUSCHEDAPPT_GEN_ALL_CORE_FT
[facility]  CARDIOLOGY [address]   Scheduled Appointment: 12/20/2022    Mirtha Celaya  [facility]  [facility]  Scheduled Appointment: 12/28/2022    [facility]  [facility]  Scheduled Appointment: 01/11/2023    Mirtha Celaya  [facility]  [facility]  Scheduled Appointment: 01/24/2023    [facility]  [facility]  Scheduled Appointment: 02/08/2023    Mirtha Celaya  [facility]  [facility]  Scheduled Appointment: 02/22/2023     Mirtha Celaya  [facility]  [facility]  Scheduled Appointment: 12/28/2022    [facility]  [facility]  Scheduled Appointment: 01/11/2023    Mirtha Celaya  [facility]  [facility]  Scheduled Appointment: 01/24/2023    [facility]  [facility]  Scheduled Appointment: 02/08/2023    Mirtha Celaya  [facility]  [facility]  Scheduled Appointment: 02/22/2023     [facility]  [facility]  Scheduled Appointment: 01/11/2023    Mirtha Celaya  [facility]  [facility]  Scheduled Appointment: 01/24/2023    [facility]  [facility]  Scheduled Appointment: 02/08/2023    Mirtha Celaya  [facility]  [facility]  Scheduled Appointment: 02/22/2023

## 2022-12-17 NOTE — DISCHARGE NOTE PROVIDER - DETAILS OF MALNUTRITION DIAGNOSIS/DIAGNOSES
This patient has been assessed with a concern for Malnutrition and was treated during this hospitalization for the following Nutrition diagnosis/diagnoses:     -  12/19/2022: Severe protein-calorie malnutrition

## 2022-12-17 NOTE — DISCHARGE NOTE PROVIDER - NSDCCPCAREPLAN_GEN_ALL_CORE_FT
PRINCIPAL DISCHARGE DIAGNOSIS  Diagnosis: Acute respiratory failure with hypoxia  Assessment and Plan of Treatment: You came into the hospital for trouble breathing and low oxygen saturation. We were concerned about a possible infection of the lungs versus worsening of your lung metastatic tumor sites versus a reaction to your chemotherapy regimen. We are able to say that this trouble breathing is likely due to worsening of your lung tumor sites. We were able to       PRINCIPAL DISCHARGE DIAGNOSIS  Diagnosis: Acute respiratory failure with hypoxia  Assessment and Plan of Treatment: You came into the hospital for trouble breathing and low oxygen saturation. We were concerned about a possible infection of the lungs versus worsening of your lung metastatic tumor sites versus a reaction to your chemotherapy regimen. We are able to say that this trouble breathing is likely due to worsening of your lung tumor sites. We were able to improve your breathing with antibiotics, steroids and inhalers. On the day of your discharge, your breathing has improved back to baseline and you no longer need any supplemental oxygen. Please make sure to follow-up with your Pulmonologist, Dr. Christopher Tracy, after discharge. If you experience any sudden shortness of breath, cough, nausea/vomiting, fevers, dizziness, weakness please go to the ED.

## 2022-12-17 NOTE — PROGRESS NOTE ADULT - ASSESSMENT
57F hx metastatic uterine cancer (s/p chemotherapy, radiation and hysterectomy) to the lungs and peritoneal carcinomatosis (on doxorubicin w/ Brianna, last session 11/2022), pleural effusions (s/p VATs), and chronic anemia presented with SOB, increased WOB, and pleural effusions. CTA neg for PE but showing progression of lung tumor burden +/- CAP.

## 2022-12-17 NOTE — PROGRESS NOTE ADULT - PROBLEM SELECTOR PLAN 3
SIRS criteria (tachycardia, tachypnea). No clear source of infection though has ascites and pleural effusions. Procalcitonin elevated.  - f/u cultures as above  - f/u MRSA swab  - continue Zosyn

## 2022-12-17 NOTE — DISCHARGE NOTE PROVIDER - HOSPITAL COURSE
For full details, please see H&P, progress notes, consult notes and ancillary notes.    57F hx metastatic uterine cancer (s/p chemotherapy, radiation and hysterectomy) to the lungs, pleura, and peritoneum (on doxorubicin w/ Brianna, last session 11/2022).    Patient admitted to Internal Medicine for further management.    Hospital Course:  Patient referred from outpatient oncology for acutely worsening SOB. Found to have acute hypoxemic respiratory failure with CXR and CTA chest showing worsening bilateral opacities and consolidations with stable effusions. She received Lasix 20mg IV in the ED out of concern for mild fluid overload. Patient initially on 6L NC saturating well with intermittent desaturations during coughing spells, productive of clear/white sputum likely from underlying pulmonary edema. Diuresis continued with Lasix 20mg IV qD. Patient met SIRS criteria (tachycardia, tachypnea, though afebrile) and was given Vanc and Cefepime in the ED, but abx de-escalated to Ceftriaxone and Azithromycin out of concern for possible CAP. Initial blood cultures, urine culture, and sputum culture negative. Started on Heparin gtt by night float out of concern pulmonary embolism i/s/o worsening respiratory status on 12/15 but subsequently discontinued when CTA chest showed no evidence of PE. CTA chest also showed respiratory distress likely due to progression of tumor burden possibly superimposed on pulmonary edema and pleural effusions. CT surgery consulted for possible thoracentesis but likely limited benefit given hypoxemia driven primarily by tumor burden and recommended no intervention. On 12/16, patient acutely worsened with SOB and increased WOB, RRT called and MICU consulted for acute hypercapnic and hypoxic respiratory failure. Patient placed on BIPAP (settings 12/5) with improvement in tachypnea, WOB, hypercapnea, and hypoxemia. Per MICUs recommendations, patient started on Solu-Medrol, aggressive diuresis (Lasix 40mg IV BID net negative goal 1-2L for possible fluid overload / pulm HTN component), and abx broadened to Zosyn. On 12/17 she was weaned off BIPAP back to 6L NC. ________________________    On day of discharge, patient is clinically stable with no new exam findings or acute symptoms compared to prior. The patient was seen by the attending physician on the date of discharge and deemed stable and acceptable for discharge. The patient's chronic medical conditions were treated accordingly per the patient's home medication regimen. The patient's medication reconciliation (with changes made to chronic medications), follow up appointments, discharge orders, instructions, and significant lab and diagnostic studies are as noted.     Discharge follow up action items:     1. Follow up with PCP in 1-2 weeks. Follow up with ____subspecialists_____ in ___1-2 weeks___.  2. Follow up labs: ***  3. Medication changes: ***  4. On hold medications: ***  5. Incidental findings: ***    Patient's ordered code status: ***  Patient disposition: ***    Patient will be discharged to _____ with close follow up. For full details, please see H&P, progress notes, consult notes and ancillary notes.    57F hx metastatic uterine cancer (s/p chemotherapy, radiation and hysterectomy) to the lungs, pleura, and peritoneum (on doxorubicin w/ Brianna, last session 11/2022).    Patient admitted to Internal Medicine for further management.    Hospital Course:  Patient referred from outpatient oncology for acutely worsening SOB. Found to have acute hypoxemic respiratory failure with CXR and CTA chest showing worsening bilateral opacities and consolidations with stable effusions. She received Lasix 20mg IV in the ED out of concern for mild fluid overload. Patient initially on 6L NC saturating well with intermittent desaturations during coughing spells, productive of clear/white sputum likely from underlying pulmonary edema. Diuresis continued with Lasix 20mg IV qD. Patient met SIRS criteria (tachycardia, tachypnea, though afebrile) and was given Vanc and Cefepime in the ED, but abx de-escalated to Ceftriaxone and Azithromycin out of concern for possible CAP. Initial blood cultures, urine culture, and sputum culture negative. Started on Heparin gtt by night float out of concern pulmonary embolism i/s/o worsening respiratory status on 12/15 but subsequently discontinued when CTA chest showed no evidence of PE. CTA chest also showed respiratory distress likely due to progression of tumor burden possibly superimposed on pulmonary edema and pleural effusions. CT surgery consulted for possible thoracentesis but likely limited benefit given hypoxemia driven primarily by tumor burden and recommended no intervention. On 12/16, patient acutely worsened with SOB and increased WOB, RRT called and MICU consulted for acute hypercapnic and hypoxic respiratory failure. Patient placed on BIPAP (settings 12/5) with improvement in tachypnea, WOB, hypercapnea, and hypoxemia. Per MICUs recommendations, patient started on Solu-Medrol, aggressive diuresis (Lasix 40mg IV BID net negative goal 1-2L for possible fluid overload / pulm HTN component), and abx broadened to Zosyn. On 12/17 she was weaned off BIPAP back to 6L NC.        On day of discharge, patient is clinically stable with no new exam findings or acute symptoms compared to prior. The patient was seen by the attending physician on the date of discharge and deemed stable and acceptable for discharge. The patient's chronic medical conditions were treated accordingly per the patient's home medication regimen. The patient's medication reconciliation (with changes made to chronic medications), follow up appointments, discharge orders, instructions, and significant lab and diagnostic studies are as noted.     Discharge follow up action items:     1. Follow up with PCP in 1-2 weeks. Follow up with ____subspecialists_____ in ___1-2 weeks___.  2. Follow up labs: ***  3. Medication changes: ***  4. On hold medications: ***  5. Incidental findings: ***    Patient's ordered code status: ***  Patient disposition: ***    Patient will be discharged to _____ with close follow up. For full details, please see H&P, progress notes, consult notes and ancillary notes.    57F hx metastatic uterine cancer (s/p chemotherapy, radiation and hysterectomy) to the lungs, pleura, and peritoneum (on doxorubicin w/ Brianna, last session 11/2022).    Patient admitted to Internal Medicine for further management.    Hospital Course:  Patient referred from outpatient oncology for acutely worsening SOB. Found to have acute hypoxemic respiratory failure with CXR and CTA chest showing worsening bilateral opacities and consolidations with stable effusions. She received Lasix 20mg IV in the ED out of concern for mild fluid overload. Patient initially on 6L NC saturating well with intermittent desaturations during coughing spells, productive of clear/white sputum likely from underlying pulmonary edema. Diuresis continued with Lasix 20mg IV qD. Patient met SIRS criteria (tachycardia, tachypnea, though afebrile) and was given Vanc and Cefepime in the ED, but abx de-escalated to Ceftriaxone and Azithromycin out of concern for possible CAP. Initial blood cultures, urine culture, and sputum culture negative. Started on Heparin gtt by night float out of concern pulmonary embolism i/s/o worsening respiratory status on 12/15 but subsequently discontinued when CTA chest showed no evidence of PE. CTA chest also showed respiratory distress likely due to progression of tumor burden possibly superimposed on pulmonary edema and pleural effusions. CT surgery consulted for possible thoracentesis but likely limited benefit given hypoxemia driven primarily by tumor burden and recommended no intervention. On 12/16, patient acutely worsened with SOB and increased WOB, RRT called and MICU consulted for acute hypercapnic and hypoxic respiratory failure. Patient placed on BIPAP (settings 12/5) with improvement in tachypnea, WOB, hypercapnea, and hypoxemia. Per MICUs recommendations, patient started on Solu-Medrol, aggressive diuresis (Lasix 40mg IV BID net negative goal 1-2L for possible fluid overload / pulm HTN component), and abx broadened to Zosyn. On 12/17 she was weaned off BIPAP back to 6L NC. The Zosyn course was stopped after an adequate course, as infectious cause of the patient's hypoxia was not suspected anyways.    Patient was seen by Pulmonology team, started on a steroid course to help with possible drug-induced pneumonitis. Infectious Diseases was also consulted to hel rule out PCP infection in the setting of immunocompromisation, and she was started on a prophylactic antibiotic. She improved from a respiratory standpoint during her hospital course and she was stable on room air to 1L oxygen upon her discharge.     On day of discharge, patient is clinically stable with no new exam findings or acute symptoms compared to prior. The patient was seen by the attending physician on the date of discharge and deemed stable and acceptable for discharge. The patient's chronic medical conditions were treated accordingly per the patient's home medication regimen. The patient's medication reconciliation (with changes made to chronic medications), follow up appointments, discharge orders, instructions, and significant lab and diagnostic studies are as noted.     Discharge follow up action items:     1. Follow up with PCP in 1-2 weeks. Follow up with ____subspecialists_____ in ___1-2 weeks___.  2. Follow up labs: ***  3. Medication changes: ***  4. On hold medications: ***  5. Incidental findings: ***    Patient will be discharged to Subacute Rehab with close follow up. 1HPI:  57-year-old female with a PMHx of uterine cancer (s/p chemotherapy, radiation and hysterectomy) w/ lung mets and peritoneal carcinomatosis (on doxorubicin w/ Brianna, last session 11/2022), pleural effusions (s/p VATs), and chronic anemia presenting with shortness of breath. Patient was scheduled for outpatient chemo today, but was found to be hypoxic so she was sent to the ED. Outpatient oncologist note reviewed. Patient noted worsening SOB for 2 days with cough productive of white sputum for past 10 days. Patient hypoxic to 69% on RA, placed on O2 and given albuterol with improvement to 90% O2. Sent to ED for further w/u.     Patient was having cough and felt tired, stating that she did not want to talk to me.    History as per ED note. Patient notes chest pain with coughing. Denies abd pain, n/v, syncope, fevers or chills. In ED, patient CXR demonstrated pulmonary edema. Patient given IV 20 mg Lasix and has been urinating. Patient also given cefepime and vancomycin and albuterol.       Hospital Course:    Patient referred from outpatient oncology for acutely worsening SOB. Found to have acute hypoxemic respiratory failure with CXR and CTA chest showing worsening bilateral opacities and consolidations with stable effusions. She received Lasix 20mg IV in the ED out of concern for mild fluid overload. Patient initially on 6L NC saturating well with intermittent desaturations during coughing spells, productive of clear/white sputum likely from underlying pulmonary edema. Diuresis continued with Lasix 20mg IV qD. Patient met SIRS criteria (tachycardia, tachypnea, though afebrile) and was given Vanc and Cefepime in the ED, but abx de-escalated to Ceftriaxone and Azithromycin out of concern for possible CAP. Initial blood cultures, urine culture, and sputum culture negative. Started on Heparin gtt by night float out of concern pulmonary embolism i/s/o worsening respiratory status on 12/15 but subsequently discontinued when CTA chest showed no evidence of PE. CTA chest also showed respiratory distress likely due to progression of tumor burden possibly superimposed on pulmonary edema and pleural effusions. CT surgery consulted for possible thoracentesis but likely limited benefit given hypoxemia driven primarily by tumor burden and recommended no intervention. On 12/16, patient acutely worsened with SOB and increased WOB, RRT called and MICU consulted for acute hypercapnic and hypoxic respiratory failure. Patient placed on BIPAP (settings 12/5) with improvement in tachypnea, WOB, hypercapnea, and hypoxemia. Per MICUs recommendations, patient started on Solu-Medrol, aggressive diuresis (Lasix 40mg IV BID net negative goal 1-2L for possible fluid overload / pulm HTN component), and abx broadened to Zosyn. On 12/17 she was weaned off BIPAP back to 6L NC. The Zosyn course was stopped after an adequate course, as infectious cause of the patient's hypoxia was not suspected anyways.    Patient was seen by Pulmonology team, started on a steroid course to help with possible drug-induced pneumonitis. Patient improved from a respiratory standpoint during her hospital stay after the initiation of steroids. A steroid taper was planned and started in consultation with Pulmonology.     Infectious Diseases was also consulted to hel rule out PCP infection in the setting of immunocompromisation, and she was started on a prophylactic antibiotic. While unlikely that the patient has been infected with PCP, she will be on prophylactic antibiotics for this infection.     On day of discharge, patient is clinically stable with no new exam findings or acute symptoms compared to prior. The patient was seen by the attending physician on the date of discharge and deemed stable and acceptable for discharge. The patient's chronic medical conditions were treated accordingly per the patient's home medication regimen. The patient's medication reconciliation (with changes made to chronic medications), follow up appointments, discharge orders, instructions, and significant lab and diagnostic studies are as noted.     Discharge follow up action items:     1. Follow up with PCP in 1-2 weeks. Follow up with ____subspecialists_____ in ___1-2 weeks___.  2. Follow up labs: ***  3. Medication changes: ***  4. On hold medications: ***  5. Incidental findings: ***    Patient will be discharged to Subacute Rehab with close follow up. 1HPI:  57-year-old female with a PMHx of uterine cancer (s/p chemotherapy, radiation and hysterectomy) w/ lung mets and peritoneal carcinomatosis (on doxorubicin w/ Brianna, last session 11/2022), pleural effusions (s/p VATs), and chronic anemia presenting with shortness of breath. Patient was scheduled for outpatient chemo today, but was found to be hypoxic so she was sent to the ED. Outpatient oncologist note reviewed. Patient noted worsening SOB for 2 days with cough productive of white sputum for past 10 days. Patient hypoxic to 69% on RA, placed on O2 and given albuterol with improvement to 90% O2. Sent to ED for further w/u.     Patient was having cough and felt tired, stating that she did not want to talk to me.    History as per ED note. Patient notes chest pain with coughing. Denies abd pain, n/v, syncope, fevers or chills. In ED, patient CXR demonstrated pulmonary edema. Patient given IV 20 mg Lasix and has been urinating. Patient also given cefepime and vancomycin and albuterol.       Hospital Course:    Patient referred from outpatient oncology for acutely worsening SOB. Found to have acute hypoxemic respiratory failure with CXR and CTA chest showing worsening bilateral opacities and consolidations with stable effusions. She received Lasix 20mg IV in the ED out of concern for mild fluid overload. Patient initially on 6L NC saturating well with intermittent desaturations during coughing spells, productive of clear/white sputum likely from underlying pulmonary edema. Diuresis continued with Lasix 20mg IV qD. Patient met SIRS criteria (tachycardia, tachypnea, though afebrile) and was given Vanc and Cefepime in the ED, but abx de-escalated to Ceftriaxone and Azithromycin out of concern for possible CAP. Initial blood cultures, urine culture, and sputum culture negative. Started on Heparin gtt by night float out of concern pulmonary embolism i/s/o worsening respiratory status on 12/15 but subsequently discontinued when CTA chest showed no evidence of PE. CTA chest also showed respiratory distress likely due to progression of tumor burden possibly superimposed on pulmonary edema and pleural effusions. CT surgery consulted for possible thoracentesis but likely limited benefit given hypoxemia driven primarily by tumor burden and recommended no intervention. On 12/16, patient acutely worsened with SOB and increased WOB, RRT called and MICU consulted for acute hypercapnic and hypoxic respiratory failure. Patient placed on BIPAP (settings 12/5) with improvement in tachypnea, WOB, hypercapnea, and hypoxemia. Per MICUs recommendations, patient started on Solu-Medrol, aggressive diuresis (Lasix 40mg IV BID net negative goal 1-2L for possible fluid overload / pulm HTN component), and abx broadened to Zosyn. On 12/17 she was weaned off BIPAP back to 6L NC. The Zosyn course was stopped after an adequate course, as infectious cause of the patient's hypoxia was not suspected anyways.    Patient was seen by Pulmonology team, started on a steroid course to help with possible drug-induced pneumonitis. Patient improved from a respiratory standpoint during her hospital stay after the initiation of steroids. A steroid taper was planned and started in consultation with Pulmonology.     Infectious Diseases was also consulted to hel rule out PCP infection in the setting of immunocompromisation, and she was started on a prophylactic antibiotic. While unlikely that the patient has been infected with PCP, she will be on prophylactic antibiotics for this infection.     On day of discharge, patient is clinically stable with no new exam findings or acute symptoms compared to prior. The patient was seen by the attending physician on the date of discharge and deemed stable and acceptable for discharge. The patient's chronic medical conditions were treated accordingly per the patient's home medication regimen. The patient's medication reconciliation (with changes made to chronic medications), follow up appointments, discharge orders, instructions, and significant lab and diagnostic studies are as noted.     For Subacute Rehab, the patient is on Prednisone 40mg daily currently with plan to taper by 10mg every 7 days. She will be on Prednisone 40mg from 12/27-01/02, and then Prednisone 30mg daily from 01/03-01/09, then Prednisone 20mg daily from 1/10-1/16, and then Prednisone 10mg daily from 1/17-1/23. Per Infectious Disease team, plan on giving Atovaquone 750mg BID until the end of her steroid taper, so plan on administering Atovaquone through 01/23/23.    Patient will be discharged to Subacute Rehab with close follow up.    To-do:  (1) Prednisone 40mg daily through 01/02  (2) Prednisone 30mg daily 01/03-01/09  (3) Prednisone 20mg daily 01/10-01/16  (4) Prednisone 10mg daily 01/17-01/23  (5) Plan on Atovaquone 750mg BID through 01/23.  (6) Outpatient Oncology follow-up with Dr. Mirtha Celaya

## 2022-12-17 NOTE — DISCHARGE NOTE PROVIDER - NSFOLLOWUPCLINICS_GEN_ALL_ED_FT
[facility] [place]  Internal Medicine  [address] [place] [address]  Phone: [phone]  Fax: [phone]

## 2022-12-17 NOTE — DISCHARGE NOTE PROVIDER - NSDCFUADDAPPT_GEN_ALL_CORE_FT
APPTS ARE READY TO BE MADE: [ ] YES    Best Family or Patient Contact (if needed):    Additional Information about above appointments (if needed):    1: Dr. Mirtha Celaya  2:   3:     Other comments or requests:

## 2022-12-17 NOTE — CHART NOTE - NSCHARTNOTEFT_GEN_A_CORE
GOC were discussed with with Ms. Liao and family at bedside yesterday.     There is no particular identified health care proxy though her brother's Max and Dashawn are involved in her Care. An extensive conversation covered Ms. Liao's care and current condition, including the current situation of her hypoxemia and hypercarbia likely caused by her increased tumor burden. She was explained that if she were to become intubated it is unlikely that she would able to be extubated. She verbalized understanding and still wished to remain FULL CODE.

## 2022-12-18 NOTE — PROGRESS NOTE ADULT - PROBLEM SELECTOR PLAN 2
Hx of metastatic uterine cancer with peritoneal carcinomatosis.  - oncology following  - was planned for chemotherapy (doxorubicin) 12/14/2022, as outpatient, last dose in 11/2022 Hx of metastatic uterine cancer with peritoneal carcinomatosis.  - oncology following  - was planned for chemotherapy (doxorubicin) 12/14/2022, as outpatient, last dose in 11/2022  - palliative consulted. will need family discussion with oncology re prognosis and further plans for chemo in s/o progressing lung mets

## 2022-12-18 NOTE — PROGRESS NOTE ADULT - ASSESSMENT
57F hx metastatic uterine cancer (s/p chemotherapy, radiation and hysterectomy) to the lungs and peritoneal carcinomatosis (on doxorubicin w/ Brianna, last session 11/2022), pleural effusions (s/p VATs), and chronic anemia presented with SOB, increased WOB, and pleural effusions. CTA neg for PE but showing progression of lung tumor burden +/- CAP. 57F hx metastatic uterine cancer (s/p chemotherapy, radiation and hysterectomy) to the lungs and peritoneal carcinomatosis (on doxorubicin w/ Brianna, last session 11/2022), pleural effusions (s/p VATs), and chronic anemia presented with SOB, increased WOB, and pleural effusions. CTA neg for PE but showing progression of lung tumor burden +/- CAP. Currently improved and on 6L NC but remains tenuous given significant lung mets.

## 2022-12-18 NOTE — PROGRESS NOTE ADULT - PROBLEM SELECTOR PLAN 3
SIRS criteria (tachycardia, tachypnea). No clear source of infection though has ascites and pleural effusions. Procalcitonin elevated.  - f/u cultures as above  - f/u MRSA swab  - continue Zosyn SIRS criteria (tachycardia, tachypnea). No clear source of infection though has ascites and pleural effusions. Procalcitonin elevated.  - f/u cultures as above  - f/u MRSA swab  - continue Zosyn as above

## 2022-12-18 NOTE — PROGRESS NOTE ADULT - SUBJECTIVE AND OBJECTIVE BOX
PROGRESS NOTE:   Authored by Dr. Nahomi Saunders MD (PGY-3). Available on TEAMS.    Patient is a 57y old  Female who presents with a chief complaint of Hypoxia (17 Dec 2022 16:04)      SUBJECTIVE / OVERNIGHT EVENTS:  No acute events overnight. Patient seen and examined at bedside. Patient denies new symptoms or concerns.     ADDITIONAL REVIEW OF SYSTEMS:  Patient denies fevers, chills, chest pain, shortness of breath, nausea, abdominal pain, diarrhea, constipation, dysuria, leg swelling, headache, light headedness.    MEDICATIONS  (STANDING):  benzocaine 15 mG/menthol 3.6 mG Lozenge 1 Lozenge Oral once  chlorhexidine 2% Cloths 1 Application(s) Topical daily  enoxaparin Injectable 40 milliGRAM(s) SubCutaneous every 24 hours  furosemide   Injectable 40 milliGRAM(s) IV Push every 12 hours  influenza   Vaccine 0.5 milliLiter(s) IntraMuscular once  levalbuterol Inhalation 0.63 milliGRAM(s) Inhalation every 6 hours  methylPREDNISolone sodium succinate Injectable 40 milliGRAM(s) IV Push daily  piperacillin/tazobactam IVPB.. 3.375 Gram(s) IV Intermittent every 8 hours    MEDICATIONS  (PRN):  acetaminophen     Tablet .. 650 milliGRAM(s) Oral every 6 hours PRN Temp greater or equal to 38C (100.4F), Mild Pain (1 - 3)  guaiFENesin Oral Liquid (Sugar-Free) 100 milliGRAM(s) Oral every 6 hours PRN Cough  hydrocodone/homatropine Syrup 5 milliLiter(s) Oral every 8 hours PRN Cough      CAPILLARY BLOOD GLUCOSE      POCT Blood Glucose.: 168 mg/dL (17 Dec 2022 08:48)    I&O's Summary    16 Dec 2022 07:01  -  17 Dec 2022 07:00  --------------------------------------------------------  IN: 0 mL / OUT: 1650 mL / NET: -1650 mL    17 Dec 2022 07:01  -  18 Dec 2022 05:54  --------------------------------------------------------  IN: 0 mL / OUT: 1100 mL / NET: -1100 mL        PHYSICAL EXAM:  Vital Signs Last 24 Hrs  T(C): 36.9 (17 Dec 2022 20:58), Max: 36.9 (17 Dec 2022 20:58)  T(F): 98.4 (17 Dec 2022 20:58), Max: 98.4 (17 Dec 2022 20:58)  HR: 101 (18 Dec 2022 04:14) (87 - 111)  BP: 117/84 (17 Dec 2022 20:58) (117/84 - 126/84)  BP(mean): --  RR: 20 (17 Dec 2022 20:58) (20 - 22)  SpO2: 97% (18 Dec 2022 04:14) (92% - 100%)    Parameters below as of 18 Dec 2022 04:14  Patient On (Oxygen Delivery Method): nasal cannula        CONSTITUTIONAL: NAD, well-developed  RESPIRATORY: Normal respiratory effort; lungs are clear to auscultation bilaterally  CARDIOVASCULAR: Regular rate and rhythm, normal S1 and S2, no murmur/rub/gallop; No lower extremity edema; Peripheral pulses are 2+ bilaterally  ABDOMEN: Nontender to palpation, normoactive bowel sounds, no rebound/guarding; No hepatosplenomegaly  MUSCLOSKELETAL: no clubbing or cyanosis of digits; no joint swelling or tenderness to palpation  PSYCH: A+O to person, place, and time; affect appropriate    LABS:                        9.5    11.04 )-----------( 331      ( 17 Dec 2022 06:00 )             31.9     12-17    144  |  93<L>  |  16  ----------------------------<  194<H>  2.9<LL>   |  43<H>  |  0.84    Ca    11.1<H>      17 Dec 2022 06:00  Phos  2.6     12-17  Mg     1.50     12-17      PT/INR - ( 16 Dec 2022 08:10 )   PT: 14.9 sec;   INR: 1.28 ratio         PTT - ( 16 Dec 2022 08:10 )  PTT:62.4 sec          Culture - Sputum (collected 15 Dec 2022 14:40)  Source: .Sputum Sputum  Gram Stain (15 Dec 2022 22:54):    Numerous polymorphonuclear leukocytes per low power field    Numerous Squamous epithelial cells per low power field    Numerous Yeast like cells per oil power field    Moderate Gram Positive Rods per oil power field    Few Gram Negative Rods per oil power field    Results consistent with oropharyngeal contamination  Final Report (17 Dec 2022 22:14):    Normal Respiratory Danielle present    Culture - Nose (collected 15 Dec 2022 12:07)  Source: .Nose  Final Report (16 Dec 2022 21:20):    No staphylococcus aureus isolated.    "PCR is more Sensitive for identifying MRSA/MSSA."    Culture - Nose (collected 15 Dec 2022 12:05)  Source: .Nose  Final Report (16 Dec 2022 18:42):    No staphylococcus aureus isolated.    "PCR is more Sensitive for identifying MRSA/MSSA."    Culture - Urine (collected 15 Dec 2022 11:45)  Source: Clean Catch Clean Catch (Midstream)  Final Report (16 Dec 2022 23:49):    No growth        Tele Reviewed:    RADIOLOGY & ADDITIONAL TESTS:  Results Reviewed:   Imaging Personally Reviewed:  Electrocardiogram Personally Reviewed:     PROGRESS NOTE:   Authored by Dr. Nahomi Saunders MD (PGY-3). Available on TEAMS.    Patient is a 57y old  Female who presents with a chief complaint of Hypoxia (17 Dec 2022 16:04)      SUBJECTIVE / OVERNIGHT EVENTS:  No acute events overnight. Patient seen and examined at bedside. She asked for cranberry flavored thickened liquid which she reports makes her happy. We discussed improvement in respiratory status, likely due to lasix and BiPAP over the weekend. She agrees and states she is urinating a lot. She reports she overall feels better.      ADDITIONAL REVIEW OF SYSTEMS:  Patient denies fevers, chills, chest pain, nausea, abdominal pain, diarrhea, constipation, dysuria, leg swelling, headache, light headedness.    MEDICATIONS  (STANDING):  benzocaine 15 mG/menthol 3.6 mG Lozenge 1 Lozenge Oral once  chlorhexidine 2% Cloths 1 Application(s) Topical daily  enoxaparin Injectable 40 milliGRAM(s) SubCutaneous every 24 hours  furosemide   Injectable 40 milliGRAM(s) IV Push every 12 hours  influenza   Vaccine 0.5 milliLiter(s) IntraMuscular once  levalbuterol Inhalation 0.63 milliGRAM(s) Inhalation every 6 hours  methylPREDNISolone sodium succinate Injectable 40 milliGRAM(s) IV Push daily  piperacillin/tazobactam IVPB.. 3.375 Gram(s) IV Intermittent every 8 hours    MEDICATIONS  (PRN):  acetaminophen     Tablet .. 650 milliGRAM(s) Oral every 6 hours PRN Temp greater or equal to 38C (100.4F), Mild Pain (1 - 3)  guaiFENesin Oral Liquid (Sugar-Free) 100 milliGRAM(s) Oral every 6 hours PRN Cough  hydrocodone/homatropine Syrup 5 milliLiter(s) Oral every 8 hours PRN Cough      CAPILLARY BLOOD GLUCOSE      POCT Blood Glucose.: 168 mg/dL (17 Dec 2022 08:48)    I&O's Summary    16 Dec 2022 07:01  -  17 Dec 2022 07:00  --------------------------------------------------------  IN: 0 mL / OUT: 1650 mL / NET: -1650 mL    17 Dec 2022 07:01  -  18 Dec 2022 05:54  --------------------------------------------------------  IN: 0 mL / OUT: 1100 mL / NET: -1100 mL        PHYSICAL EXAM:  Vital Signs Last 24 Hrs  T(C): 36.9 (17 Dec 2022 20:58), Max: 36.9 (17 Dec 2022 20:58)  T(F): 98.4 (17 Dec 2022 20:58), Max: 98.4 (17 Dec 2022 20:58)  HR: 101 (18 Dec 2022 04:14) (87 - 111)  BP: 117/84 (17 Dec 2022 20:58) (117/84 - 126/84)  BP(mean): --  RR: 20 (17 Dec 2022 20:58) (20 - 22)  SpO2: 97% (18 Dec 2022 04:14) (92% - 100%)    Parameters below as of 18 Dec 2022 04:14  Patient On (Oxygen Delivery Method): nasal cannula        CONSTITUTIONAL: NAD, well-developed  RESPIRATORY: Normal respiratory effort; decreased lung sounds bilaterally, minimal crackles in b/l lung bases. No rales/ronchi. 6L NC in place   CARDIOVASCULAR: Regular rate and rhythm, normal S1 and S2, no murmur/rub/gallop; No lower extremity edema; Peripheral pulses are 2+ bilaterally  ABDOMEN: Nontender to palpation, normoactive bowel sounds, no rebound/guarding  MUSCLOSKELETAL: no clubbing or cyanosis of digits; no joint swelling or tenderness to palpation  PSYCH: A+O to person, place, and time; affect appropriate    LABS:                        9.5    11.04 )-----------( 331      ( 17 Dec 2022 06:00 )             31.9     12-17    144  |  93<L>  |  16  ----------------------------<  194<H>  2.9<LL>   |  43<H>  |  0.84    Ca    11.1<H>      17 Dec 2022 06:00  Phos  2.6     12-17  Mg     1.50     12-17      PT/INR - ( 16 Dec 2022 08:10 )   PT: 14.9 sec;   INR: 1.28 ratio         PTT - ( 16 Dec 2022 08:10 )  PTT:62.4 sec    Culture - Sputum (collected 15 Dec 2022 14:40)  Source: .Sputum Sputum  Gram Stain (15 Dec 2022 22:54):    Numerous polymorphonuclear leukocytes per low power field    Numerous Squamous epithelial cells per low power field    Numerous Yeast like cells per oil power field    Moderate Gram Positive Rods per oil power field    Few Gram Negative Rods per oil power field    Results consistent with oropharyngeal contamination  Final Report (17 Dec 2022 22:14):    Normal Respiratory Danielle present    Culture - Nose (collected 15 Dec 2022 12:07)  Source: .Nose  Final Report (16 Dec 2022 21:20):    No staphylococcus aureus isolated.    "PCR is more Sensitive for identifying MRSA/MSSA."    Culture - Nose (collected 15 Dec 2022 12:05)  Source: .Nose  Final Report (16 Dec 2022 18:42):    No staphylococcus aureus isolated.    "PCR is more Sensitive for identifying MRSA/MSSA."    Culture - Urine (collected 15 Dec 2022 11:45)  Source: Clean Catch Clean Catch (Midstream)  Final Report (16 Dec 2022 23:49):    No growth    Pulse ox overnight with few desaturation to high 70s low 80s transiently not lasting for more than few seconds.

## 2022-12-18 NOTE — PROGRESS NOTE ADULT - PROBLEM SELECTOR PLAN 5
DVT PPx: Lovenox 40mg qD  Diet: NPO per S+S eval  Code: full code  Dispo: PT/OT Pending Hospital Course  Communication: brother Max Liao ([phone]). mom Nicole Liao ([phone]). Dashawn ([phone]). spoke 8:45AM 12/16  GOC: no defined HCP (Dashawn Santana, Nicole making decisions collectively). remains full code, patient aware of likely possibility that would not be extubated if requiring intubation DVT PPx: Lovenox 40mg qD  Diet: thickened liq  Code: full code  Dispo: PT/OT Pending Hospital Course  Communication: brother Max Liao ([phone]). mom Nicole Liao ([phone]). Dashawn ([phone]). spoke to max 12/18  GOC: no defined HCP (Max, Dashawn, Nicole making decisions collectively). remains full code, patient aware of likely possibility that would not be extubated if requiring intubation

## 2022-12-18 NOTE — PROGRESS NOTE ADULT - PROBLEM SELECTOR PLAN 1
Hx of recurrent pleural effusions (s/p VATS, multiple thoracenteses/pigtail catheters - last 10/2022). Mildly hypervolemic on exam w/ pulmonary edema/pleural effusions on CXR. Unclear cause of acute worsening started 12/16. Ddx pulmonary edema/increased pulmonary HTN, superimposed infectious process, pneumonitis from recent chemotherapy.  - CTA chest without PE but showing worsening bilateral consolidations throughout both lungs and stable bi/l pleural effusions; likely multifocal infection vs. edema vs most likely tumor progression  - repeat TTE, risk of cardiotoxicity with doxorubicin  - Xopenex inhaler q6h  - Thoracic surgery consult to eval pleural effusions, possible need for thoracentesis - after reviewing CT they feel not indicated at this time  - outpatient pulm consulted, to see patient 12/17  - worsen SOB and WOB starting 12/16, MICU consulted and rejected at this time  -- placed on BIPAP (12 over 5) with improvement in respiratory distress and hypercapnia  -- continue aggressive diuresis with Lasix 40mg BID IV (goal net negative 1-2L) for component of fluid overload and possible secondary pulmonary hypertension  -- continue Solu-Medrol for possible underlying pneumonitis vs. lymphangitic spread  -- antibiotics escalated to Zosyn 12/16 given acute illness (can de-escalate if infectious w/u negative), treatment for CAP initially given CTA chest findings; repeat Bcx cooking; MRSA ordered; Scx with respiratory hema, Ucx neg Hx of recurrent pleural effusions (s/p VATS, multiple thoracenteses/pigtail catheters - last 10/2022). Mildly hypervolemic on exam w/ pulmonary edema/pleural effusions on CXR. Unclear cause of acute worsening started 12/16. Ddx pulmonary edema/increased pulmonary HTN, superimposed infectious process, pneumonitis from recent chemotherapy.  - CTA chest without PE but showing worsening bilateral consolidations throughout both lungs and stable bi/l pleural effusions; likely multifocal infection vs. edema vs most likely tumor progression  - repeat TTE, risk of cardiotoxicity with doxorubicin  - Xopenex inhaler q6h  - Thoracic surgery consult to eval pleural effusions, possible need for thoracentesis - after reviewing CT they feel not indicated at this time  - outpatient pulm consulted, to see patient 12/17  - worsen SOB and WOB starting 12/16, MICU consulted and rejected at this time  -- placed on BIPAP (12 over 5) with improvement in respiratory distress and hypercapnia  -- continue aggressive diuresis with Lasix 40mg BID IV (goal net negative 1-2L) for component of fluid overload and possible secondary pulmonary hypertension  -- continue Solu-Medrol for possible underlying pneumonitis vs. lymphangitic spread  -- antibiotics escalated to Zosyn 12/16 given acute illness (plan to end course 12/19 given 5 days of abx total course), treatment for CAP initially given CTA chest findings; repeat Bcx cooking; MRSA ordered; Scx with respiratory hema, Ucx neg

## 2022-12-19 NOTE — PROGRESS NOTE ADULT - PROBLEM SELECTOR PLAN 5
DVT PPx: Lovenox 40mg qD  Diet: thickened liq  Code: full code  Dispo: PT/OT Pending Hospital Course  Communication: brother Max Liao ([phone]). mom Nicole Liao ([phone]). Dashawn ([phone]). spoke to max 12/18  GOC: no defined HCP (Max, Dashawn, Nicole making decisions collectively). remains full code, patient aware of likely possibility that would not be extubated if requiring intubation

## 2022-12-19 NOTE — CONSULT NOTE ADULT - PROBLEM SELECTOR RECOMMENDATION 2
-PMH of met uterine cancer c/b pleural effusions s/p carbo/taxol, Keytruda Avastin and now most recently on Doxil (cycle 3 11/16) presenting with hypoxia ,sating 69% on RA.  - Follows wt Dr. Mirtha Celaya @ [facility]

## 2022-12-19 NOTE — CONSULT NOTE ADULT - SUBJECTIVE AND OBJECTIVE BOX
HPI:  57-year-old female with a PMHx of uterine cancer (s/p chemotherapy, radiation and hysterectomy) w/ lung mets and peritoneal carcinomatosis (on doxorubicin w/ Brianna, last session 11/2022), pleural effusions (s/p VATs), and chronic anemia presenting with shortness of breath. Patient was scheduled for outpatient chemo today, but was found to be hypoxic so she was sent to the ED. Outpatient oncologist note reviewed. Patient noted worsening SOB for 2 days with cough productive of white sputum for past 10 days. Patient hypoxic to 69% on RA, placed on O2 and given albuterol with improvement to 90% O2. Sent to ED for further w/u.     Patient was having cough and felt tired, stating that she did not want to talk to me.    History as per ED note. Patient notes chest pain with coughing. Denies abd pain, n/v, syncope, fevers or chills. In ED, patient CXR demonstrated pulmonary edema. Patient given IV 20 mg Lasix and has been urinating. Patient also given cefepime and vancomycin and albuterol.  (14 Dec 2022 22:29)    PERTINENT PM/SXH:   Asthma    Uterine cancer    Metastatic carcinoma to lung    Malignant pleural effusion    Asthma    Chronic anemia      S/P hysterectomy    H/O ovarian cystectomy    History of thoracentesis      FAMILY HISTORY:  Maternal family history of hypertension (Mother)    Family history of type 2 diabetes mellitus (Father)      ------------------------------------------------------------------------------------------------------------  ITEMS NOT CHECKED ARE NOT PRESENT    SOCIAL HISTORY:   Living Situation: [ ]Home  [ ]Long term care  [ ]Rehab [ ]Other  Support:     Substance hx:  [ ]   Tobacco hx:  [ ]   Alcohol hx: [ ]   Family Hx substance abuse [ ]yes [ ]no    [place]ondoism/Spirituality:  PCSSQ[Palliative Care Spiritual Screening Question]   Severity (0-10): 0  Score of 4 or > indicate consideration of Chaplaincy referral.  Chaplaincy Referral: [ ] yes [X ] refused [ ] following    Caregiver [place]? : [ ] yes [X ] no [ ] Deferred [ ] Declined               Social work referral [ ] Patient & Family Centered Care Referral [ ]    Anticipatory Grief present?:  [ ] yes [X ] no  [ ] Deferred                    Social work referral [ ] Patient & Family Centered Care Referral [ ]    ------------------------------------------------------------------------------------------------------------    PRESENT SYMPTOMS:  [ ] No     [ ] Yes   Source if other than patient:   [ ]Family   [ ]Team     [ ] Unable to self-report      [ ] CPOT (ICU)     [ ] PAINADs     [ ] RDOS    PAIN:   If blank, patient unable to specify   [ ]yes [ ]no  Location -                    Aggravating factors -  Quality -  Radiation -  Timing-  Pain at most severe level (0-10 scale):  Pain at minimal acceptable level (0-10 scale):     Dyspnea:                           [ ]Mild [ ]Moderate [ ]Severe  Anxiety:                             [ ]Mild [ ]Moderate [ ]Severe  Fatigue:                             [ ]Mild [ ]Moderate [ ]Severe  Nausea:                             [ ]Mild [ ]Moderate [ ]Severe  Loss of appetite:              [ ]Mild [ ]Moderate [ ]Severe  Constipation:                    [ ]Mild [ ]Moderate [ ]Severe    Other Symptoms:  [X ]All other review of systems negative     Home Medications for symptoms if any:  I-Stop Reference No:    ------------------------------------------------------------------------------------------------------------    FUNCTIONAL STATUS:     Baseline ADL (prior to admission):  [ ] Independent  [ ] Moderate Assistance [ ] Dependent  Palliative Performance Score:     [ ]PPSV2 < or = to 30%     NUTRITIONAL STATUS:     Protein Calorie Malnutrition Present:   [ ]mild   [ ]moderate   [ ]severe   [ ]poor nutritional intake   [ ]artificial nutrition      Weight:   [ ]underweight (BMI 18.5 or less)   [ ]morbid obesity (BMI 30 or higher)   [ ]anasarca  [ ]significant weight loss     Height (cm): 165.1 (12-14-22 @ 22:37), 165 (11-28-22 @ 12:00), 165.1 (10-01-22 @ 06:47)  Weight (kg): 83.3 (12-14-22 @ 22:37), 78.9886 (11-28-22 @ 12:00), 85.5 (10-01-22 @ 06:47)  BMI (kg/m2): 30.6 (12-14-22 @ 22:37), 29 (11-28-22 @ 12:00), 31.4 (10-01-22 @ 06:47)    ------------------------------------------------------------------------------------------------------------    PRIOR ADVANCE DIRECTIVES:    [ ] DNR/MOLST    [ ] Living Will    [ ] Health Care Proxy(s)    DECISION MAKER(s):  [ ] Patient    [ ] Surrogate(s)  [ ]  HCP   [ ] Guardian             ------------------------------------------------------------------------------------------------------------  PHYSICAL EXAM:  Vital Signs Last 24 Hrs  T(C): 36.5 (19 Dec 2022 06:50), Max: 36.8 (18 Dec 2022 20:40)  T(F): 97.7 (19 Dec 2022 06:50), Max: 98.3 (18 Dec 2022 20:40)  HR: 111 (19 Dec 2022 07:39) (62 - 115)  BP: 132/85 (19 Dec 2022 06:50) (120/80 - 133/90)  BP(mean): --  RR: 19 (19 Dec 2022 06:50) (19 - 20)  SpO2: 100% (19 Dec 2022 07:39) (96% - 100%)    Parameters below as of 19 Dec 2022 06:50  Patient On (Oxygen Delivery Method): nasal cannula  O2 Flow (L/min): 6   I&O's Summary    18 Dec 2022 07:01  -  19 Dec 2022 07:00  --------------------------------------------------------  IN: 1420 mL / OUT: 1200 mL / NET: 220 mL        GENERAL:  [ ]Cachexia  [ ] Frail  [ ]Awake  [ ]Oriented x   [ ]Lethargic  [ ]Unarousable  [ ]Verbal  [ ]Non-Verbal    BEHAVIORAL: [ ] Anxiety  [ ] Delirium [ ] Agitation [ ] Other    HEENT: [ ]Normal   [ ]Dry mouth   [ ]ET Tube/Trach  [ ]Oral lesions    PULMONARY:   [ ]Clear [ ]Tachypnea  [ ]Audible excessive secretions   [ ]Rhonchi        [ ]Right [ ]Left [ ]Bilateral  [ ]Crackles        [ ]Right [ ]Left [ ]Bilateral  [ ]Wheezing     [ ]Right [ ]Left [ ]Bilateral  [ ]Diminished breath sounds [ ]right [ ]left [ ]bilateral    CARDIOVASCULAR:    [ ]Regular [ ]Irregular [ ]Tachy  [ ]Mina [ ]Murmur [ ]Other    GASTROINTESTINAL:  [ ]Soft  [ ]Distended   [ ]+BS  [ ]Non tender [ ]Tender  [ ]Other [ ]PEG [ ]OGT/ NGT      GENITOURINARY:  [ ]Normal [ ] Incontinent   [ ]Oliguria/Anuria   [ ]Arizmendi    MUSCULOSKELETAL:   [ ]Normal   [ ]Weakness  [ ]Bed/Wheelchair bound [ ]Edema    NEUROLOGIC:   [ ]No focal deficits  [ ]Cognitive impairment  [ ]Dysphagia [ ]Dysarthria [ ]Paresis [ ]Other     SKIN:   [ ]Normal  [ ]Rash  [ ]Other  [ ]Pressure ulcer(s)       Present on admission [ ]y [ ]n    ------------------------------------------------------------------------------------------------------------    LABS:                        8.7    8.37  )-----------( 299      ( 19 Dec 2022 06:30 )             31.0   12-19    148<H>  |  94<L>  |  19  ----------------------------<  148<H>  3.5   |  48<HH>  |  0.67    Ca    10.4      19 Dec 2022 06:30  Phos  2.4     12-19  Mg     1.90     12-19            ------------------------------------------------------------------------------------------------------------    RADIOLOGY & ADDITIONAL STUDIES:      MICRO:       ------------------------------------------------------------------------------------------------------------    ALLERGIES:  Allergies    No Known Allergies    Intolerances      MEDICATIONS  (STANDING):  chlorhexidine 2% Cloths 1 Application(s) Topical daily  enoxaparin Injectable 40 milliGRAM(s) SubCutaneous every 24 hours  furosemide   Injectable 40 milliGRAM(s) IV Push daily  influenza   Vaccine 0.5 milliLiter(s) IntraMuscular once  levalbuterol Inhalation 0.63 milliGRAM(s) Inhalation every 6 hours  methylPREDNISolone sodium succinate Injectable 40 milliGRAM(s) IV Push daily  piperacillin/tazobactam IVPB.. 3.375 Gram(s) IV Intermittent every 8 hours  polyethylene glycol 3350 17 Gram(s) Oral daily  potassium chloride   Powder 40 milliEquivalent(s) Oral once  potassium phosphate IVPB 30 milliMole(s) IV Intermittent once    MEDICATIONS  (PRN):  acetaminophen     Tablet .. 650 milliGRAM(s) Oral every 6 hours PRN Temp greater or equal to 38C (100.4F), Mild Pain (1 - 3)  guaiFENesin Oral Liquid (Sugar-Free) 100 milliGRAM(s) Oral every 6 hours PRN Cough  hydrocodone/homatropine Syrup 5 milliLiter(s) Oral every 8 hours PRN Cough      ------------------------------------------------------------------------------------------------------------    CRITICAL CARE:  [ ]Shock Present  [ ]Septic [ ]Cardiogenic [ ]Neurologic [ ]Hypovolemic [ ] Undifferentiated/Mixed   [ ]Vasopressors [ ]Inotropes     [ ]Respiratory failure present : [ ]Acute  [ ]Chronic [ ]Hypoxic  [ ]Hypercarbic   [ ]Mechanical ventilation   [ ]Non-invasive ventilatory support   [ ]High flow      [ ]Other organ failure     Other REFERRALS:  [ ]Hospice  [ ]Child Life  [ ]Social Work  [ ]Case management [ ]Holistic Therapy    HPI:  Patient is a 57-year-old female with uterine cancer (s/p chemotherapy, radiation and hysterectomy) w/ lung mets and peritoneal carcinomatosis (on doxorubicin w/ Brianna, last session 11/2022), pleural effusions (s/p VATs), and chronic anemia presented with shortness of breath. Patient was scheduled for outpatient chemo day of admission, however but was found to be hypoxic so she was sent to the ED.  Patient noted worsening SOB for 2 days with cough productive of white sputum for past 10 days. Patient hypoxic to 69% on RA, placed on O2 and given albuterol with improvement to 90% O2. Sent to ED for further w/u. In ED, patient CXR demonstrated pulmonary edema. Patient given IV 20 mg Lasix and has been urinating. Patient also given cefepime and vancomycin and albuterol.     PERTINENT PM/SXH:   Asthma    Uterine cancer    Metastatic carcinoma to lung    Malignant pleural effusion    Asthma    Chronic anemia      S/P hysterectomy    H/O ovarian cystectomy    History of thoracentesis      FAMILY HISTORY:  Maternal family history of hypertension (Mother)    Family history of type 2 diabetes mellitus (Father)      ------------------------------------------------------------------------------------------------------------  ITEMS NOT CHECKED ARE NOT PRESENT    SOCIAL HISTORY:   Living Situation: [ ]Home  [ ]Long term care  [ ]Rehab [ ]Other  Support:     Substance hx:  [ ]   Tobacco hx:  [ ]   Alcohol hx: [ ]   Family Hx substance abuse [ ]yes [ ]no    [place]anism/Spirituality:  PCSSQ[Palliative Care Spiritual Screening Question]   Severity (0-10): 0  Score of 4 or > indicate consideration of Chaplaincy referral.  Chaplaincy Referral: [ ] yes [X ] refused [ ] following    Caregiver [place]? : [ ] yes [X ] no [ ] Deferred [ ] Declined               Social work referral [ ] Patient & Family Centered Care Referral [ ]    Anticipatory Grief present?:  [ ] yes [X ] no  [ ] Deferred                    Social work referral [ ] Patient & Family Centered Care Referral [ ]    ------------------------------------------------------------------------------------------------------------    PRESENT SYMPTOMS:  [ ] No     [ ] Yes   Source if other than patient:   [ ]Family   [ ]Team     [ ] Unable to self-report      [ ] CPOT (ICU)     [ ] PAINADs     [ ] RDOS    PAIN:   If blank, patient unable to specify   [ ]yes [ ]no  Location -                    Aggravating factors -  Quality -  Radiation -  Timing-  Pain at most severe level (0-10 scale):  Pain at minimal acceptable level (0-10 scale):     Dyspnea:                           [ ]Mild [ ]Moderate [ ]Severe  Anxiety:                             [ ]Mild [ ]Moderate [ ]Severe  Fatigue:                             [ ]Mild [ ]Moderate [ ]Severe  Nausea:                             [ ]Mild [ ]Moderate [ ]Severe  Loss of appetite:              [ ]Mild [ ]Moderate [ ]Severe  Constipation:                    [ ]Mild [ ]Moderate [ ]Severe    Other Symptoms:  [X ]All other review of systems negative     Home Medications for symptoms if any:  I-Stop Reference No:    ------------------------------------------------------------------------------------------------------------    FUNCTIONAL STATUS:     Baseline ADL (prior to admission):  [ ] Independent  [ ] Moderate Assistance [ ] Dependent  Palliative Performance Score:     [ ]PPSV2 < or = to 30%     NUTRITIONAL STATUS:     Protein Calorie Malnutrition Present:   [ ]mild   [ ]moderate   [ ]severe   [ ]poor nutritional intake   [ ]artificial nutrition      Weight:   [ ]underweight (BMI 18.5 or less)   [ ]morbid obesity (BMI 30 or higher)   [ ]carlito  [ ]significant weight loss     Height (cm): 165.1 (12-14-22 @ 22:37), 165 (11-28-22 @ 12:00), 165.1 (10-01-22 @ 06:47)  Weight (kg): 83.3 (12-14-22 @ 22:37), 78.9886 (11-28-22 @ 12:00), 85.5 (10-01-22 @ 06:47)  BMI (kg/m2): 30.6 (12-14-22 @ 22:37), 29 (11-28-22 @ 12:00), 31.4 (10-01-22 @ 06:47)    ------------------------------------------------------------------------------------------------------------    PRIOR ADVANCE DIRECTIVES:    [ ] DNR/MOLST    [ ] Living Will    [ ] Health Care Proxy(s)    DECISION MAKER(s):  [ ] Patient    [ ] Surrogate(s)  [ ]  HCP   [ ] Guardian             ------------------------------------------------------------------------------------------------------------  PHYSICAL EXAM:  Vital Signs Last 24 Hrs  T(C): 36.5 (19 Dec 2022 06:50), Max: 36.8 (18 Dec 2022 20:40)  T(F): 97.7 (19 Dec 2022 06:50), Max: 98.3 (18 Dec 2022 20:40)  HR: 111 (19 Dec 2022 07:39) (62 - 115)  BP: 132/85 (19 Dec 2022 06:50) (120/80 - 133/90)  BP(mean): --  RR: 19 (19 Dec 2022 06:50) (19 - 20)  SpO2: 100% (19 Dec 2022 07:39) (96% - 100%)    Parameters below as of 19 Dec 2022 06:50  Patient On (Oxygen Delivery Method): nasal cannula  O2 Flow (L/min): 6   I&O's Summary    18 Dec 2022 07:01  -  19 Dec 2022 07:00  --------------------------------------------------------  IN: 1420 mL / OUT: 1200 mL / NET: 220 mL        GENERAL:  [ ]Cachexia  [ ] Frail  [ ]Awake  [ ]Oriented x   [ ]Lethargic  [ ]Unarousable  [ ]Verbal  [ ]Non-Verbal    BEHAVIORAL: [ ] Anxiety  [ ] Delirium [ ] Agitation [ ] Other    HEENT: [ ]Normal   [ ]Dry mouth   [ ]ET Tube/Trach  [ ]Oral lesions    PULMONARY:   [ ]Clear [ ]Tachypnea  [ ]Audible excessive secretions   [ ]Rhonchi        [ ]Right [ ]Left [ ]Bilateral  [ ]Crackles        [ ]Right [ ]Left [ ]Bilateral  [ ]Wheezing     [ ]Right [ ]Left [ ]Bilateral  [ ]Diminished breath sounds [ ]right [ ]left [ ]bilateral    CARDIOVASCULAR:    [ ]Regular [ ]Irregular [ ]Tachy  [ ]Mina [ ]Murmur [ ]Other    GASTROINTESTINAL:  [ ]Soft  [ ]Distended   [ ]+BS  [ ]Non tender [ ]Tender  [ ]Other [ ]PEG [ ]OGT/ NGT      GENITOURINARY:  [ ]Normal [ ] Incontinent   [ ]Oliguria/Anuria   [ ]Arizmendi    MUSCULOSKELETAL:   [ ]Normal   [ ]Weakness  [ ]Bed/Wheelchair bound [ ]Edema    NEUROLOGIC:   [ ]No focal deficits  [ ]Cognitive impairment  [ ]Dysphagia [ ]Dysarthria [ ]Paresis [ ]Other     SKIN:   [ ]Normal  [ ]Rash  [ ]Other  [ ]Pressure ulcer(s)       Present on admission [ ]y [ ]n    ------------------------------------------------------------------------------------------------------------    LABS:                        8.7    8.37  )-----------( 299      ( 19 Dec 2022 06:30 )             31.0   12-19    148<H>  |  94<L>  |  19  ----------------------------<  148<H>  3.5   |  48<HH>  |  0.67    Ca    10.4      19 Dec 2022 06:30  Phos  2.4     12-19  Mg     1.90     12-19    ------------------------------------------------------------------------------------------------------------    RADIOLOGY & ADDITIONAL STUDIES:      ------------------------------------------------------------------------------------------------------------    ALLERGIES:  Allergies    No Known Allergies    Intolerances      MEDICATIONS  (STANDING):  chlorhexidine 2% Cloths 1 Application(s) Topical daily  enoxaparin Injectable 40 milliGRAM(s) SubCutaneous every 24 hours  furosemide   Injectable 40 milliGRAM(s) IV Push daily  influenza   Vaccine 0.5 milliLiter(s) IntraMuscular once  levalbuterol Inhalation 0.63 milliGRAM(s) Inhalation every 6 hours  methylPREDNISolone sodium succinate Injectable 40 milliGRAM(s) IV Push daily  piperacillin/tazobactam IVPB.. 3.375 Gram(s) IV Intermittent every 8 hours  polyethylene glycol 3350 17 Gram(s) Oral daily  potassium chloride   Powder 40 milliEquivalent(s) Oral once  potassium phosphate IVPB 30 milliMole(s) IV Intermittent once    MEDICATIONS  (PRN):  acetaminophen     Tablet .. 650 milliGRAM(s) Oral every 6 hours PRN Temp greater or equal to 38C (100.4F), Mild Pain (1 - 3)  guaiFENesin Oral Liquid (Sugar-Free) 100 milliGRAM(s) Oral every 6 hours PRN Cough  hydrocodone/homatropine Syrup 5 milliLiter(s) Oral every 8 hours PRN Cough      ------------------------------------------------------------------------------------------------------------    CRITICAL CARE:  [ ]Shock Present  [ ]Septic [ ]Cardiogenic [ ]Neurologic [ ]Hypovolemic [ ] Undifferentiated/Mixed   [ ]Vasopressors [ ]Inotropes     [ ]Respiratory failure present : [ ]Acute  [ ]Chronic [ ]Hypoxic  [ ]Hypercarbic   [ ]Mechanical ventilation   [ ]Non-invasive ventilatory support   [ ]High flow      [ ]Other organ failure     Other REFERRALS:  [ ]Hospice  [ ]Child Life  [ ]Social Work  [ ]Case management [ ]Holistic Therapy    HPI:  Patient is a 57-year-old female with uterine cancer (s/p chemotherapy, radiation and hysterectomy) w/ lung mets and peritoneal carcinomatosis (on doxorubicin w/ Brianna, last session 11/2022), pleural effusions (s/p VATs), and chronic anemia presented with shortness of breath. Patient was scheduled for outpatient chemo day of admission, however but was found to be hypoxic so she was sent to the ED.  Patient noted worsening SOB for 2 days with cough productive of white sputum for past 10 days. Patient hypoxic to 69% on RA, placed on O2 and given albuterol with improvement to 90% O2. Sent to ED for further w/u. In ED, patient CXR demonstrated with increased bilateral opacities, CTA with no PE. Patient also given cefepime and vancomycin and albuterol. Hospital course complicated by worsening acute hypoxemic respiratory failure, now on bipap. Palliative consulted for GOC.     PERTINENT PM/SXH:   Asthma    Uterine cancer    Metastatic carcinoma to lung    Malignant pleural effusion    Asthma    Chronic anemia      S/P hysterectomy    H/O ovarian cystectomy    History of thoracentesis      FAMILY HISTORY:  Maternal family history of hypertension (Mother)    Family history of type 2 diabetes mellitus (Father)      ------------------------------------------------------------------------------------------------------------  ITEMS NOT CHECKED ARE NOT PRESENT    SOCIAL HISTORY:   Living Situation: [ ]Home  [ ]Long term care  [ ]Rehab [ ]Other  Support:     Substance hx:  [ ]   Tobacco hx:  [ ]   Alcohol hx: [ ]   Family Hx substance abuse [ ]yes [ ]no    [place]ism/Spirituality:  PCSSQ[Palliative Care Spiritual Screening Question]   Severity (0-10): 0  Score of 4 or > indicate consideration of Chaplaincy referral.  Chaplaincy Referral: [ ] yes [X ] refused [ ] following    Caregiver [place]? : [ ] yes [X ] no [ ] Deferred [ ] Declined               Social work referral [ ] Patient & Family Centered Care Referral [ ]    Anticipatory Grief present?:  [ ] yes [X ] no  [ ] Deferred                    Social work referral [ ] Patient & Family Centered Care Referral [ ]    ------------------------------------------------------------------------------------------------------------    PRESENT SYMPTOMS:  [ ] No     [ ] Yes   Source if other than patient:   [ ]Family   [ ]Team     [ ] Unable to self-report      [ ] CPOT (ICU)     [ ] PAINADs     [ ] RDOS    PAIN:   If blank, patient unable to specify   [ ]yes [ ]no  Location -                    Aggravating factors -  Quality -  Radiation -  Timing-  Pain at most severe level (0-10 scale):  Pain at minimal acceptable level (0-10 scale):     Dyspnea:                           [ ]Mild [ ]Moderate [ ]Severe  Anxiety:                             [ ]Mild [ ]Moderate [ ]Severe  Fatigue:                             [ ]Mild [ ]Moderate [ ]Severe  Nausea:                             [ ]Mild [ ]Moderate [ ]Severe  Loss of appetite:              [ ]Mild [ ]Moderate [ ]Severe  Constipation:                    [ ]Mild [ ]Moderate [ ]Severe    Other Symptoms:  [X ]All other review of systems negative     Home Medications for symptoms if any:  I-Stop Reference No:    ------------------------------------------------------------------------------------------------------------    FUNCTIONAL STATUS:     Baseline ADL (prior to admission):  [ ] Independent  [ ] Moderate Assistance [ ] Dependent  Palliative Performance Score:     [ ]PPSV2 < or = to 30%     NUTRITIONAL STATUS:     Protein Calorie Malnutrition Present:   [ ]mild   [ ]moderate   [ ]severe   [ ]poor nutritional intake   [ ]artificial nutrition      Weight:   [ ]underweight (BMI 18.5 or less)   [ ]morbid obesity (BMI 30 or higher)   [ ]anasarca  [ ]significant weight loss     Height (cm): 165.1 (12-14-22 @ 22:37), 165 (11-28-22 @ 12:00), 165.1 (10-01-22 @ 06:47)  Weight (kg): 83.3 (12-14-22 @ 22:37), 78.9886 (11-28-22 @ 12:00), 85.5 (10-01-22 @ 06:47)  BMI (kg/m2): 30.6 (12-14-22 @ 22:37), 29 (11-28-22 @ 12:00), 31.4 (10-01-22 @ 06:47)    ------------------------------------------------------------------------------------------------------------    PRIOR ADVANCE DIRECTIVES:    [ ] DNR/MOLST    [ ] Living Will    [ ] Health Care Proxy(s)    DECISION MAKER(s):  [ ] Patient    [ ] Surrogate(s)  [ ]  HCP   [ ] Guardian             ------------------------------------------------------------------------------------------------------------  PHYSICAL EXAM:  Vital Signs Last 24 Hrs  T(C): 36.5 (19 Dec 2022 06:50), Max: 36.8 (18 Dec 2022 20:40)  T(F): 97.7 (19 Dec 2022 06:50), Max: 98.3 (18 Dec 2022 20:40)  HR: 111 (19 Dec 2022 07:39) (62 - 115)  BP: 132/85 (19 Dec 2022 06:50) (120/80 - 133/90)  BP(mean): --  RR: 19 (19 Dec 2022 06:50) (19 - 20)  SpO2: 100% (19 Dec 2022 07:39) (96% - 100%)    Parameters below as of 19 Dec 2022 06:50  Patient On (Oxygen Delivery Method): nasal cannula  O2 Flow (L/min): 6   I&O's Summary    18 Dec 2022 07:01  -  19 Dec 2022 07:00  --------------------------------------------------------  IN: 1420 mL / OUT: 1200 mL / NET: 220 mL      GENERAL:  [ ]Cachexia  [ ] Frail  [ ]Awake  [ ]Oriented x   [ ]Lethargic  [ ]Unarousable  [ ]Verbal  [ ]Non-Verbal    BEHAVIORAL: [ ] Anxiety  [ ] Delirium [ ] Agitation [ ] Other    HEENT: [ ]Normal   [ ]Dry mouth   [ ]ET Tube/Trach  [ ]Oral lesions    PULMONARY:   [ ]Clear [ ]Tachypnea  [ ]Audible excessive secretions   [ ]Rhonchi        [ ]Right [ ]Left [ ]Bilateral  [ ]Crackles        [ ]Right [ ]Left [ ]Bilateral  [ ]Wheezing     [ ]Right [ ]Left [ ]Bilateral  [ ]Diminished breath sounds [ ]right [ ]left [ ]bilateral    CARDIOVASCULAR:    [ ]Regular [ ]Irregular [ ]Tachy  [ ]Mina [ ]Murmur [ ]Other    GASTROINTESTINAL:  [ ]Soft  [ ]Distended   [ ]+BS  [ ]Non tender [ ]Tender  [ ]Other [ ]PEG [ ]OGT/ NGT      GENITOURINARY:  [ ]Normal [ ] Incontinent   [ ]Oliguria/Anuria   [ ]Arizmendi    MUSCULOSKELETAL:   [ ]Normal   [ ]Weakness  [ ]Bed/Wheelchair bound [ ]Edema    NEUROLOGIC:   [ ]No focal deficits  [ ]Cognitive impairment  [ ]Dysphagia [ ]Dysarthria [ ]Paresis [ ]Other     SKIN:   [ ]Normal  [ ]Rash  [ ]Other  [ ]Pressure ulcer(s)       Present on admission [ ]y [ ]n    ------------------------------------------------------------------------------------------------------------    LABS:                        8.7    8.37  )-----------( 299      ( 19 Dec 2022 06:30 )             31.0   12-19    148<H>  |  94<L>  |  19  ----------------------------<  148<H>  3.5   |  48<HH>  |  0.67    Ca    10.4      19 Dec 2022 06:30  Phos  2.4     12-19  Mg     1.90     12-19    ------------------------------------------------------------------------------------------------------------    RADIOLOGY & ADDITIONAL STUDIES:    < from: Xray Chest 1 View- PORTABLE-Urgent (Xray Chest 1 View- PORTABLE-Urgent .) (12.14.22 @ 19:52) >  FINDINGS:    A right-sided port is unchanged in position.  Patchy bibasilar opacities, increased interstitial markings, increased   from prior. Small bilateral pleural effusions are unchanged. No   pneumothorax.  Cardiomediastinal silhouette is poorly evaluated on this projection.  No acute bony pathology.    < end of copied text >  < from: CT Angio Chest PE Protocol w/ IV Cont (12.16.22 @ 12:31) >  IMPRESSION:    No pulmonary embolus.    Worsening bilateral consolidations throughout both lungs when compared   with prior exam. This may be due to multifocal infection, edema, or drug   reaction.    Stable bilateral pleural effusions.    < end of copied text >    ------------------------------------------------------------------------------------------------------------    ALLERGIES:  Allergies    No Known Allergies    Intolerances      MEDICATIONS  (STANDING):  chlorhexidine 2% Cloths 1 Application(s) Topical daily  enoxaparin Injectable 40 milliGRAM(s) SubCutaneous every 24 hours  furosemide   Injectable 40 milliGRAM(s) IV Push daily  influenza   Vaccine 0.5 milliLiter(s) IntraMuscular once  levalbuterol Inhalation 0.63 milliGRAM(s) Inhalation every 6 hours  methylPREDNISolone sodium succinate Injectable 40 milliGRAM(s) IV Push daily  piperacillin/tazobactam IVPB.. 3.375 Gram(s) IV Intermittent every 8 hours  polyethylene glycol 3350 17 Gram(s) Oral daily  potassium chloride   Powder 40 milliEquivalent(s) Oral once  potassium phosphate IVPB 30 milliMole(s) IV Intermittent once    MEDICATIONS  (PRN):  acetaminophen     Tablet .. 650 milliGRAM(s) Oral every 6 hours PRN Temp greater or equal to 38C (100.4F), Mild Pain (1 - 3)  guaiFENesin Oral Liquid (Sugar-Free) 100 milliGRAM(s) Oral every 6 hours PRN Cough  hydrocodone/homatropine Syrup 5 milliLiter(s) Oral every 8 hours PRN Cough      ------------------------------------------------------------------------------------------------------------    CRITICAL CARE:  [ ]Shock Present  [ ]Septic [ ]Cardiogenic [ ]Neurologic [ ]Hypovolemic [ ] Undifferentiated/Mixed   [ ]Vasopressors [ ]Inotropes     [ ]Respiratory failure present : [ ]Acute  [ ]Chronic [ ]Hypoxic  [ ]Hypercarbic   [ ]Mechanical ventilation   [ ]Non-invasive ventilatory support   [ ]High flow      [ ]Other organ failure     Other REFERRALS:  [ ]Hospice  [ ]Child Life  [ ]Social Work  [ ]Case management [ ]Holistic Therapy    HPI:  Patient is a 57-year-old female with uterine cancer (s/p chemotherapy, radiation and hysterectomy) w/ lung mets and peritoneal carcinomatosis (on doxorubicin w/ Brianna, last session 11/2022), pleural effusions (s/p VATs), and chronic anemia presented with shortness of breath. Patient was scheduled for outpatient chemo day of admission, however but was found to be hypoxic so she was sent to the ED.  Patient noted worsening SOB for 2 days with cough productive of white sputum for past 10 days. Patient hypoxic to 69% on RA, placed on O2 and given albuterol with improvement to 90% O2. Sent to ED for further w/u. In ED, patient CXR demonstrated with increased bilateral opacities, CTA with no PE. Patient also given cefepime and vancomycin and albuterol. Hospital course complicated by worsening acute hypoxemic respiratory failure, now on bipap. Palliative consulted for GOC.     PERTINENT PM/SXH:   Asthma    Uterine cancer    Metastatic carcinoma to lung    Malignant pleural effusion    Asthma    Chronic anemia      S/P hysterectomy    H/O ovarian cystectomy    History of thoracentesis      FAMILY HISTORY:  Maternal family history of hypertension (Mother)    Family history of type 2 diabetes mellitus (Father)      ------------------------------------------------------------------------------------------------------------  ITEMS NOT CHECKED ARE NOT PRESENT    SOCIAL HISTORY:   Living Situation: [ ]Home  [ ]Long term care  [ ]Rehab [ ]Other  Support:     Substance hx:  [ ]   Tobacco hx:  [ ]   Alcohol hx: [ ]   Family Hx substance abuse [ ]yes [ ]no    Restoration/Spirituality:  PCSSQ[Palliative Care Spiritual Screening Question]   Severity (0-10): 0  Score of 4 or > indicate consideration of Chaplaincy referral.  Chaplaincy Referral: [ ] yes [X ] refused [ ] following    Caregiver [place]? : [ ] yes [X ] no [ ] Deferred [ ] Declined               Social work referral [ ] Patient & Family Centered Care Referral [ ]    Anticipatory Grief present?:  [ ] yes [X ] no  [ ] Deferred                    Social work referral [ ] Patient & Family Centered Care Referral [ ]    ------------------------------------------------------------------------------------------------------------    PRESENT SYMPTOMS: Limited due to pt wearing continuous BIPAP   [ ] No     [ X] Yes   Source if other than patient:   [ ]Family   [ ]Team     [ ] Unable to self-report      [ ] CPOT (ICU)     [ ] PAINADs     [X] RDOS    PAIN:   If blank, patient unable to specify   [ ]yes [X ]no  Location -                    Aggravating factors -  Quality -  Radiation -  Timing-  Pain at most severe level (0-10 scale):  Pain at minimal acceptable level (0-10 scale):     Dyspnea:                           [ ]Mild [ ]Moderate [x ]Severe  Anxiety:                             [ ]Mild [ ]Moderate [ ]Severe  Fatigue:                             [x ]Mild [ ]Moderate [ ]Severe  Nausea:                             [ ]Mild [ ]Moderate [ ]Severe  Loss of appetite:              [ ]Mild [ ]Moderate [ ]Severe  Constipation:                    [ ]Mild [ ]Moderate [ ]Severe    Other Symptoms:  [X ]All other review of systems negative     Home Medications for symptoms if any:  I-Stop Reference No:    ------------------------------------------------------------------------------------------------------------    FUNCTIONAL STATUS:     Baseline ADL (prior to admission):  [ ] Independent  [ x] Moderate Assistance [ ] Dependent  Palliative Performance Score:     [ ]PPSV2 < or = to 30%     NUTRITIONAL STATUS:     Protein Calorie Malnutrition Present:   [ ]mild   [ ]moderate   [ ]severe   [ ]poor nutritional intake   [ ]artificial nutrition      Weight:   [ ]underweight (BMI 18.5 or less)   [ ]morbid obesity (BMI 30 or higher)   [ ]anasarca  [ ]significant weight loss     Height (cm): 165.1 (12-14-22 @ 22:37), 165 (11-28-22 @ 12:00), 165.1 (10-01-22 @ 06:47)  Weight (kg): 83.3 (12-14-22 @ 22:37), 78.9886 (11-28-22 @ 12:00), 85.5 (10-01-22 @ 06:47)  BMI (kg/m2): 30.6 (12-14-22 @ 22:37), 29 (11-28-22 @ 12:00), 31.4 (10-01-22 @ 06:47)    ------------------------------------------------------------------------------------------------------------    PRIOR ADVANCE DIRECTIVES:    [ ] DNR/MOLST    [ ] Living Will    [X] Health Care Proxy(s)    DECISION MAKER(s):  [ X] Patient    [ ] Surrogate(s)  [X ]  HCP: Max Liao   [ ] Guardian             ------------------------------------------------------------------------------------------------------------  PHYSICAL EXAM:  Vital Signs Last 24 Hrs  T(C): 36.5 (19 Dec 2022 06:50), Max: 36.8 (18 Dec 2022 20:40)  T(F): 97.7 (19 Dec 2022 06:50), Max: 98.3 (18 Dec 2022 20:40)  HR: 111 (19 Dec 2022 07:39) (62 - 115)  BP: 132/85 (19 Dec 2022 06:50) (120/80 - 133/90)  BP(mean): --  RR: 19 (19 Dec 2022 06:50) (19 - 20)  SpO2: 100% (19 Dec 2022 07:39) (96% - 100%)    Parameters below as of 19 Dec 2022 06:50  Patient On (Oxygen Delivery Method): nasal cannula  O2 Flow (L/min): 6   I&O's Summary    18 Dec 2022 07:01  -  19 Dec 2022 07:00  --------------------------------------------------------  IN: 1420 mL / OUT: 1200 mL / NET: 220 mL      GENERAL:  [ ]Cachexia  [ ] Frail  [X ]Awake  [X ]Oriented x 4  [ ]Lethargic  [ ]Unarousable  [X ]Verbal  [ ]Non-Verbal    BEHAVIORAL: [ ] Anxiety  [ ] Delirium [ ] Agitation [ ] Other    HEENT: [ ]Normal   [X ]Dry mouth   [ ]ET Tube/Trach  [ ]Oral lesions    PULMONARY: on BIPAP  [ ]Clear [X ]Tachypnea  [ ]Audible excessive secretions   [X ]Rhonchi        [ ]Right [ ]Left [ ]Bilateral  [ ]Crackles        [ ]Right [ ]Left [ ]Bilateral  [ ]Wheezing     [ ]Right [ ]Left [ ]Bilateral  [ ]Diminished breath sounds [ ]right [ ]left [ ]bilateral    CARDIOVASCULAR:    [ ]Regular [ ]Irregular [X ]Tachy  [ ]Mina [ ]Murmur [ ]Other    GASTROINTESTINAL:  [X ]Soft  [ ]Distended   [X ]+BS  [X ]Non tender [ ]Tender  [ ]Other [ ]PEG [ ]OGT/ NGT: Last BM 12/18    GENITOURINARY:  [X ]Normal [ ] Incontinent   [ ]Oliguria/Anuria   [ ]Arizmendi    MUSCULOSKELETAL:   [ ]Normal   [ X]Weakness  [ ]Bed/Wheelchair bound [ ]Edema    NEUROLOGIC:   [ X]No focal deficits  [ ]Cognitive impairment  [ ]Dysphagia [ ]Dysarthria [ ]Paresis [ ]Other     SKIN:   [X ]Normal  [ ]Rash  [ ]Other  [ ]Pressure ulcer(s)       Present on admission [ ]y [ ]n    ------------------------------------------------------------------------------------------------------------    LABS:                        8.7    8.37  )-----------( 299      ( 19 Dec 2022 06:30 )             31.0   12-19    148<H>  |  94<L>  |  19  ----------------------------<  148<H>  3.5   |  48<HH>  |  0.67    Ca    10.4      19 Dec 2022 06:30  Phos  2.4     12-19  Mg     1.90     12-19    ------------------------------------------------------------------------------------------------------------    RADIOLOGY & ADDITIONAL STUDIES:    < from: Xray Chest 1 View- PORTABLE-Urgent (Xray Chest 1 View- PORTABLE-Urgent .) (12.14.22 @ 19:52) >  FINDINGS:    A right-sided port is unchanged in position.  Patchy bibasilar opacities, increased interstitial markings, increased   from prior. Small bilateral pleural effusions are unchanged. No   pneumothorax.  Cardiomediastinal silhouette is poorly evaluated on this projection.  No acute bony pathology.    < end of copied text >  < from: CT Angio Chest PE Protocol w/ IV Cont (12.16.22 @ 12:31) >  IMPRESSION:    No pulmonary embolus.    Worsening bilateral consolidations throughout both lungs when compared   with prior exam. This may be due to multifocal infection, edema, or drug   reaction.    Stable bilateral pleural effusions.    < end of copied text >    ------------------------------------------------------------------------------------------------------------    ALLERGIES:  Allergies    No Known Allergies    Intolerances      MEDICATIONS  (STANDING):  chlorhexidine 2% Cloths 1 Application(s) Topical daily  enoxaparin Injectable 40 milliGRAM(s) SubCutaneous every 24 hours  furosemide   Injectable 40 milliGRAM(s) IV Push daily  influenza   Vaccine 0.5 milliLiter(s) IntraMuscular once  levalbuterol Inhalation 0.63 milliGRAM(s) Inhalation every 6 hours  methylPREDNISolone sodium succinate Injectable 40 milliGRAM(s) IV Push daily  piperacillin/tazobactam IVPB.. 3.375 Gram(s) IV Intermittent every 8 hours  polyethylene glycol 3350 17 Gram(s) Oral daily  potassium chloride   Powder 40 milliEquivalent(s) Oral once  potassium phosphate IVPB 30 milliMole(s) IV Intermittent once    MEDICATIONS  (PRN):  acetaminophen     Tablet .. 650 milliGRAM(s) Oral every 6 hours PRN Temp greater or equal to 38C (100.4F), Mild Pain (1 - 3)  guaiFENesin Oral Liquid (Sugar-Free) 100 milliGRAM(s) Oral every 6 hours PRN Cough  hydrocodone/homatropine Syrup 5 milliLiter(s) Oral every 8 hours PRN Cough      ------------------------------------------------------------------------------------------------------------    CRITICAL CARE:  [ ]Shock Present  [ ]Septic [ ]Cardiogenic [ ]Neurologic [ ]Hypovolemic [ ] Undifferentiated/Mixed   [ ]Vasopressors [ ]Inotropes     [ ]Respiratory failure present : [ ]Acute  [ ]Chronic [ ]Hypoxic  [ ]Hypercarbic   [ ]Mechanical ventilation   [ ]Non-invasive ventilatory support   [ ]High flow      [ ]Other organ failure     Other REFERRALS:  [ ]Hospice  [ ]Child Life  [ ]Social Work  [ ]Case management [ ]Holistic Therapy    HPI:  Patient is a 57-year-old female with uterine cancer (s/p chemotherapy, radiation and hysterectomy) w/ lung mets and peritoneal carcinomatosis (on doxorubicin w/ Brianna, last session 11/2022), pleural effusions (s/p VATs), and chronic anemia presented with shortness of breath. Patient was scheduled for outpatient chemo day of admission, however but was found to be hypoxic so she was sent to the ED.  Patient noted worsening SOB for 2 days with cough productive of white sputum for past 10 days. Patient hypoxic to 69% on RA, placed on O2 and given albuterol with improvement to 90% O2. Sent to ED for further w/u. In ED, patient CXR demonstrated with increased bilateral opacities, CTA with no PE. Patient also given cefepime and vancomycin and albuterol. Hospital course complicated by worsening acute hypoxemic and hypercarbic respiratory failure, now on bipap. Palliative consulted for GO.     Patient was seen this AM on bipap, RR on bipap ~50, but otherwise seemed comfortable. Difficult to understand patient. Explained the necessity of bipap to help her breathe. Later saw patient's family- brother Max and SANDRA Russell (RN). They phoned in another brother Dashawn, director of RT at [employer]. They are awaiting further plans given patient's acute decline in respiratory status. Later saw patient again, comfortable on nasal cannula. Patient deferred to her family to speak about her medical issues and decisions.     PERTINENT PM/SXH:   Asthma    Uterine cancer    Metastatic carcinoma to lung    Malignant pleural effusion    Asthma    Chronic anemia      S/P hysterectomy    H/O ovarian cystectomy    History of thoracentesis      FAMILY HISTORY:  Maternal family history of hypertension (Mother)    Family history of type 2 diabetes mellitus (Father)    ------------------------------------------------------------------------------------------------------------  ITEMS NOT CHECKED ARE NOT PRESENT    SOCIAL HISTORY:   Living Situation: [X ]Home  [ ]Long term care  [ ]Rehab [ ]Other  Support: Used to live alone until the summer, now lives with her mother. Her brothers and SANDRA are very involved. No partner or children. Very close to her nieces/nephews      Substance hx:  [ ]   Tobacco hx:  [ ]   Alcohol hx: [ ]   Family Hx substance abuse [ ]yes [ ]no    Pentecostalism/Spirituality:  PCSSQ[Palliative Care Spiritual Screening Question]   Severity (0-10): 0  Score of 4 or > indicate consideration of Chaplaincy referral.  Chaplaincy Referral: [ ] yes [X ] refused [ ] following    Caregiver [place]? : [ ] yes [X ] no [ ] Deferred [ ] Declined               Social work referral [ ] Patient & Family Centered Care Referral [ ]    Anticipatory Grief present?:  [ ] yes [X ] no  [ ] Deferred                    Social work referral [ ] Patient & Family Centered Care Referral [ ]    ------------------------------------------------------------------------------------------------------------    PRESENT SYMPTOMS: Limited due to pt wearing continuous BIPAP   [ ] No     [ X] Yes   Source if other than patient:   [ ]Family   [ ]Team     [ ] Unable to self-report      [ ] CPOT (ICU)     [ ] PAINADs     [X] RDOS    PAIN:   If blank, patient unable to specify   [ ]yes [X ]no  Location -                    Aggravating factors -  Quality -  Radiation -  Timing-  Pain at most severe level (0-10 scale):  Pain at minimal acceptable level (0-10 scale):     Dyspnea:                           [ ]Mild [ ]Moderate [x ]Severe  Anxiety:                             [ ]Mild [ ]Moderate [ ]Severe  Fatigue:                             [x ]Mild [ ]Moderate [ ]Severe  Nausea:                             [ ]Mild [ ]Moderate [ ]Severe  Loss of appetite:              [ ]Mild [ ]Moderate [ ]Severe  Constipation:                    [ ]Mild [ ]Moderate [ ]Severe    Other Symptoms:  [X ]All other review of systems negative     Home Medications for symptoms if any:  I-Stop Reference No:    ------------------------------------------------------------------------------------------------------------    FUNCTIONAL STATUS:     Baseline ADL (prior to admission):  [ ] Independent  [ x] Moderate Assistance [ ] Dependent  Palliative Performance Score: 50%     [ ]PPSV2 < or = to 30%     NUTRITIONAL STATUS:     Protein Calorie Malnutrition Present:   [ ]mild   [ ]moderate   [ ]severe   [ ]poor nutritional intake   [ ]artificial nutrition      Weight:   [ ]underweight (BMI 18.5 or less)   [ ]morbid obesity (BMI 30 or higher)   [ ]anasarca  [ ]significant weight loss     Height (cm): 165.1 (12-14-22 @ 22:37), 165 (11-28-22 @ 12:00), 165.1 (10-01-22 @ 06:47)  Weight (kg): 83.3 (12-14-22 @ 22:37), 78.9886 (11-28-22 @ 12:00), 85.5 (10-01-22 @ 06:47)  BMI (kg/m2): 30.6 (12-14-22 @ 22:37), 29 (11-28-22 @ 12:00), 31.4 (10-01-22 @ 06:47)    ------------------------------------------------------------------------------------------------------------    PRIOR ADVANCE DIRECTIVES:    [ ] DNR/MOLST    [ ] Living Will    [X] Health Care Proxy(s)    DECISION MAKER(s):  [X] Patient - patient defers to her family   [ ]  Surrogate(s)  [X] HCP: Max Liao makes decisions with other brother Dashawn   [ ] Guardian             ------------------------------------------------------------------------------------------------------------  PHYSICAL EXAM:  Vital Signs Last 24 Hrs  T(C): 36.5 (19 Dec 2022 06:50), Max: 36.8 (18 Dec 2022 20:40)  T(F): 97.7 (19 Dec 2022 06:50), Max: 98.3 (18 Dec 2022 20:40)  HR: 111 (19 Dec 2022 07:39) (62 - 115)  BP: 132/85 (19 Dec 2022 06:50) (120/80 - 133/90)  BP(mean): --  RR: 19 (19 Dec 2022 06:50) (19 - 20)  SpO2: 100% (19 Dec 2022 07:39) (96% - 100%)    Parameters below as of 19 Dec 2022 06:50  Patient On (Oxygen Delivery Method): nasal cannula  O2 Flow (L/min): 6   I&O's Summary    18 Dec 2022 07:01  -  19 Dec 2022 07:00  --------------------------------------------------------  IN: 1420 mL / OUT: 1200 mL / NET: 220 mL      GENERAL:  [ ]Cachexia  [ ] Frail  [X ]Awake  [X ]Oriented x 4  [ ]Lethargic  [ ]Unarousable  [X ]Verbal  [ ]Non-Verbal    BEHAVIORAL: [ ] Anxiety  [ ] Delirium [ ] Agitation [ ] Other    HEENT: [ ]Normal   [X ]Dry mouth   [ ]ET Tube/Trach  [ ]Oral lesions    PULMONARY: on BIPAP  [ ]Clear [X ]Tachypnea  [ ]Audible excessive secretions   [X ]Rhonchi        [ ]Right [ ]Left [ ]Bilateral  [ ]Crackles        [ ]Right [ ]Left [ ]Bilateral  [ ]Wheezing     [ ]Right [ ]Left [ ]Bilateral  [ ]Diminished breath sounds [ ]right [ ]left [ ]bilateral    CARDIOVASCULAR:    [ ]Regular [ ]Irregular [X ]Tachy  [ ]Mina [ ]Murmur [ ]Other    GASTROINTESTINAL:  [X ]Soft  [ ]Distended   [X ]+BS  [X ]Non tender [ ]Tender  [ ]Other [ ]PEG [ ]OGT/ NGT: Last BM 12/18    GENITOURINARY:  [X ]Normal [ ] Incontinent   [ ]Oliguria/Anuria   [ ]Arizmendi    MUSCULOSKELETAL:   [ ]Normal   [ X]Weakness  [ ]Bed/Wheelchair bound [ ]Edema    NEUROLOGIC:   [ X]No focal deficits  [ ]Cognitive impairment  [ ]Dysphagia [ ]Dysarthria [ ]Paresis [ ]Other     SKIN:   [X ]Normal  [ ]Rash  [ ]Other  [ ]Pressure ulcer(s)       Present on admission [ ]y [ ]n    ------------------------------------------------------------------------------------------------------------    LABS:                        8.7    8.37  )-----------( 299      ( 19 Dec 2022 06:30 )             31.0   12-19    148<H>  |  94<L>  |  19  ----------------------------<  148<H>  3.5   |  48<HH>  |  0.67    Ca    10.4      19 Dec 2022 06:30  Phos  2.4     12-19  Mg     1.90     12-19    ------------------------------------------------------------------------------------------------------------    RADIOLOGY & ADDITIONAL STUDIES:    < from: Xray Chest 1 View- PORTABLE-Urgent (Xray Chest 1 View- PORTABLE-Urgent .) (12.14.22 @ 19:52) >  FINDINGS:    A right-sided port is unchanged in position.  Patchy bibasilar opacities, increased interstitial markings, increased   from prior. Small bilateral pleural effusions are unchanged. No   pneumothorax.  Cardiomediastinal silhouette is poorly evaluated on this projection.  No acute bony pathology.    < end of copied text >  < from: CT Angio Chest PE Protocol w/ IV Cont (12.16.22 @ 12:31) >  IMPRESSION:    No pulmonary embolus.    Worsening bilateral consolidations throughout both lungs when compared   with prior exam. This may be due to multifocal infection, edema, or drug   reaction.    Stable bilateral pleural effusions.    < end of copied text >    ------------------------------------------------------------------------------------------------------------    ALLERGIES:  Allergies    No Known Allergies    Intolerances      MEDICATIONS  (STANDING):  chlorhexidine 2% Cloths 1 Application(s) Topical daily  enoxaparin Injectable 40 milliGRAM(s) SubCutaneous every 24 hours  furosemide   Injectable 40 milliGRAM(s) IV Push daily  influenza   Vaccine 0.5 milliLiter(s) IntraMuscular once  levalbuterol Inhalation 0.63 milliGRAM(s) Inhalation every 6 hours  methylPREDNISolone sodium succinate Injectable 40 milliGRAM(s) IV Push daily  piperacillin/tazobactam IVPB.. 3.375 Gram(s) IV Intermittent every 8 hours  polyethylene glycol 3350 17 Gram(s) Oral daily  potassium chloride   Powder 40 milliEquivalent(s) Oral once  potassium phosphate IVPB 30 milliMole(s) IV Intermittent once    MEDICATIONS  (PRN):  acetaminophen     Tablet .. 650 milliGRAM(s) Oral every 6 hours PRN Temp greater or equal to 38C (100.4F), Mild Pain (1 - 3)  guaiFENesin Oral Liquid (Sugar-Free) 100 milliGRAM(s) Oral every 6 hours PRN Cough  hydrocodone/homatropine Syrup 5 milliLiter(s) Oral every 8 hours PRN Cough      ------------------------------------------------------------------------------------------------------------    CRITICAL CARE:  [ ]Shock Present  [ ]Septic [ ]Cardiogenic [ ]Neurologic [ ]Hypovolemic [ ] Undifferentiated/Mixed   [ ]Vasopressors [ ]Inotropes     [X ]Respiratory failure present : [X ]Acute  [ ]Chronic [X ]Hypoxic  [X ]Hypercarbic   [ ]Mechanical ventilation   [X ]Non-invasive ventilatory support   [ ]High flow      [ ]Other organ failure     Other REFERRALS:  [ ]Hospice  [ ]Child Life  [ ]Social Work  [ ]Case management [ ]Holistic Therapy

## 2022-12-19 NOTE — PROGRESS NOTE ADULT - SUBJECTIVE AND OBJECTIVE BOX
INTERVAL HPI/OVERNIGHT EVENTS:  Patient seen at bedside.  Lethargic and frail  Brothers at bedside.    VITAL SIGNS:  T(F): 97.5 (12-19-22 @ 13:15)  HR: 113 (12-19-22 @ 13:15)  BP: 119/82 (12-19-22 @ 13:15)  RR: 17 (12-19-22 @ 13:15)  SpO2: 100% (12-19-22 @ 13:15)  Wt(kg): --    PHYSICAL EXAM:    Constitutional: NAD, resting in bed comfortably  Eyes: EOMI, sclera non-icteric  Neck: supple, no LAP  Respiratory: CTA b/l, good air entry b/l, no wheezing, rhonchi or crackels  Cardiovascular: RRR, normal S1S2, no M/R/G  Gastrointestinal: soft, NTND  Extremities: no edema  Neurological: AAOx3, non focal  Skin: Normal temperature    MEDICATIONS  (STANDING):  chlorhexidine 2% Cloths 1 Application(s) Topical daily  enoxaparin Injectable 40 milliGRAM(s) SubCutaneous every 24 hours  influenza   Vaccine 0.5 milliLiter(s) IntraMuscular once  levalbuterol Inhalation 0.63 milliGRAM(s) Inhalation every 6 hours  methylPREDNISolone sodium succinate Injectable 40 milliGRAM(s) IV Push daily  piperacillin/tazobactam IVPB.. 3.375 Gram(s) IV Intermittent every 8 hours  polyethylene glycol 3350 17 Gram(s) Oral daily    MEDICATIONS  (PRN):  acetaminophen     Tablet .. 650 milliGRAM(s) Oral every 6 hours PRN Temp greater or equal to 38C (100.4F), Mild Pain (1 - 3)  guaiFENesin Oral Liquid (Sugar-Free) 100 milliGRAM(s) Oral every 6 hours PRN Cough  hydrocodone/homatropine Syrup 5 milliLiter(s) Oral every 8 hours PRN Cough  morphine  - Injectable 1 milliGRAM(s) IV Push every 3 hours PRN Severe Pain (7 - 10)/tachypnea/dyspnea      Allergies    No Known Allergies    Intolerances        LABS:                        8.7    8.37  )-----------( 299      ( 19 Dec 2022 06:30 )             31.0     12-19    148<H>  |  94<L>  |  19  ----------------------------<  148<H>  3.5   |  48<HH>  |  0.67    Ca    10.4      19 Dec 2022 06:30  Phos  2.4     12-19  Mg     1.90     12-19            RADIOLOGY & ADDITIONAL TESTS:  Studies reviewed.

## 2022-12-19 NOTE — CONSULT NOTE ADULT - ASSESSMENT
Patient is a 57-year-old female with uterine cancer (s/p chemotherapy, radiation and hysterectomy) w/ lung mets and peritoneal carcinomatosis (on doxorubicin w/ Brianna, last session 11/2022), pleural effusions (s/p VATs), and chronic anemia presented with shortness of breath. Patient was scheduled for outpatient chemo day of admission, however but was found to be hypoxic so she was sent to the ED.  Patient noted worsening SOB for 2 days with cough productive of white sputum for past 10 days. Patient hypoxic to 69% on RA, placed on O2 and given albuterol with improvement to 90% O2. Sent to ED for further w/u. In ED, patient CXR demonstrated with increased bilateral opacities, CTA with no PE. Patient also given cefepime and vancomycin and albuterol. Hospital course complicated by worsening acute hypoxemic and hypercarbic respiratory failure, now on bipap. Palliative consulted for GOC.

## 2022-12-19 NOTE — DIETITIAN INITIAL EVALUATION ADULT - PERTINENT LABORATORY DATA
12-19    148<H>  |  94<L>  |  19  ----------------------------<  148<H>  3.5   |  48<HH>  |  0.67    Ca    10.4      19 Dec 2022 06:30  Phos  2.4     12-19  Mg     1.90     12-19

## 2022-12-19 NOTE — PROGRESS NOTE ADULT - ASSESSMENT
57y Female  with PMH of  met uterine cancer c/b pleural effusions s/p carbo/taxol, Keytruda Avastin and now most recently on Doxil (cycle 3 11/16) presenting with hypoxia , sating 69% on RA.  Admitted for Acute hypoxemic respiratory failure..Oncology consulted for further evaluation     CTA chest   No pulmonary embolus.  Worsening bilateral consolidations throughout both lungs when compared   with prior exam. This may be due to multifocal infection, edema, or drug   reaction.      -Pulm consult, would appreciate input re CT findings, ? PJP or pneumonitis  -Worsening hyperNa and Co2, conside renal consult  -Discussed plan with patient's brothers at bedside. Discussed case with \[facility] [place] [facility]\"" care and primary team. Pt has worsening performance status and not a candidate for cancer directed therapy at this time. Decision on treatment will be based on performance status.    -Supportive care, pain control, Nutrition, PT, DVT ppx  -Outpatient oncology f/u    Will follow. Please do not hesitate to call back with questions.     Marilynn Watson MD  Medical Oncology Attending  C: [phone]     time spent on direct patient care, interdisciplinary discussions and chart review.

## 2022-12-19 NOTE — PROGRESS NOTE ADULT - PROBLEM SELECTOR PLAN 3
SIRS criteria (tachycardia, tachypnea). No clear source of infection though has ascites and pleural effusions. Procalcitonin elevated.  - f/u cultures as above  - f/u MRSA swab  - continue Zosyn as above SIRS criteria (tachycardia, tachypnea). No clear source of infection though has ascites and pleural effusions. Procalcitonin elevated.  - f/u cultures as above  - f/u MRSA swab  - continue Zosyn as above (12/16-12/19)

## 2022-12-19 NOTE — PROGRESS NOTE ADULT - SUBJECTIVE AND OBJECTIVE BOX
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 cronic pleural effusions s/p Rt VATS pleurx placements and thoracentesis by CTS, never seen in pulm office, on chemotherapy as per heme onco notes & family at bedside  admitted for acute dyspnea for abotu 2 days prior to ER admission. per brother (RT) at bedside- had cough, clear/opaque sputum production for a few days prior to ER  no fevers, no chills. no sick contact. not on 02 at home, not on chronic bronchodilators. never seen pulm. no pfts.  since admission, seen by CTS, palliative and heme onco  seen by ICU for work of breathing/ BPAP    seen earlier today drinking soup/liquids on 4L. she is comfortable. some episodes of cough are dry. nonproductive    ROS:   -    ACTIVE MEDICATION LIST:  MEDICATIONS  (STANDING):  chlorhexidine 2% Cloths 1 Application(s) Topical daily  enoxaparin Injectable 40 milliGRAM(s) SubCutaneous every 24 hours  influenza   Vaccine 0.5 milliLiter(s) IntraMuscular once  levalbuterol Inhalation 0.63 milliGRAM(s) Inhalation every 6 hours  methylPREDNISolone sodium succinate Injectable 40 milliGRAM(s) IV Push two times a day  piperacillin/tazobactam IVPB.. 3.375 Gram(s) IV Intermittent every 8 hours  polyethylene glycol 3350 17 Gram(s) Oral daily    MEDICATIONS  (PRN):  acetaminophen     Tablet .. 650 milliGRAM(s) Oral every 6 hours PRN Temp greater or equal to 38C (100.4F), Mild Pain (1 - 3)  guaiFENesin Oral Liquid (Sugar-Free) 100 milliGRAM(s) Oral every 6 hours PRN Cough  hydrocodone/homatropine Syrup 5 milliLiter(s) Oral every 8 hours PRN Cough  morphine  - Injectable 1 milliGRAM(s) IV Push every 3 hours PRN Severe Pain (7 - 10)/tachypnea/dyspnea      EXAM:  Vital Signs Last 24 Hrs  T(C): 36.7 (19 Dec 2022 17:07), Max: 36.8 (18 Dec 2022 20:40)  T(F): 98 (19 Dec 2022 17:07), Max: 98.3 (18 Dec 2022 20:40)  HR: 115 (19 Dec 2022 17:07) (84 - 115)  BP: 121/82 (19 Dec 2022 17:07) (119/82 - 133/90)  BP(mean): --  RR: 40 (19 Dec 2022 17:07) (17 - 40)  SpO2: 88% (19 Dec 2022 17:07) (88% - 100%)    Parameters below as of 19 Dec 2022 17:07  Patient On (Oxygen Delivery Method): nasal cannula  O2 Flow (L/min): 6      GENERAL: The patient is awake and alert in no apparent distress.     LUNGS: she is not labored on NC  she is not wheezing  diminished breath sounds b/l, some crackles at bases but hard to appreciate  no pitting edema                             8.7    8.37  )-----------( 299      ( 19 Dec 2022 06:30 )             31.0       12-19    148<H>  |  94<L>  |  19  ----------------------------<  148<H>  3.5   |  48<HH>  |  0.67    Ca    10.4      19 Dec 2022 06:30  Phos  2.4     12-19  Mg     1.90     12-19     < from: CT Angio Chest PE Protocol w/ IV Cont (12.16.22 @ 12:31) >    ACC: [id number] EXAM:  CT ANGIO CHEST PULM [facility]                          PROCEDURE DATE:  12/16/2022          INTERPRETATION:  Reason for Exam: Shortness of breath. chest pain.    CTA of the chest was performed from the thoracic inlet to the level of   the adrenal glands following IV contrast injection of  80 cc of Omnipaque   350. No immediate complications were reported.  MIP images were also   created and reviewed.    Comparison: November 28, 2022    Tubes/Lines: None    Mediastinum and Heart: Aorta and pulmonary arteries are normal in size.   Thyroid gland is unremarkable. No lymphadenopathy. No pericardial   effusion.    Lungs, Pleura, and Airways: There is no pulmonary embolus. Increasing   bilateral lung consolidation when compared with the previous exam.   Additionally there are bilateral pleural effusions, portions of which are   loculated within the major fissures bilaterally.    Visualized Abdomen: Intra-abdominal ascites noted, unchanged.    Bones and soft tissues: Unremarkable.    IMPRESSION:    No pulmonary embolus.    Worsening bilateral consolidations throughout both lungs when compared   with prior exam. This may be due to multifocal infection, edema, or drug   reaction.    Stable bilateral pleural effusions.    --- End of Report ---            QUEENIE GUALLPA MD; Attending Radiologist  This document has been electronically signed. Dec 16 2022  1:34PM    < end of copied text >  < from: CT Abdomen and Pelvis w/ Oral Cont and w/ IV Cont (11.28.22 @ 17:45) >    EXAM: [id number] - CT ABDOMEN AND PELVIS OC IC  - ORDERED BY: ISAI PADILLA    EXAM: [id number] - CT CHEST IC  - ORDERED BY: ISAI PADILLA      PROCEDURE DATE:  11/28/2022           INTERPRETATION:  CLINICAL INFORMATION: Uterine cancer. Restaging on   chemotherapy.    COMPARISON: CT 10/1/2022    CONTRAST/COMPLICATIONS:  IV Contrast: Omnipaque 350  90 cc administered   10 cc discarded  Oral Contrast: Smoothie Readi-Cat 2  Complications: None reported at time of study completion    PROCEDURE:  CT of the Chest, Abdomen and Pelvis was performed.  Sagittal and coronal reformats were performed.    FINDINGS:  CHEST:  LUNGS AND LARGE AIRWAYS: Patent central airways. No pulmonary nodules.   Scattered areas of linear type atelectasis as well as partial compressive   atelectasis of both lower lobes.  PLEURA: Bilateral loculated pleural effusions mildly decreased from prior   imaging. Enhancement of the pleura is again noted.  VESSELS: Within normal limits.  HEART: Heart size is normal. No pericardial effusion.  MEDIASTINUM AND DANE: No lymphadenopathy.  CHEST WALL AND LOWER NECK: Right chest wall Mediport with catheter tip at   the cavoatrial junction.    ABDOMEN AND PELVIS:  LIVER: A few scattered subcentimeter hypodense hepatic foci are too small   to characterize and unchanged.  BILE DUCTS: Normal caliber.  GALLBLADDER: Within normal limits.  SPLEEN: Within normal limits.  PANCREAS: Within normal limits.  ADRENALS: Within normal limits.  KIDNEYS/URETERS: Symmetric enhancement. No hydronephrosis.    BLADDER: Within normal limits.  REPRODUCTIVE ORGANS: Hysterectomy. Low-density lesion at the left vaginal   cuff (2:41) 3.5 x 2.2 cm previously 3.8 x 2.7 cm.    BOWEL: No bowel obstruction. Appendix is normal.  PERITONEUM: Small ascites decreased from prior imaging 10/1/2022.   Peritoneal carcinomatosis with poorly defined nodularity along the   peritoneal lining in the pelvis not significantly changed.  VESSELS: Within normal limits.  RETROPERITONEUM/LYMPH NODES: No lymphadenopathy.  ABDOMINAL WALL: Within normal limits.  BONES: Within normal limits.    IMPRESSION:  Peritoneal carcinomatosis and enhancement of the pleura without   significant change.    Lesion at the left vaginal cuff without significant change.    Bilateral loculated pleural effusions and ascites with interval decrease.          --- End of Report ---               ARABELLA ISBELL MD; Attending Radiologist   This document has been electronically signed. Nov 30 2022 12:01PM    < end of copied text >  < from: Transthoracic Echocardiogram (06.09.22 @ 16:28) >  mm Hg, consistent with moderate pulmonary hypertension.  ------------------------------------------------------------------------  CONCLUSIONS:  1. Normal trileaflet aortic valve.  2. Normal left ventricular internal dimensions and wall  thicknesses.  3. Endocardium not well visualized; grossly normal left  ventricular systolic function.  4. Normal right ventricular size and function.  5. Estimated pulmonary artery systolic pressure equals 50  mm Hg, assuming right atrial pressure equals 10  mm Hg,  consistent with moderate pulmonary hypertension.  ------------------------------------------------------------------------  Confirmed on  6/10/2022 - 09:45:33 by Lindy Guzman,    < end of copied text >    PROBLEM LIST:  57y Female with HEALTH ISSUES - PROBLEM Dx:  Acute respiratory failure with hypoxia    Prophylactic measure    Uterine cancer    SIRS (systemic inflammatory response syndrome)    Anemia    Uterine cancer  S/p chemotherapy, radiation and hysterectomy    Palliative care encounter      RECS:  56 yo female with metastatic uterine cancer now being evaluated for hypoxic resp failure- infection vs. fluid overload vs. pneumonitis  she has completed 5 days of IV abx and w/o any infectious source  I would check her RVP RVP/flu/covid again - noted first swab is negative  given her immunocompromised state, check LDH, would get ID consult  (should we empirically treat for PCP ?)   in the interim, add on ESR- but would treat for pneumonitis with solumedrol 40 IVP BID (keytruda can cause pneumonitis)  VBG shows she has been diuresed and now compensating  can hold on diuretics for now, would put her back on BPAP for working of breathing at night  taper 02 when she is awake  swallow eval- r/o aspiration contributing to her cough  start PPI to help with cough  would change xopenex to PRN- family and patient dont find it helpful in cough     prognosis is guarded   Please call with any questions.    Kassidy Tracy, DO  [facility] Pulmonary/Sleep Medicine  [phone]

## 2022-12-19 NOTE — DIETITIAN INITIAL EVALUATION ADULT - NAME AND PHONE
Clotilde Moreira, MS, RDN (pager #[phone])  [No Acute Distress] : no acute distress [Well Nourished] : well nourished [Well Developed] : well developed [Well-Appearing] : well-appearing [Normal Sclera/Conjunctiva] : normal sclera/conjunctiva [PERRL] : pupils equal round and reactive to light [EOMI] : extraocular movements intact [Normal Outer Ear/Nose] : the outer ears and nose were normal in appearance [Normal Oropharynx] : the oropharynx was normal [No JVD] : no jugular venous distention [Supple] : supple [No Respiratory Distress] : no respiratory distress  [Clear to Auscultation] : lungs were clear to auscultation bilaterally [Normal Rate] : normal rate  [Regular Rhythm] : with a regular rhythm [Normal S1, S2] : normal S1 and S2 [No Murmur] : no murmur heard [No Carotid Bruits] : no carotid bruits [No Edema] : there was no peripheral edema [No Extremity Clubbing/Cyanosis] : no extremity clubbing/cyanosis [Soft] : abdomen soft [Non Tender] : non-tender [Non-distended] : non-distended [No Masses] : no abdominal mass palpated [No HSM] : no HSM [Normal Bowel Sounds] : normal bowel sounds [No CVA Tenderness] : no CVA  tenderness [No Spinal Tenderness] : no spinal tenderness [No Joint Swelling] : no joint swelling [Grossly Normal Strength/Tone] : grossly normal strength/tone [No Rash] : no rash [Normal Gait] : normal gait [Coordination Grossly Intact] : coordination grossly intact [No Focal Deficits] : no focal deficits [Deep Tendon Reflexes (DTR)] : deep tendon reflexes were 2+ and symmetric [Normal Affect] : the affect was normal [Normal Insight/Judgement] : insight and judgment were intact [de-identified] : AVF in the left forearm with palpable thrill and audible bruit. Permacath in the right chest with dressing c/d/i.

## 2022-12-19 NOTE — PROGRESS NOTE ADULT - PROBLEM SELECTOR PLAN 1
Hx of recurrent pleural effusions (s/p VATS, multiple thoracenteses/pigtail catheters - last 10/2022). Mildly hypervolemic on exam w/ pulmonary edema/pleural effusions on CXR. Unclear cause of acute worsening started 12/16. Ddx pulmonary edema/increased pulmonary HTN, superimposed infectious process, pneumonitis from recent chemotherapy.  - CTA chest without PE but showing worsening bilateral consolidations throughout both lungs and stable bi/l pleural effusions; likely multifocal infection vs. edema vs most likely tumor progression  - repeat TTE, risk of cardiotoxicity with doxorubicin  - Xopenex inhaler q6h  - Thoracic surgery consult to eval pleural effusions, possible need for thoracentesis - after reviewing CT they feel not indicated at this time  - outpatient pulm consulted, to see patient 12/17  - worsen SOB and WOB starting 12/16, MICU consulted and rejected at this time  -- placed on BIPAP (12 over 5) with improvement in respiratory distress and hypercapnia  -- continue aggressive diuresis with Lasix 40mg BID IV (goal net negative 1-2L) for component of fluid overload and possible secondary pulmonary hypertension  -- continue Solu-Medrol for possible underlying pneumonitis vs. lymphangitic spread  -- antibiotics escalated to Zosyn 12/16 given acute illness (plan to end course 12/19 given 5 days of abx total course), treatment for CAP initially given CTA chest findings; repeat Bcx cooking; MRSA ordered; Scx with respiratory hema, Ucx neg Hx of recurrent pleural effusions (s/p VATS, multiple thoracenteses/pigtail catheters - last 10/2022). Mildly hypervolemic on exam w/ pulmonary edema/pleural effusions on CXR. Unclear cause of acute worsening started 12/16. Ddx pulmonary edema/increased pulmonary HTN, superimposed infectious process, pneumonitis from recent chemotherapy.  - CTA chest without PE but showing worsening bilateral consolidations throughout both lungs and stable bi/l pleural effusions; likely multifocal infection vs. edema vs most likely tumor progression  - repeat TTE, risk of cardiotoxicity with doxorubicin  - Xopenex inhaler q6h  - Thoracic surgery consult to eval pleural effusions, possible need for thoracentesis - after reviewing CT they feel not indicated at this time  - outpatient pulm consulted, to see patient 12/17  - worsen SOB and WOB starting 12/16, MICU consulted and rejected at this time  -- placed on BIPAP (12 over 5) with improvement in respiratory distress and hypercapnia  -- continue aggressive diuresis with Lasix 40mg BID IV (goal net negative 1-2L) for component of fluid overload and possible secondary pulmonary hypertension  -- continue Solu-Medrol for possible underlying pneumonitis vs. lymphangitic spread  -- antibiotics escalated to Zosyn 12/16 -12/19 given 5 days of abx total course           - treatment for CAP initially given CTA chest findings; repeat Bcx, MRSA both negative; Scx with respiratory hema, Ucx neg

## 2022-12-19 NOTE — DIETITIAN INITIAL EVALUATION ADULT - OTHER INFO
Pt 58 yo female with PMHx of metastatic uterine cancer (s/p chemotherapy, radiation and hysterectomy) to the lungs and peritoneal carcinomatosis (last session 11/2022), pleural effusions (s/p VATs), and chronic anemia presented with SOB, pleural effusions - per chart review.     At time of visit, Pt awake, alert; appears weak. Pt with poor appetite/PO intake for ~1 month or so. Pt C/O chewing difficulty PTA, as well. Pt on Clear Liquids - moderately thickened at present. Pending Cinesophagram to determine appropriate PO diet/fluid consistency - per nurse. No report of nausea, vomiting or diarrhea @ this time. +BM (12/18) per flow sheet.     Pt's height: 65" & Pt's weight: 172#, PTA; Pt with weight loss of ~10# in past "one month or so", reported. Nutrition focused physical exam conducted, Pt found with signs of moderate subcutaneous fat loss: [x] Orbital fat pads region, [x] Buccal fat region & moderate muscle wasting: [x] Temples region, [x] Clavicle region [x] Shoulder region. Rec to add PO supplement: Ensure Clear, moderately thickened - 2x daily to diet rx. Case discussed with nurse. RDN answered questions/concerns related to diet. RDN remains available.

## 2022-12-19 NOTE — DIETITIAN INITIAL EVALUATION ADULT - ADD RECOMMEND
1. Conduct Cinesophagram to determine appropriate PO diet/Fluid consistency;    1. Conduct Cinesophagram to determine appropriate PO diet/Fluid consistency;   2. Advance PO diet/Fluid consistency per SLP rec:   3. Initiate alternative means of nutrition support if PO diet/PO intake not appropriate to continue;   4. Monitor labs, hydration status;   5. Consult nutrition if warranted; RDN remains available;

## 2022-12-19 NOTE — PROGRESS NOTE ADULT - ASSESSMENT
57F hx metastatic uterine cancer (s/p chemotherapy, radiation and hysterectomy) to the lungs and peritoneal carcinomatosis (on doxorubicin w/ Brianna, last session 11/2022), pleural effusions (s/p VATs), and chronic anemia presented with SOB, increased WOB, and pleural effusions. CTA neg for PE but showing progression of lung tumor burden +/- CAP. Currently improved and on 6L NC but remains tenuous given significant lung mets.

## 2022-12-19 NOTE — CONSULT NOTE ADULT - PROBLEM SELECTOR RECOMMENDATION 9
RR 45-50 on BIPAP  C02 48  - Morphine 1mg IVP Q3hrs PRN Tachypnea/dyspnea - Presented for SOB  - Imaging showing b/l opacities- unclear etiology possibly infectious vs pneumonitis vs worsening lung mets   - hypoxemic and hypercarbic CO2 elevated   - RR 45-50 on BIPAP, now on nasal cannula   - started morphine 1mg IVP Q3hrs PRN Tachypnea/dyspnea  - Pulm following

## 2022-12-19 NOTE — PROGRESS NOTE ADULT - PROBLEM SELECTOR PLAN 2
Hx of metastatic uterine cancer with peritoneal carcinomatosis.  - oncology following  - was planned for chemotherapy (doxorubicin) 12/14/2022, as outpatient, last dose in 11/2022  - palliative consulted. will need family discussion with oncology re prognosis and further plans for chemo in s/o progressing lung mets Hx of metastatic uterine cancer with peritoneal carcinomatosis.  - oncology following  - was planned for chemotherapy (doxorubicin) 12/14/2022, as outpatient, last dose in 11/2022  - palliative consulted. will need family discussion with oncology re prognosis and further plans for chemo in s/o progressing lung mets  - follow-up Oncology recommendations for any further therapies we can offer  - follow-up Palliative recs once they meet patient Hx of metastatic uterine cancer with peritoneal carcinomatosis.  - oncology following  - was planned for chemotherapy (doxorubicin) 12/14/2022, as outpatient, last dose in 11/2022  - follow-up Oncology recommendations for any further therapies we can offer  - follow-up Palliative recs once they meet patient

## 2022-12-19 NOTE — PROGRESS NOTE ADULT - SUBJECTIVE AND OBJECTIVE BOX
Internal Medicine   Titi Kidd| PGY-1    OVERNIGHT EVENTS:      SUBJECTIVE:       MEDICATIONS  (STANDING):  chlorhexidine 2% Cloths 1 Application(s) Topical daily  enoxaparin Injectable 40 milliGRAM(s) SubCutaneous every 24 hours  furosemide   Injectable 40 milliGRAM(s) IV Push daily  influenza   Vaccine 0.5 milliLiter(s) IntraMuscular once  levalbuterol Inhalation 0.63 milliGRAM(s) Inhalation every 6 hours  methylPREDNISolone sodium succinate Injectable 40 milliGRAM(s) IV Push daily  piperacillin/tazobactam IVPB.. 3.375 Gram(s) IV Intermittent every 8 hours  polyethylene glycol 3350 17 Gram(s) Oral daily    MEDICATIONS  (PRN):  acetaminophen     Tablet .. 650 milliGRAM(s) Oral every 6 hours PRN Temp greater or equal to 38C (100.4F), Mild Pain (1 - 3)  guaiFENesin Oral Liquid (Sugar-Free) 100 milliGRAM(s) Oral every 6 hours PRN Cough  hydrocodone/homatropine Syrup 5 milliLiter(s) Oral every 8 hours PRN Cough        T(F): 97.7 (12-19-22 @ 05:19), Max: 98.3 (12-18-22 @ 20:40)  HR: 107 (12-19-22 @ 05:19) (62 - 115)  BP: 126/84 (12-19-22 @ 05:19) (120/80 - 133/90)  BP(mean): --  RR: 19 (12-19-22 @ 05:19) (19 - 20)  SpO2: 99% (12-19-22 @ 05:19) (96% - 100%)    PHYSICAL EXAM:     GENERAL: NAD, lying in bed comfortably.  HEAD:  Atraumatic, normocephalic.  EYES: EOMI, PERRLA, conjunctiva and sclera clear, no nystagmus noted.  ENT: Moist mucous membranes,   NECK: Supple. No JVD. Trachea midline.  CHEST/LUNG: CTAB. No rales, rhonchi, wheezing, or rubs. Unlabored respirations.  HEART: RRR, no M/R/G, normal S1/S2.  ABDOMEN: Soft, nontender, nondistended, no organomegaly. Normoactive bowel sounds.  EXTREMITIES:  2+ peripheral pulses b/l, brisk capillary refill. No clubbing, cyanosis, or edema.  MSK: No gross deformities noted.   NEURO:  AAOx3, no focal deficits.   SKIN: No rashes or lesions.  PSYCH: Normal mood, affect.    TELEMETRY:    LABS:                        9.2    12.28 )-----------( 328      ( 18 Dec 2022 07:35 )             31.8     12-18    147<H>  |  91<L>  |  20  ----------------------------<  148<H>  3.5   |  43<H>  |  0.81    Ca    11.1<H>      18 Dec 2022 18:15  Phos  2.4     12-18  Mg     1.60     12-18              Creatinine Trend: 0.81<--, 0.85<--, 0.84<--, 0.81<--, 0.94<--, 0.94<--  I&O's Summary    18 Dec 2022 07:01  -  19 Dec 2022 07:00  --------------------------------------------------------  IN: 1200 mL / OUT: 600 mL / NET: 600 mL      BNP    RADIOLOGY & ADDITIONAL STUDIES:             Internal Medicine   Titi Kidd| PGY-1    OVERNIGHT EVENTS:  No acute events overnight   Patient refused oral or IV supplementation of potassium & magnesium  Early morning VBG showing PCO2 101     SUBJECTIVE:   This morning, patient endorses fatigue and feeling tired  Endorses headache  Denies dizziness, weakness, SOB currently      MEDICATIONS  (STANDING):  chlorhexidine 2% Cloths 1 Application(s) Topical daily  enoxaparin Injectable 40 milliGRAM(s) SubCutaneous every 24 hours  furosemide   Injectable 40 milliGRAM(s) IV Push daily  influenza   Vaccine 0.5 milliLiter(s) IntraMuscular once  levalbuterol Inhalation 0.63 milliGRAM(s) Inhalation every 6 hours  methylPREDNISolone sodium succinate Injectable 40 milliGRAM(s) IV Push daily  piperacillin/tazobactam IVPB.. 3.375 Gram(s) IV Intermittent every 8 hours  polyethylene glycol 3350 17 Gram(s) Oral daily    MEDICATIONS  (PRN):  acetaminophen     Tablet .. 650 milliGRAM(s) Oral every 6 hours PRN Temp greater or equal to 38C (100.4F), Mild Pain (1 - 3)  guaiFENesin Oral Liquid (Sugar-Free) 100 milliGRAM(s) Oral every 6 hours PRN Cough  hydrocodone/homatropine Syrup 5 milliLiter(s) Oral every 8 hours PRN Cough        T(F): 97.7 (12-19-22 @ 05:19), Max: 98.3 (12-18-22 @ 20:40)  HR: 107 (12-19-22 @ 05:19) (62 - 115)  BP: 126/84 (12-19-22 @ 05:19) (120/80 - 133/90)  BP(mean): --  RR: 19 (12-19-22 @ 05:19) (19 - 20)  SpO2: 99% (12-19-22 @ 05:19) (96% - 100%)    PHYSICAL EXAM:     GENERAL: NAD, lying in bed comfortably.  HEAD:  Atraumatic, normocephalic.  EYES: EOMI, PERRLA, conjunctiva and sclera clear, no nystagmus noted.  ENT: Moist mucous membranes,   NECK: Supple. No JVD. Trachea midline.  CHEST/LUNG: CTAB. Decreased respiratory effort bilaterally; NC 6L in place; clear b/l breath sounds  HEART: RRR, no M/R/G, normal S1/S2.  ABDOMEN: Soft, nontender, nondistended, no organomegaly. Normoactive bowel sounds.  EXTREMITIES:  2+ peripheral pulses b/l, brisk capillary refill. No clubbing, cyanosis, or edema.  MSK: No gross deformities noted.   NEURO:  AAOx3, no focal deficits.   SKIN: No rashes or lesions.  PSYCH: Normal mood, affect.    TELEMETRY:    LABS:                        9.2    12.28 )-----------( 328      ( 18 Dec 2022 07:35 )             31.8     12-18    147<H>  |  91<L>  |  20  ----------------------------<  148<H>  3.5   |  43<H>  |  0.81    Ca    11.1<H>      18 Dec 2022 18:15  Phos  2.4     12-18  Mg     1.60     12-18              Creatinine Trend: 0.81<--, 0.85<--, 0.84<--, 0.81<--, 0.94<--, 0.94<--  I&O's Summary    18 Dec 2022 07:01  -  19 Dec 2022 07:00  --------------------------------------------------------  IN: 1200 mL / OUT: 600 mL / NET: 600 mL      BNP    RADIOLOGY & ADDITIONAL STUDIES:             Internal Medicine   Titi Kidd| PGY-1    OVERNIGHT EVENTS:  No acute events overnight   Patient refused oral or IV supplementation of potassium & magnesium  Early morning VBG showing PCO2 101     SUBJECTIVE:   This morning, patient endorses fatigue and feeling tired  Endorses headache  Denies dizziness, weakness, SOB currently      MEDICATIONS  (STANDING):  chlorhexidine 2% Cloths 1 Application(s) Topical daily  enoxaparin Injectable 40 milliGRAM(s) SubCutaneous every 24 hours  furosemide   Injectable 40 milliGRAM(s) IV Push daily  influenza   Vaccine 0.5 milliLiter(s) IntraMuscular once  levalbuterol Inhalation 0.63 milliGRAM(s) Inhalation every 6 hours  methylPREDNISolone sodium succinate Injectable 40 milliGRAM(s) IV Push daily  piperacillin/tazobactam IVPB.. 3.375 Gram(s) IV Intermittent every 8 hours  polyethylene glycol 3350 17 Gram(s) Oral daily    MEDICATIONS  (PRN):  acetaminophen     Tablet .. 650 milliGRAM(s) Oral every 6 hours PRN Temp greater or equal to 38C (100.4F), Mild Pain (1 - 3)  guaiFENesin Oral Liquid (Sugar-Free) 100 milliGRAM(s) Oral every 6 hours PRN Cough  hydrocodone/homatropine Syrup 5 milliLiter(s) Oral every 8 hours PRN Cough        T(F): 97.7 (12-19-22 @ 05:19), Max: 98.3 (12-18-22 @ 20:40)  HR: 107 (12-19-22 @ 05:19) (62 - 115)  BP: 126/84 (12-19-22 @ 05:19) (120/80 - 133/90)  BP(mean): --  RR: 19 (12-19-22 @ 05:19) (19 - 20)  SpO2: 99% (12-19-22 @ 05:19) (96% - 100%)    PHYSICAL EXAM:     GENERAL: NAD, lying in bed comfortably.  HEAD:  Atraumatic, normocephalic.  EYES: EOMI, PERRLA, conjunctiva and sclera clear, no nystagmus noted.  ENT: Moist mucous membranes,   NECK: Supple. No JVD. Trachea midline.  CHEST/LUNG: CTAB. Decreased respiratory effort bilaterally; NC 6L in place; slight wheezing on inspiration/expiration  HEART: RRR, no M/R/G, normal S1/S2.  ABDOMEN: Soft, nontender, nondistended, no organomegaly. Normoactive bowel sounds.  EXTREMITIES:  2+ peripheral pulses b/l, brisk capillary refill. No clubbing, cyanosis, or edema.  MSK: No gross deformities noted.   NEURO:  AAOx3, no focal deficits.   SKIN: No rashes or lesions.  PSYCH: Normal mood, affect.    TELEMETRY:    LABS:                        9.2    12.28 )-----------( 328      ( 18 Dec 2022 07:35 )             31.8     12-18    147<H>  |  91<L>  |  20  ----------------------------<  148<H>  3.5   |  43<H>  |  0.81    Ca    11.1<H>      18 Dec 2022 18:15  Phos  2.4     12-18  Mg     1.60     12-18              Creatinine Trend: 0.81<--, 0.85<--, 0.84<--, 0.81<--, 0.94<--, 0.94<--  I&O's Summary    18 Dec 2022 07:01  -  19 Dec 2022 07:00  --------------------------------------------------------  IN: 1200 mL / OUT: 600 mL / NET: 600 mL      BNP    RADIOLOGY & ADDITIONAL STUDIES:

## 2022-12-19 NOTE — DIETITIAN INITIAL EVALUATION ADULT - PROBLEM SELECTOR PROBLEM 1
Acute respiratory failure with hypoxia Inflammation Suggestive Of Cancer Camouflage Histology Text: There was a dense lymphocytic infiltrate which prevented adequate histologic evaluation of adjacent structures.

## 2022-12-20 NOTE — PROVIDER CONTACT NOTE (CRITICAL VALUE NOTIFICATION) - PERSON GIVING RESULT:
Aberion, C - Lab
lab
CHRISTIAN Baldwin
Nicolasa
[facility]
Timur Larkin -  [place]ala
Toxicology, Leta
NORMAN Cruz - [place]

## 2022-12-20 NOTE — PROVIDER CONTACT NOTE (CRITICAL VALUE NOTIFICATION) - ASSESSMENT
Patient A&Ox4, denies chest pain and sob, patient c/o frequent cough. vitals stable.
Pt  Pt a&ox4. No c/o of chest pain, SOB, generalized pain, numbness or tingling noted. Breathing on BIPAP, Sinus tachycardia on tele. Resting comfortably in bed
Patient A&Ox4, denies chest pain and sob, patient c/o frequent cough. vitals stable, patient very tired and weak today.
Pt is A&Ox4. Pt received on bipap. Denies SOB. Sating well on tele. Vitals per flow sheet.
see A&I flowsheet
Patient A&Ox4, denies chest pain and sob, patient c/o frequent cough. vitals stable, patient very tired and weak today.
pt with increased work of breathing. pt put on nonrebreather
Patient A&Ox4, denies chest pain and sob, patient c/o frequent cough. vitals stable.

## 2022-12-20 NOTE — SWALLOW VFSS/MBS ASSESSMENT ADULT - DIAGNOSTIC IMPRESSIONS
Patient presents with Mild Oral Stage and Mild Pharyngeal Stage Dysphagia. The Oral Stage is characterized by adequate oral containment, slow chewing for solid with ability to break down solid, adequate bolus manipulation, adequate tongue motion with adequate anterior to posterior transfer of the bolus for puree and solids; premature spillage/loss to the hypopharynx for Thin Liquids and Mildly Thick Liquids due to reduced tongue to palate seal; with adequate oral clearance post swallow.  The Pharyngeal Stage is characterized by mild delayed initiation of the pharyngeal swallow (Bolus head is at the vallecular for Thin Liquids), adequate laryngeal elevation, adequate tongue base retraction and adequate pharyngeal constriction. There is adequate pharyngeal clearance post swallow for puree and solids. There was No Aspiration observed before, during or after the swallow for puree, solids, mildly thick liquids and thin liquids. Patient presents with Mild Oral Stage and Pharyngeal Stage Dysphagia. The Oral Stage is characterized by adequate oral containment, slow chewing for solid with ability to break down solid, adequate bolus manipulation, adequate tongue motion with adequate anterior to posterior transfer of the bolus for puree and solids; premature spillage/loss to the hypopharynx for Thin Liquids and Mildly Thick Liquids due to reduced tongue to palate seal; with adequate oral clearance post swallow.  The Pharyngeal Stage is characterized by adequate initiation of the pharyngeal swallow, adequate laryngeal elevation, adequate tongue base retraction and adequate pharyngeal constriction. There is adequate pharyngeal clearance post swallow for puree and solids. There was No Aspiration observed before, during or after the swallow for puree, solids, mildly thick liquids and thin liquids. Patient presents with Mild Oral Stage and Functional Pharyngeal Stage Dysphagia. The Oral Stage is characterized by adequate oral containment, slow chewing for solid with ability to break down solid, adequate bolus manipulation, adequate tongue motion with adequate anterior to posterior transfer of the bolus for puree and solids; premature spillage/loss to the hypopharynx for Thin Liquids and Mildly Thick Liquids due to reduced tongue to palate seal; with adequate oral clearance post swallow.  The Pharyngeal Stage is characterized by adequate initiation of the pharyngeal swallow, adequate laryngeal elevation, adequate tongue base retraction and adequate pharyngeal constriction. There is adequate pharyngeal clearance post swallow for puree and solids. There was No Aspiration observed before, during or after the swallow for puree, solids, mildly thick liquids and thin liquids.

## 2022-12-20 NOTE — CONSULT NOTE ADULT - ATTENDING COMMENTS
56 yo F PMH uterina cancer s/p radiation, chemotherapy, and hysterectomy c/b metastases to lungs, pleura, and peritoneum referred from outpatient oncology for acutely worsening shortness of breath. Found to be with hypoxemic respiratory failure with CT showing worsening bilateral opacities and consolidations with stable effusions. MICU consulted for increased WOB despite BiPAP. Upon exam patient with respiratory rates to 40s though nonhypoxemic and alert following commands/responding appropriately. CT reviewed with worsening consolidations as well as possible elevation in right sided/pulmonary pressures.     - would recommend aggressive diuresis at this time for component of fluid overload and possible secondary pulmonary hypertension  - add steroids for possible underlying pneumonitis vs. lymphangitic spread  - can consider broadening abx, would de-escalate if negative cultures  - post bipap VBG reviewed, stable hypercarbia compared to previous ABG  - currently with mildly improving respiratory symptoms  - discussion had with pt and daytime primary team regarding GOC; patient amenable to intubation though tearful during discussion. Would recommend ongoing GOC  - prognosis guarded, will remain on floors at this time and monitor response to therapy    Eliseo Quintanilla MD
57 year old with metastatic uterine cancer on taxol (progressed on prior regimens)  She presented with hypoxia.    Her CT imaging shows dense bilateral consolidation    She was treated empirically with antibiotics (cefepime/ zosyn)    CT imaging is not suggestive of PCP.    Now on steroids.    Overall, prognosis is guarded    Check galactomannan. Check fungitell. Check LDL  Check sputum culture  Check sputum pcr    Can start Mepron 750 mg po q 12  If she worsens, can change to Bactrim treatment dosing as she is too unstable for a bronch  But overall, my suspicion for PCP is not high. Concern for disease progression although DDx is broad and includes drug toxicity.

## 2022-12-20 NOTE — CONSULT NOTE ADULT - SUBJECTIVE AND OBJECTIVE BOX
Incomplete  Patient is a 57y old  Female who presents with a chief complaint of Hypoxia (20 Dec 2022 14:02)    HPI:  56 yo F with a PMH of metastatic uterine cancer (lung, peritoneal carcinomatosis), s/p multiple lines of treatment (Carbo/Taxol, Keytruda, Avastatin) and now on Doxorubicin (last treatment 11/2022), who initially presented to the ED with SOB.     Admitted for acute hypoxic hypercapnic respiratory failure. CT chest on admission showed worsening b/l consolidations (infection vs edema vs drug reaction). Pt treated with BiPAP, steroids and diuresis. Also treated with course of antibiotics (Cefepime and Zosyn). Sputum/blood/urine cultures all negative. No fevers or leukocytosis this admission (apart from mild steroid induced leukocytosis that has resolved). ID consulted for recommendations.      REVIEW OF SYSTEMS  Constitutional: No fevers, chills  Skin: No rash, no phlebitis	  Eyes: No discharge	  ENMT: No sore throat  Respiratory: + SOB  Cardiovascular:  No chest pain  Gastrointestinal: No pain  Genitourinary: No dysuria  Neurological: No AMS     prior hospital charts reviewed [V]  primary team notes reviewed [V]  other consultant notes reviewed [V]    PAST MEDICAL & SURGICAL HISTORY:  Uterine cancer  S/p chemotherapy, radiation and hysterectomy    Malignant pleural effusion  with hx loculation on R    Asthma    Chronic anemia    S/P hysterectomy  2020 [facility]/BSO    H/O ovarian cystectomy  2009    History of thoracentesis  R VATS 10/21    SOCIAL HISTORY:  - Denied smoking/vaping/alcohol/recreational drug use    FAMILY HISTORY:  Maternal family history of hypertension (Mother)    Family history of type 2 diabetes mellitus (Father)    Allergies  No Known Allergies    ANTIMICROBIALS:    ANTIMICROBIALS (past 90 days):  MEDICATIONS  (STANDING):  azithromycin  IVPB   255 mL/Hr IV Intermittent (12-16-22 @ 11:59)   255 mL/Hr IV Intermittent (12-15-22 @ 12:44)    cefepime   IVPB   100 mL/Hr IV Intermittent (12-14-22 @ 17:48)    cefepime   IVPB   100 mL/Hr IV Intermittent (12-15-22 @ 05:34)    cefTRIAXone   IVPB   100 mL/Hr IV Intermittent (12-16-22 @ 11:28)   100 mL/Hr IV Intermittent (12-15-22 @ 12:00)    piperacillin/tazobactam IVPB.   200 mL/Hr IV Intermittent (12-16-22 @ 21:19)    piperacillin/tazobactam IVPB..   25 mL/Hr IV Intermittent (12-19-22 @ 20:27)   25 mL/Hr IV Intermittent (12-19-22 @ 12:15)   25 mL/Hr IV Intermittent (12-19-22 @ 06:42)   25 mL/Hr IV Intermittent (12-18-22 @ 21:45)   25 mL/Hr IV Intermittent (12-18-22 @ 12:50)   25 mL/Hr IV Intermittent (12-18-22 @ 06:30)   25 mL/Hr IV Intermittent (12-17-22 @ 21:50)   25 mL/Hr IV Intermittent (12-17-22 @ 14:16)   25 mL/Hr IV Intermittent (12-17-22 @ 05:05)    vancomycin  IVPB.   250 mL/Hr IV Intermittent (12-14-22 @ 18:36)    OTHER MEDS:   MEDICATIONS  (STANDING):  acetaminophen     Tablet .. 650 every 6 hours PRN  calcium carbonate    500 mG (Tums) Chewable 2 every 4 hours PRN  enoxaparin Injectable 40 every 24 hours  guaiFENesin Oral Liquid (Sugar-Free) 100 every 6 hours PRN  hydrocodone/homatropine Syrup 5 every 8 hours PRN  influenza   Vaccine 0.5 once  levalbuterol Inhalation 0.63 every 6 hours PRN  methylPREDNISolone sodium succinate Injectable 40 two times a day  morphine  - Injectable 1 every 3 hours PRN  pantoprazole    Tablet 40 before breakfast  polyethylene glycol 3350 17 daily    VITALS:  Vital Signs Last 24 Hrs  T(F): 97.5 (12-20-22 @ 11:15), Max: 99.7 (12-14-22 @ 22:37)    Vital Signs Last 24 Hrs  HR: 96 (12-20-22 @ 15:20) (96 - 115)  BP: 122/80 (12-20-22 @ 11:15) (121/82 - 135/88)  RR: 19 (12-20-22 @ 11:15)  SpO2: 98% (12-20-22 @ 15:20) (88% - 100%)  Wt(kg): --    EXAM:  General: Patient in no acute distress   HEENT: NCAT  CV: S1+S2  Lungs: Decreased breath sounds b/l on auscultation, on NC O2   R chest wall port site c/d/i  Abd: Soft, nontender, no guarding  Ext: No cyanosis  Neuro: Alert and oriented, calm   Skin: No rash   IV: No phlebitis    Labs:                        8.1    9.71  )-----------( 270      ( 20 Dec 2022 05:57 )             29.6     12-20    146<H>  |  94<L>  |  19  ----------------------------<  126<H>  4.1   |  46<HH>  |  0.69    Ca    10.2      20 Dec 2022 05:57  Phos  3.3     12-20  Mg     1.90     12-20    TPro  7.2  /  Alb  3.2<L>  /  TBili  <0.2  /  DBili  x   /  AST  9   /  ALT  7   /  AlkPhos  114  12-20    WBC Trend:  WBC Count: 9.71 (12-20-22 @ 05:57)  WBC Count: 8.37 (12-19-22 @ 06:30)  WBC Count: 12.28 (12-18-22 @ 07:35)  WBC Count: 11.04 (12-17-22 @ 06:00)    Auto Neutrophil #: 8.53 K/uL (12-16-22 @ 08:10)  Auto Neutrophil #: 7.45 K/uL (12-15-22 @ 05:50)  Auto Neutrophil #: 7.19 K/uL (12-14-22 @ 18:17)  Auto Neutrophil #: 7.25 K/uL (12-14-22 @ 12:39)  Auto Neutrophil #: 4.58 K/uL (11-28-22 @ 11:17)    Creatine Trend:  Creatinine, Serum: 0.69 (12-20)  Creatinine, Serum: 0.67 (12-19)  Creatinine, Serum: 0.81 (12-18)  Creatinine, Serum: 0.85 (12-18)    Liver Biochemical Testing Trend:  Alanine Aminotransferase (ALT/SGPT): 7 (12-20)  Alanine Aminotransferase (ALT/SGPT): 6 (12-15)  Alanine Aminotransferase (ALT/SGPT): 10 (12-14)  Alanine Aminotransferase (ALT/SGPT): <5 *L* (10-06)  Alanine Aminotransferase (ALT/SGPT): <5 *L* (10-05)  Aspartate Aminotransferase (AST/SGOT): 9 (12-20-22 @ 05:57)  Aspartate Aminotransferase (AST/SGOT): 12 (12-15-22 @ 05:50)  Aspartate Aminotransferase (AST/SGOT): 27 (12-14-22 @ 18:17)  Aspartate Aminotransferase (AST/SGOT): 9 (10-06-22 @ 07:44)  Aspartate Aminotransferase (AST/SGOT): 12 (10-05-22 @ 06:00)  Bilirubin Total, Serum: <0.2 (12-20)  Bilirubin Total, Serum: 0.3 (12-15)  Bilirubin Total, Serum: 0.4 (12-14)  Bilirubin Total, Serum: 0.2 (10-06)  Bilirubin Total, Serum: <0.2 (10-05)    Trend LDH  10-05-22 @ 06:00  146  07-16-22 @ 07:35  198  07-13-22 @ 06:22  141  09-02-21 @ 08:04  203    MICROBIOLOGY:  Culture - Blood (collected 17 Dec 2022 06:40)  Source: .Blood Blood-Peripheral  Preliminary Report:    No growth to date.    Culture - Blood (collected 17 Dec 2022 06:25)  Source: .Blood Blood-Peripheral  Preliminary Report:    No growth to date.    Culture - Nose (collected 16 Dec 2022 11:11)  Source: .Nose  Final Report:    No staphylococcus aureus isolated.    "PCR is more Sensitive for identifying MRSA/MSSA."    Culture - Sputum (collected 15 Dec 2022 14:40)  Source: .Sputum Sputum  Final Report:    Normal Respiratory Danielle present    Culture - Nose (collected 15 Dec 2022 12:07)  Source: .Nose  Final Report:    No staphylococcus aureus isolated.    "PCR is more Sensitive for identifying MRSA/MSSA."    Culture - Nose (collected 15 Dec 2022 12:05)  Source: .Nose  Final Report:    No staphylococcus aureus isolated.    "PCR is more Sensitive for identifying MRSA/MSSA."    Culture - Urine (collected 15 Dec 2022 11:45)  Source: Clean Catch Clean Catch (Midstream)  Final Report:    No growth    Culture - Blood (collected 14 Dec 2022 18:00)  Source: .Blood Blood-Peripheral  Final Report:    No Growth Final    Culture - Blood (collected 14 Dec 2022 18:00)  Source: .Blood Blood-Peripheral  Final Report:    No Growth Final    Culture - Acid Fast - Body Fluid w/Smear (collected 04 Oct 2022 17:06)  Source: .Body Fluid  Final Report:    No acid fast bacilli isolated after 6 weeks.    Rapid RVP Result: NotDetec (12-20 @ 10:50)  Legionella Antigen, Urine: Negative (12-15 @ 14:50)  Rapid RVP Result: NotDetec (12-14 @ 18:29)    Procalcitonin, Serum: 0.47 (12-14)    Serum Pro-Brain Natriuretic Peptide: 5128 (12-14)    Blood Gas Venous - Lactate: 1.1 (12-20 @ 05:57)  Blood Gas Venous - Lactate: 1.4 (12-19 @ 17:35)  Blood Gas Venous - Lactate: 1.1 (12-19 @ 06:30)  Lactate, Blood: 3.1 (12-18 @ 18:15)    RADIOLOGY:  imaging below personally reviewed    < from: CT Angio Chest PE Protocol w/ IV Cont (12.16.22 @ 12:31) >  IMPRESSION:    No pulmonary embolus.    Worsening bilateral consolidations throughout both lungs when compared   with prior exam. This may be due to multifocal infection, edema, or drug   reaction.    Stable bilateral pleural effusions.    --- End of Report ---    < end of copied text >

## 2022-12-20 NOTE — PROGRESS NOTE ADULT - PROBLEM SELECTOR PLAN 2
Hx of metastatic uterine cancer with peritoneal carcinomatosis.  - oncology following  - was planned for chemotherapy (doxorubicin) 12/14/2022, as outpatient, last dose in 11/2022  - follow-up Oncology recommendations for any further therapies we can offer  - follow-up Palliative recs once they meet patient Hx of metastatic uterine cancer with peritoneal carcinomatosis.  - oncology following; unlikely to offer chemo  - was planned for chemotherapy (doxorubicin) 12/14/2022, as outpatient, last dose in 11/2022  - follow-up Oncology recommendations for any further therapies we can offer  - follow-up Palliative recs once they meet patient Hx of metastatic uterine cancer with peritoneal carcinomatosis.  - oncology following; unlikely to offer chemo  - was planned for chemotherapy (doxorubicin) 12/14/2022, as outpatient, last dose in 11/2022  - follow-up Palliative recs once they meet patient

## 2022-12-20 NOTE — CONSULT NOTE ADULT - ASSESSMENT
Pt to be seen 58 yo F with a PMH of metastatic uterine cancer (lung, peritoneal carcinomatosis), s/p multiple lines of treatment (Carbo/Taxol, Keytruda, Avastatin) and now on Doxorubicin (last treatment 11/2022), who initially presented to the ED with SOB.     Admitted for acute hypoxic hypercapnic respiratory failure  CT chest on admission showed worsening b/l consolidations (infection vs edema vs drug reaction)  Pt treated with BiPAP, steroids and diuresis  Also treated with course of antibiotics (Cefepime and Zosyn)  No fevers   Mild steroid induced leukocytosis that has resolved  Sputum/blood/urine cultures all negative  ID consulted for recommendations.      OVERALL  Hypoxic hypercapnic respiratory failure  I/s/o metastatic uterine cancer s/p multiple lines of therapy   Differential diagnosis is broad, including but not limited to progression of disease, drug induced lung injury (pneumonitis), infection   Lower suspicion for infectious cause given no fevers, no leukocytosis, negative culture data   CT chest does not show typical findings of PCP pneumonia     RECOMMENDATIONS  -Check serum Galactomannan, serum Fungitell, serum LDH  -Repeat sputum culture and also check sputum PCR   -Given tenuous respiratory status (bronchoscopy will likely be quite high risk), can start Mepron 750mg PO Q12h empirically. If pt's clinical condition worsens can stop Mepron and escalate to Bactrim.     All recommendations are tentative pending Attending Attestation.    Mariia Davenport MD, PGY-4   ID Fellow  [employer] Teams Preferred  After 5pm/weekends call [phone]

## 2022-12-20 NOTE — PROGRESS NOTE ADULT - PROBLEM SELECTOR PLAN 3
SIRS criteria (tachycardia, tachypnea). No clear source of infection though has ascites and pleural effusions. Procalcitonin elevated.  - f/u cultures as above  - f/u MRSA swab  - continue Zosyn as above (12/16-12/19)

## 2022-12-20 NOTE — SWALLOW VFSS/MBS ASSESSMENT ADULT - PHARYNGEAL PHASE COMMENTS
adequate initiation of the pharyngeal swallow, adequate laryngeal elevation, adequate tongue base retraction, adequate pharyngeal constriction. adequate initiation of the pharyngeal swallow, adequate laryngeal elevation, adequate tongue base retraction, adequate pharyngeal constriction delayed initiation of the pharyngeal swallow, adequate laryngeal elevation, adequate tongue base retraction, adequate pharyngeal constriction

## 2022-12-20 NOTE — SWALLOW VFSS/MBS ASSESSMENT ADULT - DEMONSTRATES NEED FOR REFERRAL TO ANOTHER SERVICE
to ensure adequate caloric intake; oral supplements to maximize nutritional needs./Registered Dietitian

## 2022-12-20 NOTE — PROVIDER CONTACT NOTE (CRITICAL VALUE NOTIFICATION) - NAME OF MD/NP/PA/DO NOTIFIED:
Nahomi Saunders
Nahomi nichols
Nahomi Saunders
Ashli Guajardo
MD Titi Kidd
augustine Walls

## 2022-12-20 NOTE — PROVIDER CONTACT NOTE (CRITICAL VALUE NOTIFICATION) - BACKGROUND
Hx uterine cancer mets to lungs & peritoneal, pleural effusions, chronic anemia. Admitted for acute respiratory failure with hypoxia
57 year old female with metastatic uterine cancer. Presented with SOB, increased WOB, and pleural effusion.
75F metastatic uterine cancer to the lungs and peritoneal carcinomatosis; pleural effusions, chronic anemia, acute resp failure w/ hypoxia
Hx uterine cancer mets to lungs & peritoneal, pleural effusions, chronic anemia. Admitted for acute respiratory failure with hypoxia
pt admitted for respiratory failure/hypoxia. pt with lung ca.
patient with previous critical bicarb values over admission

## 2022-12-20 NOTE — PROGRESS NOTE ADULT - PROBLEM SELECTOR PLAN 1
Hx of recurrent pleural effusions (s/p VATS, multiple thoracenteses/pigtail catheters - last 10/2022). Mildly hypervolemic on exam w/ pulmonary edema/pleural effusions on CXR. Unclear cause of acute worsening started 12/16. Ddx pulmonary edema/increased pulmonary HTN, superimposed infectious process, pneumonitis from recent chemotherapy.  - CTA chest without PE but showing worsening bilateral consolidations throughout both lungs and stable bi/l pleural effusions; likely multifocal infection vs. edema vs most likely tumor progression  - repeat TTE, risk of cardiotoxicity with doxorubicin  - Xopenex inhaler q6h  - Thoracic surgery consult to eval pleural effusions, possible need for thoracentesis - after reviewing CT they feel not indicated at this time  - outpatient pulm consulted, to see patient 12/17  - worsen SOB and WOB starting 12/16, MICU consulted and rejected at this time  -- placed on BIPAP (12 over 5) with improvement in respiratory distress and hypercapnia  -- continue aggressive diuresis with Lasix 40mg BID IV (goal net negative 1-2L) for component of fluid overload and possible secondary pulmonary hypertension  -- continue Solu-Medrol for possible underlying pneumonitis vs. lymphangitic spread  -- antibiotics escalated to Zosyn 12/16 -12/19 given 5 days of abx total course           - treatment for CAP initially given CTA chest findings; repeat Bcx, MRSA both negative; Scx with respiratory hema, Ucx neg Hx of recurrent pleural effusions (s/p VATS, multiple thoracenteses/pigtail catheters - last 10/2022). Mildly hypervolemic on exam w/ pulmonary edema/pleural effusions on CXR. Unclear cause of acute worsening started 12/16. Ddx pulmonary edema/increased pulmonary HTN, superimposed infectious process, pneumonitis from recent chemotherapy.  - CTA chest without PE but showing worsening bilateral consolidations throughout both lungs and stable bi/l pleural effusions; likely multifocal infection vs. edema vs most likely tumor progression  - repeat TTE, risk of cardiotoxicity with doxorubicin  - Xopenex inhaler q6h  - Thoracic surgery consult to eval pleural effusions, possible need for thoracentesis - after reviewing CT they feel not indicated at this time  - outpatient pulm consulted, to see patient 12/17  - worsen SOB and WOB starting 12/16, MICU consulted and rejected at this time  -- placed on BIPAP (12 over 5) with improvement in respiratory distress and hypercapnia  -- continue Solu-Medrol for possible underlying pneumonitis vs. lymphangitic spread per Pulm  -- antibiotics escalated to Zosyn 12/16 -12/19 given 5 days of abx total course           - treatment for CAP initially given CTA chest findings; repeat Bcx, MRSA both negative; Scx with respiratory hema, Ucx neg Hx of recurrent pleural effusions (s/p VATS, multiple thoracenteses/pigtail catheters - last 10/2022). Mildly hypervolemic on exam w/ pulmonary edema/pleural effusions on CXR. Unclear cause of acute worsening started 12/16. Ddx pulmonary edema/increased pulmonary HTN, superimposed infectious process, pneumonitis from recent chemotherapy.  - CTA chest without PE but showing worsening bilateral consolidations throughout both lungs and stable bi/l pleural effusions; likely multifocal infection vs. edema vs most likely tumor progression  - repeat TTE, risk of cardiotoxicity with doxorubicin  - Xopenex inhaler q6h pRN  - Thoracic surgery consult to eval pleural effusions, possible need for thoracentesis - after reviewing CT they feel not indicated at this time  - worsen SOB and WOB starting 12/16, MICU consulted and rejected at this time  -- placed on BIPAP (12 over 5) with improvement in respiratory distress and hypercapnia  -- SoluMedrol 40mg IVP BID per Pulm  - -- antibiotics escalated to Zosyn 12/16 -12/19 given 5 days of abx total course           - treatment for CAP initially given CTA chest findings; repeat Bcx, MRSA both negative; Scx with respiratory hema, Ucx neg  - ID consulted for possible empiric PCP coverage in s/o immunocompromised state & possible infectious source of lung imaging

## 2022-12-20 NOTE — PROGRESS NOTE ADULT - SUBJECTIVE AND OBJECTIVE BOX
PULMONARY PROGRESS NOTE    ALEXANDER HUDDLESTON  MRN-[id number]    Patient is a 57y old  Female who presents with a chief complaint of Hypoxia (20 Dec 2022 14:02)      HPI:  -seen this morning on NC  reports less cough  -sis in law at bedside  per RN- tolerated BPAP    ROS:   -    ACTIVE MEDICATION LIST:  MEDICATIONS  (STANDING):  atovaquone  Suspension 750 milliGRAM(s) Oral every 12 hours  chlorhexidine 2% Cloths 1 Application(s) Topical daily  enoxaparin Injectable 40 milliGRAM(s) SubCutaneous every 24 hours  influenza   Vaccine 0.5 milliLiter(s) IntraMuscular once  methylPREDNISolone sodium succinate Injectable 40 milliGRAM(s) IV Push two times a day  pantoprazole    Tablet 40 milliGRAM(s) Oral before breakfast  polyethylene glycol 3350 17 Gram(s) Oral daily    MEDICATIONS  (PRN):  acetaminophen     Tablet .. 650 milliGRAM(s) Oral every 6 hours PRN Temp greater or equal to 38C (100.4F), Mild Pain (1 - 3)  calcium carbonate    500 mG (Tums) Chewable 2 Tablet(s) Chew every 4 hours PRN Heartburn  guaiFENesin Oral Liquid (Sugar-Free) 100 milliGRAM(s) Oral every 6 hours PRN Cough  hydrocodone/homatropine Syrup 5 milliLiter(s) Oral every 8 hours PRN Cough  levalbuterol Inhalation 0.63 milliGRAM(s) Inhalation every 6 hours PRN SOB/Wheezing  morphine  - Injectable 1 milliGRAM(s) IV Push every 3 hours PRN Severe Pain (7 - 10)/tachypnea/dyspnea      EXAM:  Vital Signs Last 24 Hrs  T(C): 36.4 (20 Dec 2022 11:15), Max: 36.7 (20 Dec 2022 04:17)  T(F): 97.5 (20 Dec 2022 11:15), Max: 98 (20 Dec 2022 04:17)  HR: 96 (20 Dec 2022 15:20) (96 - 113)  BP: 122/80 (20 Dec 2022 11:15) (122/80 - 134/84)  BP(mean): --  RR: 19 (20 Dec 2022 11:15) (19 - 23)  SpO2: 98% (20 Dec 2022 15:20) (96% - 100%)    Parameters below as of 20 Dec 2022 11:15  Patient On (Oxygen Delivery Method): nasal cannula  O2 Flow (L/min): 6      GENERAL: The patient is awake and alert in no apparent distress.     LUNGS: diminished breath sounds    HEART: tachy                            8.1    9.71  )-----------( 270      ( 20 Dec 2022 05:57 )             29.6       12-20    146<H>  |  94<L>  |  19  ----------------------------<  126<H>  4.1   |  46<HH>  |  0.69    Ca    10.2      20 Dec 2022 05:57  Phos  3.3     12-20  Mg     1.90     12-20    TPro  7.2  /  Alb  3.2<L>  /  TBili  <0.2  /  DBili  x   /  AST  9   /  ALT  7   /  AlkPhos  114  12-20   < from: Xray Cinesophagram Swallow Function w/ Contrast (12.20.22 @ 14:33) >      < end of copied text >  < from: CT Angio Chest PE Protocol w/ IV Cont (12.16.22 @ 12:31) >    ACC: [id number] EXAM:  CT ANGIO CHEST PULM [facility]                          PROCEDURE DATE:  12/16/2022          INTERPRETATION:  Reason for Exam: Shortness of breath. chest pain.    CTA of the chest was performed from the thoracic inlet to the level of   the adrenal glands following IV contrast injection of  80 cc of Omnipaque   350. No immediate complications were reported.  MIP images were also   created and reviewed.    Comparison: November 28, 2022    Tubes/Lines: None    Mediastinum and Heart: Aorta and pulmonary arteries are normal in size.   Thyroid gland is unremarkable. No lymphadenopathy. No pericardial   effusion.    Lungs, Pleura, and Airways: There is no pulmonary embolus. Increasing   bilateral lung consolidation when compared with the previous exam.   Additionally there are bilateral pleural effusions, portions of which are   loculated within the major fissures bilaterally.    Visualized Abdomen: Intra-abdominal ascites noted, unchanged.    Bones and soft tissues: Unremarkable.    IMPRESSION:    No pulmonary embolus.    Worsening bilateral consolidations throughout both lungs when compared   with prior exam. This may be due to multifocal infection, edema, or drug   reaction.    Stable bilateral pleural effusions.    --- End of Report ---            QUEENIE GUALLPA MD; Attending Radiologist  This document has been electronically signed. Dec 16 2022  1:34PM    < end of copied text >    PROBLEM LIST:  57y Female with HEALTH ISSUES - PROBLEM Dx:  Acute respiratory failure with hypoxia    Prophylactic measure    Uterine cancer    SIRS (systemic inflammatory response syndrome)    Anemia    Uterine cancer  S/p chemotherapy, radiation and hysterectomy    Palliative care encounter      RECS:   would continue solumedrol 40 IVP BID for now to treat for suspected pneumonitis   taper 02  continue with dvt prophylaxis- high risk patient  ID eval- would consider empirically treating her for opportunistic infections (fungal/ PCP)  change bpap 12/7; pco2 on gas today is increasing  noted no aspiration  noted repeat rvp negative   prognosis is guarded      Please call with any questions.    Kassidy Tracy, DO  [facility] Pulmonary/Sleep Medicine  [phone]

## 2022-12-20 NOTE — SWALLOW VFSS/MBS ASSESSMENT ADULT - RECOMMENDED CONSISTENCY
1.) Regular with Thin Liquids  2.) Aspiration Precautions  3.) Reflux Precautions  4.) Maintain Good Oral Hygiene

## 2022-12-20 NOTE — PROVIDER CONTACT NOTE (CRITICAL VALUE NOTIFICATION) - RECOMMENDATIONS
continue BiPAP
Potassium supplement, sodium phosphate supplement, repeat labs
Continue to assess and evaluate patient.
NOne at this time
Continous bipap
Continous bipap
Potassium supplement, sodium phosphate supplement, repeat labs

## 2022-12-20 NOTE — PROVIDER CONTACT NOTE (CRITICAL VALUE NOTIFICATION) - ACTION/TREATMENT ORDERED:
Continous bipap
Continue to assess and evaluate patient.
Potassium supplement, sodium phosphate supplement, repeat labs
None at this time.
Continous bipap
continue BiPAP; MD at bedside assessing patient and discussing care with adult sons.
Potassium supplement, sodium phosphate supplement, repeat labs
start bipap

## 2022-12-20 NOTE — PROGRESS NOTE ADULT - PROBLEM SELECTOR PLAN 5
DVT PPx: Lovenox 40mg qD  Diet: thickened liq  Code: full code  Dispo: PT/OT Pending Hospital Course  Communication: brother Max Liao ([phone]). mom Nicole Liao ([phone]). Dashawn ([phone]). spoke to max 12/18  GOC: no defined HCP (Max, Dashawn, Nicole making decisions collectively). remains full code, patient aware of likely possibility that would not be extubated if requiring intubation

## 2022-12-20 NOTE — PROGRESS NOTE ADULT - SUBJECTIVE AND OBJECTIVE BOX
Internal Medicine   Titi Kidd| PGY-1    OVERNIGHT EVENTS:      SUBJECTIVE:       MEDICATIONS  (STANDING):  chlorhexidine 2% Cloths 1 Application(s) Topical daily  enoxaparin Injectable 40 milliGRAM(s) SubCutaneous every 24 hours  influenza   Vaccine 0.5 milliLiter(s) IntraMuscular once  methylPREDNISolone sodium succinate Injectable 40 milliGRAM(s) IV Push two times a day  pantoprazole    Tablet 40 milliGRAM(s) Oral before breakfast  polyethylene glycol 3350 17 Gram(s) Oral daily    MEDICATIONS  (PRN):  acetaminophen     Tablet .. 650 milliGRAM(s) Oral every 6 hours PRN Temp greater or equal to 38C (100.4F), Mild Pain (1 - 3)  guaiFENesin Oral Liquid (Sugar-Free) 100 milliGRAM(s) Oral every 6 hours PRN Cough  hydrocodone/homatropine Syrup 5 milliLiter(s) Oral every 8 hours PRN Cough  levalbuterol Inhalation 0.63 milliGRAM(s) Inhalation every 6 hours PRN SOB/Wheezing  morphine  - Injectable 1 milliGRAM(s) IV Push every 3 hours PRN Severe Pain (7 - 10)/tachypnea/dyspnea        T(F): 98 (12-20-22 @ 04:17), Max: 98 (12-19-22 @ 17:07)  HR: 97 (12-20-22 @ 04:17) (84 - 115)  BP: 125/80 (12-20-22 @ 04:17) (119/82 - 135/88)  BP(mean): --  RR: 19 (12-20-22 @ 04:17) (17 - 40)  SpO2: 100% (12-20-22 @ 04:17) (88% - 100%)    PHYSICAL EXAM:     GENERAL: NAD, lying in bed comfortably.  HEAD:  Atraumatic, normocephalic.  EYES: EOMI, PERRLA, conjunctiva and sclera clear, no nystagmus noted.  ENT: Moist mucous membranes,   NECK: Supple. No JVD. Trachea midline.  CHEST/LUNG: CTAB. No rales, rhonchi, wheezing, or rubs. Unlabored respirations.  HEART: RRR, no M/R/G, normal S1/S2.  ABDOMEN: Soft, nontender, nondistended, no organomegaly. Normoactive bowel sounds.  EXTREMITIES:  2+ peripheral pulses b/l, brisk capillary refill. No clubbing, cyanosis, or edema.  MSK: No gross deformities noted.   NEURO:  AAOx3, no focal deficits.   SKIN: No rashes or lesions.  PSYCH: Normal mood, affect.    TELEMETRY:    LABS:                        8.7    8.37  )-----------( 299      ( 19 Dec 2022 06:30 )             31.0     12-19    148<H>  |  94<L>  |  19  ----------------------------<  148<H>  3.5   |  48<HH>  |  0.67    Ca    10.4      19 Dec 2022 06:30  Phos  2.4     12-19  Mg     1.90     12-19              Creatinine Trend: 0.67<--, 0.81<--, 0.85<--, 0.84<--, 0.81<--, 0.94<--  I&O's Summary    19 Dec 2022 07:01  -  20 Dec 2022 07:00  --------------------------------------------------------  IN: 0 mL / OUT: 1000 mL / NET: -1000 mL      BNP    RADIOLOGY & ADDITIONAL STUDIES:             Internal Medicine   Titi Kidd| PGY-1    OVERNIGHT EVENTS:  On BiPAP overnight    SUBJECTIVE:   Feels well this AM  Denies headache, dizziness, weakness, SOB, c/p  Feels more active this morning     MEDICATIONS  (STANDING):  chlorhexidine 2% Cloths 1 Application(s) Topical daily  enoxaparin Injectable 40 milliGRAM(s) SubCutaneous every 24 hours  influenza   Vaccine 0.5 milliLiter(s) IntraMuscular once  methylPREDNISolone sodium succinate Injectable 40 milliGRAM(s) IV Push two times a day  pantoprazole    Tablet 40 milliGRAM(s) Oral before breakfast  polyethylene glycol 3350 17 Gram(s) Oral daily    MEDICATIONS  (PRN):  acetaminophen     Tablet .. 650 milliGRAM(s) Oral every 6 hours PRN Temp greater or equal to 38C (100.4F), Mild Pain (1 - 3)  guaiFENesin Oral Liquid (Sugar-Free) 100 milliGRAM(s) Oral every 6 hours PRN Cough  hydrocodone/homatropine Syrup 5 milliLiter(s) Oral every 8 hours PRN Cough  levalbuterol Inhalation 0.63 milliGRAM(s) Inhalation every 6 hours PRN SOB/Wheezing  morphine  - Injectable 1 milliGRAM(s) IV Push every 3 hours PRN Severe Pain (7 - 10)/tachypnea/dyspnea        T(F): 98 (12-20-22 @ 04:17), Max: 98 (12-19-22 @ 17:07)  HR: 97 (12-20-22 @ 04:17) (84 - 115)  BP: 125/80 (12-20-22 @ 04:17) (119/82 - 135/88)  BP(mean): --  RR: 19 (12-20-22 @ 04:17) (17 - 40)  SpO2: 100% (12-20-22 @ 04:17) (88% - 100%)    PHYSICAL EXAM:     GENERAL: NAD, lying in bed comfortably.  HEAD:  Atraumatic, normocephalic.  EYES: EOMI, PERRLA, conjunctiva and sclera clear, no nystagmus noted.  ENT: Moist mucous membranes,   NECK: Supple. No JVD. Trachea midline.  CHEST/LUNG: CTAB. No rales, rhonchi, wheezing, or rubs. Unlabored respirations.  HEART: RRR, no M/R/G, normal S1/S2.  ABDOMEN: Soft, nontender, nondistended, no organomegaly. Normoactive bowel sounds.  EXTREMITIES:  2+ peripheral pulses b/l, brisk capillary refill. No clubbing, cyanosis, or edema.  MSK: No gross deformities noted.   NEURO:  AAOx3, no focal deficits. Alert  SKIN: No rashes or lesions.  PSYCH: Normal mood, affect.    TELEMETRY:    LABS:                        8.7    8.37  )-----------( 299      ( 19 Dec 2022 06:30 )             31.0     12-19    148<H>  |  94<L>  |  19  ----------------------------<  148<H>  3.5   |  48<HH>  |  0.67    Ca    10.4      19 Dec 2022 06:30  Phos  2.4     12-19  Mg     1.90     12-19              Creatinine Trend: 0.67<--, 0.81<--, 0.85<--, 0.84<--, 0.81<--, 0.94<--  I&O's Summary    19 Dec 2022 07:01  -  20 Dec 2022 07:00  --------------------------------------------------------  IN: 0 mL / OUT: 1000 mL / NET: -1000 mL      BNP    RADIOLOGY & ADDITIONAL STUDIES:

## 2022-12-20 NOTE — SWALLOW VFSS/MBS ASSESSMENT ADULT - ADDITIONAL RECOMMENDATIONS
This service to follow as schedule permits for diet tolerance. Medical Team advised to Reconsult if patient exhibits a change in medical status or exhibits intolerance for recommended diet.

## 2022-12-20 NOTE — SWALLOW VFSS/MBS ASSESSMENT ADULT - COMMENTS
Patient scheduled for cinesophagram this AM, however per Team 2, patient on BIPAP and unable to tolerate oxygenation via nasal canula/room air at this time and cinesophagram to be deferred. Per discussion with Team 2, this department to be reconsulted when patient's condition is medically optimized.
Medicine Note 12/20/2022 - 57F hx metastatic uterine cancer (s/p chemotherapy, radiation and hysterectomy) to the lungs and peritoneal carcinomatosis (on doxorubicin w/ Brianna, last session 11/2022), pleural effusions (s/p VATs), and chronic anemia presented with SOB, increased WOB, and pleural effusions. CTA neg for PE but showing progression of lung tumor burden +/- CAP. Currently improved and on 6L NC but remains tenuous given significant lung mets.    Of Note: Patient was seen for a Clinical Swallow Eval on 12/16/2022 (See Consult).     Patient arrived to Radiology for Cinesophagram. Patient was accompanied by her sister in law. Patient is alert and oriented; able to follow commands and express basic needs. Patient is on Nasal Cannula with 6 LIters of Oxygen. Patient was weaned off BiPap early this morning.

## 2022-12-21 NOTE — PROGRESS NOTE ADULT - ASSESSMENT
57 year old with metastatic uterine cancer on taxol (progressed on prior regimens)  She presented with hypoxia.    Her CT imaging shows dense bilateral consolidation    She was treated empirically with antibiotics (cefepime/ zosyn)    CT imaging is not suggestive of PCP.    Now on steroids.    Overall, prognosis is guarded    Check galactomannan. Check fungitell. Check LDH  Check sputum culture  Check sputum pcr for pcp    Can start Mepron 750 mg po q 12- continue while on steroids  If she worsens, can change to Bactrim treatment dosing as she is too unstable for a bronch  But overall, my suspicion for PCP is not high. Concern for disease progression although DDx is broad and includes drug toxicity.

## 2022-12-21 NOTE — PROGRESS NOTE ADULT - PROBLEM SELECTOR PLAN 2
Hx of metastatic uterine cancer with peritoneal carcinomatosis.  - oncology following; unlikely to offer chemo  - was planned for chemotherapy (doxorubicin) 12/14/2022, as outpatient, last dose in 11/2022  - follow-up Palliative recs once they meet patient

## 2022-12-21 NOTE — PROGRESS NOTE ADULT - SUBJECTIVE AND OBJECTIVE BOX
Follow Up:      Inverval History/ROS:Patient is a 57y old  Female who presents with a chief complaint of Hypoxia (21 Dec 2022 07:14)    No fever  SOB unchanged  On 5 L NC    Allergies    No Known Allergies    Intolerances        ANTIMICROBIALS:  atovaquone  Suspension 750 every 12 hours      OTHER MEDS:  acetaminophen     Tablet .. 650 milliGRAM(s) Oral every 6 hours PRN  calcium carbonate    500 mG (Tums) Chewable 2 Tablet(s) Chew every 4 hours PRN  chlorhexidine 2% Cloths 1 Application(s) Topical daily  enoxaparin Injectable 40 milliGRAM(s) SubCutaneous every 24 hours  guaiFENesin Oral Liquid (Sugar-Free) 100 milliGRAM(s) Oral every 6 hours PRN  haloperidol    Injectable 1 milliGRAM(s) IntraMuscular once  hydrocodone/homatropine Syrup 5 milliLiter(s) Oral every 8 hours PRN  influenza   Vaccine 0.5 milliLiter(s) IntraMuscular once  levalbuterol Inhalation 0.63 milliGRAM(s) Inhalation every 6 hours PRN  methylPREDNISolone sodium succinate Injectable 40 milliGRAM(s) IV Push two times a day  morphine  - Injectable 1 milliGRAM(s) IV Push every 3 hours PRN  pantoprazole    Tablet 40 milliGRAM(s) Oral before breakfast  polyethylene glycol 3350 17 Gram(s) Oral daily      Vital Signs Last 24 Hrs  T(C): 36.7 (21 Dec 2022 12:40), Max: 36.7 (21 Dec 2022 12:40)  T(F): 98 (21 Dec 2022 12:40), Max: 98 (21 Dec 2022 12:40)  HR: 123 (21 Dec 2022 12:40) (82 - 123)  BP: 129/87 (21 Dec 2022 12:40) (127/82 - 129/87)  BP(mean): --  RR: 19 (21 Dec 2022 12:40) (19 - 20)  SpO2: 100% (21 Dec 2022 12:40) (95% - 100%)    Parameters below as of 21 Dec 2022 12:40  Patient On (Oxygen Delivery Method): room air        PHYSICAL EXAM:  General: [x ] non-toxic  HEAD/EYES: [ ] PERRL x[ ] white sclera [ ] icterus  ENT:  [ ] normal [ x] supple [ ] thrush [ ] pharyngeal exudate  Cardiovascular:   [ ] murmur [ x] normal [ ] PPM/AICD  Respiratory:  x[ ] clear to ausculation bilaterally  GI:  [ x] soft, non-tender, normal bowel sounds  :  [ ] velasquez [ ] no CVA tenderness   Musculoskeletal:  [ ] no synovitis  Neurologic:  [ ] non-focal exam   Skin:  [ x] no rash  Lymph: [ ] no lymphadenopathy  Psychiatric:  [x ] appropriate affect [ ] alert & oriented  Lines:  [x ] no phlebitis [ ] central line                                8.1    9.71  )-----------( 270      ( 20 Dec 2022 05:57 )             29.6       12-20    146<H>  |  94<L>  |  19  ----------------------------<  126<H>  4.1   |  46<HH>  |  0.69    Ca    10.2      20 Dec 2022 05:57  Phos  3.3     12-20  Mg     1.90     12-20    TPro  7.2  /  Alb  3.2<L>  /  TBili  <0.2  /  DBili  x   /  AST  9   /  ALT  7   /  AlkPhos  114  12-20          MICROBIOLOGY:Culture Results:   No growth to date. (12-17-22 @ 06:40)  Culture Results:   No growth to date. (12-17-22 @ 06:25)  Culture Results:   No staphylococcus aureus isolated.  "PCR is more Sensitive for identifying MRSA/MSSA." (12-16-22 @ 11:11)  Culture Results:   Normal Respiratory Danielle present (12-15-22 @ 14:40)  Culture Results:   No staphylococcus aureus isolated.  "PCR is more Sensitive for identifying MRSA/MSSA." (12-15-22 @ 12:07)  Culture Results:   No staphylococcus aureus isolated.  "PCR is more Sensitive for identifying MRSA/MSSA." (12-15-22 @ 12:05)  Culture Results:   No growth (12-15-22 @ 11:45)  Culture Results:   No Growth Final (12-14-22 @ 18:00)  Culture Results:   No Growth Final (12-14-22 @ 18:00)      RADIOLOGY:

## 2022-12-21 NOTE — PROGRESS NOTE ADULT - PROBLEM SELECTOR PLAN 1
Hx of recurrent pleural effusions (s/p VATS, multiple thoracenteses/pigtail catheters - last 10/2022). Mildly hypervolemic on exam w/ pulmonary edema/pleural effusions on CXR. Unclear cause of acute worsening started 12/16. Ddx pulmonary edema/increased pulmonary HTN, superimposed infectious process, pneumonitis from recent chemotherapy.  - CTA chest without PE but showing worsening bilateral consolidations throughout both lungs and stable bi/l pleural effusions; likely multifocal infection vs. edema vs most likely tumor progression  - repeat TTE, risk of cardiotoxicity with doxorubicin  - Xopenex inhaler q6h pRN  - Thoracic surgery consult to eval pleural effusions, possible need for thoracentesis - after reviewing CT they feel not indicated at this time  - worsen SOB and WOB starting 12/16, MICU consulted and rejected at this time  -- placed on BIPAP (12 over 5) with improvement in respiratory distress and hypercapnia  -- SoluMedrol 40mg IVP BID per Pulm  - -- antibiotics escalated to Zosyn 12/16 -12/19 given 5 days of abx total course           - treatment for CAP initially given CTA chest findings; repeat Bcx, MRSA both negative; Scx with respiratory hema, Ucx neg  - ID consulted for possible empiric PCP coverage in s/o immunocompromised state & possible infectious source of lung imaging Hx of recurrent pleural effusions (s/p VATS, multiple thoracenteses/pigtail catheters - last 10/2022). Mildly hypervolemic on exam w/ pulmonary edema/pleural effusions on CXR. Unclear cause of acute worsening started 12/16. Ddx pulmonary edema/increased pulmonary HTN, superimposed infectious process, pneumonitis from recent chemotherapy.  - CTA chest without PE but showing worsening bilateral consolidations throughout both lungs and stable bi/l pleural effusions; likely multifocal infection vs. edema vs most likely tumor progression  - repeat TTE, risk of cardiotoxicity with doxorubicin  - Xopenex inhaler q6h pRN  - Thoracic surgery consult to eval pleural effusions, possible need for thoracentesis - after reviewing CT they feel not indicated at this time  - worsen SOB and WOB starting 12/16, MICU consulted and rejected at this time  -- placed on BIPAP (12 over 5) with improvement in respiratory distress and hypercapnia  -- SoluMedrol 40mg IVP BID per Pulm  - -- antibiotics escalated to Zosyn 12/16 -12/19 given 5 days of abx total course           - treatment for CAP initially given CTA chest findings; repeat Bcx, MRSA both negative; Scx with respiratory hema, Ucx neg  - ID consulted for possible empiric PCP coverage in s/o immunocompromised state & possible infectious source of lung imaging: low suspicion for PCP; Check serum Galactomannan, serum Fungitell, serum LDH                    - Atovaquone 750mg BID

## 2022-12-21 NOTE — PROGRESS NOTE ADULT - SUBJECTIVE AND OBJECTIVE BOX
PULMONARY PROGRESS NOTE    ALEXANDER HUDDLESTON  MRN-[id number]    Patient is a 57y old  Female who presents with a chief complaint of Hypoxia (21 Dec 2022 12:50)      HPI:  -awake on 5L  feels tired  denies cough    ROS:   -    ACTIVE MEDICATION LIST:  MEDICATIONS  (STANDING):  atovaquone  Suspension 750 milliGRAM(s) Oral every 12 hours  chlorhexidine 2% Cloths 1 Application(s) Topical daily  enoxaparin Injectable 40 milliGRAM(s) SubCutaneous every 24 hours  influenza   Vaccine 0.5 milliLiter(s) IntraMuscular once  methylPREDNISolone sodium succinate Injectable 40 milliGRAM(s) IV Push two times a day  pantoprazole    Tablet 40 milliGRAM(s) Oral before breakfast  polyethylene glycol 3350 17 Gram(s) Oral daily    MEDICATIONS  (PRN):  acetaminophen     Tablet .. 650 milliGRAM(s) Oral every 6 hours PRN Temp greater or equal to 38C (100.4F), Mild Pain (1 - 3)  calcium carbonate    500 mG (Tums) Chewable 2 Tablet(s) Chew every 4 hours PRN Heartburn  guaiFENesin Oral Liquid (Sugar-Free) 100 milliGRAM(s) Oral every 6 hours PRN Cough  hydrocodone/homatropine Syrup 5 milliLiter(s) Oral every 8 hours PRN Cough  levalbuterol Inhalation 0.63 milliGRAM(s) Inhalation every 6 hours PRN SOB/Wheezing  morphine  - Injectable 1 milliGRAM(s) IV Push every 3 hours PRN Severe Pain (7 - 10)/tachypnea/dyspnea      EXAM:  Vital Signs Last 24 Hrs  T(C): 36.7 (21 Dec 2022 12:40), Max: 36.7 (21 Dec 2022 12:40)  T(F): 98 (21 Dec 2022 12:40), Max: 98 (21 Dec 2022 12:40)  HR: 123 (21 Dec 2022 12:40) (82 - 123)  BP: 129/87 (21 Dec 2022 12:40) (127/82 - 129/87)  BP(mean): --  RR: 19 (21 Dec 2022 12:40) (19 - 20)  SpO2: 100% (21 Dec 2022 12:40) (95% - 100%)    Parameters below as of 21 Dec 2022 12:40  Patient On (Oxygen Delivery Method): room air        GENERAL: The patient is awake and alert   poor air entry  not wheezing  she is tachycardic  mildly tachypneic                         8.1    9.71  )-----------( 270      ( 20 Dec 2022 05:57 )             29.6       12-20    146<H>  |  94<L>  |  19  ----------------------------<  126<H>  4.1   |  46<HH>  |  0.69    Ca    10.2      20 Dec 2022 05:57  Phos  3.3     12-20  Mg     1.90     12-20    TPro  7.2  /  Alb  3.2<L>  /  TBili  <0.2  /  DBili  x   /  AST  9   /  ALT  7   /  AlkPhos  114  12-20    < from: CT Angio Chest PE Protocol w/ IV Cont (12.16.22 @ 12:31) >    ACC: [id number] EXAM:  CT ANGIO CHEST PULM ART [facility]                          PROCEDURE DATE:  12/16/2022          INTERPRETATION:  Reason for Exam: Shortness of breath. chest pain.    CTA of the chest was performed from the thoracic inlet to the level of   the adrenal glands following IV contrast injection of  80 cc of Omnipaque   350. No immediate complications were reported.  MIP images were also   created and reviewed.    Comparison: November 28, 2022    Tubes/Lines: None    Mediastinum and Heart: Aorta and pulmonary arteries are normal in size.   Thyroid gland is unremarkable. No lymphadenopathy. No pericardial   effusion.    Lungs, Pleura, and Airways: There is no pulmonary embolus. Increasing   bilateral lung consolidation when compared with the previous exam.   Additionally there are bilateral pleural effusions, portions of which are   loculated within the major fissures bilaterally.    Visualized Abdomen: Intra-abdominal ascites noted, unchanged.    Bones and soft tissues: Unremarkable.    IMPRESSION:    No pulmonary embolus.    Worsening bilateral consolidations throughout both lungs when compared   with prior exam. This may be due to multifocal infection, edema, or drug   reaction.    Stable bilateral pleural effusions.    --- End of Report ---            QUEENIE GUALLPA MD; Attending Radiologist    < end of copied text >  >>> <<<    PROBLEM LIST:  57y Female with HEALTH ISSUES - PROBLEM Dx:  Acute respiratory failure with hypoxia    Prophylactic measure    Uterine cancer    SIRS (systemic inflammatory response syndrome)    Anemia    Uterine cancer  S/p chemotherapy, radiation and hysterectomy    Palliative care encounter           RECS:  on steroids for suspected pneumonitis  on empiric pcp meds  i tapered  02 down to 4L  on dvt prophylaxis  s/p abx for bacterial pna   on bpap for work of breathing  the radiographic findings could be her cancer  would have palliative/GOC discussion with family      Please call with any questions.    Kassidy Tracy DO  [facility] Pulmonary/Sleep Medicine  [phone]

## 2022-12-21 NOTE — PROGRESS NOTE ADULT - SUBJECTIVE AND OBJECTIVE BOX
Internal Medicine   Titi Kidd| PGY-1    OVERNIGHT EVENTS:      SUBJECTIVE:       MEDICATIONS  (STANDING):  atovaquone  Suspension 750 milliGRAM(s) Oral every 12 hours  chlorhexidine 2% Cloths 1 Application(s) Topical daily  enoxaparin Injectable 40 milliGRAM(s) SubCutaneous every 24 hours  influenza   Vaccine 0.5 milliLiter(s) IntraMuscular once  methylPREDNISolone sodium succinate Injectable 40 milliGRAM(s) IV Push two times a day  pantoprazole    Tablet 40 milliGRAM(s) Oral before breakfast  polyethylene glycol 3350 17 Gram(s) Oral daily    MEDICATIONS  (PRN):  acetaminophen     Tablet .. 650 milliGRAM(s) Oral every 6 hours PRN Temp greater or equal to 38C (100.4F), Mild Pain (1 - 3)  calcium carbonate    500 mG (Tums) Chewable 2 Tablet(s) Chew every 4 hours PRN Heartburn  guaiFENesin Oral Liquid (Sugar-Free) 100 milliGRAM(s) Oral every 6 hours PRN Cough  hydrocodone/homatropine Syrup 5 milliLiter(s) Oral every 8 hours PRN Cough  levalbuterol Inhalation 0.63 milliGRAM(s) Inhalation every 6 hours PRN SOB/Wheezing  morphine  - Injectable 1 milliGRAM(s) IV Push every 3 hours PRN Severe Pain (7 - 10)/tachypnea/dyspnea        T(F): 97.5 (12-20-22 @ 11:15), Max: 97.5 (12-20-22 @ 11:15)  HR: 122 (12-21-22 @ 03:52) (82 - 122)  BP: 122/80 (12-20-22 @ 11:15) (122/80 - 122/80)  BP(mean): --  RR: 19 (12-20-22 @ 11:15) (19 - 19)  SpO2: 97% (12-21-22 @ 03:52) (96% - 100%)    PHYSICAL EXAM:     GENERAL: NAD, lying in bed comfortably.  HEAD:  Atraumatic, normocephalic.  EYES: EOMI, PERRLA, conjunctiva and sclera clear, no nystagmus noted.  ENT: Moist mucous membranes,   NECK: Supple. No JVD. Trachea midline.  CHEST/LUNG: CTAB. No rales, rhonchi, wheezing, or rubs. Unlabored respirations.  HEART: RRR, no M/R/G, normal S1/S2.  ABDOMEN: Soft, nontender, nondistended, no organomegaly. Normoactive bowel sounds.  EXTREMITIES:  2+ peripheral pulses b/l, brisk capillary refill. No clubbing, cyanosis, or edema.  MSK: No gross deformities noted.   NEURO:  AAOx3, no focal deficits.   SKIN: No rashes or lesions.  PSYCH: Normal mood, affect.    TELEMETRY:    LABS:                        8.1    9.71  )-----------( 270      ( 20 Dec 2022 05:57 )             29.6     12-20    146<H>  |  94<L>  |  19  ----------------------------<  126<H>  4.1   |  46<HH>  |  0.69    Ca    10.2      20 Dec 2022 05:57  Phos  3.3     12-20  Mg     1.90     12-20    TPro  7.2  /  Alb  3.2<L>  /  TBili  <0.2  /  DBili  x   /  AST  9   /  ALT  7   /  AlkPhos  114  12-20            Creatinine Trend: 0.69<--, 0.67<--, 0.81<--, 0.85<--, 0.84<--, 0.81<--  I&O's Summary    20 Dec 2022 07:01  -  21 Dec 2022 07:00  --------------------------------------------------------  IN: 1154 mL / OUT: 1700 mL / NET: -546 mL      BNP    RADIOLOGY & ADDITIONAL STUDIES:             Internal Medicine   Titi Kidd| PGY-1    OVERNIGHT EVENTS:  No acute overnight events     SUBJECTIVE:   Feels well on NC this AM  Nervous  But denies headache, dizziness, weakness, SOB, c/p    MEDICATIONS  (STANDING):  atovaquone  Suspension 750 milliGRAM(s) Oral every 12 hours  chlorhexidine 2% Cloths 1 Application(s) Topical daily  enoxaparin Injectable 40 milliGRAM(s) SubCutaneous every 24 hours  influenza   Vaccine 0.5 milliLiter(s) IntraMuscular once  methylPREDNISolone sodium succinate Injectable 40 milliGRAM(s) IV Push two times a day  pantoprazole    Tablet 40 milliGRAM(s) Oral before breakfast  polyethylene glycol 3350 17 Gram(s) Oral daily    MEDICATIONS  (PRN):  acetaminophen     Tablet .. 650 milliGRAM(s) Oral every 6 hours PRN Temp greater or equal to 38C (100.4F), Mild Pain (1 - 3)  calcium carbonate    500 mG (Tums) Chewable 2 Tablet(s) Chew every 4 hours PRN Heartburn  guaiFENesin Oral Liquid (Sugar-Free) 100 milliGRAM(s) Oral every 6 hours PRN Cough  hydrocodone/homatropine Syrup 5 milliLiter(s) Oral every 8 hours PRN Cough  levalbuterol Inhalation 0.63 milliGRAM(s) Inhalation every 6 hours PRN SOB/Wheezing  morphine  - Injectable 1 milliGRAM(s) IV Push every 3 hours PRN Severe Pain (7 - 10)/tachypnea/dyspnea        T(F): 97.5 (12-20-22 @ 11:15), Max: 97.5 (12-20-22 @ 11:15)  HR: 122 (12-21-22 @ 03:52) (82 - 122)  BP: 122/80 (12-20-22 @ 11:15) (122/80 - 122/80)  BP(mean): --  RR: 19 (12-20-22 @ 11:15) (19 - 19)  SpO2: 97% (12-21-22 @ 03:52) (96% - 100%)    PHYSICAL EXAM:     GENERAL: NAD, lying in bed comfortably.  HEAD:  Atraumatic, normocephalic.  EYES: EOMI, PERRLA, conjunctiva and sclera clear, no nystagmus noted.  ENT: Moist mucous membranes,   NECK: Supple. No JVD. Trachea midline.  CHEST/LUNG: low lung volume air movement; relatively clear breath sounds  HEART: RRR, no M/R/G, normal S1/S2.  ABDOMEN: Soft, nontender, nondistended, no organomegaly. Normoactive bowel sounds.  EXTREMITIES:  2+ peripheral pulses b/l, brisk capillary refill. No clubbing, cyanosis, or edema.  MSK: No gross deformities noted.   NEURO:  AAOx3, no focal deficits.   SKIN: No rashes or lesions.  PSYCH: Normal mood, affect.    TELEMETRY:    LABS:                        8.1    9.71  )-----------( 270      ( 20 Dec 2022 05:57 )             29.6     12-20    146<H>  |  94<L>  |  19  ----------------------------<  126<H>  4.1   |  46<HH>  |  0.69    Ca    10.2      20 Dec 2022 05:57  Phos  3.3     12-20  Mg     1.90     12-20    TPro  7.2  /  Alb  3.2<L>  /  TBili  <0.2  /  DBili  x   /  AST  9   /  ALT  7   /  AlkPhos  114  12-20            Creatinine Trend: 0.69<--, 0.67<--, 0.81<--, 0.85<--, 0.84<--, 0.81<--  I&O's Summary    20 Dec 2022 07:01  -  21 Dec 2022 07:00  --------------------------------------------------------  IN: 1154 mL / OUT: 1700 mL / NET: -546 mL      BNP    RADIOLOGY & ADDITIONAL STUDIES:

## 2022-12-21 NOTE — PROGRESS NOTE ADULT - PROBLEM SELECTOR PLAN 5
DVT PPx: Lovenox 40mg qD  Diet: thickened liq  Code: full code  Dispo: PT/OT Pending Hospital Course  Communication: brother Max Liao ([phone]). mom Nicole Liao ([phone]). Dashawn ([phone]). spoke to max 12/18  GOC: no defined HCP (Max, Dashawn, Nicole making decisions collectively). remains full code, patient aware of likely possibility that would not be extubated if requiring intubation

## 2022-12-22 NOTE — PROGRESS NOTE ADULT - PROBLEM SELECTOR PLAN 3
SIRS criteria (tachycardia, tachypnea). No clear source of infection though has ascites and pleural effusions. Procalcitonin elevated.  - f/u cultures as above  - f/u MRSA swab  - continue Zosyn as above (12/16-12/19) SIRS criteria (tachycardia, tachypnea). No clear source of infection though has ascites and pleural effusions. Procalcitonin elevated.  - f/u cultures as above  - f/u MRSA swab  - Zosyn as above (12/16-12/19)

## 2022-12-22 NOTE — PROGRESS NOTE ADULT - ASSESSMENT
57 year old with metastatic uterine cancer on taxol (progressed on prior regimens)  She presented with hypoxia.    Her CT imaging shows dense bilateral consolidation. Associated hypoxia  DDX is broad- disease progression, pneumonitis, less likely PCP  CT imaging is not typical of PCP.    She was treated empirically with antibiotics (cefepime/ zosyn)    Now on steroids and PCP treatment with improvement    Serum LDL is normal   Galactomannan and fungitell are normal   Check sputum culture and sputum for pcp pcr if feasible    Mepron 750 mg po q 12 for three weeks  If on steroids beyond 3 weeks, wiould then need PCP prophylaxis    Overall, prognosis is guarded

## 2022-12-22 NOTE — PROGRESS NOTE ADULT - ASSESSMENT
57y Female  with PMH of  met uterine cancer c/b pleural effusions s/p carbo/taxol, Keytruda Avastin and now most recently on Doxil (cycle 3 11/16) presenting with hypoxia , sating 69% on RA.  Admitted for Acute hypoxemic respiratory failure..Oncology consulted for further evaluation     CTA chest   No pulmonary embolus.  Worsening bilateral consolidations throughout both lungs when compared   with prior exam. This may be due to multifocal infection, edema, or drug   reaction.    -Pulm consult appreciated, on abx for PJP and on steroids for ?pneumonitis  -Respiratory status improved, decreased o2 demand  -Pt has worsening performance status and not a candidate for cancer directed therapy at this time. Decision on treatment will be based on performance status.    -Supportive care, pain control, Nutrition, PT, DVT ppx  -Outpatient oncology f/u    Will follow. Please do not hesitate to call back with questions.     Marilynn Watson MD  Medical Oncology Attending  C: [phone]     time spent on direct patient care, interdisciplinary discussions and chart review.

## 2022-12-22 NOTE — PROGRESS NOTE ADULT - SUBJECTIVE AND OBJECTIVE BOX
Follow Up:      Inverval History/ROS:Patient is a 57y old  Female who presents with a chief complaint of Hypoxia (22 Dec 2022 07:17)  on 2 Liters nasal canula    Allergies    No Known Allergies    Intolerances        ANTIMICROBIALS:  atovaquone  Suspension 750 every 12 hours      OTHER MEDS:  acetaminophen     Tablet .. 650 milliGRAM(s) Oral every 6 hours PRN  calcium carbonate    500 mG (Tums) Chewable 2 Tablet(s) Chew every 4 hours PRN  chlorhexidine 2% Cloths 1 Application(s) Topical daily  enoxaparin Injectable 40 milliGRAM(s) SubCutaneous every 24 hours  guaiFENesin Oral Liquid (Sugar-Free) 100 milliGRAM(s) Oral every 6 hours PRN  hydrocodone/homatropine Syrup 5 milliLiter(s) Oral every 8 hours PRN  influenza   Vaccine 0.5 milliLiter(s) IntraMuscular once  levalbuterol Inhalation 0.63 milliGRAM(s) Inhalation every 6 hours PRN  melatonin 3 milliGRAM(s) Oral at bedtime  methylPREDNISolone sodium succinate Injectable 40 milliGRAM(s) IV Push two times a day  morphine  - Injectable 1 milliGRAM(s) IV Push every 3 hours PRN  nystatin Powder 1 Application(s) Topical two times a day  pantoprazole    Tablet 40 milliGRAM(s) Oral before breakfast  polyethylene glycol 3350 17 Gram(s) Oral daily  sodium chloride 0.9%. 1000 milliLiter(s) IV Continuous <Continuous>      Vital Signs Last 24 Hrs  T(C): 36.9 (22 Dec 2022 05:18), Max: 37 (22 Dec 2022 00:55)  T(F): 98.4 (22 Dec 2022 05:18), Max: 98.6 (22 Dec 2022 00:55)  HR: 133 (22 Dec 2022 08:14) (104 - 133)  BP: 126/81 (22 Dec 2022 05:18) (126/81 - 143/84)  BP(mean): --  RR: 18 (22 Dec 2022 08:14) (18 - 20)  SpO2: 95% (22 Dec 2022 08:14) (93% - 100%)    Parameters below as of 22 Dec 2022 08:14  Patient On (Oxygen Delivery Method): nasal cannula  O2 Flow (L/min): 1      PHYSICAL EXAM:  General: [ ] non-toxic  HEAD/EYES: [ ] PERRL [x ] white sclera [ ] icterus  ENT:  [ ] normal x[ ] supple [ ] thrush [ ] pharyngeal exudate  Cardiovascular:   [ ] murmur [ x] normal [ ] PPM/AICD  Respiratory:  [x ] clear to ausculation bilaterally  GI:  [ x] soft, non-tender, normal bowel sounds  :  [ ] velasquez [x ] no CVA tenderness   Musculoskeletal:  [ ] no synovitis  Neurologic:  [ ] non-focal exam   Skin:  [ x] no rash  Lymph: [ x] no lymphadenopathy  Psychiatric:  [ ] appropriate affect [ ] alert & oriented  Lines:  [ ]x no phlebitis [ ] central line                                9.5    13.60 )-----------( 327      ( 22 Dec 2022 06:37 )             33.6       12-22    146<H>  |  96<L>  |  23  ----------------------------<  135<H>  3.8   |  41<H>  |  0.73    Ca    11.7<H>      22 Dec 2022 06:37  Phos  3.0     12-22  Mg     1.20     12-22    TPro  7.8  /  Alb  3.8  /  TBili  0.3  /  DBili  x   /  AST  36<H>  /  ALT  33  /  AlkPhos  173<H>  12-22          MICROBIOLOGY:Culture Results:   No Growth Final (12-17-22 @ 06:40)  Culture Results:   No Growth Final (12-17-22 @ 06:25)  Culture Results:   No staphylococcus aureus isolated.  "PCR is more Sensitive for identifying MRSA/MSSA." (12-16-22 @ 11:11)      RADIOLOGY:

## 2022-12-22 NOTE — PROGRESS NOTE ADULT - PROBLEM SELECTOR PLAN 5
DVT PPx: Lovenox 40mg qD  Diet: thickened liq  Code: full code  Dispo: PT/OT Pending Hospital Course  Communication: brother Max Liao ([phone]). mom Nicole Liao ([phone]). Dashawn ([phone]). spoke to max 12/18  GOC: no defined HCP (Max, Dashawn, Nicole making decisions collectively). remains full code, patient aware of likely possibility that would not be extubated if requiring intubation

## 2022-12-22 NOTE — PROGRESS NOTE ADULT - SUBJECTIVE AND OBJECTIVE BOX
INTERVAL HPI/OVERNIGHT EVENTS:  Patient seen at bedside.  Feels and appears improved.   Slightly confused    VITAL SIGNS:  T(F): 98.4 (12-22-22 @ 05:18)  HR: 133 (12-22-22 @ 08:14)  BP: 126/81 (12-22-22 @ 05:18)  RR: 18 (12-22-22 @ 08:14)  SpO2: 95% (12-22-22 @ 08:14)  Wt(kg): --    PHYSICAL EXAM:    Constitutional: NAD, resting in bed comfortably  Eyes: EOMI, sclera non-icteric  Neck: supple, no LAP  Respiratory: CTA b/l, good air entry b/l, no wheezing, rhonchi or crackels  Cardiovascular: RRR, normal S1S2, no M/R/G  Gastrointestinal: soft, NTND  Extremities: no edema  Neurological: AAOx3, non focal  Skin: Normal temperature    MEDICATIONS  (STANDING):  atovaquone  Suspension 750 milliGRAM(s) Oral every 12 hours  chlorhexidine 2% Cloths 1 Application(s) Topical daily  enoxaparin Injectable 40 milliGRAM(s) SubCutaneous every 24 hours  influenza   Vaccine 0.5 milliLiter(s) IntraMuscular once  melatonin 3 milliGRAM(s) Oral at bedtime  methylPREDNISolone sodium succinate Injectable 40 milliGRAM(s) IV Push two times a day  nystatin Powder 1 Application(s) Topical two times a day  pantoprazole    Tablet 40 milliGRAM(s) Oral before breakfast  polyethylene glycol 3350 17 Gram(s) Oral daily  sodium chloride 0.9%. 1000 milliLiter(s) (75 mL/Hr) IV Continuous <Continuous>    MEDICATIONS  (PRN):  acetaminophen     Tablet .. 650 milliGRAM(s) Oral every 6 hours PRN Temp greater or equal to 38C (100.4F), Mild Pain (1 - 3)  calcium carbonate    500 mG (Tums) Chewable 2 Tablet(s) Chew every 4 hours PRN Heartburn  guaiFENesin Oral Liquid (Sugar-Free) 100 milliGRAM(s) Oral every 6 hours PRN Cough  hydrocodone/homatropine Syrup 5 milliLiter(s) Oral every 8 hours PRN Cough  levalbuterol Inhalation 0.63 milliGRAM(s) Inhalation every 6 hours PRN SOB/Wheezing  morphine  - Injectable 1 milliGRAM(s) IV Push every 3 hours PRN Severe Pain (7 - 10)/tachypnea/dyspnea      Allergies    No Known Allergies    Intolerances        LABS:                        9.5    13.60 )-----------( 327      ( 22 Dec 2022 06:37 )             33.6     12-22    146<H>  |  96<L>  |  23  ----------------------------<  135<H>  3.8   |  41<H>  |  0.73    Ca    11.7<H>      22 Dec 2022 06:37  Phos  3.0     12-22  Mg     1.20     12-22    TPro  7.8  /  Alb  3.8  /  TBili  0.3  /  DBili  x   /  AST  36<H>  /  ALT  33  /  AlkPhos  173<H>  12-22          RADIOLOGY & ADDITIONAL TESTS:  Studies reviewed.

## 2022-12-22 NOTE — PROGRESS NOTE ADULT - PROBLEM SELECTOR PLAN 1
Hx of recurrent pleural effusions (s/p VATS, multiple thoracenteses/pigtail catheters - last 10/2022). Mildly hypervolemic on exam w/ pulmonary edema/pleural effusions on CXR. Unclear cause of acute worsening started 12/16. Ddx pulmonary edema/increased pulmonary HTN, superimposed infectious process, pneumonitis from recent chemotherapy.  - CTA chest without PE but showing worsening bilateral consolidations throughout both lungs and stable bi/l pleural effusions; likely multifocal infection vs. edema vs most likely tumor progression  - repeat TTE, risk of cardiotoxicity with doxorubicin  - Xopenex inhaler q6h pRN  - Thoracic surgery consult to eval pleural effusions, possible need for thoracentesis - after reviewing CT they feel not indicated at this time  - worsen SOB and WOB starting 12/16, MICU consulted and rejected at this time  -- placed on BIPAP (12 over 5) with improvement in respiratory distress and hypercapnia  -- SoluMedrol 40mg IVP BID per Pulm  - -- antibiotics escalated to Zosyn 12/16 -12/19 given 5 days of abx total course           - treatment for CAP initially given CTA chest findings; repeat Bcx, MRSA both negative; Scx with respiratory hema, Ucx neg  - ID consulted for possible empiric PCP coverage in s/o immunocompromised state & possible infectious source of lung imaging: low suspicion for PCP; Check serum Galactomannan, serum Fungitell, serum LDH                    - Atovaquone 750mg BID Hx of recurrent pleural effusions (s/p VATS, multiple thoracenteses/pigtail catheters - last 10/2022). Mildly hypervolemic on exam w/ pulmonary edema/pleural effusions on CXR. Unclear cause of acute worsening started 12/16. Ddx pulmonary edema/increased pulmonary HTN, superimposed infectious process, pneumonitis from recent chemotherapy.  - CTA chest without PE but showing worsening bilateral consolidations throughout both lungs and stable bi/l pleural effusions; likely multifocal infection vs. edema vs most likely tumor progression  - repeat TTE, risk of cardiotoxicity with doxorubicin  - Xopenex inhaler q6h pRN  - Thoracic surgery consult to eval pleural effusions, possible need for thoracentesis - after reviewing CT they feel not indicated at this time  - worsen SOB and WOB starting 12/16, MICU consulted and rejected at this time  -- placed on BIPAP (12 over 5) with improvement in respiratory distress and hypercapnia  -- SoluMedrol 40mg IVP BID per Pulm  - -- antibiotics escalated to Zosyn 12/16 -12/19 given abx total course           - treatment for CAP initially given CTA chest findings; repeat Bcx, MRSA both negative; Scx with respiratory hema, Ucx neg  - ID consulted for possible empiric PCP coverage in s/o immunocompromised state & possible infectious source of lung imaging: low suspicion for PCP; Check serum Galactomannan, serum Fungitell, serum LDH                    - Atovaquone 750mg BID

## 2022-12-22 NOTE — PROGRESS NOTE ADULT - SUBJECTIVE AND OBJECTIVE BOX
Goal Outcome Evaluation:  Patient asleep for most of shift, only responding to painful stimuli. When asked any question patient only moans.     Scheduled medication given as ordered.    Internal Medicine   Titi Kidd| PGY-1    OVERNIGHT EVENTS:      SUBJECTIVE:       MEDICATIONS  (STANDING):  atovaquone  Suspension 750 milliGRAM(s) Oral every 12 hours  chlorhexidine 2% Cloths 1 Application(s) Topical daily  enoxaparin Injectable 40 milliGRAM(s) SubCutaneous every 24 hours  influenza   Vaccine 0.5 milliLiter(s) IntraMuscular once  magnesium sulfate  IVPB 2 Gram(s) IV Intermittent every 2 hours  melatonin 3 milliGRAM(s) Oral at bedtime  methylPREDNISolone sodium succinate Injectable 40 milliGRAM(s) IV Push two times a day  pantoprazole    Tablet 40 milliGRAM(s) Oral before breakfast  polyethylene glycol 3350 17 Gram(s) Oral daily  potassium phosphate IVPB 30 milliMole(s) IV Intermittent once    MEDICATIONS  (PRN):  acetaminophen     Tablet .. 650 milliGRAM(s) Oral every 6 hours PRN Temp greater or equal to 38C (100.4F), Mild Pain (1 - 3)  calcium carbonate    500 mG (Tums) Chewable 2 Tablet(s) Chew every 4 hours PRN Heartburn  guaiFENesin Oral Liquid (Sugar-Free) 100 milliGRAM(s) Oral every 6 hours PRN Cough  hydrocodone/homatropine Syrup 5 milliLiter(s) Oral every 8 hours PRN Cough  levalbuterol Inhalation 0.63 milliGRAM(s) Inhalation every 6 hours PRN SOB/Wheezing  morphine  - Injectable 1 milliGRAM(s) IV Push every 3 hours PRN Severe Pain (7 - 10)/tachypnea/dyspnea        T(F): 98.4 (12-22-22 @ 05:18), Max: 98.6 (12-22-22 @ 00:55)  HR: 114 (12-22-22 @ 05:18) (104 - 128)  BP: 126/81 (12-22-22 @ 05:18) (126/81 - 143/84)  BP(mean): --  RR: 19 (12-22-22 @ 05:18) (19 - 20)  SpO2: 95% (12-22-22 @ 05:18) (93% - 100%)    PHYSICAL EXAM:     GENERAL: NAD, lying in bed comfortably.  HEAD:  Atraumatic, normocephalic.  EYES: EOMI, PERRLA, conjunctiva and sclera clear, no nystagmus noted.  ENT: Moist mucous membranes,   NECK: Supple. No JVD. Trachea midline.  CHEST/LUNG: CTAB. No rales, rhonchi, wheezing, or rubs. Unlabored respirations.  HEART: RRR, no M/R/G, normal S1/S2.  ABDOMEN: Soft, nontender, nondistended, no organomegaly. Normoactive bowel sounds.  EXTREMITIES:  2+ peripheral pulses b/l, brisk capillary refill. No clubbing, cyanosis, or edema.  MSK: No gross deformities noted.   NEURO:  AAOx3, no focal deficits.   SKIN: No rashes or lesions.  PSYCH: Normal mood, affect.    TELEMETRY:    LABS:                        9.2    14.20 )-----------( 345      ( 21 Dec 2022 19:00 )             31.7     12-21    146<H>  |  96<L>  |  23  ----------------------------<  185<H>  3.7   |  39<H>  |  0.64    Ca    11.3<H>      21 Dec 2022 19:00  Phos  1.3     12-21  Mg     1.10     12-21    TPro  7.5  /  Alb  3.4  /  TBili  0.3  /  DBili  x   /  AST  49<H>  /  ALT  30  /  AlkPhos  164<H>  12-21            Creatinine Trend: 0.64<--, 0.69<--, 0.67<--, 0.81<--, 0.85<--, 0.84<--  I&O's Summary    21 Dec 2022 07:01  -  22 Dec 2022 07:00  --------------------------------------------------------  IN: 175 mL / OUT: 500 mL / NET: -325 mL      BNP    RADIOLOGY & ADDITIONAL STUDIES:             Internal Medicine   Titi Kidd| PGY-1    OVERNIGHT EVENTS:  No acute events overnight  Patient went down for CT yesterday but did not receive actual scan    SUBJECTIVE:   Feels okay today  Denies headache, dizziness, weakness, SOB, chest pain    MEDICATIONS  (STANDING):  atovaquone  Suspension 750 milliGRAM(s) Oral every 12 hours  chlorhexidine 2% Cloths 1 Application(s) Topical daily  enoxaparin Injectable 40 milliGRAM(s) SubCutaneous every 24 hours  influenza   Vaccine 0.5 milliLiter(s) IntraMuscular once  magnesium sulfate  IVPB 2 Gram(s) IV Intermittent every 2 hours  melatonin 3 milliGRAM(s) Oral at bedtime  methylPREDNISolone sodium succinate Injectable 40 milliGRAM(s) IV Push two times a day  pantoprazole    Tablet 40 milliGRAM(s) Oral before breakfast  polyethylene glycol 3350 17 Gram(s) Oral daily  potassium phosphate IVPB 30 milliMole(s) IV Intermittent once    MEDICATIONS  (PRN):  acetaminophen     Tablet .. 650 milliGRAM(s) Oral every 6 hours PRN Temp greater or equal to 38C (100.4F), Mild Pain (1 - 3)  calcium carbonate    500 mG (Tums) Chewable 2 Tablet(s) Chew every 4 hours PRN Heartburn  guaiFENesin Oral Liquid (Sugar-Free) 100 milliGRAM(s) Oral every 6 hours PRN Cough  hydrocodone/homatropine Syrup 5 milliLiter(s) Oral every 8 hours PRN Cough  levalbuterol Inhalation 0.63 milliGRAM(s) Inhalation every 6 hours PRN SOB/Wheezing  morphine  - Injectable 1 milliGRAM(s) IV Push every 3 hours PRN Severe Pain (7 - 10)/tachypnea/dyspnea        T(F): 98.4 (12-22-22 @ 05:18), Max: 98.6 (12-22-22 @ 00:55)  HR: 114 (12-22-22 @ 05:18) (104 - 128)  BP: 126/81 (12-22-22 @ 05:18) (126/81 - 143/84)  BP(mean): --  RR: 19 (12-22-22 @ 05:18) (19 - 20)  SpO2: 95% (12-22-22 @ 05:18) (93% - 100%)    PHYSICAL EXAM:     GENERAL: NAD, lying in bed comfortably.  HEAD:  Atraumatic, normocephalic.  EYES: EOMI, PERRLA, conjunctiva and sclera clear, no nystagmus noted.  ENT: Moist mucous membranes,   NECK: Supple. No JVD. Trachea midline.  CHEST/LUNG: CTAB. Low lung volumes; clear bilaterally  HEART: RRR, no M/R/G, normal S1/S2.  ABDOMEN: Soft, nontender, nondistended, no organomegaly. Normoactive bowel sounds.  EXTREMITIES:  2+ peripheral pulses b/l, brisk capillary refill. No clubbing, cyanosis, or edema.  MSK: No gross deformities noted.   NEURO:  AAOx3, no focal deficits.   SKIN: No rashes or lesions.  PSYCH: Normal mood, affect.    TELEMETRY:    LABS:                        9.2    14.20 )-----------( 345      ( 21 Dec 2022 19:00 )             31.7     12-21    146<H>  |  96<L>  |  23  ----------------------------<  185<H>  3.7   |  39<H>  |  0.64    Ca    11.3<H>      21 Dec 2022 19:00  Phos  1.3     12-21  Mg     1.10     12-21    TPro  7.5  /  Alb  3.4  /  TBili  0.3  /  DBili  x   /  AST  49<H>  /  ALT  30  /  AlkPhos  164<H>  12-21            Creatinine Trend: 0.64<--, 0.69<--, 0.67<--, 0.81<--, 0.85<--, 0.84<--  I&O's Summary    21 Dec 2022 07:01  -  22 Dec 2022 07:00  --------------------------------------------------------  IN: 175 mL / OUT: 500 mL / NET: -325 mL      BNP    RADIOLOGY & ADDITIONAL STUDIES:

## 2022-12-23 NOTE — PROGRESS NOTE ADULT - PROBLEM SELECTOR PLAN 1
Hx of recurrent pleural effusions (s/p VATS, multiple thoracenteses/pigtail catheters - last 10/2022). Mildly hypervolemic on exam w/ pulmonary edema/pleural effusions on CXR. Unclear cause of acute worsening started 12/16. Ddx pulmonary edema/increased pulmonary HTN, superimposed infectious process, pneumonitis from recent chemotherapy.  - CTA chest without PE but showing worsening bilateral consolidations throughout both lungs and stable bi/l pleural effusions; likely multifocal infection vs. edema vs most likely tumor progression  - repeat TTE, risk of cardiotoxicity with doxorubicin  - Xopenex inhaler q6h pRN  - Thoracic surgery consult to eval pleural effusions, possible need for thoracentesis - after reviewing CT they feel not indicated at this time  - worsen SOB and WOB starting 12/16, MICU consulted and rejected at this time  -- placed on BIPAP (12 over 5) with improvement in respiratory distress and hypercapnia  -- SoluMedrol 40mg IVP BID per Pulm  - -- antibiotics escalated to Zosyn 12/16 -12/19 given abx total course           - treatment for CAP initially given CTA chest findings; repeat Bcx, MRSA both negative; Scx with respiratory hema, Ucx neg  - ID consulted for possible empiric PCP coverage in s/o immunocompromised state & possible infectious source of lung imaging: low suspicion for PCP; Check serum Galactomannan, serum Fungitell, serum LDH                    - Atovaquone 750mg BID Exhibited confusion and lack of 12/21. In s/o cancer, concern for brain mets. Pt initally not amenable to CT head, but CT finally done 12/22  - back to baseline mentation  - CT head 12/22 normal, no mets seen

## 2022-12-23 NOTE — PROGRESS NOTE ADULT - PROBLEM SELECTOR PLAN 6
DVT PPx: Lovenox 40mg qD  Diet: thickened liq  Code: full code  Dispo: PT/OT Pending Hospital Course  Communication: brother Max Liao ([phone]). mom Nicole Liao ([phone]). Dashawn ([phone]). spoke to max 12/18  GOC: no defined HCP (Max, Dashawn, Nicole making decisions collectively). remains full code, patient aware of likely possibility that would not be extubated if requiring intubation

## 2022-12-23 NOTE — PROGRESS NOTE ADULT - PROBLEM SELECTOR PLAN 3
SIRS criteria (tachycardia, tachypnea). No clear source of infection though has ascites and pleural effusions. Procalcitonin elevated.  - f/u cultures as above  - f/u MRSA swab  - Zosyn as above (12/16-12/19) Hx of metastatic uterine cancer with peritoneal carcinomatosis.  - oncology following; unlikely to offer chemo  - was planned for chemotherapy (doxorubicin) 12/14/2022, as outpatient, last dose in 11/2022  - follow-up Palliative recs once they meet patient

## 2022-12-23 NOTE — GOALS OF CARE CONVERSATION - ADVANCED CARE PLANNING - CONVERSATION DETAILS
Referral to palliative care for complex decision making and symptom management in setting of advanced malignancy.  Pt has hx of metastatic uterine Cancer.  Pt is known to Dr. Celaya from [facility]. Per oncology documentation at [facility] at this time pt is not a candidate for further DMT due to poor functional status. Recommended PT to assess for post hospital rehab recommendation.  Patient and brother are both in agreement with sub acute rehab if recommended to help improve pt's performance status. Pt and family are hopeful that pt can continue to receive DMT to extend her life. Pt very reluctant to talk about her prognosis and does not wish to discuss advanced care planning at this time. Palliative care will continue to follow for on-going goals of care.

## 2022-12-23 NOTE — PROGRESS NOTE ADULT - SUBJECTIVE AND OBJECTIVE BOX
PULMONARY PROGRESS NOTE    ALEXANDER HUDDLESTON  MRN-[id number]    Patient is a 57y old  Female who presents with a chief complaint of Hypoxia (23 Dec 2022 13:01)      HPI:  -  -    ROS:   -    ACTIVE MEDICATION LIST:  MEDICATIONS  (STANDING):  atovaquone  Suspension 750 milliGRAM(s) Oral every 12 hours  chlorhexidine 2% Cloths 1 Application(s) Topical daily  enoxaparin Injectable 40 milliGRAM(s) SubCutaneous every 24 hours  influenza   Vaccine 0.5 milliLiter(s) IntraMuscular once  melatonin 3 milliGRAM(s) Oral at bedtime  methylPREDNISolone sodium succinate Injectable 40 milliGRAM(s) IV Push once  nystatin Powder 1 Application(s) Topical two times a day  pantoprazole    Tablet 40 milliGRAM(s) Oral before breakfast  polyethylene glycol 3350 17 Gram(s) Oral daily  sodium chloride 0.9%. 1000 milliLiter(s) (75 mL/Hr) IV Continuous <Continuous>    MEDICATIONS  (PRN):  acetaminophen     Tablet .. 650 milliGRAM(s) Oral every 6 hours PRN Temp greater or equal to 38C (100.4F), Mild Pain (1 - 3)  calcium carbonate    500 mG (Tums) Chewable 2 Tablet(s) Chew every 4 hours PRN Heartburn  guaiFENesin Oral Liquid (Sugar-Free) 100 milliGRAM(s) Oral every 6 hours PRN Cough  levalbuterol Inhalation 0.63 milliGRAM(s) Inhalation every 6 hours PRN SOB/Wheezing  morphine  - Injectable 1 milliGRAM(s) IV Push every 3 hours PRN Severe Pain (7 - 10)/tachypnea/dyspnea      EXAM:  Vital Signs Last 24 Hrs  T(C): 37.3 (23 Dec 2022 12:44), Max: 37.3 (23 Dec 2022 12:44)  T(F): 99.1 (23 Dec 2022 12:44), Max: 99.1 (23 Dec 2022 12:44)  HR: 112 (23 Dec 2022 15:31) (86 - 122)  BP: 131/83 (23 Dec 2022 12:44) (122/76 - 140/90)  BP(mean): --  RR: 18 (23 Dec 2022 12:44) (18 - 19)  SpO2: 94% (23 Dec 2022 15:31) (94% - 100%)    Parameters below as of 23 Dec 2022 12:44  Patient On (Oxygen Delivery Method): nasal cannula  O2 Flow (L/min): 2      GENERAL: The patient is awake and alert in no apparent distress.     LUNGS: Clear to auscultation without wheezing, rales or rhonchi; respirations unlabored    HEART: Regular rate and rhythm without murmur.                            8.3    12.06 )-----------( 253      ( 23 Dec 2022 06:39 )             28.2       12-23    141  |  96<L>  |  19  ----------------------------<  150<H>  3.5   |  36<H>  |  0.68    Ca    10.2      23 Dec 2022 06:39  Phos  3.0     12-23  Mg     1.40     12-23    TPro  6.5  /  Alb  3.2<L>  /  TBili  0.2  /  DBili  x   /  AST  27  /  ALT  30  /  AlkPhos  158<H>  12-23    >>> <<<    PROBLEM LIST:  57y Female with HEALTH ISSUES - PROBLEM Dx:  Acute respiratory failure with hypoxia    Prophylactic measure    Uterine cancer    SIRS (systemic inflammatory response syndrome)    Anemia    Uterine cancer  S/p chemotherapy, radiation and hysterectomy    Palliative care encounter    Altered mental state              RECS:        Please call with any questions.    Kassidy Tracy DO  [facility] Pulmonary/Sleep Medicine  [phone]   PULMONARY PROGRESS NOTE    ALEXANDER HUDDLESTON  MRN-[id number]    Patient is a 57y old  Female who presents with a chief complaint of Hypoxia (23 Dec 2022 13:01)      HPI:  -on 2L   -some cough she attributes to 'food going down wrong pipe'      ROS:   -    ACTIVE MEDICATION LIST:  MEDICATIONS  (STANDING):  atovaquone  Suspension 750 milliGRAM(s) Oral every 12 hours  chlorhexidine 2% Cloths 1 Application(s) Topical daily  enoxaparin Injectable 40 milliGRAM(s) SubCutaneous every 24 hours  influenza   Vaccine 0.5 milliLiter(s) IntraMuscular once  melatonin 3 milliGRAM(s) Oral at bedtime  methylPREDNISolone sodium succinate Injectable 40 milliGRAM(s) IV Push once  nystatin Powder 1 Application(s) Topical two times a day  pantoprazole    Tablet 40 milliGRAM(s) Oral before breakfast  polyethylene glycol 3350 17 Gram(s) Oral daily  sodium chloride 0.9%. 1000 milliLiter(s) (75 mL/Hr) IV Continuous <Continuous>    MEDICATIONS  (PRN):  acetaminophen     Tablet .. 650 milliGRAM(s) Oral every 6 hours PRN Temp greater or equal to 38C (100.4F), Mild Pain (1 - 3)  calcium carbonate    500 mG (Tums) Chewable 2 Tablet(s) Chew every 4 hours PRN Heartburn  guaiFENesin Oral Liquid (Sugar-Free) 100 milliGRAM(s) Oral every 6 hours PRN Cough  levalbuterol Inhalation 0.63 milliGRAM(s) Inhalation every 6 hours PRN SOB/Wheezing  morphine  - Injectable 1 milliGRAM(s) IV Push every 3 hours PRN Severe Pain (7 - 10)/tachypnea/dyspnea      EXAM:  Vital Signs Last 24 Hrs  T(C): 37.3 (23 Dec 2022 12:44), Max: 37.3 (23 Dec 2022 12:44)  T(F): 99.1 (23 Dec 2022 12:44), Max: 99.1 (23 Dec 2022 12:44)  HR: 112 (23 Dec 2022 15:31) (86 - 122)  BP: 131/83 (23 Dec 2022 12:44) (122/76 - 140/90)  BP(mean): --  RR: 18 (23 Dec 2022 12:44) (18 - 19)  SpO2: 94% (23 Dec 2022 15:31) (94% - 100%)    Parameters below as of 23 Dec 2022 12:44  Patient On (Oxygen Delivery Method): nasal cannula  O2 Flow (L/min): 2      GENERAL: The patient is awake , answering qx appropriately today       LUNGS:  mild improvement in aeration in upper lugn fields  tachypneic  HEART: tachy                            8.3    12.06 )-----------( 253      ( 23 Dec 2022 06:39 )             28.2       12-23    141  |  96<L>  |  19  ----------------------------<  150<H>  3.5   |  36<H>  |  0.68    Ca    10.2      23 Dec 2022 06:39  Phos  3.0     12-23  Mg     1.40     12-23    TPro  6.5  /  Alb  3.2<L>  /  TBili  0.2  /  DBili  x   /  AST  27  /  ALT  30  /  AlkPhos  158<H>  12-23    < from: CT Head w/wo IV Cont (12.22.22 @ 20:06) >    ACC: [id number] EXAM:  CT BRAIN [facility]                          PROCEDURE DATE:  12/22/2022          INTERPRETATION:  INDICATIONS:  AMS with metastatic uterine cancer.    TECHNIQUE:  Serial axial images were obtained from the skull base to the   vertex before and following intravenous contrast administration. Coronal   and sagittal noncontrast reformatted images were obtained. 90 cc   omnipaque 350 were administered. 10 cc were discarded.    COMPARISON EXAMINATION: None.    FINDINGS:  VENTRICLES AND SULCI:  Normal.  INTRA-AXIAL:  No mass, blood, abnormal attenuation or abnormal contrast   enhancement is seen.  EXTRA-AXIAL:  No mass, collection or abnormal contrast enhancement is   seen.  VISUALIZED SINUSES:  Clear.  VISUALIZED MASTOIDS:  Clear.  CALVARIUM:   Normal.  MISCELLANEOUS:  None.    IMPRESSION:  Normal CT of the brain.    --- End of Report ---            FRANCISCO NAVARRETE MD; Attending Radiologist  This document has been electronically signed. Dec 23 2022  9:05AM    < end of copied text >  < from: CT Angio Chest PE Protocol w/ IV Cont (12.16.22 @ 12:31) >    ACC: [id number] EXAM:  CT ANGIO CHEST PULM ART [facility]                          PROCEDURE DATE:  12/16/2022          INTERPRETATION:  Reason for Exam: Shortness of breath. chest pain.    CTA of the chest was performed from the thoracic inlet to the level of   the adrenal glands following IV contrast injection of  80 cc of Omnipaque   350. No immediate complications were reported.  MIP images were also   created and reviewed.    Comparison: November 28, 2022    Tubes/Lines: None    Mediastinum and Heart: Aorta and pulmonary arteries are normal in size.   Thyroid gland is unremarkable. No lymphadenopathy. No pericardial   effusion.    Lungs, Pleura, and Airways: There is no pulmonary embolus. Increasing   bilateral lung consolidation when compared with the previous exam.   Additionally there are bilateral pleural effusions, portions of which are   loculated within the major fissures bilaterally.    Visualized Abdomen: Intra-abdominal ascites noted, unchanged.    Bones and soft tissues: Unremarkable.    IMPRESSION:    No pulmonary embolus.    Worsening bilateral consolidations throughout both lungs when compared   with prior exam. This may be due to multifocal infection, edema, or drug   reaction.    Stable bilateral pleural effusions.    --- End of Report ---            QUEENIE GUALLPA MD; Attending Radiologist  This document has been electronically signed. Dec 16 2022  1:34PM    < end of copied text >      PROBLEM LIST:  57y Female with HEALTH ISSUES - PROBLEM Dx:  Acute respiratory failure with hypoxia    Prophylactic measure    Uterine cancer    SIRS (systemic inflammatory response syndrome)    Anemia    Uterine cancer  S/p chemotherapy, radiation and hysterectomy    Palliative care encounter    Altered mental state              RECS:  would complete 5 dyas ofsolumedrol 40mg IVP BID  then would do solumedrol daily prior to a long prednisone course  oral exam? thrush?  continue PPI  heme onco input noted  she remains tachy/tachypneic - low threshold for ICU  continue empiric bactrim treatment, and if discharged, unless ID disagrees- would continue her on it outpatient  asked her to go back on bpap for work of breathing but she is currently refusing  would suggest trying to obtain  NIVV for discharge by CM team    Please call with any questions.    Kassidy Tracy, DO  [facility] Pulmonary/Sleep Medicine  [phone]

## 2022-12-23 NOTE — PROGRESS NOTE ADULT - PROBLEM SELECTOR PLAN 5
DVT PPx: Lovenox 40mg qD  Diet: thickened liq  Code: full code  Dispo: PT/OT Pending Hospital Course  Communication: brother Max Liao ([phone]). mom Nicole Liao ([phone]). Dashawn ([phone]). spoke to max 12/18  GOC: no defined HCP (Max, Dsahawn, Nicole making decisions collectively). remains full code, patient aware of likely possibility that would not be extubated if requiring intubation Hgb 9.3 on admission (baseline 9-10). Likely i/s/o malignancy and chemotherapy.  - Continue to monitor with daily CBC  - Transfuse goal >7

## 2022-12-23 NOTE — CHART NOTE - NSCHARTNOTEFT_GEN_A_CORE
Source: Patient [x]     other [x] medical chart   Diet rx: Regular (12-20-22 @ 16:03) [Active]    Pt 58 yo female with Acute hypoxic hypercarbic respiratory failure likely 2/2 lung mets - per chart review.     At time of visit, Pt awake, alert. Per Pt, her appetite getting better; food preferences discussed. Of note, Pt passed Cinesophagram (12/20). SLP rec: Regular with thin liquids (12/20). No report of nausea, vomiting @ this time; +diarrhea x few days reported. Add Lactobacillus Acidophilus (probiotic supplementation) to promote improved gut function. RDN answered questions/concerns related to diet. RDN remains available, Pt made aware.     Pt's height: 65" (12/14)    IBW: 125#+/-10%       Pt's weight: 83.3 kg (12/14)   Pertinent Medications: Lovenox, Tums, Protonix, Miralax, Solu-Medrol,    Pertinent Labs: (12/23) .6 H, H/H 8.3/28.2 L, Albumin 3.2 L Cl 96 L, Glu 150 H, Mg 1.40 L,  H   Skin: 1+edema to L leg/foot/ankle, per flow sheet      Estimated Needs: [x] no change since previous assessment  Previous Nutrition Diagnosis: [x] Malnutrition, severe    Nutrition Diagnosis is [x] ongoing     Nutrition Interventions/Recommendations:   1. Encourage & assist Pt with meals; Monitor PO diet tolerance; Honor food preferences;   2. Add PO supplement: Ensure Plus  ml (350 kcal, 20 gm Protein) 2x bottle/daily;  3. Add Multivitamins 1 tab daily for micronutrient coverage;   4. Monitor labs, weekly weights, hydration status;

## 2022-12-23 NOTE — PROGRESS NOTE ADULT - PROBLEM SELECTOR PLAN 4
Hgb 9.3 on admission (baseline 9-10). Likely i/s/o malignancy and chemotherapy.  - Continue to monitor with daily CBC  - Transfuse goal >7 SIRS criteria (tachycardia, tachypnea). No clear source of infection though has ascites and pleural effusions. Procalcitonin elevated.  - f/u cultures as above  - f/u MRSA swab  - Zosyn as above (12/16-12/19)

## 2022-12-23 NOTE — PROGRESS NOTE ADULT - PROBLEM SELECTOR PLAN 2
Hx of metastatic uterine cancer with peritoneal carcinomatosis.  - oncology following; unlikely to offer chemo  - was planned for chemotherapy (doxorubicin) 12/14/2022, as outpatient, last dose in 11/2022  - follow-up Palliative recs once they meet patient Hx of recurrent pleural effusions (s/p VATS, multiple thoracenteses/pigtail catheters - last 10/2022). Mildly hypervolemic on exam w/ pulmonary edema/pleural effusions on CXR. Unclear cause of acute worsening started 12/16. Ddx pulmonary edema/increased pulmonary HTN, superimposed infectious process, pneumonitis from recent chemotherapy.  - CTA chest without PE but showing worsening bilateral consolidations throughout both lungs and stable bi/l pleural effusions; likely multifocal infection vs. edema vs most likely tumor progression  - repeat TTE, risk of cardiotoxicity with doxorubicin  - Xopenex inhaler q6h pRN  - Thoracic surgery consult to eval pleural effusions, possible need for thoracentesis - after reviewing CT they feel not indicated at this time  - worsen SOB and WOB starting 12/16, MICU consulted and rejected at this time  -- able to be weaned to 2L O2 currently  -- SoluMedrol 40mg IVP BID per Pulm  - -- antibiotics escalated to Zosyn 12/16 -12/19 given abx total course           - treatment for CAP initially given CTA chest findings; repeat Bcx, MRSA both negative; Scx with respiratory hema, Ucx neg  - ID consulted for possible empiric PCP coverage in s/o immunocompromised state & possible infectious source of lung imaging: low suspicion for PCP; Check serum Galactomannan, serum Fungitell, serum LDH                    - Atovaquone 750mg BID  - can think about d/c in s/o more stabilization of respiratory status

## 2022-12-24 NOTE — PHYSICAL THERAPY INITIAL EVALUATION ADULT - ADDITIONAL COMMENTS
Pt states she has been living with her mother in a house with 5 steps to enter, resides on the main floor. Prior to admission pt reports being independent in ADLs and ambulation without a device. Pt states she previously lived alone in an apartment but will not be returning there.    Following evaluation, pt was left semireclined in bed in no distress, all lines in tact, call kaminski in reach. EMANUEL Lucio aware.

## 2022-12-24 NOTE — PROGRESS NOTE ADULT - PROBLEM SELECTOR PLAN 2
Hx of recurrent pleural effusions (s/p VATS, multiple thoracenteses/pigtail catheters - last 10/2022). Mildly hypervolemic on exam w/ pulmonary edema/pleural effusions on CXR. Unclear cause of acute worsening started 12/16. Ddx pulmonary edema/increased pulmonary HTN, superimposed infectious process, pneumonitis from recent chemotherapy.  - CTA chest without PE but showing worsening bilateral consolidations throughout both lungs and stable bi/l pleural effusions; likely multifocal infection vs. edema vs most likely tumor progression  - repeat TTE, risk of cardiotoxicity with doxorubicin  - Xopenex inhaler q6h pRN  - Thoracic surgery consult to eval pleural effusions, possible need for thoracentesis - after reviewing CT they feel not indicated at this time  - worsen SOB and WOB starting 12/16, MICU consulted and rejected at this time  -- able to be weaned to 2L O2 currently  -- SoluMedrol 40mg IVP BID per Pulm  - -- antibiotics escalated to Zosyn 12/16 -12/19 given abx total course           - treatment for CAP initially given CTA chest findings; repeat Bcx, MRSA both negative; Scx with respiratory hema, Ucx neg  - ID consulted for possible empiric PCP coverage in s/o immunocompromised state & possible infectious source of lung imaging: low suspicion for PCP; Check serum Galactomannan, serum Fungitell, serum LDH                    - Atovaquone 750mg BID  - can think about d/c in s/o more stabilization of respiratory status Hx of metastatic uterine cancer with peritoneal carcinomatosis.  - oncology following; unlikely to offer chemo  - was planned for chemotherapy (doxorubicin) 12/14/2022, as outpatient, last dose in 11/2022  - follow-up Palliative recs once they meet patient

## 2022-12-24 NOTE — PROGRESS NOTE ADULT - PROBLEM SELECTOR PLAN 4
SIRS criteria (tachycardia, tachypnea). No clear source of infection though has ascites and pleural effusions. Procalcitonin elevated.  - f/u cultures as above  - f/u MRSA swab  - Zosyn as above (12/16-12/19)

## 2022-12-24 NOTE — PROGRESS NOTE ADULT - PROBLEM SELECTOR PLAN 6
DVT PPx: Lovenox 40mg qD  Diet: thickened liq  Code: full code  Dispo: PT/OT Pending Hospital Course  Communication: brother Max Liao ([phone]). mom Nicole Liao ([phone]). Dashawn ([phone]). spoke to max 12/18  GOC: no defined HCP (Max, Dashawn, Nicole making decisions collectively). remains full code, patient aware of likely possibility that would not be extubated if requiring intubation

## 2022-12-24 NOTE — PROGRESS NOTE ADULT - SUBJECTIVE AND OBJECTIVE BOX
Internal Medicine   Titi Kidd| PGY-1    OVERNIGHT EVENTS:      SUBJECTIVE:       MEDICATIONS  (STANDING):  atovaquone  Suspension 750 milliGRAM(s) Oral every 12 hours  chlorhexidine 2% Cloths 1 Application(s) Topical daily  enoxaparin Injectable 40 milliGRAM(s) SubCutaneous every 24 hours  influenza   Vaccine 0.5 milliLiter(s) IntraMuscular once  melatonin 3 milliGRAM(s) Oral at bedtime  methylPREDNISolone sodium succinate Injectable 40 milliGRAM(s) IV Push daily  nystatin Powder 1 Application(s) Topical two times a day  pantoprazole    Tablet 40 milliGRAM(s) Oral before breakfast  polyethylene glycol 3350 17 Gram(s) Oral daily    MEDICATIONS  (PRN):  acetaminophen     Tablet .. 650 milliGRAM(s) Oral every 6 hours PRN Temp greater or equal to 38C (100.4F), Mild Pain (1 - 3)  calcium carbonate    500 mG (Tums) Chewable 2 Tablet(s) Chew every 4 hours PRN Heartburn  guaiFENesin Oral Liquid (Sugar-Free) 100 milliGRAM(s) Oral every 6 hours PRN Cough  levalbuterol Inhalation 0.63 milliGRAM(s) Inhalation every 6 hours PRN SOB/Wheezing  morphine  - Injectable 1 milliGRAM(s) IV Push every 3 hours PRN Severe Pain (7 - 10)/tachypnea/dyspnea        T(F): 97.8 (12-24-22 @ 05:50), Max: 99.1 (12-23-22 @ 12:44)  HR: 98 (12-24-22 @ 05:50) (86 - 112)  BP: 134/87 (12-24-22 @ 05:50) (131/83 - 134/87)  BP(mean): --  RR: 17 (12-24-22 @ 05:50) (17 - 18)  SpO2: 99% (12-24-22 @ 05:50) (94% - 99%)    PHYSICAL EXAM:     GENERAL: NAD, lying in bed comfortably.  HEAD:  Atraumatic, normocephalic.  EYES: EOMI, PERRLA, conjunctiva and sclera clear, no nystagmus noted.  ENT: Moist mucous membranes,   NECK: Supple. No JVD. Trachea midline.  CHEST/LUNG: CTAB. No rales, rhonchi, wheezing, or rubs. Unlabored respirations.  HEART: RRR, no M/R/G, normal S1/S2.  ABDOMEN: Soft, nontender, nondistended, no organomegaly. Normoactive bowel sounds.  EXTREMITIES:  2+ peripheral pulses b/l, brisk capillary refill. No clubbing, cyanosis, or edema.  MSK: No gross deformities noted.   NEURO:  AAOx3, no focal deficits.   SKIN: No rashes or lesions.  PSYCH: Normal mood, affect.    TELEMETRY:    LABS:                        8.3    12.06 )-----------( 253      ( 23 Dec 2022 06:39 )             28.2     12-23    141  |  96<L>  |  19  ----------------------------<  150<H>  3.5   |  36<H>  |  0.68    Ca    10.2      23 Dec 2022 06:39  Phos  3.0     12-23  Mg     1.40     12-23    TPro  6.5  /  Alb  3.2<L>  /  TBili  0.2  /  DBili  x   /  AST  27  /  ALT  30  /  AlkPhos  158<H>  12-23            Creatinine Trend: 0.68<--, 0.73<--, 0.64<--, 0.69<--, 0.67<--, 0.81<--  I&O's Summary    23 Dec 2022 07:01  -  24 Dec 2022 07:00  --------------------------------------------------------  IN: 850 mL / OUT: 850 mL / NET: 0 mL      BNP    RADIOLOGY & ADDITIONAL STUDIES:             Internal Medicine   Titi Kidd| PGY-1    OVERNIGHT EVENTS:  No acute events overnight    SUBJECTIVE:   Feels well this AM  Denies headache, dizziness, weakness  Endorses less SOB than before, feeling well on 2L O2 right now      MEDICATIONS  (STANDING):  atovaquone  Suspension 750 milliGRAM(s) Oral every 12 hours  chlorhexidine 2% Cloths 1 Application(s) Topical daily  enoxaparin Injectable 40 milliGRAM(s) SubCutaneous every 24 hours  influenza   Vaccine 0.5 milliLiter(s) IntraMuscular once  melatonin 3 milliGRAM(s) Oral at bedtime  methylPREDNISolone sodium succinate Injectable 40 milliGRAM(s) IV Push daily  nystatin Powder 1 Application(s) Topical two times a day  pantoprazole    Tablet 40 milliGRAM(s) Oral before breakfast  polyethylene glycol 3350 17 Gram(s) Oral daily    MEDICATIONS  (PRN):  acetaminophen     Tablet .. 650 milliGRAM(s) Oral every 6 hours PRN Temp greater or equal to 38C (100.4F), Mild Pain (1 - 3)  calcium carbonate    500 mG (Tums) Chewable 2 Tablet(s) Chew every 4 hours PRN Heartburn  guaiFENesin Oral Liquid (Sugar-Free) 100 milliGRAM(s) Oral every 6 hours PRN Cough  levalbuterol Inhalation 0.63 milliGRAM(s) Inhalation every 6 hours PRN SOB/Wheezing  morphine  - Injectable 1 milliGRAM(s) IV Push every 3 hours PRN Severe Pain (7 - 10)/tachypnea/dyspnea        T(F): 97.8 (12-24-22 @ 05:50), Max: 99.1 (12-23-22 @ 12:44)  HR: 98 (12-24-22 @ 05:50) (86 - 112)  BP: 134/87 (12-24-22 @ 05:50) (131/83 - 134/87)  BP(mean): --  RR: 17 (12-24-22 @ 05:50) (17 - 18)  SpO2: 99% (12-24-22 @ 05:50) (94% - 99%)    PHYSICAL EXAM:     GENERAL: NAD, lying in bed comfortably.  HEAD:  Atraumatic, normocephalic.  EYES: EOMI, PERRLA, conjunctiva and sclera clear, no nystagmus noted.  ENT: Moist mucous membranes,   NECK: Supple. No JVD. Trachea midline.  CHEST/LUNG: poor lung movement; clear breaths   HEART: tachycardic rate and rhythm  ABDOMEN: Soft, nontender, nondistended, no organomegaly. Normoactive bowel sounds.  EXTREMITIES:  2+ peripheral pulses b/l, brisk capillary refill. No clubbing, cyanosis, or edema.  MSK: No gross deformities noted.   NEURO:  AAOx3, no focal deficits.   SKIN: No rashes or lesions.  PSYCH: Normal mood, affect.    TELEMETRY:    LABS:                        8.3    12.06 )-----------( 253      ( 23 Dec 2022 06:39 )             28.2     12-23    141  |  96<L>  |  19  ----------------------------<  150<H>  3.5   |  36<H>  |  0.68    Ca    10.2      23 Dec 2022 06:39  Phos  3.0     12-23  Mg     1.40     12-23    TPro  6.5  /  Alb  3.2<L>  /  TBili  0.2  /  DBili  x   /  AST  27  /  ALT  30  /  AlkPhos  158<H>  12-23            Creatinine Trend: 0.68<--, 0.73<--, 0.64<--, 0.69<--, 0.67<--, 0.81<--  I&O's Summary    23 Dec 2022 07:01  -  24 Dec 2022 07:00  --------------------------------------------------------  IN: 850 mL / OUT: 850 mL / NET: 0 mL      BNP    RADIOLOGY & ADDITIONAL STUDIES:

## 2022-12-24 NOTE — PHYSICAL THERAPY INITIAL EVALUATION ADULT - PERTINENT HX OF CURRENT PROBLEM, REHAB EVAL
57 year old female presented with SOB, increased WOB, and pleural effusions. CTA negative for PE but showing progression of lung tumor burden.

## 2022-12-24 NOTE — PROGRESS NOTE ADULT - PROBLEM SELECTOR PLAN 1
Exhibited confusion and lack of 12/21. In s/o cancer, concern for brain mets. Pt initally not amenable to CT head, but CT finally done 12/22  - back to baseline mentation  - CT head 12/22 normal, no mets seen Hx of recurrent pleural effusions (s/p VATS, multiple thoracenteses/pigtail catheters - last 10/2022). Mildly hypervolemic on exam w/ pulmonary edema/pleural effusions on CXR. Unclear cause of acute worsening started 12/16. Ddx pulmonary edema/increased pulmonary HTN, superimposed infectious process, pneumonitis from recent chemotherapy.  - CTA chest without PE but showing worsening bilateral consolidations throughout both lungs and stable bi/l pleural effusions; likely multifocal infection vs. edema vs most likely tumor progression  - repeat TTE, risk of cardiotoxicity with doxorubicin  - Xopenex inhaler q6h pRN  - Thoracic surgery consult to eval pleural effusions, possible need for thoracentesis - after reviewing CT they feel not indicated at this time  - worsen SOB and WOB starting 12/16, MICU consulted and rejected at this time  - antibiotics escalated to Zosyn 12/16 -12/19 given abx total course           - treatment for CAP initially given CTA chest findings; repeat Bcx, MRSA both negative; Scx with respiratory hema, Ucx neg  -- able to be weaned to 2L O2 currently  -- SoluMedrol 40mg daily x3 days (12/24-12/26); then Prednisone taper  - ID consulted for possible empiric PCP coverage in s/o immunocompromised state & possible infectious source of lung imaging: low suspicion for PCP; Check serum Galactomannan, serum Fungitell, serum LDH                    - Atovaquone 750mg BID  - can think about d/c in s/o more stabilization of respiratory status

## 2022-12-25 NOTE — PROGRESS NOTE ADULT - SUBJECTIVE AND OBJECTIVE BOX
PROGRESS NOTE:   Authored by Dr. Moon Delvalle / Internal Medicine Resident    Patient is a 57y old  Female who presents with a chief complaint of Hypoxia (24 Dec 2022 07:03)      SUBJECTIVE / OVERNIGHT EVENTS:    ADDITIONAL REVIEW OF SYSTEMS:    MEDICATIONS  (STANDING):  atovaquone  Suspension 750 milliGRAM(s) Oral every 12 hours  chlorhexidine 2% Cloths 1 Application(s) Topical daily  enoxaparin Injectable 40 milliGRAM(s) SubCutaneous every 24 hours  influenza   Vaccine 0.5 milliLiter(s) IntraMuscular once  lactated ringers. 1000 milliLiter(s) (75 mL/Hr) IV Continuous <Continuous>  loratadine 10 milliGRAM(s) Oral daily  melatonin 3 milliGRAM(s) Oral at bedtime  methylPREDNISolone sodium succinate Injectable 40 milliGRAM(s) IV Push daily  nystatin Powder 1 Application(s) Topical two times a day  pantoprazole    Tablet 40 milliGRAM(s) Oral before breakfast    MEDICATIONS  (PRN):  acetaminophen     Tablet .. 650 milliGRAM(s) Oral every 6 hours PRN Temp greater or equal to 38C (100.4F), Mild Pain (1 - 3)  benzonatate 100 milliGRAM(s) Oral every 8 hours PRN Cough  calcium carbonate    500 mG (Tums) Chewable 2 Tablet(s) Chew every 4 hours PRN Heartburn  guaiFENesin Oral Liquid (Sugar-Free) 100 milliGRAM(s) Oral every 6 hours PRN Cough  hydrocodone/homatropine Syrup 5 milliLiter(s) Oral every 8 hours PRN Cough  levalbuterol Inhalation 0.63 milliGRAM(s) Inhalation every 6 hours PRN SOB/Wheezing  morphine  - Injectable 1 milliGRAM(s) IV Push every 3 hours PRN Severe Pain (7 - 10)/tachypnea/dyspnea      CAPILLARY BLOOD GLUCOSE        I&O's Summary      PHYSICAL EXAM:  Vital Signs Last 24 Hrs  T(C): 37.6 (25 Dec 2022 00:30), Max: 37.6 (25 Dec 2022 00:30)  T(F): 99.6 (25 Dec 2022 00:30), Max: 99.6 (25 Dec 2022 00:30)  HR: 96 (25 Dec 2022 00:30) (96 - 100)  BP: 114/79 (25 Dec 2022 00:30) (114/79 - 138/73)  BP(mean): 83 (25 Dec 2022 00:30) (83 - 83)  RR: 17 (25 Dec 2022 00:30) (17 - 18)  SpO2: 97% (25 Dec 2022 00:30) (94% - 99%)    Parameters below as of 25 Dec 2022 00:30  Patient On (Oxygen Delivery Method): nasal cannula  O2 Flow (L/min): 1      GENERAL: NAD, lying comfortably in bed  HEAD: Atraumatic, normocephalic  EYES: EOMI b/l PERRLA b/l, conjunctiva and sclera clear  NECK: Supple, No JVD, No LAD  RESPIRATORY: Normal respiratory effort; lungs are clear to auscultation bilaterally  CARDIOVASCULAR: Regular rate and rhythm, normal S1 and S2, no murmur/rub/gallop; No lower extremity edema  ABDOMEN: Nontender, normoactive bowel sounds, no rebound/guarding; No hepatosplenomegaly  MUSCULOSKELETAL: no clubbing or cyanosis of digits; no joint swelling or tenderness to palpation  NEURO: Non focal   PSYCH: A+O to person, place, and time; affect appropriate    LABS: PROGRESS NOTE:   Authored by Dr. Moon Delvalle / Internal Medicine Resident    Patient is a 57y old  Female who presents with a chief complaint of Hypoxia (24 Dec 2022 07:03)      SUBJECTIVE / OVERNIGHT EVENTS: No overnight events. Patient reported feeling well, wants to go home. Denied SOB, chest pain, palpitations, n/v/d.    ADDITIONAL REVIEW OF SYSTEMS:  Review of Systems:  Constitutional: No fever, No weight loss, good appetite/po intake  Head: No headache   Eyes: No blurry vision, No diplopia  Neuro: No tremors, No muscle weakness   Cardiovascular: No chest pain, No palpitations  Respiratory: No SOB, No cough  GI: No nausea, No vomiting, No diarrhea  : No dysuria, No hematuria  Skin: No rash  MSK: No joint pain   Psych: No depression  Heme: No abnormal bruising, no abnormal bleeding      MEDICATIONS  (STANDING):  atovaquone  Suspension 750 milliGRAM(s) Oral every 12 hours  chlorhexidine 2% Cloths 1 Application(s) Topical daily  enoxaparin Injectable 40 milliGRAM(s) SubCutaneous every 24 hours  influenza   Vaccine 0.5 milliLiter(s) IntraMuscular once  lactated ringers. 1000 milliLiter(s) (75 mL/Hr) IV Continuous <Continuous>  loratadine 10 milliGRAM(s) Oral daily  melatonin 3 milliGRAM(s) Oral at bedtime  methylPREDNISolone sodium succinate Injectable 40 milliGRAM(s) IV Push daily  nystatin Powder 1 Application(s) Topical two times a day  pantoprazole    Tablet 40 milliGRAM(s) Oral before breakfast    MEDICATIONS  (PRN):  acetaminophen     Tablet .. 650 milliGRAM(s) Oral every 6 hours PRN Temp greater or equal to 38C (100.4F), Mild Pain (1 - 3)  benzonatate 100 milliGRAM(s) Oral every 8 hours PRN Cough  calcium carbonate    500 mG (Tums) Chewable 2 Tablet(s) Chew every 4 hours PRN Heartburn  guaiFENesin Oral Liquid (Sugar-Free) 100 milliGRAM(s) Oral every 6 hours PRN Cough  hydrocodone/homatropine Syrup 5 milliLiter(s) Oral every 8 hours PRN Cough  levalbuterol Inhalation 0.63 milliGRAM(s) Inhalation every 6 hours PRN SOB/Wheezing  morphine  - Injectable 1 milliGRAM(s) IV Push every 3 hours PRN Severe Pain (7 - 10)/tachypnea/dyspnea      CAPILLARY BLOOD GLUCOSE        I&O's Summary      PHYSICAL EXAM:  Vital Signs Last 24 Hrs  T(C): 37.6 (25 Dec 2022 00:30), Max: 37.6 (25 Dec 2022 00:30)  T(F): 99.6 (25 Dec 2022 00:30), Max: 99.6 (25 Dec 2022 00:30)  HR: 96 (25 Dec 2022 00:30) (96 - 100)  BP: 114/79 (25 Dec 2022 00:30) (114/79 - 138/73)  BP(mean): 83 (25 Dec 2022 00:30) (83 - 83)  RR: 17 (25 Dec 2022 00:30) (17 - 18)  SpO2: 97% (25 Dec 2022 00:30) (94% - 99%)    Parameters below as of 25 Dec 2022 00:30  Patient On (Oxygen Delivery Method): nasal cannula  O2 Flow (L/min): 1      GENERAL: NAD, lying comfortably in bed  HEAD: Atraumatic, normocephalic  EYES: EOMI b/l PERRLA b/l, conjunctiva and sclera clear  NECK: Supple, No JVD, No LAD  RESPIRATORY: Normal respiratory effort; lungs are clear to auscultation bilaterally  CARDIOVASCULAR: Regular rate and rhythm, normal S1 and S2, no murmur/rub/gallop; No lower extremity edema  ABDOMEN: Nontender, normoactive bowel sounds, no rebound/guarding; No hepatosplenomegaly  MUSCULOSKELETAL: no clubbing or cyanosis of digits; no joint swelling or tenderness to palpation  NEURO: Non focal   PSYCH: A+O to person, place, and time; affect appropriate    LABS:

## 2022-12-25 NOTE — PROGRESS NOTE ADULT - PROBLEM SELECTOR PLAN 6
DVT PPx: Lovenox 40mg qD  Diet: thickened liq  Code: full code  Dispo: PT/OT Pending Hospital Course  Communication: brother Max Liao ([phone]). mom Nicole Liao ([phone]). Dashawn ([phone]). spoke to max 12/18  GOC: no defined HCP (Max, Dashawn, Nicole making decisions collectively). remains full code, patient aware of likely possibility that would not be extubated if requiring intubation

## 2022-12-25 NOTE — PROGRESS NOTE ADULT - PROBLEM SELECTOR PLAN 1
Hx of recurrent pleural effusions (s/p VATS, multiple thoracenteses/pigtail catheters - last 10/2022). Mildly hypervolemic on exam w/ pulmonary edema/pleural effusions on CXR. Unclear cause of acute worsening started 12/16. Ddx pulmonary edema/increased pulmonary HTN, superimposed infectious process, pneumonitis from recent chemotherapy.  - CTA chest without PE but showing worsening bilateral consolidations throughout both lungs and stable bi/l pleural effusions; likely multifocal infection vs. edema vs most likely tumor progression  - repeat TTE, risk of cardiotoxicity with doxorubicin  - Xopenex inhaler q6h pRN  - Thoracic surgery consult to eval pleural effusions, possible need for thoracentesis - after reviewing CT they feel not indicated at this time  - worsen SOB and WOB starting 12/16, MICU consulted and rejected at this time  - antibiotics escalated to Zosyn 12/16 -12/19 given abx total course           - treatment for CAP initially given CTA chest findings; repeat Bcx, MRSA both negative; Scx with respiratory hema, Ucx neg  -- able to be weaned to 2L O2 currently  -- SoluMedrol 40mg daily x3 days (12/24-12/26); then Prednisone taper  - ID consulted for possible empiric PCP coverage in s/o immunocompromised state & possible infectious source of lung imaging: low suspicion for PCP; Check serum Galactomannan, serum Fungitell, serum LDH                    - Atovaquone 750mg BID  - can think about d/c in s/o more stabilization of respiratory status

## 2022-12-25 NOTE — PROGRESS NOTE ADULT - PROBLEM SELECTOR PLAN 3
Exhibited confusion and lack of 12/21. In s/o cancer, concern for brain mets. Pt initally not amenable to CT head, but CT finally done 12/22  - back to baseline mentation  - CT head 12/22 normal, no mets seen

## 2022-12-26 NOTE — CHART NOTE - NSCHARTNOTESELECT_GEN_ALL_CORE
Oxygen
Event Note
Follow-up/Nutrition Services
GOC Conversation/Event Note
Respiratory distress
thoracic surgery/Event Note

## 2022-12-26 NOTE — PROGRESS NOTE ADULT - SUBJECTIVE AND OBJECTIVE BOX
PULMONARY PROGRESS NOTE    ALEXANDER HUDDLESTON  MRN-[id number]    Patient is a 57y old  Female who presents with a chief complaint of Hypoxia (26 Dec 2022 06:52)      HPI:  -  -    ROS:   -    ACTIVE MEDICATION LIST:  MEDICATIONS  (STANDING):  atovaquone  Suspension 750 milliGRAM(s) Oral every 12 hours  chlorhexidine 2% Cloths 1 Application(s) Topical daily  enoxaparin Injectable 40 milliGRAM(s) SubCutaneous every 24 hours  influenza   Vaccine 0.5 milliLiter(s) IntraMuscular once  lactated ringers. 1000 milliLiter(s) (75 mL/Hr) IV Continuous <Continuous>  loratadine 10 milliGRAM(s) Oral daily  melatonin 3 milliGRAM(s) Oral at bedtime  nystatin Powder 1 Application(s) Topical two times a day  pantoprazole    Tablet 40 milliGRAM(s) Oral before breakfast    MEDICATIONS  (PRN):  acetaminophen     Tablet .. 650 milliGRAM(s) Oral every 6 hours PRN Temp greater or equal to 38C (100.4F), Mild Pain (1 - 3)  benzonatate 100 milliGRAM(s) Oral every 8 hours PRN Cough  calcium carbonate    500 mG (Tums) Chewable 2 Tablet(s) Chew every 4 hours PRN Heartburn  guaiFENesin Oral Liquid (Sugar-Free) 100 milliGRAM(s) Oral every 6 hours PRN Cough  hydrocodone/homatropine Syrup 5 milliLiter(s) Oral every 8 hours PRN Cough  levalbuterol Inhalation 0.63 milliGRAM(s) Inhalation every 6 hours PRN SOB/Wheezing      EXAM:  Vital Signs Last 24 Hrs  T(C): 37 (26 Dec 2022 11:46), Max: 37 (26 Dec 2022 11:46)  T(F): 98.6 (26 Dec 2022 11:46), Max: 98.6 (26 Dec 2022 11:46)  HR: 102 (26 Dec 2022 11:46) (100 - 102)  BP: 122/82 (26 Dec 2022 11:46) (122/71 - 122/82)  BP(mean): --  RR: 18 (26 Dec 2022 11:46) (17 - 19)  SpO2: 95% (26 Dec 2022 11:46) (86% - 95%)    Parameters below as of 26 Dec 2022 11:46  Patient On (Oxygen Delivery Method): nasal cannula        GENERAL: The patient is awake and alert in no apparent distress.     LUNGS: Clear to auscultation without wheezing, rales or rhonchi; respirations unlabored    HEART: Regular rate and rhythm without murmur.                            8.9    10.78 )-----------( 330      ( 26 Dec 2022 07:19 )             30.4       12-25    141  |  99  |  14  ----------------------------<  107<H>  3.6   |  35<H>  |  0.73    Ca    10.1      25 Dec 2022 07:03  Phos  2.8     12-25  Mg     1.50     12-25    TPro  6.6  /  Alb  3.2<L>  /  TBili  0.3  /  DBili  x   /  AST  30  /  ALT  37<H>  /  AlkPhos  196<H>  12-25     < from: CT Angio Chest PE Protocol w/ IV Cont (12.16.22 @ 12:31) >    ACC: [id number] EXAM:  CT ANGIO CHEST PULM [facility]                          PROCEDURE DATE:  12/16/2022          INTERPRETATION:  Reason for Exam: Shortness of breath. chest pain.    CTA of the chest was performed from the thoracic inlet to the level of   the adrenal glands following IV contrast injection of  80 cc of Omnipaque   350. No immediate complications were reported.  MIP images were also   created and reviewed.    Comparison: November 28, 2022    Tubes/Lines: None    Mediastinum and Heart: Aorta and pulmonary arteries are normal in size.   Thyroid gland is unremarkable. No lymphadenopathy. No pericardial   effusion.    Lungs, Pleura, and Airways: There is no pulmonary embolus. Increasing   bilateral lung consolidation when compared with the previous exam.   Additionally there are bilateral pleural effusions, portions of which are   loculated within the major fissures bilaterally.    Visualized Abdomen: Intra-abdominal ascites noted, unchanged.    Bones and soft tissues: Unremarkable.    IMPRESSION:    No pulmonary embolus.    Worsening bilateral consolidations throughout both lungs when compared   with prior exam. This may be due to multifocal infection, edema, or drug   reaction.    Stable bilateral pleural effusions.    --- End of Report ---            QUEENIE GUALLPA MD; Attending Radiologist  This document has been electronically signed. Dec 16 2022  1:34PM    < end of copied text >      PROBLEM LIST:  57y Female with HEALTH ISSUES - PROBLEM Dx:  Acute respiratory failure with hypoxia    Prophylactic measure    Uterine cancer    SIRS (systemic inflammatory response syndrome)    Anemia    Uterine cancer  S/p chemotherapy, radiation and hysterectomy    Palliative care encounter    Altered mental state              RECS:        Please call with any questions.    Kassidy Tracy DO  [facility] Pulmonary/Sleep Medicine  [phone]   PULMONARY PROGRESS NOTE    ALEXANDER HUDDLESTON  MRN-[id number]    Patient is a 57y old  Female who presents with a chief complaint of Hypoxia (26 Dec 2022 06:52)      HPI:  -seen on NC today  alert; responding ot qx appropriately     ROS:   -    ACTIVE MEDICATION LIST:  MEDICATIONS  (STANDING):  atovaquone  Suspension 750 milliGRAM(s) Oral every 12 hours  chlorhexidine 2% Cloths 1 Application(s) Topical daily  enoxaparin Injectable 40 milliGRAM(s) SubCutaneous every 24 hours  influenza   Vaccine 0.5 milliLiter(s) IntraMuscular once  lactated ringers. 1000 milliLiter(s) (75 mL/Hr) IV Continuous <Continuous>  loratadine 10 milliGRAM(s) Oral daily  melatonin 3 milliGRAM(s) Oral at bedtime  nystatin Powder 1 Application(s) Topical two times a day  pantoprazole    Tablet 40 milliGRAM(s) Oral before breakfast    MEDICATIONS  (PRN):  acetaminophen     Tablet .. 650 milliGRAM(s) Oral every 6 hours PRN Temp greater or equal to 38C (100.4F), Mild Pain (1 - 3)  benzonatate 100 milliGRAM(s) Oral every 8 hours PRN Cough  calcium carbonate    500 mG (Tums) Chewable 2 Tablet(s) Chew every 4 hours PRN Heartburn  guaiFENesin Oral Liquid (Sugar-Free) 100 milliGRAM(s) Oral every 6 hours PRN Cough  hydrocodone/homatropine Syrup 5 milliLiter(s) Oral every 8 hours PRN Cough  levalbuterol Inhalation 0.63 milliGRAM(s) Inhalation every 6 hours PRN SOB/Wheezing      EXAM:  Vital Signs Last 24 Hrs  T(C): 37 (26 Dec 2022 11:46), Max: 37 (26 Dec 2022 11:46)  T(F): 98.6 (26 Dec 2022 11:46), Max: 98.6 (26 Dec 2022 11:46)  HR: 102 (26 Dec 2022 11:46) (100 - 102)  BP: 122/82 (26 Dec 2022 11:46) (122/71 - 122/82)  BP(mean): --  RR: 18 (26 Dec 2022 11:46) (17 - 19)  SpO2: 95% (26 Dec 2022 11:46) (86% - 95%)    Parameters below as of 26 Dec 2022 11:46  Patient On (Oxygen Delivery Method): nasal cannula        GENERAL: The patient is awake and alert in no apparent distress.     LUNGS:  improved aeration; no wheeze; respirations unlabored                           8.9    10.78 )-----------( 330      ( 26 Dec 2022 07:19 )             30.4       12-25    141  |  99  |  14  ----------------------------<  107<H>  3.6   |  35<H>  |  0.73    Ca    10.1      25 Dec 2022 07:03  Phos  2.8     12-25  Mg     1.50     12-25    TPro  6.6  /  Alb  3.2<L>  /  TBili  0.3  /  DBili  x   /  AST  30  /  ALT  37<H>  /  AlkPhos  196<H>  12-25     < from: CT Angio Chest PE Protocol w/ IV Cont (12.16.22 @ 12:31) >    ACC: [id number] EXAM:  CT ANGIO CHEST PULM [facility]                          PROCEDURE DATE:  12/16/2022          INTERPRETATION:  Reason for Exam: Shortness of breath. chest pain.    CTA of the chest was performed from the thoracic inlet to the level of   the adrenal glands following IV contrast injection of  80 cc of Omnipaque   350. No immediate complications were reported.  MIP images were also   created and reviewed.    Comparison: November 28, 2022    Tubes/Lines: None    Mediastinum and Heart: Aorta and pulmonary arteries are normal in size.   Thyroid gland is unremarkable. No lymphadenopathy. No pericardial   effusion.    Lungs, Pleura, and Airways: There is no pulmonary embolus. Increasing   bilateral lung consolidation when compared with the previous exam.   Additionally there are bilateral pleural effusions, portions of which are   loculated within the major fissures bilaterally.    Visualized Abdomen: Intra-abdominal ascites noted, unchanged.    Bones and soft tissues: Unremarkable.    IMPRESSION:    No pulmonary embolus.    Worsening bilateral consolidations throughout both lungs when compared   with prior exam. This may be due to multifocal infection, edema, or drug   reaction.    Stable bilateral pleural effusions.    --- End of Report ---            QUEENIE GUALLPA MD; Attending Radiologist  This document has been electronically signed. Dec 16 2022  1:34PM    < end of copied text >      PROBLEM LIST:  57y Female with HEALTH ISSUES - PROBLEM Dx:  Acute respiratory failure with hypoxia    Prophylactic measure    Uterine cancer    SIRS (systemic inflammatory response syndrome)    Anemia    Uterine cancer  S/p chemotherapy, radiation and hysterectomy    Palliative care encounter    Altered mental state      RECS:  prolong prednisone taper  prednisone 40 x 7 days with 10mg taper every 7 days  xray prior to discharge  02  needs pulm eval outpatient  defer to ID but would air on side of continuing treating her for pcp       Please call with any questions.    Kassidy Tracy DO  [facility] Pulmonary/Sleep Medicine  [phone]

## 2022-12-26 NOTE — PROGRESS NOTE ADULT - PROBLEM SELECTOR PLAN 6
DVT PPx: Lovenox 40mg qD  Diet: thickened liq  Code: full code  Dispo: PT/OT Pending Hospital Course  Communication: brother Max Liao ([phone]). mom Nicole Liao ([phone]). Dashawn ([phone]). spoke to max 12/18  GOC: no defined HCP (Max, Dashawn, Nicole making decisions collectively). remains full code, patient aware of likely possibility that would not be extubated if requiring intubation DVT PPx: Lovenox 40mg qD  Diet: regular  Code: full code  Dispo: home with PT vs JAYA  Communication: brother Max Liao ([phone]). mom Nicole Liao ([phone]). Dashawn ([phone]). spoke to Dashawn 12/24  GOC: no defined HCP (Max, Dashawn, Nicole making decisions collectively). remains full code, patient aware of likely possibility that would not be extubated if requiring intubation

## 2022-12-26 NOTE — PROGRESS NOTE ADULT - PROBLEM SELECTOR PLAN 4
SIRS criteria (tachycardia, tachypnea). No clear source of infection though has ascites and pleural effusions. Procalcitonin elevated.  - f/u cultures as above  - f/u MRSA swab  - Zosyn as above (12/16-12/19) SIRS criteria (tachycardia, tachypnea). No clear source of infection though has ascites and pleural effusions. Procalcitonin elevated.  - f/u cultures as above  - f/u MRSA swab  - Zon (12/16-12/19)

## 2022-12-26 NOTE — PROGRESS NOTE ADULT - ASSESSMENT
57F hx metastatic uterine cancer (s/p chemotherapy, radiation and hysterectomy) to the lungs and peritoneal carcinomatosis (on doxorubicin w/ Brianna, last session 11/2022), pleural effusions (s/p VATs), and chronic anemia presented with SOB, increased WOB, and pleural effusions. CTA neg for PE but showing progression of lung tumor burden +/- CAP. Currently improved and weaned off of oxygen, pending JAYA. 57F hx metastatic uterine cancer (s/p chemotherapy, radiation and hysterectomy) to the lungs and peritoneal carcinomatosis (on doxorubicin w/ Brianna, last session 11/2022), pleural effusions (s/p VATs), and chronic anemia presented with SOB, increased WOB, and pleural effusions. CTA neg for PE but showing progression of lung tumor burden +/- CAP. Currently improved and on minimal oxygen, pending dispo.

## 2022-12-26 NOTE — CHART NOTE - NSCHARTNOTEFT_GEN_A_CORE
Patient is admitted for acute hypoxic and hypercarbic respiratory failure due to multifocal pneumonia, pneumonitis, and progression of lung metastatic disease. She was noted to have desaturated to 86% while at rest on room air. With 1-2L of oxygen via nasal cannula, her SpO2 improved to 94%. Patient will benefit from oxygen therapy at home.     Naohmi Saunders MD  PGY-3  Internal Medicine  Available on TEAMS Patient is admitted for acute hypoxic and hypercarbic respiratory failure due to multifocal pneumonia, pneumonitis, and progression of lung metastatic disease. She was noted to have desaturated to 86% while at rest on room air. With 2L of oxygen via nasal cannula, her SpO2 improved to 94%. Patient will benefit from oxygen therapy at home.     Nahomi Saunders MD  PGY-3  Internal Medicine  Available on TEAMS

## 2022-12-26 NOTE — PROGRESS NOTE ADULT - PROBLEM SELECTOR PLAN 1
Hx of recurrent pleural effusions (s/p VATS, multiple thoracenteses/pigtail catheters - last 10/2022). Mildly hypervolemic on exam w/ pulmonary edema/pleural effusions on CXR. Unclear cause of acute worsening started 12/16. Ddx pulmonary edema/increased pulmonary HTN, superimposed infectious process, pneumonitis from recent chemotherapy.  - CTA chest without PE but showing worsening bilateral consolidations throughout both lungs and stable bi/l pleural effusions; likely multifocal infection vs. edema vs most likely tumor progression  - repeat TTE, risk of cardiotoxicity with doxorubicin  - Xopenex inhaler q6h pRN  - Thoracic surgery consult to eval pleural effusions, possible need for thoracentesis - after reviewing CT they feel not indicated at this time  - worsen SOB and WOB starting 12/16, MICU consulted and rejected at this time  - antibiotics escalated to Zosyn 12/16 -12/19 given abx total course           - treatment for CAP initially given CTA chest findings; repeat Bcx, MRSA both negative; Scx with respiratory hema, Ucx neg  -- able to be weaned to 2L O2 currently  -- SoluMedrol 40mg daily x3 days (12/24-12/26); then Prednisone taper  - ID consulted for possible empiric PCP coverage in s/o immunocompromised state & possible infectious source of lung imaging: low suspicion for PCP; Check serum Galactomannan, serum Fungitell, serum LDH                    - Atovaquone 750mg BID  - can think about d/c in s/o more stabilization of respiratory status Hx of recurrent pleural effusions (s/p VATS, multiple thoracenteses/pigtail catheters - last 10/2022). Mildly hypervolemic on exam w/ pulmonary edema/pleural effusions on CXR. Unclear cause of acute worsening started 12/16. Ddx pulmonary edema/increased pulmonary HTN, superimposed infectious process, pneumonitis from recent chemotherapy.  - CTA chest without PE but showing worsening bilateral consolidations throughout both lungs and stable bi/l pleural effusions; likely multifocal infection vs. edema vs most likely tumor progression  - repeat TTE, risk of cardiotoxicity with doxorubicin  - Xopenex inhaler q6h pRN  - Thoracic surgery consult to eval pleural effusions, possible need for thoracentesis - after reviewing CT they feel not indicated at this time  - worsen SOB and WOB starting 12/16, MICU consulted and rejected at this time  - antibiotics escalated to Zosyn 12/16 -12/19 given abx total course    - treatment for CAP initially given CTA chest findings; repeat Bcx, MRSA both negative; Scx with respiratory hema, Ucx neg  -- able to be weaned to 2L O2 currently  -- SoluMedrol 40mg daily x3 days (12/24-12/26); then Prednisone taper (40mg qD, decrease by 10mg weekly until 10mg qD)   - ID consulted for possible empiric PCP coverage in s/o immunocompromised state & possible infectious source of lung imaging: low suspicion for PCP; Check serum Galactomannan, serum Fungitell, serum LDH                    - Atovaquone 750mg BID  - repeat CXR for baseline as per pulm, anticipating dc. CXR to be used as baseline

## 2022-12-26 NOTE — PROGRESS NOTE ADULT - SUBJECTIVE AND OBJECTIVE BOX
PROGRESS NOTE:   Authored by Dr. Nahomi Saunders MD (PGY-3). Available on TEAMS.    Patient is a 57y old  Female who presents with a chief complaint of Hypoxia (25 Dec 2022 07:05)      SUBJECTIVE / OVERNIGHT EVENTS:  No acute events overnight. Patient seen and examined at bedside. Patient denies new symptoms or concerns.     ADDITIONAL REVIEW OF SYSTEMS:  Patient denies fevers, chills, chest pain, shortness of breath, nausea, abdominal pain, diarrhea, constipation, dysuria, leg swelling, headache, light headedness.    MEDICATIONS  (STANDING):  atovaquone  Suspension 750 milliGRAM(s) Oral every 12 hours  chlorhexidine 2% Cloths 1 Application(s) Topical daily  enoxaparin Injectable 40 milliGRAM(s) SubCutaneous every 24 hours  influenza   Vaccine 0.5 milliLiter(s) IntraMuscular once  lactated ringers. 1000 milliLiter(s) (75 mL/Hr) IV Continuous <Continuous>  loratadine 10 milliGRAM(s) Oral daily  melatonin 3 milliGRAM(s) Oral at bedtime  nystatin Powder 1 Application(s) Topical two times a day  pantoprazole    Tablet 40 milliGRAM(s) Oral before breakfast    MEDICATIONS  (PRN):  acetaminophen     Tablet .. 650 milliGRAM(s) Oral every 6 hours PRN Temp greater or equal to 38C (100.4F), Mild Pain (1 - 3)  benzonatate 100 milliGRAM(s) Oral every 8 hours PRN Cough  calcium carbonate    500 mG (Tums) Chewable 2 Tablet(s) Chew every 4 hours PRN Heartburn  guaiFENesin Oral Liquid (Sugar-Free) 100 milliGRAM(s) Oral every 6 hours PRN Cough  hydrocodone/homatropine Syrup 5 milliLiter(s) Oral every 8 hours PRN Cough  levalbuterol Inhalation 0.63 milliGRAM(s) Inhalation every 6 hours PRN SOB/Wheezing  morphine  - Injectable 1 milliGRAM(s) IV Push every 3 hours PRN Severe Pain (7 - 10)/tachypnea/dyspnea      CAPILLARY BLOOD GLUCOSE        I&O's Summary    25 Dec 2022 07:01  -  26 Dec 2022 06:52  --------------------------------------------------------  IN: 140 mL / OUT: 0 mL / NET: 140 mL        PHYSICAL EXAM:  Vital Signs Last 24 Hrs  T(C): 36.8 (25 Dec 2022 21:05), Max: 36.8 (25 Dec 2022 21:05)  T(F): 98.2 (25 Dec 2022 21:05), Max: 98.2 (25 Dec 2022 21:05)  HR: 100 (25 Dec 2022 21:05) (95 - 100)  BP: 122/71 (25 Dec 2022 21:05) (113/86 - 122/71)  BP(mean): 95 (25 Dec 2022 11:00) (95 - 95)  RR: 17 (25 Dec 2022 21:05) (17 - 17)  SpO2: 95% (25 Dec 2022 21:05) (95% - 95%)    Parameters below as of 25 Dec 2022 21:05  Patient On (Oxygen Delivery Method): nasal cannula  O2 Flow (L/min): 1      CONSTITUTIONAL: NAD, well-developed  RESPIRATORY: Normal respiratory effort; lungs are clear to auscultation bilaterally  CARDIOVASCULAR: Regular rate and rhythm, normal S1 and S2, no murmur/rub/gallop; No lower extremity edema; Peripheral pulses are 2+ bilaterally  ABDOMEN: Nontender to palpation, normoactive bowel sounds, no rebound/guarding; No hepatosplenomegaly  MUSCLOSKELETAL: no clubbing or cyanosis of digits; no joint swelling or tenderness to palpation  PSYCH: A+O to person, place, and time; affect appropriate    LABS:                        9.5    10.83 )-----------( 313      ( 25 Dec 2022 07:03 )             32.1     12-25    141  |  99  |  14  ----------------------------<  107<H>  3.6   |  35<H>  |  0.73    Ca    10.1      25 Dec 2022 07:03  Phos  2.8     12-25  Mg     1.50     12-25    TPro  6.6  /  Alb  3.2<L>  /  TBili  0.3  /  DBili  x   /  AST  30  /  ALT  37<H>  /  AlkPhos  196<H>  12-25                Tele Reviewed:    RADIOLOGY & ADDITIONAL TESTS:  Results Reviewed:   Imaging Personally Reviewed:  Electrocardiogram Personally Reviewed:     PROGRESS NOTE:   Authored by Dr. Nahomi Saunders MD (PGY-3). Available on TEAMS.    Patient is a 57y old  Female who presents with a chief complaint of Hypoxia (25 Dec 2022 07:05)      SUBJECTIVE / OVERNIGHT EVENTS:  No acute events overnight. Patient seen and examined at bedside. Patient denies new symptoms or concerns. We discussed [facility] recommendation by PT, which patient adamantly refuses, states her mom passed in rehab and she will never go to a rehab. We discussed PT re-eval and plan for home PT if possible.     ADDITIONAL REVIEW OF SYSTEMS:  Patient denies fevers, chills, chest pain, shortness of breath, nausea, abdominal pain, diarrhea, constipation, dysuria, leg swelling, headache, light headedness.    MEDICATIONS  (STANDING):  atovaquone  Suspension 750 milliGRAM(s) Oral every 12 hours  chlorhexidine 2% Cloths 1 Application(s) Topical daily  enoxaparin Injectable 40 milliGRAM(s) SubCutaneous every 24 hours  influenza   Vaccine 0.5 milliLiter(s) IntraMuscular once  lactated ringers. 1000 milliLiter(s) (75 mL/Hr) IV Continuous <Continuous>  loratadine 10 milliGRAM(s) Oral daily  melatonin 3 milliGRAM(s) Oral at bedtime  nystatin Powder 1 Application(s) Topical two times a day  pantoprazole    Tablet 40 milliGRAM(s) Oral before breakfast    MEDICATIONS  (PRN):  acetaminophen     Tablet .. 650 milliGRAM(s) Oral every 6 hours PRN Temp greater or equal to 38C (100.4F), Mild Pain (1 - 3)  benzonatate 100 milliGRAM(s) Oral every 8 hours PRN Cough  calcium carbonate    500 mG (Tums) Chewable 2 Tablet(s) Chew every 4 hours PRN Heartburn  guaiFENesin Oral Liquid (Sugar-Free) 100 milliGRAM(s) Oral every 6 hours PRN Cough  hydrocodone/homatropine Syrup 5 milliLiter(s) Oral every 8 hours PRN Cough  levalbuterol Inhalation 0.63 milliGRAM(s) Inhalation every 6 hours PRN SOB/Wheezing  morphine  - Injectable 1 milliGRAM(s) IV Push every 3 hours PRN Severe Pain (7 - 10)/tachypnea/dyspnea      CAPILLARY BLOOD GLUCOSE        I&O's Summary    25 Dec 2022 07:01  -  26 Dec 2022 06:52  --------------------------------------------------------  IN: 140 mL / OUT: 0 mL / NET: 140 mL        PHYSICAL EXAM:  Vital Signs Last 24 Hrs  T(C): 36.8 (25 Dec 2022 21:05), Max: 36.8 (25 Dec 2022 21:05)  T(F): 98.2 (25 Dec 2022 21:05), Max: 98.2 (25 Dec 2022 21:05)  HR: 100 (25 Dec 2022 21:05) (95 - 100)  BP: 122/71 (25 Dec 2022 21:05) (113/86 - 122/71)  BP(mean): 95 (25 Dec 2022 11:00) (95 - 95)  RR: 17 (25 Dec 2022 21:05) (17 - 17)  SpO2: 95% (25 Dec 2022 21:05) (95% - 95%)    Parameters below as of 25 Dec 2022 21:05  Patient On (Oxygen Delivery Method): nasal cannula  O2 Flow (L/min): 1      CONSTITUTIONAL: NAD, well-developed  RESPIRATORY: Normal respiratory effort; lungs are clear to auscultation with reduced breath sounds in bases bilaterally   CARDIOVASCULAR: Regular rate and rhythm, normal S1 and S2, no murmur/rub/gallop; No lower extremity edema; Peripheral pulses are 2+ bilaterally  ABDOMEN: Nontender to palpation, normoactive bowel sounds, no rebound/guarding  MUSCLOSKELETAL: no clubbing or cyanosis of digits; no joint swelling or tenderness to palpation  PSYCH: A+O to person, place, and time; affect appropriate    LABS:                        9.5    10.83 )-----------( 313      ( 25 Dec 2022 07:03 )             32.1     12-25    141  |  99  |  14  ----------------------------<  107<H>  3.6   |  35<H>  |  0.73    Ca    10.1      25 Dec 2022 07:03  Phos  2.8     12-25  Mg     1.50     12-25    TPro  6.6  /  Alb  3.2<L>  /  TBili  0.3  /  DBili  x   /  AST  30  /  ALT  37<H>  /  AlkPhos  196<H>  12-25

## 2022-12-27 NOTE — PROGRESS NOTE ADULT - PROBLEM SELECTOR PLAN 2
Hx of metastatic uterine cancer with peritoneal carcinomatosis.  - oncology following; unlikely to offer chemo  - was planned for chemotherapy (doxorubicin) 12/14/2022, as outpatient, last dose in 11/2022  - follow-up Palliative recs once they meet patient Hx of metastatic uterine cancer with peritoneal carcinomatosis.  - oncology following; unlikely to offer chemo  - was planned for chemotherapy (doxorubicin) 12/14/2022, as outpatient, last dose in 11/2022  - Palliative care met with patient-> she is not ready to talk about Palliative care as of now; see Palliative note for further details

## 2022-12-27 NOTE — PROGRESS NOTE ADULT - PROBLEM SELECTOR PLAN 6
DVT PPx: Lovenox 40mg qD  Diet: regular  Code: full code  Dispo: home with PT vs [facility]  Communication: brother Max Liao ([phone]). mom Nicole Liao ([phone]). Dashawn ([phone]). spoke to Dashawn 12/24  GOC: no defined HCP (Max, Dashawn, Nicole making decisions collectively). remains full code, patient aware of likely possibility that would not be extubated if requiring intubation

## 2022-12-27 NOTE — PROGRESS NOTE ADULT - ASSESSMENT
57y Female  with PMH of  met uterine cancer c/b pleural effusions s/p carbo/taxol, Keytruda Avastin and now most recently on Doxil (cycle 3 11/16) presenting with hypoxia , sating 69% on RA.  Admitted for Acute hypoxemic respiratory failure..Oncology consulted for further evaluation     CTA chest   No pulmonary embolus.  Worsening bilateral consolidations throughout both lungs when compared   with prior exam. This may be due to multifocal infection, edema, or drug   reaction.    -Pulm consult appreciated, on abx for PJP and on steroids for ?pneumonitis  -to be a prolonged steroid taper as per Pulm  -Respiratory status improved, decreased o2 demand  -To be discharged with supplemental home O2  -Pt has worsening performance status and not a candidate for cancer directed therapy at this time. Decision on treatment will be based on performance status.    Recommend follow up with outpatient oncologist after discharge from the hospital.   No cancer treatment while in rehab.  -Rest of care as per primary team  -Patient to followup with Dr. Celaya ([facility]) upon discharge  -C/w Supportive care, pain control, Nutrition, PT, DVT ppx  -Oncology will continue to follow with you      Case discussed with Dr. Shirley ROQUE  Oncology Physician Assistant  Tracie JUSTICE/JEANNIE [facility]  Pager [phone] also available on Teams    If before 8am/after 5pm or on weekends please page On-call Oncology Fellow    case d/w Dr Watson    57y Female  with PMH of  met uterine cancer c/b pleural effusions s/p carbo/taxol, Keytruda Avastin and now most recently on Doxil (cycle 3 11/16) presenting with hypoxia , sating 69% on RA.  Admitted for Acute hypoxemic respiratory failure..Oncology consulted for further evaluation     CTA chest   No pulmonary embolus.  Worsening bilateral consolidations throughout both lungs when compared   with prior exam. This may be due to multifocal infection, edema, or drug   reaction.    -Pulm consult appreciated, on abx for PJP and on steroids for ?pneumonitis  -to be a prolonged steroid taper as per Pulm  -Respiratory status improved, decreased o2 demand  -To be discharged with supplemental home O2  -Pt has worsening performance status and not a candidate for cancer directed therapy at this time. Decision on treatment will be based on performance status.    Recommend follow up with outpatient oncologist after discharge from the hospital.   No cancer treatment (Chemotherapy, immunotherapy or radiation) while in rehab.  -Rest of care as per primary team  -Patient to followup with Dr. Celaya ([facility]) upon discharge  -C/w Supportive care, pain control, Nutrition, PT, DVT ppx  -Oncology will continue to follow with you      Case discussed with Dr. Shirley ROQUE  Oncology Physician Assistant  Tracie JUSTICE/JEANNIE [facility]  Pager [phone] also available on Teams    If before 8am/after 5pm or on weekends please page On-call Oncology Fellow    case d/w Dr Watson

## 2022-12-27 NOTE — PROGRESS NOTE ADULT - SUBJECTIVE AND OBJECTIVE BOX
Internal Medicine   Titi Kidd| PGY-1    OVERNIGHT EVENTS:      SUBJECTIVE:       MEDICATIONS  (STANDING):  atovaquone  Suspension 750 milliGRAM(s) Oral every 12 hours  chlorhexidine 2% Cloths 1 Application(s) Topical daily  enoxaparin Injectable 40 milliGRAM(s) SubCutaneous every 24 hours  influenza   Vaccine 0.5 milliLiter(s) IntraMuscular once  lactated ringers. 1000 milliLiter(s) (75 mL/Hr) IV Continuous <Continuous>  loratadine 10 milliGRAM(s) Oral daily  melatonin 3 milliGRAM(s) Oral at bedtime  nystatin Powder 1 Application(s) Topical two times a day  pantoprazole    Tablet 40 milliGRAM(s) Oral before breakfast  predniSONE   Tablet 40 milliGRAM(s) Oral daily    MEDICATIONS  (PRN):  acetaminophen     Tablet .. 650 milliGRAM(s) Oral every 6 hours PRN Temp greater or equal to 38C (100.4F), Mild Pain (1 - 3)  benzonatate 100 milliGRAM(s) Oral every 8 hours PRN Cough  calcium carbonate    500 mG (Tums) Chewable 2 Tablet(s) Chew every 4 hours PRN Heartburn  guaiFENesin Oral Liquid (Sugar-Free) 100 milliGRAM(s) Oral every 6 hours PRN Cough  hydrocodone/homatropine Syrup 5 milliLiter(s) Oral every 8 hours PRN Cough  levalbuterol Inhalation 0.63 milliGRAM(s) Inhalation every 6 hours PRN SOB/Wheezing        T(F): 98.2 (12-27-22 @ 06:00), Max: 98.6 (12-26-22 @ 11:46)  HR: 99 (12-27-22 @ 06:00) (99 - 108)  BP: 118/76 (12-27-22 @ 06:00) (111/86 - 122/82)  BP(mean): --  RR: 18 (12-27-22 @ 06:00) (17 - 19)  SpO2: 100% (12-27-22 @ 06:00) (86% - 100%)    PHYSICAL EXAM:     GENERAL: NAD, lying in bed comfortably.  HEAD:  Atraumatic, normocephalic.  EYES: EOMI, PERRLA, conjunctiva and sclera clear, no nystagmus noted.  ENT: Moist mucous membranes,   NECK: Supple. No JVD. Trachea midline.  CHEST/LUNG: CTAB. No rales, rhonchi, wheezing, or rubs. Unlabored respirations.  HEART: RRR, no M/R/G, normal S1/S2.  ABDOMEN: Soft, nontender, nondistended, no organomegaly. Normoactive bowel sounds.  EXTREMITIES:  2+ peripheral pulses b/l, brisk capillary refill. No clubbing, cyanosis, or edema.  MSK: No gross deformities noted.   NEURO:  AAOx3, no focal deficits.   SKIN: No rashes or lesions.  PSYCH: Normal mood, affect.    TELEMETRY:    LABS:                        8.9    10.78 )-----------( 330      ( 26 Dec 2022 07:19 )             30.4                   Creatinine Trend: 0.73<--, 0.68<--, 0.73<--, 0.64<--, 0.69<--, 0.67<--  I&O's Summary    26 Dec 2022 07:01  -  27 Dec 2022 07:00  --------------------------------------------------------  IN: 350 mL / OUT: 550 mL / NET: -200 mL      BNP    RADIOLOGY & ADDITIONAL STUDIES:             INTERVAL HPI/OVERNIGHT EVENTS:    Patient was seen and examined at bedside. As per nurse and patient, no o/n events, patient resting comfortably. No complaints at this time. Patient denies: fever, chills, dizziness, weakness, HA, CP, palpitations, SOB, cough, N/V/D/C, dysuria, changes in bowel movements, LE edema.    ROS: as above    VITAL SIGNS:  T(F): 98.2 (12-27-22 @ 06:00)  HR: 99 (12-27-22 @ 06:00)  BP: 118/76 (12-27-22 @ 06:00)  RR: 18 (12-27-22 @ 06:00)  SpO2: 100% (12-27-22 @ 06:00)  Wt(kg): --    PHYSICAL EXAM:    Constitutional: WDWN, NAD  Eyes: PERRL, EOMI, sclera non-icteric  Neck: supple, trachea midline, no masses, no JVD  Respiratory: CTA b/l, no wheezing, no rhonchi, poor air movement in lungs  Cardiovascular: RRR, normal S1S2, no M/R/G  Gastrointestinal: soft, NTND, no masses palpable, BS normal  Extremities: Warm, well perfused, pulses equal bilateral upper and lower extremities, no edema, no clubbing  Neurological: AAOx3, CN Grossly intact  Skin: Normal temperature, warm, dry    MEDICATIONS  (STANDING):  atovaquone  Suspension 750 milliGRAM(s) Oral every 12 hours  chlorhexidine 2% Cloths 1 Application(s) Topical daily  enoxaparin Injectable 40 milliGRAM(s) SubCutaneous every 24 hours  influenza   Vaccine 0.5 milliLiter(s) IntraMuscular once  lactated ringers. 1000 milliLiter(s) (75 mL/Hr) IV Continuous <Continuous>  loratadine 10 milliGRAM(s) Oral daily  melatonin 3 milliGRAM(s) Oral at bedtime  nystatin Powder 1 Application(s) Topical two times a day  pantoprazole    Tablet 40 milliGRAM(s) Oral before breakfast  predniSONE   Tablet 40 milliGRAM(s) Oral daily    MEDICATIONS  (PRN):  acetaminophen     Tablet .. 650 milliGRAM(s) Oral every 6 hours PRN Temp greater or equal to 38C (100.4F), Mild Pain (1 - 3)  benzonatate 100 milliGRAM(s) Oral every 8 hours PRN Cough  calcium carbonate    500 mG (Tums) Chewable 2 Tablet(s) Chew every 4 hours PRN Heartburn  guaiFENesin Oral Liquid (Sugar-Free) 100 milliGRAM(s) Oral every 6 hours PRN Cough  hydrocodone/homatropine Syrup 5 milliLiter(s) Oral every 8 hours PRN Cough  levalbuterol Inhalation 0.63 milliGRAM(s) Inhalation every 6 hours PRN SOB/Wheezing      Allergies    No Known Allergies    Intolerances    LABS:                           8.9    10.78 )-----------( 330      ( 26 Dec 2022 07:19 )             30.4                                 I&O's Summary    26 Dec 2022 07:01  -  27 Dec 2022 07:00  --------------------------------------------------------  IN: 350 mL / OUT: 550 mL / NET: -200 mL           /     CAPILLARY BLOOD GLUCOSE                MICRO:

## 2022-12-27 NOTE — PROGRESS NOTE ADULT - ASSESSMENT
57F hx metastatic uterine cancer (s/p chemotherapy, radiation and hysterectomy) to the lungs and peritoneal carcinomatosis (on doxorubicin w/ Brianna, last session 11/2022), pleural effusions (s/p VATs), and chronic anemia presented with SOB, increased WOB, and pleural effusions. CTA neg for PE but showing progression of lung tumor burden +/- CAP. Currently improved and on minimal oxygen, pending dispo.

## 2022-12-27 NOTE — PROGRESS NOTE ADULT - SUBJECTIVE AND OBJECTIVE BOX
INTERVAL HPI/OVERNIGHT EVENTS:  Patient seen at bedside.  Patient with much improved symptoms  Remains on supp O2 but with less O2 demand  Anticipating discharge to subacute rehab      VITAL SIGNS:  T(F): 98.2 (12-27-22 @ 06:00)  HR: 99 (12-27-22 @ 06:00)  BP: 118/76 (12-27-22 @ 06:00)  RR: 18 (12-27-22 @ 06:00)  SpO2: 100% (12-27-22 @ 06:00)  Wt(kg): --    PHYSICAL EXAM:  Constitutional: WDWN, NAD  Eyes: PERRL, EOMI, sclera non-icteric  Neck: supple, trachea midline, no masses, no JVD  Respiratory: CTA b/l, no wheezing, no rhonchi, poor air movement in lungs  Cardiovascular: RRR, normal S1S2, no M/R/G  Gastrointestinal: soft, NTND, no masses palpable, BS normal  Extremities: Warm, well perfused, pulses equal bilateral upper and lower extremities, no edema, no clubbing  Neurological: AAOx3, CN Grossly intact  Skin: Normal temperature, warm, dry    MEDICATIONS  (STANDING):  atovaquone  Suspension 750 milliGRAM(s) Oral every 12 hours  chlorhexidine 2% Cloths 1 Application(s) Topical daily  enoxaparin Injectable 40 milliGRAM(s) SubCutaneous every 24 hours  influenza   Vaccine 0.5 milliLiter(s) IntraMuscular once  lactated ringers. 1000 milliLiter(s) (75 mL/Hr) IV Continuous <Continuous>  loratadine 10 milliGRAM(s) Oral daily  melatonin 3 milliGRAM(s) Oral at bedtime  nystatin Powder 1 Application(s) Topical two times a day  pantoprazole    Tablet 40 milliGRAM(s) Oral before breakfast  predniSONE   Tablet 40 milliGRAM(s) Oral daily    MEDICATIONS  (PRN):  acetaminophen     Tablet .. 650 milliGRAM(s) Oral every 6 hours PRN Temp greater or equal to 38C (100.4F), Mild Pain (1 - 3)  benzonatate 100 milliGRAM(s) Oral every 8 hours PRN Cough  calcium carbonate    500 mG (Tums) Chewable 2 Tablet(s) Chew every 4 hours PRN Heartburn  guaiFENesin Oral Liquid (Sugar-Free) 100 milliGRAM(s) Oral every 6 hours PRN Cough  hydrocodone/homatropine Syrup 5 milliLiter(s) Oral every 8 hours PRN Cough  levalbuterol Inhalation 0.63 milliGRAM(s) Inhalation every 6 hours PRN SOB/Wheezing      Allergies    No Known Allergies    Intolerances        LABS:                        8.9    10.78 )-----------( 330      ( 26 Dec 2022 07:19 )             30.4                 RADIOLOGY & ADDITIONAL TESTS:  Studies reviewed.

## 2022-12-27 NOTE — PROGRESS NOTE ADULT - NS PANP COMMENT GEN_ALL_CORE FT
Patient seen at bedside. Case discussed with JUDE Judd. Plan as above.   Plan is to dc to rehab  No chemo while in rehab  outpatient oncology follow up Patient seen at bedside. Case discussed with JUDE Judd. Plan as above.   Plan is to dc to rehab  No chemo, immunotherapy or radiation while in rehab  outpatient oncology follow up

## 2022-12-27 NOTE — PROGRESS NOTE ADULT - PROBLEM SELECTOR PLAN 4
SIRS criteria (tachycardia, tachypnea). No clear source of infection though has ascites and pleural effusions. Procalcitonin elevated.  - f/u cultures as above  - f/u MRSA swab  - Zon (12/16-12/19)

## 2022-12-28 NOTE — PROGRESS NOTE ADULT - PROBLEM SELECTOR PLAN 1
Hx of recurrent pleural effusions (s/p VATS, multiple thoracenteses/pigtail catheters - last 10/2022). Mildly hypervolemic on exam w/ pulmonary edema/pleural effusions on CXR. Unclear cause of acute worsening started 12/16. Ddx pulmonary edema/increased pulmonary HTN, superimposed infectious process, pneumonitis from recent chemotherapy.  - CTA chest without PE but showing worsening bilateral consolidations throughout both lungs and stable bi/l pleural effusions; likely multifocal infection vs. edema vs most likely tumor progression  - repeat TTE, risk of cardiotoxicity with doxorubicin  - Xopenex inhaler q6h pRN  - Thoracic surgery consult to eval pleural effusions, possible need for thoracentesis - after reviewing CT they feel not indicated at this time  - worsen SOB and WOB starting 12/16, MICU consulted and rejected at this time  - antibiotics escalated to Zosyn 12/16 -12/19 given abx total course    - treatment for CAP initially given CTA chest findings; repeat Bcx, MRSA both negative; Scx with respiratory hema, Ucx neg  -- able to be weaned to 2L O2 currently  -- SoluMedrol 40mg daily x3 days (12/24-12/26); then Prednisone taper (40mg qD, decrease by 10mg weekly until 10mg qD)   - ID consulted for possible empiric PCP coverage in s/o immunocompromised state & possible infectious source of lung imaging: low suspicion for PCP; Check serum Galactomannan, serum Fungitell, serum LDH                    - Atovaquone 750mg BID  - repeat CXR for baseline as per pulm, anticipating dc. CXR to be used as baseline Hx of recurrent pleural effusions (s/p VATS, multiple thoracenteses/pigtail catheters - last 10/2022). Mildly hypervolemic on exam w/ pulmonary edema/pleural effusions on CXR. Unclear cause of acute worsening started 12/16. Ddx pulmonary edema/increased pulmonary HTN, superimposed infectious process, pneumonitis from recent chemotherapy.  - CTA chest without PE but showing worsening bilateral consolidations throughout both lungs and stable bi/l pleural effusions; likely multifocal infection vs. edema vs most likely tumor progression  - repeat TTE, risk of cardiotoxicity with doxorubicin  - Xopenex inhaler q6h pRN  - Thoracic surgery consult to eval pleural effusions, possible need for thoracentesis - after reviewing CT they feel not indicated at this time  - worsen SOB and WOB starting 12/16, MICU consulted and rejected at this time  - antibiotics escalated to Zosyn 12/16 -12/19 given abx total course    - treatment for CAP initially given CTA chest findings; repeat Bcx, MRSA both negative; Scx with respiratory hema, Ucx neg  -- able to be weaned to 2L O2 currently  -- SoluMedrol 40mg daily x3 days (12/24-12/26); then Prednisone taper (40mg qD, decrease by 10mg weekly until 10mg qD)   - ID consulted for possible empiric PCP coverage in s/o immunocompromised state & possible infectious source of lung imaging: low suspicion for PCP; Check serum Galactomannan, serum Fungitell, serum LDH                    - Atovaquone 750mg BID  - repeat CXR done  - cleared for d/c with Pulm outpt f/u

## 2022-12-28 NOTE — PROGRESS NOTE ADULT - SUBJECTIVE AND OBJECTIVE BOX
INTERVAL HPI/OVERNIGHT EVENTS:    Patient was seen and examined at bedside. As per nurse and patient, no o/n events, patient resting comfortably. No complaints at this time. Patient denies: fever, chills, dizziness, weakness, HA, CP, palpitations, SOB, cough, N/V/D/C, dysuria, changes in bowel movements, LE edema.    ROS: as above    VITAL SIGNS:  T(F): 98.2 (12-28-22 @ 05:31)  HR: 104 (12-28-22 @ 05:31)  BP: 127/72 (12-28-22 @ 05:31)  RR: 17 (12-28-22 @ 05:31)  SpO2: 100% (12-28-22 @ 05:31)  Wt(kg): --    PHYSICAL EXAM:    Constitutional: WDWN, NAD  Eyes: PERRL, EOMI, sclera non-icteric  Neck: supple, trachea midline, no masses, no JVD  Respiratory: CTA b/l, good air entry b/l, no wheezing, no rhonchi, no rales, without accessory muscle use and no intercostal retractions  Cardiovascular: RRR, normal S1S2, no M/R/G  Gastrointestinal: soft, NTND, no masses palpable, BS normal  Extremities: Warm, well perfused, pulses equal bilateral upper and lower extremities, no edema, no clubbing  Neurological: AAOx3, CN Grossly intact  Skin: Normal temperature, warm, dry    MEDICATIONS  (STANDING):  atovaquone  Suspension 750 milliGRAM(s) Oral every 12 hours  chlorhexidine 2% Cloths 1 Application(s) Topical daily  enoxaparin Injectable 40 milliGRAM(s) SubCutaneous every 24 hours  influenza   Vaccine 0.5 milliLiter(s) IntraMuscular once  loratadine 10 milliGRAM(s) Oral daily  melatonin 3 milliGRAM(s) Oral at bedtime  nystatin Powder 1 Application(s) Topical two times a day  pantoprazole    Tablet 40 milliGRAM(s) Oral before breakfast  predniSONE   Tablet 40 milliGRAM(s) Oral daily    MEDICATIONS  (PRN):  acetaminophen     Tablet .. 650 milliGRAM(s) Oral every 6 hours PRN Temp greater or equal to 38C (100.4F), Mild Pain (1 - 3)  benzonatate 100 milliGRAM(s) Oral every 8 hours PRN Cough  calcium carbonate    500 mG (Tums) Chewable 2 Tablet(s) Chew every 4 hours PRN Heartburn  guaiFENesin Oral Liquid (Sugar-Free) 100 milliGRAM(s) Oral every 6 hours PRN Cough  hydrocodone/homatropine Syrup 5 milliLiter(s) Oral every 8 hours PRN Cough  levalbuterol Inhalation 0.63 milliGRAM(s) Inhalation every 6 hours PRN SOB/Wheezing      Allergies    No Known Allergies    Intolerances        LABS:                        8.9    10.78 )-----------( 330      ( 26 Dec 2022 07:19 )             30.4                 RADIOLOGY & ADDITIONAL TESTS:

## 2022-12-28 NOTE — PROGRESS NOTE ADULT - PROBLEM SELECTOR PLAN 2
Hx of metastatic uterine cancer with peritoneal carcinomatosis.  - oncology following; unlikely to offer chemo  - was planned for chemotherapy (doxorubicin) 12/14/2022, as outpatient, last dose in 11/2022  - Palliative care met with patient-> she is not ready to talk about Palliative care as of now; see Palliative note for further details

## 2022-12-28 NOTE — PROGRESS NOTE ADULT - PROBLEM SELECTOR PLAN 6
DVT PPx: Lovenox 40mg qD  Diet: regular  Code: full code  Dispo: home with PT vs [facility]  Communication: brother Max Liao ([phone]). mom Nicole Liao ([phone]). Dashawn ([phone]). spoke to Dashawn 12/24  GOC: no defined HCP (Max, Dashawn, Nicole making decisions collectively). remains full code, patient aware of likely possibility that would not be extubated if requiring intubation

## 2022-12-28 NOTE — PROGRESS NOTE ADULT - PROBLEM SELECTOR PLAN 3
Exhibited confusion and lack of 12/21. In s/o cancer, concern for brain mets. Pt initally not amenable to CT head, but CT finally done 12/22  - back to baseline mentation  - CT head 12/22 normal, no mets seen Exhibited confusion and lack of 12/21. In s/o cancer, concern for brain mets. Pt initially not amenable to CT head, but CT finally done 12/22  - back to baseline mentation  - CT head 12/22 normal, no mets seen

## 2022-12-29 NOTE — PROVIDER CONTACT NOTE (OTHER) - RECOMMENDATIONS
continue continuous BIPAP, continue to monitor
no further orders at this time
Supplement potassium
Called provider to bedside to reinforce education, attempt different administration routes for potassium supplement, continue to attempt to administer and educate patient.

## 2022-12-29 NOTE — PROGRESS NOTE ADULT - ATTENDING COMMENTS
57F w/ metastatic uterine cancer (s/p hysterectomy, chemo/xrt, mets to lungs/pleura, peritoneum, currently receiving doxorubicin at [facility] last 11/2022) sent to the ED from outpatient chemo appointment with SOB/cough x10 days found to have acute hypoxemic respiratory failure with CTA showing bilateral opacities concerning for progression of malignancy with possible superimposed pulmonary edema or infection.      Acute resp failure with hypxia/hypercapnea: worsening overnight, c/w BIPAP will wean gradually  - ECHO ordered to r/o new-onset heart failure due to doxo  - Broaden coverage of Abx to Zosyn  - Lasix 40BID, with goal neg 1-2L  - Oncology consulted: no acute inpt findings  - c/w steroids per MICU for suspicion of pneumonitis.  - Palliative care consult for help with symptom management and goals of care discussions .  - Full code for now
57F with metastatic uterine cancer s/p chemotherapy, radiation and hysterectomy, with lung metastasis and peritoneal carcinomatosis, malignant pleural effusions s/p VATs, chronic anemia who presents with increased work of breathing in the setting of increasing pulmonary infiltrates, concerning for progression of disease.     # Encephalopathy- Resolved. CT-H without evidence of metastasis.     # Acute hypercarbic and hypoxic respiratory failure- In the setting of b/l infiltrates which are concerning for infection vs pulmonary edema vs progression of malignancy. Pt with improvement today. Can trial off of BiPAP tonight. Procalcitonin not significantly elevated and WBC count relatively normal, which suggests against bacterial infection. s/p course of zosyn.  ID recommending fungitell, galactomannan. Mepron started.  Grossly nl LV and RV function. No need for diuresis    # Metastatic Uterine Cancer- appreciate oncology and palliative care input.    # Metabolic Alkalosis- improving. likely due to aggressive diuresis with lasix; will hold diuretic for now and continue to assess fluid status; acetazolamide if further diuresis is required.     # hypercalcemia- resolved, continue to monitor    Dispo: PT eval today
57F with metastatic uterine cancer s/p chemotherapy, radiation and hysterectomy, with lung metastasis and peritoneal carcinomatosis, malignant pleural effusions s/p VATs, chronic anemia who presents with increased work of breathing in the setting of increasing pulmonary infiltrates, concerning for progression of disease.     # Encephalopathy- this AM pt stating that "something isn't right, only god knows", not answering questions appropriately, seems paranoid. May be metabolic encephalopathy in setting of alkalosis, electrolyte disturbance. Pt has not had head imaging, will check CT-H with IV contrast to evaluate for intracranial pathology. Can give haldol prn agitation. Frequent redirection as needed.     # Acute hypercarbic and hypoxic respiratory failure- In the setting of b/l infiltrates which are concerning for infection vs pulmonary edema vs progression of malignancy. Progression of disease most likely. Will follow up with oncology regarding further treatment plan.  Procalcitonin not significantly elevated and WBC count relatively normal, which suggests against bacterial infection. s/p course of zosyn.  ID recommending fungitell, galactomannan. Mepron started.  Pt aggressively diuresed over the weekend, now with metabolic alkalosis and appears euvolemic so will hold lasix. If pt requires further diuretics will consider acetazolamide. Grossly normal LV and RV function on TTE. Continue BiPAP qHS. Appreciate pulm and onc input.     # Metastatic Uterine Cancer- appreciate oncology and palliative care input.    # Metabolic Alkalosis- likely due to aggressive diuresis with lasix; will hold diuretic for now and continue to assess fluid status; acetazolamide if further diuresis is required. Pt refusing labs this AM, will attempt again in afternoon/
57F with metastatic uterine cancer s/p chemotherapy, radiation and hysterectomy, with lung metastasis and peritoneal carcinomatosis, malignant pleural effusions s/p VATs, chronic anemia who presents with increased work of breathing in the setting of increasing pulmonary infiltrates, concerning for progression of disease.     # Encephalopathy- improved today. May be due to metastasis, electrolyte disturbance, steroid use. Pt did not have CT yesterday and today is refusing until family comes to visit. Will continue to monitor mental status. Recommend CT-H.     # Acute hypercarbic and hypoxic respiratory failure- In the setting of b/l infiltrates which are concerning for infection vs pulmonary edema vs progression of malignancy. Progression of disease most likely. Will follow up with oncology regarding further treatment plan.  Procalcitonin not significantly elevated and WBC count relatively normal, which suggests against bacterial infection. s/p course of zosyn.  ID recommending fungitell, galactomannan. Mepron started.  Pt aggressively diuresed over the weekend, now with metabolic alkalosis and appears euvolemic so will hold lasix. If pt requires further diuretics will consider acetazolamide. Grossly normal LV and RV function on TTE. Continue BiPAP qHS. Appreciate pulm and onc input.     # Metastatic Uterine Cancer- appreciate oncology and palliative care input.    # Metabolic Alkalosis- improving. likely due to aggressive diuresis with lasix; will hold diuretic for now and continue to assess fluid status; acetazolamide if further diuresis is required.     # hypercalcemia- mild. gentle IVF today, trend BMP
57F w/ metastatic uterine cancer (s/p hysterectomy, chemo/xrt, mets to lungs/pleura s/p VATS pleurodesis, prior PleurX (multiple recent thoracenteses last 10/22), peritoneum, currently receiving doxorubicin at [facility] last 11/2022) sent to the ED from outpatient chemo appointment with SOB/cough x10 days found to have acute hypoxemic respiratory failure.    Acute hypoxemic respiratory failure -- most likely reaccumulation of pleural fluid, but ddx also includes CAP (elevated procalcitonin), pulmonary edema (no prior dx of CHF, but receiving doxorubicin), PE (tachycardia, underlying malignancy). Per last thoracic surgery note in 10/22 plan to consider pleurX if reaccumulation of pleural fluid  - Thoracic surgery consult  - CTA chest ordered  - ECHO ordered  - CTX/azithromycin for CAP coverage for now  - Lasix 20 IV goal net negative
57F with metastatic uterine cancer s/p chemotherapy, radiation and hysterectomy, with lung metastasis and peritoneal carcinomatosis, malignant pleural effusions s/p VATs, chronic anemia who presents with increased work of breathing in the setting of increasing pulmonary infiltrates, concerning for progression of disease.     # Acute hypercarbic and hypoxic respiratory failure- In the setting of b/l infiltrates which are concerning for infection vs pulmonary edema vs progression of malignancy. Now off BiPAP and oxygen. Tolerating RA well. Procalcitonin not significantly elevated and WBC count relatively normal, which suggests against bacterial infection. s/p course of zosyn. Grossly nl LV and RV function. No need for diuresis. ID recommending Mepron. Fungitell negative. Galactomannan pending       # Encephalopathy- Resolved. CT-H without evidence of metastasis.     # Metastatic Uterine Cancer- appreciate oncology and palliative care input.    # Metabolic Alkalosis- improving. likely due to aggressive diuresis with lasix; will hold diuretic for now and continue to assess fluid status; acetazolamide if further diuresis is required.     # hypercalcemia- resolved, continue to monitor    [facility]
57F w/ metastatic uterine cancer (s/p hysterectomy, chemo/xrt, mets to lungs/pleura, peritoneum, currently receiving doxorubicin at [facility] last 11/2022) sent to the ED from outpatient chemo appointment with SOB/cough x10 days found to have acute hypoxemic respiratory failure with CTA showing bilateral opacities concerning for progression of mailgnancy with possible superimposed pulmonary edema or infection.  - ECHO ordered  - Continue CTX/azithromycin for CAP coverage for now  - Lasix 20 IV PRN goal net negative  - Oncology consulted given concern of pulmonary disease progression  - Palliative care consult for help with symptom management and goals of care discussions
57F w/ metastatic uterine cancer (s/p hysterectomy, chemo/xrt, mets to lungs/pleura, peritoneum, currently receiving doxorubicin at [facility] last 11/2022) sent to the ED from outpatient chemo appointment with SOB/cough x10 days found to have acute hypoxemic respiratory failure with CTA showing bilateral opacities concerning for progression of malignancy with possible superimposed pulmonary edema or infection.      Acute resp failure with hypoxia/hypercapnea: worsening overnight, c/w BIPAP will wean gradually  - ECHO ordered to r/o new-onset heart failure due to doxo  - Broaden coverage of Abx to Zosyn  - Lasix 40daily given uptrending bicarb, with goal neg 1-2L  - Oncology consulted: no acute inpt findings  - c/w steroids per MICU for suspicion of pneumonitis.  - Palliative care consult for help with symptom management and goals of care discussions .  - Full code for now .    Hypercalcemia: unable to give IVF, start calcitonin
57F with metastatic uterine cancer s/p chemotherapy, radiation and hysterectomy, with lung metastasis and peritoneal carcinomatosis, malignant pleural effusions s/p VATs, chronic anemia who presents with increased work of breathing in the setting of increasing pulmonary infiltrates, concerning for progression of disease vs pneumonitis    # Acute hypercarbic and hypoxic respiratory failure- In the setting of b/l infiltrates which are concerning for infection vs pulmonary edema vs progression of malignancy. Intermittently may require 1-2L NC. s/p course of zosyn. Grossly nl LV and RV function. No need for diuresis. ID recommending Mepron. Fungitell negative. Galactomannan pending. Pulm recs prolong prednisone taper (pred 40 x 7 days with 10mg taper every 7 days). CXR with resolving infiltrates, b/l pleural effusions    # Metastatic Uterine Cancer- appreciate oncology and palliative care input. Pt will not get chemo while inpt or in [facility]    # Metabolic Alkalosis- due to diuresis, improving.     # hypercalcemia- resolved, continue to monitor    Dispo: [facility] pending insurance authorization
57F with metastatic uterine cancer s/p chemotherapy, radiation and hysterectomy, with lung metastasis and peritoneal carcinomatosis, malignant pleural effusions s/p VATs, chronic anemia who presents with increased work of breathing in the setting of increasing pulmonary infiltrates, concerning for progression of disease vs pneumonitis    # Acute hypercarbic and hypoxic respiratory failure- In the setting of b/l infiltrates which are concerning for infection vs pulmonary edema vs progression of malignancy. Intermittently may require 1-2L NC. s/p course of zosyn. Grossly nl LV and RV function. No need for diuresis. ID recommending Mepron. Fungitell negative. Galactomannan pending. Pulm recs prolong prednisone taper (pred 40 x 7 days with 10mg taper every 7 days). CXR with resolving infiltrates, b/l pleural effusions    # Metastatic Uterine Cancer- appreciate oncology and palliative care input. Pt will not get chemo while inpt or in [facility]    # Metabolic Alkalosis- due to diuresis, improving.     # hypercalcemia- resolved, continue to monitor    Dispo: discharge to [facility] today
57F with metastatic uterine cancer s/p chemotherapy, radiation and hysterectomy, with lung metastasis and peritoneal carcinomatosis, malignant pleural effusions s/p VATs, chronic anemia who presents with increased work of breathing in the setting of increasing pulmonary infiltrates, concerning for progression of disease vs pneumonitis    # Acute hypercarbic and hypoxic respiratory failure- In the setting of b/l infiltrates which are concerning for infection vs pulmonary edema vs progression of malignancy. Now off BiPAP. Intermittently may require 1-2L NC. s/p course of zosyn. Grossly nl LV and RV function. No need for diuresis. ID recommending Mepron. Fungitell negative. Galactomannan pending. Pulm recs prolong prednisone taper (pred 40 x 7 days with 10mg taper every 7 days). CXR with resolving infiltrates, b/l pleural effusions    # Metastatic Uterine Cancer- appreciate oncology and palliative care input. Pt will not get chemo while inpt or in [facility]    # Metabolic Alkalosis- due to diuresis, improving.     # hypercalcemia- resolved, continue to monitor    Dispo: [facility] pending insurance authorization
57F with metastatic uterine cancer s/p chemotherapy, radiation and hysterectomy, with lung metastasis and peritoneal carcinomatosis, malignant pleural effusions s/p VATs, chronic anemia who presents with increased work of breathing in the setting of increasing pulmonary infiltrates, concerning for progression of disease. Pt required BiPAP this morning for increased work of breathing.    # Acute hypercarbic and hypoxic respiratory failure- In the setting of b/l infiltrates which are concerning for infection vs pulmonary edema vs progression of malignancy. Progression of disease most likely. Will follow up with oncology regarding further treatment plan.  Procalcitonin not significantly elevated and WBC count relatively normal, which suggests against infection. Last day of zosyn today. Pt aggressively diuresed over the weekend, today with metabolic alkalosis and appears euvolemic so will hold lasix. If pt requires further diuretics will consider acetazolamide. Will consult pulmonology. Check TTE. Continue BiPAP qHS and prn    # Metastatic Uterine Cancer- appreciate oncology and palliative care input.    # Metabolic Alkalosis- likely due to aggressive diuresis with lasix; will hold diuretic for now and reassess fluid status tomorrow; acetazolamide if further diuresis is required.
57F with metastatic uterine cancer s/p chemotherapy, radiation and hysterectomy, with lung metastasis and peritoneal carcinomatosis, malignant pleural effusions s/p VATs, chronic anemia who presents with increased work of breathing in the setting of increasing pulmonary infiltrates, concerning for progression of disease.     # Acute hypercarbic and hypoxic respiratory failure- In the setting of b/l infiltrates which are concerning for infection vs pulmonary edema vs progression of malignancy. Progression of disease most likely. Will follow up with oncology regarding further treatment plan.  Procalcitonin not significantly elevated and WBC count relatively normal, which suggests against bacterial infection. s/p course of zosyn. ID consult to evaluate for PCP. Pt aggressively diuresed over the weekend, today with metabolic alkalosis and appears euvolemic so will hold lasix. If pt requires further diuretics will consider acetazolamide. Grossly normal LV and RV function on TTE. Continue BiPAP qHS. Appreciate pulm and onc input.     # Metastatic Uterine Cancer- appreciate oncology and palliative care input.    # Metabolic Alkalosis- likely due to aggressive diuresis with lasix; will hold diuretic for now and continue to assess fluid status; acetazolamide if further diuresis is required.
57F with metastatic uterine cancer s/p chemotherapy, radiation and hysterectomy, with lung metastasis and peritoneal carcinomatosis, malignant pleural effusions s/p VATs, chronic anemia who presents with increased work of breathing in the setting of increasing pulmonary infiltrates, concerning for progression of disease.     # Acute hypercarbic and hypoxic respiratory failure- In the setting of b/l infiltrates which are concerning for infection vs pulmonary edema vs progression of malignancy. Now off BiPAP. Intermittently may require 1-2L NC. s/p course of zosyn. Grossly nl LV and RV function. No need for diuresis. ID recommending Mepron. Fungitell negative. Galactomannan pending. Pulm recs prolong prednisone taper (pred 40 x 7 days with 10mg taper every 7 days). Obtain chest xray prior to discharge.        # Encephalopathy- Resolved. CT-H without evidence of metastasis.     # Metastatic Uterine Cancer- appreciate oncology and palliative care input.    # Metabolic Alkalosis- improving. likely due to aggressive diuresis with lasix; will hold diuretic for now and continue to assess fluid status; acetazolamide if further diuresis is required.     # hypercalcemia- resolved, continue to monitor    [facility]. Patient in agreement for rehab placement.
57F with metastatic uterine cancer s/p chemotherapy, radiation and hysterectomy, with lung metastasis and peritoneal carcinomatosis, malignant pleural effusions s/p VATs, chronic anemia who presents with increased work of breathing in the setting of increasing pulmonary infiltrates, concerning for progression of disease.     # Acute hypercarbic and hypoxic respiratory failure- In the setting of b/l infiltrates which are concerning for infection vs pulmonary edema vs progression of malignancy. Now off BiPAP and oxygen. Tolerating RA well. Procalcitonin not significantly elevated and WBC count relatively normal, which suggests against bacterial infection. s/p course of zosyn. Grossly nl LV and RV function. No need for diuresis. ID recommending fungitell, galactomannan. Mepron started.      # Encephalopathy- Resolved. CT-H without evidence of metastasis.     # Metastatic Uterine Cancer- appreciate oncology and palliative care input.    # Metabolic Alkalosis- improving. likely due to aggressive diuresis with lasix; will hold diuretic for now and continue to assess fluid status; acetazolamide if further diuresis is required.     # hypercalcemia- resolved, continue to monitor    Dispo: [facility]

## 2022-12-29 NOTE — PROGRESS NOTE ADULT - PROBLEM SELECTOR PLAN 1
Hx of recurrent pleural effusions (s/p VATS, multiple thoracenteses/pigtail catheters - last 10/2022). Mildly hypervolemic on exam w/ pulmonary edema/pleural effusions on CXR. Unclear cause of acute worsening started 12/16. Ddx pulmonary edema/increased pulmonary HTN, superimposed infectious process, pneumonitis from recent chemotherapy.  - CTA chest without PE but showing worsening bilateral consolidations throughout both lungs and stable bi/l pleural effusions; likely multifocal infection vs. edema vs most likely tumor progression  - repeat TTE, risk of cardiotoxicity with doxorubicin  - Xopenex inhaler q6h pRN  - Thoracic surgery consult to eval pleural effusions, possible need for thoracentesis - after reviewing CT they feel not indicated at this time  - worsen SOB and WOB starting 12/16, MICU consulted and rejected at this time  - antibiotics escalated to Zosyn 12/16 -12/19 given abx total course    - treatment for CAP initially given CTA chest findings; repeat Bcx, MRSA both negative; Scx with respiratory hema, Ucx neg  -- able to be weaned to 2L O2 currently  -- SoluMedrol 40mg daily x3 days (12/24-12/26); then Prednisone taper (40mg qD, decrease by 10mg weekly until 10mg qD)   - ID consulted for possible empiric PCP coverage in s/o immunocompromised state & possible infectious source of lung imaging: low suspicion for PCP; Check serum Galactomannan, serum Fungitell, serum LDH                    - Atovaquone 750mg BID  - repeat CXR done  - cleared for d/c with Pulm outpt f/u Hx of recurrent pleural effusions (s/p VATS, multiple thoracenteses/pigtail catheters - last 10/2022). Mildly hypervolemic on exam w/ pulmonary edema/pleural effusions on CXR. Unclear cause of acute worsening started 12/16. Ddx pulmonary edema/increased pulmonary HTN, superimposed infectious process, pneumonitis from recent chemotherapy.  - CTA chest without PE but showing worsening bilateral consolidations throughout both lungs and stable bi/l pleural effusions; likely multifocal infection vs. edema vs most likely tumor progression  - repeat TTE, risk of cardiotoxicity with doxorubicin  - Xopenex inhaler q6h pRN  - Thoracic surgery consult to eval pleural effusions, possible need for thoracentesis - after reviewing CT they feel not indicated at this time  - worsen SOB and WOB starting 12/16, MICU consulted and rejected at this time  - antibiotics escalated to Zosyn 12/16 -12/19 given abx total course    - treatment for CAP initially given CTA chest findings; repeat Bcx, MRSA both negative; Scx with respiratory hema, Ucx neg  -- able to be weaned to 2L O2 currently  -- SoluMedrol 40mg daily x3 days (12/24-12/26); then Prednisone taper (40mg qD, decrease by 10mg weekly until 10mg qD)   - ID consulted for possible empiric PCP coverage in s/o immunocompromised state & possible infectious source of lung imaging: low suspicion for PCP; Check serum Galactomannan, serum Fungitell, serum LDH                    - Atovaquone 750mg BID for at least 3 weeks, depending on if steroids are continued  - repeat CXR done  - cleared for d/c with Pulm outpt f/u

## 2022-12-29 NOTE — PROVIDER CONTACT NOTE (OTHER) - ASSESSMENT
Patient A&Ox4, continuous BIPAP maintained satting >98%, vital signs stable. Patient denies pain or SOB at this time.
[id number] Yanni Denalie pt refusing AM blood draws despite education from the RN
Patient A&Ox4, continuous BIPAP maintained satting >98%, vital signs stable. Patient denies pain or SOB at this time.
Pt is A&Ox4, c/o unable to tolerate both PO and/or IV potassium supplement. Pt complaining of arm burning and slight swelling noted to arm. Refused multiple attempts to give supplementation despite educationn by RN and provider.

## 2022-12-29 NOTE — PROGRESS NOTE ADULT - NUTRITIONAL ASSESSMENT
This patient has been assessed with a concern for Malnutrition and has been determined to have a diagnosis/diagnoses of Severe protein-calorie malnutrition.    This patient is being managed with:   Diet Regular-  Entered: Dec 20 2022  4:04PM    
This patient has been assessed with a concern for Malnutrition and has been determined to have a diagnosis/diagnoses of Severe protein-calorie malnutrition.    This patient is being managed with:   Diet Clear Liquid-  Moderately Thick Liquids (MODTHICKLIQS)  Entered: Dec 17 2022 12:11PM    
This patient has been assessed with a concern for Malnutrition and has been determined to have a diagnosis/diagnoses of Severe protein-calorie malnutrition.    This patient is being managed with:   Diet Regular-  Entered: Dec 20 2022  4:04PM    
This patient has been assessed with a concern for Malnutrition and has been determined to have a diagnosis/diagnoses of Severe protein-calorie malnutrition.    This patient is being managed with:   Diet Regular-  Entered: Dec 20 2022  4:04PM

## 2022-12-29 NOTE — PROGRESS NOTE ADULT - REASON FOR ADMISSION
Hypoxia

## 2022-12-29 NOTE — PROVIDER CONTACT NOTE (OTHER) - SITUATION
[id number] Yanni Denalie pt refusing AM blood draws despite education from the RN
Potassium 2.9
Patient continuously refusing potassium supplementation despite education of importance. Pt refusing oral supplement and IV potassium.
HCO3 53

## 2022-12-29 NOTE — DISCHARGE NOTE NURSING/CASE MANAGEMENT/SOCIAL WORK - NSDCCRNAME_GEN_ALL_CORE_FT
"June 25, 2021    RE: Betty MCKEON Garrard  [address] [place] [address]       To Whom it May Concern,    We are aware that Betty already has some accommodations in place, but unfortunately they are insufficient for her to maintain given the severity of her condition. Betty continues to experience debilitating mental health symptoms related to factors outside of her control that interfere significantly with her ability to function. She is currently engaged in intensive treatment for her condition, but her treatment is currently hindered by her work schedule, which has prevented her from being able to attend, resulting in deterioration. She must be permitted to attend her DBT treatment on Monday afternoons from 2:30p-4:30p due to her work schedule. At this time, DBT continues to be the primary treatment recommendation in combination with regular medication management appointments. This recommendation has been assessed by multiple providers involved in her care.     As our previous accommodation recommendations, remote work and private workspace, were denied, we are are strongly recommending the following accommodations:     1.) Please allow Betty to \"flex\" her work schedule during the week so that she can leave by 2p on Mondays to consistently attend DBT group.   2.) Please allow Betty the option of intermittent time away from work 1-2 times per week should symptom exacerbation occur.     It is likely that without these necessary additional accommodations in place, Betty will continue to experience a decline in functioning that will significantly impact her ability to perform work duties, and may result in involuntary absence. Please accommodate this request to the fullest extent possible.     Sincerely,        Jordan Boyd MD  "
[facility] address: [facility] [place] [address], 4pm  via [facility] ambulance

## 2022-12-29 NOTE — PROGRESS NOTE ADULT - PROBLEM SELECTOR PLAN 3
Exhibited confusion and lack of 12/21. In s/o cancer, concern for brain mets. Pt initially not amenable to CT head, but CT finally done 12/22  - back to baseline mentation  - CT head 12/22 normal, no mets seen

## 2022-12-29 NOTE — PROGRESS NOTE ADULT - SUBJECTIVE AND OBJECTIVE BOX
PULMONARY PROGRESS NOTE    ALEXANDER HUDDLESTON  MRN-[id number]    Patient is a 57y old  Female who presents with a chief complaint of Hypoxia (29 Dec 2022 07:00)      HPI:  -sitting in chair  2L  breathing ok  not using IS    -    ROS:   -    ACTIVE MEDICATION LIST:  MEDICATIONS  (STANDING):  atovaquone  Suspension 750 milliGRAM(s) Oral every 12 hours  chlorhexidine 2% Cloths 1 Application(s) Topical daily  enoxaparin Injectable 40 milliGRAM(s) SubCutaneous every 24 hours  influenza   Vaccine 0.5 milliLiter(s) IntraMuscular once  loratadine 10 milliGRAM(s) Oral daily  melatonin 3 milliGRAM(s) Oral at bedtime  nystatin Powder 1 Application(s) Topical two times a day  pantoprazole    Tablet 40 milliGRAM(s) Oral before breakfast  predniSONE   Tablet 40 milliGRAM(s) Oral daily    MEDICATIONS  (PRN):  acetaminophen     Tablet .. 650 milliGRAM(s) Oral every 6 hours PRN Temp greater or equal to 38C (100.4F), Mild Pain (1 - 3)  albuterol    90 MICROgram(s) HFA Inhaler 1 Puff(s) Inhalation every 6 hours PRN Shortness of Breath and/or Wheezing  benzonatate 100 milliGRAM(s) Oral every 8 hours PRN Cough  calcium carbonate    500 mG (Tums) Chewable 2 Tablet(s) Chew every 4 hours PRN Heartburn  guaiFENesin Oral Liquid (Sugar-Free) 100 milliGRAM(s) Oral every 6 hours PRN Cough  hydrocodone/homatropine Syrup 5 milliLiter(s) Oral every 8 hours PRN Cough  levalbuterol Inhalation 0.63 milliGRAM(s) Inhalation every 6 hours PRN SOB/Wheezing      EXAM:  Vital Signs Last 24 Hrs  T(C): 36.8 (29 Dec 2022 12:05), Max: 36.8 (29 Dec 2022 12:05)  T(F): 98.2 (29 Dec 2022 12:05), Max: 98.2 (29 Dec 2022 12:05)  HR: 116 (29 Dec 2022 12:05) (105 - 116)  BP: 124/85 (29 Dec 2022 12:05) (122/79 - 124/85)  BP(mean): --  RR: 18 (29 Dec 2022 12:05) (18 - 18)  SpO2: 97% (29 Dec 2022 12:05) (97% - 97%)    Parameters below as of 29 Dec 2022 12:05  Patient On (Oxygen Delivery Method): room air        GENERAL: The patient is awake and alert in no apparent distress.     LUNGS: not wheezing  not labored  diminished at bases now     < from: Xray Chest 2 Views PA/Lat (12.27.22 @ 09:20) >    ACC: [id number] EXAM:  XR CHEST PA LAT 2V                          PROCEDURE DATE:  12/27/2022          INTERPRETATION:  EXAMINATION: XR CHEST PA AND LATERAL    CLINICAL INDICATION: O2 requirements    TECHNIQUE: 2 views; Frontal and lateral views of the chest were obtained.    COMPARISON: Chest x-ray 12/14/2022.    FINDINGS:  Right-sided Mediport.  The heart is poorly assessed on this projection.  Small bilateral loculated pleural effusions with associated atelectasis.  No pneumothorax.    IMPRESSION:  Small bilateral loculated pleural effusions with associated atelectasis    --- End of Report ---          RAYMOND RIVERA MD; Resident Radiologist  This document has been electronically signed.  LILIANA BURGOS MD; Attending Radiologist  This document has been electronically signed. Dec 27 2022  2:57PM    < end of copied text >  < from: CT Angio Chest PE Protocol w/ IV Cont (12.16.22 @ 12:31) >    ACC: [id number] EXAM:  CT ANGIO CHEST PULM [facility]                          PROCEDURE DATE:  12/16/2022          INTERPRETATION:  Reason for Exam: Shortness of breath. chest pain.    CTA of the chest was performed from the thoracic inlet to the level of   the adrenal glands following IV contrast injection of  80 cc of Omnipaque   350. No immediate complications were reported.  MIP images were also   created and reviewed.    Comparison: November 28, 2022    Tubes/Lines: None    Mediastinum and Heart: Aorta and pulmonary arteries are normal in size.   Thyroid gland is unremarkable. No lymphadenopathy. No pericardial   effusion.    Lungs, Pleura, and Airways: There is no pulmonary embolus. Increasing   bilateral lung consolidation when compared with the previous exam.   Additionally there are bilateral pleural effusions, portions of which are   loculated within the major fissures bilaterally.    Visualized Abdomen: Intra-abdominal ascites noted, unchanged.    Bones and soft tissues: Unremarkable.    IMPRESSION:    No pulmonary embolus.    Worsening bilateral consolidations throughout both lungs when compared   with prior exam. This may be due to multifocal infection, edema, or drug   reaction.    Stable bilateral pleural effusions.    --- End of Report ---            QUEENIE GUALLPA MD; Attending Radiologist  This document has been electronically signed. Dec 16 2022  1    < end of copied text >      PROBLEM LIST:  57y Female with HEALTH ISSUES - PROBLEM Dx:  Acute respiratory failure with hypoxia    Prophylactic measure    Uterine cancer    SIRS (systemic inflammatory response syndrome)    Anemia    Uterine cancer  S/p chemotherapy, radiation and hysterectomy    Palliative care encounter    Altered mental state              RECS:  prolong prednisone taper  prednisone 40 x 7 days with 10mg taper every 7 days  xray reviewed  will need 02 upon discharge  asked her to continue using IS while at home  needs pulm eval outpatient  defer to ID but would air on side of continuing treating her for pcp   dc planning as per primary team    Please call with any questions.    Kassidy Tracy DO  [facility] Pulmonary/Sleep Medicine  [phone]

## 2022-12-29 NOTE — PROGRESS NOTE ADULT - PROVIDER SPECIALTY LIST ADULT
Infectious Disease
Pulmonology
Heme/Onc
Infectious Disease
Internal Medicine
Pulmonology
Internal Medicine

## 2022-12-29 NOTE — PROGRESS NOTE ADULT - SUBJECTIVE AND OBJECTIVE BOX
INTERVAL HPI/OVERNIGHT EVENTS:    Patient was seen and examined at bedside. As per nurse and patient, no o/n events, patient resting comfortably. No complaints at this time. Patient denies: fever, chills, dizziness, weakness, HA, CP, palpitations, SOB, cough, N/V/D/C, dysuria, changes in bowel movements, LE edema.    ROS: as above    VITAL SIGNS:  T(F): 97.9 (12-28-22 @ 20:15)  HR: 105 (12-28-22 @ 20:15)  BP: 122/79 (12-28-22 @ 20:15)  RR: 18 (12-28-22 @ 20:15)  SpO2: 97% (12-28-22 @ 20:15)  Wt(kg): --    PHYSICAL EXAM:    Constitutional: WDWN, NAD  Eyes: PERRL, EOMI, sclera non-icteric  Neck: supple, trachea midline, no masses, no JVD  Respiratory: CTA b/l, good air entry b/l, no wheezing, no rhonchi, no rales, without accessory muscle use and no intercostal retractions  Cardiovascular: RRR, normal S1S2, no M/R/G  Gastrointestinal: soft, NTND, no masses palpable, BS normal  Extremities: Warm, well perfused, pulses equal bilateral upper and lower extremities, no edema, no clubbing  Neurological: AAOx3, CN Grossly intact  Skin: Normal temperature, warm, dry    MEDICATIONS  (STANDING):  atovaquone  Suspension 750 milliGRAM(s) Oral every 12 hours  chlorhexidine 2% Cloths 1 Application(s) Topical daily  enoxaparin Injectable 40 milliGRAM(s) SubCutaneous every 24 hours  influenza   Vaccine 0.5 milliLiter(s) IntraMuscular once  loratadine 10 milliGRAM(s) Oral daily  melatonin 3 milliGRAM(s) Oral at bedtime  nystatin Powder 1 Application(s) Topical two times a day  pantoprazole    Tablet 40 milliGRAM(s) Oral before breakfast  predniSONE   Tablet 40 milliGRAM(s) Oral daily    MEDICATIONS  (PRN):  acetaminophen     Tablet .. 650 milliGRAM(s) Oral every 6 hours PRN Temp greater or equal to 38C (100.4F), Mild Pain (1 - 3)  benzonatate 100 milliGRAM(s) Oral every 8 hours PRN Cough  calcium carbonate    500 mG (Tums) Chewable 2 Tablet(s) Chew every 4 hours PRN Heartburn  guaiFENesin Oral Liquid (Sugar-Free) 100 milliGRAM(s) Oral every 6 hours PRN Cough  hydrocodone/homatropine Syrup 5 milliLiter(s) Oral every 8 hours PRN Cough  levalbuterol Inhalation 0.63 milliGRAM(s) Inhalation every 6 hours PRN SOB/Wheezing      Allergies    No Known Allergies    Intolerances        LABS:                RADIOLOGY & ADDITIONAL TESTS:   INTERVAL HPI/OVERNIGHT EVENTS:    Patient was seen and examined at bedside. As per nurse and patient, no o/n events, patient resting comfortably. No complaints at this time. Patient denies: fever, chills, dizziness, weakness, HA, CP, palpitations, SOB, cough, N/V/D/C, dysuria, changes in bowel movements, LE edema.    ROS: as above    VITAL SIGNS:  T(F): 97.9 (12-28-22 @ 20:15)  HR: 105 (12-28-22 @ 20:15)  BP: 122/79 (12-28-22 @ 20:15)  RR: 18 (12-28-22 @ 20:15)  SpO2: 97% (12-28-22 @ 20:15)  Wt(kg): --    PHYSICAL EXAM:    Constitutional: WDWN, NAD  Eyes: PERRL, EOMI, sclera non-icteric  Neck: supple, trachea midline, no masses, no JVD  Respiratory: CTA b/l, poor air entry b/l, no wheezing, no rhonchi, no rales, without accessory muscle use and no intercostal retractions  Cardiovascular: RRR, normal S1S2, no M/R/G  Gastrointestinal: soft, NTND, no masses palpable, BS normal  Extremities: Warm, well perfused, pulses equal bilateral upper and lower extremities, no edema, no clubbing  Neurological: AAOx3, CN Grossly intact  Skin: Normal temperature, warm, dry    MEDICATIONS  (STANDING):  atovaquone  Suspension 750 milliGRAM(s) Oral every 12 hours  chlorhexidine 2% Cloths 1 Application(s) Topical daily  enoxaparin Injectable 40 milliGRAM(s) SubCutaneous every 24 hours  influenza   Vaccine 0.5 milliLiter(s) IntraMuscular once  loratadine 10 milliGRAM(s) Oral daily  melatonin 3 milliGRAM(s) Oral at bedtime  nystatin Powder 1 Application(s) Topical two times a day  pantoprazole    Tablet 40 milliGRAM(s) Oral before breakfast  predniSONE   Tablet 40 milliGRAM(s) Oral daily    MEDICATIONS  (PRN):  acetaminophen     Tablet .. 650 milliGRAM(s) Oral every 6 hours PRN Temp greater or equal to 38C (100.4F), Mild Pain (1 - 3)  benzonatate 100 milliGRAM(s) Oral every 8 hours PRN Cough  calcium carbonate    500 mG (Tums) Chewable 2 Tablet(s) Chew every 4 hours PRN Heartburn  guaiFENesin Oral Liquid (Sugar-Free) 100 milliGRAM(s) Oral every 6 hours PRN Cough  hydrocodone/homatropine Syrup 5 milliLiter(s) Oral every 8 hours PRN Cough  levalbuterol Inhalation 0.63 milliGRAM(s) Inhalation every 6 hours PRN SOB/Wheezing      Allergies    No Known Allergies    Intolerances        LABS:                RADIOLOGY & ADDITIONAL TESTS:

## 2022-12-29 NOTE — PROVIDER CONTACT NOTE (OTHER) - ACTION/TREATMENT ORDERED:
Supplement potassium
continue continuous BIPAP, continue to monitor
no further orders at this time
Called provider to bedside to reinforce education, attempted different administration routes for potassium supplement, continued to attempt to administer and educate patient.

## 2022-12-29 NOTE — PROGRESS NOTE ADULT - PROBLEM SELECTOR PLAN 6
DVT PPx: Lovenox 40mg qD  Diet: regular  Code: full code  Dispo: home with PT vs JAYA  Communication: brother Max Liao ([phone]). mom Nicole Liao ([phone]). Dashawn ([phone]). spoke to Dashawn 12/24  GOC: no defined HCP (Max, Dashawn, Nicole making decisions collectively). remains full code, patient aware of likely possibility that would not be extubated if requiring intubation

## 2022-12-29 NOTE — DISCHARGE NOTE NURSING/CASE MANAGEMENT/SOCIAL WORK - PATIENT PORTAL LINK FT
You can access the FollowMyHealth Patient Portal offered by [facility] by registering at the following website: [email or web address]. By joining [facility]’s FollowMyHealth portal, you will also be able to view your health information using other applications (apps) compatible with our system.

## 2022-12-29 NOTE — PROVIDER CONTACT NOTE (OTHER) - REASON
Potassium 2.9
464[address] Yanni Denalie pt refusing AM blood draws despite education from the RN
Patient continuously refusing potassium supplementation
HCO3 53

## 2022-12-29 NOTE — PROVIDER CONTACT NOTE (OTHER) - BACKGROUND
58 yo hx of metastatic uterine cancer, pleural effusions and chronic anemia presenting with shortness of breath and acute respiratory failure with hypoxia.
Admitted with acute respiratory failure with hypoxia. Hx of metastatic uterine cancer to lungs and peritoneal, pleural effusions and chronic anemia.
Admitted with acute respiratory failure with hypoxia. Hx of metastatic uterine cancer to lungs and peritoneal, pleural effusions and chronic anemia.
56 y/o female admitted for acute respiratory failure with hypoxia, hx of metastatic uterine cancer with lung mets

## 2022-12-29 NOTE — DISCHARGE NOTE NURSING/CASE MANAGEMENT/SOCIAL WORK - NSDCPEFALRISK_GEN_ALL_CORE
For information on Fall & Injury Prevention, visit: [email or web address] OR  [email or web address] OR  https://www.cdc.gov/steadi/patient.html

## 2023-01-01 ENCOUNTER — OUTPATIENT (OUTPATIENT)
Dept: OUTPATIENT SERVICES | Facility: HOSPITAL | Age: 58
LOS: 1 days | Discharge: HOME | End: 2023-01-01

## 2023-01-01 ENCOUNTER — APPOINTMENT (OUTPATIENT)
Dept: HEMATOLOGY ONCOLOGY | Facility: CLINIC | Age: 58
End: 2023-01-01

## 2023-01-01 ENCOUNTER — APPOINTMENT (OUTPATIENT)
Dept: INFUSION THERAPY | Facility: HOSPITAL | Age: 58
End: 2023-01-01

## 2023-01-01 ENCOUNTER — NON-APPOINTMENT (OUTPATIENT)
Age: 58
End: 2023-01-01

## 2023-01-01 ENCOUNTER — APPOINTMENT (OUTPATIENT)
Dept: HEMATOLOGY ONCOLOGY | Facility: CLINIC | Age: 58
End: 2023-01-01
Payer: COMMERCIAL

## 2023-01-01 VITALS
TEMPERATURE: 97.2 F | HEART RATE: 126 BPM | SYSTOLIC BLOOD PRESSURE: 129 MMHG | WEIGHT: 168.98 LBS | OXYGEN SATURATION: 100 % | RESPIRATION RATE: 16 BRPM | BODY MASS INDEX: 28.15 KG/M2 | HEIGHT: 64.96 IN | DIASTOLIC BLOOD PRESSURE: 90 MMHG

## 2023-01-01 DIAGNOSIS — Z98.890 OTHER SPECIFIED POSTPROCEDURAL STATES: Chronic | ICD-10-CM

## 2023-01-01 DIAGNOSIS — C54.1 MALIGNANT NEOPLASM OF ENDOMETRIUM: ICD-10-CM

## 2023-01-01 DIAGNOSIS — C55 MALIGNANT NEOPLASM OF UTERUS, PART UNSPECIFIED: ICD-10-CM

## 2023-01-01 DIAGNOSIS — Z90.710 ACQUIRED ABSENCE OF BOTH CERVIX AND UTERUS: Chronic | ICD-10-CM

## 2023-01-01 PROCEDURE — 99214 OFFICE O/P EST MOD 30 MIN: CPT

## 2023-01-01 RX ORDER — SCOPOLAMINE 1.5 MG/1
1 PATCH, EXTENDED RELEASE TRANSDERMAL
Qty: 1 | Refills: 2 | Status: ACTIVE | COMMUNITY
Start: 2023-01-01 | End: 1900-01-01

## 2023-01-01 RX ORDER — HYDROCODONE BITARTRATE AND HOMATROPINE METHYLBROMIDE 5; 1.5 MG/5ML; MG/5ML
5-1.5 SOLUTION ORAL EVERY 6 HOURS
Qty: 1 | Refills: 0 | Status: ACTIVE | COMMUNITY
Start: 2022-01-01 | End: 1900-01-01

## 2023-01-01 RX ORDER — ALPRAZOLAM 0.25 MG/1
0.25 TABLET ORAL
Qty: 30 | Refills: 0 | Status: ACTIVE | COMMUNITY
Start: 2023-01-01 | End: 1900-01-01

## 2023-01-01 RX ORDER — PREDNISONE 5 MG/1
5 TABLET ORAL DAILY
Qty: 30 | Refills: 1 | Status: ACTIVE | COMMUNITY
Start: 2023-01-01 | End: 1900-01-01

## 2023-01-30 NOTE — ED PROVIDER NOTE - NSICDXPASTMEDICALHX_GEN_ALL_CORE_FT
[Follow-Up: _____] : a [unfilled] follow-up visit PAST MEDICAL HISTORY:  Asthma No attacks since 2017    Uterine cancer S/p chemotherapy, radiation and hysterectomy

## 2023-02-02 PROBLEM — C55 SEROUS ADENOCARCINOMA OF UTERUS: Status: ACTIVE | Noted: 2020-10-12

## 2023-02-02 PROBLEM — C55 SEROUS ADENOCARCINOMA OF UTERUS, STAGE 1: Status: ACTIVE | Noted: 2020-05-21

## 2023-02-02 NOTE — PHYSICAL EXAM
[Ambulatory and capable of all self care but unable to carry out any work activities] : Status 2- Ambulatory and capable of all self care but unable to carry out any work activities. Up and about more than 50% of waking hours [Normal] : affect appropriate [de-identified] : appears weak [de-identified] : b/l decreased BS (lright >left), tachypneic [de-identified] : skin is dry, hyperpigmentation of hands and tongue

## 2023-02-02 NOTE — HISTORY OF PRESENT ILLNESS
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 Patient was seen initially by Dr. Arciniega at [place].\par She then saw Dr Robin Zamorano, at [facility] and had a repeat biopsy that showed clear cell carcinoma.\par On 4/20/2020 she underwent surgical staging and is post TLH/BSO/SLND/Omentectomy.\par Final pathology showed stage IA,high grade endometrial carcinoma involving predominantly clear cell and focally serous features, no invasion, no LVSI.\par \par Ct C/A/P with contrast done on 3/12/2020 showed no metastatic sites.\par She completed five cycles of CT 6/3/20- 9/2/20) Last cycle held due to toxicity and myelosuppression.She received vaginal brachytherapy to a total of 2100 cGy in 3 fractions. Treatment was delivered from 10/16/20- 10/23/20. \par Scan showed CINDY.\par \par 8/10/21: Surveillance scan showed New partially visualized small right pleural effusion. Correlation with dedicated chest CT is recommended. Few subcentimeter hepatic hypo densities, too small to characterize, but similar in appearance to prior. Small pelvic ascites, similar in appearance to prior.\par - 29( previously 15)\par \par 8/30/21: CT chest: Limited. No pulmonary embolus to level of the lobar pulmonary arteries. Interval increase in small right pleural effusion since 8/19/2021 with associated adjacent compressive atelectasis.Small perihepatic ascites.\par \par Patient was admitted to the hospital with SOB and pleural effusion. She had  thoracentesis and cytology was consistent with relapsed uterine cancer.\par \par Final Diagnosis\par PLEURAL FLUID, RIGHT\par POSITIVE FOR MALIGNANT CELLS.\par Metastatic adenocarcinoma\par \par Cytology slides and cell block reveal a hypercellular specimen\par composed of crowded 3-dimensional groups and single lying\par malignant cells with enlarged nuclei containing prominent\par nucleoli and vacuolated cytoplasm, among benign mesothelial\par cells.\par \par Immunohistochemical stains are performed on the cell block. The\par tumor cells are positive for PAX-8 (focal and weak), p16, napsin\par (focal) and p53 (overexpression), while they are negative for ER,\par WT-1 and TTF-1. In the absence of another primary, it is most\par consistent with involvement by patient's known endometrial\par adenocarcinoma. [de-identified] : Carboplatin and Taxol 6/3/20- 9/2/20( five cycles) [FreeTextEntry1] : Doxil\par C1 - 9/14/22\par C2 - 10/19/22\par C3 - 11/16/22 [de-identified] : Patient is here after recent discharge from rehab following hospitalization for hypoxemia and dypsnea.\par She felt better when she was on steroids, able to eat and breathe better.\par She continues to require oxygen, has not see pulm since discharge.\par Cough continues to be bothersome and appetite is decreased.\par She has not completed certification for medical cannabis.\par She has weakness and is in wheelchair, needs assistance for most ADLs.

## 2023-02-17 NOTE — ED PROVIDER NOTE - NSICDXPASTSURGICALHX_GEN_ALL_CORE_FT
Department of Anesthesiology  Preprocedure Note       Name:  Tricia Zhu   Age:  52 y.o.  :  1974                                          MRN:  [id number]         Date:  2023      Surgeon: * No surgeons listed *    Procedure: * No procedures listed *    Medications prior to admission:   Prior to Admission medications    Medication Sig Start Date End Date Taking? Authorizing Provider   ibuprofen (ADVIL;MOTRIN) 800 MG tablet Take 1 tablet by mouth every 8 hours as needed for Pain 18   Dennis Quintero MD       Current medications:    Current Facility-Administered Medications   Medication Dose Route Frequency Provider Last Rate Last Admin    ondansetron (ZOFRAN-ODT) disintegrating tablet 4 mg  4 mg Oral Q8H PRN Hesham Mclaughlin MD        Or    on[facility]ron [facility]) injection 4 mg  4 mg IntraVENous Q6H PRN Hesham Mclaughlin MD        polyethylene glycol [facility]) packet 17 g  17 g Oral Daily PRN MD Joe Simental ON 2023] enoxaparin (LOVENOX) injection 40 mg  40 mg SubCUTAneous Daily Hesham Mclaughlin MD        [START ON 2023] aspirin EC tablet 81 mg  81 mg Oral Daily Hesham Mclaughlin MD        Or   Janessa Shelby ON 2023] aspirin suppository 300 mg  300 mg Rectal Daily Hesham Mclaughlin MD        atorvastatin (LIPITOR) tablet 80 mg  80 mg Oral Nightly Hesham Mclaughlin MD        phenylephrine (RAY-SYNEPHRINE) 50 mg in sodium chloride 0.9 % 250 mL infusion   mcg/min IntraVENous Continuous Trent Zambrano MD 15 mL/hr at 23 0241 50 mcg/min at 23     Current Outpatient Medications   Medication Sig Dispense Refill    ibuprofen (ADVIL;MOTRIN) 800 MG tablet Take 1 tablet by mouth every 8 hours as needed for Pain 30 tablet 0       Allergies:     Allergies   Allergen Reactions    Penicillins        Problem List:    Patient Active Problem List   Diagnosis Code    Cerebrovascular accident aborted by administration of thrombolytic agent ([facility] I63.9       Past Medical History:  No past medical history on file. Past Surgical History:  No past surgical history on file. Social History:    Social History     Tobacco Use    Smoking status: Former    Smokeless tobacco: Not on file   Substance Use Topics    Alcohol use: No                                Counseling given: Not Answered      Vital Signs (Current):   Vitals:    02/17/23 0212 02/17/23 0227 02/17/23 0242 02/17/23 0245   BP: 115/76 101/78 102/72 119/85   Pulse: 88 88 87 81   Resp: 18 17 16 15   Temp: 36.8 °C (98.3 °F) 36.8 °C (98.2 °F) 36.8 °C (98.3 °F)    TempSrc: Oral Oral Oral    SpO2:    98%   Weight:       Height:                                                  BP Readings from Last 3 Encounters:   02/17/23 119/85   01/30/18 (!) 151/97       NPO Status:                                                                                 BMI:   Wt Readings from Last 3 Encounters:   02/17/23 194 lb (88 kg)   01/30/18 230 lb (104.3 kg)     Body mass index is 28.65 kg/m².     CBC:   Lab Results   Component Value Date/Time    WBC 8.3 02/16/2023 11:44 PM    RBC 4.49 02/16/2023 11:44 PM    HGB 12.6 02/16/2023 11:44 PM    HCT 38.9 02/16/2023 11:44 PM    MCV 86.6 02/16/2023 11:44 PM    RDW 15.5 02/16/2023 11:44 PM     02/16/2023 11:44 PM       CMP:   Lab Results   Component Value Date/Time     02/16/2023 11:44 PM    K 4.5 02/16/2023 11:44 PM     02/16/2023 11:44 PM    CO2 20 02/16/2023 11:44 PM    BUN 21 02/16/2023 11:44 PM    CREATININE 0.97 02/16/2023 11:44 PM    CREATININE 0.97 02/16/2023 11:42 PM    LABGLOM >60 02/16/2023 11:44 PM    GLUCOSE 119 02/16/2023 11:44 PM    CALCIUM 8.8 02/16/2023 11:44 PM       POC Tests:   Recent Labs     02/16/23  2342   POCGLU 115*   POCNA 138   POCK 4.4   POCCL 102   POCBUN 25   POCHEMO 14.3   POCHCT 42       Coags:   Lab Results   Component Value Date/Time    PROTIME 10.4 02/16/2023 11:44 PM    INR 1.0 02/16/2023 11:44 PM APTT 22.6 02/16/2023 11:44 PM       HCG (If Applicable): No results found for: PREGTESTUR, PREGSERUM, HCG, HCGQUANT     ABGs: No results found for: PHART, PO2ART, PSL2UWU, XKS9OTA, BEART, [id number]     Type & Screen (If Applicable):  No results found for: LABABO, LABRH    Drug/Infectious Status (If Applicable):  No results found for: HIV, HEPCAB    COVID-19 Screening (If Applicable): No results found for: COVID19        Anesthesia Evaluation  Patient summary reviewed  Airway: Mallampati: II  TM distance: >3 FB   Neck ROM: full  Mouth opening: > = 3 FB   Dental: normal exam         Pulmonary:Negative Pulmonary ROS and normal exam                               Cardiovascular:    (+) CHF:,         Rhythm: regular  Rate: normal                    Neuro/Psych:   Negative Neuro/Psych ROS              GI/Hepatic/Renal: Neg GI/Hepatic/Renal ROS            Endo/Other: Negative Endo/Other ROS                    Abdominal:             Vascular: negative vascular ROS. Other Findings:           Anesthesia Plan      MAC     ASA 4 - emergent             Anesthetic plan and risks discussed with patient. Use of blood products discussed with patient whom. Plan discussed with CRNA and attending.                     AXEL Camilo - CRNA   2/17/2023 PAST SURGICAL HISTORY:  H/O ovarian cystectomy 2009    History of thoracentesis R VATS 10/21    S/P hysterectomy 2020 [facility]ENT/BSO

## 2023-05-03 NOTE — H&P PST ADULT - NSANTHOBSERVEDRD_ENT_A_CORE
Impression: Primary open-angle glaucoma, mild stage, bilateral: H40.1131. Plan: s/p SLT OU October 2021. Pt previously using Latanoprost before SLT but has since stopped. IOPs of 21/23 today. Fundus photos and RNFL ordered today. RNFL showed thicknesses of 74/70 with signals of 4/10 and 5/10 (2019: 82/75 with signals of 8/10 OU; not convinced of true tissue loss due to poor signal strength today; repeat RNFL at follow-up). A repeat  Duncan 24-2 visual field and nerve fiber layer analysis by OCT, were ordered for next visit. All of the patients questions were answered and they stated they understand their finding. The risk of blindness if glaucoma goes undetected and/or untreated was discussed with the patient. No

## 2023-05-10 NOTE — DIETITIAN INITIAL EVALUATION ADULT - OTHER INFO
Terri Agee Pt has a history of Uterine Ca with pulmonary mets c/b pleura effusion with VATS and Pleurx cath in 3/2022. Pt presented with SOB.  Pt states she has not been able to eat due to her SOB for about 4 days PTA. Pt has been eating about 75% of her usual intake for 6-9 mos. Pt is unable to eat or drink anything cold because if causes her to cough. Unable to discuss food preferences, weight history or diet history etc due to difficulty breathing.   As per HIE, Pt's weight  was 109.3 kg in 9/2021. Her admission weight was 100.4 kg. Pt had a 20 lb weight loss in 9 mo.   Pt is amenable to taking Ensure Enlive 3x/day.

## 2023-09-06 NOTE — ASU PATIENT PROFILE, ADULT - NS TRANSFER PROSTHESIS:
Pain better, kanu anal area still with some pain  Vitals:    09/06/23 1537   BP: (!) 97/54   Pulse: 83   Resp: 16   Temp: 98.2 °F (36.8 °C)     Abdomen benign    A/P    I and D of kanu rectal abscess    Has left pelvic pathology which is related to ovary, uterus, and maybe colon.  She has a complex history with this as it was found at her latest child's birth.  She is been waiting to have it removed since that time and has been seeing Dr. Zazueta in [place].  There is some question as to the activity of her Crohn's disease.  She is seen a new gastroenterologist who is considering starting a new medication.  I have some concern that the area in the pelvis represents infected necrotic tissue and has started to drain through the vagina.  She does report occasional exudate that she confuses possibly with perianal abscess.  She is planning to bring her latest CD with her CT scan to Dr. Zazueta to discuss this with her.  I have offered the possibility of laparotomy with exposure of the area if I would be able to find a gynecologist willing to do the gynecologic resection.  I am not clear that starting a Crohn's Medicine in this situation would be beneficial but will defer to Dr. Zazueta for now, as she has not received the latest CT scan.  I will see Mrs. Matos after her visit with gyn to follow up with their plan   none

## 2023-09-20 NOTE — H&P PST ADULT - NEGATIVE GASTROINTESTINAL SYMPTOMS
Patient requests normal tests results to be communicated via phone [phone]    Patient notified that if test results are abnormal, provider will call patient.    no nausea/no vomiting/no diarrhea/no constipation/no abdominal pain

## 2023-09-22 NOTE — ED PROVIDER NOTE - IV ALTEPLASE EXCL ABS HIDDEN
Blurred Vision, Adult    Having blurred vision means that you cannot see things clearly. Your vision may seem fuzzy or out of focus. It can involve your vision for objects that are close or far away. It may affect one or both eyes. There are many causes of blurred vision, including cataracts, macular degeneration, eye inflammation (uveitis), and diabetic retinopathy.    In many cases, blurred vision has to do with the shape of your eye. An abnormal eye shape means you cannot focus well (refractive error). When this happens, it can cause:  Faraway objects to look blurry (nearsightedness).  Close objects to look blurry (farsightedness).  Blurry vision at any distance (astigmatism).  Refractive errors are often corrected with glasses or contacts.    Blurred vision can be diagnosed based on your symptoms and a physical exam. Tell your health care provider about any other health problems you have, any recent eye injury, and any prior surgeries. You may need to see a health care provider who specializes in eye problems (ophthalmologist). Your treatment will depend on what is causing your blurred vision.    Follow these instructions at home:  Keep all follow-up visits as told by your health care provider. This is important. These include any visits to your eye specialists.  Do not drive or use heavy machinery if your vision is blurry.  Use eye drops only as told by your health care provider.  If you were prescribed glasses or contact lenses, wear the glasses or contacts as told by your health care provider.  Schedule eye exams regularly.  Pay attention to any changes in your symptoms.  Contact a health care provider if:  Your symptoms do not improve or they get worse.  You have:  New symptoms.  A headache.  Trouble seeing at night.  Trouble noticing the difference between colors.  You notice:  Drooping of your eyelids.  Drainage coming from your eyes.  A rash around your eyes.  Get help right away if:  You have:  Severe eye pain.  A severe headache.  A sudden change in vision.  A sudden loss of vision.  A vision change after an injury.  You notice flashing lights in your field of vision. Your field of vision is the area that you can see without moving your eyes.  Summary  Having blurred vision means that you cannot see things clearly. Your vision may seem fuzzy or out of focus.  There are many causes of blurred vision. In many cases, blurred vision has to do with an abnormal eye shape (refractive error), and it can be corrected with glasses or contact lenses.  Pay attention to any changes in your symptoms. Contact a health care provider if your symptoms do not improve or if you have any new symptoms. show

## 2023-10-05 NOTE — PROGRESS NOTE ADULT - SUBJECTIVE AND OBJECTIVE BOX
"-- DO NOT REPLY / DO NOT REPLY ALL --  -- Message is from [facility] ([id number] [facility]) --    Referral Request  Name of Specialist: Cory Meredith  Provider's specialty: ENT    Medical condition for referral:  Follow up 10/03    Is this a NEW request?:       Referral ordered by: Christine Dalal       Insurance type:       Payor: [address] [place] [address] / Plan: [address] [id number] / Product Type:  MEDI-MEDI      Preferred Delivery Method   Fax - number to send to: [phone] Information       Type Contact Phone/Fax    10/05/2023 04:18 PM CDT Phone (Incoming) La Nenajoey Crump (Self) [phone] (M)          Alternative phone number: n/a     Can a detailed message be left? Yes    Please give this turnaround time to the caller: ""This message will be sent to [facility] Provider's full name]. The clinical team will return your call as soon as they review your message. Typically, it takes 3 business days to process referral requests. \""  " Internal Medicine   Titi Kidd| PGY-1    OVERNIGHT EVENTS:      SUBJECTIVE:       MEDICATIONS  (STANDING):  atovaquone  Suspension 750 milliGRAM(s) Oral every 12 hours  chlorhexidine 2% Cloths 1 Application(s) Topical daily  enoxaparin Injectable 40 milliGRAM(s) SubCutaneous every 24 hours  influenza   Vaccine 0.5 milliLiter(s) IntraMuscular once  melatonin 3 milliGRAM(s) Oral at bedtime  methylPREDNISolone sodium succinate Injectable 40 milliGRAM(s) IV Push two times a day  nystatin Powder 1 Application(s) Topical two times a day  pantoprazole    Tablet 40 milliGRAM(s) Oral before breakfast  polyethylene glycol 3350 17 Gram(s) Oral daily  sodium chloride 0.9%. 1000 milliLiter(s) (75 mL/Hr) IV Continuous <Continuous>    MEDICATIONS  (PRN):  acetaminophen     Tablet .. 650 milliGRAM(s) Oral every 6 hours PRN Temp greater or equal to 38C (100.4F), Mild Pain (1 - 3)  calcium carbonate    500 mG (Tums) Chewable 2 Tablet(s) Chew every 4 hours PRN Heartburn  guaiFENesin Oral Liquid (Sugar-Free) 100 milliGRAM(s) Oral every 6 hours PRN Cough  levalbuterol Inhalation 0.63 milliGRAM(s) Inhalation every 6 hours PRN SOB/Wheezing  morphine  - Injectable 1 milliGRAM(s) IV Push every 3 hours PRN Severe Pain (7 - 10)/tachypnea/dyspnea        T(F): 99.1 (12-23-22 @ 12:44), Max: 99.1 (12-23-22 @ 12:44)  HR: 109 (12-23-22 @ 12:44) (86 - 122)  BP: 131/83 (12-23-22 @ 12:44) (122/76 - 140/90)  BP(mean): --  RR: 18 (12-23-22 @ 12:44) (18 - 19)  SpO2: 95% (12-23-22 @ 12:44) (95% - 100%)    PHYSICAL EXAM:     GENERAL: NAD, lying in bed comfortably.  HEAD:  Atraumatic, normocephalic.  EYES: EOMI, PERRLA, conjunctiva and sclera clear, no nystagmus noted.  ENT: Moist mucous membranes,   NECK: Supple. No JVD. Trachea midline.  CHEST/LUNG: CTAB. No rales, rhonchi, wheezing, or rubs. Unlabored respirations.  HEART: RRR, no M/R/G, normal S1/S2.  ABDOMEN: Soft, nontender, nondistended, no organomegaly. Normoactive bowel sounds.  EXTREMITIES:  2+ peripheral pulses b/l, brisk capillary refill. No clubbing, cyanosis, or edema.  MSK: No gross deformities noted.   NEURO:  AAOx3, no focal deficits.   SKIN: No rashes or lesions.  PSYCH: Normal mood, affect.    TELEMETRY:    LABS:                        8.3    12.06 )-----------( 253      ( 23 Dec 2022 06:39 )             28.2     12-23    141  |  96<L>  |  19  ----------------------------<  150<H>  3.5   |  36<H>  |  0.68    Ca    10.2      23 Dec 2022 06:39  Phos  3.0     12-23  Mg     1.40     12-23    TPro  6.5  /  Alb  3.2<L>  /  TBili  0.2  /  DBili  x   /  AST  27  /  ALT  30  /  AlkPhos  158<H>  12-23            Creatinine Trend: 0.68<--, 0.73<--, 0.64<--, 0.69<--, 0.67<--, 0.81<--  I&O's Summary    23 Dec 2022 07:01  -  23 Dec 2022 13:01  --------------------------------------------------------  IN: 400 mL / OUT: 650 mL / NET: -250 mL      BNP    RADIOLOGY & ADDITIONAL STUDIES:             Internal Medicine   Titi Kidd| PGY-1    OVERNIGHT EVENTS:  No acute events overnight    SUBJECTIVE:   Denies headache, dizziness, weakness, SOB  Feels much better this morning    MEDICATIONS  (STANDING):  atovaquone  Suspension 750 milliGRAM(s) Oral every 12 hours  chlorhexidine 2% Cloths 1 Application(s) Topical daily  enoxaparin Injectable 40 milliGRAM(s) SubCutaneous every 24 hours  influenza   Vaccine 0.5 milliLiter(s) IntraMuscular once  melatonin 3 milliGRAM(s) Oral at bedtime  methylPREDNISolone sodium succinate Injectable 40 milliGRAM(s) IV Push two times a day  nystatin Powder 1 Application(s) Topical two times a day  pantoprazole    Tablet 40 milliGRAM(s) Oral before breakfast  polyethylene glycol 3350 17 Gram(s) Oral daily  sodium chloride 0.9%. 1000 milliLiter(s) (75 mL/Hr) IV Continuous <Continuous>    MEDICATIONS  (PRN):  acetaminophen     Tablet .. 650 milliGRAM(s) Oral every 6 hours PRN Temp greater or equal to 38C (100.4F), Mild Pain (1 - 3)  calcium carbonate    500 mG (Tums) Chewable 2 Tablet(s) Chew every 4 hours PRN Heartburn  guaiFENesin Oral Liquid (Sugar-Free) 100 milliGRAM(s) Oral every 6 hours PRN Cough  levalbuterol Inhalation 0.63 milliGRAM(s) Inhalation every 6 hours PRN SOB/Wheezing  morphine  - Injectable 1 milliGRAM(s) IV Push every 3 hours PRN Severe Pain (7 - 10)/tachypnea/dyspnea        T(F): 99.1 (12-23-22 @ 12:44), Max: 99.1 (12-23-22 @ 12:44)  HR: 109 (12-23-22 @ 12:44) (86 - 122)  BP: 131/83 (12-23-22 @ 12:44) (122/76 - 140/90)  BP(mean): --  RR: 18 (12-23-22 @ 12:44) (18 - 19)  SpO2: 95% (12-23-22 @ 12:44) (95% - 100%)    PHYSICAL EXAM:     GENERAL: NAD, lying in bed comfortably.  HEAD:  Atraumatic, normocephalic.  EYES: EOMI, PERRLA, conjunctiva and sclera clear, no nystagmus noted.  ENT: Moist mucous membranes,   NECK: Supple. No JVD. Trachea midline.  CHEST/LUNG: poor lung movement, but clear breath sounds heard  HEART: RRR, no M/R/G, normal S1/S2.  ABDOMEN: Soft, nontender, nondistended, no organomegaly. Normoactive bowel sounds.  EXTREMITIES:  2+ peripheral pulses b/l, brisk capillary refill. No clubbing, cyanosis, or edema.  MSK: No gross deformities noted.   NEURO:  AAOx3, no focal deficits.   SKIN: No rashes or lesions.  PSYCH: Normal mood, affect.    TELEMETRY:    LABS:                        8.3    12.06 )-----------( 253      ( 23 Dec 2022 06:39 )             28.2     12-23    141  |  96<L>  |  19  ----------------------------<  150<H>  3.5   |  36<H>  |  0.68    Ca    10.2      23 Dec 2022 06:39  Phos  3.0     12-23  Mg     1.40     12-23    TPro  6.5  /  Alb  3.2<L>  /  TBili  0.2  /  DBili  x   /  AST  27  /  ALT  30  /  AlkPhos  158<H>  12-23            Creatinine Trend: 0.68<--, 0.73<--, 0.64<--, 0.69<--, 0.67<--, 0.81<--  I&O's Summary    23 Dec 2022 07:01  -  23 Dec 2022 13:01  --------------------------------------------------------  IN: 400 mL / OUT: 650 mL / NET: -250 mL      BNP    RADIOLOGY & ADDITIONAL STUDIES:

## 2023-10-13 NOTE — ED PROVIDER NOTE - CONSTITUTIONAL NEGATIVE STATEMENT, MLM
PAST SURGICAL HISTORY:  H/O hernia repair b/l inguinal & abdominal    History of arthroplasty of right knee 5yrs    History of arthroscopy of left shoulder age 17 & right shoulder 3 yrs    
no fever and no chills.

## 2024-01-17 NOTE — ED PROVIDER NOTE - SEPSIS ALERT DATE/TIME
"Petey Archibald      1958  A DME order for supplies has been placed to [facility]. If there are any issues with your order including not receiving the order please call [facility] at [phone]. They can also provide a tracking number for you.  Dressing changes outside of clinic are being performed by [facility]   Lives at [facility] (3x weekly wound care)   FAX ORDERS ATTENTION JUAN DANIEL: [phone]     Plan:  Good job on stopping smoking, Continue to try and wean off of the nicotine inhaler to aide in healing  Patient would like to hold off on vein study (venous competency) at this time  Change dressings immediately after showering    Wound Dressing Change: Right Lateral Dorsal Foot  - Prepare surface and wash your hands with soap and water  - Cleanse with mild unscented soap and water (such as Cetaphil, Cerave or Dove)  - apply light layer Desitin/ Zinc oxide barrier paste around wound edge  - Apply 1/6th piece 4x4 Melgisorb alginate to wound bed  - Cover with 1/2 of 5x9 ABD  - Secure with 1 roll of 4\" Kerlix roll gauze and 2'' Medipore tape  - Pull up Spandagrip Size F from base of toes to the knee  Change 3 times weekly and as needed for drainage     Wound Dressing Change: Left Dorsal 1st Toe  - Prepare surface and wash your hands with soap and water  - Cleanse with mild unscented soap and water (such as Cetaphil, Cerave or Dove)  - apply light layer Desitin/ Zinc oxide barrier paste around wound edge  - Apply 1/6th piece  4x4 Melgisorb alginate to wound bed  - Cover with 1/2 of 5x9 ABD  - Secure with 1 roll of 4\" Kerlix roll gauze and 2'' Medipore tape  - Pull up Spandagrip size F from base of toes to the knee  Change 3 times weekly and as needed for drainage     Wound Dressing Change: Left Dorsal Foot  - Prepare surface and wash your hands with soap and water  - Cleanse with mild unscented soap and water (such as Cetaphil, Cerave or Dove)  - apply light layer Desitin/ Zinc oxide barrier " paste around wound edge  - Apply 1/4th piece  4x4 Melgisorb alginate to wound bed  - Cover and Secure as above  - Pull up Spandagrip size F from base of toes to the knee  Change 3 times weekly and as needed for drainage     Wound Dressing Change: Left Anterior Lower Leg  - Prepare surface and wash your hands with soap and water  - Cleanse with mild unscented soap and water (such as Cetaphil, Cerave or Dove)  - apply light layer Desitin/ Zinc oxide barrier paste around wound edge  - Apply 1/2 piece 4x4 Melgisorb alginate to wound bed  - Cover with 1/2 of 5x9 ABD  - Secure as above  - Pull up Spandagrip Size F from base of toes to the knee  Change 3 times weekly and as needed for drainage     Elevation: elevate your legs above the level of your heart for 30 minutes 2-3 times each day  - Lay on the couch or your bed and prop your legs up on pillows  - Recline back as far as you can go in your recliner and prop your legs on pillows.     Protein: diet high in protein, shakes or barsBeef jerky 13g per 1oz     Main Provider: Miguel Hampton M.D. January 17, 2024    Call us at [phone] if you have any questions about your wounds, have redness or swelling around your wound, have a fever of 101 degrees Fahrenheit or greater or if you have any other problems or concerns. We answer the phone Monday through Friday 8 am to 4 pm, please leave a message as we check the voicemail frequently throughout the day.     If you had a positive experience please indicate that on your patient satisfaction survey form that [facility] will be sending you.  It was a pleasure meeting with you today.  Thank you for allowing me and my team the privilege of caring for you today.  YOU are the reason we are here, and I truly hope we provided you with the excellent service you deserve.  Please let us know if there is anything else we can do for you so that we can be sure you are leaving completely satisfied with your care experience.       If you have any billing related questions please call the [facility] Business office at [phone]. The clinic staff does not handle billing related matters.  If you are scheduled to have a follow up appointment, you will receive a reminder call the day before your visit. On the appointment day please arrive 15 minutes prior to your appointment time. If you are unable to keep that appointment, please call the clinic to cancel or reschedule. If you are more than 10 minutes late or greater for your scheduled appointment time, the clinic policy is that you may be asked to reschedule.     25-Nov-2021 16:24

## 2024-01-29 NOTE — PATIENT PROFILE ADULT - FUNCTIONAL SCREEN CURRENT LEVEL: COMMUNICATION, MLM
Patient: Marguerite Abarca    Procedure Summary       Date: 01/29/24 Room / Location: [facility] ENDOSCOPY 1 / [facility] ENDOSCOPY    Anesthesia Start: 0810 Anesthesia Stop: 0825    Procedure: ESOPHAGOGASTRODUODENOSCOPY WITH BIOPSY (Esophagus) Diagnosis:       Epigastric pain      (Epigastric pain [R10.13])    Surgeons: Kay Miranda MD Provider: Taz Escudero MD    Anesthesia Type: MAC ASA Status: 2            Anesthesia Type: MAC    Vitals  Vitals Value Taken Time   BP     Temp     Pulse 74 01/29/24 0825   Resp     SpO2 100 % 01/29/24 0825   Vitals shown include unfiled device data.        Post Anesthesia Care and Evaluation    Patient location during evaluation: PACU  Patient participation: complete - patient participated  Level of consciousness: awake and alert  Pain management: adequate    Airway patency: patent  Anesthetic complications: No anesthetic complications    Cardiovascular status: acceptable  Respiratory status: acceptable  Hydration status: acceptable    Comments: -------------------------              01/29/24 0727        -------------------------   BP:        182/93      Pulse:        71          Resp:         16          Temp:   36.7 °C (98 °F)   SpO2:         95%        -------------------------     0 = understands/communicates without difficulty

## 2024-03-01 NOTE — REVIEW OF SYSTEMS
[Negative] : Allergic/Immunologic [Fatigue] : no fatigue [de-identified] : neuropathy of feet  Fair

## 2024-03-19 NOTE — HISTORY OF PRESENT ILLNESS
Hold losartan, amlodipine, furosemide, potassium supplement at discharge  Start torsemide 20 mg daily  Repeat labs in 1 week   [Home] : at home, [unfilled] , at the time of the visit. [Medical Office: ([facility])___] : at the medical office located in  [Verbal consent obtained from patient] : the patient, [unfilled] [FreeTextEntry1] : \par Ms. ALEXANDER HUDDLESTON, 57 year old female, never smoker, w/ hx of Uterine cancer dx 2020 s/p chemo s/p VINCENT/BSO, asthma, found to have persistent pleural effusion in 2021 s/p IR drainage during that admission with path positive for metastatic adenocarcinoma. \par \par Patient is s/p Right VATS, pleural bx and Pleurx catheter placement on 09/15/2021. 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 \par \par Followed by Mirtha Celaya; completed 6 cycles of chemo on 02/10/2022. \par \par Patient is s/p Right Pleural catheter removal on 03/08/2022. \par \par Patient c/o SOB, was sent for a CTA on 04/05/2022, Small right pleural effusion has increased in size and tracks along the fissures, no intervention needed and continue observation, However, patient is symptomatic, will contact IR for possible drainage. \par \par On 04/16/2022: Preprocedure ultrasound confirmed presence of trace amount of right pleural effusion. Thoracentesis was not performed. \par \par Patient has started combined immunotherapy with pembro + lenvima in 05/2022. \par \par Patient was hospitalized on 06/03/2022 for SOB secondary to bilateral pleural effusions. unsuccessful attempt was made to place R chest tube. IR was consulted and a left chest tube was placed without complication, s/p US guided right thoracentesis on 06/10/2022, A total of 300 cc of yellow fluid was then aspirated. \par \par Patient went back to hospital on 07/12/2022 for fever and SOB, found Sepsis due to COVID-19 infection, started on Remdesivir and decadron.\par \par CT chest on 08/23/2022: reviewed. \par \par Patient is followed today via Telephonic visit. Patient c/o SOB, c/o cough, denies cough, chest pain, fever, chills.

## 2024-04-03 NOTE — H&P ADULT - PROBLEM/PLAN-3
[FreeTextEntry1] : EXAMINATION OF LUMBAR SPINE & LOWER EXTREMITIES:   Upon Inspection: S/P left hip replacement   Reflexes (R) L/E:                   Quadriceps 2+                   Achilles 2+ Reflexes (L) L/E:                    Quadriceps 2+                    Achilles 2+   Sensory L/E: decrease sensation involving the left L4/L5 dermatome distribution.      Good peripheral Pulses Bilateral L/E    Antalgic Gait patient using straight cane favoring left lower extremity.    Testing: Patricks (R) [- ]               Patricks (L) [ -]               Babinski [ down going bilaterally]               Chaddock [ -bilaterally]               Oppenheim [- bilaterally]               Gonda [- bilaterally]               Clonus [ -ankle bilaterally]               Leseagues (R) [- ]               Leseagues (L) [ -]               Kemps [ -] SI Jt Lig.Challenege Test (R) - SI Jt Lig.Challenege Test (L) + S.L.R. ( R ) +50 degrees S.L.R. ( L ) + 30 degrees  Range of motion testing was performed with the use of a goniometer.                           Flexion:40 degrees (normal - 75-90)                           Extension: 5 degrees (normal - 30)                           Lateral Bending ( R ):15 degrees  (normal - 35)                           Lateral Bending ( L ):10 degrees (normal - 35)                           Thoracic Rotation ( R ): 25 degrees (normal - 35)                           Thoracic Rotation ( L ): 15 degrees (normal - 35)                           Internal Rotation Femur ( R ):WNL                           External Rotation Femur ( R ):WNL                            Internal Rotation Femur ( L ): mild to moderate restriction                            External Rotation Femur ( L ): WNL Vibratory: intact Proprioception: intact  MMT: bilateral hip flexion 4+/5 volitional efforts compromised by pain. Left EHL 4+/5  Palpation of the Lumbar Spine: Tenderness involving the L4/L5 and L5/S1 interspace.  Tenderness and spasm involving the bilateral lower lumbar paraspinal musculature with greater involvement on the left. Trigger points involving the bilateral gluteal musculature with greater involvement on the left.    Patient presents for evaluation of Lumbar Spine pain.    After today's examination additional trigger points were identified involving the left gluteus maximums, Medius and piriformis musculature, thus indicating the need for trigger point therapy.  Goals of which are to decrease pain, dissipate muscle spasm, increase ROM and improve level of function.   Sterile Technique Injection 2 Syringes of 5 cc 1 % Lidocaine HCL  left gluteus maximums, Medius and piriformis musculature,   Ice injection site PRN  Injection tolerated well.     Multiple areas were identified using palpation guidance. Using aseptic technique, each of these areas left gluteus maximums, Medius and piriformis musculature,  were isolated and the skin prepped with alcohol.  A 22 gauge 1 1/2 inch needle was inserted to the level of the muscle. At this point, a slight twitch was elicited and in some cases the patient identified referred pain to areas distant from the injection site.  All of these were consistent with the definition of a trigger point.  Aspiration revealed no blood and a mixture of 5cc 1 % Lidocaine HCL was injected in increments of 3-4 mL into each of these areas for a total of 4 sites. The needle was removed. Hemostasis was achieved with direct pressure.  The patient tolerated the procedure well. Post-procedure exam did not reveal any new neurological deficits. The patient was instructed to call with fever, chills, increased pain, redness or swelling at the injection site, or numbness or weakness in the lower extremities. The patient was discharged home in good condition with post-procedural instructions.         DISPLAY PLAN FREE TEXT

## 2024-04-04 NOTE — PROGRESS NOTE ADULT - PROBLEM SELECTOR PROBLEM 5
Pt given discharge instructions.  Discussed diet, activity, follow up appointments, symptoms and management, and prescriptions provided. Meds to beds given to patient.  Packet sent with patient and all questions answered.        Prophylactic measure

## 2024-05-13 NOTE — DISCHARGE NOTE PROVIDER - NSDCFUSCHEDAPPT_GEN_ALL_CORE_FT
Brunilda Garcia  [facility]  [facility]  Scheduled Appointment: 10/03/2022    [facility]  Scheduled Appointment: 10/12/2022    Mirtha Celaya  [facility]  [facility]  Scheduled Appointment: 11/01/2022    [facility]  [facility]  Scheduled Appointment: 11/09/2022    Mirtha Celaya  [facility]  [facility]  Scheduled Appointment: 11/28/2022     [facility]  [facility]  Scheduled Appointment: 10/12/2022    Mirtha Celaya  [facility]  [facility]  Scheduled Appointment: 11/01/2022    [facility]  [facility]  Scheduled Appointment: 11/09/2022    Mirtha Celaya  [facility]  [facility]  Scheduled Appointment: 11/28/2022     Mirtha Celaya  [facility]  [facility]  Scheduled Appointment: 11/01/2022    [facility]  [facility]  Scheduled Appointment: 11/09/2022    Mirtha Celaya  [facility]  [facility]  Scheduled Appointment: 11/28/2022     (1) More than 48 hours/None

## 2024-11-15 NOTE — HISTORY OF PRESENT ILLNESS
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 Patient was seen initially by Dr. Arciniega at [place].\par She then saw Dr Robin Zamorano, at [facility] and had a repeat biopsy that showed clear cell carcinoma.\par On 4/20/2020 she underwent surgical staging and is post TLH/BSO/SLND/Omentectomy.\par Final pathology showed stage IA,high grade endometrial carcinoma involving predominantly clear cell and focally serous features, no invasion, no LVSI.\par \par Ct C/A/P with contrast done on 3/12/2020 showed no metastatic sites.\par She completed five cycles of CT 6/3/20- 9/2/20) Last cycle held due to toxicity and myelosuppression.She received vaginal brachytherapy to a total of 2100 cGy in 3 fractions. Treatment was delivered from 10/16/20- 10/23/20. \par Scan showed CINDY.\par \par 8/10/21: Surveillance scan showed New partially visualized small right pleural effusion. Correlation with dedicated chest CT is recommended. Few subcentimeter hepatic hypo densities, too small to characterize, but similar in appearance to prior. Small pelvic ascites, similar in appearance to prior.\par - 29( previously 15)\par \par 8/30/21: CT chest: Limited. No pulmonary embolus to level of the lobar pulmonary arteries. Interval increase in small right pleural effusion since 8/19/2021 with associated adjacent compressive atelectasis.Small perihepatic ascites.\par \par Patient was admitted to the hospital with SOB and pleural effusion. She had  thoracentesis and cytology was consistent with relapsed uterine cancer.\par \par Final Diagnosis\par PLEURAL FLUID, RIGHT\par POSITIVE FOR MALIGNANT CELLS.\par Metastatic adenocarcinoma\par \par Cytology slides and cell block reveal a hypercellular specimen\par composed of crowded 3-dimensional groups and single lying\par malignant cells with enlarged nuclei containing prominent\par nucleoli and vacuolated cytoplasm, among benign mesothelial\par cells.\par \par Immunohistochemical stains are performed on the cell block. The\par tumor cells are positive for PAX-8 (focal and weak), p16, napsin\par (focal) and p53 (overexpression), while they are negative for ER,\par WT-1 and TTF-1. In the absence of another primary, it is most\par consistent with involvement by patient's known endometrial\par adenocarcinoma. [de-identified] : Carboplatin and Taxol 6/3/20- 9/2/20( five cycles) [de-identified] : Patient here for af/u visit. She has high BP today. Denies any headache, visual complain or dizziness.\par She also has increase in SOB and cough at night time. <-- Click to add NO significant Past Surgical History

## 2025-04-27 NOTE — ED PROVIDER NOTE - CARDIAC RHYTHM
Gen: Well appearing in NAD.   Head: atraumatic  Heart: s1/s2, RRR  Lung: CTA b/l,   Abd: soft, ND, +LLQ abd TTP,   no rebound or guarding, +mild LCVAT  Msk: no pedal edema or calf pain,  Neuro: AAO x3, patient moving all extremity equally, no focal neuro deficits noted  Skin: Normal for race. No rashes  Psych: Calm and cooperative regular

## 2025-06-19 NOTE — CONSULT LETTER
[facility]. Office called the patient to check on them and to reschedule their appointment.     Unable to leave voicemail.  .     Barriers to treatment: N/A - didn't speak to patient.      Electronically signed by Alicia D Giraldo-Reyes on 6/19/2025 at 3:48 PM         [FreeTextEntry2] : Dr. Mirtha Celaya ([facility]/ref) \par  [FreeTextEntry3] : Stevan Peters MD \par Attending Surgeon \par Division of Thoracic Surgery \par , Cardiovascular and Thoracic Surgery \par [facility]\par \par
